# Patient Record
Sex: FEMALE | Race: WHITE | NOT HISPANIC OR LATINO | URBAN - METROPOLITAN AREA
[De-identification: names, ages, dates, MRNs, and addresses within clinical notes are randomized per-mention and may not be internally consistent; named-entity substitution may affect disease eponyms.]

---

## 2017-02-17 ENCOUNTER — OUTPATIENT (OUTPATIENT)
Dept: OUTPATIENT SERVICES | Facility: HOSPITAL | Age: 62
LOS: 1 days | End: 2017-02-17
Payer: MEDICAID

## 2017-02-17 ENCOUNTER — APPOINTMENT (OUTPATIENT)
Dept: CT IMAGING | Facility: CLINIC | Age: 62
End: 2017-02-17

## 2017-02-17 DIAGNOSIS — R63.4 ABNORMAL WEIGHT LOSS: ICD-10-CM

## 2017-02-17 DIAGNOSIS — R10.32 LEFT LOWER QUADRANT PAIN: ICD-10-CM

## 2017-02-17 DIAGNOSIS — Z87.19 PERSONAL HISTORY OF OTHER DISEASES OF THE DIGESTIVE SYSTEM: ICD-10-CM

## 2017-02-17 DIAGNOSIS — R10.31 RIGHT LOWER QUADRANT PAIN: ICD-10-CM

## 2017-02-17 PROCEDURE — 74177 CT ABD & PELVIS W/CONTRAST: CPT

## 2017-02-17 PROCEDURE — 82565 ASSAY OF CREATININE: CPT

## 2017-11-21 ENCOUNTER — EMERGENCY (EMERGENCY)
Facility: HOSPITAL | Age: 62
LOS: 1 days | Discharge: HOME | End: 2017-11-21
Attending: EMERGENCY MEDICINE | Admitting: EMERGENCY MEDICINE
Payer: MEDICAID

## 2017-11-21 VITALS — WEIGHT: 119.93 LBS

## 2017-11-21 VITALS
HEART RATE: 64 BPM | RESPIRATION RATE: 15 BRPM | DIASTOLIC BLOOD PRESSURE: 80 MMHG | OXYGEN SATURATION: 96 % | SYSTOLIC BLOOD PRESSURE: 155 MMHG | TEMPERATURE: 98 F

## 2017-11-21 DIAGNOSIS — Z88.0 ALLERGY STATUS TO PENICILLIN: ICD-10-CM
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
## 2017-11-21 DIAGNOSIS — M25.512 PAIN IN LEFT SHOULDER: ICD-10-CM

## 2017-11-21 DIAGNOSIS — S42.202A UNSPECIFIED FRACTURE OF UPPER END OF LEFT HUMERUS, INITIAL ENCOUNTER FOR CLOSED FRACTURE: ICD-10-CM

## 2017-11-21 DIAGNOSIS — R10.13 EPIGASTRIC PAIN: ICD-10-CM

## 2017-11-21 LAB
ALBUMIN SERPL ELPH-MCNC: 3.6 G/DL — SIGNIFICANT CHANGE UP (ref 3.3–5)
ALP SERPL-CCNC: 102 U/L — SIGNIFICANT CHANGE UP (ref 40–120)
ALT FLD-CCNC: 24 U/L — SIGNIFICANT CHANGE UP (ref 12–78)
ANION GAP SERPL CALC-SCNC: 9 MMOL/L — SIGNIFICANT CHANGE UP (ref 5–17)
APTT BLD: 34.5 SEC — SIGNIFICANT CHANGE UP (ref 27.5–37.4)
AST SERPL-CCNC: 24 U/L — SIGNIFICANT CHANGE UP (ref 15–37)
BASOPHILS # BLD AUTO: 0.1 K/UL — SIGNIFICANT CHANGE UP (ref 0–0.2)
BASOPHILS NFR BLD AUTO: 0.8 % — SIGNIFICANT CHANGE UP (ref 0–2)
BILIRUB SERPL-MCNC: 0.5 MG/DL — SIGNIFICANT CHANGE UP (ref 0.2–1.2)
BUN SERPL-MCNC: 18 MG/DL — SIGNIFICANT CHANGE UP (ref 7–23)
CALCIUM SERPL-MCNC: 9.3 MG/DL — SIGNIFICANT CHANGE UP (ref 8.5–10.1)
CHLORIDE SERPL-SCNC: 110 MMOL/L — HIGH (ref 96–108)
CO2 SERPL-SCNC: 26 MMOL/L — SIGNIFICANT CHANGE UP (ref 22–31)
CREAT SERPL-MCNC: 1.2 MG/DL — SIGNIFICANT CHANGE UP (ref 0.5–1.3)
EOSINOPHIL # BLD AUTO: 0.2 K/UL — SIGNIFICANT CHANGE UP (ref 0–0.5)
EOSINOPHIL NFR BLD AUTO: 2.9 % — SIGNIFICANT CHANGE UP (ref 0–6)
GLUCOSE SERPL-MCNC: 76 MG/DL — SIGNIFICANT CHANGE UP (ref 70–99)
HCT VFR BLD CALC: 46.6 % — HIGH (ref 34.5–45)
HGB BLD-MCNC: 14.3 G/DL — SIGNIFICANT CHANGE UP (ref 11.5–15.5)
INR BLD: 1.09 RATIO — SIGNIFICANT CHANGE UP (ref 0.88–1.16)
LIDOCAIN IGE QN: 123 U/L — SIGNIFICANT CHANGE UP (ref 73–393)
LYMPHOCYTES # BLD AUTO: 1.3 K/UL — SIGNIFICANT CHANGE UP (ref 1–3.3)
LYMPHOCYTES # BLD AUTO: 20.2 % — SIGNIFICANT CHANGE UP (ref 13–44)
MCHC RBC-ENTMCNC: 29 PG — SIGNIFICANT CHANGE UP (ref 27–34)
MCHC RBC-ENTMCNC: 30.7 GM/DL — LOW (ref 32–36)
MCV RBC AUTO: 94.4 FL — SIGNIFICANT CHANGE UP (ref 80–100)
MONOCYTES # BLD AUTO: 0.4 K/UL — SIGNIFICANT CHANGE UP (ref 0–0.9)
MONOCYTES NFR BLD AUTO: 6.4 % — SIGNIFICANT CHANGE UP (ref 1–9)
NEUTROPHILS # BLD AUTO: 4.5 K/UL — SIGNIFICANT CHANGE UP (ref 1.8–7.4)
NEUTROPHILS NFR BLD AUTO: 69.7 % — SIGNIFICANT CHANGE UP (ref 43–77)
PLATELET # BLD AUTO: 200 K/UL — SIGNIFICANT CHANGE UP (ref 150–400)
POTASSIUM SERPL-MCNC: 4 MMOL/L — SIGNIFICANT CHANGE UP (ref 3.5–5.3)
POTASSIUM SERPL-SCNC: 4 MMOL/L — SIGNIFICANT CHANGE UP (ref 3.5–5.3)
PROT SERPL-MCNC: 7.8 G/DL — SIGNIFICANT CHANGE UP (ref 6–8.3)
PROTHROM AB SERPL-ACNC: 11.9 SEC — SIGNIFICANT CHANGE UP (ref 9.8–12.7)
RBC # BLD: 4.93 M/UL — SIGNIFICANT CHANGE UP (ref 3.8–5.2)
RBC # FLD: 13 % — SIGNIFICANT CHANGE UP (ref 10.3–14.5)
SODIUM SERPL-SCNC: 145 MMOL/L — SIGNIFICANT CHANGE UP (ref 135–145)
WBC # BLD: 6.4 K/UL — SIGNIFICANT CHANGE UP (ref 3.8–10.5)
WBC # FLD AUTO: 6.4 K/UL — SIGNIFICANT CHANGE UP (ref 3.8–10.5)

## 2017-11-21 PROCEDURE — 96375 TX/PRO/DX INJ NEW DRUG ADDON: CPT

## 2017-11-21 PROCEDURE — 83690 ASSAY OF LIPASE: CPT

## 2017-11-21 PROCEDURE — 70450 CT HEAD/BRAIN W/O DYE: CPT | Mod: 26

## 2017-11-21 PROCEDURE — 99285 EMERGENCY DEPT VISIT HI MDM: CPT

## 2017-11-21 PROCEDURE — 85610 PROTHROMBIN TIME: CPT

## 2017-11-21 PROCEDURE — 73060 X-RAY EXAM OF HUMERUS: CPT | Mod: 26,LT

## 2017-11-21 PROCEDURE — 85730 THROMBOPLASTIN TIME PARTIAL: CPT

## 2017-11-21 PROCEDURE — 70450 CT HEAD/BRAIN W/O DYE: CPT

## 2017-11-21 PROCEDURE — 71010: CPT | Mod: 26

## 2017-11-21 PROCEDURE — 73060 X-RAY EXAM OF HUMERUS: CPT

## 2017-11-21 PROCEDURE — 71045 X-RAY EXAM CHEST 1 VIEW: CPT

## 2017-11-21 PROCEDURE — 99284 EMERGENCY DEPT VISIT MOD MDM: CPT | Mod: 25

## 2017-11-21 PROCEDURE — 85027 COMPLETE CBC AUTOMATED: CPT

## 2017-11-21 PROCEDURE — 80053 COMPREHEN METABOLIC PANEL: CPT

## 2017-11-21 PROCEDURE — 73030 X-RAY EXAM OF SHOULDER: CPT | Mod: 26,LT

## 2017-11-21 PROCEDURE — 96374 THER/PROPH/DIAG INJ IV PUSH: CPT

## 2017-11-21 PROCEDURE — 76705 ECHO EXAM OF ABDOMEN: CPT

## 2017-11-21 PROCEDURE — 73030 X-RAY EXAM OF SHOULDER: CPT

## 2017-11-21 PROCEDURE — 76705 ECHO EXAM OF ABDOMEN: CPT | Mod: 26

## 2017-11-21 RX ORDER — MORPHINE SULFATE 50 MG/1
4 CAPSULE, EXTENDED RELEASE ORAL ONCE
Qty: 0 | Refills: 0 | Status: DISCONTINUED | OUTPATIENT
Start: 2017-11-21 | End: 2017-11-21

## 2017-11-21 RX ORDER — OXYCODONE HYDROCHLORIDE 5 MG/1
1 TABLET ORAL
Qty: 16 | Refills: 0 | OUTPATIENT
Start: 2017-11-21 | End: 2017-11-25

## 2017-11-21 RX ORDER — HYDROMORPHONE HYDROCHLORIDE 2 MG/ML
0.5 INJECTION INTRAMUSCULAR; INTRAVENOUS; SUBCUTANEOUS ONCE
Qty: 0 | Refills: 0 | Status: DISCONTINUED | OUTPATIENT
Start: 2017-11-21 | End: 2017-11-21

## 2017-11-21 RX ORDER — ONDANSETRON 8 MG/1
1 TABLET, FILM COATED ORAL
Qty: 9 | Refills: 0 | OUTPATIENT
Start: 2017-11-21 | End: 2017-11-24

## 2017-11-21 RX ADMIN — HYDROMORPHONE HYDROCHLORIDE 0.5 MILLIGRAM(S): 2 INJECTION INTRAMUSCULAR; INTRAVENOUS; SUBCUTANEOUS at 09:14

## 2017-11-21 RX ADMIN — MORPHINE SULFATE 4 MILLIGRAM(S): 50 CAPSULE, EXTENDED RELEASE ORAL at 07:42

## 2017-11-21 RX ADMIN — MORPHINE SULFATE 4 MILLIGRAM(S): 50 CAPSULE, EXTENDED RELEASE ORAL at 07:57

## 2017-11-21 RX ADMIN — HYDROMORPHONE HYDROCHLORIDE 0.5 MILLIGRAM(S): 2 INJECTION INTRAMUSCULAR; INTRAVENOUS; SUBCUTANEOUS at 09:32

## 2017-11-21 NOTE — ED PROVIDER NOTE - OBJECTIVE STATEMENT
61 yo female hx of seizures s/p mechanical fall today while at home, thought her door was closed but was actually open and fell through landed on her left shoulder, admits to hitting the side of her head, no LOC, no neck pain c/o left shoulder pain with difficulty ranging it.  Also c/o of upper abdominal pain radiating to the back x 1 week.  No nausea/vomiting/diarrhea.  No fever/chills.

## 2017-11-21 NOTE — ED PROVIDER NOTE - PRINCIPAL DIAGNOSIS
Shoulder fracture, left, closed, initial encounter Closed fracture of proximal end of left humerus, unspecified fracture morphology, initial encounter

## 2017-11-21 NOTE — ED PROVIDER NOTE - PROGRESS NOTE DETAILS
discussed cased with Sung stevens resident, recommend sling and f/u with Dr. Lobato, does not feel the need to see patient in ED patient feels better after medications, offered admission for pain control, but patient declined, her with daughter and grandson who are comfortable with taking back home.  Labs and imaging explained to patient, will f/u with PMD Dr. Frederick. after discharge paper given, patient now stating she can't take percocet (despite telling patient what see was receiving), now requesting vicodin

## 2017-11-21 NOTE — CHART NOTE - NSCHARTNOTEFT_GEN_A_CORE
Asked by ed attending to eval patient for nondisplaced proximal humerus fracture which is an orthopedic issue and out of scope of practice of ER physician.    62y Female RHD presents c/o L shoulder pain sp mechanical fall. +HS. Denies LOC. Denies numbness/tingling. Denies fever/chills. Denies pain/injury elsewhere. No other complaints.    HEALTH ISSUES - PROBLEM Dx:        MEDICATIONS  (STANDING):    Allergies    penicillin (Anaphylaxis)    Intolerances                            14.3   6.4   )-----------( 200      ( 21 Nov 2017 07:53 )             46.6     21 Nov 2017 07:53    145    |  110    |  18     ----------------------------<  76     4.0     |  26     |  1.20     Ca    9.3        21 Nov 2017 07:53    TPro  7.8    /  Alb  3.6    /  TBili  0.5    /  DBili  x      /  AST  24     /  ALT  24     /  AlkPhos  102    21 Nov 2017 07:53    PT/INR - ( 21 Nov 2017 07:53 )   PT: 11.9 sec;   INR: 1.09 ratio         PTT - ( 21 Nov 2017 07:53 )  PTT:34.5 sec  Vital Signs Last 24 Hrs  T(C): 36.4 (11-21-17 @ 06:56), Max: 36.4 (11-21-17 @ 06:56)  T(F): 97.6 (11-21-17 @ 06:56), Max: 97.6 (11-21-17 @ 06:56)  HR: 52 (11-21-17 @ 06:56) (52 - 52)  BP: 156/62 (11-21-17 @ 06:56) (156/62 - 156/62)  BP(mean): --  RR: 15 (11-21-17 @ 06:56) (15 - 15)  SpO2: 100% (11-21-17 @ 06:56) (100% - 100%)  Imaging: XR demonstrates R/L proximal humerus fracture    Physical Exam  Gen: NAD  LUE: Skin intact,  +ttp shoulder, +r/u/m/ain/pin function, SILT, radial pulse intact, compartments soft/compressible, warm/well perfused    A/P: 62y Female with L proximal humerus fracture  Pain control  NWB LUE in sling, keep c/d/I   Pendulum exercises  Ice  Active movement of fingers/elbow encouraged  Ca/Vit D, outpt osteoporosis workup  Possible need for surgical intervention discussed with patient  All question answered  Follow up with Dr. Lobato as outpatient within 1 week, call office for appointment   Ortho stable

## 2017-11-21 NOTE — ED PROVIDER NOTE - CARE PLAN
Principal Discharge DX:	Shoulder fracture, left, closed, initial encounter  Secondary Diagnosis:	Upper abdominal pain Principal Discharge DX:	Shoulder fracture, left, closed, initial encounter  Secondary Diagnosis:	Upper abdominal pain  Secondary Diagnosis:	Epigastric pain Principal Discharge DX:	Closed fracture of proximal end of left humerus, unspecified fracture morphology, initial encounter  Secondary Diagnosis:	Upper abdominal pain  Secondary Diagnosis:	Epigastric pain

## 2017-11-22 ENCOUNTER — EMERGENCY (EMERGENCY)
Facility: HOSPITAL | Age: 62
LOS: 1 days | Discharge: HOME | End: 2017-11-22
Attending: EMERGENCY MEDICINE | Admitting: EMERGENCY MEDICINE
Payer: MEDICAID

## 2017-11-22 VITALS
RESPIRATION RATE: 18 BRPM | SYSTOLIC BLOOD PRESSURE: 159 MMHG | TEMPERATURE: 99 F | HEIGHT: 62 IN | HEART RATE: 67 BPM | WEIGHT: 121.25 LBS | DIASTOLIC BLOOD PRESSURE: 79 MMHG | OXYGEN SATURATION: 95 %

## 2017-11-22 LAB
ALBUMIN SERPL ELPH-MCNC: 2.9 G/DL — LOW (ref 3.3–5)
ALP SERPL-CCNC: 87 U/L — SIGNIFICANT CHANGE UP (ref 40–120)
ALT FLD-CCNC: 19 U/L — SIGNIFICANT CHANGE UP (ref 12–78)
ANION GAP SERPL CALC-SCNC: 12 MMOL/L — SIGNIFICANT CHANGE UP (ref 5–17)
AST SERPL-CCNC: 26 U/L — SIGNIFICANT CHANGE UP (ref 15–37)
BASOPHILS # BLD AUTO: 0 K/UL — SIGNIFICANT CHANGE UP (ref 0–0.2)
BASOPHILS NFR BLD AUTO: 0.5 % — SIGNIFICANT CHANGE UP (ref 0–2)
BILIRUB SERPL-MCNC: 0.5 MG/DL — SIGNIFICANT CHANGE UP (ref 0.2–1.2)
BUN SERPL-MCNC: 19 MG/DL — SIGNIFICANT CHANGE UP (ref 7–23)
CALCIUM SERPL-MCNC: 8.6 MG/DL — SIGNIFICANT CHANGE UP (ref 8.5–10.1)
CHLORIDE SERPL-SCNC: 107 MMOL/L — SIGNIFICANT CHANGE UP (ref 96–108)
CO2 SERPL-SCNC: 19 MMOL/L — LOW (ref 22–31)
CREAT SERPL-MCNC: 0.88 MG/DL — SIGNIFICANT CHANGE UP (ref 0.5–1.3)
EOSINOPHIL # BLD AUTO: 0.2 K/UL — SIGNIFICANT CHANGE UP (ref 0–0.5)
EOSINOPHIL NFR BLD AUTO: 2.2 % — SIGNIFICANT CHANGE UP (ref 0–6)
GLUCOSE SERPL-MCNC: 83 MG/DL — SIGNIFICANT CHANGE UP (ref 70–99)
HCT VFR BLD CALC: 41.6 % — SIGNIFICANT CHANGE UP (ref 34.5–45)
HGB BLD-MCNC: 13.5 G/DL — SIGNIFICANT CHANGE UP (ref 11.5–15.5)
LYMPHOCYTES # BLD AUTO: 0.8 K/UL — LOW (ref 1–3.3)
LYMPHOCYTES # BLD AUTO: 11 % — LOW (ref 13–44)
MCHC RBC-ENTMCNC: 29.8 PG — SIGNIFICANT CHANGE UP (ref 27–34)
MCHC RBC-ENTMCNC: 32.5 GM/DL — SIGNIFICANT CHANGE UP (ref 32–36)
MCV RBC AUTO: 91.7 FL — SIGNIFICANT CHANGE UP (ref 80–100)
MONOCYTES # BLD AUTO: 0.6 K/UL — SIGNIFICANT CHANGE UP (ref 0–0.9)
MONOCYTES NFR BLD AUTO: 7.4 % — SIGNIFICANT CHANGE UP (ref 1–9)
NEUTROPHILS # BLD AUTO: 6.1 K/UL — SIGNIFICANT CHANGE UP (ref 1.8–7.4)
NEUTROPHILS NFR BLD AUTO: 79 % — HIGH (ref 43–77)
PLATELET # BLD AUTO: 173 K/UL — SIGNIFICANT CHANGE UP (ref 150–400)
POTASSIUM SERPL-MCNC: 5 MMOL/L — SIGNIFICANT CHANGE UP (ref 3.5–5.3)
POTASSIUM SERPL-SCNC: 5 MMOL/L — SIGNIFICANT CHANGE UP (ref 3.5–5.3)
PROT SERPL-MCNC: 7.3 G/DL — SIGNIFICANT CHANGE UP (ref 6–8.3)
RBC # BLD: 4.53 M/UL — SIGNIFICANT CHANGE UP (ref 3.8–5.2)
RBC # FLD: 12.8 % — SIGNIFICANT CHANGE UP (ref 10.3–14.5)
SODIUM SERPL-SCNC: 138 MMOL/L — SIGNIFICANT CHANGE UP (ref 135–145)
WBC # BLD: 7.7 K/UL — SIGNIFICANT CHANGE UP (ref 3.8–10.5)
WBC # FLD AUTO: 7.7 K/UL — SIGNIFICANT CHANGE UP (ref 3.8–10.5)

## 2017-11-22 PROCEDURE — 96374 THER/PROPH/DIAG INJ IV PUSH: CPT

## 2017-11-22 PROCEDURE — 96361 HYDRATE IV INFUSION ADD-ON: CPT

## 2017-11-22 PROCEDURE — 85610 PROTHROMBIN TIME: CPT

## 2017-11-22 PROCEDURE — 85027 COMPLETE CBC AUTOMATED: CPT

## 2017-11-22 PROCEDURE — 80053 COMPREHEN METABOLIC PANEL: CPT

## 2017-11-22 PROCEDURE — 36415 COLL VENOUS BLD VENIPUNCTURE: CPT

## 2017-11-22 PROCEDURE — 99284 EMERGENCY DEPT VISIT MOD MDM: CPT | Mod: 25

## 2017-11-22 PROCEDURE — 85730 THROMBOPLASTIN TIME PARTIAL: CPT

## 2017-11-22 PROCEDURE — 99284 EMERGENCY DEPT VISIT MOD MDM: CPT

## 2017-11-22 PROCEDURE — 96375 TX/PRO/DX INJ NEW DRUG ADDON: CPT

## 2017-11-22 RX ORDER — METOCLOPRAMIDE HCL 10 MG
10 TABLET ORAL ONCE
Qty: 0 | Refills: 0 | Status: COMPLETED | OUTPATIENT
Start: 2017-11-22 | End: 2017-11-22

## 2017-11-22 RX ORDER — HYDROMORPHONE HYDROCHLORIDE 2 MG/ML
0.5 INJECTION INTRAMUSCULAR; INTRAVENOUS; SUBCUTANEOUS ONCE
Qty: 0 | Refills: 0 | Status: DISCONTINUED | OUTPATIENT
Start: 2017-11-22 | End: 2017-11-22

## 2017-11-22 RX ORDER — SODIUM CHLORIDE 9 MG/ML
1000 INJECTION INTRAMUSCULAR; INTRAVENOUS; SUBCUTANEOUS ONCE
Qty: 0 | Refills: 0 | Status: COMPLETED | OUTPATIENT
Start: 2017-11-22 | End: 2017-11-22

## 2017-11-22 RX ORDER — KETOROLAC TROMETHAMINE 30 MG/ML
30 SYRINGE (ML) INJECTION ONCE
Qty: 0 | Refills: 0 | Status: DISCONTINUED | OUTPATIENT
Start: 2017-11-22 | End: 2017-11-22

## 2017-11-22 RX ADMIN — Medication 10 MILLIGRAM(S): at 22:24

## 2017-11-22 RX ADMIN — HYDROMORPHONE HYDROCHLORIDE 0.5 MILLIGRAM(S): 2 INJECTION INTRAMUSCULAR; INTRAVENOUS; SUBCUTANEOUS at 23:35

## 2017-11-22 RX ADMIN — Medication 30 MILLIGRAM(S): at 23:48

## 2017-11-22 RX ADMIN — Medication 30 MILLIGRAM(S): at 22:23

## 2017-11-22 RX ADMIN — SODIUM CHLORIDE 1000 MILLILITER(S): 9 INJECTION INTRAMUSCULAR; INTRAVENOUS; SUBCUTANEOUS at 22:25

## 2017-11-22 NOTE — ED ADULT NURSE NOTE - OBJECTIVE STATEMENT
Pt was received with left arm sling and ecchymosis and C/O N/V and severe headache and left arm pain. Pt was evaluated by Dr. Bain who is aware of the above assessments. Pt was given her stat meds as ordered. No s/s of distress noted. Continue to monitor pt.

## 2017-11-22 NOTE — ED PROVIDER NOTE - OBJECTIVE STATEMENT
pmdaphne - lee pt with hx migraines c/o headache with n/v c/w her chronic migraines s/p stopping her fioricet 2 days ago. pt also relates didn't take xanax today. no fevers, chills, rash, dizziness, cp, sob, abd pain, weakness, numbness, vision or speech changes.  pmd - lee

## 2017-11-22 NOTE — ED PROVIDER NOTE - MUSCULOSKELETAL, MLM
Spine appears normal, left arm in sling (due to recent shoulder fx), otherwise range of motion is not limited, no muscle or joint tenderness

## 2017-11-22 NOTE — ED ADULT NURSE NOTE - CHPI ED SYMPTOMS NEG
no dysuria/no chills/no abdominal distension/no blood in stool/no diarrhea/no fever/no burning urination

## 2017-11-22 NOTE — ED ADULT NURSE NOTE - AS SC BRADEN [employer]
Billing Type: Third-Party Bill Notification Instructions: Patient will be notified of biopsy results. However, patient instructed to call the office if not contacted within 2 weeks. Anesthesia Type: 1% lidocaine with epinephrine Consent was obtained and risks were reviewed including but not limited to scarring, infection, bleeding, scabbing, incomplete removal, nerve damage and allergy to anesthesia. Detail Level: Detailed Price (Use Numbers Only, No Special Characters Or $): 250.00 Hemostasis: Drysol Post-Care Instructions: I reviewed with the patient in detail post-care instructions. Patient is to keep the biopsy site dry overnight, and then apply bacitracin twice daily until healed. Patient may apply hydrogen peroxide soaks to remove any crusting. Wound Care: Vaseline Biopsy Method: Dermablade Anesthesia Volume In Cc: 1.5 Size Of Lesion In Cm (Optional): 0 Biopsy Type: H and E Price (Use Numbers Only, No Special Characters Or $): 125.00 (3) slightly limited

## 2017-11-23 VITALS
DIASTOLIC BLOOD PRESSURE: 68 MMHG | TEMPERATURE: 98 F | RESPIRATION RATE: 16 BRPM | OXYGEN SATURATION: 97 % | SYSTOLIC BLOOD PRESSURE: 133 MMHG | HEART RATE: 63 BPM

## 2017-11-23 LAB
APTT BLD: 34.4 SEC — SIGNIFICANT CHANGE UP (ref 27.5–37.4)
INR BLD: 1.24 RATIO — HIGH (ref 0.88–1.16)
PROTHROM AB SERPL-ACNC: 13.6 SEC — HIGH (ref 9.8–12.7)

## 2017-11-23 RX ADMIN — HYDROMORPHONE HYDROCHLORIDE 0.5 MILLIGRAM(S): 2 INJECTION INTRAMUSCULAR; INTRAVENOUS; SUBCUTANEOUS at 00:28

## 2017-12-18 ENCOUNTER — APPOINTMENT (OUTPATIENT)
Dept: ORTHOPEDIC SURGERY | Facility: CLINIC | Age: 62
End: 2017-12-18
Payer: MEDICAID

## 2017-12-18 VITALS
WEIGHT: 120 LBS | HEIGHT: 62 IN | BODY MASS INDEX: 22.08 KG/M2 | DIASTOLIC BLOOD PRESSURE: 78 MMHG | SYSTOLIC BLOOD PRESSURE: 110 MMHG | HEART RATE: 82 BPM

## 2017-12-18 DIAGNOSIS — Z78.9 OTHER SPECIFIED HEALTH STATUS: ICD-10-CM

## 2017-12-18 DIAGNOSIS — Z87.898 PERSONAL HISTORY OF OTHER SPECIFIED CONDITIONS: ICD-10-CM

## 2017-12-18 DIAGNOSIS — Z80.9 FAMILY HISTORY OF MALIGNANT NEOPLASM, UNSPECIFIED: ICD-10-CM

## 2017-12-18 PROCEDURE — 73030 X-RAY EXAM OF SHOULDER: CPT | Mod: LT

## 2017-12-18 PROCEDURE — 99203 OFFICE O/P NEW LOW 30 MIN: CPT

## 2018-01-22 ENCOUNTER — APPOINTMENT (OUTPATIENT)
Dept: ORTHOPEDIC SURGERY | Facility: CLINIC | Age: 63
End: 2018-01-22
Payer: MEDICAID

## 2018-01-22 VITALS — HEIGHT: 62 IN | RESPIRATION RATE: 14 BRPM | WEIGHT: 120 LBS | BODY MASS INDEX: 22.08 KG/M2

## 2018-01-22 DIAGNOSIS — S42.295A OTHER NONDISPLACED FRACTURE OF UPPER END OF LEFT HUMERUS, INITIAL ENCOUNTER FOR CLOSED FRACTURE: ICD-10-CM

## 2018-01-22 PROCEDURE — 99214 OFFICE O/P EST MOD 30 MIN: CPT

## 2018-01-22 PROCEDURE — 73030 X-RAY EXAM OF SHOULDER: CPT | Mod: LT

## 2018-01-26 PROBLEM — S42.295A OTHER CLOSED NONDISPLACED FRACTURE OF PROXIMAL END OF LEFT HUMERUS, INITIAL ENCOUNTER: Status: ACTIVE | Noted: 2017-12-18

## 2018-01-28 ENCOUNTER — EMERGENCY (EMERGENCY)
Facility: HOSPITAL | Age: 63
LOS: 1 days | Discharge: HOME | End: 2018-01-28
Attending: EMERGENCY MEDICINE | Admitting: EMERGENCY MEDICINE
Payer: MEDICAID

## 2018-01-28 VITALS
OXYGEN SATURATION: 99 % | SYSTOLIC BLOOD PRESSURE: 140 MMHG | RESPIRATION RATE: 17 BRPM | DIASTOLIC BLOOD PRESSURE: 62 MMHG | TEMPERATURE: 98 F | HEART RATE: 72 BPM

## 2018-01-28 VITALS
HEIGHT: 62 IN | TEMPERATURE: 98 F | OXYGEN SATURATION: 98 % | WEIGHT: 115.08 LBS | RESPIRATION RATE: 16 BRPM | HEART RATE: 68 BPM | DIASTOLIC BLOOD PRESSURE: 80 MMHG | SYSTOLIC BLOOD PRESSURE: 189 MMHG

## 2018-01-28 PROCEDURE — 96375 TX/PRO/DX INJ NEW DRUG ADDON: CPT

## 2018-01-28 PROCEDURE — 99284 EMERGENCY DEPT VISIT MOD MDM: CPT

## 2018-01-28 PROCEDURE — 99284 EMERGENCY DEPT VISIT MOD MDM: CPT | Mod: 25

## 2018-01-28 PROCEDURE — 96374 THER/PROPH/DIAG INJ IV PUSH: CPT

## 2018-01-28 RX ORDER — METOCLOPRAMIDE HCL 10 MG
10 TABLET ORAL ONCE
Qty: 0 | Refills: 0 | Status: COMPLETED | OUTPATIENT
Start: 2018-01-28 | End: 2018-01-28

## 2018-01-28 RX ORDER — HYDROMORPHONE HYDROCHLORIDE 2 MG/ML
1 INJECTION INTRAMUSCULAR; INTRAVENOUS; SUBCUTANEOUS ONCE
Qty: 0 | Refills: 0 | Status: DISCONTINUED | OUTPATIENT
Start: 2018-01-28 | End: 2018-01-28

## 2018-01-28 RX ORDER — KETOROLAC TROMETHAMINE 30 MG/ML
30 SYRINGE (ML) INJECTION ONCE
Qty: 0 | Refills: 0 | Status: DISCONTINUED | OUTPATIENT
Start: 2018-01-28 | End: 2018-01-28

## 2018-01-28 RX ORDER — METOCLOPRAMIDE HCL 10 MG
10 TABLET ORAL ONCE
Qty: 0 | Refills: 0 | Status: DISCONTINUED | OUTPATIENT
Start: 2018-01-28 | End: 2018-01-28

## 2018-01-28 RX ORDER — SODIUM CHLORIDE 9 MG/ML
1000 INJECTION INTRAMUSCULAR; INTRAVENOUS; SUBCUTANEOUS ONCE
Qty: 0 | Refills: 0 | Status: COMPLETED | OUTPATIENT
Start: 2018-01-28 | End: 2018-01-28

## 2018-01-28 RX ORDER — PROCHLORPERAZINE MALEATE 5 MG
10 TABLET ORAL ONCE
Qty: 0 | Refills: 0 | Status: COMPLETED | OUTPATIENT
Start: 2018-01-28 | End: 2018-01-28

## 2018-01-28 RX ADMIN — HYDROMORPHONE HYDROCHLORIDE 1 MILLIGRAM(S): 2 INJECTION INTRAMUSCULAR; INTRAVENOUS; SUBCUTANEOUS at 08:39

## 2018-01-28 RX ADMIN — Medication 125 MILLIGRAM(S): at 05:38

## 2018-01-28 RX ADMIN — Medication 30 MILLIGRAM(S): at 05:38

## 2018-01-28 RX ADMIN — Medication 30 MILLIGRAM(S): at 06:19

## 2018-01-28 RX ADMIN — Medication 10 MILLIGRAM(S): at 06:43

## 2018-01-28 RX ADMIN — SODIUM CHLORIDE 2000 MILLILITER(S): 9 INJECTION INTRAMUSCULAR; INTRAVENOUS; SUBCUTANEOUS at 05:37

## 2018-01-28 NOTE — ED ADULT NURSE NOTE - OBJECTIVE STATEMENT
62 year old female presents to the ED with c/o migraine x 10 hours. A+Ox  4. anxious. states migraine started at 4pm the previous night. On 200 mg Topamax. Tried tylenol and coffee. Ambulation WDL without incidence or need of assistance. Lungs clear lenora. S1/S2. Reports headache and NV. skin warm dry and intact. asking for dilaudid.

## 2018-01-28 NOTE — ED PROVIDER NOTE - PROGRESS NOTE DETAILS
pt still with 7/10 ha after tx with tx with one dose dilaudid and d/c for neuro fu Pt feeling much improved, wishes to go home.  Dw pt re paiz prec / inst, importance of close, prompt fu and to return with any changes or concerns

## 2018-01-28 NOTE — ED PROVIDER NOTE - CHPI ED SYMPTOMS NEG
no confusion/no blurred vision/no numbness/no dizziness/no fever/no loss of consciousness/no change in level of consciousness/no weakness

## 2018-01-28 NOTE — ED PROVIDER NOTE - CONSTITUTIONAL, MLM
normal... disheveled, awake, alert, oriented to person, place, time/situation and in no apparent distress.

## 2018-01-28 NOTE — ED PROVIDER NOTE - ENMT, MLM
Airway patent, Nasal mucosa clear. Mm dry. Throat has no vesicles, no oropharyngeal exudates and uvula is midline. poor dentition

## 2018-01-28 NOTE — ED PROVIDER NOTE - MEDICAL DECISION MAKING DETAILS
pt with migraine headache, typical severe type that doesn't respond to meds, pt states ONLY dilaudid works, pt told opiates not first line tx and will tx with compazine, toradol, ivf, o2. steroids. reassess

## 2018-01-28 NOTE — ED PROVIDER NOTE - OBJECTIVE STATEMENT
Pt is a 61 yo female hx migraines on topamax, sz,  pt states with 3 dasy of typical migraine, progressively severe with n/v and photophobia. pain is diffuse throbbing ha, 10/10.  pt states that often caffeine and rest with topamax break headaches, but occasionally they get this severe and only dilaudid works for her. no f/c, numbness, weakness, vision change, pt has had many headaches this severe in past

## 2018-07-01 ENCOUNTER — OUTPATIENT (OUTPATIENT)
Dept: OUTPATIENT SERVICES | Facility: HOSPITAL | Age: 63
LOS: 1 days | End: 2018-07-01
Payer: MEDICAID

## 2018-07-01 PROCEDURE — G9001: CPT

## 2018-07-16 RX ADMIN — Medication 650 MILLIGRAM(S): at 21:39

## 2018-07-19 ENCOUNTER — INPATIENT (INPATIENT)
Facility: HOSPITAL | Age: 63
LOS: 8 days | Discharge: HOME | DRG: 552 | End: 2018-07-28
Attending: INTERNAL MEDICINE | Admitting: INTERNAL MEDICINE
Payer: MEDICAID

## 2018-07-19 VITALS
WEIGHT: 115.08 LBS | TEMPERATURE: 97 F | HEART RATE: 51 BPM | RESPIRATION RATE: 16 BRPM | OXYGEN SATURATION: 98 % | DIASTOLIC BLOOD PRESSURE: 64 MMHG | SYSTOLIC BLOOD PRESSURE: 138 MMHG

## 2018-07-19 DIAGNOSIS — R41.0 DISORIENTATION, UNSPECIFIED: ICD-10-CM

## 2018-07-19 LAB
ALBUMIN SERPL ELPH-MCNC: 3.6 G/DL — SIGNIFICANT CHANGE UP (ref 3.3–5)
ALP SERPL-CCNC: 85 U/L — SIGNIFICANT CHANGE UP (ref 40–120)
ALT FLD-CCNC: 17 U/L — SIGNIFICANT CHANGE UP (ref 10–45)
ANION GAP SERPL CALC-SCNC: 13 MMOL/L — SIGNIFICANT CHANGE UP (ref 5–17)
APPEARANCE UR: CLEAR — SIGNIFICANT CHANGE UP
APTT BLD: 31.7 SEC — SIGNIFICANT CHANGE UP (ref 27.5–37.4)
AST SERPL-CCNC: 21 U/L — SIGNIFICANT CHANGE UP (ref 10–40)
BASE EXCESS BLDV CALC-SCNC: -2.4 MMOL/L — LOW (ref -2–2)
BILIRUB SERPL-MCNC: 0.2 MG/DL — SIGNIFICANT CHANGE UP (ref 0.2–1.2)
BILIRUB UR-MCNC: NEGATIVE — SIGNIFICANT CHANGE UP
BUN SERPL-MCNC: 31 MG/DL — HIGH (ref 7–23)
CA-I SERPL-SCNC: 1.24 MMOL/L — SIGNIFICANT CHANGE UP (ref 1.12–1.3)
CALCIUM SERPL-MCNC: 9.1 MG/DL — SIGNIFICANT CHANGE UP (ref 8.4–10.5)
CHLORIDE BLDV-SCNC: 115 MMOL/L — HIGH (ref 96–108)
CHLORIDE SERPL-SCNC: 110 MMOL/L — HIGH (ref 96–108)
CO2 BLDV-SCNC: 25 MMOL/L — SIGNIFICANT CHANGE UP (ref 22–30)
CO2 SERPL-SCNC: 20 MMOL/L — LOW (ref 22–31)
COLOR SPEC: SIGNIFICANT CHANGE UP
CREAT SERPL-MCNC: 1.36 MG/DL — HIGH (ref 0.5–1.3)
DIFF PNL FLD: NEGATIVE — SIGNIFICANT CHANGE UP
GAS PNL BLDV: 139 MMOL/L — SIGNIFICANT CHANGE UP (ref 136–145)
GAS PNL BLDV: SIGNIFICANT CHANGE UP
GLUCOSE BLDV-MCNC: 128 MG/DL — HIGH (ref 70–99)
GLUCOSE SERPL-MCNC: 138 MG/DL — HIGH (ref 70–99)
GLUCOSE UR QL: NEGATIVE — SIGNIFICANT CHANGE UP
HCO3 BLDV-SCNC: 23 MMOL/L — SIGNIFICANT CHANGE UP (ref 21–29)
HCT VFR BLD CALC: 42 % — SIGNIFICANT CHANGE UP (ref 34.5–45)
HCT VFR BLDA CALC: 42 % — SIGNIFICANT CHANGE UP (ref 39–50)
HGB BLD CALC-MCNC: 13.8 G/DL — SIGNIFICANT CHANGE UP (ref 11.5–15.5)
HGB BLD-MCNC: 13.9 G/DL — SIGNIFICANT CHANGE UP (ref 11.5–15.5)
INR BLD: 1.05 RATIO — SIGNIFICANT CHANGE UP (ref 0.88–1.16)
KETONES UR-MCNC: NEGATIVE — SIGNIFICANT CHANGE UP
LACTATE BLDV-MCNC: 1.5 MMOL/L — SIGNIFICANT CHANGE UP (ref 0.7–2)
LEUKOCYTE ESTERASE UR-ACNC: ABNORMAL
MCHC RBC-ENTMCNC: 30.7 PG — SIGNIFICANT CHANGE UP (ref 27–34)
MCHC RBC-ENTMCNC: 33.2 GM/DL — SIGNIFICANT CHANGE UP (ref 32–36)
MCV RBC AUTO: 92.5 FL — SIGNIFICANT CHANGE UP (ref 80–100)
NITRITE UR-MCNC: NEGATIVE — SIGNIFICANT CHANGE UP
PCO2 BLDV: 46 MMHG — SIGNIFICANT CHANGE UP (ref 35–50)
PH BLDV: 7.32 — LOW (ref 7.35–7.45)
PH UR: 7 — SIGNIFICANT CHANGE UP (ref 5–8)
PLATELET # BLD AUTO: 158 K/UL — SIGNIFICANT CHANGE UP (ref 150–400)
PO2 BLDV: 41 MMHG — SIGNIFICANT CHANGE UP (ref 25–45)
POTASSIUM BLDV-SCNC: 4.4 MMOL/L — SIGNIFICANT CHANGE UP (ref 3.5–5.3)
POTASSIUM SERPL-MCNC: 4 MMOL/L — SIGNIFICANT CHANGE UP (ref 3.5–5.3)
POTASSIUM SERPL-SCNC: 4 MMOL/L — SIGNIFICANT CHANGE UP (ref 3.5–5.3)
PROT SERPL-MCNC: 6.8 G/DL — SIGNIFICANT CHANGE UP (ref 6–8.3)
PROT UR-MCNC: NEGATIVE — SIGNIFICANT CHANGE UP
PROTHROM AB SERPL-ACNC: 11.4 SEC — SIGNIFICANT CHANGE UP (ref 9.8–12.7)
RBC # BLD: 4.54 M/UL — SIGNIFICANT CHANGE UP (ref 3.8–5.2)
RBC # FLD: 12.3 % — SIGNIFICANT CHANGE UP (ref 10.3–14.5)
RBC CASTS # UR COMP ASSIST: ABNORMAL /HPF (ref 0–2)
SAO2 % BLDV: 73 % — SIGNIFICANT CHANGE UP (ref 67–88)
SODIUM SERPL-SCNC: 143 MMOL/L — SIGNIFICANT CHANGE UP (ref 135–145)
SP GR SPEC: 1.01 — LOW (ref 1.01–1.02)
UROBILINOGEN FLD QL: NEGATIVE — SIGNIFICANT CHANGE UP
WBC # BLD: 5.4 K/UL — SIGNIFICANT CHANGE UP (ref 3.8–10.5)
WBC # FLD AUTO: 5.4 K/UL — SIGNIFICANT CHANGE UP (ref 3.8–10.5)
WBC UR QL: SIGNIFICANT CHANGE UP /HPF (ref 0–5)

## 2018-07-19 PROCEDURE — 99284 EMERGENCY DEPT VISIT MOD MDM: CPT | Mod: 25

## 2018-07-19 PROCEDURE — 93010 ELECTROCARDIOGRAM REPORT: CPT

## 2018-07-19 PROCEDURE — 72128 CT CHEST SPINE W/O DYE: CPT | Mod: 26

## 2018-07-19 PROCEDURE — 70450 CT HEAD/BRAIN W/O DYE: CPT | Mod: 26

## 2018-07-19 RX ORDER — SODIUM CHLORIDE 9 MG/ML
500 INJECTION INTRAMUSCULAR; INTRAVENOUS; SUBCUTANEOUS ONCE
Qty: 0 | Refills: 0 | Status: COMPLETED | OUTPATIENT
Start: 2018-07-19 | End: 2018-07-19

## 2018-07-19 RX ORDER — DIPHENHYDRAMINE HCL 50 MG
50 CAPSULE ORAL ONCE
Qty: 0 | Refills: 0 | Status: DISCONTINUED | OUTPATIENT
Start: 2018-07-19 | End: 2018-07-19

## 2018-07-19 RX ORDER — IBUPROFEN 200 MG
600 TABLET ORAL ONCE
Qty: 0 | Refills: 0 | Status: DISCONTINUED | OUTPATIENT
Start: 2018-07-19 | End: 2018-07-19

## 2018-07-19 RX ORDER — ACETAMINOPHEN 500 MG
975 TABLET ORAL ONCE
Qty: 0 | Refills: 0 | Status: COMPLETED | OUTPATIENT
Start: 2018-07-19 | End: 2018-07-19

## 2018-07-19 RX ORDER — MORPHINE SULFATE 50 MG/1
2 CAPSULE, EXTENDED RELEASE ORAL ONCE
Qty: 0 | Refills: 0 | Status: DISCONTINUED | OUTPATIENT
Start: 2018-07-19 | End: 2018-07-19

## 2018-07-19 RX ORDER — IBUPROFEN 200 MG
600 TABLET ORAL ONCE
Qty: 0 | Refills: 0 | Status: COMPLETED | OUTPATIENT
Start: 2018-07-19 | End: 2018-07-19

## 2018-07-19 RX ADMIN — Medication 600 MILLIGRAM(S): at 19:07

## 2018-07-19 RX ADMIN — MORPHINE SULFATE 2 MILLIGRAM(S): 50 CAPSULE, EXTENDED RELEASE ORAL at 22:38

## 2018-07-19 RX ADMIN — Medication 600 MILLIGRAM(S): at 19:06

## 2018-07-19 RX ADMIN — SODIUM CHLORIDE 1000 MILLILITER(S): 9 INJECTION INTRAMUSCULAR; INTRAVENOUS; SUBCUTANEOUS at 23:01

## 2018-07-19 RX ADMIN — Medication 975 MILLIGRAM(S): at 19:06

## 2018-07-19 NOTE — ED ADULT NURSE REASSESSMENT NOTE - NS ED NURSE REASSESS COMMENT FT1
amb to/from bathroom with assist, cousin present in room, +voided large amount of clear urine, +slightly unsteady with amb, adm pending

## 2018-07-19 NOTE — ED PROVIDER NOTE - NS ED ROS FT
CONSTITUTIONAL: No fevers or chills  RESPIRATORY: No cough, No shortness of breath  CARDIOVASCULAR: No chest pain or palpitations  MSK: + back pain, no muscle weakness  NEUROLOGICAL: No numbness or weakness, no sensory loss, no bowel or urinary incontinence  SKIN: No itching, rashes

## 2018-07-19 NOTE — ED PROVIDER NOTE - PHYSICAL EXAMINATION
GENERAL: NAD, well-developed  HEAD:  Atraumatic, Normocephalic  EYES: EOMI, PERRLA, conjunctiva and sclera clear  NECK: Supple, No JVD  CHEST/LUNG: Clear to auscultation bilaterally; No wheeze  HEART: Regular rate and rhythm; No murmurs, rubs, or gallops  ABDOMEN: Soft, Nontender, Nondistended; Bowel sounds present  BACK: TTP on lumbar spine, no muscle tenderness.   MSK: 5/5 strength bilaterally, normal ROM  EXTREMITIES:  2+ Peripheral Pulses, No clubbing, cyanosis, or edema  NEUROLOGY: non-focal, no sensory deficit  SKIN: No rashes or lesions GENERAL: NAD, well-developed, somnolent   HEAD:  Atraumatic, Normocephalic  EYES: EOMI, PERRLA, conjunctiva and sclera clear  NECK: Supple, No JVD  CHEST/LUNG: Clear to auscultation bilaterally; No wheeze  HEART: Regular rate and rhythm; No murmurs, rubs, or gallops  ABDOMEN: Soft, Nontender, Nondistended; Bowel sounds present  BACK: TTP at mid-thoracic spine, R para-thoracic pain TTP.  No midline c-spine TTP or restrictions in range of motion of neck.    MSK: 5/5 strength bilaterally, normal range of motion, no sensory deficits  EXTREMITIES:  2+ Peripheral Pulses, No clubbing, cyanosis, or edema  NEUROLOGY: non-focal, no sensory deficit  SKIN: No rashes or lesions

## 2018-07-19 NOTE — ED PROVIDER NOTE - OBJECTIVE STATEMENT
64 yo female with PMHx of migraines present with back pain. About 2 days ago, while lifting a paint can, she felt sudden pain on her mid back. she went to [facility] ED X2, had xray of the back did not show any fracture. Was given valium not help with the pain. Denied fever, chill, numbness/tingling, loss of sensation, LE weakness, bowel/urinary incontinence. 62 yo female with PMHx of migraines, seizure d/o present with back pain. About 2 days ago, while lifting a paint can, she felt sudden pain on her mid back. she went to [facility] ED X2, had xray of the back did not show any fracture. Was given valium not help with the pain. Denied fever, chill, numbness/tingling, loss of sensation, LE weakness, bowel/urinary incontinence.

## 2018-07-19 NOTE — CONSULT NOTE ADULT - ASSESSMENT
63F here with sudden onsent back pain after lifting a heavy object found to have a non displaced thoracic compression deformity  - no acute neurosurgical intervention  - Pain control  - Outpatient follow up

## 2018-07-19 NOTE — ED ADULT NURSE NOTE - OBJECTIVE STATEMENT
63 yr old female is complaining of back pain s/p lifting. on assessment a and o x 3 lungs clear abd soft non tender no swelling in extremties no n/v/d no fevers no other complaints.
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
## 2018-07-19 NOTE — ED PROVIDER NOTE - MEDICAL DECISION MAKING DETAILS
DENIZ Horn., 62 yo female with PMHx of migraines present with back pain after suffering a injury from lifting heavy paint can. No red flag on physical exam. Pain localized to lumbar spine without neurologic deficit. Will get CT lumbar spine w/o contrast. Pain control with motrin DENIZ Horn., 62 yo female with PMHx of migraines present with back pain after suffering a injury from lifting heavy paint can. No red flag on physical exam. Pain localized to lumbar spine without neurologic deficit. Will get CT lumbar spine w/o contrast. Pain control DENIZ Horn., 64 yo female with PMHx of migraines present with back pain after suffering a injury from lifting heavy paint can. No red flag on physical exam. Pain localized to thoracic spine without neurologic deficit. Will get CT thoracic spine w/o contrast. Pain control with motrin and tylenol CAROLA Horn, 64 yo female with PMHx of migraines present with back pain after suffering a injury from lifting heavy paint can. No red flag on physical exam. Pain localized to thoracic spine without neurologic deficit. Will get CT thoracic spine w/o contrast. Pain control with motrin and tylenol  Attending Statement: Agree with the above.  No h/o fall; states was lifting heavy object and had immediate nonradiating pain.  Neurologically intact.  Able to ambulate though with assistance from family.  Pain poorly controlled at home -- nephew states she has been taking doses of home diazepam and norco (unknown # of pills; patient denies PO meds since ED visit yesterday AM).  Significant TTP at mid-T spine requiring CT.  Reassess after imaging/pain Rx.  --LITTLE

## 2018-07-19 NOTE — ED PROVIDER NOTE - CARE PLAN
Principal Discharge DX:	Delirium  Goal:	Admit to medicine  Secondary Diagnosis:	Compression fracture of thoracic vertebra

## 2018-07-19 NOTE — CONSULT NOTE ADULT - SUBJECTIVE AND OBJECTIVE BOX
p ([id number])     HPI:62 yo female with PMHx of migraines, seizure d/o present with back pain. About 2 days ago, while lifting a paint can, she felt sudden pain on her mid back. she went to [facility] ED X2, had xray of the back did not show any fracture. Was given valium not help with the pain. Denied fever, chill, numbness/tingling, loss of sensation, LE weakness, bowel/urinary incontinence.    PAST MEDICAL HISTORY   Shingles  Pericarditis  Osteoporosis  Seizure disorder  Migraines    PAST SURGICAL HISTORY   No significant past surgical history    penicillin (Anaphylaxis)      MEDICATIONS:  Antibiotics:    Neuro:    Anticoagulation:    Other:      SOCIAL HISTORY:   Occupation:   Marital Status:     FAMILY HISTORY:  No pertinent family history in first degree relatives      REVIEW OF SYSTEMS:  Check here if all are normal other than Neurological []  General:  Eyes:  ENT:  Cardiac:  Respiratory:  GI:  Musculoskeletal:   Skin:  Neurologic:   Psychiatric:     PHYSICAL EXAMINATION:   T(C): 36.3 (07-19-18 @ 17:37), Max: 36.3 (07-19-18 @ 17:37)  HR: 50 (07-19-18 @ 20:25) (50 - 51)  BP: 138/64 (07-19-18 @ 17:37) (138/64 - 138/64)  RR: 17 (07-19-18 @ 20:25) (16 - 17)  SpO2: 99% (07-19-18 @ 20:25) (98% - 99%)  Wt(kg): --  Weight (kg): 52.2 (07-19 @ 17:37)    General Examination:     Neurologic Examination:           PHYSICAL EXAM:    Constitutional: No Acute Distress     Neurological: AOx3, Following Commands    Motor exam:          Upper extremity                         Delt     Bicep     Tricep    HG                                                 R         5/5        5/5        5/5       5/5                                               L          5/5        5/5        5/5       5/5          Lower extremity                        HF         KF        KE       DF         PF                                                  R        5/5        5/5        5/5       5/5         5/5                                               L         5/5        5/5       5/5       5/5          5/5                                                 Sensation: [x] intact to light touch  [] decreased:     No clonus      LABS:                        13.9   5.4   )-----------( 158      ( 19 Jul 2018 22:12 )             42.0     07-19    143  |  110<H>  |  31<H>  ----------------------------<  138<H>  4.0   |  20<L>  |  1.36<H>    Ca    9.1      19 Jul 2018 22:12    TPro  6.8  /  Alb  3.6  /  TBili  0.2  /  DBili  x   /  AST  21  /  ALT  17  /  AlkPhos  85  07-19    PT/INR - ( 19 Jul 2018 22:12 )   PT: 11.4 sec;   INR: 1.05 ratio         PTT - ( 19 Jul 2018 22:12 )  PTT:31.7 sec      RADIOLOGY & ADDITIONAL STUDIES:

## 2018-07-20 DIAGNOSIS — G40.909 EPILEPSY, UNSPECIFIED, NOT INTRACTABLE, WITHOUT STATUS EPILEPTICUS: ICD-10-CM

## 2018-07-20 DIAGNOSIS — R41.0 DISORIENTATION, UNSPECIFIED: ICD-10-CM

## 2018-07-20 DIAGNOSIS — Z79.899 OTHER LONG TERM (CURRENT) DRUG THERAPY: ICD-10-CM

## 2018-07-20 DIAGNOSIS — S22.000A WEDGE COMPRESSION FRACTURE OF UNSPECIFIED THORACIC VERTEBRA, INITIAL ENCOUNTER FOR CLOSED FRACTURE: ICD-10-CM

## 2018-07-20 LAB
ALBUMIN SERPL ELPH-MCNC: 4.5 G/DL — SIGNIFICANT CHANGE UP (ref 3.3–5)
ALP SERPL-CCNC: 99 U/L — SIGNIFICANT CHANGE UP (ref 40–120)
ALT FLD-CCNC: 18 U/L — SIGNIFICANT CHANGE UP (ref 10–45)
ANION GAP SERPL CALC-SCNC: 13 MMOL/L — SIGNIFICANT CHANGE UP (ref 5–17)
AST SERPL-CCNC: 23 U/L — SIGNIFICANT CHANGE UP (ref 10–40)
BASOPHILS # BLD AUTO: 0.03 K/UL — SIGNIFICANT CHANGE UP (ref 0–0.2)
BASOPHILS NFR BLD AUTO: 0.5 % — SIGNIFICANT CHANGE UP (ref 0–2)
BILIRUB SERPL-MCNC: 0.3 MG/DL — SIGNIFICANT CHANGE UP (ref 0.2–1.2)
BUN SERPL-MCNC: 26 MG/DL — HIGH (ref 7–23)
CALCIUM SERPL-MCNC: 9.3 MG/DL — SIGNIFICANT CHANGE UP (ref 8.4–10.5)
CHLORIDE SERPL-SCNC: 113 MMOL/L — HIGH (ref 96–108)
CK MB CFR SERPL CALC: 1.4 NG/ML — SIGNIFICANT CHANGE UP (ref 0–3.8)
CK SERPL-CCNC: 46 U/L — SIGNIFICANT CHANGE UP (ref 25–170)
CO2 SERPL-SCNC: 18 MMOL/L — LOW (ref 22–31)
CREAT SERPL-MCNC: 1.23 MG/DL — SIGNIFICANT CHANGE UP (ref 0.5–1.3)
CULTURE RESULTS: SIGNIFICANT CHANGE UP
EOSINOPHIL # BLD AUTO: 0.14 K/UL — SIGNIFICANT CHANGE UP (ref 0–0.5)
EOSINOPHIL NFR BLD AUTO: 2.2 % — SIGNIFICANT CHANGE UP (ref 0–6)
GLUCOSE SERPL-MCNC: 138 MG/DL — HIGH (ref 70–99)
HCT VFR BLD CALC: 45.7 % — HIGH (ref 34.5–45)
HGB BLD-MCNC: 15 G/DL — SIGNIFICANT CHANGE UP (ref 11.5–15.5)
IMM GRANULOCYTES NFR BLD AUTO: 0.2 % — SIGNIFICANT CHANGE UP (ref 0–1.5)
LIDOCAIN IGE QN: 26 U/L — SIGNIFICANT CHANGE UP (ref 7–60)
LYMPHOCYTES # BLD AUTO: 1.04 K/UL — SIGNIFICANT CHANGE UP (ref 1–3.3)
LYMPHOCYTES # BLD AUTO: 16.2 % — SIGNIFICANT CHANGE UP (ref 13–44)
MCHC RBC-ENTMCNC: 30.1 PG — SIGNIFICANT CHANGE UP (ref 27–34)
MCHC RBC-ENTMCNC: 32.8 GM/DL — SIGNIFICANT CHANGE UP (ref 32–36)
MCV RBC AUTO: 91.6 FL — SIGNIFICANT CHANGE UP (ref 80–100)
MONOCYTES # BLD AUTO: 0.42 K/UL — SIGNIFICANT CHANGE UP (ref 0–0.9)
MONOCYTES NFR BLD AUTO: 6.5 % — SIGNIFICANT CHANGE UP (ref 2–14)
NEUTROPHILS # BLD AUTO: 4.78 K/UL — SIGNIFICANT CHANGE UP (ref 1.8–7.4)
NEUTROPHILS NFR BLD AUTO: 74.4 % — SIGNIFICANT CHANGE UP (ref 43–77)
PLATELET # BLD AUTO: 219 K/UL — SIGNIFICANT CHANGE UP (ref 150–400)
POTASSIUM SERPL-MCNC: 4 MMOL/L — SIGNIFICANT CHANGE UP (ref 3.5–5.3)
POTASSIUM SERPL-SCNC: 4 MMOL/L — SIGNIFICANT CHANGE UP (ref 3.5–5.3)
PROT SERPL-MCNC: 8.1 G/DL — SIGNIFICANT CHANGE UP (ref 6–8.3)
RBC # BLD: 4.99 M/UL — SIGNIFICANT CHANGE UP (ref 3.8–5.2)
RBC # FLD: 13.8 % — SIGNIFICANT CHANGE UP (ref 10.3–14.5)
SODIUM SERPL-SCNC: 144 MMOL/L — SIGNIFICANT CHANGE UP (ref 135–145)
SPECIMEN SOURCE: SIGNIFICANT CHANGE UP
TROPONIN T, HIGH SENSITIVITY RESULT: 7 NG/L — SIGNIFICANT CHANGE UP (ref 0–51)
WBC # BLD: 6.42 K/UL — SIGNIFICANT CHANGE UP (ref 3.8–10.5)
WBC # FLD AUTO: 6.42 K/UL — SIGNIFICANT CHANGE UP (ref 3.8–10.5)

## 2018-07-20 PROCEDURE — 99223 1ST HOSP IP/OBS HIGH 75: CPT

## 2018-07-20 PROCEDURE — 99223 1ST HOSP IP/OBS HIGH 75: CPT | Mod: GC

## 2018-07-20 RX ORDER — HYDROMORPHONE HYDROCHLORIDE 2 MG/ML
0.5 INJECTION INTRAMUSCULAR; INTRAVENOUS; SUBCUTANEOUS EVERY 6 HOURS
Qty: 0 | Refills: 0 | Status: DISCONTINUED | OUTPATIENT
Start: 2018-07-20 | End: 2018-07-21

## 2018-07-20 RX ORDER — ALPRAZOLAM 0.25 MG
0.5 TABLET ORAL ONCE
Qty: 0 | Refills: 0 | Status: DISCONTINUED | OUTPATIENT
Start: 2018-07-20 | End: 2018-07-23

## 2018-07-20 RX ORDER — TOPIRAMATE 25 MG
100 TABLET ORAL
Qty: 0 | Refills: 0 | Status: DISCONTINUED | OUTPATIENT
Start: 2018-07-20 | End: 2018-07-28

## 2018-07-20 RX ORDER — NALOXONE HYDROCHLORIDE 4 MG/.1ML
0.04 SPRAY NASAL ONCE
Qty: 0 | Refills: 0 | Status: COMPLETED | OUTPATIENT
Start: 2018-07-20 | End: 2018-07-20

## 2018-07-20 RX ORDER — ACETAMINOPHEN 500 MG
650 TABLET ORAL EVERY 6 HOURS
Qty: 0 | Refills: 0 | Status: DISCONTINUED | OUTPATIENT
Start: 2018-07-20 | End: 2018-07-28

## 2018-07-20 RX ORDER — HYDROMORPHONE HYDROCHLORIDE 2 MG/ML
0.25 INJECTION INTRAMUSCULAR; INTRAVENOUS; SUBCUTANEOUS ONCE
Qty: 0 | Refills: 0 | Status: DISCONTINUED | OUTPATIENT
Start: 2018-07-20 | End: 2018-07-28

## 2018-07-20 RX ORDER — NALOXONE HYDROCHLORIDE 4 MG/.1ML
0.4 SPRAY NASAL ONCE
Qty: 0 | Refills: 0 | Status: DISCONTINUED | OUTPATIENT
Start: 2018-07-20 | End: 2018-07-20

## 2018-07-20 RX ORDER — LIDOCAINE 4 G/100G
1 CREAM TOPICAL DAILY
Qty: 0 | Refills: 0 | Status: DISCONTINUED | OUTPATIENT
Start: 2018-07-20 | End: 2018-07-26

## 2018-07-20 RX ORDER — SODIUM CHLORIDE 9 MG/ML
500 INJECTION INTRAMUSCULAR; INTRAVENOUS; SUBCUTANEOUS ONCE
Qty: 0 | Refills: 0 | Status: DISCONTINUED | OUTPATIENT
Start: 2018-07-20 | End: 2018-07-28

## 2018-07-20 RX ORDER — MORPHINE SULFATE 50 MG/1
2 CAPSULE, EXTENDED RELEASE ORAL ONCE
Qty: 0 | Refills: 0 | Status: DISCONTINUED | OUTPATIENT
Start: 2018-07-20 | End: 2018-07-20

## 2018-07-20 RX ORDER — SERTRALINE 25 MG/1
50 TABLET, FILM COATED ORAL DAILY
Qty: 0 | Refills: 0 | Status: DISCONTINUED | OUTPATIENT
Start: 2018-07-20 | End: 2018-07-21

## 2018-07-20 RX ORDER — DIAZEPAM 5 MG
2 TABLET ORAL AT BEDTIME
Qty: 0 | Refills: 0 | Status: DISCONTINUED | OUTPATIENT
Start: 2018-07-20 | End: 2018-07-24

## 2018-07-20 RX ORDER — FAMOTIDINE 10 MG/ML
20 INJECTION INTRAVENOUS ONCE
Qty: 0 | Refills: 0 | Status: COMPLETED | OUTPATIENT
Start: 2018-07-20 | End: 2018-07-20

## 2018-07-20 RX ORDER — HEPARIN SODIUM 5000 [USP'U]/ML
5000 INJECTION INTRAVENOUS; SUBCUTANEOUS EVERY 12 HOURS
Qty: 0 | Refills: 0 | Status: DISCONTINUED | OUTPATIENT
Start: 2018-07-20 | End: 2018-07-28

## 2018-07-20 RX ORDER — DOCUSATE SODIUM 100 MG
100 CAPSULE ORAL THREE TIMES A DAY
Qty: 0 | Refills: 0 | Status: DISCONTINUED | OUTPATIENT
Start: 2018-07-20 | End: 2018-07-28

## 2018-07-20 RX ADMIN — LIDOCAINE 1 PATCH: 4 CREAM TOPICAL at 23:00

## 2018-07-20 RX ADMIN — MORPHINE SULFATE 2 MILLIGRAM(S): 50 CAPSULE, EXTENDED RELEASE ORAL at 00:21

## 2018-07-20 RX ADMIN — Medication 100 MILLIGRAM(S): at 07:49

## 2018-07-20 RX ADMIN — HEPARIN SODIUM 5000 UNIT(S): 5000 INJECTION INTRAVENOUS; SUBCUTANEOUS at 07:49

## 2018-07-20 RX ADMIN — MORPHINE SULFATE 2 MILLIGRAM(S): 50 CAPSULE, EXTENDED RELEASE ORAL at 02:24

## 2018-07-20 RX ADMIN — Medication 100 MILLIGRAM(S): at 22:12

## 2018-07-20 RX ADMIN — Medication 100 MILLIGRAM(S): at 07:50

## 2018-07-20 RX ADMIN — SERTRALINE 50 MILLIGRAM(S): 25 TABLET, FILM COATED ORAL at 12:49

## 2018-07-20 RX ADMIN — LIDOCAINE 1 PATCH: 4 CREAM TOPICAL at 12:49

## 2018-07-20 RX ADMIN — HEPARIN SODIUM 5000 UNIT(S): 5000 INJECTION INTRAVENOUS; SUBCUTANEOUS at 18:15

## 2018-07-20 RX ADMIN — Medication 650 MILLIGRAM(S): at 07:50

## 2018-07-20 RX ADMIN — FAMOTIDINE 20 MILLIGRAM(S): 10 INJECTION INTRAVENOUS at 01:15

## 2018-07-20 RX ADMIN — Medication 100 MILLIGRAM(S): at 16:08

## 2018-07-20 RX ADMIN — HYDROMORPHONE HYDROCHLORIDE 0.5 MILLIGRAM(S): 2 INJECTION INTRAMUSCULAR; INTRAVENOUS; SUBCUTANEOUS at 22:12

## 2018-07-20 RX ADMIN — HYDROMORPHONE HYDROCHLORIDE 0.5 MILLIGRAM(S): 2 INJECTION INTRAMUSCULAR; INTRAVENOUS; SUBCUTANEOUS at 09:36

## 2018-07-20 RX ADMIN — Medication 100 MILLIGRAM(S): at 18:31
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
## 2018-07-20 NOTE — H&P ADULT - PROBLEM SELECTOR PLAN 3
patient and family  cannot name medications and dosing,  - pharmacy tech emailed to verify home medications

## 2018-07-20 NOTE — H&P ADULT - NSHPSOCIALHISTORY_GEN_ALL_CORE
Social History:    Marital Status:  (   )    (  x ) Single    (   )    (  )   Occupation:   Lives with: (  ) alone  (  ) children   (  ) spouse   (  ) parents  ( x ) sister    Substance Use (street drugs):   Tobacco Usage:  (   ) never smoked   (  x ) former smoker  , quit 30 yrs ago  (   ) current smoker  (     ) pack year  (        ) last cigarette date  Alcohol Usage: no etoh use

## 2018-07-20 NOTE — H&P ADULT - NSHPPHYSICALEXAM_GEN_ALL_CORE
Vital Signs Last 24 Hrs  T(C): 36.8 (20 Jul 2018 00:18), Max: 36.8 (20 Jul 2018 00:18)  T(F): 98.2 (20 Jul 2018 00:18), Max: 98.2 (20 Jul 2018 00:18)  HR: 52 (20 Jul 2018 00:18) (50 - 52)  BP: 157/83 (20 Jul 2018 00:18) (138/64 - 157/83)  BP(mean): --  RR: 17 (20 Jul 2018 00:18) (16 - 17)  SpO2: 98% (20 Jul 2018 00:18) (98% - 99%) Vital Signs Last 24 Hrs  T(C): 36.8 (20 Jul 2018 00:18), Max: 36.8 (20 Jul 2018 00:18)  T(F): 98.2 (20 Jul 2018 00:18), Max: 98.2 (20 Jul 2018 00:18)  HR: 52 (20 Jul 2018 00:18) (50 - 52)  BP: 157/83 (20 Jul 2018 00:18) (138/64 - 157/83)  BP(mean): --  RR: 17 (20 Jul 2018 00:18) (16 - 17)  SpO2: 98% (20 Jul 2018 00:18) (98% - 99%)    GENERAL: No acute distress, poor concentration , unable to maintain conversation   HEAD:  Atraumatic, Normocephalic  ENT: EOMI, PERRLA, conjunctiva and sclera clear,  moist mucosa no pharyngeal erythema or exudates   NECK: supple , no JVD   CHEST/LUNG: Clear to auscultation bilaterally; No wheeze, equal breath sounds bilaterally   BACK: No spinal tenderness,  No CVA tenderness   HEART: Regular rate and rhythm; No murmurs, rubs, or gallops  ABDOMEN: Soft, Nontender, Nondistended; Bowel sounds present  EXTREMITIES:  No clubbing, cyanosis, or edema  MSK: No joint swelling or effusions, ROM intact   PSYCH: Normal behavior/affect  NEUROLOGY: AAOx3, non-focal, cranial nerves intact  SKIN: Normal color, No rashes or lesions

## 2018-07-20 NOTE — H&P ADULT - FAMILY HISTORY
No pertinent family history in first degree relatives Mother  Still living? Unknown  Family history of colon cancer in mother, Age at diagnosis: Age Unknown

## 2018-07-20 NOTE — CONSULT NOTE ADULT - ASSESSMENT
62 yo woman w/ PMH of migraines, seizure disorder p/w acute onset delirium, possibly due to opioid use vs. benzo/shell withdrawal.    Plan:    Acute delirium - multifactorial, could be due to benzo/shell withdrawal, as patient takes benzos at home. Opioid use less likely given patient has received morphine and dilaudid. infection unlikely as pt afebrile, no WBC count. recommend toxic-metabolic workup.  -recommend psychiatry consultation  -recommend B12, folate, RPR, TSH, ammonia  -reorientation  -consider pain medication review - could be cause for delirium 64 yo woman w/ PMH of migraines, seizure disorder p/w acute onset delirium, possibly due to opioid use vs. benzo/shell withdrawal.    Plan:    Acute delirium - multifactorial, could be due to benzo/shell withdrawal, as patient takes benzos at home. Opioid use less likely given patient has received morphine and dilaudid. infection unlikely as pt afebrile, no WBC count. recommend toxic-metabolic workup.  -recommend psychiatry consultation  -recommend B12, folate, RPR, TSH, ammonia  -reorientation  -consider pain medication review - could be cause for delirium  -EEG monitoring to r/o seizures 62 yo woman w/ PMH of migraines, seizure disorder p/w acute onset delirium, possibly due to opioid use vs. benzo/shell withdrawal vs seizures causing post-ictal agitation.    Plan:    Acute delirium - multifactorial, could be due to benzo/shell withdrawal, as patient takes benzos at home. Opioid use less likely given patient has received morphine and dilaudid. infection unlikely as pt afebrile, no WBC count. recommend toxic-metabolic workup. Also possible that patient having seizures and could have post-ictal agitation and confusion.  -recommend psychiatry consultation  -recommend B12, folate, RPR, TSH, ammonia  -reorientation  -consider pain medication review - could be cause for delirium  -EEG monitoring to assess for seizures

## 2018-07-20 NOTE — H&P ADULT - PROBLEM SELECTOR PLAN 2
Seen by Neurosurgery , no surgical intervention recommended  - tylenol PRN for mild pain    - Dilaudid IV prn for severe pain   - lidocaine patch   - fall precautions

## 2018-07-20 NOTE — ED ADULT NURSE REASSESSMENT NOTE - NS ED NURSE REASSESS COMMENT FT1
pt with periods of wakefulness and other times of lethargy, cousin present, vitals stable pt with periods of wakefulness and other times of lethargy, cousin present, vitals stable, pepcid given for reproducible epigastric pain

## 2018-07-20 NOTE — H&P ADULT - PROBLEM SELECTOR PLAN 1
CT head without acute findings , no metabolic derangements, U tox positive for barbiturates and benzodiazepines c/w history provided by family , suspect delirium secondary to excessive dosing  will monitor closely CT head without acute findings , no metabolic derangements, U tox positive for barbiturates and benzodiazepines c/w history provided by family , suspect delirium secondary to excessive dosing  will monitor closely:   Istop was personally reviewed ,  Reference #: [id number] patient has been on Diazepam and percocet both last prescribed in January '18

## 2018-07-20 NOTE — H&P ADULT - NSHPLABSRESULTS_GEN_ALL_CORE
Labs personally reviewed:                          13.9   5.4   )-----------( 158      ( 2018 22:12 )             42.0     -    143  |  110<H>  |  31<H>  ----------------------------<  138<H>  4.0   |  20<L>  |  1.36<H>    Ca    9.1      2018 22:12    TPro  6.8  /  Alb  3.6  /  TBili  0.2  /  DBili  x   /  AST  21  /  ALT  17  /  AlkPhos  85          LIVER FUNCTIONS - ( 2018 22:12 )  Alb: 3.6 g/dL / Pro: 6.8 g/dL / ALK PHOS: 85 U/L / ALT: 17 U/L / AST: 21 U/L / GGT: x           PT/INR - ( 2018 22:12 )   PT: 11.4 sec;   INR: 1.05 ratio         PTT - ( 2018 22:12 )  PTT:31.7 sec  Urinalysis Basic - ( 2018 22:45 )    Color: PL Yellow / Appearance: Clear / S.008 / pH: x  Gluc: x / Ketone: Negative  / Bili: Negative / Urobili: Negative   Blood: x / Protein: Negative / Nitrite: Negative   Leuk Esterase: Moderate / RBC: 2-5 /HPF / WBC 2-5 /HPF   Sq Epi: x / Non Sq Epi: x / Bacteria: x      CAPILLARY BLOOD GLUCOSE      POCT Blood Glucose.: 125 mg/dL (2018 20:48)      Imaging:  Central compression fracture of T11. There is no retropulsion.  CT head: No acute intracranial hemorrhage or mass effect      EKG personally reviewed:  sinus bradycardia

## 2018-07-20 NOTE — CONSULT NOTE ADULT - SUBJECTIVE AND OBJECTIVE BOX
Neurology consult    AUGUSTUS [id number]    HPI:  63 Female w/ PMH of migraines, seizure disorder, HTN, osteoporosis, presents with back pain for the past two days. CT thoracic spine shows T11 fracture. NSGY evaluated, no intervention. Tonight, patient apparently had an episode of acute delirium. Per NP notes, patient was getting up and ready to leave the hospital, was demanding to leave. Attempts were made to re-orient patient, but unsuccessful. Patient placed on constant observation, neurology consulted.     Currently patient without any complaints.    MEDICATIONS    acetaminophen   Tablet. 650 milliGRAM(s) Oral every 6 hours PRN  ALPRAZolam 0.5 milliGRAM(s) Oral once PRN  diazepam    Tablet 2 milliGRAM(s) Oral at bedtime PRN  docusate sodium 100 milliGRAM(s) Oral three times a day  heparin  Injectable 5000 Unit(s) SubCutaneous every 12 hours  HYDROmorphone  Injectable 0.5 milliGRAM(s) IV Push every 6 hours PRN  HYDROmorphone  Injectable 0.25 milliGRAM(s) IV Push once  lidocaine   Patch 1 Patch Transdermal daily  sertraline 50 milliGRAM(s) Oral daily  sodium chloride 0.9% Bolus 500 milliLiter(s) IV Bolus once  topiramate 100 milliGRAM(s) Oral two times a day         Family history: No history of dementia, strokes, or seizures   FAMILY HISTORY:  Family history of colon cancer in mother (Mother)    SOCIAL HISTORY -- No history of tobacco or alcohol use     Allergies    penicillin (Anaphylaxis)    Intolerances        Height (cm): 157.48 ( @ 12:00)  Weight (kg): 53 ( @ 12:00)  BMI (kg/m2): 21.4 ( @ 12:00)    Vital Signs Last 24 Hrs  T(C): 37 (2018 12:00), Max: 37 (2018 12:00)  T(F): 98.6 (2018 12:00), Max: 98.6 (2018 12:00)  HR: 56 (2018 12:00) (51 - 60)  BP: 147/76 (2018 12:00) (117/54 - 194/70)  BP(mean): --  RR: 18 (2018 12:00) (16 - 20)  SpO2: 98% (2018 12:00) (97% - 100%)      REVIEW OF SYSTEMS:    Constitutional: No fever  Eyes: no visual disturbances  Gastrointestinal: No nausea, vomiting.   Neurological: No headaches  Musculoskeletal: No joint pain or swelling  Skin: No itching, burning, rashes or lesions   Lymph Nodes: No enlarged glands  Endocrine: No heat or cold intolerance; No hair loss, No h/o diabetes or thyroid dysfunction  Allergy and Immunologic: No hives or eczema    On Neurological Examination:    Mental Status - Patient is alert, awake, oriented X3.  Follows commands well and able to answer questions appropriately. Mood and affect  normal  Speech -   Fluent                          Cranial Nerves - Pupils 3 mm equal and reactive to light, extraocular eye movements intact. no facial asymmetry  Motor Exam - 5/5 throughout  Sensory    Bilateral intact to light touch  Gait -  deferred  Reflexes: 2+ UE, 2+ LLE, 1+ RLE  Babinski downgoing b/l       LABS:  CBC Full  -  ( 2018 08:19 )  WBC Count : 6.42 K/uL  Hemoglobin : 15.0 g/dL  Hematocrit : 45.7 %  Platelet Count - Automated : 219 K/uL  Mean Cell Volume : 91.6 fl  Mean Cell Hemoglobin : 30.1 pg  Mean Cell Hemoglobin Concentration : 32.8 gm/dL  Auto Neutrophil # : 4.78 K/uL  Auto Lymphocyte # : 1.04 K/uL  Auto Monocyte # : 0.42 K/uL  Auto Eosinophil # : 0.14 K/uL  Auto Basophil # : 0.03 K/uL  Auto Neutrophil % : 74.4 %  Auto Lymphocyte % : 16.2 %  Auto Monocyte % : 6.5 %  Auto Eosinophil % : 2.2 %  Auto Basophil % : 0.5 %    Urinalysis Basic - ( 2018 22:45 )    Color: PL Yellow / Appearance: Clear / S.008 / pH: x  Gluc: x / Ketone: Negative  / Bili: Negative / Urobili: Negative   Blood: x / Protein: Negative / Nitrite: Negative   Leuk Esterase: Moderate / RBC: 2-5 /HPF / WBC 2-5 /HPF   Sq Epi: x / Non Sq Epi: x / Bacteria: x      07-20    144  |  113<H>  |  26<H>  ----------------------------<  138<H>  4.0   |  18<L>  |  1.23    Ca    9.3      2018 06:33    TPro  8.1  /  Alb  4.5  /  TBili  0.3  /  DBili  x   /  AST  23  /  ALT  18  /  AlkPhos  99  07-20    Hemoglobin A1C:     LIVER FUNCTIONS - ( 2018 06:33 )  Alb: 4.5 g/dL / Pro: 8.1 g/dL / ALK PHOS: 99 U/L / ALT: 18 U/L / AST: 23 U/L / GGT: x           Vitamin B12   PT/INR - ( 2018 22:12 )   PT: 11.4 sec;   INR: 1.05 ratio         PTT - ( 2018 22:12 )  PTT:31.7 sec      RADIOLOGY    EKG

## 2018-07-20 NOTE — H&P ADULT - ASSESSMENT
63F w/pmh migraines , seizure disorder , HTN, osteoporosis,   p/w intractable  back pain x  two days, CT spine with T11 compression fracture.

## 2018-07-20 NOTE — CHART NOTE - NSCHARTNOTEFT_GEN_A_CORE
Pt OOB , demanding "What room is this? I wanna call the police! I am missing my bag with my credit card, cellphone!" Attempted to reorient and redirect pt . Explained to pt that RN is trying to contact her nephew.   Now pt in hallway , dressed in her own clothes, saying " I am leaving here. You have my bag, no one can find my bag for two days! I am going to another hospital where they can properly diagnose my back pain! I came here with a kidney stone or something. " Again , attempted to redirect and reorient pt without success. Now pt speaking to her nephew on phone.  -Will maintain constant observation for now for elopement risk.   -Consider haldol for severe agitation.  -Consider psyche consult   -D/w Night NP and staff.       Nelda Barrera NP Medicine [id number]

## 2018-07-20 NOTE — ED ADULT NURSE REASSESSMENT NOTE - NS ED NURSE REASSESS COMMENT FT1
*gave report to holding rn delphine  - upon returning to fast track, RN notified pt with c/o chest pain and acute change in mental status, lethargic, garbled speech, requiring constant cueing to open eyes and to answer questions, EKG and repeat EKG done, trop sent to lab,  infusing, ED attending and resident present, cousin present

## 2018-07-21 DIAGNOSIS — F32.9 MAJOR DEPRESSIVE DISORDER, SINGLE EPISODE, UNSPECIFIED: ICD-10-CM

## 2018-07-21 PROCEDURE — 99223 1ST HOSP IP/OBS HIGH 75: CPT

## 2018-07-21 RX ORDER — PANTOPRAZOLE SODIUM 20 MG/1
40 TABLET, DELAYED RELEASE ORAL
Qty: 0 | Refills: 0 | Status: DISCONTINUED | OUTPATIENT
Start: 2018-07-21 | End: 2018-07-28

## 2018-07-21 RX ORDER — PROPRANOLOL HCL 160 MG
10 CAPSULE, EXTENDED RELEASE 24HR ORAL ONCE
Qty: 0 | Refills: 0 | Status: COMPLETED | OUTPATIENT
Start: 2018-07-21 | End: 2018-07-21

## 2018-07-21 RX ORDER — HYDRALAZINE HCL 50 MG
5 TABLET ORAL ONCE
Qty: 0 | Refills: 0 | Status: COMPLETED | OUTPATIENT
Start: 2018-07-21 | End: 2018-07-21

## 2018-07-21 RX ORDER — HYDROMORPHONE HYDROCHLORIDE 2 MG/ML
0.5 INJECTION INTRAMUSCULAR; INTRAVENOUS; SUBCUTANEOUS EVERY 4 HOURS
Qty: 0 | Refills: 0 | Status: DISCONTINUED | OUTPATIENT
Start: 2018-07-21 | End: 2018-07-28

## 2018-07-21 RX ORDER — AMLODIPINE BESYLATE 2.5 MG/1
5 TABLET ORAL ONCE
Qty: 0 | Refills: 0 | Status: DISCONTINUED | OUTPATIENT
Start: 2018-07-21 | End: 2018-07-21

## 2018-07-21 RX ORDER — AMLODIPINE BESYLATE 2.5 MG/1
5 TABLET ORAL DAILY
Qty: 0 | Refills: 0 | Status: DISCONTINUED | OUTPATIENT
Start: 2018-07-21 | End: 2018-07-23

## 2018-07-21 RX ORDER — SERTRALINE 25 MG/1
25 TABLET, FILM COATED ORAL DAILY
Qty: 0 | Refills: 0 | Status: DISCONTINUED | OUTPATIENT
Start: 2018-07-21 | End: 2018-07-28

## 2018-07-21 RX ADMIN — Medication 100 MILLIGRAM(S): at 21:51

## 2018-07-21 RX ADMIN — Medication 650 MILLIGRAM(S): at 08:15

## 2018-07-21 RX ADMIN — Medication 650 MILLIGRAM(S): at 07:40

## 2018-07-21 RX ADMIN — HYDROMORPHONE HYDROCHLORIDE 0.5 MILLIGRAM(S): 2 INJECTION INTRAMUSCULAR; INTRAVENOUS; SUBCUTANEOUS at 21:51

## 2018-07-21 RX ADMIN — HYDROMORPHONE HYDROCHLORIDE 0.5 MILLIGRAM(S): 2 INJECTION INTRAMUSCULAR; INTRAVENOUS; SUBCUTANEOUS at 10:30

## 2018-07-21 RX ADMIN — Medication 100 MILLIGRAM(S): at 14:21

## 2018-07-21 RX ADMIN — Medication 2 MILLIGRAM(S): at 23:36

## 2018-07-21 RX ADMIN — Medication 650 MILLIGRAM(S): at 16:20

## 2018-07-21 RX ADMIN — LIDOCAINE 1 PATCH: 4 CREAM TOPICAL at 11:31

## 2018-07-21 RX ADMIN — Medication 650 MILLIGRAM(S): at 16:45

## 2018-07-21 RX ADMIN — Medication 100 MILLIGRAM(S): at 05:54

## 2018-07-21 RX ADMIN — SERTRALINE 25 MILLIGRAM(S): 25 TABLET, FILM COATED ORAL at 11:39

## 2018-07-21 RX ADMIN — HYDROMORPHONE HYDROCHLORIDE 0.5 MILLIGRAM(S): 2 INJECTION INTRAMUSCULAR; INTRAVENOUS; SUBCUTANEOUS at 10:15

## 2018-07-21 RX ADMIN — HEPARIN SODIUM 5000 UNIT(S): 5000 INJECTION INTRAVENOUS; SUBCUTANEOUS at 05:56

## 2018-07-21 RX ADMIN — HEPARIN SODIUM 5000 UNIT(S): 5000 INJECTION INTRAVENOUS; SUBCUTANEOUS at 18:58

## 2018-07-21 RX ADMIN — Medication 5 MILLIGRAM(S): at 22:42

## 2018-07-21 RX ADMIN — Medication 100 MILLIGRAM(S): at 18:59

## 2018-07-21 RX ADMIN — PANTOPRAZOLE SODIUM 40 MILLIGRAM(S): 20 TABLET, DELAYED RELEASE ORAL at 06:47

## 2018-07-21 RX ADMIN — HYDROMORPHONE HYDROCHLORIDE 0.5 MILLIGRAM(S): 2 INJECTION INTRAMUSCULAR; INTRAVENOUS; SUBCUTANEOUS at 18:20

## 2018-07-21 RX ADMIN — Medication 100 MILLIGRAM(S): at 05:55

## 2018-07-21 RX ADMIN — HYDROMORPHONE HYDROCHLORIDE 0.5 MILLIGRAM(S): 2 INJECTION INTRAMUSCULAR; INTRAVENOUS; SUBCUTANEOUS at 18:01

## 2018-07-21 RX ADMIN — AMLODIPINE BESYLATE 5 MILLIGRAM(S): 2.5 TABLET ORAL at 16:20

## 2018-07-21 RX ADMIN — Medication 10 MILLIGRAM(S): at 07:40

## 2018-07-21 RX ADMIN — Medication 5 MILLIGRAM(S): at 23:32

## 2018-07-21 RX ADMIN — HYDROMORPHONE HYDROCHLORIDE 0.5 MILLIGRAM(S): 2 INJECTION INTRAMUSCULAR; INTRAVENOUS; SUBCUTANEOUS at 22:09
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
## 2018-07-21 NOTE — BEHAVIORAL HEALTH ASSESSMENT NOTE - NSBHCHARTREVIEWVS_PSY_A_CORE FT
Vital Signs Last 24 Hrs  T(C): 36.8 (21 Jul 2018 15:34), Max: 37 (21 Jul 2018 14:14)  T(F): 98.3 (21 Jul 2018 15:34), Max: 98.6 (21 Jul 2018 14:14)  HR: 62 (21 Jul 2018 20:59) (60 - 64)  BP: 195/80 (21 Jul 2018 20:59) (158/76 - 195/80)  BP(mean): --  RR: 18 (21 Jul 2018 14:14) (18 - 18)  SpO2: 98% (21 Jul 2018 20:59) (95% - 100%)

## 2018-07-21 NOTE — PROGRESS NOTE ADULT - SUBJECTIVE AND OBJECTIVE BOX
Patient is a 63y old  Female who presents with a chief complaint of back pain (2018 00:18)    Admitted overnight by hospitalist service   SUBJECTIVE / OVERNIGHT EVENTS: c/o back pain.  Review of Systems  chest pain no  palpitations no  sob no  nausea no  headache no    MEDICATIONS  (STANDING):  docusate sodium 100 milliGRAM(s) Oral three times a day  heparin  Injectable 5000 Unit(s) SubCutaneous every 12 hours  HYDROmorphone  Injectable 0.25 milliGRAM(s) IV Push once  lidocaine   Patch 1 Patch Transdermal daily  pantoprazole    Tablet 40 milliGRAM(s) Oral before breakfast  sertraline 25 milliGRAM(s) Oral daily  sodium chloride 0.9% Bolus 500 milliLiter(s) IV Bolus once  topiramate 100 milliGRAM(s) Oral two times a day    MEDICATIONS  (PRN):  acetaminophen   Tablet. 650 milliGRAM(s) Oral every 6 hours PRN Mild Pain (1 - 3)  ALPRAZolam 0.5 milliGRAM(s) Oral once PRN Anxiety  diazepam    Tablet 2 milliGRAM(s) Oral at bedtime PRN muscle spasms  HYDROmorphone  Injectable 0.5 milliGRAM(s) IV Push every 4 hours PRN Moderate Pain (4 - 6)          PHYSICAL EXAM:  GENERAL: NAD, well-developed  HEAD:  Atraumatic, Normocephalic  EYES: EOMI, PERRLA, conjunctiva and sclera clear  NECK: Supple, No JVD  CHEST/LUNG: Clear to auscultation bilaterally; No wheeze TS tenderness  HEART: Regular rate and rhythm; No murmurs, rubs, or gallops  ABDOMEN: Soft, Nontender, Nondistended; Bowel sounds present  EXTREMITIES:  2+ Peripheral Pulses, No clubbing, cyanosis, or edema  PSYCH anxious   NEUROLOGY: non-focal  SKIN: No rashes or lesions    LABS:                        15.0   6.42  )-----------( 219      ( 2018 08:19 )             45.7     07-20    144  |  113<H>  |  26<H>  ----------------------------<  138<H>  4.0   |  18<L>  |  1.23    Ca    9.3      2018 06:33    TPro  8.1  /  Alb  4.5  /  TBili  0.3  /  DBili  x   /  AST  23  /  ALT  18  /  AlkPhos  99  07-20    PT/INR - ( 2018 22:12 )   PT: 11.4 sec;   INR: 1.05 ratio         PTT - ( 2018 22:12 )  PTT:31.7 sec  CARDIAC MARKERS ( 2018 01:07 )  x     / x     / 46 U/L / x     / 1.4 ng/mL      Urinalysis Basic - ( 2018 22:45 )    Color: PL Yellow / Appearance: Clear / S.008 / pH: x  Gluc: x / Ketone: Negative  / Bili: Negative / Urobili: Negative   Blood: x / Protein: Negative / Nitrite: Negative   Leuk Esterase: Moderate / RBC: 2-5 /HPF / WBC 2-5 /HPF   Sq Epi: x / Non Sq Epi: x / Bacteria: x        RADIOLOGY & ADDITIONAL TESTS:    Imaging Personally Reviewed:    Consultant(s) Notes Reviewed:      Care Discussed with Consultants/Other Providers:

## 2018-07-21 NOTE — BEHAVIORAL HEALTH ASSESSMENT NOTE - NSBHCHARTREVIEWLAB_PSY_A_CORE FT
15.0   6.42  )-----------( 219      ( 2018 08:19 )             45.7     07-20    144  |  113<H>  |  26<H>  ----------------------------<  138<H>  4.0   |  18<L>  |  1.23    Ca    9.3      2018 06:33    TPro  8.1  /  Alb  4.5  /  TBili  0.3  /  DBili  x   /  AST  23  /  ALT  18  /  AlkPhos  99  07-20    Urinalysis Basic - ( 2018 22:45 )    Color: PL Yellow / Appearance: Clear / S.008 / pH: x  Gluc: x / Ketone: Negative  / Bili: Negative / Urobili: Negative   Blood: x / Protein: Negative / Nitrite: Negative   Leuk Esterase: Moderate / RBC: 2-5 /HPF / WBC 2-5 /HPF   Sq Epi: x / Non Sq Epi: x / Bacteria: x

## 2018-07-21 NOTE — BEHAVIORAL HEALTH ASSESSMENT NOTE - NSBHCHARTREVIEWINVESTIGATE_PSY_A_CORE FT
Ventricular Rate 45 BPM    Atrial Rate 45 BPM    P-R Interval 162 ms    QRS Duration 82 ms     ms    QTc 410 ms    P Axis 46 degrees    R Axis 8 degrees    T Axis 29 degrees    Diagnosis Line MARKED SINUS BRADYCARDIA  MINIMAL VOLTAGE CRITERIAFOR LVH, MAY BE NORMAL VARIANT  NONSPECIFIC T WAVE ABNORMALITY  ABNORMAL ECG

## 2018-07-21 NOTE — PROGRESS NOTE ADULT - ASSESSMENT
· Assessment		  63F w/pmh migraines , seizure disorder , HTN, osteoporosis,   p/w intractable  back pain x  two days, CT spine with T11 compression fracture.         Delirium.  CT head without acute findings , no metabolic derangements, U tox positive for barbiturates and benzodiazepines c/w history provided by family , suspect delirium secondary to excessive dosing  will monitor closely:   Istop was personally reviewed ,  Reference #: [id number] patient has been on Diazepam and percocet both last prescribed in January '18.   Psychiatry evaluation      Compression fracture of thoracic vertebra.  Seen by Neurosurgery , no surgical intervention recommended  - tylenol PRN for mild pain    - Dilaudid IV prn for severe pain   - lidocaine patch   - fall precautions.     Osteoporosis- Endocrine evaluation       Seizure disorder.  Plan: - cont Topamax.   Naldo Mendoza MD pager [phone]
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
## 2018-07-21 NOTE — BEHAVIORAL HEALTH ASSESSMENT NOTE - RISK ASSESSMENT
mild , no current SI or plans, No CAH, no loss of interest, pt Is future oriented, she has good rapport with her therapist .

## 2018-07-21 NOTE — BEHAVIORAL HEALTH ASSESSMENT NOTE - NSBHSUICPROTECTFACT_PSY_A_CORE
Identifies reasons for living/Future oriented/Fear of death or dying due to pain/suffering/Ability to cope with stress/Responsibility to family and others/Supportive social network or family

## 2018-07-22 LAB
ANION GAP SERPL CALC-SCNC: 15 MMOL/L — SIGNIFICANT CHANGE UP (ref 5–17)
BUN SERPL-MCNC: 25 MG/DL — HIGH (ref 7–23)
CALCIUM SERPL-MCNC: 9.9 MG/DL — SIGNIFICANT CHANGE UP (ref 8.4–10.5)
CHLORIDE SERPL-SCNC: 104 MMOL/L — SIGNIFICANT CHANGE UP (ref 96–108)
CO2 SERPL-SCNC: 21 MMOL/L — LOW (ref 22–31)
CREAT SERPL-MCNC: 1.23 MG/DL — SIGNIFICANT CHANGE UP (ref 0.5–1.3)
GLUCOSE SERPL-MCNC: 164 MG/DL — HIGH (ref 70–99)
HCT VFR BLD CALC: 47.8 % — HIGH (ref 34.5–45)
HGB BLD-MCNC: 16.4 G/DL — HIGH (ref 11.5–15.5)
MCHC RBC-ENTMCNC: 30.6 PG — SIGNIFICANT CHANGE UP (ref 27–34)
MCHC RBC-ENTMCNC: 34.3 GM/DL — SIGNIFICANT CHANGE UP (ref 32–36)
MCV RBC AUTO: 89.2 FL — SIGNIFICANT CHANGE UP (ref 80–100)
OB PNL GAST: POSITIVE
PLATELET # BLD AUTO: 168 K/UL — SIGNIFICANT CHANGE UP (ref 150–400)
POTASSIUM SERPL-MCNC: 3.7 MMOL/L — SIGNIFICANT CHANGE UP (ref 3.5–5.3)
POTASSIUM SERPL-SCNC: 3.7 MMOL/L — SIGNIFICANT CHANGE UP (ref 3.5–5.3)
RBC # BLD: 5.36 M/UL — HIGH (ref 3.8–5.2)
RBC # FLD: 13.4 % — SIGNIFICANT CHANGE UP (ref 10.3–14.5)
SODIUM SERPL-SCNC: 140 MMOL/L — SIGNIFICANT CHANGE UP (ref 135–145)
WBC # BLD: 6.58 K/UL — SIGNIFICANT CHANGE UP (ref 3.8–10.5)
WBC # FLD AUTO: 6.58 K/UL — SIGNIFICANT CHANGE UP (ref 3.8–10.5)

## 2018-07-22 PROCEDURE — 99232 SBSQ HOSP IP/OBS MODERATE 35: CPT

## 2018-07-22 PROCEDURE — 70450 CT HEAD/BRAIN W/O DYE: CPT | Mod: 26

## 2018-07-22 RX ORDER — LABETALOL HCL 100 MG
10 TABLET ORAL ONCE
Qty: 0 | Refills: 0 | Status: COMPLETED | OUTPATIENT
Start: 2018-07-22 | End: 2018-07-22

## 2018-07-22 RX ORDER — POLYETHYLENE GLYCOL 3350 17 G/17G
17 POWDER, FOR SOLUTION ORAL DAILY
Qty: 0 | Refills: 0 | Status: DISCONTINUED | OUTPATIENT
Start: 2018-07-22 | End: 2018-07-28

## 2018-07-22 RX ORDER — GLYCERIN ADULT
1 SUPPOSITORY, RECTAL RECTAL ONCE
Qty: 0 | Refills: 0 | Status: COMPLETED | OUTPATIENT
Start: 2018-07-22 | End: 2018-07-22

## 2018-07-22 RX ORDER — ACETAMINOPHEN 500 MG
1000 TABLET ORAL ONCE
Qty: 0 | Refills: 0 | Status: COMPLETED | OUTPATIENT
Start: 2018-07-22 | End: 2018-07-22

## 2018-07-22 RX ORDER — METOCLOPRAMIDE HCL 10 MG
10 TABLET ORAL ONCE
Qty: 0 | Refills: 0 | Status: COMPLETED | OUTPATIENT
Start: 2018-07-22 | End: 2018-07-22

## 2018-07-22 RX ORDER — HYDROMORPHONE HYDROCHLORIDE 2 MG/ML
0.25 INJECTION INTRAMUSCULAR; INTRAVENOUS; SUBCUTANEOUS ONCE
Qty: 0 | Refills: 0 | Status: DISCONTINUED | OUTPATIENT
Start: 2018-07-22 | End: 2018-07-22

## 2018-07-22 RX ORDER — ONDANSETRON 8 MG/1
4 TABLET, FILM COATED ORAL ONCE
Qty: 0 | Refills: 0 | Status: COMPLETED | OUTPATIENT
Start: 2018-07-22 | End: 2018-07-22

## 2018-07-22 RX ORDER — HYDRALAZINE HCL 50 MG
10 TABLET ORAL ONCE
Qty: 0 | Refills: 0 | Status: COMPLETED | OUTPATIENT
Start: 2018-07-22 | End: 2018-07-22

## 2018-07-22 RX ORDER — SODIUM CHLORIDE 9 MG/ML
1000 INJECTION INTRAMUSCULAR; INTRAVENOUS; SUBCUTANEOUS
Qty: 0 | Refills: 0 | Status: DISCONTINUED | OUTPATIENT
Start: 2018-07-22 | End: 2018-07-23

## 2018-07-22 RX ADMIN — HYDROMORPHONE HYDROCHLORIDE 0.5 MILLIGRAM(S): 2 INJECTION INTRAMUSCULAR; INTRAVENOUS; SUBCUTANEOUS at 13:29

## 2018-07-22 RX ADMIN — Medication 100 MILLIGRAM(S): at 21:47

## 2018-07-22 RX ADMIN — Medication 10 MILLIGRAM(S): at 16:31

## 2018-07-22 RX ADMIN — Medication 100 MILLIGRAM(S): at 13:51

## 2018-07-22 RX ADMIN — SODIUM CHLORIDE 100 MILLILITER(S): 9 INJECTION INTRAMUSCULAR; INTRAVENOUS; SUBCUTANEOUS at 22:11

## 2018-07-22 RX ADMIN — PANTOPRAZOLE SODIUM 40 MILLIGRAM(S): 20 TABLET, DELAYED RELEASE ORAL at 08:07

## 2018-07-22 RX ADMIN — HYDROMORPHONE HYDROCHLORIDE 0.5 MILLIGRAM(S): 2 INJECTION INTRAMUSCULAR; INTRAVENOUS; SUBCUTANEOUS at 23:39

## 2018-07-22 RX ADMIN — LIDOCAINE 1 PATCH: 4 CREAM TOPICAL at 00:37

## 2018-07-22 RX ADMIN — SERTRALINE 25 MILLIGRAM(S): 25 TABLET, FILM COATED ORAL at 13:50

## 2018-07-22 RX ADMIN — Medication 100 MILLIGRAM(S): at 08:07

## 2018-07-22 RX ADMIN — HYDROMORPHONE HYDROCHLORIDE 0.5 MILLIGRAM(S): 2 INJECTION INTRAMUSCULAR; INTRAVENOUS; SUBCUTANEOUS at 13:54

## 2018-07-22 RX ADMIN — ONDANSETRON 4 MILLIGRAM(S): 8 TABLET, FILM COATED ORAL at 00:33

## 2018-07-22 RX ADMIN — HYDROMORPHONE HYDROCHLORIDE 0.5 MILLIGRAM(S): 2 INJECTION INTRAMUSCULAR; INTRAVENOUS; SUBCUTANEOUS at 02:01

## 2018-07-22 RX ADMIN — Medication 10 MILLIGRAM(S): at 07:41

## 2018-07-22 RX ADMIN — Medication 1000 MILLIGRAM(S): at 18:50

## 2018-07-22 RX ADMIN — HYDROMORPHONE HYDROCHLORIDE 0.25 MILLIGRAM(S): 2 INJECTION INTRAMUSCULAR; INTRAVENOUS; SUBCUTANEOUS at 04:50

## 2018-07-22 RX ADMIN — SODIUM CHLORIDE 100 MILLILITER(S): 9 INJECTION INTRAMUSCULAR; INTRAVENOUS; SUBCUTANEOUS at 18:10

## 2018-07-22 RX ADMIN — HYDROMORPHONE HYDROCHLORIDE 0.5 MILLIGRAM(S): 2 INJECTION INTRAMUSCULAR; INTRAVENOUS; SUBCUTANEOUS at 02:16

## 2018-07-22 RX ADMIN — Medication 400 MILLIGRAM(S): at 18:10

## 2018-07-22 RX ADMIN — LIDOCAINE 1 PATCH: 4 CREAM TOPICAL at 13:50

## 2018-07-22 RX ADMIN — ONDANSETRON 4 MILLIGRAM(S): 8 TABLET, FILM COATED ORAL at 01:24

## 2018-07-22 RX ADMIN — AMLODIPINE BESYLATE 5 MILLIGRAM(S): 2.5 TABLET ORAL at 08:07

## 2018-07-22 RX ADMIN — Medication 10 MILLIGRAM(S): at 16:33

## 2018-07-22 RX ADMIN — Medication 10 MILLIGRAM(S): at 09:54

## 2018-07-22 RX ADMIN — Medication 10 MILLIGRAM(S): at 22:12

## 2018-07-22 RX ADMIN — Medication 100 MILLIGRAM(S): at 21:46

## 2018-07-22 RX ADMIN — HYDROMORPHONE HYDROCHLORIDE 0.25 MILLIGRAM(S): 2 INJECTION INTRAMUSCULAR; INTRAVENOUS; SUBCUTANEOUS at 05:35

## 2018-07-22 NOTE — CONSULT NOTE ADULT - SUBJECTIVE AND OBJECTIVE BOX
Patient is a 63y old  Female who presents with a chief complaint of back pain (20 Jul 2018 00:18)      HPI:  63 Female w/pmh migraines , seizure disorder , HTN, osteoporosis,   presents with back pain for the past two days.   Patient has a psych history.  She gets vomiting during migraines.  She had an episode of coffee ground emesis.  She had recent delirium.  She has T11 compression fracture.  Utox was positive for barbiturates and benzos.  Now with constipation.  Given a tap water enema today.      PAST MEDICAL & SURGICAL HISTORY:  Shingles  Pericarditis  Osteoporosis  Seizure disorder  Migraines  No significant past surgical history      Allergies    penicillin (Anaphylaxis)    Intolerances        MEDICATIONS  (STANDING):  amLODIPine   Tablet 5 milliGRAM(s) Oral daily  docusate sodium 100 milliGRAM(s) Oral three times a day  glycerin Suppository - Adult 1 Suppository(s) Rectal once  heparin  Injectable 5000 Unit(s) SubCutaneous every 12 hours  HYDROmorphone  Injectable 0.25 milliGRAM(s) IV Push once  lidocaine   Patch 1 Patch Transdermal daily  pantoprazole    Tablet 40 milliGRAM(s) Oral before breakfast  sertraline 25 milliGRAM(s) Oral daily  sodium chloride 0.9% Bolus 500 milliLiter(s) IV Bolus once  topiramate 100 milliGRAM(s) Oral two times a day    MEDICATIONS  (PRN):  acetaminophen   Tablet. 650 milliGRAM(s) Oral every 6 hours PRN Mild Pain (1 - 3)  ALPRAZolam 0.5 milliGRAM(s) Oral once PRN Anxiety  diazepam    Tablet 2 milliGRAM(s) Oral at bedtime PRN muscle spasms  HYDROmorphone  Injectable 0.5 milliGRAM(s) IV Push every 4 hours PRN Moderate Pain (4 - 6)          Review of Systems:    General:  No wt loss, fevers, chills, night sweats,fatigue,   CV:  No pain, palpitatioins, hypo/hypertension  Resp:  No dyspnea, cough, tachypnea, wheezing  GI:  No pain, No nausea, No vomiting, No diarrhea, No constipatiion, No weight loss, No fever, No pruritis, No rectal bleeding, No tarry stools, No dysphagia,  :  No pain, bleeding, incontinence, nocturia  Muscle:  No pain, weakness  Neuro:  No weakness, tingling, memory problems  Psych:  No fatigue, insomnia, mood problems, depression  Endocrine:  No polyuria, polydypsia, cold/heat intolerance  Heme:  No petechiae, ecchymosis, easy bruisability  Skin:  No rash, tattoos, scars, edema  Otherwise 10 point ROS negative      Vital Signs Last 24 Hrs  T(C): 36.9 (22 Jul 2018 09:37), Max: 37 (21 Jul 2018 14:14)  T(F): 98.5 (22 Jul 2018 09:37), Max: 98.6 (21 Jul 2018 14:14)  HR: 80 (22 Jul 2018 10:06) (61 - 84)  BP: 161/86 (22 Jul 2018 10:06) (161/86 - 199/98)  BP(mean): --  RR: 18 (22 Jul 2018 09:37) (18 - 18)  SpO2: 97% (22 Jul 2018 09:37) (95% - 100%)    PHYSICAL EXAM:    Constitutional: NAD, well-developed  Neck: No LAD, supple  Respiratory: CTA and P  Cardiovascular: S1 and S2, RRR, no M  Gastrointestinal: BS+, soft, NT/ND, neg HSM,  Extremities: No peripheral edema, neg clubing, cyanosis  Vascular: 2+ peripheral pulses  Neurological: A/O x 3, no focal deficits  Psychiatric: Normal mood, normal affect  Skin: No rashes        LABS:                        16.4   6.58  )-----------( 168      ( 22 Jul 2018 07:59 )             47.8     07-22    140  |  104  |  25<H>  ----------------------------<  164<H>  3.7   |  21<L>  |  1.23    Ca    9.9      22 Jul 2018 06:58          LIVER FUNCTIONS - ( 20 Jul 2018 06:33 )  Alb: 4.5 g/dL / Pro: 8.1 g/dL / ALK PHOS: 99 U/L / ALT: 18 U/L / AST: 23 U/L / GGT: x             RADIOLOGY & ADDITIONAL TESTS:

## 2018-07-22 NOTE — PROGRESS NOTE BEHAVIORAL HEALTH - NSBHCHARTREVIEWVS_PSY_A_CORE FT
Vital Signs Last 24 Hrs  T(C): 37.2 (22 Jul 2018 14:24), Max: 37.2 (22 Jul 2018 14:24)  T(F): 99 (22 Jul 2018 14:24), Max: 99 (22 Jul 2018 14:24)  HR: 100 (22 Jul 2018 16:42) (61 - 100)  BP: 184/88 (22 Jul 2018 16:42) (161/86 - 203/101)  BP(mean): --  RR: 18 (22 Jul 2018 14:24) (18 - 18)  SpO2: 97% (22 Jul 2018 16:23) (95% - 98%)

## 2018-07-22 NOTE — PROGRESS NOTE BEHAVIORAL HEALTH - NSBHCHARTREVIEWLAB_PSY_A_CORE FT
16.4   6.58  )-----------( 168      ( 22 Jul 2018 07:59 )             47.8     07-22    140  |  104  |  25<H>  ----------------------------<  164<H>  3.7   |  21<L>  |  1.23    Ca    9.9      22 Jul 2018 06:58

## 2018-07-22 NOTE — CHART NOTE - NSCHARTNOTEFT_GEN_A_CORE
NP note called by RN for pt vomiting coffee ground emesis, nausea, headache and hypertension. Pt is c/o migraine headaches and reports that when she is nauseous, she sticks her fingers down her throat to induce vomiting.  She normally takes coffee and tylenol for her migraines, but is refusing at this time, stating it is too far gone at this time.  I offered multiple options for pain relief and the patient was agreeable to IV tylenol.  She was also given 2 doses of iv hydralazine for her elevated b/p, with moderate results.  The coffee ground emesis was positive for occult blood, h/h stable at 16/47.  The above was discussed with Dr. Mendoza and GI Dr. Rivera called for evaluation.  Will continue to follow.  MILTON Sofia [id number]

## 2018-07-22 NOTE — CONSULT NOTE ADULT - ASSESSMENT
63 Female w/pmh migraines , seizure disorder , HTN, osteoporosis,   presents with back pain for the past two days.   Patient has a psych history.  She gets vomiting during migraines.  She had an episode of coffee ground emesis.  She had recent delirium.  She has T11 compression fracture.  Utox was positive for barbiturates and benzos.  Now with constipation.  Given a tap water enema today.  She likely has a component of narcotic induced constipation.  Hgb / HCT normal.     Plan:  1.  No plans for inpatient EGD unless has overt or active GI bleeding.  2.  Protonix 40mg po qd.  3.  Miralax.  4.  May need amitiza if remains on chronic narcotics.

## 2018-07-22 NOTE — PROGRESS NOTE BEHAVIORAL HEALTH - NSBHFUPINTERVALHXFT_PSY_A_CORE
Pt was well related, however she seems to be in mld pain, she reported her mood is Ok but in mild pain, denied any loss of interest, denied any SI or plans. She continues to be future oriented  She was alert, oriented x 4. no Psychosis.
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
## 2018-07-22 NOTE — CHART NOTE - NSCHARTNOTEFT_GEN_A_CORE
Medicine PA Note     AUGUSTUS FLORES  MRN-[id number]       Notified by RN for elevated BP of 193/73. Patient is a 63 y.o F w/pmh migraines , seizure disorder , HTN, osteoporosis who p/w LBP found to have T11 compression fx,  Patient seen and evaluated at bedside in NAD with c/o LBP that is alleviated with PRN pain meds. Patient states her BP is typically sporadic and has been elevated to this level before. Patient denies any Headache/CP/SOB/Abdominal pain/N/V/D/Dizziness/Visual disturbances. Patient with 10mg IV hydralazine pushes during day shift x 2.     Vital Signs Last 24 Hrs  T(C): 37.1 (07-22-18 @ 21:11), Max: 37.2 (07-22-18 @ 14:24)  T(F): 98.8 (07-22-18 @ 21:11), Max: 99 (07-22-18 @ 14:24)  HR: 105 (07-22-18 @ 21:11) (75 - 105)  BP: 193/73 (07-22-18 @ 21:11) (161/86 - 203/101)  BP(mean): --  RR: 18 (07-22-18 @ 21:11) (18 - 18)  SpO2: 99% (07-22-18 @ 21:11) (95% - 99%)  Daily     Daily   I&O's Summary    21 Jul 2018 07:01  -  22 Jul 2018 07:00  --------------------------------------------------------  IN: 840 mL / OUT: 3 mL / NET: 837 mL    22 Jul 2018 07:01  -  22 Jul 2018 21:21  --------------------------------------------------------  IN: 200 mL / OUT: 4 mL / NET: 196 mL      CAPILLARY BLOOD GLUCOSE                          16.4   6.58  )-----------( 168      ( 22 Jul 2018 07:59 )             47.8     07-22    140  |  104  |  25<H>  ----------------------------<  164<H>  3.7   |  21<L>  |  1.23    Ca    9.9      22 Jul 2018 06:58      PHYSICAL EXAM:  GENERAL: NAD, well-developed  CHEST/LUNG: Clear to auscultation bilaterally; No wheeze  HEART: Regular rate and rhythm;  ABDOMEN: Soft, Nontender, Nondistended; Bowel sounds present  EXTREMITIES:  2+ Peripheral Pulses, No clubbing, cyanosis, or edema      Assessment/Plan: Hypertensive urgency likely 2/2 to pain and uncontrolled essential hypertension   - labetolol 10mg IVP x 1, reassess BP 30-40 mins s/p   - consider additional BP agent if BP remains persistently elevated   - RN aware of plan   - c/w PRN pain medications   - Will continue to monitor   - will endorse to AM team.       Gene Mendoza PA-C,   Department of Medicine Medicine PA Note     AUGUSTUS FLORES  MRN-[id number]       Notified by RN for elevated BP of 193/73. Patient is a 63 y.o F w/pmh migraines , seizure disorder , HTN, osteoporosis who p/w LBP found to have T11 compression fx,  Patient seen and evaluated at bedside in NAD with c/o LBP that is alleviated with PRN pain meds. Patient states her BP is typically sporadic and has been elevated to this level before. Patient denies any Headache/CP/SOB/Abdominal pain/N/V/D/Dizziness/Visual disturbances. Patient with 10mg IV hydralazine pushes during day shift x 2.     Vital Signs Last 24 Hrs  T(C): 37.1 (07-22-18 @ 21:11), Max: 37.2 (07-22-18 @ 14:24)  T(F): 98.8 (07-22-18 @ 21:11), Max: 99 (07-22-18 @ 14:24)  HR: 105 (07-22-18 @ 21:11) (75 - 105)  BP: 193/73 (07-22-18 @ 21:11) (161/86 - 203/101)  BP(mean): --  RR: 18 (07-22-18 @ 21:11) (18 - 18)  SpO2: 99% (07-22-18 @ 21:11) (95% - 99%)  Daily     Daily   I&O's Summary    21 Jul 2018 07:01  -  22 Jul 2018 07:00  --------------------------------------------------------  IN: 840 mL / OUT: 3 mL / NET: 837 mL    22 Jul 2018 07:01  -  22 Jul 2018 21:21  --------------------------------------------------------  IN: 200 mL / OUT: 4 mL / NET: 196 mL      CAPILLARY BLOOD GLUCOSE                          16.4   6.58  )-----------( 168      ( 22 Jul 2018 07:59 )             47.8     07-22    140  |  104  |  25<H>  ----------------------------<  164<H>  3.7   |  21<L>  |  1.23    Ca    9.9      22 Jul 2018 06:58      PHYSICAL EXAM:  GENERAL: NAD, well-developed  CHEST/LUNG: Clear to auscultation bilaterally; No wheeze  HEART: Regular rate and rhythm;  ABDOMEN: Soft, Nontender, Nondistended; Bowel sounds present  EXTREMITIES:  2+ Peripheral Pulses, No clubbing, cyanosis, or edema      Assessment/Plan: Hypertensive urgency likely 2/2 to pain and uncontrolled essential hypertension   - labetolol 10mg IVP x 1, reassess BP 30-40 mins s/p ,    - consider additional BP agent if BP remains persistently elevated   - consider cardiology consult if persistent uncontrolled BP   - RN aware of plan   - c/w PRN pain medications   - Will continue to monitor   - will endorse to AM team.       Gene Mendoza PA-C,   Department of Medicine Medicine PA [place]     AUGUSTUS FLORES  MRN-[id number]       Notified by RN for elevated BP of 193/73. Patient is a 63 y.o F w/pmh migraines , seizure disorder , HTN, osteoporosis who p/w LBP found to have T11 compression fx,  Patient seen and evaluated at bedside in NAD with c/o LBP that is alleviated with PRN pain meds. Patient states her BP is typically sporadic and has been elevated to this level before. Patient denies any Headache/CP/SOB/Abdominal pain/N/V/D/Dizziness/Visual disturbances. Patient with 10mg IV hydralazine pushes during day shift x 2. . Head CT done last night, with no ICH.     Vital Signs Last 24 Hrs  T(C): 37.1 (07-22-18 @ 21:11), Max: 37.2 (07-22-18 @ 14:24)  T(F): 98.8 (07-22-18 @ 21:11), Max: 99 (07-22-18 @ 14:24)  HR: 105 (07-22-18 @ 21:11) (75 - 105)  BP: 193/73 (07-22-18 @ 21:11) (161/86 - 203/101)  BP(mean): --  RR: 18 (07-22-18 @ 21:11) (18 - 18)  SpO2: 99% (07-22-18 @ 21:11) (95% - 99%)  Daily     Daily   I&O's Summary    21 Jul 2018 07:01  -  22 Jul 2018 07:00  --------------------------------------------------------  IN: 840 mL / OUT: 3 mL / NET: 837 mL    22 Jul 2018 07:01  -  22 Jul 2018 21:21  --------------------------------------------------------  IN: 200 mL / OUT: 4 mL / NET: 196 mL      CAPILLARY BLOOD GLUCOSE                          16.4   6.58  )-----------( 168      ( 22 Jul 2018 07:59 )             47.8     07-22    140  |  104  |  25<H>  ----------------------------<  164<H>  3.7   |  21<L>  |  1.23    Ca    9.9      22 Jul 2018 06:58      PHYSICAL EXAM:  GENERAL: NAD, well-developed  CHEST/LUNG: Clear to auscultation bilaterally; No wheeze  HEART: Regular rate and rhythm;  ABDOMEN: Soft, Nontender, Nondistended; Bowel sounds present  EXTREMITIES:  2+ Peripheral Pulses, No clubbing, cyanosis, or edema      Assessment/Plan: Hypertensive urgency likely 2/2 to pain and uncontrolled essential hypertension   - labetolol 10mg IVP x 1, reassess BP 30-40 mins s/p ,    - consider additional BP agent if BP remains persistently elevated   - consider cardiology consult if persistent uncontrolled BP   - RN aware of plan   - c/w PRN pain medications   - Will continue to monitor   - will endorse to AM team.       Gene Mendoza PA-C,   Department of Medicine

## 2018-07-22 NOTE — PROGRESS NOTE ADULT - ASSESSMENT
· Assessment		  63F w/pmh migraines , seizure disorder , HTN, osteoporosis,   p/w intractable  back pain x  two days, CT spine with T11 compression fracture.         Delirium.  CT head without acute findings , no metabolic derangements, U tox positive for barbiturates and benzodiazepines c/w history provided by family , suspect delirium secondary to excessive dosing  will monitor closely:   Istop was personally reviewed ,  Reference #: [id number] patient has been on Diazepam and percocet both last prescribed in January '18.   Psychiatry evaluation noted      Compression fracture of thoracic vertebra.  Seen by Neurosurgery , no surgical intervention recommended  - tylenol PRN for mild pain    - Dilaudid IV prn for severe pain   - lidocaine patch   - fall precautions.     Osteoporosis- Endocrine evaluation    Nausea/ Constipation- Gastroenterology evaluation noted. No EGD. Bowel regimen/enema.    HTN control- continue Am,lodipine    Seizure disorder.  Plan: - cont Topamax.   Naldo Mendoza MD pager [phone]

## 2018-07-23 ENCOUNTER — TRANSCRIPTION ENCOUNTER (OUTPATIENT)
Age: 63
End: 2018-07-23

## 2018-07-23 DIAGNOSIS — S22.000A WEDGE COMPRESSION FRACTURE OF UNSPECIFIED THORACIC VERTEBRA, INITIAL ENCOUNTER FOR CLOSED FRACTURE: ICD-10-CM

## 2018-07-23 LAB
24R-OH-CALCIDIOL SERPL-MCNC: 15.3 NG/ML — LOW (ref 30–80)
ANION GAP SERPL CALC-SCNC: 16 MMOL/L — SIGNIFICANT CHANGE UP (ref 5–17)
BUN SERPL-MCNC: 29 MG/DL — HIGH (ref 7–23)
CALCIUM SERPL-MCNC: 9.8 MG/DL — SIGNIFICANT CHANGE UP (ref 8.4–10.5)
CHLORIDE SERPL-SCNC: 102 MMOL/L — SIGNIFICANT CHANGE UP (ref 96–108)
CO2 SERPL-SCNC: 23 MMOL/L — SIGNIFICANT CHANGE UP (ref 22–31)
CORTIS AM PEAK SERPL-MCNC: 24.7 UG/DL — HIGH (ref 6–18.4)
CREAT SERPL-MCNC: 1.53 MG/DL — HIGH (ref 0.5–1.3)
GLUCOSE SERPL-MCNC: 141 MG/DL — HIGH (ref 70–99)
POTASSIUM SERPL-MCNC: 3.4 MMOL/L — LOW (ref 3.5–5.3)
POTASSIUM SERPL-SCNC: 3.4 MMOL/L — LOW (ref 3.5–5.3)
SODIUM SERPL-SCNC: 141 MMOL/L — SIGNIFICANT CHANGE UP (ref 135–145)

## 2018-07-23 PROCEDURE — 99222 1ST HOSP IP/OBS MODERATE 55: CPT

## 2018-07-23 PROCEDURE — 99232 SBSQ HOSP IP/OBS MODERATE 35: CPT

## 2018-07-23 RX ORDER — NIFEDIPINE 30 MG
30 TABLET, EXTENDED RELEASE 24 HR ORAL DAILY
Qty: 0 | Refills: 0 | Status: DISCONTINUED | OUTPATIENT
Start: 2018-07-23 | End: 2018-07-28

## 2018-07-23 RX ORDER — POTASSIUM CHLORIDE 20 MEQ
20 PACKET (EA) ORAL
Qty: 0 | Refills: 0 | Status: COMPLETED | OUTPATIENT
Start: 2018-07-23 | End: 2018-07-23

## 2018-07-23 RX ORDER — PROPRANOLOL HCL 160 MG
10 CAPSULE, EXTENDED RELEASE 24HR ORAL
Qty: 0 | Refills: 0 | Status: DISCONTINUED | OUTPATIENT
Start: 2018-07-23 | End: 2018-07-24

## 2018-07-23 RX ORDER — ALPRAZOLAM 0.25 MG
0.25 TABLET ORAL ONCE
Qty: 0 | Refills: 0 | Status: DISCONTINUED | OUTPATIENT
Start: 2018-07-23 | End: 2018-07-23

## 2018-07-23 RX ORDER — ONDANSETRON 8 MG/1
4 TABLET, FILM COATED ORAL EVERY 6 HOURS
Qty: 0 | Refills: 0 | Status: DISCONTINUED | OUTPATIENT
Start: 2018-07-23 | End: 2018-07-28

## 2018-07-23 RX ORDER — AMLODIPINE BESYLATE 2.5 MG/1
5 TABLET ORAL ONCE
Qty: 0 | Refills: 0 | Status: COMPLETED | OUTPATIENT
Start: 2018-07-23 | End: 2018-07-23

## 2018-07-23 RX ORDER — ONDANSETRON 8 MG/1
4 TABLET, FILM COATED ORAL ONCE
Qty: 0 | Refills: 0 | Status: COMPLETED | OUTPATIENT
Start: 2018-07-23 | End: 2018-07-23

## 2018-07-23 RX ADMIN — Medication 10 MILLIGRAM(S): at 18:15

## 2018-07-23 RX ADMIN — AMLODIPINE BESYLATE 5 MILLIGRAM(S): 2.5 TABLET ORAL at 10:37

## 2018-07-23 RX ADMIN — HYDROMORPHONE HYDROCHLORIDE 0.5 MILLIGRAM(S): 2 INJECTION INTRAMUSCULAR; INTRAVENOUS; SUBCUTANEOUS at 01:00

## 2018-07-23 RX ADMIN — PANTOPRAZOLE SODIUM 40 MILLIGRAM(S): 20 TABLET, DELAYED RELEASE ORAL at 05:27

## 2018-07-23 RX ADMIN — HYDROMORPHONE HYDROCHLORIDE 0.5 MILLIGRAM(S): 2 INJECTION INTRAMUSCULAR; INTRAVENOUS; SUBCUTANEOUS at 06:15

## 2018-07-23 RX ADMIN — Medication 2 MILLIGRAM(S): at 00:42

## 2018-07-23 RX ADMIN — Medication 0.5 MILLIGRAM(S): at 09:04

## 2018-07-23 RX ADMIN — Medication 10 MILLIGRAM(S): at 10:37

## 2018-07-23 RX ADMIN — HYDROMORPHONE HYDROCHLORIDE 0.5 MILLIGRAM(S): 2 INJECTION INTRAMUSCULAR; INTRAVENOUS; SUBCUTANEOUS at 09:43

## 2018-07-23 RX ADMIN — ONDANSETRON 4 MILLIGRAM(S): 8 TABLET, FILM COATED ORAL at 09:06

## 2018-07-23 RX ADMIN — Medication 100 MILLIGRAM(S): at 21:17

## 2018-07-23 RX ADMIN — HEPARIN SODIUM 5000 UNIT(S): 5000 INJECTION INTRAVENOUS; SUBCUTANEOUS at 18:14

## 2018-07-23 RX ADMIN — HYDROMORPHONE HYDROCHLORIDE 0.5 MILLIGRAM(S): 2 INJECTION INTRAMUSCULAR; INTRAVENOUS; SUBCUTANEOUS at 19:55

## 2018-07-23 RX ADMIN — Medication 100 MILLIGRAM(S): at 09:08

## 2018-07-23 RX ADMIN — Medication 20 MILLIEQUIVALENT(S): at 15:51

## 2018-07-23 RX ADMIN — LIDOCAINE 1 PATCH: 4 CREAM TOPICAL at 13:46

## 2018-07-23 RX ADMIN — Medication 0.25 MILLIGRAM(S): at 01:31

## 2018-07-23 RX ADMIN — HYDROMORPHONE HYDROCHLORIDE 0.5 MILLIGRAM(S): 2 INJECTION INTRAMUSCULAR; INTRAVENOUS; SUBCUTANEOUS at 14:00

## 2018-07-23 RX ADMIN — LIDOCAINE 1 PATCH: 4 CREAM TOPICAL at 00:52

## 2018-07-23 RX ADMIN — HYDROMORPHONE HYDROCHLORIDE 0.5 MILLIGRAM(S): 2 INJECTION INTRAMUSCULAR; INTRAVENOUS; SUBCUTANEOUS at 13:46

## 2018-07-23 RX ADMIN — POLYETHYLENE GLYCOL 3350 17 GRAM(S): 17 POWDER, FOR SOLUTION ORAL at 13:46

## 2018-07-23 RX ADMIN — HYDROMORPHONE HYDROCHLORIDE 0.5 MILLIGRAM(S): 2 INJECTION INTRAMUSCULAR; INTRAVENOUS; SUBCUTANEOUS at 00:00

## 2018-07-23 RX ADMIN — Medication 30 MILLIGRAM(S): at 16:54

## 2018-07-23 RX ADMIN — HYDROMORPHONE HYDROCHLORIDE 0.5 MILLIGRAM(S): 2 INJECTION INTRAMUSCULAR; INTRAVENOUS; SUBCUTANEOUS at 05:59

## 2018-07-23 RX ADMIN — ONDANSETRON 4 MILLIGRAM(S): 8 TABLET, FILM COATED ORAL at 20:03

## 2018-07-23 RX ADMIN — HYDROMORPHONE HYDROCHLORIDE 0.5 MILLIGRAM(S): 2 INJECTION INTRAMUSCULAR; INTRAVENOUS; SUBCUTANEOUS at 20:15

## 2018-07-23 RX ADMIN — Medication 100 MILLIGRAM(S): at 13:46

## 2018-07-23 RX ADMIN — SERTRALINE 25 MILLIGRAM(S): 25 TABLET, FILM COATED ORAL at 13:46

## 2018-07-23 RX ADMIN — Medication 20 MILLIEQUIVALENT(S): at 18:15

## 2018-07-23 RX ADMIN — Medication 100 MILLIGRAM(S): at 05:27

## 2018-07-23 RX ADMIN — AMLODIPINE BESYLATE 5 MILLIGRAM(S): 2.5 TABLET ORAL at 05:27

## 2018-07-23 NOTE — DISCHARGE NOTE ADULT - PLAN OF CARE
Improved You will need to follow up with your primary medical doctor, Dr. Montes, within one week of discharge - please call to make an appointment. Outpatient physical therapy  You can follow up with neurosurgery, Dr. Ramos [phone] upon discharge - please call to make an appointment. Continue with current medication regimen You will need to follow up with your primary medical doctor, Dr. Montes, within one week of discharge - please call to make an appointment.  At this time, you will need to have your creatinine checked.

## 2018-07-23 NOTE — PROGRESS NOTE ADULT - SUBJECTIVE AND OBJECTIVE BOX
Patient is a 63y old  Female who presents with a chief complaint of back pain (23 Jul 2018 17:21)      SUBJECTIVE / OVERNIGHT EVENTS: c/o headache Vomited once.  Review of Systems  chest pain no  palpitations no  sob no  nausea no    MEDICATIONS  (STANDING):  docusate sodium 100 milliGRAM(s) Oral three times a day  heparin  Injectable 5000 Unit(s) SubCutaneous every 12 hours  HYDROmorphone  Injectable 0.25 milliGRAM(s) IV Push once  lidocaine   Patch 1 Patch Transdermal daily  NIFEdipine XL 30 milliGRAM(s) Oral daily  pantoprazole    Tablet 40 milliGRAM(s) Oral before breakfast  polyethylene glycol 3350 17 Gram(s) Oral daily  propranolol 10 milliGRAM(s) Oral two times a day  sertraline 25 milliGRAM(s) Oral daily  sodium chloride 0.9% Bolus 500 milliLiter(s) IV Bolus once  topiramate 100 milliGRAM(s) Oral two times a day    MEDICATIONS  (PRN):  acetaminophen   Tablet. 650 milliGRAM(s) Oral every 6 hours PRN Mild Pain (1 - 3)  diazepam    Tablet 2 milliGRAM(s) Oral at bedtime PRN muscle spasms  HYDROmorphone  Injectable 0.5 milliGRAM(s) IV Push every 4 hours PRN Moderate Pain (4 - 6)  ondansetron Injectable 4 milliGRAM(s) IV Push every 6 hours PRN Nausea and/or Vomiting          PHYSICAL EXAM:  GENERAL: sick  HEAD:  Atraumatic, Normocephalic  EYES: EOMI, PERRLA, conjunctiva and sclera clear  NECK: Supple, No JVD  CHEST/LUNG: Clear to auscultation bilaterally; No wheeze  HEART: Regular rate and rhythm; No murmurs, rubs, or gallops  ABDOMEN: Soft, Nontender, Nondistended; Bowel sounds present  EXTREMITIES:  2+ Peripheral Pulses, No clubbing, cyanosis, or edema  PSYCH: AAOx3, anxious  NEUROLOGY: non-focal  SKIN: No rashes or lesions    LABS:                        16.4   6.58  )-----------( 168      ( 22 Jul 2018 07:59 )             47.8     07-23    141  |  102  |  29<H>  ----------------------------<  141<H>  3.4<L>   |  23  |  1.53<H>    Ca    9.8      23 Jul 2018 06:28                RADIOLOGY & ADDITIONAL TESTS:    Imaging Personally Reviewed:    Consultant(s) Notes Reviewed:      Care Discussed with Consultants/Other Providers:

## 2018-07-23 NOTE — DISCHARGE NOTE ADULT - MEDICATION SUMMARY - MEDICATIONS TO STOP TAKING
I will STOP taking the medications listed below when I get home from the hospital:    butalbital-acetaminophen 50 mg-325 mg oral tablet  -- 1 tab(s) by mouth every 4 hours, As Needed    propranolol 10 mg oral tablet  -- 1 tab(s) by mouth 2 times a day

## 2018-07-23 NOTE — DISCHARGE NOTE ADULT - HOSPITAL COURSE
63 Female w/pmh migraines , seizure disorder , HTN, osteoporosis,   presents with back pain for the past two days.   Per family, patient reported symptoms started suddenly after lifting a paint can ,she had severe pain  in mid thoracic area  more on right , non radiating . Per family she has been taking extra doses of her migraine medications  in order to relieve the pain, they are not sure how much she took.  she was seen at [facility] emergency room and treated with valium , however symptoms persisted. She has no fever or chills . No other injuries.

## 2018-07-23 NOTE — CONSULT NOTE ADULT - PROBLEM SELECTOR RECOMMENDATION 9
-check vitamin D 25-OH: If less than 20, start on ergocalciferol 50,000 IU qweekly, If between 20-25, vit d 2000IU qdaily or between 25-30 then vitamin D 1000IU po qdaily.  -she also needs calcium carbonate 1000mg po bid  -unable to treat this patient at this time for several reasons:  a) Vitamin D status is unknown, low vitamin D would put her at risk for hypocalcemia  b) She has poor dentition so she would be at risk for ONJ. She should see the dentist to have her remaining few teeth pulled.   c) her CrCl is close to 30 so a bisphosphonate would not be safe. Denosumab would be a better option but is an outpatient therapy and not safe due to the above reasons.  Outpt DXA and f/u with my partner Dr. Jaqueline Montes at [address] [place] [address]. [address] [phone] on 9/10/18 at 11am.  Will sign off at this time

## 2018-07-23 NOTE — DISCHARGE NOTE ADULT - CARE PLAN
Principal Discharge DX:	Delirium  Goal:	Improved  Secondary Diagnosis:	Compression fracture of thoracic vertebra  Secondary Diagnosis:	Seizure disorder  Secondary Diagnosis:	Depression Principal Discharge DX:	Delirium  Goal:	Improved  Secondary Diagnosis:	Compression fracture of thoracic vertebra  Secondary Diagnosis:	Seizure disorder  Secondary Diagnosis:	Depression  Secondary Diagnosis:	RUPERTO (acute kidney injury) Principal Discharge DX:	Delirium  Goal:	Improved  Assessment and plan of treatment:	You will need to follow up with your primary medical doctor, Dr. Montes, within one week of discharge - please call to make an appointment.  Secondary Diagnosis:	Compression fracture of thoracic vertebra  Assessment and plan of treatment:	Outpatient physical therapy  You can follow up with neurosurgery, Dr. Ramos [phone] upon discharge - please call to make an appointment.  Secondary Diagnosis:	Seizure disorder  Assessment and plan of treatment:	Continue with current medication regimen  Secondary Diagnosis:	Depression  Assessment and plan of treatment:	Continue with current medication regimen  Secondary Diagnosis:	RUPERTO (acute kidney injury)  Assessment and plan of treatment:	You will need to follow up with your primary medical doctor, Dr. Montes, within one week of discharge - please call to make an appointment.  At this time, you will need to have your creatinine checked.

## 2018-07-23 NOTE — PROGRESS NOTE ADULT - ASSESSMENT
· Assessment		  63F w/pmh migraines , seizure disorder , HTN, osteoporosis,   p/w intractable  back pain x  two days, CT spine with T11 compression fracture.         Delirium.  CT head without acute findings , no metabolic derangements, U tox positive for barbiturates and benzodiazepines c/w history provided by family , suspect delirium secondary to excessive dosing  will monitor closely:   Istop was personally reviewed ,  Reference #: [id number] patient has been on Diazepam and percocet both last prescribed in January '18.   Psychiatry evaluation noted  EEG pending       Compression fracture of thoracic vertebra.  Seen by Neurosurgery , no surgical intervention recommended  - tylenol PRN for mild pain    - Dilaudid IV prn for severe pain   - lidocaine patch   - fall precautions.     Osteoporosis- Endocrine evaluation noted. For further Rx as OTP.    Nausea/ Constipation- Gastroenterology evaluation noted. No EGD. Bowel regimen/enema.    HTN control- Nephrology evaluation noted Dr. Bhat    Seizure disorder.  Plan: - cont Topamax.   Naldo Mendoza MD pager [phone]

## 2018-07-23 NOTE — PROGRESS NOTE ADULT - SUBJECTIVE AND OBJECTIVE BOX
INTERVAL HPI/OVERNIGHT EVENTS:  Patient awake in bed, reports she has back pain and migraines not able to eat much because of the nausea she associates with migraine HA . had dry heaves, with small amount of bile . She had a BM after the enema      MEDICATIONS  (STANDING):  amLODIPine   Tablet 5 milliGRAM(s) Oral once  docusate sodium 100 milliGRAM(s) Oral three times a day  heparin  Injectable 5000 Unit(s) SubCutaneous every 12 hours  HYDROmorphone  Injectable 0.25 milliGRAM(s) IV Push once  lidocaine   Patch 1 Patch Transdermal daily  pantoprazole    Tablet 40 milliGRAM(s) Oral before breakfast  polyethylene glycol 3350 17 Gram(s) Oral daily  propranolol 10 milliGRAM(s) Oral two times a day  sertraline 25 milliGRAM(s) Oral daily  sodium chloride 0.9% Bolus 500 milliLiter(s) IV Bolus once  sodium chloride 0.9%. 1000 milliLiter(s) (100 mL/Hr) IV Continuous <Continuous>  topiramate 100 milliGRAM(s) Oral two times a day    MEDICATIONS  (PRN):  acetaminophen   Tablet. 650 milliGRAM(s) Oral every 6 hours PRN Mild Pain (1 - 3)  diazepam    Tablet 2 milliGRAM(s) Oral at bedtime PRN muscle spasms  HYDROmorphone  Injectable 0.5 milliGRAM(s) IV Push every 4 hours PRN Moderate Pain (4 - 6)      Allergies    penicillin (Anaphylaxis)    Intolerances        Review of Systems:    General:  No fevers or chills    HENT:  has migraine headaches , no  dysphagia  CV:  No chest pain  Resp:  No dyspnea, cough, tachypnea, or wheezing  GI, had dry heaves, BM this morning after enema        Vital Signs Last 24 Hrs  T(C): 37 (23 Jul 2018 04:30), Max: 37.2 (22 Jul 2018 14:24)  T(F): 98.6 (23 Jul 2018 04:30), Max: 99 (22 Jul 2018 14:24)  HR: 75 (23 Jul 2018 07:15) (75 - 105)  BP: 175/85 (23 Jul 2018 07:15) (170/98 - 203/101)  BP(mean): --  RR: 18 (23 Jul 2018 04:30) (18 - 18)  SpO2: 97% (23 Jul 2018 04:30) (97% - 99%)    PHYSICAL EXAM:    Constitutional: in no NAD, non toxic , well-developed  Neck: supple  Respiratory:  anterior chest wall clear to auscultation,  no  wheezing  Cardiovascular: S1 and S2, RRR,  Gastrointestinal: soft + BS, non tender   Extremities: No peripheral edema, or  cyanosis  Neurological: A/O x 3, no focal deficits  Psychiatric: Normal mood, normal affect  Skin: No visible rashes, no jaundice       LABS:                        16.4   6.58  )-----------( 168      ( 22 Jul 2018 07:59 )             47.8     07-23    141  |  102  |  29<H>  ----------------------------<  141<H>  3.4<L>   |  23  |  1.53<H>    Ca    9.8      23 Jul 2018 06:28    Occult Blood, Gastric (07.22.18 @ 12:44)    Occult Blood, Gastric: Positive          LIVER FUNCTIONS - ( 20 Jul 2018 06:33 )  Alb: 4.5 g/dL / Pro: 8.1 g/dL / ALK PHOS: 99 U/L / ALT: 18 U/L / AST: 23 U/L / GGT: x             RADIOLOGY & ADDITIONAL TESTS:  < from: CT Head No Cont (07.22.18 @ 03:00) >  EXAM:  CT BRAIN                            PROCEDURE DATE:  07/22/2018            INTERPRETATION:  INDICATIONS:  hx compression fx, htn, dizzy headache   vomiting    TECHNIQUE:  Serial axial images were obtained from the skull base to the   vertex without intravenous contrast.    COMPARISON EXAMINATION: 7/19/2018    FINDINGS:    VENTRICLES AND SULCI: Mild involutional changes appropriate for the   patient's age  INTRA-AXIAL:  No mass, blood or abnormal attenuation is seen.  EXTRA-AXIAL:  No mass or collection is seen.  VISUALIZED SINUSES:  Clear.  VISUALIZED MASTOIDS:  Clear.  CALVARIUM:  Normal.  MISCELLANEOUS:  None.    IMPRESSION:  Normal non-contrast CT of the brain. No change 7/19/2018      < from: CT Thoracic Spine No Cont (07.19.18 @ 19:06) >  EXAM:  CT THORACIC SPINE                            PROCEDURE DATE:  07/19/2018            INTERPRETATION:  HISTORY: Injury to trunk.    COMPARISON: CT chest 1/1/2016.    TECHNIQUE: Axial noncontrast CT images of the thoracic spine were   obtained.Coronal and sagittal reconstructions were performed. Bone and   soft tissue windows were evaluated.     FINDINGS:    Thoracic kyphosis is maintained. No evidence of traumatic malalignment.    There is a central compression fracture of T11. The heightloss is on the   order of 55% centrally.     Multiple anterior osteophytes are present.     IMPRESSION:     Central compression fracture of T11. There is no retropulsion.               < end of copied text > INTERVAL HPI/OVERNIGHT EVENTS:  Patient awake in bed, reports she has back pain and migraines not able to eat much because of the nausea she associates with migraine HA . had dry heaves, with small amount of bile . She had a BM after the enema      MEDICATIONS  (STANDING):  amLODIPine   Tablet 5 milliGRAM(s) Oral once  docusate sodium 100 milliGRAM(s) Oral three times a day  heparin  Injectable 5000 Unit(s) SubCutaneous every 12 hours  HYDROmorphone  Injectable 0.25 milliGRAM(s) IV Push once  lidocaine   Patch 1 Patch Transdermal daily  pantoprazole    Tablet 40 milliGRAM(s) Oral before breakfast  polyethylene glycol 3350 17 Gram(s) Oral daily  propranolol 10 milliGRAM(s) Oral two times a day  sertraline 25 milliGRAM(s) Oral daily  sodium chloride 0.9% Bolus 500 milliLiter(s) IV Bolus once  sodium chloride 0.9%. 1000 milliLiter(s) (100 mL/Hr) IV Continuous <Continuous>  topiramate 100 milliGRAM(s) Oral two times a day    MEDICATIONS  (PRN):  acetaminophen   Tablet. 650 milliGRAM(s) Oral every 6 hours PRN Mild Pain (1 - 3)  diazepam    Tablet 2 milliGRAM(s) Oral at bedtime PRN muscle spasms  HYDROmorphone  Injectable 0.5 milliGRAM(s) IV Push every 4 hours PRN Moderate Pain (4 - 6)      Allergies    penicillin (Anaphylaxis)    Intolerances        Review of Systems:    General:  No fevers or chills    HENT:  has migraine headaches , no  dysphagia  CV:  No chest pain  Resp:  No dyspnea, cough, tachypnea, or wheezing  GI, had dry heaves, BM this morning after enema        Vital Signs Last 24 Hrs  T(C): 37 (23 Jul 2018 04:30), Max: 37.2 (22 Jul 2018 14:24)  T(F): 98.6 (23 Jul 2018 04:30), Max: 99 (22 Jul 2018 14:24)  HR: 75 (23 Jul 2018 07:15) (75 - 105)  BP: 175/85 (23 Jul 2018 07:15) (170/98 - 203/101)  BP(mean): --  RR: 18 (23 Jul 2018 04:30) (18 - 18)  SpO2: 97% (23 Jul 2018 04:30) (97% - 99%)    PHYSICAL EXAM:    Constitutional: in no NAD, non toxic , well-developed  Neck: supple  Respiratory:  anterior chest wall clear to auscultation,  no  wheezing  Cardiovascular: S1 and S2, RRR,  Gastrointestinal: soft + BS, non tender   Extremities: No peripheral edema, or  cyanosis  Neurological: A/O x 3, no focal deficits  Psychiatric: Normal mood, normal affect  Skin: No visible rashes, no jaundice       LABS:                        16.4   6.58  )-----------( 168      ( 22 Jul 2018 07:59 )             47.8     07-23    141  |  102  |  29<H>  ----------------------------<  141<H>  3.4<L>   |  23  |  1.53<H>    Ca    9.8      23 Jul 2018 06:28    Occult Blood, Gastric (07.22.18 @ 12:44)    Occult Blood, Gastric: Positive          LIVER FUNCTIONS - ( 20 Jul 2018 06:33 )  Alb: 4.5 g/dL / Pro: 8.1 g/dL / ALK PHOS: 99 U/L / ALT: 18 U/L / AST: 23 U/L / GGT: x             RADIOLOGY & ADDITIONAL TESTS:  < from: CT Head No Cont (07.22.18 @ 03:00) >  EXAM:  CT BRAIN                            PROCEDURE DATE:  07/22/2018            INTERPRETATION:  INDICATIONS:  hx compression fx, htn, dizzy headache   vomiting    TECHNIQUE:  Serial axial images were obtained from the skull base to the   vertex without intravenous contrast.    COMPARISON EXAMINATION: 7/19/2018    FINDINGS:    VENTRICLES AND SULCI: Mild involutional changes appropriate for the   patient's age  INTRA-AXIAL:  No mass, blood or abnormal attenuation is seen.  EXTRA-AXIAL:  No mass or collection is seen.  VISUALIZED SINUSES:  Clear.  VISUALIZED MASTOIDS:  Clear.  CALVARIUM:  Normal.  MISCELLANEOUS:  None.    IMPRESSION:  Normal non-contrast CT of the brain. No change 7/19/2018      < from: CT Thoracic Spine No Cont (07.19.18 @ 19:06) >  EXAM:  CT THORACIC SPINE                            PROCEDURE DATE:  07/19/2018            INTERPRETATION:  HISTORY: Injury to trunk.    COMPARISON: CT chest 1/1/2016.    TECHNIQUE: Axial noncontrast CT images of the thoracic spine were   obtained.Coronal and sagittal reconstructions were performed. Bone and   soft tissue windows were evaluated.     FINDINGS:    Thoracic kyphosis is maintained. No evidence of traumatic malalignment.    There is a central compression fracture of T11. The heightloss is on the   order of 55% centrally.     Multiple anterior osteophytes are present.     IMPRESSION:     Central compression fracture of T11. There is no retropulsion.

## 2018-07-23 NOTE — PROGRESS NOTE ADULT - ASSESSMENT
Assessment Assessment :  63 Female with history of migraines , seizure disorder , HTN, osteoporosis,   admitted 7/19/18  with back pain for the past two days.   Patient has a psych history.  She frequently gets vomiting during migraines.  She had one  episode of coffee ground emesis.    She has T11 compression fracture.  Urine tox screen  was positive for barbiturates and benzos.  She had a bowel movement after an enema  She likely has a component of narcotic induced constipation.    Her Hgb / HCT stable      Plan:  Monitor labs and trends as ordered  Continue  PPI  Continue  colace and Miralax   there are no plans for inpatient EGD unless has overt or active GI bleeding.  monitor bowel frequency as patient may need Amitiza if remains on chronic narcotics.     Neris Bourgeois, [facility]  [address] [place] [address]  [id number] [phone]

## 2018-07-23 NOTE — DISCHARGE NOTE ADULT - PATIENT PORTAL LINK FT
You can access the [facility] Patient Portal, offered by [facility], by registering with the following website: [email or web address]

## 2018-07-23 NOTE — CONSULT NOTE ADULT - SUBJECTIVE AND OBJECTIVE BOX
HPI:          PAST MEDICAL & SURGICAL HISTORY:  Shingles  Pericarditis  Osteoporosis  Seizure disorder  Migraines        FAMILY HISTORY:  Family history of colon cancer in mother, MI: father, breast cancer: paternal aunt, rehumatic fever/valve disease: sister        Social History:  Tobacco: quit 12 years ago: 1ppdx 25 yrs  ETOH: none  Occupation: Retired     Outpatient Medications: topa    MEDICATIONS  (STANDING):  docusate sodium 100 milliGRAM(s) Oral three times a day  heparin  Injectable 5000 Unit(s) SubCutaneous every 12 hours  HYDROmorphone  Injectable 0.25 milliGRAM(s) IV Push once  lidocaine   Patch 1 Patch Transdermal daily  pantoprazole    Tablet 40 milliGRAM(s) Oral before breakfast  polyethylene glycol 3350 17 Gram(s) Oral daily  potassium chloride    Tablet ER 20 milliEquivalent(s) Oral every 2 hours  propranolol 10 milliGRAM(s) Oral two times a day  sertraline 25 milliGRAM(s) Oral daily  sodium chloride 0.9% Bolus 500 milliLiter(s) IV Bolus once  topiramate 100 milliGRAM(s) Oral two times a day    MEDICATIONS  (PRN):  acetaminophen   Tablet. 650 milliGRAM(s) Oral every 6 hours PRN Mild Pain (1 - 3)  diazepam    Tablet 2 milliGRAM(s) Oral at bedtime PRN muscle spasms  HYDROmorphone  Injectable 0.5 milliGRAM(s) IV Push every 4 hours PRN Moderate Pain (4 - 6)    Allergies  Penicillin (Anaphylaxis)    Review of Systems:  Constitutional: No fever, good appetite/po intake  Eyes: No blurry vision, diplopia  Neuro: No tremors  HEENT: No pain  Cardiovascular: No chest pain, palpitations  Respiratory: No SOB, no cough  GI: No nausea, vomiting,   : No dysuria, hematuria  Skin: no rash  Psych: no depression  Endocrine: no polyuria, polydipsia  Hem/lymph: no swelling  Osteoporosis: no fractures    ALL OTHER SYSTEMS REVIEWED AND NEGATIVE    UNABLE TO OBTAIN    PHYSICAL EXAM:  VITALS: T(C): 37 (07-23-18 @ 12:21)  T(F): 98.6 (07-23-18 @ 12:21), Max: 98.8 (07-22-18 @ 21:11)  HR: 73 (07-23-18 @ 12:21) (73 - 105)  BP: 196/84 (07-23-18 @ 13:52) (170/98 - 203/101)  RR:  (18 - 18)  SpO2:  (97% - 99%)  Wt(kg): --  GENERAL: NAD, well-groomed, well-developed  EYES: No proptosis, anicteric  HEENT:  Atraumatic, Normocephalic, moist mucous membranes  RESPIRATORY: Clear to auscultation bilaterally; No rales, rhonchi, wheezing, or rubs  CARDIOVASCULAR: Regular rate and rhythm; No murmurs  GI: Soft, nontender, non distended, normal bowel sounds  SKIN: Dry, intact, No rashes or lesions  PSYCH: reactive affect, euthymic mood                          16.4   6.58  )-----------( 168      ( 22 Jul 2018 07:59 )             47.8       07-23    141  |  102  |  29<H>  ----------------------------<  141<H>  3.4<L>   |  23  |  1.53<H>    EGFR if : 42<L>  EGFR if non : 36<L>    Ca    9.8      07-23 HPI:       PAST MEDICAL & SURGICAL HISTORY:  Shingles  Pericarditis  Osteoporosis  Seizure disorder  Migraines      FAMILY HISTORY:  Family history of colon cancer in mother, MI: father, breast cancer: paternal aunt, rehumatic fever/valve disease: sister    Social History:  Tobacco: quit 12 years ago: 1ppdx 25 yrs  ETOH: none  Occupation: Retired     Outpatient Medications: topamax, zoloft, protonix, xanax, oxycodone    MEDICATIONS  (STANDING):  docusate sodium 100 milliGRAM(s) Oral three times a day  heparin  Injectable 5000 Unit(s) SubCutaneous every 12 hours  HYDROmorphone  Injectable 0.25 milliGRAM(s) IV Push once  lidocaine   Patch 1 Patch Transdermal daily  pantoprazole    Tablet 40 milliGRAM(s) Oral before breakfast  polyethylene glycol 3350 17 Gram(s) Oral daily  potassium chloride    Tablet ER 20 milliEquivalent(s) Oral every 2 hours  propranolol 10 milliGRAM(s) Oral two times a day  sertraline 25 milliGRAM(s) Oral daily  sodium chloride 0.9% Bolus 500 milliLiter(s) IV Bolus once  topiramate 100 milliGRAM(s) Oral two times a day    MEDICATIONS  (PRN):  acetaminophen   Tablet. 650 milliGRAM(s) Oral every 6 hours PRN Mild Pain (1 - 3)  diazepam    Tablet 2 milliGRAM(s) Oral at bedtime PRN muscle spasms  HYDROmorphone  Injectable 0.5 milliGRAM(s) IV Push every 4 hours PRN Moderate Pain (4 - 6)    Allergies  Penicillin (Anaphylaxis)    Review of Systems:  Constitutional: +decreased appetite/po intake  Eyes: No blurry vision, diplopia  Neuro: +migraines, no dizzyness  Cardiovascular: No chest pain, palpitations  Respiratory: No SOB, no cough  GI: +nausea, vomiting,   : No dysuria, +hematuria  Psych: no depression. no S/HI  Osteoporosis: +fractures of R forearm, spine  ALL OTHER SYSTEMS REVIEWED AND NEGATIVE      PHYSICAL EXAM:  VITALS: T(C): 37 (07-23-18 @ 12:21)  T(F): 98.6 (07-23-18 @ 12:21), Max: 98.8 (07-22-18 @ 21:11)  HR: 73 (07-23-18 @ 12:21) (73 - 105)  BP: 196/84 (07-23-18 @ 13:52) (170/98 - 203/101)  RR:  (18 - 18)  SpO2:  (97% - 99%)  Wt(kg): --  GENERAL: NAD, well-groomed, well-developed  EYES: No proptosis, anicteric  HEENT:  Atraumatic, Normocephalic, moist mucous membranes  RESPIRATORY: Clear to auscultation bilaterally; No rales, rhonchi, wheezing, or rubs  CARDIOVASCULAR: Regular rate and rhythm; No murmurs  GI: Soft, nontender, non distended, normal bowel sounds  SKIN: Dry, intact, No rashes or lesions on feet b/l  PSYCH: reactive affect, euthymic mood                          16.4   6.58  )-----------( 168      ( 22 Jul 2018 07:59 )             47.8       07-23    141  |  102  |  29<H>  ----------------------------<  141<H>  3.4<L>   |  23  |  1.53<H>    EGFR if : 42<L>  EGFR if non : 36<L>    Ca    9.8      07-23 HPI: 62 yo female with hx of migraines, valvular dz, migraines, renal stones and OP here after hearing a snap in her back while lifting gallons of paint. She was concerned as she has 2 previous fractures. Nov 2017 she broke her L arm s/p fall upon getting up from bed which was associated with dizziness and a sudden migraine. She landed on carpeting. She was not treated at that time. She also broke a vertebrae - but she is not sure of which one - in a unknown manner. About 15 years ago she was treated with actonel but told to stop it after 5-6 years. She denies hip fx in parents, but her sister has osteopenia. Also no steroid use or etoh abuse. Her risk factors include: smoking history, SSRI use (zoloft), PPI use (protonix) and past use of dilantin.    PAST MEDICAL & SURGICAL HISTORY:  Shingles  Pericarditis  Osteoporosis  Seizure disorder  Migraines    FAMILY HISTORY:  Family history of colon cancer in mother, MI: father, breast cancer: paternal aunt, rheumatic fever/valve disease: sister    Social History:  Tobacco: quit 12 years ago: 1ppdx 25 yrs  ETOH: none  Occupation: Retired     Outpatient Medications: topamax, zoloft, protonix, xanax, oxycodone    MEDICATIONS  (STANDING):  docusate sodium 100 milliGRAM(s) Oral three times a day  heparin  Injectable 5000 Unit(s) SubCutaneous every 12 hours  HYDROmorphone  Injectable 0.25 milliGRAM(s) IV Push once  lidocaine   Patch 1 Patch Transdermal daily  pantoprazole    Tablet 40 milliGRAM(s) Oral before breakfast  polyethylene glycol 3350 17 Gram(s) Oral daily  potassium chloride    Tablet ER 20 milliEquivalent(s) Oral every 2 hours  propranolol 10 milliGRAM(s) Oral two times a day  sertraline 25 milliGRAM(s) Oral daily  sodium chloride 0.9% Bolus 500 milliLiter(s) IV Bolus once  topiramate 100 milliGRAM(s) Oral two times a day    MEDICATIONS  (PRN):  acetaminophen   Tablet. 650 milliGRAM(s) Oral every 6 hours PRN Mild Pain (1 - 3)  diazepam    Tablet 2 milliGRAM(s) Oral at bedtime PRN muscle spasms  HYDROmorphone  Injectable 0.5 milliGRAM(s) IV Push every 4 hours PRN Moderate Pain (4 - 6)    Allergies  Penicillin (Anaphylaxis)    Review of Systems:  Constitutional: +decreased appetite/po intake  Eyes: No blurry vision, diplopia  Neuro: +migraines, no dizziness  Cardiovascular: No chest pain, palpitations  Respiratory: No SOB, no cough  GI: +nausea, vomiting,   : No dysuria, +hematuria  Psych: no depression. no S/HI  Osteoporosis: +fractures of R forearm, spine  ALL OTHER SYSTEMS REVIEWED AND NEGATIVE      PHYSICAL EXAM:  VITALS: T(C): 37 (07-23-18 @ 12:21)  T(F): 98.6 (07-23-18 @ 12:21), Max: 98.8 (07-22-18 @ 21:11)  HR: 73 (07-23-18 @ 12:21) (73 - 105)  BP: 196/84 (07-23-18 @ 13:52) (170/98 - 203/101)  RR:  (18 - 18)  SpO2:  (97% - 99%)  Wt(kg): --  GENERAL: NAD, well-groomed, well-developed  EYES: No proptosis, anicteric  HEENT:  Atraumatic, Normocephalic, moist mucous membranes  RESPIRATORY: Clear to auscultation bilaterally; No rales, rhonchi, wheezing, or rubs  CARDIOVASCULAR: Regular rate and rhythm; No murmurs  GI: Soft, nontender, non distended, normal bowel sounds  SKIN: Dry, intact, No rashes or lesions on feet b/l  PSYCH: reactive affect, euthymic mood                          16.4   6.58  )-----------( 168      ( 22 Jul 2018 07:59 )             47.8       07-23    141  |  102  |  29<H>  ----------------------------<  141<H>  3.4<L>   |  23  |  1.53<H>    EGFR if : 42<L>  EGFR if non : 36<L>    Ca    9.8      07-23

## 2018-07-23 NOTE — CONSULT NOTE ADULT - ASSESSMENT
62 yo female with hx of migraines, valvular dz, migraines, renal stones and OP here after hearing a snap in her back while lifting gallons of paint., endocrine consult for OP in setting of vertebral fracture.

## 2018-07-23 NOTE — DISCHARGE NOTE ADULT - ADDITIONAL INSTRUCTIONS
follow up with Dr. Montes on Sep 10, 2018 at 11 am You will need to follow up with your primary medical doctor, Dr. Montes, within one week of discharge - please call to make an appointment.   At this time, you will need to have your creatinine checked.    You can follow up with neurosurgery, Dr. Ramos [phone] upon discharge - please call to make an appointment. You will need to follow up with your primary medical doctor, Dr. Montes, within one week of discharge - please call to make an appointment.   At this time, you will need to have your creatinine checked. Also follow up Neurology and endocrinology in 1-2 weeks    You can follow up with neurosurgery, Dr. Ramos [phone] upon discharge - please call to make an appointment.

## 2018-07-23 NOTE — DISCHARGE NOTE ADULT - PROVIDER TOKENS
TOKNEFTALY:'[id number]' TOKEN:'[id number]:'[id number] TOKEN:'[id number]

## 2018-07-23 NOTE — CHART NOTE - NSCHARTNOTEFT_GEN_A_CORE
notified by RN that patient SBP in 180's, associated with scanty nose bleed. Seen and examined the patient. patient in no distress. states that she noticed satin of blood on facial tissue while she was cleaning her nose. patient endorses hx of nose bleed. c/o dried nose, c/o generalized pain, Denies any other complaints.     Vital Signs Last 24 Hrs  T(C): 37.7 (23 Jul 2018 19:23), Max: 37.7 (23 Jul 2018 19:23)  T(F): 99.8 (23 Jul 2018 19:23), Max: 99.8 (23 Jul 2018 19:23)  HR: 74 (23 Jul 2018 19:23) (73 - 105)  BP: 168/92 (23 Jul 2018 20:19) (168/92 - 196/84)  BP(mean): --  RR: 18 (23 Jul 2018 19:23) (18 - 18)  SpO2: 99% (23 Jul 2018 19:23) (97% - 99%)      Labs:                          16.4   6.58  )-----------( 168      ( 22 Jul 2018 07:59 )             47.8     07-23    141  |  102  |  29<H>  ----------------------------<  141<H>  3.4<L>   |  23  |  1.53<H>    Ca    9.8      23 Jul 2018 06:28      Radiology:    MEDICATIONS  < from: CT Head No Cont (07.22.18 @ 03:00) >    Normal non-contrast CT of the brain. No change 7/19/2018    docusate sodium 100 milliGRAM(s) Oral three times a day  heparin  Injectable 5000 Unit(s) SubCutaneous every 12 hours  HYDROmorphone  Injectable 0.25 milliGRAM(s) IV Push once  lidocaine   Patch 1 Patch Transdermal daily  NIFEdipine XL 30 milliGRAM(s) Oral daily  pantoprazole    Tablet 40 milliGRAM(s) Oral before breakfast  polyethylene glycol 3350 17 Gram(s) Oral daily  propranolol 10 milliGRAM(s) Oral two times a day  sertraline 25 milliGRAM(s) Oral daily  sodium chloride 0.9% Bolus 500 milliLiter(s) IV Bolus once  topiramate 100 milliGRAM(s) Oral two times a day    MEDICATIONS  (PRN):  acetaminophen   Tablet. 650 milliGRAM(s) Oral every 6 hours PRN Mild Pain (1 - 3)  diazepam    Tablet 2 milliGRAM(s) Oral at bedtime PRN muscle spasms  HYDROmorphone  Injectable 0.5 milliGRAM(s) IV Push every 4 hours PRN Moderate Pain (4 - 6)  ondansetron Injectable 4 milliGRAM(s) IV Push every 6 hours PRN Nausea and/or Vomiting      Physical Exam:  General: well appearing, in no acute distress  Neurology: A&Ox3, nonfocal, LOZANO x 4  ENT: no bleeding  Respiratory: CTA B/L  CV: RRR, S1S2  Abdominal: Soft, NT, ND no palpable mass  MSK: No edema, + peripheral pulses, FROM all 4 extremity    Assessment & Plan:  63F w/pmh migraines , seizure disorder , HTN, osteoporosis,   p/w intractable  back pain x  two days, CT spine with T11 compression fracture.  HTN urgency, RUPERTO  Patient was hypertensive with SBP in range of  200's to 180's during day. Started on propanolol and procardia XL PO today by Nephrology. manual /92 mm of hg.     HTN : patient asymptomatic other than scanty nose bleed  Continue Procardia XL and propanolol, Bp slowly trending down, goal 25% reduction of Bp in 24 hours  Vitals [id number] for now  Continue Hydromorphone IV for pain    Epistaxis; scanty nose bleed, no bleeding now    Instructed the patient do not blow the nose or aggressively clean the nose  Offered Notified by RN that patient SBP in 180's, associated with scanty nose bleed. Seen and examined the patient. patient in no distress. states that she noticed stain of blood on facial tissue while she was cleaning her nose. patient endorses hx of nose bleed. c/o dried nose, c/o migraine,  Denies any other complaints.     Vital Signs Last 24 Hrs  T(C): 37.7 (23 Jul 2018 19:23), Max: 37.7 (23 Jul 2018 19:23)  T(F): 99.8 (23 Jul 2018 19:23), Max: 99.8 (23 Jul 2018 19:23)  HR: 74 (23 Jul 2018 19:23) (73 - 105)  BP: 168/92 (23 Jul 2018 20:19) (168/92 - 196/84)  BP(mean): --  RR: 18 (23 Jul 2018 19:23) (18 - 18)  SpO2: 99% (23 Jul 2018 19:23) (97% - 99%)      Labs:                          16.4   6.58  )-----------( 168      ( 22 Jul 2018 07:59 )             47.8     07-23    141  |  102  |  29<H>  ----------------------------<  141<H>  3.4<L>   |  23  |  1.53<H>    Ca    9.8      23 Jul 2018 06:28      Radiology:    MEDICATIONS  < from: CT Head No Cont (07.22.18 @ 03:00) >    Normal non-contrast CT of the brain. No change 7/19/2018    docusate sodium 100 milliGRAM(s) Oral three times a day  heparin  Injectable 5000 Unit(s) SubCutaneous every 12 hours  HYDROmorphone  Injectable 0.25 milliGRAM(s) IV Push once  lidocaine   Patch 1 Patch Transdermal daily  NIFEdipine XL 30 milliGRAM(s) Oral daily  pantoprazole    Tablet 40 milliGRAM(s) Oral before breakfast  polyethylene glycol 3350 17 Gram(s) Oral daily  propranolol 10 milliGRAM(s) Oral two times a day  sertraline 25 milliGRAM(s) Oral daily  sodium chloride 0.9% Bolus 500 milliLiter(s) IV Bolus once  topiramate 100 milliGRAM(s) Oral two times a day    MEDICATIONS  (PRN):  acetaminophen   Tablet. 650 milliGRAM(s) Oral every 6 hours PRN Mild Pain (1 - 3)  diazepam    Tablet 2 milliGRAM(s) Oral at bedtime PRN muscle spasms  HYDROmorphone  Injectable 0.5 milliGRAM(s) IV Push every 4 hours PRN Moderate Pain (4 - 6)  ondansetron Injectable 4 milliGRAM(s) IV Push every 6 hours PRN Nausea and/or Vomiting      Physical Exam:  General: well appearing, in no acute distress  Neurology: A&Ox3, nonfocal, LOZANO x 4  ENT: no bleeding, throat clear  Respiratory: CTA B/L  CV: RRR, S1S2  Abdominal: Soft, NT, ND no palpable mass  MSK: No edema, + peripheral pulses, FROM all 4 extremity    Assessment & Plan:  63F w/pmh of migraines , seizure disorder , HTN, osteoporosis,   p/w intractable  back pain x  two days, CT spine with T11 compression fracture.  HTN urgency, RUPERTO  Patient was hypertensive with SBP in range of  200's to 180's during day. Started on propanolol and procardia XL PO today by Nephrology. manual /92 mm of hg.     HTN : patient asymptomatic other than scanty nose bleed, CTH done on 07/22 negative  Continue Procardia XL and propanolol, Bp slowly trending down, goal 25% reduction of Bp in 24 hours  Vitals [id number] for now  Continue Hydromorphone IV for pain    Epistaxis; scanty nose bleed, no bleeding now    Instructed the patient not to blow the nose or aggressively clean the nose  Offered ocean nasal spray for dry nose, patient refused  Consider ENT consult if patient with active nose bleed  Will continue to monitor  will update with primary team  Attending to follow    Carlos Guerrero [facility] [place]  [id number] Notified by RN that patient SBP in 180's, associated with scanty nose bleed. Seen and examined the patient. patient in no distress. states that she noticed stain of blood on facial tissue while she was cleaning her nose. patient endorses hx of nose bleed. c/o dried nose, c/o migraine,  Denies any other complaints.     Vital Signs Last 24 Hrs  T(C): 37.7 (23 Jul 2018 19:23), Max: 37.7 (23 Jul 2018 19:23)  T(F): 99.8 (23 Jul 2018 19:23), Max: 99.8 (23 Jul 2018 19:23)  HR: 74 (23 Jul 2018 19:23) (73 - 105)  BP: 168/92 (23 Jul 2018 20:19) (168/92 - 196/84)  BP(mean): --  RR: 18 (23 Jul 2018 19:23) (18 - 18)  SpO2: 99% (23 Jul 2018 19:23) (97% - 99%)      Labs:                          16.4   6.58  )-----------( 168      ( 22 Jul 2018 07:59 )             47.8     07-23    141  |  102  |  29<H>  ----------------------------<  141<H>  3.4<L>   |  23  |  1.53<H>    Ca    9.8      23 Jul 2018 06:28      Radiology:    MEDICATIONS  < from: CT Head No Cont (07.22.18 @ 03:00) >    Normal non-contrast CT of the brain. No change 7/19/2018    docusate sodium 100 milliGRAM(s) Oral three times a day  heparin  Injectable 5000 Unit(s) SubCutaneous every 12 hours  HYDROmorphone  Injectable 0.25 milliGRAM(s) IV Push once  lidocaine   Patch 1 Patch Transdermal daily  NIFEdipine XL 30 milliGRAM(s) Oral daily  pantoprazole    Tablet 40 milliGRAM(s) Oral before breakfast  polyethylene glycol 3350 17 Gram(s) Oral daily  propranolol 10 milliGRAM(s) Oral two times a day  sertraline 25 milliGRAM(s) Oral daily  sodium chloride 0.9% Bolus 500 milliLiter(s) IV Bolus once  topiramate 100 milliGRAM(s) Oral two times a day    MEDICATIONS  (PRN):  acetaminophen   Tablet. 650 milliGRAM(s) Oral every 6 hours PRN Mild Pain (1 - 3)  diazepam    Tablet 2 milliGRAM(s) Oral at bedtime PRN muscle spasms  HYDROmorphone  Injectable 0.5 milliGRAM(s) IV Push every 4 hours PRN Moderate Pain (4 - 6)  ondansetron Injectable 4 milliGRAM(s) IV Push every 6 hours PRN Nausea and/or Vomiting    Review of Systems: CONSTITUTIONAL: No fevers or chills  	RESPIRATORY: No cough, No shortness of breath                ENt + nose bleed no throat pain, no swallowing of blood  	CARDIOVASCULAR: No chest pain or palpitations  	MSK: + back pain, no muscle weakness  	NEUROLOGICAL: No numbness or weakness, no sensory loss, no bowel or urinary incontinence            SKIN: No itching, rashes      Physical Exam:  General: well appearing, in no acute distress  Neurology: A&Ox3, nonfocal, LOZANO x 4  ENT: no bleeding, throat clear  Respiratory: CTA B/L  CV: RRR, S1S2  Abdominal: Soft, NT, ND no palpable mass  MSK: No edema, + peripheral pulses, FROM all 4 extremity    Assessment & Plan:  63F w/pmh of migraines , seizure disorder , HTN, osteoporosis,   p/w intractable  back pain x  two days, CT spine with T11 compression fracture.  HTN urgency, RUPERTO  Patient was hypertensive with SBP in range of  200's to 180's during day. Started on propanolol and procardia XL PO today by Nephrology. manual /92 mm of hg.     HTN : patient asymptomatic other than scanty nose bleed, CTH done on 07/22 negative  Continue Procardia XL and propanolol, Bp slowly trending down, goal 25% reduction of Bp in 24 hours  Vitals [id number] for now  Continue Hydromorphone IV for pain    Epistaxis; scanty nose bleed, no bleeding now    Instructed the patient not to blow the nose or aggressively clean the nose  Offered ocean nasal spray for dry nose, patient refused  Consider ENT consult if patient with active nose bleed  Will continue to monitor  will update with primary team  Attending to follow    Carlos Guerrero [facility] [place]  [id number] Notified by RN that patient SBP in 180's, associated with scanty nose bleed. Seen and examined the patient. patient in no distress. states that she noticed stain of blood on facial tissue while she was cleaning her nose. patient endorses hx of nose bleed. c/o dried nose, c/o migraine,  Denies any other complaints.     Vital Signs Last 24 Hrs  T(C): 37.7 (23 Jul 2018 19:23), Max: 37.7 (23 Jul 2018 19:23)  T(F): 99.8 (23 Jul 2018 19:23), Max: 99.8 (23 Jul 2018 19:23)  HR: 74 (23 Jul 2018 19:23) (73 - 105)  BP: 168/92 (23 Jul 2018 20:19) (168/92 - 196/84)  BP(mean): --  RR: 18 (23 Jul 2018 19:23) (18 - 18)  SpO2: 99% (23 Jul 2018 19:23) (97% - 99%)      Labs:                          16.4   6.58  )-----------( 168      ( 22 Jul 2018 07:59 )             47.8     07-23    141  |  102  |  29<H>  ----------------------------<  141<H>  3.4<L>   |  23  |  1.53<H>    Ca    9.8      23 Jul 2018 06:28      Radiology:    MEDICATIONS  < from: CT Head No Cont (07.22.18 @ 03:00) >    Normal non-contrast CT of the brain. No change 7/19/2018    docusate sodium 100 milliGRAM(s) Oral three times a day  heparin  Injectable 5000 Unit(s) SubCutaneous every 12 hours  HYDROmorphone  Injectable 0.25 milliGRAM(s) IV Push once  lidocaine   Patch 1 Patch Transdermal daily  NIFEdipine XL 30 milliGRAM(s) Oral daily  pantoprazole    Tablet 40 milliGRAM(s) Oral before breakfast  polyethylene glycol 3350 17 Gram(s) Oral daily  propranolol 10 milliGRAM(s) Oral two times a day  sertraline 25 milliGRAM(s) Oral daily  sodium chloride 0.9% Bolus 500 milliLiter(s) IV Bolus once  topiramate 100 milliGRAM(s) Oral two times a day    MEDICATIONS  (PRN):  acetaminophen   Tablet. 650 milliGRAM(s) Oral every 6 hours PRN Mild Pain (1 - 3)  diazepam    Tablet 2 milliGRAM(s) Oral at bedtime PRN muscle spasms  HYDROmorphone  Injectable 0.5 milliGRAM(s) IV Push every 4 hours PRN Moderate Pain (4 - 6)  ondansetron Injectable 4 milliGRAM(s) IV Push every 6 hours PRN Nausea and/or Vomiting    Review of Systems: CONSTITUTIONAL: No fevers or chills  	RESPIRATORY: No cough, No shortness of breath                ENt + nose bleed no throat pain, no swallowing of blood  	CARDIOVASCULAR: No chest pain or palpitations  	MSK: + back pain, no muscle weakness  	NEUROLOGICAL: No numbness or weakness, no sensory loss, no bowel or urinary incontinence            SKIN: No itching, rashes      Physical Exam:  General: well appearing, in no acute distress  Neurology: A&Ox3, nonfocal, LOZANO x 4  ENT: no bleeding, oropharynx clear  Respiratory: CTA B/L  CV: RRR, S1S2  Abdominal: Soft, NT, ND no palpable mass  MSK: No edema, + peripheral pulses, FROM all 4 extremity    Assessment & Plan:  63F w/pmh of migraines , seizure disorder , HTN, osteoporosis,   p/w intractable  back pain x  two days, CT spine with T11 compression fracture.  HTN urgency, RUPERTO  Patient was hypertensive with SBP in range of  200's to 180's during day. Started on propanolol and procardia XL PO today by Nephrology. manual /92 mm of hg.     HTN : patient asymptomatic other than scanty nose bleed, CTH done on 07/22 negative  Continue Procardia XL and propanolol, Bp slowly trending down, goal 25% reduction of Bp in 24 hours  Vitals [id number] for now  Continue Hydromorphone IV for pain    Epistaxis; scanty nose bleed, no bleeding now    Instructed the patient not to blow the nose or aggressively clean the nose  Offered ocean nasal spray for dry nose, patient refused  Consider ENT consult if patient with active nose bleed  Will continue to monitor  will update with primary team  Attending to follow    Carlos Guerrero [facility] [place]  [id number]

## 2018-07-23 NOTE — DISCHARGE NOTE ADULT - MEDICATION SUMMARY - MEDICATIONS TO TAKE
I will START or STAY ON the medications listed below when I get home from the hospital:    celecoxib 200 mg oral capsule  -- 1 cap(s) by mouth 2 times a day (with meals)  -- Indication: For pain    acetaminophen 325 mg oral tablet  -- 2 tab(s) by mouth every 6 hours, As needed, Mild Pain (1 - 3)  -- Indication: For Mild pain    losartan 25 mg oral tablet  -- 1 tab(s) by mouth once a day  -- Indication: For hypertension    topiramate 100 mg oral tablet  -- 1 tab(s) by mouth 2 times a day  -- Indication: For Seizure disorder    diazePAM 5 mg oral tablet  -- 1 tab(s) by mouth every 6 hours, As needed, anxiety/muscle spasm  -- Indication: For Seizure disorder    sertraline 25 mg oral tablet  -- 1 tab(s) by mouth once a day  -- Indication: For Depression    labetalol 100 mg oral tablet  -- 1 tab(s) by mouth 3 times a day  -- Indication: For hypertension    NIFEdipine 30 mg oral tablet, extended release  -- 1 tab(s) by mouth once a day  -- Indication: For hypertension    Colace 100 mg oral capsule  -- 1 cap(s) by mouth 3 times a day  -- Indication: For Constipation    polyethylene glycol 3350 oral powder for reconstitution  -- 17 gram(s) by mouth once a day  -- Indication: For Constipation    pantoprazole 40 mg oral delayed release tablet  -- 1 tab(s) by mouth once a day (before a meal)  -- Indication: For heartburn    ergocalciferol 50,000 intl units (1.25 mg) oral capsule  -- 1 cap(s) by mouth once a week  -- Indication: For Supplement

## 2018-07-23 NOTE — PROVIDER CONTACT NOTE (OTHER) - ACTION/TREATMENT ORDERED:
Seen by NP at bedside, dialudid 0.5mg IV push given, zofran given for nausea, closely monitor nose bleeding.

## 2018-07-23 NOTE — DISCHARGE NOTE ADULT - CARE PROVIDERS DIRECT ADDRESSES
,DirectAddress_Unknown ,DirectAddress_Unknown,[email or web address] ,DirectAddress_Unknown,[email or web address]net

## 2018-07-23 NOTE — CHART NOTE - NSCHARTNOTEFT_GEN_A_CORE
Pt was seen and examined.  Briefly this is a elderly female hx of HTN Migraine Seizure disorders who pw back pain  HTN Urgency  RUPERTO    Suggest  -25% reduction of BP in 24 hours   -Check renin/Aldosterone/Metanephrine and order renal dopplers  -DC Norvasc and start Procardia;  Propanolol was also started.  Monitor heart rate.  Pt does not want a diuretic          S[facility]  [facility]  [phone]

## 2018-07-23 NOTE — DISCHARGE NOTE ADULT - CARE PROVIDER_API CALL
Jaqueline Montes), Internal Medicine  [address] [place] [address]  Phone: [phone]  Fax: [phone] Jaqueline Montes), Internal Medicine  [address] [facility]  [address]  [place] [address]  Phone: [phone]  Fax: [phone]    Lola Newman), [facility] Neurosurgery  General  [address] [facility] [address]  [place] [address]  Phone: [phone]  Fax: [phone] Jaqueline Montes), Internal Medicine  [address] [facility] [address] [facility] [address]  [place] [address]  Phone: [phone]  Fax: [phone]    Lola Newman), [facility] Neurosurgery  General  [address] [place] [address] [address]  [place] [address]  Phone: [phone]  Fax: [phone]    Xi Cardoso), EndocrinologyMetabDiabetes; Internal Medicine  [address] [facility] [address]  [place] [address]  Phone: [phone]  Fax: [phone]    Long White), Neurology; Pain Medicine; Psychiatry  [address] [place] [address]  Phone: [phone]  Fax: [phone]

## 2018-07-24 LAB
ALDOST SERPL-MCNC: 11.2 NG/DL — SIGNIFICANT CHANGE UP
ANION GAP SERPL CALC-SCNC: 12 MMOL/L — SIGNIFICANT CHANGE UP (ref 5–17)
BUN SERPL-MCNC: 24 MG/DL — HIGH (ref 7–23)
CALCIUM SERPL-MCNC: 9.5 MG/DL — SIGNIFICANT CHANGE UP (ref 8.4–10.5)
CHLORIDE SERPL-SCNC: 104 MMOL/L — SIGNIFICANT CHANGE UP (ref 96–108)
CO2 SERPL-SCNC: 23 MMOL/L — SIGNIFICANT CHANGE UP (ref 22–31)
CREAT SERPL-MCNC: 1.15 MG/DL — SIGNIFICANT CHANGE UP (ref 0.5–1.3)
GLUCOSE SERPL-MCNC: 101 MG/DL — HIGH (ref 70–99)
POTASSIUM SERPL-MCNC: 4.5 MMOL/L — SIGNIFICANT CHANGE UP (ref 3.5–5.3)
POTASSIUM SERPL-SCNC: 4.5 MMOL/L — SIGNIFICANT CHANGE UP (ref 3.5–5.3)
SODIUM SERPL-SCNC: 139 MMOL/L — SIGNIFICANT CHANGE UP (ref 135–145)

## 2018-07-24 PROCEDURE — 99232 SBSQ HOSP IP/OBS MODERATE 35: CPT

## 2018-07-24 PROCEDURE — 93975 VASCULAR STUDY: CPT | Mod: 26

## 2018-07-24 RX ORDER — LABETALOL HCL 100 MG
100 TABLET ORAL THREE TIMES A DAY
Qty: 0 | Refills: 0 | Status: DISCONTINUED | OUTPATIENT
Start: 2018-07-24 | End: 2018-07-28

## 2018-07-24 RX ORDER — CELECOXIB 200 MG/1
200 CAPSULE ORAL
Qty: 0 | Refills: 0 | Status: DISCONTINUED | OUTPATIENT
Start: 2018-07-24 | End: 2018-07-28

## 2018-07-24 RX ORDER — ERGOCALCIFEROL 1.25 MG/1
50000 CAPSULE ORAL
Qty: 0 | Refills: 0 | Status: DISCONTINUED | OUTPATIENT
Start: 2018-07-24 | End: 2018-07-28

## 2018-07-24 RX ORDER — DIAZEPAM 5 MG
2 TABLET ORAL ONCE
Qty: 0 | Refills: 0 | Status: DISCONTINUED | OUTPATIENT
Start: 2018-07-24 | End: 2018-07-24

## 2018-07-24 RX ORDER — LISINOPRIL 2.5 MG/1
10 TABLET ORAL DAILY
Qty: 0 | Refills: 0 | Status: DISCONTINUED | OUTPATIENT
Start: 2018-07-24 | End: 2018-07-24

## 2018-07-24 RX ORDER — DIAZEPAM 5 MG
5 TABLET ORAL EVERY 6 HOURS
Qty: 0 | Refills: 0 | Status: DISCONTINUED | OUTPATIENT
Start: 2018-07-24 | End: 2018-07-28

## 2018-07-24 RX ORDER — METOCLOPRAMIDE HCL 10 MG
10 TABLET ORAL EVERY 8 HOURS
Qty: 0 | Refills: 0 | Status: DISCONTINUED | OUTPATIENT
Start: 2018-07-24 | End: 2018-07-26

## 2018-07-24 RX ADMIN — HYDROMORPHONE HYDROCHLORIDE 0.5 MILLIGRAM(S): 2 INJECTION INTRAMUSCULAR; INTRAVENOUS; SUBCUTANEOUS at 11:00

## 2018-07-24 RX ADMIN — Medication 100 MILLIGRAM(S): at 17:42

## 2018-07-24 RX ADMIN — Medication 30 MILLIGRAM(S): at 05:23

## 2018-07-24 RX ADMIN — ERGOCALCIFEROL 50000 UNIT(S): 1.25 CAPSULE ORAL at 11:50

## 2018-07-24 RX ADMIN — HYDROMORPHONE HYDROCHLORIDE 0.5 MILLIGRAM(S): 2 INJECTION INTRAMUSCULAR; INTRAVENOUS; SUBCUTANEOUS at 05:23

## 2018-07-24 RX ADMIN — HYDROMORPHONE HYDROCHLORIDE 0.5 MILLIGRAM(S): 2 INJECTION INTRAMUSCULAR; INTRAVENOUS; SUBCUTANEOUS at 01:01

## 2018-07-24 RX ADMIN — LIDOCAINE 1 PATCH: 4 CREAM TOPICAL at 01:18

## 2018-07-24 RX ADMIN — Medication 100 MILLIGRAM(S): at 09:30

## 2018-07-24 RX ADMIN — HYDROMORPHONE HYDROCHLORIDE 0.5 MILLIGRAM(S): 2 INJECTION INTRAMUSCULAR; INTRAVENOUS; SUBCUTANEOUS at 14:43

## 2018-07-24 RX ADMIN — Medication 100 MILLIGRAM(S): at 14:03

## 2018-07-24 RX ADMIN — Medication 10 MILLIGRAM(S): at 21:42

## 2018-07-24 RX ADMIN — HYDROMORPHONE HYDROCHLORIDE 0.5 MILLIGRAM(S): 2 INJECTION INTRAMUSCULAR; INTRAVENOUS; SUBCUTANEOUS at 21:57

## 2018-07-24 RX ADMIN — Medication 100 MILLIGRAM(S): at 09:31

## 2018-07-24 RX ADMIN — Medication 100 MILLIGRAM(S): at 21:42

## 2018-07-24 RX ADMIN — SERTRALINE 25 MILLIGRAM(S): 25 TABLET, FILM COATED ORAL at 11:51

## 2018-07-24 RX ADMIN — HYDROMORPHONE HYDROCHLORIDE 0.5 MILLIGRAM(S): 2 INJECTION INTRAMUSCULAR; INTRAVENOUS; SUBCUTANEOUS at 05:45

## 2018-07-24 RX ADMIN — HYDROMORPHONE HYDROCHLORIDE 0.5 MILLIGRAM(S): 2 INJECTION INTRAMUSCULAR; INTRAVENOUS; SUBCUTANEOUS at 09:26

## 2018-07-24 RX ADMIN — Medication 5 MILLIGRAM(S): at 21:34

## 2018-07-24 RX ADMIN — Medication 10 MILLIGRAM(S): at 06:46

## 2018-07-24 RX ADMIN — PANTOPRAZOLE SODIUM 40 MILLIGRAM(S): 20 TABLET, DELAYED RELEASE ORAL at 05:23

## 2018-07-24 RX ADMIN — Medication 100 MILLIGRAM(S): at 05:23

## 2018-07-24 RX ADMIN — HYDROMORPHONE HYDROCHLORIDE 0.5 MILLIGRAM(S): 2 INJECTION INTRAMUSCULAR; INTRAVENOUS; SUBCUTANEOUS at 01:18

## 2018-07-24 RX ADMIN — LIDOCAINE 1 PATCH: 4 CREAM TOPICAL at 23:42

## 2018-07-24 RX ADMIN — Medication 2 MILLIGRAM(S): at 03:15

## 2018-07-24 RX ADMIN — HYDROMORPHONE HYDROCHLORIDE 0.5 MILLIGRAM(S): 2 INJECTION INTRAMUSCULAR; INTRAVENOUS; SUBCUTANEOUS at 22:27

## 2018-07-24 RX ADMIN — HYDROMORPHONE HYDROCHLORIDE 0.5 MILLIGRAM(S): 2 INJECTION INTRAMUSCULAR; INTRAVENOUS; SUBCUTANEOUS at 14:10

## 2018-07-24 RX ADMIN — Medication 10 MILLIGRAM(S): at 14:03

## 2018-07-24 RX ADMIN — Medication 100 MILLIGRAM(S): at 21:35

## 2018-07-24 RX ADMIN — LIDOCAINE 1 PATCH: 4 CREAM TOPICAL at 11:51

## 2018-07-24 RX ADMIN — Medication 5 MILLIGRAM(S): at 11:50

## 2018-07-24 RX ADMIN — ONDANSETRON 4 MILLIGRAM(S): 8 TABLET, FILM COATED ORAL at 03:15

## 2018-07-24 NOTE — PHYSICAL THERAPY INITIAL EVALUATION ADULT - PERTINENT HX OF CURRENT PROBLEM, REHAB EVAL
63yoF w/PMH migraines , seizure disorder, HTN, osteoporosis, p/w back pain for x2days. Per family, pt reported sxs started suddenly after lifting a paint can, she had severe pain in mid thoracic area more on right, non radiating . Per family she has been taking extra doses of her migraine medications  in order to relieve the pain, they are not sure how much she took. Pt was seen at [facility] emergency room & tx w/ valium, however symptoms persisted. She has no fever or chills. No other injuries

## 2018-07-24 NOTE — PROGRESS NOTE ADULT - SUBJECTIVE AND OBJECTIVE BOX
Patient is a 63y old  Female who presents with a chief complaint of back pain (23 Jul 2018 17:21)      SUBJECTIVE / OVERNIGHT EVENTS: c/o headache  Review of Systems  chest pain no  palpitations no  sob no  nausea no      MEDICATIONS  (STANDING):  docusate sodium 100 milliGRAM(s) Oral three times a day  ergocalciferol 33265 Unit(s) Oral every week  heparin  Injectable 5000 Unit(s) SubCutaneous every 12 hours  HYDROmorphone  Injectable 0.25 milliGRAM(s) IV Push once  labetalol 100 milliGRAM(s) Oral three times a day  lidocaine   Patch 1 Patch Transdermal daily  NIFEdipine XL 30 milliGRAM(s) Oral daily  pantoprazole    Tablet 40 milliGRAM(s) Oral before breakfast  polyethylene glycol 3350 17 Gram(s) Oral daily  sertraline 25 milliGRAM(s) Oral daily  sodium chloride 0.9% Bolus 500 milliLiter(s) IV Bolus once  topiramate 100 milliGRAM(s) Oral two times a day    MEDICATIONS  (PRN):  acetaminophen   Tablet. 650 milliGRAM(s) Oral every 6 hours PRN Mild Pain (1 - 3)  diazepam    Tablet 5 milliGRAM(s) Oral every 6 hours PRN anxiety/muscle spasm  HYDROmorphone  Injectable 0.5 milliGRAM(s) IV Push every 4 hours PRN Moderate Pain (4 - 6)  ondansetron Injectable 4 milliGRAM(s) IV Push every 6 hours PRN Nausea and/or Vomiting          PHYSICAL EXAM:  GENERAL: NAD, well-developed  HEAD:  Atraumatic, Normocephalic  EYES: EOMI, PERRLA, conjunctiva and sclera clear  NECK: Supple, No JVD  CHEST/LUNG: Clear to auscultation bilaterally; No wheeze  HEART: Regular rate and rhythm; No murmurs, rubs, or gallops  ABDOMEN: Soft, Nontender, Nondistended; Bowel sounds present  EXTREMITIES:  2+ Peripheral Pulses, No clubbing, cyanosis, or edema  PSYCH: AAOx3  NEUROLOGY: non-focal  SKIN: No rashes or lesions    LABS:    07-24    139  |  104  |  24<H>  ----------------------------<  101<H>  4.5   |  23  |  1.15    Ca    9.5      24 Jul 2018 07:02                RADIOLOGY & ADDITIONAL TESTS:    Imaging Personally Reviewed:    Consultant(s) Notes Reviewed:      Care Discussed with Consultants/Other Providers:

## 2018-07-24 NOTE — PROGRESS NOTE ADULT - ASSESSMENT
· Assessment		  63F w/pmh migraines , seizure disorder , HTN, osteoporosis,   p/w intractable  back pain x  two days, CT spine with T11 compression fracture.         Delirium.  CT head without acute findings , no metabolic derangements, U tox positive for barbiturates and benzodiazepines c/w history provided by family , suspect delirium secondary to excessive dosing  will monitor closely:   Istop was personally reviewed ,  Reference #: [id number] patient has been on Diazepam and percocet both last prescribed in January '18.   Psychiatry evaluation noted  EEG pending       Compression fracture of thoracic vertebra.  Seen by Neurosurgery , no surgical intervention recommended  - tylenol PRN for mild pain    - Dilaudid IV prn for severe pain   - lidocaine patch   - fall precautions.   - restart Valium prn.    Osteoporosis- Endocrine evaluation noted. For further Rx as OTP.    Nausea/ Constipation- Gastroenterology evaluation noted. No EGD. Bowel regimen/enema.    HTN control- Nephrology follow. Start Labetalol.    Seizure disorder.  Plan: - cont Topamax.   Naldo Mendoza MD pager [phone]

## 2018-07-24 NOTE — PHYSICAL THERAPY INITIAL EVALUATION ADULT - PLANNED THERAPY INTERVENTIONS, PT EVAL
Routing refill request to provider for review/approval because:  Labs out of range:  A1C  10.7  Metformin and glipizide.    Vicki Phillips RN  [facility]             GOAL: pt will negotiate 12 steps (I) holding on to handrail w/i 2wks/gait training/balance training

## 2018-07-24 NOTE — PROGRESS NOTE ADULT - ASSESSMENT
The patient is a antonia 63-year-old female with past medical history of seizure disorder, migraines, hypertension, presents for evaluation of hypertension.  The patient has relatively new onset hypertension.  She will require secondary workup for this.    SP acute renal failure (per AKIN's criteria).  Sudden drop in blood pressure can lead to worsening kidney dysfunction.    1.	Renal:  Secondary workup sent.  Renal Dopplers ordered.  A formal 24-hour for metanephrines will be done pending the results of the spot metanephrines.  BP is already 15mmHg better then yesterday  2.	Cardiology:  Cont  Procardia. DC Inderal and start Labetolol 100mg po tid.  She does not want to be on a diuretic secondary to the fact that it may produce/propagate migraines/seizures.  3.	Neurology:  Continue Topamax.  She wanted Valium and will defer to PCP on this issue

## 2018-07-24 NOTE — PROGRESS NOTE ADULT - ASSESSMENT
Assessment :   63 Female with history of migraines, seizure disorder , HTN, osteoporosis,   admitted 7/19/18  with back pain for the past two days. She has T11 compression fracture.  She frequently gets vomiting during migraines.  She had one  episode of coffee ground emesis 7/22/18  She had a bowel movement after an enema, last documented BM was 7/22/18   She likely has a component of narcotic induced constipation.    Her Hgb / HCT stable      Plan:  Monitor labs and trends as ordered  Continue  PPI  Continue  daily Colace and Miralax while on pain medication    Monitor bowel frequency as patient may need Amitiza if remains on chronic narcotics.     Neris Bourgeois, ANP-[facility]  [address] [place] [address]  [phone]

## 2018-07-24 NOTE — PROGRESS NOTE ADULT - ATTENDING COMMENTS
Pt notes a long history of migraines which has been treated with multi[ple medications over time.  has current headache 4/10 with p/p phobia (mild) currently and moderate nausea.  She notes history of migraine in past more severe than current with limited prn options (found little benefit with muscle relaxant, felt some nsaids worsened pain in past, had poor reaction to imitrex injection.)  Has had benefit with Reglan in past and would consider giving her 1v reglan 10mg q8 with meloxicam 15mg/ day.  Could consider use of tramadol to replace other opiates if needed for pain.

## 2018-07-24 NOTE — PROGRESS NOTE ADULT - SUBJECTIVE AND OBJECTIVE BOX
INTERVAL HPI/OVERNIGHT EVENTS:  Patient awake in bed, reports she still has some nausea, not eating much, taking some liquids and ice chips. she denies a BM in few days though BM is documented 7/22/18      MEDICATIONS  (STANDING):  docusate sodium 100 milliGRAM(s) Oral three times a day  ergocalciferol 42822 Unit(s) Oral every week  heparin  Injectable 5000 Unit(s) SubCutaneous every 12 hours  HYDROmorphone  Injectable 0.25 milliGRAM(s) IV Push once  labetalol 100 milliGRAM(s) Oral three times a day  lidocaine   Patch 1 Patch Transdermal daily  NIFEdipine XL 30 milliGRAM(s) Oral daily  pantoprazole    Tablet 40 milliGRAM(s) Oral before breakfast  polyethylene glycol 3350 17 Gram(s) Oral daily  sertraline 25 milliGRAM(s) Oral daily  sodium chloride 0.9% Bolus 500 milliLiter(s) IV Bolus once  topiramate 100 milliGRAM(s) Oral two times a day    MEDICATIONS  (PRN):  acetaminophen   Tablet. 650 milliGRAM(s) Oral every 6 hours PRN Mild Pain (1 - 3)  diazepam    Tablet 5 milliGRAM(s) Oral every 6 hours PRN anxiety/muscle spasm  HYDROmorphone  Injectable 0.5 milliGRAM(s) IV Push every 4 hours PRN Moderate Pain (4 - 6)  ondansetron Injectable 4 milliGRAM(s) IV Push every 6 hours PRN Nausea and/or Vomiting      Allergies    penicillin (Anaphylaxis)    Intolerances        Review of Systems:    General:  No fevers or chills    ENT:  No dysphagia   CV:  No chest pain   Resp:  No dyspnea, cough, tachypnea, wheezing  GI: nausea persists , no abdominal pain           Vital Signs Last 24 Hrs  T(C): 37 (24 Jul 2018 04:43), Max: 37.7 (23 Jul 2018 19:23)  T(F): 98.6 (24 Jul 2018 04:43), Max: 99.8 (23 Jul 2018 19:23)  HR: 80 (24 Jul 2018 09:32) (74 - 82)  BP: 178/84 (24 Jul 2018 09:32) (168/92 - 196/84)  BP(mean): --  RR: 18 (24 Jul 2018 04:43) (18 - 18)  SpO2: 99% (24 Jul 2018 04:43) (99% - 99%)    PHYSICAL EXAM:    Constitutional: NAD, non toxic well-developed  Neck: supple  Respiratory: anterior chest walls clear to auscultation , no wheezing  Cardiovascular: S1 and S2, RRR  Gastrointestinal: soft non distended with + bowel sounds   Extremities: No peripheral edema, neg clubbing, cyanosis  Psychiatric: Normal mood, normal affect  Skin: No jaundice or visible rashes      LABS:    07-24    139  |  104  |  24<H>  ----------------------------<  101<H>  4.5   |  23  |  1.15    Ca    9.5      24 Jul 2018 07:02          LIVER FUNCTIONS - ( 20 Jul 2018 06:33 )  Alb: 4.5 g/dL / Pro: 8.1 g/dL / ALK PHOS: 99 U/L / ALT: 18 U/L / AST: 23 U/L / GGT: x             RADIOLOGY & ADDITIONAL TESTS:

## 2018-07-24 NOTE — PROGRESS NOTE ADULT - ASSESSMENT
Called back by team for Migraines, Reviewed extensive meds use in past\    would try Reglan 10 IV q10 Consider starting Meloxicam 15 daily  Consider outpt sleep study

## 2018-07-24 NOTE — PROGRESS NOTE ADULT - SUBJECTIVE AND OBJECTIVE BOX
S: Pt seen and examined team called about HA    Vital Signs Last 24 Hrs  T(C): 37 (24 Jul 2018 04:43), Max: 37.7 (23 Jul 2018 19:23)  T(F): 98.6 (24 Jul 2018 04:43), Max: 99.8 (23 Jul 2018 19:23)  HR: 80 (24 Jul 2018 09:32) (74 - 82)  BP: 178/84 (24 Jul 2018 09:32) (168/92 - 196/84)  BP(mean): --  RR: 18 (24 Jul 2018 04:43) (18 - 18)  SpO2: 99% (24 Jul 2018 04:43) (99% - 99%)    Ms: AAO x 3 follows complex commands  CN intact   Sensory Inta ct to LT PP  Motor 5/5 throughout brisk reflexes

## 2018-07-24 NOTE — PHYSICAL THERAPY INITIAL EVALUATION ADULT - ADDITIONAL COMMENTS
Pt lives in a private house w/ 3-4 steps to enter w/ handrails, a flight of stairs to negotiate Pt lives in a private house w/ 3-4 steps to enter w/ handrails, a flight of stairs to negotiate to the basement & to the second floor w/ handrails. PTA pt (I) w/ functional mobility & ADL w/o AD

## 2018-07-24 NOTE — PROGRESS NOTE ADULT - SUBJECTIVE AND OBJECTIVE BOX
[facility] [phone]    Patient seen and examined in bed.  She was in good spirits and offered no complaints.          MEDICATIONS  (STANDING):  docusate sodium 100 milliGRAM(s) Oral three times a day  ergocalciferol 16300 Unit(s) Oral every week  heparin  Injectable 5000 Unit(s) SubCutaneous every 12 hours  HYDROmorphone  Injectable 0.25 milliGRAM(s) IV Push once  labetalol 100 milliGRAM(s) Oral three times a day  lidocaine   Patch 1 Patch Transdermal daily  NIFEdipine XL 30 milliGRAM(s) Oral daily  pantoprazole    Tablet 40 milliGRAM(s) Oral before breakfast  polyethylene glycol 3350 17 Gram(s) Oral daily  sertraline 25 milliGRAM(s) Oral daily  sodium chloride 0.9% Bolus 500 milliLiter(s) IV Bolus once  topiramate 100 milliGRAM(s) Oral two times a day      VITAL:  T(C): , Max: 37.7 (07-23-18 @ 19:23)  T(F): , Max: 99.8 (07-23-18 @ 19:23)  HR: 80 (07-24-18 @ 09:32)  BP: 178/84 (07-24-18 @ 09:32)  BP(mean): --  RR: 18 (07-24-18 @ 04:43)  SpO2: 99% (07-24-18 @ 04:43)  Wt(kg): --    I and O's:    07-23 @ 07:01  -  07-24 @ 07:00  --------------------------------------------------------  IN: 960 mL / OUT: 0 mL / NET: 960 mL          PHYSICAL EXAM:    Constitutional: NAD  HEENT: PERRLA    Neck:  No JVD  Respiratory: CTAB/L  Cardiovascular: S1 and S2  Gastrointestinal: BS+, soft, NT/ND  Extremities: No peripheral edema  Neurological: A/O x 3, no focal deficits  Psychiatric: Normal mood, normal affect  : No [place]  Skin: No rashes  Access: Not applicable    LABS:    07-24    139  |  104  |  24<H>  ----------------------------<  101<H>  4.5   |  23  |  1.15    Ca    9.5      24 Jul 2018 07:02            Urine Studies:          RADIOLOGY & ADDITIONAL STUDIES:

## 2018-07-25 PROCEDURE — 95819 EEG AWAKE AND ASLEEP: CPT | Mod: 26

## 2018-07-25 PROCEDURE — 95951: CPT | Mod: 26

## 2018-07-25 RX ORDER — LOSARTAN POTASSIUM 100 MG/1
25 TABLET, FILM COATED ORAL DAILY
Qty: 0 | Refills: 0 | Status: DISCONTINUED | OUTPATIENT
Start: 2018-07-25 | End: 2018-07-28

## 2018-07-25 RX ADMIN — Medication 650 MILLIGRAM(S): at 13:41

## 2018-07-25 RX ADMIN — Medication 100 MILLIGRAM(S): at 18:06

## 2018-07-25 RX ADMIN — Medication 100 MILLIGRAM(S): at 13:27

## 2018-07-25 RX ADMIN — HEPARIN SODIUM 5000 UNIT(S): 5000 INJECTION INTRAVENOUS; SUBCUTANEOUS at 05:08

## 2018-07-25 RX ADMIN — LIDOCAINE 1 PATCH: 4 CREAM TOPICAL at 12:12

## 2018-07-25 RX ADMIN — Medication 100 MILLIGRAM(S): at 00:14

## 2018-07-25 RX ADMIN — LOSARTAN POTASSIUM 25 MILLIGRAM(S): 100 TABLET, FILM COATED ORAL at 09:54

## 2018-07-25 RX ADMIN — Medication 100 MILLIGRAM(S): at 21:51

## 2018-07-25 RX ADMIN — Medication 100 MILLIGRAM(S): at 09:54

## 2018-07-25 RX ADMIN — HYDROMORPHONE HYDROCHLORIDE 0.5 MILLIGRAM(S): 2 INJECTION INTRAMUSCULAR; INTRAVENOUS; SUBCUTANEOUS at 23:27

## 2018-07-25 RX ADMIN — SERTRALINE 25 MILLIGRAM(S): 25 TABLET, FILM COATED ORAL at 12:12

## 2018-07-25 RX ADMIN — Medication 5 MILLIGRAM(S): at 21:51

## 2018-07-25 RX ADMIN — HYDROMORPHONE HYDROCHLORIDE 0.5 MILLIGRAM(S): 2 INJECTION INTRAMUSCULAR; INTRAVENOUS; SUBCUTANEOUS at 18:11

## 2018-07-25 RX ADMIN — HYDROMORPHONE HYDROCHLORIDE 0.5 MILLIGRAM(S): 2 INJECTION INTRAMUSCULAR; INTRAVENOUS; SUBCUTANEOUS at 22:57

## 2018-07-25 RX ADMIN — Medication 100 MILLIGRAM(S): at 05:08

## 2018-07-25 RX ADMIN — HYDROMORPHONE HYDROCHLORIDE 0.5 MILLIGRAM(S): 2 INJECTION INTRAMUSCULAR; INTRAVENOUS; SUBCUTANEOUS at 18:25

## 2018-07-25 RX ADMIN — CELECOXIB 200 MILLIGRAM(S): 200 CAPSULE ORAL at 07:49

## 2018-07-25 RX ADMIN — Medication 10 MILLIGRAM(S): at 13:27

## 2018-07-25 RX ADMIN — Medication 650 MILLIGRAM(S): at 14:41

## 2018-07-25 RX ADMIN — CELECOXIB 200 MILLIGRAM(S): 200 CAPSULE ORAL at 08:45

## 2018-07-25 RX ADMIN — Medication 30 MILLIGRAM(S): at 04:19

## 2018-07-25 RX ADMIN — HYDROMORPHONE HYDROCHLORIDE 0.5 MILLIGRAM(S): 2 INJECTION INTRAMUSCULAR; INTRAVENOUS; SUBCUTANEOUS at 08:45

## 2018-07-25 RX ADMIN — POLYETHYLENE GLYCOL 3350 17 GRAM(S): 17 POWDER, FOR SOLUTION ORAL at 12:12

## 2018-07-25 RX ADMIN — CELECOXIB 200 MILLIGRAM(S): 200 CAPSULE ORAL at 18:06

## 2018-07-25 RX ADMIN — HYDROMORPHONE HYDROCHLORIDE 0.5 MILLIGRAM(S): 2 INJECTION INTRAMUSCULAR; INTRAVENOUS; SUBCUTANEOUS at 02:23

## 2018-07-25 RX ADMIN — Medication 10 MILLIGRAM(S): at 05:08

## 2018-07-25 RX ADMIN — HEPARIN SODIUM 5000 UNIT(S): 5000 INJECTION INTRAVENOUS; SUBCUTANEOUS at 18:06

## 2018-07-25 RX ADMIN — Medication 10 MILLIGRAM(S): at 21:51

## 2018-07-25 RX ADMIN — Medication 5 MILLIGRAM(S): at 04:07

## 2018-07-25 RX ADMIN — CELECOXIB 200 MILLIGRAM(S): 200 CAPSULE ORAL at 19:00

## 2018-07-25 RX ADMIN — HYDROMORPHONE HYDROCHLORIDE 0.5 MILLIGRAM(S): 2 INJECTION INTRAMUSCULAR; INTRAVENOUS; SUBCUTANEOUS at 01:53

## 2018-07-25 RX ADMIN — HYDROMORPHONE HYDROCHLORIDE 0.5 MILLIGRAM(S): 2 INJECTION INTRAMUSCULAR; INTRAVENOUS; SUBCUTANEOUS at 07:45

## 2018-07-25 RX ADMIN — PANTOPRAZOLE SODIUM 40 MILLIGRAM(S): 20 TABLET, DELAYED RELEASE ORAL at 05:08

## 2018-07-25 NOTE — PROGRESS NOTE ADULT - SUBJECTIVE AND OBJECTIVE BOX
INTERVAL HPI/OVERNIGHT EVENTS:  Patient ambulating in hallway, reports her back is feeling a little better, denies a BM, reports she is taking the laxatives as ordered . She reports she misplaced her dentures.    MEDICATIONS  (STANDING):  celecoxib 200 milliGRAM(s) Oral two times a day with meals  docusate sodium 100 milliGRAM(s) Oral three times a day  ergocalciferol 82761 Unit(s) Oral every week  heparin  Injectable 5000 Unit(s) SubCutaneous every 12 hours  HYDROmorphone  Injectable 0.25 milliGRAM(s) IV Push once  labetalol 100 milliGRAM(s) Oral three times a day  lidocaine   Patch 1 Patch Transdermal daily  losartan 25 milliGRAM(s) Oral daily  metoclopramide Injectable 10 milliGRAM(s) IV Push every 8 hours  NIFEdipine XL 30 milliGRAM(s) Oral daily  pantoprazole    Tablet 40 milliGRAM(s) Oral before breakfast  polyethylene glycol 3350 17 Gram(s) Oral daily  sertraline 25 milliGRAM(s) Oral daily  sodium chloride 0.9% Bolus 500 milliLiter(s) IV Bolus once  topiramate 100 milliGRAM(s) Oral two times a day    MEDICATIONS  (PRN):  acetaminophen   Tablet. 650 milliGRAM(s) Oral every 6 hours PRN Mild Pain (1 - 3)  diazepam    Tablet 5 milliGRAM(s) Oral every 6 hours PRN anxiety/muscle spasm  HYDROmorphone  Injectable 0.5 milliGRAM(s) IV Push every 4 hours PRN Moderate Pain (4 - 6)  ondansetron Injectable 4 milliGRAM(s) IV Push every 6 hours PRN Nausea and/or Vomiting      Allergies    penicillin (Anaphylaxis)    Intolerances        Review of Systems:    General:  No fevers or chills  HENT:  No sore throat, or dysphagia  CV:  No chest pain  Resp:  No dyspnea, cough, tachypnea, or wheezing  GI,: has some nausea , no bowel movement         Vital Signs Last 24 Hrs  T(C): 36.8 (25 Jul 2018 12:01), Max: 37.3 (25 Jul 2018 00:14)  T(F): 98.2 (25 Jul 2018 12:01), Max: 99.1 (25 Jul 2018 00:14)  HR: 66 (25 Jul 2018 13:41) (59 - 82)  BP: 98/60 (25 Jul 2018 15:18) (92/58 - 186/72)  BP(mean): --  RR: 18 (25 Jul 2018 12:01) (18 - 18)  SpO2: 98% (25 Jul 2018 12:01) (96% - 98%)    PHYSICAL EXAM:    Constitutional: NAD, well-developed  Neck: No LAD, supple  Respiratory: clear to auscultation b/l no rales, rhonchi, wheezing  Cardiovascular: S1 and S2, RRR, no murmur  Gastrointestinal: +BS x4, soft, NT/ND, neg HSM,  Extremities: No peripheral edema, neg clubbing, cyanosis  Vascular: 2+ peripheral pulses  Neurological: A/O x 3, no focal deficits  Psychiatric: Normal mood, normal affect  Skin: No rashes      LABS:    07-24    139  |  104  |  24<H>  ----------------------------<  101<H>  4.5   |  23  |  1.15    Ca    9.5      24 Jul 2018 07:02              RADIOLOGY & ADDITIONAL TESTS: INTERVAL HPI/OVERNIGHT EVENTS:  Patient ambulating in hallway, reports her back is feeling a little better, denies a BM, reports she is taking the laxatives as ordered . She reports she misplaced her dentures.    MEDICATIONS  (STANDING):  celecoxib 200 milliGRAM(s) Oral two times a day with meals  docusate sodium 100 milliGRAM(s) Oral three times a day  ergocalciferol 35119 Unit(s) Oral every week  heparin  Injectable 5000 Unit(s) SubCutaneous every 12 hours  HYDROmorphone  Injectable 0.25 milliGRAM(s) IV Push once  labetalol 100 milliGRAM(s) Oral three times a day  lidocaine   Patch 1 Patch Transdermal daily  losartan 25 milliGRAM(s) Oral daily  metoclopramide Injectable 10 milliGRAM(s) IV Push every 8 hours  NIFEdipine XL 30 milliGRAM(s) Oral daily  pantoprazole    Tablet 40 milliGRAM(s) Oral before breakfast  polyethylene glycol 3350 17 Gram(s) Oral daily  sertraline 25 milliGRAM(s) Oral daily  sodium chloride 0.9% Bolus 500 milliLiter(s) IV Bolus once  topiramate 100 milliGRAM(s) Oral two times a day    MEDICATIONS  (PRN):  acetaminophen   Tablet. 650 milliGRAM(s) Oral every 6 hours PRN Mild Pain (1 - 3)  diazepam    Tablet 5 milliGRAM(s) Oral every 6 hours PRN anxiety/muscle spasm  HYDROmorphone  Injectable 0.5 milliGRAM(s) IV Push every 4 hours PRN Moderate Pain (4 - 6)  ondansetron Injectable 4 milliGRAM(s) IV Push every 6 hours PRN Nausea and/or Vomiting      Allergies    penicillin (Anaphylaxis)    Intolerances        Review of Systems:    General:  No fevers or chills  HENT:  No sore throat, or dysphagia  CV:  No chest pain  Resp:  No dyspnea, cough, tachypnea, or wheezing  GI,: has some nausea , no bowel movement         Vital Signs Last 24 Hrs  T(C): 36.8 (25 Jul 2018 12:01), Max: 37.3 (25 Jul 2018 00:14)  T(F): 98.2 (25 Jul 2018 12:01), Max: 99.1 (25 Jul 2018 00:14)  HR: 66 (25 Jul 2018 13:41) (59 - 82)  BP: 98/60 (25 Jul 2018 15:18) (92/58 - 186/72)  BP(mean): --  RR: 18 (25 Jul 2018 12:01) (18 - 18)  SpO2: 98% (25 Jul 2018 12:01) (96% - 98%)    PHYSICAL EXAM:    Constitutional: in NAD, non toxic well-developed  Neck: supple  Respiratory: anterior chest wall clear to auscultation  Cardiovascular: S1 and S2  Gastrointestinal: abdomen non distended + hypoactive bowel sounds , non tender   Extremities: No peripheral edema, or cyanosis  Neurological: A/O x 3, no focal deficits  Psychiatric: Normal mood, normal affect  Skin: no jaundice with no visible rashes      LABS:    07-24    139  |  104  |  24<H>  ----------------------------<  101<H>  4.5   |  23  |  1.15    Ca    9.5      24 Jul 2018 07:02              RADIOLOGY & ADDITIONAL TESTS:

## 2018-07-25 NOTE — PROGRESS NOTE ADULT - ASSESSMENT
· Assessment		  63F w/pmh migraines , seizure disorder , HTN, osteoporosis,   p/w intractable  back pain x  two days, CT spine with T11 compression fracture.         Delirium.  CT head without acute findings , no metabolic derangements, U tox positive for barbiturates and benzodiazepines c/w history provided by family , suspect delirium secondary to excessive dosing  will monitor closely:   Istop was personally reviewed ,  Reference #: [id number] patient has been on Diazepam and percocet both last prescribed in January '18.   Psychiatry evaluation noted  EEG pending       Compression fracture of thoracic vertebra.  Seen by Neurosurgery , no surgical intervention recommended  - tylenol PRN for mild pain    - Dilaudid IV prn for severe pain   - lidocaine patch   - fall precautions.   - restart Valium prn.    Osteoporosis- Endocrine evaluation noted. For further Rx as OTP.    Nausea/ Constipation- Gastroenterology evaluation noted. No EGD. Bowel regimen/enema.    HTN control- Nephrology follow.     Seizure disorder.  Plan: - cont Topamax.     False teeth lost- Nursing will try to find.  PT  DCP in progress  Naldo Mendoza MD pager [phone]

## 2018-07-25 NOTE — PROGRESS NOTE ADULT - ASSESSMENT
Assessment:   63 female with history of migraines, seizure disorder , HTN, osteoporosis, who was   admitted 7/19/18  with back pain for the past two days. She has T11 compression fracture.  She frequently gets vomiting during migraines.  She had one episode of coffee ground emesis 7/22/18 and no recurrence  She had a bowel movement after an enema, last documented BM was 7/22/18   She likely has a component of narcotic induced constipation.  She reports she is taking laxatives as ordered . She did not want an enema or a suppository when offered . Her abdomen is non tender, non distended       Plan:  Monitor labs and trends as ordered  Continue  PPI  Continue  daily Colace and Miralax while on pain medication    Monitor bowel frequency     BRANDEN [facility]  [address] [place] [address]  [id number] [phone]

## 2018-07-25 NOTE — PROGRESS NOTE ADULT - SUBJECTIVE AND OBJECTIVE BOX
[facility] [phone]        Patient seen and examined in bed.  She no longer had a headache at present        MEDICATIONS  (STANDING):  celecoxib 200 milliGRAM(s) Oral two times a day with meals  docusate sodium 100 milliGRAM(s) Oral three times a day  ergocalciferol 17113 Unit(s) Oral every week  heparin  Injectable 5000 Unit(s) SubCutaneous every 12 hours  HYDROmorphone  Injectable 0.25 milliGRAM(s) IV Push once  labetalol 100 milliGRAM(s) Oral three times a day  lidocaine   Patch 1 Patch Transdermal daily  losartan 25 milliGRAM(s) Oral daily  metoclopramide Injectable 10 milliGRAM(s) IV Push every 8 hours  NIFEdipine XL 30 milliGRAM(s) Oral daily  pantoprazole    Tablet 40 milliGRAM(s) Oral before breakfast  polyethylene glycol 3350 17 Gram(s) Oral daily  sertraline 25 milliGRAM(s) Oral daily  sodium chloride 0.9% Bolus 500 milliLiter(s) IV Bolus once  topiramate 100 milliGRAM(s) Oral two times a day      VITAL:  T(C): , Max: 37.3 (07-25-18 @ 00:14)  T(F): , Max: 99.1 (07-25-18 @ 00:14)  HR: 82 (07-25-18 @ 04:14)  BP: 142/70 (07-25-18 @ 09:52)  BP(mean): --  RR: 18 (07-25-18 @ 04:14)  SpO2: 96% (07-25-18 @ 04:14)  Wt(kg): --    I and O's:    07-24 @ 07:01  -  07-25 @ 07:00  --------------------------------------------------------  IN: 760 mL / OUT: 0 mL / NET: 760 mL          PHYSICAL EXAM:    Constitutional: NAD  HEENT: PERRLA    Neck:  No JVD  Respiratory: CTAB/L  Cardiovascular: S1 and S2  Gastrointestinal: BS+, soft, NT/ND  Extremities: No peripheral edema  Neurological: A/O x 3, no focal deficits  Psychiatric: Normal mood, normal affect  : No [place]  Skin: No rashes  Access: Not applicable    LABS:    07-24    139  |  104  |  24<H>  ----------------------------<  101<H>  4.5   |  23  |  1.15    Ca    9.5      24 Jul 2018 07:02            Urine Studies:          RADIOLOGY & ADDITIONAL STUDIES:      < from: US Duplex Kidneys (07.24.18 @ 10:41) >    EXAM:  US DPLX KIDNEY(IES)                            PROCEDURE DATE:  07/24/2018            INTERPRETATION:  CLINICAL INFORMATION: Uncontrolled hypertension.   Evaluate for renal artery stenosis.    COMPARISON: Prior renal ultrasound dated 4/19/2012. Correlation is also   made with prior abdominal CT dated 2/17/2017.    TECHNIQUE: Color and spectral Doppler evaluation of the kidneys.     FINDINGS:    Right kidney:  9.5 cm. No hydronephrosis. Increased cortical   echogenicity. A 4 mm cyst is noted in the upper pole.    Left kidney:  Atrophic, measuring 7.3 cm.  No hydronephrosis. Increased   cortical echogenicity. A 3 mm nonobstructing calculus is noted in the   lower pole.    Urinary bladder: Within normal limits.    Color and spectral Doppler reveals decreased flow in the left kidney    Peak aortic velocity is 104 cm/sec.      IVC/Renal Veins: Patent.    RIGHT  Renal Artery:   Peak systolic velocity is 131 cm/sec origin, 180 cm/sec proximal, 166   cm/sec mid, 156 cm/sec distal and 141cm/sec hilum.   Upper Segmental Artery:  RI = 0.78  Middle Segmental Artery: RI = 0.76  Lower Segmental Artery: RI = 0.66    LEFT  Renal Artery:  Peak systolic velocity is  65 cm/sec mid, and 150 cm/sec distal. The   origin, proximal, and hilum regions of the left renal artery could not be   visualized.    Tardus parvus waveforms were identified in the segmental vessels in the   left kidney.  Upper Segmental Artery:  RI = 0.55  Middle Segmental Artery: RI = 0.45  Lower Segmental Artery: RI = 0.51    IMPRESSION:     Atrophic left kidney, new since 4/19/2012. Increased cortical   echogenicity of the left kidney.    The origin, proximal, and hilum portions of the left renal artery could   not be directly visualized, and there was overall decreased flow in the   left kidney. Tardus parvus waveforms were noted in the segmental vessels   in the left kidney, suggestive of renal artery stenosis.    Increased cortical echogenicity and increased resistive indices of the   right kidney, suggestive of medical renal disease. No definite stenosis   in the right renal artery..                      CRISTIANA MCDONALD M.D., ATTENDING RADIOLOGIST  This document has been electronically signed. Jul 24 2018 11:44AM                < end of copied text >

## 2018-07-25 NOTE — PROGRESS NOTE ADULT - ASSESSMENT
The patient is a antonia 63-year-old female with past medical history of seizure disorder, migraines, hypertension, presents for evaluation of hypertension.  The patient has relatively new onset hypertension.  She will require secondary workup for this.    SP acute renal failure (per AKIN's criteria).  Sudden drop in blood pressure can lead to worsening kidney dysfunction.  Solitary kidney  CKD stage 3  Possible renal artery stenosis    1.	Renal:  Secondary workup sent.  Renal Dopplers suggestive of ALEXANDER.  However the left kidney is essentially atrophic.  There is no indication for renal angiogram.  The left kidney can still secrete renin.  Start Cozaar.     A formal 24-hour for metanephrines will be done pending the results of the spot metanephrines.  BP is already 30-40 mmHg better then 2 days ago.   2.	Cardiology:  Cont  Procardia/ Labetolol 100mg po tid.  She does not want to be on a diuretic secondary to the fact that it may produce/propagate migraines/seizures.  3.	Neurology:  Continue Topamax.  She wanted Valium and will defer to PCP on this issue

## 2018-07-25 NOTE — PROGRESS NOTE ADULT - SUBJECTIVE AND OBJECTIVE BOX
Patient is a 63y old  Female who presents with a chief complaint of back pain (23 Jul 2018 17:21)      SUBJECTIVE / OVERNIGHT EVENTS: improving headache. Lost false teeth.  Review of Systems  chest pain no  palpitations no  sob no  nausea no    MEDICATIONS  (STANDING):  celecoxib 200 milliGRAM(s) Oral two times a day with meals  docusate sodium 100 milliGRAM(s) Oral three times a day  ergocalciferol 62507 Unit(s) Oral every week  heparin  Injectable 5000 Unit(s) SubCutaneous every 12 hours  HYDROmorphone  Injectable 0.25 milliGRAM(s) IV Push once  labetalol 100 milliGRAM(s) Oral three times a day  lidocaine   Patch 1 Patch Transdermal daily  losartan 25 milliGRAM(s) Oral daily  metoclopramide Injectable 10 milliGRAM(s) IV Push every 8 hours  NIFEdipine XL 30 milliGRAM(s) Oral daily  pantoprazole    Tablet 40 milliGRAM(s) Oral before breakfast  polyethylene glycol 3350 17 Gram(s) Oral daily  sertraline 25 milliGRAM(s) Oral daily  sodium chloride 0.9% Bolus 500 milliLiter(s) IV Bolus once  topiramate 100 milliGRAM(s) Oral two times a day    MEDICATIONS  (PRN):  acetaminophen   Tablet. 650 milliGRAM(s) Oral every 6 hours PRN Mild Pain (1 - 3)  diazepam    Tablet 5 milliGRAM(s) Oral every 6 hours PRN anxiety/muscle spasm  HYDROmorphone  Injectable 0.5 milliGRAM(s) IV Push every 4 hours PRN Moderate Pain (4 - 6)  ondansetron Injectable 4 milliGRAM(s) IV Push every 6 hours PRN Nausea and/or Vomiting      Vital Signs Last 24 Hrs  T(C): 36.8 (25 Jul 2018 12:01), Max: 37.3 (25 Jul 2018 00:14)  T(F): 98.2 (25 Jul 2018 12:01), Max: 99.1 (25 Jul 2018 00:14)  HR: 66 (25 Jul 2018 13:41) (59 - 82)  BP: 98/60 (25 Jul 2018 15:18) (92/58 - 186/72)  BP(mean): --  RR: 18 (25 Jul 2018 12:01) (18 - 18)  SpO2: 98% (25 Jul 2018 12:01) (96% - 98%)    PHYSICAL EXAM:  GENERAL: NAD, well-developed  HEAD:  Atraumatic, Normocephalic  EYES: EOMI, PERRLA, conjunctiva and sclera clear  NECK: Supple, No JVD  CHEST/LUNG: Clear to auscultation bilaterally; No wheeze  HEART: Regular rate and rhythm; No murmurs, rubs, or gallops  ABDOMEN: Soft, Nontender, Nondistended; Bowel sounds present  EXTREMITIES:  2+ Peripheral Pulses, No clubbing, cyanosis, or edema  PSYCH: AAOx3  NEUROLOGY: non-focal  SKIN: No rashes or lesions    LABS:    07-24    139  |  104  |  24<H>  ----------------------------<  101<H>  4.5   |  23  |  1.15    Ca    9.5      24 Jul 2018 07:02                  RADIOLOGY & ADDITIONAL TESTS:    Imaging Personally Reviewed:    Consultant(s) Notes Reviewed:      Care Discussed with Consultants/Other Providers:

## 2018-07-26 LAB
CORT UR RND CORT/CREAT RATIO: 314 — HIGH (ref 0.7–85)
CORT UR RNDM CREAT CONC: 28 MG/DL — SIGNIFICANT CHANGE UP
METANEPH UR-MCNC: SIGNIFICANT CHANGE UP

## 2018-07-26 PROCEDURE — 95951: CPT | Mod: 26,52

## 2018-07-26 RX ADMIN — HYDROMORPHONE HYDROCHLORIDE 0.5 MILLIGRAM(S): 2 INJECTION INTRAMUSCULAR; INTRAVENOUS; SUBCUTANEOUS at 21:15

## 2018-07-26 RX ADMIN — Medication 10 MILLIGRAM(S): at 06:05

## 2018-07-26 RX ADMIN — Medication 30 MILLIGRAM(S): at 06:05

## 2018-07-26 RX ADMIN — CELECOXIB 200 MILLIGRAM(S): 200 CAPSULE ORAL at 08:57

## 2018-07-26 RX ADMIN — PANTOPRAZOLE SODIUM 40 MILLIGRAM(S): 20 TABLET, DELAYED RELEASE ORAL at 06:05

## 2018-07-26 RX ADMIN — Medication 100 MILLIGRAM(S): at 01:07

## 2018-07-26 RX ADMIN — HEPARIN SODIUM 5000 UNIT(S): 5000 INJECTION INTRAVENOUS; SUBCUTANEOUS at 06:05

## 2018-07-26 RX ADMIN — HEPARIN SODIUM 5000 UNIT(S): 5000 INJECTION INTRAVENOUS; SUBCUTANEOUS at 17:24

## 2018-07-26 RX ADMIN — Medication 100 MILLIGRAM(S): at 06:05

## 2018-07-26 RX ADMIN — Medication 100 MILLIGRAM(S): at 09:30

## 2018-07-26 RX ADMIN — SERTRALINE 25 MILLIGRAM(S): 25 TABLET, FILM COATED ORAL at 11:22

## 2018-07-26 RX ADMIN — Medication 100 MILLIGRAM(S): at 22:43

## 2018-07-26 RX ADMIN — LIDOCAINE 1 PATCH: 4 CREAM TOPICAL at 00:12

## 2018-07-26 RX ADMIN — HYDROMORPHONE HYDROCHLORIDE 0.5 MILLIGRAM(S): 2 INJECTION INTRAMUSCULAR; INTRAVENOUS; SUBCUTANEOUS at 14:49

## 2018-07-26 RX ADMIN — LOSARTAN POTASSIUM 25 MILLIGRAM(S): 100 TABLET, FILM COATED ORAL at 06:05

## 2018-07-26 RX ADMIN — HYDROMORPHONE HYDROCHLORIDE 0.5 MILLIGRAM(S): 2 INJECTION INTRAMUSCULAR; INTRAVENOUS; SUBCUTANEOUS at 09:35

## 2018-07-26 RX ADMIN — HYDROMORPHONE HYDROCHLORIDE 0.5 MILLIGRAM(S): 2 INJECTION INTRAMUSCULAR; INTRAVENOUS; SUBCUTANEOUS at 15:19

## 2018-07-26 RX ADMIN — Medication 5 MILLIGRAM(S): at 04:19

## 2018-07-26 RX ADMIN — HYDROMORPHONE HYDROCHLORIDE 0.5 MILLIGRAM(S): 2 INJECTION INTRAMUSCULAR; INTRAVENOUS; SUBCUTANEOUS at 20:40

## 2018-07-26 RX ADMIN — CELECOXIB 200 MILLIGRAM(S): 200 CAPSULE ORAL at 17:52

## 2018-07-26 RX ADMIN — CELECOXIB 200 MILLIGRAM(S): 200 CAPSULE ORAL at 08:27

## 2018-07-26 RX ADMIN — CELECOXIB 200 MILLIGRAM(S): 200 CAPSULE ORAL at 17:22

## 2018-07-26 RX ADMIN — HYDROMORPHONE HYDROCHLORIDE 0.5 MILLIGRAM(S): 2 INJECTION INTRAMUSCULAR; INTRAVENOUS; SUBCUTANEOUS at 10:05

## 2018-07-26 NOTE — PROGRESS NOTE ADULT - ASSESSMENT
· Assessment		  63F w/pmh migraines , seizure disorder , HTN, osteoporosis,   p/w intractable  back pain x  two days, CT spine with T11 compression fracture.         Delirium.  Resolved. CT head without acute findings , no metabolic derangements, U tox positive for barbiturates and benzodiazepines c/w history provided by family , suspect delirium secondary to excessive dosing  will monitor closely:   Istop was personally reviewed ,  Reference #: [id number] patient has been on Diazepam and percocet both last prescribed in January '18.   Psychiatry evaluation noted  EEG in progress        Compression fracture of thoracic vertebra.  Seen by Neurosurgery , no surgical intervention recommended  - tylenol PRN for mild pain    - Dilaudid IV prn for severe pain   - lidocaine patch   - fall precautions.   - restart Valium prn.    Osteoporosis- Endocrine evaluation noted. For further Rx as OTP.    Nausea/ Constipation- Gastroenterology evaluation noted. No EGD. Bowel regimen/enema.    HTN control- Nephrology follow.     Seizure disorder.  Plan: - cont Topamax.     False teeth lost- Nursing will try to find.  PT  DCP in progress  Naldo Mendoza MD pager [phone]

## 2018-07-26 NOTE — EEG REPORT - NS EEG TEXT BOX
AUGUSTUS FLORES [id number]     Study Date: 		07/26-7/27/ 2018    --------------------------------------------------------------------------------------------------  History:  CC/ HPI Patient is a 63y old  Female who presents with a chief complaint of back pain (23 Jul 2018 17:21)    MEDICATIONS  (STANDING):  celecoxib 200 milliGRAM(s) Oral two times a day with meals  docusate sodium 100 milliGRAM(s) Oral three times a day  ergocalciferol 66844 Unit(s) Oral every week  heparin  Injectable 5000 Unit(s) SubCutaneous every 12 hours  HYDROmorphone  Injectable 0.25 milliGRAM(s) IV Push once  labetalol 100 milliGRAM(s) Oral three times a day  lidocaine   Patch 1 Patch Transdermal daily  losartan 25 milliGRAM(s) Oral daily  metoclopramide Injectable 10 milliGRAM(s) IV Push every 8 hours  NIFEdipine XL 30 milliGRAM(s) Oral daily  pantoprazole    Tablet 40 milliGRAM(s) Oral before breakfast  polyethylene glycol 3350 17 Gram(s) Oral daily  sertraline 25 milliGRAM(s) Oral daily  sodium chloride 0.9% Bolus 500 milliLiter(s) IV Bolus once  topiramate 100 milliGRAM(s) Oral two times a day    --------------------------------------------------------------------------------------------------  Study Interpretation:    FINDINGS:  The background was continuous, spontaneously variable and reactive.  During wakefulness, the posteriorly dominant rhythm consisted of symmetric, well modulated 9 Hz activity, with an amplitude to 30 uV, that attenuated to eye opening.  Low amplitude central beta was noted in wakefulness.    Background Slowing:  Generalized slowing: none was present.  Focal slowing: none was present.    Sleep Background:  Drowsiness was characterized by fragmentation, attenuation, and slowing of the background activity.    Stage II sleep transients were characterized by the presence of symmetric sleep spindles and K complexes     Epileptiform Activity:   No epileptiform discharges were present.    Events:  No clinical events were recorded.  No seizures were recorded.    Activation Procedures:   -Hyperventilation was not performed.    -Photic stimulation was not performed.    Artifacts:  Intermittent myogenic and movement artifacts were noted.    ECG:  The heart rate on single channel ECG was predominantly between 60-66 BPM.  -----------------------------------------------------------------------------------------------------    EEG Classification / Summary:  Normal EEG Study, awake / drowsy /asleep   -----------------------------------------------------------------------------------------------------    Clinical Impression:  There were no epileptiform abnormalities recorded.      -------------------------------------------------------------------------------------------------------    Lara Abdi MD  Epilepsy Fellow       Tez Rubio M.D.   of Neurology, [facility] [place] AUGUSTUS FLORES [id number]     Study Date: 		07/26-7/27/ 2018    --------------------------------------------------------------------------------------------------  History:  CC/ HPI Patient is a 63y old  Female who presents with a chief complaint of back pain (23 Jul 2018 17:21)    MEDICATIONS  (STANDING):  celecoxib 200 milliGRAM(s) Oral two times a day with meals  docusate sodium 100 milliGRAM(s) Oral three times a day  ergocalciferol 19437 Unit(s) Oral every week  heparin  Injectable 5000 Unit(s) SubCutaneous every 12 hours  HYDROmorphone  Injectable 0.25 milliGRAM(s) IV Push once  labetalol 100 milliGRAM(s) Oral three times a day  lidocaine   Patch 1 Patch Transdermal daily  losartan 25 milliGRAM(s) Oral daily  metoclopramide Injectable 10 milliGRAM(s) IV Push every 8 hours  NIFEdipine XL 30 milliGRAM(s) Oral daily  pantoprazole    Tablet 40 milliGRAM(s) Oral before breakfast  polyethylene glycol 3350 17 Gram(s) Oral daily  sertraline 25 milliGRAM(s) Oral daily  sodium chloride 0.9% Bolus 500 milliLiter(s) IV Bolus once  topiramate 100 milliGRAM(s) Oral two times a day    --------------------------------------------------------------------------------------------------  Study Interpretation:    FINDINGS:  The background was continuous, spontaneously variable and reactive.  During wakefulness, the posteriorly dominant rhythm consisted of symmetric, well modulated 9 Hz activity, with an amplitude to 30 uV, that attenuated to eye opening.  Low amplitude central beta was noted in wakefulness.    Background Slowing:  Generalized slowing: none was present.  Focal slowing: none was present.    Sleep Background:  Drowsiness was characterized by fragmentation, attenuation, and slowing of the background activity.    Stage II sleep transients were characterized by the presence of symmetric sleep spindles and K complexes     Epileptiform Activity:   No epileptiform discharges were present.    Events:  No clinical events were recorded.  No seizures were recorded.    Activation Procedures:   -Hyperventilation was not performed.    -Photic stimulation was not performed.    Artifacts:  Intermittent myogenic and movement artifacts were noted.    ECG:  The heart rate on single channel ECG was predominantly between 60-66 BPM.  -----------------------------------------------------------------------------------------------------    EEG Classification / Summary:  Normal EEG Study, awake / drowsy /asleep   -----------------------------------------------------------------------------------------------------    Clinical Impression:  There were no epileptiform abnormalities recorded.      -------------------------------------------------------------------------------------------------------    Lara Abdi MD  Epilepsy Fellow 	      Tez Rubio M.D.   of Neurology, [facility] [place]

## 2018-07-26 NOTE — PROGRESS NOTE ADULT - SUBJECTIVE AND OBJECTIVE BOX
INTERVAL HPI/OVERNIGHT EVENTS:  Patient ambulating in room, recent EEG probes still in tact. She reports no further migraines or nausea, BM yesterday. Reports she located her dentures, her sister brought them home .     MEDICATIONS  (STANDING):  celecoxib 200 milliGRAM(s) Oral two times a day with meals  docusate sodium 100 milliGRAM(s) Oral three times a day  ergocalciferol 62916 Unit(s) Oral every week  heparin  Injectable 5000 Unit(s) SubCutaneous every 12 hours  HYDROmorphone  Injectable 0.25 milliGRAM(s) IV Push once  labetalol 100 milliGRAM(s) Oral three times a day  lidocaine   Patch 1 Patch Transdermal daily  losartan 25 milliGRAM(s) Oral daily  metoclopramide Injectable 10 milliGRAM(s) IV Push every 8 hours  NIFEdipine XL 30 milliGRAM(s) Oral daily  pantoprazole    Tablet 40 milliGRAM(s) Oral before breakfast  polyethylene glycol 3350 17 Gram(s) Oral daily  sertraline 25 milliGRAM(s) Oral daily  sodium chloride 0.9% Bolus 500 milliLiter(s) IV Bolus once  topiramate 100 milliGRAM(s) Oral two times a day    MEDICATIONS  (PRN):  acetaminophen   Tablet. 650 milliGRAM(s) Oral every 6 hours PRN Mild Pain (1 - 3)  diazepam    Tablet 5 milliGRAM(s) Oral every 6 hours PRN anxiety/muscle spasm  HYDROmorphone  Injectable 0.5 milliGRAM(s) IV Push every 4 hours PRN Moderate Pain (4 - 6)  ondansetron Injectable 4 milliGRAM(s) IV Push every 6 hours PRN Nausea and/or Vomiting      Allergies    penicillin (Anaphylaxis)    Intolerances        Review of Systems:    General:  No fevers or chills    HENT:  denies headaches, no throat, pain or dysphagia  CV:  No chest pain  Resp:  No dyspnea, cough, tachypnea, wheezing  GI: denies nausea or vomiting, tolerating her meals, BM yesterday       Vital Signs Last 24 Hrs  T(C): 36.7 (26 Jul 2018 06:04), Max: 37.2 (25 Jul 2018 21:43)  T(F): 98.1 (26 Jul 2018 06:04), Max: 99 (25 Jul 2018 21:43)  HR: 61 (26 Jul 2018 06:04) (59 - 71)  BP: 150/77 (26 Jul 2018 06:04) (92/58 - 150/77)  BP(mean): --  RR: 18 (26 Jul 2018 06:04) (18 - 18)  SpO2: 98% (26 Jul 2018 06:04) (98% - 98%)    PHYSICAL EXAM:    Constitutional: non toxic and in NAD, well-developed  HENT: EEG probes intact on scalp , eyes anicteric , mucus membranes moist   Respiratory: anterior chest wall clear to auscultation. No wheezing  Cardiovascular: S1 and S2, RRR  Gastrointestinal: non distended , + bowel sounds   Extremities: No peripheral edema, or cyanosis  Psychiatric: Normal mood, normal affect  Skin: No jaundice or visible rashes      LABS:  No new labs for review             RADIOLOGY & ADDITIONAL TESTS:

## 2018-07-26 NOTE — PROGRESS NOTE ADULT - SUBJECTIVE AND OBJECTIVE BOX
Patient is a 63y old  Female who presents with a chief complaint of back pain (23 Jul 2018 17:21)      SUBJECTIVE / OVERNIGHT EVENTS:  Review of Systems  chest pain no  palpitations no  sob no  nausea no  headache no    MEDICATIONS  (STANDING):  celecoxib 200 milliGRAM(s) Oral two times a day with meals  docusate sodium 100 milliGRAM(s) Oral three times a day  ergocalciferol 76038 Unit(s) Oral every week  heparin  Injectable 5000 Unit(s) SubCutaneous every 12 hours  HYDROmorphone  Injectable 0.25 milliGRAM(s) IV Push once  labetalol 100 milliGRAM(s) Oral three times a day  lidocaine   Patch 1 Patch Transdermal daily  losartan 25 milliGRAM(s) Oral daily  metoclopramide Injectable 10 milliGRAM(s) IV Push every 8 hours  NIFEdipine XL 30 milliGRAM(s) Oral daily  pantoprazole    Tablet 40 milliGRAM(s) Oral before breakfast  polyethylene glycol 3350 17 Gram(s) Oral daily  sertraline 25 milliGRAM(s) Oral daily  sodium chloride 0.9% Bolus 500 milliLiter(s) IV Bolus once  topiramate 100 milliGRAM(s) Oral two times a day    MEDICATIONS  (PRN):  acetaminophen   Tablet. 650 milliGRAM(s) Oral every 6 hours PRN Mild Pain (1 - 3)  diazepam    Tablet 5 milliGRAM(s) Oral every 6 hours PRN anxiety/muscle spasm  HYDROmorphone  Injectable 0.5 milliGRAM(s) IV Push every 4 hours PRN Moderate Pain (4 - 6)  ondansetron Injectable 4 milliGRAM(s) IV Push every 6 hours PRN Nausea and/or Vomiting      Vital Signs Last 24 Hrs  T(C): 37 (26 Jul 2018 11:47), Max: 37.2 (25 Jul 2018 21:43)  T(F): 98.6 (26 Jul 2018 11:47), Max: 99 (25 Jul 2018 21:43)  HR: 60 (26 Jul 2018 11:47) (60 - 71)  BP: 144/83 (26 Jul 2018 11:47) (92/58 - 150/77)  BP(mean): --  RR: 18 (26 Jul 2018 11:47) (18 - 18)  SpO2: 98% (26 Jul 2018 11:47) (98% - 98%)    PHYSICAL EXAM:  GENERAL: NAD  HEAD:  Atraumatic, Normocephalic  EYES: EOMI, PERRLA, conjunctiva and sclera clear  NECK: Supple, No JVD  CHEST/LUNG: Clear to auscultation bilaterally; No wheeze  HEART: Regular rate and rhythm; No murmurs, rubs, or gallops  ABDOMEN: Soft, Nontender, Nondistended; Bowel sounds present  EXTREMITIES:  2+ Peripheral Pulses, No clubbing, cyanosis, or edema  PSYCH: AAOx3  NEUROLOGY: non-focal  SKIN: No rashes or lesions    LABS:            EEG Classification / Summary:  Normal EEG Study, awake / drowsy /asleep   -----------------------------------------------------------------------------------------------------    Clinical Impression:  There were no epileptiform abnormalities recorded.      ------------------------------------------------------------------------          RADIOLOGY & ADDITIONAL TESTS:    Imaging Personally Reviewed:    Consultant(s) Notes Reviewed:      Care Discussed with Consultants/Other Providers:

## 2018-07-26 NOTE — PROGRESS NOTE ADULT - ASSESSMENT
The patient is a antonia 63-year-old female with past medical history of seizure disorder, migraines, hypertension, presents for evaluation of hypertension.  The patient has relatively new onset hypertension.  She will require secondary workup for this.    SP acute renal failure (per AKIN's criteria).  Sudden drop in blood pressure can lead to worsening kidney dysfunction.  Solitary kidney  CKD stage 3  Possible renal artery stenosis    1.	Renal:  Secondary workup sent.  Renal Dopplers suggestive of ALEXANDER.  However the left kidney is essentially atrophic.  There is no indication for renal angiogram.  The left kidney can still secrete renin.  Cont  Cozaar.     A formal 24-hour for metanephrines will be done pending the results of the spot metanephrines.  BP is already 30-40 mmHg better then 3 days ago.   2.	Cardiology:  Cont  Procardia/ Labetolol 100mg po tid.  She does not want to be on a diuretic secondary to the fact that it may produce/propagate migraines/seizures.  Neurology:  Continue Topamax.

## 2018-07-26 NOTE — PROGRESS NOTE ADULT - ASSESSMENT
Assessment:   63 female with history of migraines, seizure disorder , HTN, osteoporosis, who was   admitted 7/19/18  with back pain for the past two days. She has T11 compression fracture.  She reported that she frequently gets vomiting during migraines.  Her migraines have resolved and she has not had any further nausea or vomiting. tolerating her meals Had BM yesterday Her abdomen is non tender, non distended       Plan:  Monitor labs and trends as ordered  Continue  PPI  Monitor bowel frequency   Continue  daily Colace and Miralax if continued on pain medication      BRANDEN [facility]  [address] [place] [address]  [address] [phone]

## 2018-07-26 NOTE — PROGRESS NOTE ADULT - SUBJECTIVE AND OBJECTIVE BOX
NEPHROLOGY-[facility] [phone]        Patient seen and examined in bed.  She was in good spirits        MEDICATIONS  (STANDING):  celecoxib 200 milliGRAM(s) Oral two times a day with meals  docusate sodium 100 milliGRAM(s) Oral three times a day  ergocalciferol 93596 Unit(s) Oral every week  heparin  Injectable 5000 Unit(s) SubCutaneous every 12 hours  HYDROmorphone  Injectable 0.25 milliGRAM(s) IV Push once  labetalol 100 milliGRAM(s) Oral three times a day  lidocaine   Patch 1 Patch Transdermal daily  losartan 25 milliGRAM(s) Oral daily  metoclopramide Injectable 10 milliGRAM(s) IV Push every 8 hours  NIFEdipine XL 30 milliGRAM(s) Oral daily  pantoprazole    Tablet 40 milliGRAM(s) Oral before breakfast  polyethylene glycol 3350 17 Gram(s) Oral daily  sertraline 25 milliGRAM(s) Oral daily  sodium chloride 0.9% Bolus 500 milliLiter(s) IV Bolus once  topiramate 100 milliGRAM(s) Oral two times a day      VITAL:  T(C): , Max: 37.2 (07-25-18 @ 21:43)  T(F): , Max: 99 (07-25-18 @ 21:43)  HR: 61 (07-26-18 @ 06:04)  BP: 150/77 (07-26-18 @ 06:04)  BP(mean): --  RR: 18 (07-26-18 @ 06:04)  SpO2: 98% (07-26-18 @ 06:04)  Wt(kg): --    I and O's:    07-25 @ 07:01  -  07-26 @ 07:00  --------------------------------------------------------  IN: 360 mL / OUT: 0 mL / NET: 360 mL          PHYSICAL EXAM:    Constitutional: NAD  HEENT: PERRLA    Neck:  No JVD  Respiratory: CTAB/L  Cardiovascular: S1 and S2  Gastrointestinal: BS+, soft, NT/ND  Extremities: No peripheral edema  Neurological: A/O x 3, no focal deficits  Psychiatric: Normal mood, normal affect  : No [place]  Skin: No rashes  Access: Not applicable    LABS:                Urine Studies:          RADIOLOGY & ADDITIONAL STUDIES:

## 2018-07-27 LAB
ANION GAP SERPL CALC-SCNC: 14 MMOL/L — SIGNIFICANT CHANGE UP (ref 5–17)
BUN SERPL-MCNC: 38 MG/DL — HIGH (ref 7–23)
CALCIUM SERPL-MCNC: 9.3 MG/DL — SIGNIFICANT CHANGE UP (ref 8.4–10.5)
CHLORIDE SERPL-SCNC: 102 MMOL/L — SIGNIFICANT CHANGE UP (ref 96–108)
CO2 SERPL-SCNC: 21 MMOL/L — LOW (ref 22–31)
CREAT SERPL-MCNC: 1.62 MG/DL — HIGH (ref 0.5–1.3)
GLUCOSE SERPL-MCNC: 106 MG/DL — HIGH (ref 70–99)
HCT VFR BLD CALC: 38.5 % — SIGNIFICANT CHANGE UP (ref 34.5–45)
HGB BLD-MCNC: 12.8 G/DL — SIGNIFICANT CHANGE UP (ref 11.5–15.5)
MCHC RBC-ENTMCNC: 29.4 PG — SIGNIFICANT CHANGE UP (ref 27–34)
MCHC RBC-ENTMCNC: 33.2 GM/DL — SIGNIFICANT CHANGE UP (ref 32–36)
MCV RBC AUTO: 88.3 FL — SIGNIFICANT CHANGE UP (ref 80–100)
METANEPHRINE, PL: 32 PG/ML — SIGNIFICANT CHANGE UP (ref 0–62)
NORMETANEPHRINE, PL: 151 PG/ML — HIGH (ref 0–145)
PLATELET # BLD AUTO: 193 K/UL — SIGNIFICANT CHANGE UP (ref 150–400)
POTASSIUM SERPL-MCNC: 3.5 MMOL/L — SIGNIFICANT CHANGE UP (ref 3.5–5.3)
POTASSIUM SERPL-SCNC: 3.5 MMOL/L — SIGNIFICANT CHANGE UP (ref 3.5–5.3)
RBC # BLD: 4.36 M/UL — SIGNIFICANT CHANGE UP (ref 3.8–5.2)
RBC # FLD: 13.6 % — SIGNIFICANT CHANGE UP (ref 10.3–14.5)
RENIN PLAS-CCNC: 7.42 NG/ML/HR — HIGH (ref 0.17–5.38)
SODIUM SERPL-SCNC: 137 MMOL/L — SIGNIFICANT CHANGE UP (ref 135–145)
WBC # BLD: 5.35 K/UL — SIGNIFICANT CHANGE UP (ref 3.8–10.5)
WBC # FLD AUTO: 5.35 K/UL — SIGNIFICANT CHANGE UP (ref 3.8–10.5)

## 2018-07-27 RX ORDER — DIAZEPAM 5 MG
1 TABLET ORAL
Qty: 0 | Refills: 0 | COMMUNITY

## 2018-07-27 RX ORDER — DIAZEPAM 5 MG
1 TABLET ORAL
Qty: 0 | Refills: 0 | COMMUNITY
Start: 2018-07-27

## 2018-07-27 RX ORDER — SIMETHICONE 80 MG/1
80 TABLET, CHEWABLE ORAL ONCE
Qty: 0 | Refills: 0 | Status: COMPLETED | OUTPATIENT
Start: 2018-07-27 | End: 2018-07-27

## 2018-07-27 RX ORDER — TOPIRAMATE 25 MG
1 TABLET ORAL
Qty: 0 | Refills: 0 | DISCHARGE
Start: 2018-07-27

## 2018-07-27 RX ADMIN — Medication 100 MILLIGRAM(S): at 14:33

## 2018-07-27 RX ADMIN — HYDROMORPHONE HYDROCHLORIDE 0.5 MILLIGRAM(S): 2 INJECTION INTRAMUSCULAR; INTRAVENOUS; SUBCUTANEOUS at 09:02

## 2018-07-27 RX ADMIN — Medication 100 MILLIGRAM(S): at 22:22

## 2018-07-27 RX ADMIN — CELECOXIB 200 MILLIGRAM(S): 200 CAPSULE ORAL at 09:01

## 2018-07-27 RX ADMIN — SIMETHICONE 80 MILLIGRAM(S): 80 TABLET, CHEWABLE ORAL at 01:59

## 2018-07-27 RX ADMIN — HYDROMORPHONE HYDROCHLORIDE 0.5 MILLIGRAM(S): 2 INJECTION INTRAMUSCULAR; INTRAVENOUS; SUBCUTANEOUS at 01:42

## 2018-07-27 RX ADMIN — Medication 100 MILLIGRAM(S): at 06:39

## 2018-07-27 RX ADMIN — Medication 100 MILLIGRAM(S): at 09:01

## 2018-07-27 RX ADMIN — HYDROMORPHONE HYDROCHLORIDE 0.5 MILLIGRAM(S): 2 INJECTION INTRAMUSCULAR; INTRAVENOUS; SUBCUTANEOUS at 02:00

## 2018-07-27 RX ADMIN — SERTRALINE 25 MILLIGRAM(S): 25 TABLET, FILM COATED ORAL at 12:36

## 2018-07-27 RX ADMIN — CELECOXIB 200 MILLIGRAM(S): 200 CAPSULE ORAL at 17:11

## 2018-07-27 RX ADMIN — HYDROMORPHONE HYDROCHLORIDE 0.5 MILLIGRAM(S): 2 INJECTION INTRAMUSCULAR; INTRAVENOUS; SUBCUTANEOUS at 09:32

## 2018-07-27 RX ADMIN — HEPARIN SODIUM 5000 UNIT(S): 5000 INJECTION INTRAVENOUS; SUBCUTANEOUS at 06:39

## 2018-07-27 RX ADMIN — CELECOXIB 200 MILLIGRAM(S): 200 CAPSULE ORAL at 09:31

## 2018-07-27 RX ADMIN — HYDROMORPHONE HYDROCHLORIDE 0.5 MILLIGRAM(S): 2 INJECTION INTRAMUSCULAR; INTRAVENOUS; SUBCUTANEOUS at 22:08

## 2018-07-27 RX ADMIN — Medication 30 MILLIGRAM(S): at 06:39

## 2018-07-27 RX ADMIN — CELECOXIB 200 MILLIGRAM(S): 200 CAPSULE ORAL at 17:41

## 2018-07-27 RX ADMIN — Medication 100 MILLIGRAM(S): at 22:12

## 2018-07-27 RX ADMIN — LOSARTAN POTASSIUM 25 MILLIGRAM(S): 100 TABLET, FILM COATED ORAL at 06:39

## 2018-07-27 RX ADMIN — PANTOPRAZOLE SODIUM 40 MILLIGRAM(S): 20 TABLET, DELAYED RELEASE ORAL at 06:39

## 2018-07-27 RX ADMIN — HYDROMORPHONE HYDROCHLORIDE 0.5 MILLIGRAM(S): 2 INJECTION INTRAMUSCULAR; INTRAVENOUS; SUBCUTANEOUS at 14:31

## 2018-07-27 RX ADMIN — HYDROMORPHONE HYDROCHLORIDE 0.5 MILLIGRAM(S): 2 INJECTION INTRAMUSCULAR; INTRAVENOUS; SUBCUTANEOUS at 15:01

## 2018-07-27 RX ADMIN — HYDROMORPHONE HYDROCHLORIDE 0.5 MILLIGRAM(S): 2 INJECTION INTRAMUSCULAR; INTRAVENOUS; SUBCUTANEOUS at 22:40

## 2018-07-27 NOTE — DIETITIAN INITIAL EVALUATION ADULT. - OTHER INFO
Pt seen for LOS, reports UBW as 115-120 pounds, denies wt fluctuations. Current wt is 116 pounds. Pt with good appetite and po intakes, observed 100% po intakes of meal at visit, noted 20-75% per flowsheet. Pt had GI distress (Nausea with coffee ground emesis) couple of days ago, now resolved and no GI distress, +BM today. Pt wears full upper and partial lower dentures with good fit, does not use them for eating, denies chewing/swallowing difficulties. NKFA. PTA did not take any supplements.

## 2018-07-27 NOTE — DIETITIAN INITIAL EVALUATION ADULT. - ENERGY NEEDS
Height: 62 inches, Weight: 116 pounds  BMI: 21 kg/m2 IBW: 110 pounds (+/-10%), %IBW: 105%  Pertinent Info: Per chart, 64 y/o female with PMH of Seizure, HTN, osteoporosis, CKD3, admitted with back pain 2/2 T11 compression fracture and acute delirium.  No edema, no pressure ulcers noted at this time.

## 2018-07-27 NOTE — PROGRESS NOTE ADULT - ASSESSMENT
Assessment:   63 female with history of migraines, seizure disorder , HTN, osteoporosis, who was   admitted 7/19/18  with back pain for the past two days. She has T11 compression fracture. She has had  no recurrent nausea or vomiting, tolerating her meals because her  migraines have resolved . And  her back pain has improved  patient reports a colonoscopy about 3 yrs ago, she reports she has colonoscopy every 5 years   Plan:  Continue colace if continued on pain ,medications   Follow up with primary GI as advised   Stable for discharge from a GI perspective       BRANDEN [facility]  [address] [place] [address]  [address] [phone]

## 2018-07-27 NOTE — PROGRESS NOTE ADULT - SUBJECTIVE AND OBJECTIVE BOX
Patient is a 63y old  Female who presents with a chief complaint of back pain (23 Jul 2018 17:21)      SUBJECTIVE / OVERNIGHT EVENTS: Comfortable without new complaints.   Review of Systems  chest pain no  palpitations no  sob no  nausea no  headache no    MEDICATIONS  (STANDING):  celecoxib 200 milliGRAM(s) Oral two times a day with meals  docusate sodium 100 milliGRAM(s) Oral three times a day  ergocalciferol 47314 Unit(s) Oral every week  heparin  Injectable 5000 Unit(s) SubCutaneous every 12 hours  HYDROmorphone  Injectable 0.25 milliGRAM(s) IV Push once  labetalol 100 milliGRAM(s) Oral three times a day  losartan 25 milliGRAM(s) Oral daily  NIFEdipine XL 30 milliGRAM(s) Oral daily  pantoprazole    Tablet 40 milliGRAM(s) Oral before breakfast  polyethylene glycol 3350 17 Gram(s) Oral daily  sertraline 25 milliGRAM(s) Oral daily  sodium chloride 0.9% Bolus 500 milliLiter(s) IV Bolus once  topiramate 100 milliGRAM(s) Oral two times a day    MEDICATIONS  (PRN):  acetaminophen   Tablet. 650 milliGRAM(s) Oral every 6 hours PRN Mild Pain (1 - 3)  diazepam    Tablet 5 milliGRAM(s) Oral every 6 hours PRN anxiety/muscle spasm  HYDROmorphone  Injectable 0.5 milliGRAM(s) IV Push every 4 hours PRN Moderate Pain (4 - 6)  ondansetron Injectable 4 milliGRAM(s) IV Push every 6 hours PRN Nausea and/or Vomiting      Vital Signs Last 24 Hrs  T(C): 37.1 (27 Jul 2018 12:48), Max: 37.4 (27 Jul 2018 05:09)  T(F): 98.7 (27 Jul 2018 12:48), Max: 99.3 (27 Jul 2018 05:09)  HR: 74 (27 Jul 2018 12:48) (68 - 78)  BP: 149/76 (27 Jul 2018 12:48) (140/78 - 150/70)  BP(mean): --  RR: 18 (27 Jul 2018 12:48) (18 - 18)  SpO2: 96% (27 Jul 2018 12:48) (94% - 98%)    PHYSICAL EXAM:  GENERAL: NAD, well-developed  HEAD:  Atraumatic, Normocephalic  EYES: EOMI, PERRLA, conjunctiva and sclera clear  NECK: Supple, No JVD  CHEST/LUNG: Clear to auscultation bilaterally; No wheeze  HEART: Regular rate and rhythm; No murmurs, rubs, or gallops  ABDOMEN: Soft, Nontender, Nondistended; Bowel sounds present  EXTREMITIES:  2+ Peripheral Pulses, No clubbing, cyanosis, or edema  PSYCH: AAOx3  NEUROLOGY: non-focal  SKIN: No rashes or lesions    LABS:                        12.8   5.35  )-----------( 193      ( 27 Jul 2018 08:41 )             38.5     07-27    137  |  102  |  38<H>  ----------------------------<  106<H>  3.5   |  21<L>  |  1.62<H>    Ca    9.3      27 Jul 2018 06:58                  RADIOLOGY & ADDITIONAL TESTS:    Imaging Personally Reviewed:    Consultant(s) Notes Reviewed:      Care Discussed with Consultants/Other Providers:

## 2018-07-27 NOTE — PROGRESS NOTE ADULT - SUBJECTIVE AND OBJECTIVE BOX
INTERVAL HPI/OVERNIGHT EVENTS:  Patient sitting on side of bed, reports she is feeling much better, tolerating her meals, no nausea or vomiting. Has a BM this morning, anticipating discharge     MEDICATIONS  (STANDING):  celecoxib 200 milliGRAM(s) Oral two times a day with meals  docusate sodium 100 milliGRAM(s) Oral three times a day  ergocalciferol 80791 Unit(s) Oral every week  heparin  Injectable 5000 Unit(s) SubCutaneous every 12 hours  HYDROmorphone  Injectable 0.25 milliGRAM(s) IV Push once  labetalol 100 milliGRAM(s) Oral three times a day  losartan 25 milliGRAM(s) Oral daily  NIFEdipine XL 30 milliGRAM(s) Oral daily  pantoprazole    Tablet 40 milliGRAM(s) Oral before breakfast  polyethylene glycol 3350 17 Gram(s) Oral daily  sertraline 25 milliGRAM(s) Oral daily  sodium chloride 0.9% Bolus 500 milliLiter(s) IV Bolus once  topiramate 100 milliGRAM(s) Oral two times a day    MEDICATIONS  (PRN):  acetaminophen   Tablet. 650 milliGRAM(s) Oral every 6 hours PRN Mild Pain (1 - 3)  diazepam    Tablet 5 milliGRAM(s) Oral every 6 hours PRN anxiety/muscle spasm  HYDROmorphone  Injectable 0.5 milliGRAM(s) IV Push every 4 hours PRN Moderate Pain (4 - 6)  ondansetron Injectable 4 milliGRAM(s) IV Push every 6 hours PRN Nausea and/or Vomiting      Allergies    penicillin (Anaphylaxis)    Intolerances        Review of Systems:    General:  No fevers or chills    HENT:  No headaches  No sore throat, or dysphagia  CV:  No chest pain   Resp:  No dyspnea, cough, tachypnea, or wheezing  GI: denies nausea or vomiting, tolerating meals, had BM           Vital Signs Last 24 Hrs  T(C): 37.4 (27 Jul 2018 05:09), Max: 37.4 (27 Jul 2018 05:09)  T(F): 99.3 (27 Jul 2018 05:09), Max: 99.3 (27 Jul 2018 05:09)  HR: 78 (27 Jul 2018 06:38) (60 - 78)  BP: 150/70 (27 Jul 2018 06:38) (140/78 - 150/70)  BP(mean): --  RR: 18 (27 Jul 2018 06:38) (18 - 18)  SpO2: 94% (27 Jul 2018 05:09) (94% - 98%)    PHYSICAL EXAM:      Constitutional: non toxic and in NAD, well-developed  Neck: supple  Respiratory: anterior chest wall  clear to auscultation, no  wheezing  Cardiovascular: S1 and S2, RRR,  Gastrointestinal: abdomen soft, non distended  + bowel sounds   Extremities: No peripheral edema, neg clubbing, cyanosis  Neurological: A/O x 3, no focal deficits  Psychiatric: Normal mood, normal affect  Skin: No jaundice or visible  rashes      LABS:                        12.8   5.35  )-----------( 193      ( 27 Jul 2018 08:41 )             38.5     07-27    137  |  102  |  38<H>  ----------------------------<  106<H>  3.5   |  21<L>  |  1.62<H>    Ca    9.3      27 Jul 2018 06:58      RADIOLOGY & ADDITIONAL TESTS:

## 2018-07-27 NOTE — PROGRESS NOTE ADULT - ASSESSMENT
· Assessment		  63F w/pmh migraines , seizure disorder , HTN, osteoporosis,   p/w intractable  back pain x  two days, CT spine with T11 compression fracture.         Delirium.  Resolved. CT head without acute findings , no metabolic derangements, U tox positive for barbiturates and benzodiazepines c/w history provided by family , suspect delirium secondary to excessive dosing  will monitor closely:   Istop was personally reviewed ,  Reference #: [id number] patient has been on Diazepam and percocet both last prescribed in January '18.   Psychiatry evaluation noted  EEG in progress        Compression fracture of thoracic vertebra.  Seen by Neurosurgery , no surgical intervention recommended  - tylenol PRN for mild pain    - Dilaudid IV prn for severe pain   - lidocaine patch   - fall precautions.   - restart Valium prn.    Osteoporosis- Endocrine evaluation noted. For further Rx as OTP.    Nausea/ Constipation- Gastroenterology evaluation noted. No EGD. Bowel regimen/enema.    HTN control- Nephrology follow.     RUPERTO- will follow Cr. Not cleared by Nephrology for DC.    Seizure disorder.  Plan: - cont Topamax.     False teeth lost- Nursing will try to find.  PT  DCP in progress if cleared by Nephrology.  Naldo Mendoza MD pager [phone]

## 2018-07-27 NOTE — PROGRESS NOTE ADULT - SUBJECTIVE AND OBJECTIVE BOX
NEPHROLOGY-[facility] [phone]        Patient seen and examined in bed.  She was in good spirits and offered no complaints.          MEDICATIONS  (STANDING):  celecoxib 200 milliGRAM(s) Oral two times a day with meals  docusate sodium 100 milliGRAM(s) Oral three times a day  ergocalciferol 37214 Unit(s) Oral every week  heparin  Injectable 5000 Unit(s) SubCutaneous every 12 hours  HYDROmorphone  Injectable 0.25 milliGRAM(s) IV Push once  labetalol 100 milliGRAM(s) Oral three times a day  losartan 25 milliGRAM(s) Oral daily  NIFEdipine XL 30 milliGRAM(s) Oral daily  pantoprazole    Tablet 40 milliGRAM(s) Oral before breakfast  polyethylene glycol 3350 17 Gram(s) Oral daily  sertraline 25 milliGRAM(s) Oral daily  sodium chloride 0.9% Bolus 500 milliLiter(s) IV Bolus once  topiramate 100 milliGRAM(s) Oral two times a day      VITAL:  T(C): , Max: 37.4 (07-27-18 @ 05:09)  T(F): , Max: 99.3 (07-27-18 @ 05:09)  HR: 78 (07-27-18 @ 06:38)  BP: 150/70 (07-27-18 @ 06:38)  BP(mean): --  RR: 18 (07-27-18 @ 06:38)  SpO2: 94% (07-27-18 @ 05:09)  Wt(kg): --    I and O's:    07-26 @ 07:01  -  07-27 @ 07:00  --------------------------------------------------------  IN: 1150 mL / OUT: 550 mL / NET: 600 mL          PHYSICAL EXAM:    Constitutional: NAD  HEENT: PERRLA    Neck:  No JVD  Respiratory: CTAB/L  Cardiovascular: S1 and S2  Gastrointestinal: BS+, soft, NT/ND  Extremities: No peripheral edema  Neurological: A/O x 3, no focal deficits  Psychiatric: Normal mood, normal affect  : No Redmond  Skin: No rashes  Access: Not applicable    LABS:    07-27    137  |  102  |  38<H>  ----------------------------<  106<H>  3.5   |  21<L>  |  1.62<H>    Ca    9.3      27 Jul 2018 06:58            Urine Studies:          RADIOLOGY & ADDITIONAL STUDIES:

## 2018-07-27 NOTE — DIETITIAN INITIAL EVALUATION ADULT. - PROBLEM SELECTOR PLAN 1
CT head without acute findings , no metabolic derangements, U tox positive for barbiturates and benzodiazepines c/w history provided by family , suspect delirium secondary to excessive dosing  will monitor closely:   Istop was personally reviewed ,  Reference #: [id number] patient has been on Diazepam and percocet both last prescribed in January '18

## 2018-07-27 NOTE — DIETITIAN INITIAL EVALUATION ADULT. - PERTINENT LABORATORY DATA
Na 137 [135 - 145], K+ 3.5 [3.5 - 5.3], BUN 38 [7 - 23], Cr 1.62 [0.50 - 1.30],  [70 - 99], Ca 9.3 [8.4 - 10.5]

## 2018-07-27 NOTE — PROGRESS NOTE ADULT - ASSESSMENT
The patient is a antonia 63-year-old female with past medical history of seizure disorder, migraines, hypertension, presents for evaluation of hypertension.  The patient has relatively new onset hypertension.      Sudden drop in blood pressure can lead to worsening kidney dysfunction.  Solitary kidney  CKD stage 3 and now RUPERTO?  Possible renal artery stenosis    1.	Renal:     Renal Dopplers suggestive of ALEXANDER.  However the left kidney is essentially atrophic.  There is no indication for renal angiogram.  The left kidney can still secrete renin.  Cont  Cozaar despite the increased in creatinine.     A formal 24-hour for metanephrines will be done pending the results of the spot metanephrines.  BP is already 30-40 mmHg better then 3 days ago.   2.	Cardiology:  Cont  Procardia/ Labetolol 100mg po tid.  She does not want to be on a diuretic secondary to the fact that it may produce/propagate migraines/seizures.  Neurology:  Continue Topamax.       Please keep in the hsopital for another 24 hours to trend the serum creatinine

## 2018-07-28 VITALS
SYSTOLIC BLOOD PRESSURE: 166 MMHG | OXYGEN SATURATION: 99 % | DIASTOLIC BLOOD PRESSURE: 82 MMHG | TEMPERATURE: 98 F | RESPIRATION RATE: 18 BRPM | HEART RATE: 57 BPM

## 2018-07-28 LAB
ANION GAP SERPL CALC-SCNC: 11 MMOL/L — SIGNIFICANT CHANGE UP (ref 5–17)
BUN SERPL-MCNC: 38 MG/DL — HIGH (ref 7–23)
CALCIUM SERPL-MCNC: 9.3 MG/DL — SIGNIFICANT CHANGE UP (ref 8.4–10.5)
CHLORIDE SERPL-SCNC: 104 MMOL/L — SIGNIFICANT CHANGE UP (ref 96–108)
CO2 SERPL-SCNC: 23 MMOL/L — SIGNIFICANT CHANGE UP (ref 22–31)
CREAT SERPL-MCNC: 1.46 MG/DL — HIGH (ref 0.5–1.3)
GLUCOSE SERPL-MCNC: 101 MG/DL — HIGH (ref 70–99)
HCT VFR BLD CALC: 38.5 % — SIGNIFICANT CHANGE UP (ref 34.5–45)
HGB BLD-MCNC: 12.8 G/DL — SIGNIFICANT CHANGE UP (ref 11.5–15.5)
MCHC RBC-ENTMCNC: 29.4 PG — SIGNIFICANT CHANGE UP (ref 27–34)
MCHC RBC-ENTMCNC: 33.2 GM/DL — SIGNIFICANT CHANGE UP (ref 32–36)
MCV RBC AUTO: 88.5 FL — SIGNIFICANT CHANGE UP (ref 80–100)
PLATELET # BLD AUTO: 188 K/UL — SIGNIFICANT CHANGE UP (ref 150–400)
POTASSIUM SERPL-MCNC: 3.9 MMOL/L — SIGNIFICANT CHANGE UP (ref 3.5–5.3)
POTASSIUM SERPL-SCNC: 3.9 MMOL/L — SIGNIFICANT CHANGE UP (ref 3.5–5.3)
RBC # BLD: 4.35 M/UL — SIGNIFICANT CHANGE UP (ref 3.8–5.2)
RBC # FLD: 14.1 % — SIGNIFICANT CHANGE UP (ref 10.3–14.5)
SODIUM SERPL-SCNC: 138 MMOL/L — SIGNIFICANT CHANGE UP (ref 135–145)
WBC # BLD: 5.45 K/UL — SIGNIFICANT CHANGE UP (ref 3.8–10.5)
WBC # FLD AUTO: 5.45 K/UL — SIGNIFICANT CHANGE UP (ref 3.8–10.5)

## 2018-07-28 PROCEDURE — 93975 VASCULAR STUDY: CPT

## 2018-07-28 PROCEDURE — 95819 EEG AWAKE AND ASLEEP: CPT

## 2018-07-28 PROCEDURE — 82553 CREATINE MB FRACTION: CPT

## 2018-07-28 PROCEDURE — 83690 ASSAY OF LIPASE: CPT

## 2018-07-28 PROCEDURE — 80053 COMPREHEN METABOLIC PANEL: CPT

## 2018-07-28 PROCEDURE — 72128 CT CHEST SPINE W/O DYE: CPT

## 2018-07-28 PROCEDURE — 82803 BLOOD GASES ANY COMBINATION: CPT

## 2018-07-28 PROCEDURE — 82435 ASSAY OF BLOOD CHLORIDE: CPT

## 2018-07-28 PROCEDURE — 95951: CPT

## 2018-07-28 PROCEDURE — 84484 ASSAY OF TROPONIN QUANT: CPT

## 2018-07-28 PROCEDURE — 82530 CORTISOL FREE: CPT

## 2018-07-28 PROCEDURE — 70450 CT HEAD/BRAIN W/O DYE: CPT

## 2018-07-28 PROCEDURE — 99285 EMERGENCY DEPT VISIT HI MDM: CPT | Mod: 25

## 2018-07-28 PROCEDURE — 93005 ELECTROCARDIOGRAM TRACING: CPT

## 2018-07-28 PROCEDURE — 96374 THER/PROPH/DIAG INJ IV PUSH: CPT

## 2018-07-28 PROCEDURE — 82330 ASSAY OF CALCIUM: CPT

## 2018-07-28 PROCEDURE — 83605 ASSAY OF LACTIC ACID: CPT

## 2018-07-28 PROCEDURE — 97161 PT EVAL LOW COMPLEX 20 MIN: CPT

## 2018-07-28 PROCEDURE — 84244 ASSAY OF RENIN: CPT

## 2018-07-28 PROCEDURE — 83835 ASSAY OF METANEPHRINES: CPT

## 2018-07-28 PROCEDURE — 82088 ASSAY OF ALDOSTERONE: CPT

## 2018-07-28 PROCEDURE — 85014 HEMATOCRIT: CPT

## 2018-07-28 PROCEDURE — 82533 TOTAL CORTISOL: CPT

## 2018-07-28 PROCEDURE — 81001 URINALYSIS AUTO W/SCOPE: CPT

## 2018-07-28 PROCEDURE — 85027 COMPLETE CBC AUTOMATED: CPT

## 2018-07-28 PROCEDURE — 82962 GLUCOSE BLOOD TEST: CPT

## 2018-07-28 PROCEDURE — 83986 ASSAY PH BODY FLUID NOS: CPT

## 2018-07-28 PROCEDURE — 82947 ASSAY GLUCOSE BLOOD QUANT: CPT

## 2018-07-28 PROCEDURE — 80048 BASIC METABOLIC PNL TOTAL CA: CPT

## 2018-07-28 PROCEDURE — 87086 URINE CULTURE/COLONY COUNT: CPT

## 2018-07-28 PROCEDURE — 85610 PROTHROMBIN TIME: CPT

## 2018-07-28 PROCEDURE — 82306 VITAMIN D 25 HYDROXY: CPT

## 2018-07-28 PROCEDURE — 82550 ASSAY OF CK (CPK): CPT

## 2018-07-28 PROCEDURE — 85730 THROMBOPLASTIN TIME PARTIAL: CPT

## 2018-07-28 PROCEDURE — 80307 DRUG TEST PRSMV CHEM ANLYZR: CPT

## 2018-07-28 PROCEDURE — 84132 ASSAY OF SERUM POTASSIUM: CPT

## 2018-07-28 PROCEDURE — 82271 OCCULT BLOOD OTHER SOURCES: CPT

## 2018-07-28 PROCEDURE — 84295 ASSAY OF SERUM SODIUM: CPT

## 2018-07-28 RX ORDER — NIFEDIPINE 30 MG
1 TABLET, EXTENDED RELEASE 24 HR ORAL
Qty: 30 | Refills: 0 | OUTPATIENT
Start: 2018-07-28 | End: 2018-08-26

## 2018-07-28 RX ORDER — POLYETHYLENE GLYCOL 3350 17 G/17G
17 POWDER, FOR SOLUTION ORAL
Qty: 30 | Refills: 0 | OUTPATIENT
Start: 2018-07-28 | End: 2018-08-26

## 2018-07-28 RX ORDER — ERGOCALCIFEROL 1.25 MG/1
1 CAPSULE ORAL
Qty: 4 | Refills: 0 | OUTPATIENT
Start: 2018-07-28 | End: 2018-08-26

## 2018-07-28 RX ORDER — SERTRALINE 25 MG/1
1 TABLET, FILM COATED ORAL
Qty: 30 | Refills: 0 | OUTPATIENT
Start: 2018-07-28

## 2018-07-28 RX ORDER — PROPRANOLOL HCL 160 MG
1 CAPSULE, EXTENDED RELEASE 24HR ORAL
Qty: 0 | Refills: 0 | COMMUNITY

## 2018-07-28 RX ORDER — LABETALOL HCL 100 MG
1 TABLET ORAL
Qty: 90 | Refills: 0 | OUTPATIENT
Start: 2018-07-28

## 2018-07-28 RX ORDER — BUTALBITAL/ACETAMINOPHEN 50MG-650MG
1 TABLET ORAL
Qty: 0 | Refills: 0 | COMMUNITY

## 2018-07-28 RX ORDER — PANTOPRAZOLE SODIUM 20 MG/1
1 TABLET, DELAYED RELEASE ORAL
Qty: 30 | Refills: 0 | OUTPATIENT
Start: 2018-07-28

## 2018-07-28 RX ORDER — DOCUSATE SODIUM 100 MG
1 CAPSULE ORAL
Qty: 90 | Refills: 0 | OUTPATIENT
Start: 2018-07-28

## 2018-07-28 RX ORDER — LOSARTAN POTASSIUM 100 MG/1
1 TABLET, FILM COATED ORAL
Qty: 30 | Refills: 0 | OUTPATIENT
Start: 2018-07-28

## 2018-07-28 RX ORDER — ACETAMINOPHEN 500 MG
2 TABLET ORAL
Qty: 240 | Refills: 0 | OUTPATIENT
Start: 2018-07-28 | End: 2018-08-26

## 2018-07-28 RX ORDER — CELECOXIB 200 MG/1
1 CAPSULE ORAL
Qty: 60 | Refills: 0 | OUTPATIENT
Start: 2018-07-28 | End: 2018-08-26

## 2018-07-28 RX ADMIN — HYDROMORPHONE HYDROCHLORIDE 0.5 MILLIGRAM(S): 2 INJECTION INTRAMUSCULAR; INTRAVENOUS; SUBCUTANEOUS at 10:38

## 2018-07-28 RX ADMIN — CELECOXIB 200 MILLIGRAM(S): 200 CAPSULE ORAL at 18:41

## 2018-07-28 RX ADMIN — HYDROMORPHONE HYDROCHLORIDE 0.5 MILLIGRAM(S): 2 INJECTION INTRAMUSCULAR; INTRAVENOUS; SUBCUTANEOUS at 10:18

## 2018-07-28 RX ADMIN — LOSARTAN POTASSIUM 25 MILLIGRAM(S): 100 TABLET, FILM COATED ORAL at 06:49

## 2018-07-28 RX ADMIN — Medication 30 MILLIGRAM(S): at 06:49

## 2018-07-28 RX ADMIN — Medication 100 MILLIGRAM(S): at 06:49

## 2018-07-28 RX ADMIN — Medication 650 MILLIGRAM(S): at 15:03

## 2018-07-28 RX ADMIN — SERTRALINE 25 MILLIGRAM(S): 25 TABLET, FILM COATED ORAL at 14:35

## 2018-07-28 RX ADMIN — Medication 100 MILLIGRAM(S): at 14:37

## 2018-07-28 RX ADMIN — CELECOXIB 200 MILLIGRAM(S): 200 CAPSULE ORAL at 09:00

## 2018-07-28 RX ADMIN — Medication 100 MILLIGRAM(S): at 09:59

## 2018-07-28 RX ADMIN — PANTOPRAZOLE SODIUM 40 MILLIGRAM(S): 20 TABLET, DELAYED RELEASE ORAL at 06:49

## 2018-07-28 NOTE — PROGRESS NOTE ADULT - PROVIDER SPECIALTY LIST ADULT
Gastroenterology
Internal Medicine
Nephrology
Neurology
Internal Medicine

## 2018-07-28 NOTE — PROGRESS NOTE ADULT - ASSESSMENT
· Assessment		  63F w/pmh migraines , seizure disorder , HTN, osteoporosis,   p/w intractable  back pain x  two days, CT spine with T11 compression fracture.         Delirium.  Resolved. CT head without acute findings , no metabolic derangements, U tox positive for barbiturates and benzodiazepines c/w history provided by family , suspect delirium secondary to excessive dosing  will monitor closely:   Istop was personally reviewed ,  Reference #: [id number] patient has been on Diazepam and percocet both last prescribed in January '18.   Psychiatry evaluation noted        Compression fracture of thoracic vertebra.  Seen by Neurosurgery , no surgical intervention recommended  - tylenol PRN for mild pain    - lidocaine patch   - fall precautions.   - restart Valium prn.    Osteoporosis- Endocrine evaluation noted. For further Rx as OTP.    Nausea/ Constipation- Gastroenterology evaluation noted. No EGD. Bowel regimen/enema.    HTN control- Nephrology follow.     RUPERTO- improving.     Seizure disorder.  Plan: - cont Topamax.     DC home. Follow with PMD/ Endocrine/ Neurology QA  Naldo Mendoza MD pager [phone]

## 2018-07-28 NOTE — PROGRESS NOTE ADULT - SUBJECTIVE AND OBJECTIVE BOX
Patient is a 63y old  Female who presents with a chief complaint of back pain (23 Jul 2018 17:21)      SUBJECTIVE / OVERNIGHT EVENTS: Comfortable without new complaints.   Review of Systems  chest pain no  palpitations no  sob no  nausea no  headache no    MEDICATIONS  (STANDING):  celecoxib 200 milliGRAM(s) Oral two times a day with meals  docusate sodium 100 milliGRAM(s) Oral three times a day  ergocalciferol 38776 Unit(s) Oral every week  heparin  Injectable 5000 Unit(s) SubCutaneous every 12 hours  HYDROmorphone  Injectable 0.25 milliGRAM(s) IV Push once  labetalol 100 milliGRAM(s) Oral three times a day  losartan 25 milliGRAM(s) Oral daily  NIFEdipine XL 30 milliGRAM(s) Oral daily  pantoprazole    Tablet 40 milliGRAM(s) Oral before breakfast  polyethylene glycol 3350 17 Gram(s) Oral daily  sertraline 25 milliGRAM(s) Oral daily  sodium chloride 0.9% Bolus 500 milliLiter(s) IV Bolus once  topiramate 100 milliGRAM(s) Oral two times a day    MEDICATIONS  (PRN):  acetaminophen   Tablet. 650 milliGRAM(s) Oral every 6 hours PRN Mild Pain (1 - 3)  diazepam    Tablet 5 milliGRAM(s) Oral every 6 hours PRN anxiety/muscle spasm  HYDROmorphone  Injectable 0.5 milliGRAM(s) IV Push every 4 hours PRN Moderate Pain (4 - 6)  ondansetron Injectable 4 milliGRAM(s) IV Push every 6 hours PRN Nausea and/or Vomiting      Vital Signs Last 24 Hrs  T(C): 37 (28 Jul 2018 06:05), Max: 37.2 (27 Jul 2018 21:21)  T(F): 98.6 (28 Jul 2018 06:05), Max: 99 (27 Jul 2018 21:21)  HR: 63 (28 Jul 2018 06:05) (63 - 74)  BP: 157/78 (28 Jul 2018 06:05) (149/76 - 163/76)  BP(mean): --  RR: 18 (28 Jul 2018 06:05) (18 - 18)  SpO2: 97% (28 Jul 2018 06:05) (96% - 100%)    PHYSICAL EXAM:  GENERAL: NAD, well-developed  HEAD:  Atraumatic, Normocephalic  EYES: EOMI, PERRLA, conjunctiva and sclera clear  NECK: Supple, No JVD  CHEST/LUNG: Clear to auscultation bilaterally; No wheeze  HEART: Regular rate and rhythm; No murmurs, rubs, or gallops  ABDOMEN: Soft, Nontender, Nondistended; Bowel sounds present  EXTREMITIES:  2+ Peripheral Pulses, No clubbing, cyanosis, or edema  PSYCH: AAOx3  NEUROLOGY: non-focal  SKIN: No rashes or lesions    LABS:                        12.8   5.45  )-----------( 188      ( 28 Jul 2018 09:05 )             38.5     07-28    138  |  104  |  38<H>  ----------------------------<  101<H>  3.9   |  23  |  1.46<H>    Ca    9.3      28 Jul 2018 07:17                  RADIOLOGY & ADDITIONAL TESTS:    Imaging Personally Reviewed:    Consultant(s) Notes Reviewed:      Care Discussed with Consultants/Other Providers:

## 2018-07-28 NOTE — PROGRESS NOTE ADULT - SUBJECTIVE AND OBJECTIVE BOX
Patient seen and examined    REVIEW OF SYSTEMS:  As per HPI, otherwise 8 full 10 ROS were unremarkable    MEDICATIONS  (STANDING):  celecoxib 200 milliGRAM(s) Oral two times a day with meals  docusate sodium 100 milliGRAM(s) Oral three times a day  ergocalciferol 51058 Unit(s) Oral every week  heparin  Injectable 5000 Unit(s) SubCutaneous every 12 hours  HYDROmorphone  Injectable 0.25 milliGRAM(s) IV Push once  labetalol 100 milliGRAM(s) Oral three times a day  losartan 25 milliGRAM(s) Oral daily  NIFEdipine XL 30 milliGRAM(s) Oral daily  pantoprazole    Tablet 40 milliGRAM(s) Oral before breakfast  polyethylene glycol 3350 17 Gram(s) Oral daily  sertraline 25 milliGRAM(s) Oral daily  sodium chloride 0.9% Bolus 500 milliLiter(s) IV Bolus once  topiramate 100 milliGRAM(s) Oral two times a day      VITAL:  T(C): , Max: 37.2 (07-27-18 @ 21:21)  T(F): , Max: 99 (07-27-18 @ 21:21)  HR: 57 (07-28-18 @ 12:35)  BP: 157/78 (07-28-18 @ 06:05)  BP(mean): --  RR: 18 (07-28-18 @ 12:35)  SpO2: 99% (07-28-18 @ 12:35)  Wt(kg): --    I and O's:    07-27 @ 07:01  -  07-28 @ 07:00  --------------------------------------------------------  IN: 860 mL / OUT: 0 mL / NET: 860 mL    07-28 @ 07:01  -  07-28 @ 14:16  --------------------------------------------------------  IN: 480 mL / OUT: 0 mL / NET: 480 mL          PHYSICAL EXAM:    Constitutional: NAD  HEENT: PERRLA, EOMI,  MMM  Neck: No LAD, No JVD  Respiratory: CTAB  Cardiovascular: S1 and S2  Gastrointestinal: BS+, soft, NT/ND  Extremities: No peripheral edema  Neurological: A/O x 3, no focal deficits  Psychiatric: Normal mood, normal affect  : No Redmond  Skin: No rashes  Access: Not applicable    LABS:                        12.8   5.45  )-----------( 188      ( 28 Jul 2018 09:05 )             38.5     07-28    138  |  104  |  38<H>  ----------------------------<  101<H>  3.9   |  23  |  1.46<H>    Ca    9.3      28 Jul 2018 07:17      Assessment and Plan:   · Assessment	    The patient is a antonia 63-year-old female with past medical history of seizure disorder, migraines, hypertension, presents for evaluation of hypertension.  The patient has relatively new onset hypertension.      Sudden drop in blood pressure can lead to worsening kidney dysfunction.  Solitary kidney  CKD stage 3 and now RUPERTO?  Possible renal artery stenosis    Renal:  CKD stage 3 and now RUPERTO  Renal Dopplers suggestive of ALEXANDER.  However the left kidney is essentially atrophic.  There is no indication for renal angiogram.  The left kidney can still secrete renin.  Cont Cozaar despite the increased in creatinine as of late; renal fxn slowly improving  Cardiology:  Cont  Procardia/ Labetolol 100mg po tid.  She does not want to be on a diuretic secondary to the fact that it may produce/propagate migraines/seizures.  Neurology:  Continue Topamax    *Discharge planning per primary team; no renal objection to proceed; have patient f/u with PCP in 1-2 weeks for repeat BMP Patient seen and examined    REVIEW OF SYSTEMS:  As per HPI, otherwise 8 full 10 ROS were unremarkable    MEDICATIONS  (STANDING):  celecoxib 200 milliGRAM(s) Oral two times a day with meals  docusate sodium 100 milliGRAM(s) Oral three times a day  ergocalciferol 88370 Unit(s) Oral every week  heparin  Injectable 5000 Unit(s) SubCutaneous every 12 hours  HYDROmorphone  Injectable 0.25 milliGRAM(s) IV Push once  labetalol 100 milliGRAM(s) Oral three times a day  losartan 25 milliGRAM(s) Oral daily  NIFEdipine XL 30 milliGRAM(s) Oral daily  pantoprazole    Tablet 40 milliGRAM(s) Oral before breakfast  polyethylene glycol 3350 17 Gram(s) Oral daily  sertraline 25 milliGRAM(s) Oral daily  sodium chloride 0.9% Bolus 500 milliLiter(s) IV Bolus once  topiramate 100 milliGRAM(s) Oral two times a day      VITAL:  T(C): , Max: 37.2 (07-27-18 @ 21:21)  T(F): , Max: 99 (07-27-18 @ 21:21)  HR: 57 (07-28-18 @ 12:35)  BP: 157/78 (07-28-18 @ 06:05)  BP(mean): --  RR: 18 (07-28-18 @ 12:35)  SpO2: 99% (07-28-18 @ 12:35)  Wt(kg): --    I and O's:    07-27 @ 07:01  -  07-28 @ 07:00  --------------------------------------------------------  IN: 860 mL / OUT: 0 mL / NET: 860 mL    07-28 @ 07:01  -  07-28 @ 14:16  --------------------------------------------------------  IN: 480 mL / OUT: 0 mL / NET: 480 mL          PHYSICAL EXAM:    Constitutional: NAD  HEENT: PERRLA, EOMI,  MMM  Neck: No LAD, No JVD  Respiratory: CTAB  Cardiovascular: S1 and S2  Gastrointestinal: BS+, soft, NT/ND  Extremities: No peripheral edema  Neurological: A/O x 3, no focal deficits  Psychiatric: Normal mood, normal affect  : No Redmond  Skin: No rashes  Access: Not applicable    LABS:                        12.8   5.45  )-----------( 188      ( 28 Jul 2018 09:05 )             38.5     07-28    138  |  104  |  38<H>  ----------------------------<  101<H>  3.9   |  23  |  1.46<H>    Ca    9.3      28 Jul 2018 07:17      Assessment and Plan:   · Assessment	    The patient is a antonia 63-year-old female with past medical history of seizure disorder, migraines, hypertension, presents for evaluation of hypertension.  The patient has relatively new onset hypertension.      Sudden drop in blood pressure can lead to worsening kidney dysfunction.  Solitary kidney  CKD stage 3 and now RUPERTO?  Possible renal artery stenosis    Renal:  CKD stage 3 and now RUPERTO  Renal Dopplers suggestive of ALEXANDER.  However the left kidney is essentially atrophic.  There is no indication for renal angiogram.  The left kidney can still secrete renin.  Cont Cozaar despite the increased in creatinine as of late; renal fxn slowly improving  Cardiology:  Cont  Procardia/ Labetolol 100mg po tid.  She does not want to be on a diuretic secondary to the fact that it may produce/propagate migraines/seizures.  Neurology:  Continue Topamax Patient seen and examined in bed; no new events overnight.  NAD.    REVIEW OF SYSTEMS:  As per HPI, otherwise 8 full 10 ROS were unremarkable    MEDICATIONS  (STANDING):  celecoxib 200 milliGRAM(s) Oral two times a day with meals  docusate sodium 100 milliGRAM(s) Oral three times a day  ergocalciferol 83824 Unit(s) Oral every week  heparin  Injectable 5000 Unit(s) SubCutaneous every 12 hours  HYDROmorphone  Injectable 0.25 milliGRAM(s) IV Push once  labetalol 100 milliGRAM(s) Oral three times a day  losartan 25 milliGRAM(s) Oral daily  NIFEdipine XL 30 milliGRAM(s) Oral daily  pantoprazole    Tablet 40 milliGRAM(s) Oral before breakfast  polyethylene glycol 3350 17 Gram(s) Oral daily  sertraline 25 milliGRAM(s) Oral daily  sodium chloride 0.9% Bolus 500 milliLiter(s) IV Bolus once  topiramate 100 milliGRAM(s) Oral two times a day      VITAL:  T(C): , Max: 37.2 (07-27-18 @ 21:21)  T(F): , Max: 99 (07-27-18 @ 21:21)  HR: 57 (07-28-18 @ 12:35)  BP: 157/78 (07-28-18 @ 06:05)  BP(mean): --  RR: 18 (07-28-18 @ 12:35)  SpO2: 99% (07-28-18 @ 12:35)  Wt(kg): --    I and O's:    07-27 @ 07:01 - 07-28 @ 07:00  --------------------------------------------------------  IN: 860 mL / OUT: 0 mL / NET: 860 mL    07-28 @ 07:01  -  07-28 @ 14:16  --------------------------------------------------------  IN: 480 mL / OUT: 0 mL / NET: 480 mL          PHYSICAL EXAM:    Constitutional: NAD  HEENT: PERRLA, EOMI,  MMM  Neck: No LAD, No JVD  Respiratory: CTAB  Cardiovascular: S1 and S2  Gastrointestinal: BS+, soft, NT/ND  Extremities: No peripheral edema  Neurological: A/O x 3, no focal deficits  Psychiatric: Normal mood, normal affect  : No Redmond  Skin: No rashes  Access: Not applicable    LABS:                        12.8   5.45  )-----------( 188      ( 28 Jul 2018 09:05 )             38.5     07-28    138  |  104  |  38<H>  ----------------------------<  101<H>  3.9   |  23  |  1.46<H>    Ca    9.3      28 Jul 2018 07:17      Assessment and Plan:   · Assessment	    The patient is a antonia 63-year-old female with past medical history of seizure disorder, migraines, hypertension, presents for evaluation of hypertension.  The patient has relatively new onset hypertension.      Sudden drop in blood pressure can lead to worsening kidney dysfunction.  Solitary kidney  CKD stage 3 and now RUPERTO?  Possible renal artery stenosis    Renal:  CKD stage 3 and now RUPERTO  Renal Dopplers suggestive of ALEXANDER.  However the left kidney is essentially atrophic.  There is no indication for renal angiogram.  The left kidney can still secrete renin.  Cont Cozaar despite the increased in creatinine as of late; renal fxn slowly improving  Cardiology:  Cont  Procardia/ Labetolol 100mg po tid.  She does not want to be on a diuretic secondary to the fact that it may produce/propagate migraines/seizures.  Neurology:  Continue Topamax    **Discharge planning per primary team; no renal objection to proceed**

## 2018-07-30 DIAGNOSIS — Z71.89 OTHER SPECIFIED COUNSELING: ICD-10-CM

## 2018-08-05 ENCOUNTER — INPATIENT (INPATIENT)
Facility: HOSPITAL | Age: 63
LOS: 4 days | Discharge: HOME | DRG: 305 | End: 2018-08-10
Attending: INTERNAL MEDICINE | Admitting: INTERNAL MEDICINE
Payer: MEDICAID

## 2018-08-05 VITALS
RESPIRATION RATE: 18 BRPM | HEIGHT: 62 IN | TEMPERATURE: 98 F | DIASTOLIC BLOOD PRESSURE: 98 MMHG | HEART RATE: 59 BPM | WEIGHT: 115.08 LBS | SYSTOLIC BLOOD PRESSURE: 209 MMHG | OXYGEN SATURATION: 98 %

## 2018-08-05 LAB
ALBUMIN SERPL ELPH-MCNC: 3.8 G/DL — SIGNIFICANT CHANGE UP (ref 3.3–5)
ALP SERPL-CCNC: 72 U/L — SIGNIFICANT CHANGE UP (ref 40–120)
ALT FLD-CCNC: 17 U/L — SIGNIFICANT CHANGE UP (ref 10–45)
ANION GAP SERPL CALC-SCNC: 13 MMOL/L — SIGNIFICANT CHANGE UP (ref 5–17)
AST SERPL-CCNC: 18 U/L — SIGNIFICANT CHANGE UP (ref 10–40)
BASOPHILS # BLD AUTO: 0.1 K/UL — SIGNIFICANT CHANGE UP (ref 0–0.2)
BASOPHILS NFR BLD AUTO: 0.9 % — SIGNIFICANT CHANGE UP (ref 0–2)
BILIRUB SERPL-MCNC: 0.3 MG/DL — SIGNIFICANT CHANGE UP (ref 0.2–1.2)
BUN SERPL-MCNC: 22 MG/DL — SIGNIFICANT CHANGE UP (ref 7–23)
CALCIUM SERPL-MCNC: 9.3 MG/DL — SIGNIFICANT CHANGE UP (ref 8.4–10.5)
CHLORIDE SERPL-SCNC: 106 MMOL/L — SIGNIFICANT CHANGE UP (ref 96–108)
CO2 SERPL-SCNC: 24 MMOL/L — SIGNIFICANT CHANGE UP (ref 22–31)
CREAT SERPL-MCNC: 1.65 MG/DL — HIGH (ref 0.5–1.3)
EOSINOPHIL # BLD AUTO: 0.2 K/UL — SIGNIFICANT CHANGE UP (ref 0–0.5)
EOSINOPHIL NFR BLD AUTO: 3.3 % — SIGNIFICANT CHANGE UP (ref 0–6)
GAS PNL BLDV: SIGNIFICANT CHANGE UP
GLUCOSE SERPL-MCNC: 90 MG/DL — SIGNIFICANT CHANGE UP (ref 70–99)
HCT VFR BLD CALC: 39.6 % — SIGNIFICANT CHANGE UP (ref 34.5–45)
HGB BLD-MCNC: 13.3 G/DL — SIGNIFICANT CHANGE UP (ref 11.5–15.5)
LYMPHOCYTES # BLD AUTO: 1.9 K/UL — SIGNIFICANT CHANGE UP (ref 1–3.3)
LYMPHOCYTES # BLD AUTO: 25.8 % — SIGNIFICANT CHANGE UP (ref 13–44)
MCHC RBC-ENTMCNC: 30.2 PG — SIGNIFICANT CHANGE UP (ref 27–34)
MCHC RBC-ENTMCNC: 33.7 GM/DL — SIGNIFICANT CHANGE UP (ref 32–36)
MCV RBC AUTO: 89.6 FL — SIGNIFICANT CHANGE UP (ref 80–100)
MONOCYTES # BLD AUTO: 0.6 K/UL — SIGNIFICANT CHANGE UP (ref 0–0.9)
MONOCYTES NFR BLD AUTO: 8.9 % — SIGNIFICANT CHANGE UP (ref 2–14)
NEUTROPHILS # BLD AUTO: 4.4 K/UL — SIGNIFICANT CHANGE UP (ref 1.8–7.4)
NEUTROPHILS NFR BLD AUTO: 61.1 % — SIGNIFICANT CHANGE UP (ref 43–77)
PLATELET # BLD AUTO: 248 K/UL — SIGNIFICANT CHANGE UP (ref 150–400)
POTASSIUM SERPL-MCNC: 3.3 MMOL/L — LOW (ref 3.5–5.3)
POTASSIUM SERPL-SCNC: 3.3 MMOL/L — LOW (ref 3.5–5.3)
PROT SERPL-MCNC: 7.1 G/DL — SIGNIFICANT CHANGE UP (ref 6–8.3)
RBC # BLD: 4.42 M/UL — SIGNIFICANT CHANGE UP (ref 3.8–5.2)
RBC # FLD: 12.7 % — SIGNIFICANT CHANGE UP (ref 10.3–14.5)
SODIUM SERPL-SCNC: 143 MMOL/L — SIGNIFICANT CHANGE UP (ref 135–145)
WBC # BLD: 7.2 K/UL — SIGNIFICANT CHANGE UP (ref 3.8–10.5)
WBC # FLD AUTO: 7.2 K/UL — SIGNIFICANT CHANGE UP (ref 3.8–10.5)

## 2018-08-05 PROCEDURE — 70450 CT HEAD/BRAIN W/O DYE: CPT | Mod: 26

## 2018-08-05 PROCEDURE — 71045 X-RAY EXAM CHEST 1 VIEW: CPT | Mod: 26

## 2018-08-05 PROCEDURE — 99285 EMERGENCY DEPT VISIT HI MDM: CPT | Mod: 25

## 2018-08-05 PROCEDURE — 93010 ELECTROCARDIOGRAM REPORT: CPT

## 2018-08-05 RX ORDER — FAMOTIDINE 10 MG/ML
20 INJECTION INTRAVENOUS ONCE
Qty: 0 | Refills: 0 | Status: COMPLETED | OUTPATIENT
Start: 2018-08-05 | End: 2018-08-05

## 2018-08-05 RX ORDER — ACETAMINOPHEN 500 MG
975 TABLET ORAL ONCE
Qty: 0 | Refills: 0 | Status: COMPLETED | OUTPATIENT
Start: 2018-08-05 | End: 2018-08-05

## 2018-08-05 RX ORDER — METOCLOPRAMIDE HCL 10 MG
10 TABLET ORAL ONCE
Qty: 0 | Refills: 0 | Status: COMPLETED | OUTPATIENT
Start: 2018-08-05 | End: 2018-08-05

## 2018-08-05 RX ADMIN — FAMOTIDINE 20 MILLIGRAM(S): 10 INJECTION INTRAVENOUS at 23:57

## 2018-08-05 RX ADMIN — Medication 30 MILLILITER(S): at 23:57

## 2018-08-05 RX ADMIN — Medication 10 MILLIGRAM(S): at 23:51

## 2018-08-05 NOTE — ED PROVIDER NOTE - PROGRESS NOTE DETAILS
Patient now telling staff and family members that she is sick of her pain, will go home and take all her blood pressure medicine in an effort to harm herself, family notes that she has had self-harm in the past with vertical scars on her wrists. Paging psych for evaluation. -SM Cleared by psych, headache beginning to improve, BP also better, in process of second dose of headache meds. Will cont to reassess. Awaiting trop 2. Delta trop normal, headache has returned to 10/10. Will admit and call neuro. Patient agrees. -SM Dr Thapa accepts admission and would like us to call house neurology. Paging now. -SM Dr Mendoza accepts admission and would like us to call house neurology. Paging now. Attempted to call patient's family to inform them of admission, no answer at emergency contact number -SM

## 2018-08-05 NOTE — ED PROVIDER NOTE - OBJECTIVE STATEMENT
Patient is 63 y F with PMH migraines/seizures on topamax, htn, renal artery stenosis  presenting with hypertension, headache, chest pain.  Chest pain is dull, gradual onset, non radiating, thinks pain is associated with all meds she takes, has been occurring for 2 weeks.   Headaches are different from migraines, b/l temples, no trouble walking, no recent visual changes.   Did not take BP meds today, has been noncompliant with meds bc they give her headaches, took only propranolol today and yesterday.   Had recent admission for back pain, found to have thoracic compression fracture, ALEXANDER on ultrasound, was evaluated for treatment resistant hypertension    PMD: Tristan SIMPSON  Nsg: Trent  Neuro: none  Endo: Xi Cardoso  Psych/pain: Long White  ROS: Denies fever, palpitations, chills, recent sickness, cough, SOB, chest pain, abdominal pain, n/v/d/c, dysuria, hematuria, rash, new joint aches, sick contacts, and recent travel.

## 2018-08-05 NOTE — ED PROVIDER NOTE - ATTENDING CONTRIBUTION TO CARE
slow onset ha similar to prior migraine, daily cp when pt gets pills. at least 1 wk.  nl neuro exam though +/- Romberg. rrr, clear lungs, well appearing in no distress. 2+ 4/4. from neck.  do not suspect acs / dissection / pe. do not suspect sah / cva / dissection. will check for esr.

## 2018-08-05 NOTE — ED ADULT NURSE NOTE - OBJECTIVE STATEMENT
Pt presents to the ED with headache, chest pain and elevated BP. Pt AXOX3 with family at the bedside. Pt reports chest pain mid sternal nonradiating beginning yesterday, pt reports frequent headaches she states "are because of my blood pressure" pt hypertensive on arrival to the ED, pt reports some blurry vision, pt on home BP meds, unsure of which she takes regularly , medications brought in bag include 2 beta blockers, pt states she did take med today, but " not all of them" Pt presents to the ED with headache, chest pain and elevated BP. Pt AXOX3 with family at the bedside. Pt reports chest pain mid sternal nonradiating beginning yesterday, pt reports frequent headaches she states "are because of my blood pressure", reports headaches location as "all over" the head, pt hypertensive on arrival to the ED, pt reports some blurry vision, pt on home BP meds, unsure of which she takes regularly , medications brought in bag include 2 beta blockers, pt states she did take med today, but " not all of them", pt moving all extremities with strength, no shortness of breath, no nausea vomiting or diarrhea. No fevers or chills at home. Abdomen soft and nontender. Pt family report poor PO intake.

## 2018-08-05 NOTE — ED ADULT NURSE NOTE - NSIMPLEMENTINTERV_GEN_ALL_ED
Implemented All Universal Safety Interventions:  [place] to call system. Call bell, personal items and telephone within reach. Instruct patient to call for assistance. Room bathroom lighting operational. Non-slip footwear when patient is off stretcher. Physically safe environment: no spills, clutter or unnecessary equipment. Stretcher in lowest position, wheels locked, appropriate side rails in place.

## 2018-08-05 NOTE — ED PROVIDER NOTE - MEDICAL DECISION MAKING DETAILS
Headache ddx includes migraine, 2/2 hypertension, temporal arteritis, uncertain if related to dysmetria on exam. Chest pain is atypical for cardiac and occurring after taking pills, pill esophagitis possible. BP currently downtrending, will monitor, noncompliant with most BP meds at home. Plan: cxr, ekg, troponin, esr, pain control, ct head, reassess

## 2018-08-05 NOTE — ED PROVIDER NOTE - PHYSICAL EXAMINATION
Gen: NAD, AOx3  Head: NCAT  HEENT: PERRL, oral mucosa moist, normal conjunctiva  Lung: CTAB, no respiratory distress  CV: rrr, no murmurs, Normal perfusion  Abd: soft, NTND, no CVA tenderness  MSK: No edema, no visible deformities  Neuro: CN intact, motor and sensation intact, +dysmetria to LUE and LLE, + slight pronator drift on LUE  Skin: No rash   Psych: normal affect

## 2018-08-06 DIAGNOSIS — I10 ESSENTIAL (PRIMARY) HYPERTENSION: ICD-10-CM

## 2018-08-06 DIAGNOSIS — G43.909 MIGRAINE, UNSPECIFIED, NOT INTRACTABLE, WITHOUT STATUS MIGRAINOSUS: ICD-10-CM

## 2018-08-06 DIAGNOSIS — R10.13 EPIGASTRIC PAIN: ICD-10-CM

## 2018-08-06 DIAGNOSIS — F32.9 MAJOR DEPRESSIVE DISORDER, SINGLE EPISODE, UNSPECIFIED: ICD-10-CM

## 2018-08-06 DIAGNOSIS — Z29.9 ENCOUNTER FOR PROPHYLACTIC MEASURES, UNSPECIFIED: ICD-10-CM

## 2018-08-06 DIAGNOSIS — N17.9 ACUTE KIDNEY FAILURE, UNSPECIFIED: ICD-10-CM

## 2018-08-06 LAB
ANION GAP SERPL CALC-SCNC: 13 MMOL/L — SIGNIFICANT CHANGE UP (ref 5–17)
BUN SERPL-MCNC: 21 MG/DL — SIGNIFICANT CHANGE UP (ref 7–23)
CALCIUM SERPL-MCNC: 9.5 MG/DL — SIGNIFICANT CHANGE UP (ref 8.4–10.5)
CHLORIDE SERPL-SCNC: 103 MMOL/L — SIGNIFICANT CHANGE UP (ref 96–108)
CO2 SERPL-SCNC: 23 MMOL/L — SIGNIFICANT CHANGE UP (ref 22–31)
CREAT SERPL-MCNC: 1.37 MG/DL — HIGH (ref 0.5–1.3)
ERYTHROCYTE [SEDIMENTATION RATE] IN BLOOD: 20 MM/HR — SIGNIFICANT CHANGE UP (ref 0–20)
GLUCOSE SERPL-MCNC: 117 MG/DL — HIGH (ref 70–99)
HCT VFR BLD CALC: 44.1 % — SIGNIFICANT CHANGE UP (ref 34.5–45)
HGB BLD-MCNC: 14.9 G/DL — SIGNIFICANT CHANGE UP (ref 11.5–15.5)
MCHC RBC-ENTMCNC: 30.1 PG — SIGNIFICANT CHANGE UP (ref 27–34)
MCHC RBC-ENTMCNC: 33.9 GM/DL — SIGNIFICANT CHANGE UP (ref 32–36)
MCV RBC AUTO: 88.8 FL — SIGNIFICANT CHANGE UP (ref 80–100)
PLATELET # BLD AUTO: 271 K/UL — SIGNIFICANT CHANGE UP (ref 150–400)
POTASSIUM SERPL-MCNC: 4 MMOL/L — SIGNIFICANT CHANGE UP (ref 3.5–5.3)
POTASSIUM SERPL-SCNC: 4 MMOL/L — SIGNIFICANT CHANGE UP (ref 3.5–5.3)
RBC # BLD: 4.96 M/UL — SIGNIFICANT CHANGE UP (ref 3.8–5.2)
RBC # FLD: 12.6 % — SIGNIFICANT CHANGE UP (ref 10.3–14.5)
SODIUM SERPL-SCNC: 139 MMOL/L — SIGNIFICANT CHANGE UP (ref 135–145)
TROPONIN T, HIGH SENSITIVITY RESULT: 12 NG/L — SIGNIFICANT CHANGE UP (ref 0–51)
TROPONIN T, HIGH SENSITIVITY RESULT: 14 NG/L — SIGNIFICANT CHANGE UP (ref 0–51)
WBC # BLD: 5.2 K/UL — SIGNIFICANT CHANGE UP (ref 3.8–10.5)
WBC # FLD AUTO: 5.2 K/UL — SIGNIFICANT CHANGE UP (ref 3.8–10.5)

## 2018-08-06 PROCEDURE — 90792 PSYCH DIAG EVAL W/MED SRVCS: CPT | Mod: GT

## 2018-08-06 PROCEDURE — 99223 1ST HOSP IP/OBS HIGH 75: CPT | Mod: AI

## 2018-08-06 RX ORDER — NIFEDIPINE 30 MG
30 TABLET, EXTENDED RELEASE 24 HR ORAL DAILY
Qty: 0 | Refills: 0 | Status: DISCONTINUED | OUTPATIENT
Start: 2018-08-06 | End: 2018-08-07

## 2018-08-06 RX ORDER — METOCLOPRAMIDE HCL 10 MG
10 TABLET ORAL EVERY 6 HOURS
Qty: 0 | Refills: 0 | Status: DISCONTINUED | OUTPATIENT
Start: 2018-08-06 | End: 2018-08-07

## 2018-08-06 RX ORDER — HYDROMORPHONE HYDROCHLORIDE 2 MG/ML
1 INJECTION INTRAMUSCULAR; INTRAVENOUS; SUBCUTANEOUS ONCE
Qty: 0 | Refills: 0 | Status: DISCONTINUED | OUTPATIENT
Start: 2018-08-06 | End: 2018-08-06

## 2018-08-06 RX ORDER — POTASSIUM CHLORIDE 20 MEQ
40 PACKET (EA) ORAL ONCE
Qty: 0 | Refills: 0 | Status: COMPLETED | OUTPATIENT
Start: 2018-08-06 | End: 2018-08-06

## 2018-08-06 RX ORDER — ACETAMINOPHEN 500 MG
650 TABLET ORAL EVERY 6 HOURS
Qty: 0 | Refills: 0 | Status: DISCONTINUED | OUTPATIENT
Start: 2018-08-06 | End: 2018-08-10

## 2018-08-06 RX ORDER — DIAZEPAM 5 MG
2.5 TABLET ORAL AT BEDTIME
Qty: 0 | Refills: 0 | Status: DISCONTINUED | OUTPATIENT
Start: 2018-08-06 | End: 2018-08-10

## 2018-08-06 RX ORDER — HEPARIN SODIUM 5000 [USP'U]/ML
5000 INJECTION INTRAVENOUS; SUBCUTANEOUS EVERY 8 HOURS
Qty: 0 | Refills: 0 | Status: DISCONTINUED | OUTPATIENT
Start: 2018-08-06 | End: 2018-08-10

## 2018-08-06 RX ORDER — DEXAMETHASONE 0.5 MG/5ML
10 ELIXIR ORAL ONCE
Qty: 0 | Refills: 0 | Status: COMPLETED | OUTPATIENT
Start: 2018-08-06 | End: 2018-08-06

## 2018-08-06 RX ORDER — PROPRANOLOL HCL 160 MG
0 CAPSULE, EXTENDED RELEASE 24HR ORAL
Qty: 0 | Refills: 0 | COMMUNITY

## 2018-08-06 RX ORDER — TOPIRAMATE 25 MG
100 TABLET ORAL
Qty: 0 | Refills: 0 | Status: DISCONTINUED | OUTPATIENT
Start: 2018-08-06 | End: 2018-08-10

## 2018-08-06 RX ORDER — METOCLOPRAMIDE HCL 10 MG
10 TABLET ORAL EVERY 6 HOURS
Qty: 0 | Refills: 0 | Status: DISCONTINUED | OUTPATIENT
Start: 2018-08-06 | End: 2018-08-06

## 2018-08-06 RX ORDER — KETOROLAC TROMETHAMINE 30 MG/ML
30 SYRINGE (ML) INJECTION EVERY 6 HOURS
Qty: 0 | Refills: 0 | Status: DISCONTINUED | OUTPATIENT
Start: 2018-08-06 | End: 2018-08-07

## 2018-08-06 RX ORDER — VALPROIC ACID (AS SODIUM SALT) 250 MG/5ML
500 SOLUTION, ORAL ORAL ONCE
Qty: 0 | Refills: 0 | Status: COMPLETED | OUTPATIENT
Start: 2018-08-06 | End: 2018-08-06

## 2018-08-06 RX ORDER — POLYETHYLENE GLYCOL 3350 17 G/17G
17 POWDER, FOR SOLUTION ORAL DAILY
Qty: 0 | Refills: 0 | Status: DISCONTINUED | OUTPATIENT
Start: 2018-08-06 | End: 2018-08-10

## 2018-08-06 RX ORDER — DOCUSATE SODIUM 100 MG
100 CAPSULE ORAL THREE TIMES A DAY
Qty: 0 | Refills: 0 | Status: DISCONTINUED | OUTPATIENT
Start: 2018-08-06 | End: 2018-08-10

## 2018-08-06 RX ORDER — DIPHENHYDRAMINE HCL 50 MG
25 CAPSULE ORAL EVERY 6 HOURS
Qty: 0 | Refills: 0 | Status: DISCONTINUED | OUTPATIENT
Start: 2018-08-06 | End: 2018-08-07

## 2018-08-06 RX ORDER — METOCLOPRAMIDE HCL 10 MG
10 TABLET ORAL ONCE
Qty: 0 | Refills: 0 | Status: COMPLETED | OUTPATIENT
Start: 2018-08-06 | End: 2018-08-06

## 2018-08-06 RX ORDER — PANTOPRAZOLE SODIUM 20 MG/1
40 TABLET, DELAYED RELEASE ORAL
Qty: 0 | Refills: 0 | Status: DISCONTINUED | OUTPATIENT
Start: 2018-08-06 | End: 2018-08-10

## 2018-08-06 RX ADMIN — Medication 650 MILLIGRAM(S): at 22:50

## 2018-08-06 RX ADMIN — Medication 650 MILLIGRAM(S): at 11:56

## 2018-08-06 RX ADMIN — Medication 30 MILLIGRAM(S): at 10:59

## 2018-08-06 RX ADMIN — Medication 100 MILLIGRAM(S): at 18:39

## 2018-08-06 RX ADMIN — Medication 30 MILLIGRAM(S): at 18:38

## 2018-08-06 RX ADMIN — Medication 30 MILLIGRAM(S): at 19:36

## 2018-08-06 RX ADMIN — Medication 25 MILLIGRAM(S): at 11:57

## 2018-08-06 RX ADMIN — Medication 10 MILLIGRAM(S): at 13:28

## 2018-08-06 RX ADMIN — Medication 975 MILLIGRAM(S): at 00:10

## 2018-08-06 RX ADMIN — Medication 25 MILLIGRAM(S): at 18:41

## 2018-08-06 RX ADMIN — Medication 102 MILLIGRAM(S): at 02:20

## 2018-08-06 RX ADMIN — HEPARIN SODIUM 5000 UNIT(S): 5000 INJECTION INTRAVENOUS; SUBCUTANEOUS at 18:41

## 2018-08-06 RX ADMIN — HYDROMORPHONE HYDROCHLORIDE 1 MILLIGRAM(S): 2 INJECTION INTRAMUSCULAR; INTRAVENOUS; SUBCUTANEOUS at 08:29

## 2018-08-06 RX ADMIN — Medication 10 MILLIGRAM(S): at 18:41

## 2018-08-06 RX ADMIN — HYDROMORPHONE HYDROCHLORIDE 1 MILLIGRAM(S): 2 INJECTION INTRAMUSCULAR; INTRAVENOUS; SUBCUTANEOUS at 07:04

## 2018-08-06 RX ADMIN — Medication 40 MILLIEQUIVALENT(S): at 07:05

## 2018-08-06 RX ADMIN — Medication 27.5 MILLIGRAM(S): at 02:49

## 2018-08-06 RX ADMIN — Medication 10 MILLIGRAM(S): at 02:20

## 2018-08-06 RX ADMIN — Medication 30 MILLIGRAM(S): at 13:28

## 2018-08-06 RX ADMIN — Medication 650 MILLIGRAM(S): at 21:56

## 2018-08-06 NOTE — ED BEHAVIORAL HEALTH ASSESSMENT NOTE - RISK ASSESSMENT
no imminent safety risk; no current SI/HI or plans, No CAH, no loss of interest, pt Is future oriented, has outpatient care

## 2018-08-06 NOTE — ED BEHAVIORAL HEALTH ASSESSMENT NOTE - DESCRIPTION
requesting pain medication for her headache  Patient was initially cooperative but became agitated when medication was denied. Patient made suicidal statement to RN and family.     00:29 - Decadron IVPB 10mg in 50mL of 5% Dextrose; Reglan 10mg IVP, and Depacon (Valproate Sodium) IVPB 500mg in 50mL NS. (ORDERED BUT NOT YET ADMINISTERED)  00:10 – Tylenol 975mg PO  23:57 – Maalox 30mL PO, Reglan 10mg IVP, Pepcid 20mg migraines, headaches, HTN, seizures lives with family, disabled x30 years due to migraines

## 2018-08-06 NOTE — H&P ADULT - PROBLEM SELECTOR PLAN 4
Cont pt on home sertraline for anxiety/depression, and diazepam for control fo anxiety  -some concern that patient has had self-harm behavior; and pt does state that when her headaches are very bad, hat she wants to harm herself and not live - psych consult called for evaluation; will f/u psych recs

## 2018-08-06 NOTE — CONSULT NOTE ADULT - SUBJECTIVE AND OBJECTIVE BOX
CHIEF COMPLAINT: HA    HPI:  Patient reports new onset headache today. States it began suddenly approximately 4hrs ago. Reports multiple migraines in the past. Multiple visits for headaches. Patient reports tinnitus aura. Reports photo/phonophobia and associated nausea without vomiting. Patient states Ofirmev and reglan was ineffective. States Benadryl does not work. Requesting Dilaudid by name.      REVIEW OF SYSTEMS:    Constitutional: No fever, weight loss, or fatigue  Eyes: No eye pain, visual disturbances, or discharge  ENMT:  No difficulty hearing, tinnitus, vertigo; No sinus or throat pain  Neck: No pain or stiffness  Respiratory: No cough, wheezing, or shortness of breath  Cardiovascular: No chest pain, palpitations, or leg swelling  Gastrointestinal: No abdominal pain, nausea, vomiting, diarrhea or constipation.   Genitourinary: No dysuria, frequency, hematuria, or incontinence  Neurological: As per HPI  Skin: No itching, burning, rashes, or lesions   Endocrine: No heat or cold intolerance; No hair loss  Musculoskeletal: No joint pain or swelling; No muscle, back, or extremity pain  Psychiatric: No depression, anxiety, mood swings, or difficulty sleeping  Heme/Lymph: No easy bruising or bleeding; No enlarged glands    PAST MEDICAL & SURGICAL HISTORY:  Shingles  Pericarditis  Osteoporosis  Seizure disorder  Migraines  No significant past surgical history    MEDICATIONS  (STANDING):    MEDICATIONS  (PRN):    Allergies    penicillin (Anaphylaxis)    Intolerances      FAMILY HISTORY:      SOCIAL HISTORY:    Living Situation:    Occupation:    Tobacco:    Alcohol:    Drugs:      VITAL SIGNS:  Vital Signs Last 24 Hrs  T(C): 36.8 (06 Aug 2018 06:38), Max: 36.8 (05 Aug 2018 22:18)  T(F): 98.2 (06 Aug 2018 06:38), Max: 98.2 (05 Aug 2018 22:18)  HR: 67 (06 Aug 2018 07:04) (53 - 67)  BP: 187/79 (06 Aug 2018 07:04) (156/75 - 209/98)  BP(mean): --  RR: 18 (06 Aug 2018 07:04) (16 - 18)  SpO2: 98% (06 Aug 2018 07:04) (98% - 100%)    PHYSICAL EXAMINATION:  General: Mild distress.   Eyes: Conjunctiva and sclera clear.  Cardiovascular: Regular rate and rhythm; S1 and S2 Normal; No murmurs, gallops or rubs.  Neurologic:  - Mental Status:  Alert, awake, oriented to person, place, and time; Speech is fluent with intact naming, repetition, and comprehension; Immediate recall is 3/3 words and delayed recall is 3/3 words at 5 minutes; Able to spell WORLD backwards and perform serial 7 subtraction; Able to read and write a sentence; Able to copy a cube; Good overall fund of knowledge.  - Cranial Nerves II-XII:    II:  Visual acuity is 20/20 bilaterally; Visual fields are full to confronation; Fundoscopic exam is normal with sharp discs; Pupils are equal, round, and reactive to light.  III, IV, VI:  Extraocular movements are intact without nystagmus.  V:  Facial sensation is intact in the V1-V3 distribution bilaterally.  VII:  Face is symmetric with normal eye closure and smile  VIII:  Hearing is intact to finger rub.  IX, X:  Uvula is midline and soft palate rises symmetrically  XI:  Head turning and shoulder shrug are intact.  XII:  Tongue protudes in the midline.  - Motor:  Strength is 5/5 throughout.  There is no prontator drift.  Normal muscle bulk and tone throughout.  - Reflexes:  2+ and symmetric at the biceps, triceps, brachioradialis, knees, and ankles.  Plantar responses flexor.  - Sensory:  Intact to light touch, pin prick, vibration, and joint-position sense throughout.  - Coordination:  Finger-nose-finger and heel-knee-shin intact without dysmetria.  Rapid alternating hand movements intact.  - Gait:   Normal steps, base, arm swing, and turning.  Heel and toe walking are normal.  Tandem gait is normal.  Romberg testing is negative.    LABS:                        13.3   7.2   )-----------( 248      ( 05 Aug 2018 23:23 )             39.6     05 Aug 2018 23:23    143    |  106    |  22     ----------------------------<  90     3.3     |  24     |  1.65     Ca    9.3        05 Aug 2018 23:23    TPro  7.1    /  Alb  3.8    /  TBili  0.3    /  DBili  x      /  AST  18     /  ALT  17     /  AlkPhos  72     05 Aug 2018 23:23      RADIOLOGY & ADDITIONAL STUDIES:      < from: CT Head No Cont (08.05.18 @ 23:34) >  IMPRESSION:   No acute findings on noncontrast CT ofthe brain when compared to prior   exam from 7/22/2018.  ZULMA CHRISTIE M.D., RADIOLOGY RESIDENT  This document has been electronically signed.  FLYNN PAZ D.O.; ATTENDING RADIOLOGIST  This document has been electronicallysigned. Aug  6 2018 12:35AM  < end of copied text >

## 2018-08-06 NOTE — ED BEHAVIORAL HEALTH ASSESSMENT NOTE - SUMMARY
63 years  female, single homosexual lives with her sister and nephew, with PMHx of seizure d/o, migraines, HTN, PPHx reported hx of depression and is currently on Zoloft 25 mg daily and she follows up with therapist and PCP, 1 prior self harm incident by cutting wrists in 1992, no recent hospitalizations or suicide attempt, no legal/substance hx, presenting for worsening headaches. Expressed suicidal ideation in context of pain.   Patient denying any active suicidal ideation with plan or intent and clarifies that statements of suicidal ideation said earlier in Emergency Department were in context of pain. Safety planning conducted with patient that patient to return to Emergency Department or call 911 if having plan/intent to harm self due to pain; patient agreed. Declines voluntary admission stating she does not have safety concerns and has outpatient care. Does not meet criteria for involuntary admission at this time.

## 2018-08-06 NOTE — CONSULT NOTE ADULT - ASSESSMENT
IMPRESSION:    Patient reports new onset headache today. States it began suddenly approximately 4hrs ago. Reports multiple migraines in the past. Multiple visits for headaches. Patient reports tinnitus aura. Reports photo/phonophobia and associated nausea without vomiting. Patient states Ofirmev and reglan was ineffective. States Benadryl does not work. Requesting Dilaudid by name.    Migrainous Headaches.     PLAN:  Reglan 10mg IV q6H  Benadryl 25mg IV q6H  Toradol 30mg IV q6H   If no improvement  Solumedrol 1mg IV x1  Depakote 1000mg IV x1 pushed over 15-30mins.   If no improvement, consider [facility] protocol and admission.
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
## 2018-08-06 NOTE — H&P ADULT - NSHPREVIEWOFSYSTEMS_GEN_ALL_CORE
+ headaches  + epigastric pain  + nausea    - chest pain, palpitations  - sob, cough  - fevers, chills, nt sweats  - bleeding, bruising  - allergies, rhinorrhea  - wt changes, temp intolerance  - abd pn, constipation, diarrhea  - vision changes, loss  - extremity weakness or loss of sensation  - new joint pains

## 2018-08-06 NOTE — H&P ADULT - PROBLEM SELECTOR PLAN 2
Pt having worsened headaches; neurology consulted and recommended toradol, benadryl, and reglan q6hrs for now for control of symptoms  -CT head negative, and case discussed with neurology team, they recommend no further inpt imaging at this time, but will cont to monitor pt  -cont topiramate

## 2018-08-06 NOTE — ED ADULT NURSE REASSESSMENT NOTE - NS ED NURSE REASSESS COMMENT FT1
Pt placed on one to one observation for stated thoughts of self harm, pt stated if she "did not get dilaudid. she would go home and kill herself", pt initially refused tylenol for pain control, family at bedside convinced her to take, pt told family she was going to go home and take many pills, MD Sebastian attending made aware, pt to receive psych consult and be observed by staff at all times until cleared.

## 2018-08-06 NOTE — ED ADULT NURSE REASSESSMENT NOTE - NS ED NURSE REASSESS COMMENT FT1
Pt received from MARIA LUISA [facility]Pneda in [facility]. Pt resting in bed, complaining of 9/10 headache at the moment, medicated with Dilaudid 1 mg. Pt admitted to medicine for hypertension. RTM. Awaits bed. Nephew at bedside.

## 2018-08-06 NOTE — H&P ADULT - PROBLEM SELECTOR PLAN 1
Pt presented with uncontrolled hypertension, due to medication noncompliance.  Pt has not been taking her home losartan and nifedipine, because she feels they make her headaches worse  -For now, will restart nifedipine, hold off on losartan given RUPERTO, and cont propranolol; will monitor BP's closely, and titrate meds as required, while monitoring for worsening headaches or any other side effects of the anti-hypertensives

## 2018-08-06 NOTE — H&P ADULT - HISTORY OF PRESENT ILLNESS
64 y/o f w/ PMH of HTN, migraines, GERD, anxiety p/w uncontrolled HTN and headaches.  Pt has been noncompliant with anti-hypertensive medications because she feels they cause her headaches to become exacerbated.  She was supposed to be on nifedipine and losartan, but has not been taking them, and takes only her propranolol.  In the ED she was found to be hypertensive.  Pt has history of migraines, but has been experiencing severe headaches, different from her typical migraines.  She woke up with this headache in the morning, they were persistent, dull, throbbing in nature, located at the top of her head and in the temporal regions.  Headaches were exacerbated by light.  Usually, her headaches are one sided, and have blurry vision, rhinorrhea, and she gets auras, but she did not experience any of those qualities with these headaches.  Pt also had some epigastric pain associated with food intake, and not associated with movement, that was relived wit maalox, and she believes to be related to her GERD.    In the ED, a CT head showed no acute findings, CXR was clear, and trop T were negative.

## 2018-08-06 NOTE — H&P ADULT - PROBLEM SELECTOR PLAN 5
Pt's Cr elevated from baseline; will avoid nephrotoxins, hold off on celecoxib and losartan Pt's Cr elevated from baseline; will avoid nephrotoxins, hold off on celecoxib and losartan; monitor Cr on BMP

## 2018-08-06 NOTE — H&P ADULT - NSHPLABSRESULTS_GEN_ALL_CORE
LABS personally reviewed by me: LABS personally reviewed by me:    WBC = 5.2  Hb = 14.9  Plt = 271    Trop T = 14    BUN = 22  Cr = 1.65      RADIOLOGY:    CT head = no acute pathology    CXR - personally reviewed by me = clear lungs      EKG: personally reviewed by me - NSR @ 65 bpm, no acute ST or T wave changes

## 2018-08-06 NOTE — H&P ADULT - PROBLEM SELECTOR PLAN 3
Pain appears to be related to GERD, as symptoms related to food consumption, and location of pain in epigastric region; EKG not suggestive of ACS, and trop T negative  -cont pt's protonix  -cont maalox prn

## 2018-08-06 NOTE — ED ADULT NURSE REASSESSMENT NOTE - NS ED NURSE REASSESS COMMENT FT1
Pt c/o h/a, med with Tylenol, pt requesting Dilauded, states nothing else will work .  Contacted ZEB Bird about request, Toradol has been ordered.

## 2018-08-06 NOTE — H&P ADULT - FAMILY HISTORY
Mother  Still living? Unknown  Family history of colon cancer in mother, Age at diagnosis: Age Unknown

## 2018-08-06 NOTE — H&P ADULT - NSHPPHYSICALEXAM_GEN_ALL_CORE
Gen - NAD  HEENT - perrla, eomi  CV - s1 and s2, rrr, no m/r/g  LUNGS - CTAB  Abd - soft, nd, nt, +bs  Ext - no c/c/e  Neuro - strength and sensation intact b/l, CN 2-12 intact Vital Signs Last 24 Hrs  T(C): 36.8 (06 Aug 2018 06:38), Max: 36.8 (05 Aug 2018 22:18)  T(F): 98.2 (06 Aug 2018 06:38), Max: 98.2 (05 Aug 2018 22:18)  HR: 67 (06 Aug 2018 07:04) (53 - 67)  BP: 187/79 (06 Aug 2018 07:04) (156/75 - 209/98)  BP(mean): --  RR: 18 (06 Aug 2018 07:04) (16 - 18)  SpO2: 98% (06 Aug 2018 07:04) (98% - 100%)    Gen - NAD  HEENT - perrla, eomi  CV - s1 and s2, rrr, no m/r/g  LUNGS - CTAB  Abd - soft, nd, nt, +bs  Ext - no c/c/e  Neuro - strength and sensation intact b/l, CN 2-12 intact

## 2018-08-06 NOTE — ED BEHAVIORAL HEALTH ASSESSMENT NOTE - OTHER PAST PSYCHIATRIC HISTORY (INCLUDE DETAILS REGARDING ONSET, COURSE OF ILLNESS, INPATIENT/OUTPATIENT TREATMENT)
receives outpatient treatment from PCP  unclear if prior psych admission (would have been in 1992 around wrist cutting incident)
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
## 2018-08-07 ENCOUNTER — TRANSCRIPTION ENCOUNTER (OUTPATIENT)
Age: 63
End: 2018-08-07

## 2018-08-07 LAB
ANION GAP SERPL CALC-SCNC: 11 MMOL/L — SIGNIFICANT CHANGE UP (ref 5–17)
BASOPHILS # BLD AUTO: 0.02 K/UL — SIGNIFICANT CHANGE UP (ref 0–0.2)
BASOPHILS NFR BLD AUTO: 0.3 % — SIGNIFICANT CHANGE UP (ref 0–2)
BUN SERPL-MCNC: 34 MG/DL — HIGH (ref 7–23)
CALCIUM SERPL-MCNC: 8.8 MG/DL — SIGNIFICANT CHANGE UP (ref 8.4–10.5)
CHLORIDE SERPL-SCNC: 104 MMOL/L — SIGNIFICANT CHANGE UP (ref 96–108)
CO2 SERPL-SCNC: 22 MMOL/L — SIGNIFICANT CHANGE UP (ref 22–31)
CREAT SERPL-MCNC: 1.81 MG/DL — HIGH (ref 0.5–1.3)
EOSINOPHIL # BLD AUTO: 0.19 K/UL — SIGNIFICANT CHANGE UP (ref 0–0.5)
EOSINOPHIL NFR BLD AUTO: 2.8 % — SIGNIFICANT CHANGE UP (ref 0–6)
GLUCOSE SERPL-MCNC: 91 MG/DL — SIGNIFICANT CHANGE UP (ref 70–99)
HCT VFR BLD CALC: 39.5 % — SIGNIFICANT CHANGE UP (ref 34.5–45)
HCV AB S/CO SERPL IA: 0.26 S/CO — SIGNIFICANT CHANGE UP
HCV AB SERPL-IMP: SIGNIFICANT CHANGE UP
HGB BLD-MCNC: 12.9 G/DL — SIGNIFICANT CHANGE UP (ref 11.5–15.5)
IMM GRANULOCYTES NFR BLD AUTO: 0.3 % — SIGNIFICANT CHANGE UP (ref 0–1.5)
LYMPHOCYTES # BLD AUTO: 2.07 K/UL — SIGNIFICANT CHANGE UP (ref 1–3.3)
LYMPHOCYTES # BLD AUTO: 30.6 % — SIGNIFICANT CHANGE UP (ref 13–44)
MAGNESIUM SERPL-MCNC: 2.3 MG/DL — SIGNIFICANT CHANGE UP (ref 1.6–2.6)
MCHC RBC-ENTMCNC: 29.6 PG — SIGNIFICANT CHANGE UP (ref 27–34)
MCHC RBC-ENTMCNC: 32.7 GM/DL — SIGNIFICANT CHANGE UP (ref 32–36)
MCV RBC AUTO: 90.6 FL — SIGNIFICANT CHANGE UP (ref 80–100)
MONOCYTES # BLD AUTO: 0.63 K/UL — SIGNIFICANT CHANGE UP (ref 0–0.9)
MONOCYTES NFR BLD AUTO: 9.3 % — SIGNIFICANT CHANGE UP (ref 2–14)
NEUTROPHILS # BLD AUTO: 3.83 K/UL — SIGNIFICANT CHANGE UP (ref 1.8–7.4)
NEUTROPHILS NFR BLD AUTO: 56.7 % — SIGNIFICANT CHANGE UP (ref 43–77)
PHOSPHATE SERPL-MCNC: 2.3 MG/DL — LOW (ref 2.5–4.5)
PLATELET # BLD AUTO: 246 K/UL — SIGNIFICANT CHANGE UP (ref 150–400)
POTASSIUM SERPL-MCNC: 3.9 MMOL/L — SIGNIFICANT CHANGE UP (ref 3.5–5.3)
POTASSIUM SERPL-SCNC: 3.9 MMOL/L — SIGNIFICANT CHANGE UP (ref 3.5–5.3)
RBC # BLD: 4.36 M/UL — SIGNIFICANT CHANGE UP (ref 3.8–5.2)
RBC # FLD: 13.7 % — SIGNIFICANT CHANGE UP (ref 10.3–14.5)
SODIUM SERPL-SCNC: 137 MMOL/L — SIGNIFICANT CHANGE UP (ref 135–145)
WBC # BLD: 6.76 K/UL — SIGNIFICANT CHANGE UP (ref 3.8–10.5)
WBC # FLD AUTO: 6.76 K/UL — SIGNIFICANT CHANGE UP (ref 3.8–10.5)

## 2018-08-07 PROCEDURE — 99222 1ST HOSP IP/OBS MODERATE 55: CPT

## 2018-08-07 PROCEDURE — 74176 CT ABD & PELVIS W/O CONTRAST: CPT | Mod: 26

## 2018-08-07 RX ORDER — NIFEDIPINE 30 MG
60 TABLET, EXTENDED RELEASE 24 HR ORAL DAILY
Qty: 0 | Refills: 0 | Status: DISCONTINUED | OUTPATIENT
Start: 2018-08-08 | End: 2018-08-08

## 2018-08-07 RX ORDER — NIFEDIPINE 30 MG
30 TABLET, EXTENDED RELEASE 24 HR ORAL ONCE
Qty: 0 | Refills: 0 | Status: COMPLETED | OUTPATIENT
Start: 2018-08-07 | End: 2018-08-08

## 2018-08-07 RX ORDER — PROPRANOLOL HCL 160 MG
10 CAPSULE, EXTENDED RELEASE 24HR ORAL
Qty: 0 | Refills: 0 | Status: DISCONTINUED | OUTPATIENT
Start: 2018-08-07 | End: 2018-08-08

## 2018-08-07 RX ORDER — HYDRALAZINE HCL 50 MG
10 TABLET ORAL ONCE
Qty: 0 | Refills: 0 | Status: COMPLETED | OUTPATIENT
Start: 2018-08-07 | End: 2018-08-07

## 2018-08-07 RX ORDER — HYDROMORPHONE HYDROCHLORIDE 2 MG/ML
1 INJECTION INTRAMUSCULAR; INTRAVENOUS; SUBCUTANEOUS ONCE
Qty: 0 | Refills: 0 | Status: DISCONTINUED | OUTPATIENT
Start: 2018-08-07 | End: 2018-08-07

## 2018-08-07 RX ORDER — NIFEDIPINE 30 MG
30 TABLET, EXTENDED RELEASE 24 HR ORAL ONCE
Qty: 0 | Refills: 0 | Status: COMPLETED | OUTPATIENT
Start: 2018-08-07 | End: 2018-08-07

## 2018-08-07 RX ADMIN — Medication 30 MILLIGRAM(S): at 18:22

## 2018-08-07 RX ADMIN — Medication 30 MILLIGRAM(S): at 01:04

## 2018-08-07 RX ADMIN — HEPARIN SODIUM 5000 UNIT(S): 5000 INJECTION INTRAVENOUS; SUBCUTANEOUS at 21:18

## 2018-08-07 RX ADMIN — Medication 25 MILLIGRAM(S): at 06:01

## 2018-08-07 RX ADMIN — Medication 30 MILLIGRAM(S): at 01:30

## 2018-08-07 RX ADMIN — Medication 30 MILLIGRAM(S): at 06:02

## 2018-08-07 RX ADMIN — HEPARIN SODIUM 5000 UNIT(S): 5000 INJECTION INTRAVENOUS; SUBCUTANEOUS at 01:04

## 2018-08-07 RX ADMIN — Medication 10 MILLIGRAM(S): at 18:21

## 2018-08-07 RX ADMIN — Medication 100 MILLIGRAM(S): at 06:01

## 2018-08-07 RX ADMIN — Medication 650 MILLIGRAM(S): at 13:48

## 2018-08-07 RX ADMIN — PANTOPRAZOLE SODIUM 40 MILLIGRAM(S): 20 TABLET, DELAYED RELEASE ORAL at 06:02

## 2018-08-07 RX ADMIN — Medication 650 MILLIGRAM(S): at 22:55

## 2018-08-07 RX ADMIN — Medication 10 MILLIGRAM(S): at 21:10

## 2018-08-07 RX ADMIN — Medication 10 MILLIGRAM(S): at 01:04

## 2018-08-07 RX ADMIN — Medication 100 MILLIGRAM(S): at 17:07

## 2018-08-07 RX ADMIN — Medication 30 MILLIGRAM(S): at 06:52

## 2018-08-07 RX ADMIN — Medication 25 MILLIGRAM(S): at 01:03

## 2018-08-07 RX ADMIN — Medication 10 MILLIGRAM(S): at 06:02

## 2018-08-07 RX ADMIN — Medication 650 MILLIGRAM(S): at 14:18

## 2018-08-07 RX ADMIN — HEPARIN SODIUM 5000 UNIT(S): 5000 INJECTION INTRAVENOUS; SUBCUTANEOUS at 13:48

## 2018-08-07 NOTE — DISCHARGE NOTE ADULT - MEDICATION SUMMARY - MEDICATIONS TO TAKE
I will START or STAY ON the medications listed below when I get home from the hospital:    Fioricet oral capsule  -- 1 cap(s) by mouth every 4 hours, As Needed  -- Indication: For Migraines    losartan 25 mg oral tablet  -- 1 tab(s) by mouth once a day  -- Indication: For Htn    topiramate 100 mg oral tablet  -- 1 tab(s) by mouth 2 times a day  -- Indication: For Migraines    sertraline 25 mg oral tablet  -- 1 tab(s) by mouth once a day  -- Indication: For Depression    carvedilol 25 mg oral tablet  -- 1 tab(s) by mouth every 12 hours  -- Indication: For Htn    NIFEdipine 90 mg oral tablet, extended release  -- 1 tab(s) by mouth 2 times a day at 6am and 6pm  -- Indication: For Htn    hydroCHLOROthiazide 25 mg oral tablet  -- 1 tab(s) by mouth once a day  -- Indication: For Htn    polyethylene glycol 3350 oral powder for reconstitution  -- 17 gram(s) by mouth once a day, As needed, Constipation  -- Indication: For Constipation    Colace 100 mg oral capsule  -- 1 cap(s) by mouth 3 times a day, As Needed  -- Indication: For constipation    pantoprazole 40 mg oral delayed release tablet  -- 1 tab(s) by mouth once a day (before a meal)  -- Indication: For GERD (gastroesophageal reflux disease)    hydrALAZINE 50 mg oral tablet  -- 1 tab(s) by mouth 3 times a day  -- Indication: For HTN

## 2018-08-07 NOTE — PROGRESS NOTE ADULT - ASSESSMENT
Patient reports new onset headache today. States it began suddenly approximately 4hrs ago. Reports multiple migraines in the past. Multiple visits for headaches. Patient reports tinnitus aura. Reports photo/phonophobia and associated nausea without vomiting. Patient states Ofirmev and reglan was ineffective. States Benadryl does not work. Requesting Dilaudid by name.    Migrainous Headaches.     PLAN:  Patient states that her headache has now resolved. She can continue the topiramate that she is on as an outpatient. She feels that the nifedipine is helping as well. No further neurological testing needed as an inpatient. Can follow-up with neurology as outpatient for follow-up evaluation. Patient reports new onset headache today. States it began suddenly approximately 4hrs ago. Reports multiple migraines in the past. Multiple visits for headaches. Patient reports tinnitus aura. Reports photo/phonophobia and associated nausea without vomiting. Patient states Ofirmev and reglan was ineffective. States Benadryl does not work. Requesting Dilaudid by name.    Migrainous Headaches.     PLAN:  Patient states that her headache has now resolved. She can continue the topiramate that she is on as an outpatient. She feels that the nifedipine is helping as well. No further neurological testing needed as an inpatient. Can follow-up with neurology as outpatient for follow-up.

## 2018-08-07 NOTE — DISCHARGE NOTE ADULT - ADDITIONAL INSTRUCTIONS
f/u appointment with your therapist-Madelyn, as scheduled on 8/15/18  f/u with PMD - CALVIN Rodriguez in one week

## 2018-08-07 NOTE — DISCHARGE NOTE ADULT - PATIENT PORTAL LINK FT
You can access the [facility] Patient Portal, offered by [facility], by registering with the following website: [email or web address]

## 2018-08-07 NOTE — PROGRESS NOTE ADULT - ASSESSMENT
· Assessment		  62 y/o f w/ PMH of HTN, migraines, GERD, anxiety p/w uncontrolled HTN and headaches    Hypertension.Pt presented with uncontrolled hypertension, due to medication noncompliance.  Pt has not been taking her home losartan and nifedipine, because she feels they make her headaches worse  Increase Prtocardia to 60 mg daily.     Migraines.neurology consulted and recommended toradol, benadryl, and reglan q6hrs for now for control of symptoms  -cont topiramate. Will hold Toradol 2 to RUPERTO.    Epigastric pain.  Pain appears to be related to GERD, as symptoms related to food consumption, and location of pain in epigastric region; EKG not suggestive of ACS, and trop T negative  -cont pt's protonix  -cont maalox prn.       Depression.  Cont pt on home sertraline for anxiety/depression, and diazepam for control fo anxiety  -some concern that patient has had self-harm behavior; and pt does state that when her headaches are very bad, hat she wants to harm herself and not live - psych consult called for evaluation; will f/u psych recs.       RUPERTO (acute kidney injury).  Pt's Cr elevated from baseline; will avoid nephrotoxins, hold off on celecoxib and losartan; monitor Cr on BMP. Nephrology evaluation Dr. Bhat.      Need for prophylactic measure. hep sq.    Naldo Mendoza MD pager [phone] · Assessment		  64 y/o f w/ PMH of HTN, migraines, GERD, anxiety p/w uncontrolled HTN and headaches    Hypertension.Pt presented with uncontrolled hypertension, due to medication noncompliance.  Pt has not been taking her home losartan and nifedipine, because she feels they make her headaches worse  Increase Prtocardia to 60 mg daily.     Migraines.neurology consulted and recommended toradol, benadryl, and reglan q6hrs for now for control of symptoms  -cont topiramate. Will hold Toradol 2 to RUPERTO.    Epigastric pain.  Pain appears to be related to GERD, as symptoms related to food consumption, and location of pain in epigastric region; EKG not suggestive of ACS, and trop T negative  -cont pt's protonix  -cont maalox prn.       Depression.  Cont pt on home sertraline for anxiety/depression, and diazepam for control fo anxiety  -some concern that patient has had self-harm behavior; and pt does state that when her headaches are very bad, hat she wants to harm herself and not live - psych consult called for evaluation; will f/u psych recs.       RUPERTO (acute kidney injury).  Pt's Cr elevated from baseline; will avoid nephrotoxins, hold off on celecoxib and losartan; monitor Cr on BMP. Nephrology evaluation Dr. Bhat.    Patient gives a history of a "above kidney mass " from a "doctor". Will check cortisol, CT abdomen/pelvis.      Need for prophylactic measure. hep sq.    Naldo Mendoza MD pager [phone]

## 2018-08-07 NOTE — DISCHARGE NOTE ADULT - CARE PROVIDER_API CALL
PMD - JAVAD Rodriguez,   Phone: (   )    -  Fax: (   )    - Tristan Frederick), Family Medicine  [address] [place] [address]  Phone: [phone]  Fax: [phone]    Edwin Hernandez), Cardiovascular Disease; Internal Medicine; Interventional Cardiology; Nuclear Cardiology  [address] [facility] [address]  [place] [address]  Phone: [phone]  Fax: [phone]

## 2018-08-07 NOTE — DISCHARGE NOTE ADULT - SECONDARY DIAGNOSIS.
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

## 2018-08-07 NOTE — DISCHARGE NOTE ADULT - MEDICATION SUMMARY - MEDICATIONS TO STOP TAKING
I will STOP taking the medications listed below when I get home from the hospital:    propranolol 10 mg oral tablet  -- 1 tab(s) by mouth 2 times a day

## 2018-08-07 NOTE — PROGRESS NOTE ADULT - SUBJECTIVE AND OBJECTIVE BOX
Neurology Follow up note        Subjective:Interval History - No events overnight. Patient states that she feels she is doing much better and that her headache has improved. She has no acute complaints at this time.     Objective:   Vital Signs Last 24 Hrs  T(C): 36.9 (07 Aug 2018 13:33), Max: 37.5 (07 Aug 2018 04:24)  T(F): 98.5 (07 Aug 2018 13:33), Max: 99.5 (07 Aug 2018 04:24)  HR: 66 (07 Aug 2018 13:33) (66 - 78)  BP: 171/63 (07 Aug 2018 13:33) (144/78 - 177/80)  BP(mean): --  RR: 18 (07 Aug 2018 13:33) (16 - 18)  SpO2: 97% (07 Aug 2018 13:33) (95% - 99%)    General Exam:   General appearance: No acute distress                 Cardiovascular: Pedal dorsalis pulses intact bilaterally    Neurological Exam:  Mental Status: Orientated to self, date and place.  Attention intact.  No dysarthria, aphasia or neglect.  Knowledge intact.  Registration intact.  Short and long term memory grossly intact.      Cranial Nerves:  PERRL, EOMI, VFF, no nystagmus or diplopia.  No APD.    CN V1-3 intact to light touch and pinprick.  No facial asymmetry.  Hearing intact to finger rub bilaterally.  Tongue, uvula and palate midline.  Sternocleidomastoid and Trapezius intact bilaterally.    Motor:   Tone: normal.                  Strength: intact throughout      Sensation: intact to light touch    08-07    137  |  104  |  34<H>  ----------------------------<  91  3.9   |  22  |  1.81<H>    Ca    8.8      07 Aug 2018 06:49  Phos  2.3     08-07  Mg     2.3     08-07    TPro  7.1  /  Alb  3.8  /  TBili  0.3  /  DBili  x   /  AST  18  /  ALT  17  /  AlkPhos  72  08-05 08-07    137  |  104  |  34<H>  ----------------------------<  91  3.9   |  22  |  1.81<H>    Ca    8.8      07 Aug 2018 06:49  Phos  2.3     08-07  Mg     2.3     08-07    TPro  7.1  /  Alb  3.8  /  TBili  0.3  /  DBili  x   /  AST  18  /  ALT  17  /  AlkPhos  72  08-05    LIVER FUNCTIONS - ( 05 Aug 2018 23:23 )  Alb: 3.8 g/dL / Pro: 7.1 g/dL / ALK PHOS: 72 U/L / ALT: 17 U/L / AST: 18 U/L / GGT: x                                 12.9   6.76  )-----------( 246      ( 07 Aug 2018 07:54 )             39.5         MEDICATIONS  (STANDING):  heparin  Injectable 5000 Unit(s) SubCutaneous every 8 hours  NIFEdipine XL 30 milliGRAM(s) Oral daily  pantoprazole    Tablet 40 milliGRAM(s) Oral before breakfast  topiramate 100 milliGRAM(s) Oral two times a day    MEDICATIONS  (PRN):  acetaminophen   Tablet. 650 milliGRAM(s) Oral every 6 hours PRN Mild Pain (1 - 3)  aluminum hydroxide/magnesium hydroxide/simethicone Suspension 30 milliLiter(s) Oral every 6 hours PRN Dyspepsia  diazepam    Tablet 2.5 milliGRAM(s) Oral at bedtime PRN anxiety  docusate sodium 100 milliGRAM(s) Oral three times a day PRN Constipation  polyethylene glycol 3350 17 Gram(s) Oral daily PRN Constipation

## 2018-08-07 NOTE — DISCHARGE NOTE ADULT - PLAN OF CARE
HOME CARE INSTRUCTIONS  Avoid foods and drinks that make your symptoms worse, such as:   Caffeine or alcoholic drinks.   Chocolate.   Peppermint or mint flavorings.  Garlic and onions.  Spicy foods.   Citrus fruits, such as oranges, lennox, or limes.   Avoid lying down for the 3 hours prior to your bedtime or prior to taking a nap.  Eat small, frequent meals instead of large meals.   Raise the head of your bed 6 to 8 inches with wood blocks to help you sleep. Extra pillows will not help.  Only take over-the-counter or prescription medicines for pain, discomfort, or fever as directed by your caregiver.   SEEK IMMEDIATE MEDICAL CARE IF:  You have pain in your arms, neck, jaw, teeth, or back.   Your pain increases or changes in intensity or duration.   You develop nausea, vomiting, or sweating (diaphoresis).  You develop shortness of breath, or you faint.  Your vomit is green, yellow, black, or looks like coffee grounds or blood.  Your stool is red, bloody, or black.  These symptoms could be signs of other problems, such as heart disease, gastric bleeding, or esophageal bleeding. Depressed mood disorder 2/2 pain syndrome as diagnosed by Psychiatry team  -Continue with Zoloft as prescribed  -f/u appointment with your therapist-Madelyn, as scheduled on 8/15/18 Goal SBP<140, DBP<90 Low salt diet  Activity as tolerated.  Take all medication as prescribed.  Follow up with your medical doctor for routine blood pressure monitoring at your next visit.  Notify your doctor if you have any of the following symptoms:   Dizziness, Lightheadedness, Blurry vision, Headache, Chest pain, Shortness of breath -Continue with anti-emetics and abortive migraine medications as prescribed  -Follow-up with PMD-Dr. Tristan Perez [facility] in one week  HOME CARE INSTRUCTIONS  Ask your caregiver about specific rehydration instructions.  Drink enough fluids to keep your urine clear or pale yellow.   SEEK MEDICAL CARE IF:  You have abdominal pain and it increases or stays in one area (localizes).  You have a rash, stiff neck, or severe headache.   You are irritable, sleepy, or difficult to awaken.  You are weak, dizzy, or extremely thirsty.  SEEK IMMEDIATE MEDICAL CARE IF:  You are unable to keep fluids down or you get worse despite treatment.  You have frequent episodes of vomiting or diarrhea.  You have blood or green matter (bile) in your vomit.  You have blood in your stool or your stool looks black and tarry.  You have not urinated in 6 to 8 hours, or you have only urinated a small amount of very dark urine.  You have a fever.  You faint Continue blood pressure medications, follow up with nephrology Follow up with PMD for management

## 2018-08-07 NOTE — PROGRESS NOTE ADULT - SUBJECTIVE AND OBJECTIVE BOX
Patient is a 63y old  Female who presents with a chief complaint of Hypertension (07 Aug 2018 11:52)      SUBJECTIVE / OVERNIGHT EVENTS: Comfortable without new complaints. Headache improving   Review of Systems  chest pain no  palpitations no  sob no  nausea no  headache no    MEDICATIONS  (STANDING):  heparin  Injectable 5000 Unit(s) SubCutaneous every 8 hours  pantoprazole    Tablet 40 milliGRAM(s) Oral before breakfast  propranolol 10 milliGRAM(s) Oral two times a day  topiramate 100 milliGRAM(s) Oral two times a day    MEDICATIONS  (PRN):  acetaminophen   Tablet. 650 milliGRAM(s) Oral every 6 hours PRN Mild Pain (1 - 3)  aluminum hydroxide/magnesium hydroxide/simethicone Suspension 30 milliLiter(s) Oral every 6 hours PRN Dyspepsia  diazepam    Tablet 2.5 milliGRAM(s) Oral at bedtime PRN anxiety  docusate sodium 100 milliGRAM(s) Oral three times a day PRN Constipation  polyethylene glycol 3350 17 Gram(s) Oral daily PRN Constipation      Vital Signs Last 24 Hrs  T(C): 36.9 (07 Aug 2018 13:33), Max: 37.5 (07 Aug 2018 04:24)  T(F): 98.5 (07 Aug 2018 13:33), Max: 99.5 (07 Aug 2018 04:24)  HR: 66 (07 Aug 2018 13:33) (66 - 78)  BP: 194/82 (07 Aug 2018 17:07) (144/78 - 194/82)  BP(mean): --  RR: 18 (07 Aug 2018 13:33) (17 - 18)  SpO2: 97% (07 Aug 2018 13:33) (95% - 98%)    PHYSICAL EXAM:  GENERAL: NAD, well-developed  HEAD:  Atraumatic, Normocephalic  EYES: EOMI, PERRLA, conjunctiva and sclera clear  NECK: Supple, No JVD  CHEST/LUNG: Clear to auscultation bilaterally; No wheeze  HEART: Regular rate and rhythm; No murmurs, rubs, or gallops  ABDOMEN: Soft, Nontender, Nondistended; Bowel sounds present  EXTREMITIES:  2+ Peripheral Pulses, No clubbing, cyanosis, or edema  PSYCH: AAOx3  NEUROLOGY: non-focal  SKIN: No rashes or lesions    LABS:                        12.9   6.76  )-----------( 246      ( 07 Aug 2018 07:54 )             39.5     08-07    137  |  104  |  34<H>  ----------------------------<  91  3.9   |  22  |  1.81<H>    Ca    8.8      07 Aug 2018 06:49  Phos  2.3     08-07  Mg     2.3     08-07    TPro  7.1  /  Alb  3.8  /  TBili  0.3  /  DBili  x   /  AST  18  /  ALT  17  /  AlkPhos  72  08-05                RADIOLOGY & ADDITIONAL TESTS:    Imaging Personally Reviewed:    Consultant(s) Notes Reviewed:      Care Discussed with Consultants/Other Providers:

## 2018-08-07 NOTE — DISCHARGE NOTE ADULT - PROVIDER TOKENS
FREE:[LAST:[PMD - CALVIN Rodriguez],PHONE:[(   )    -],FAX:[(   )    -]] TOKEN:'[id number]',TOKEN:'[id number]'

## 2018-08-07 NOTE — DISCHARGE NOTE ADULT - HOSPITAL COURSE
64 y/o f w/ PMH of HTN, migraines, GERD, anxiety p/w uncontrolled HTN and headaches.  Pt has been noncompliant with anti-hypertensive medications because she feels they cause her headaches to become exacerbated.  She was supposed to be on nifedipine and losartan, but has not been taking them, and takes only her propranolol.  In the ED she was found to be hypertensive. In the ED, a CT head showed no acute findings, CXR was clear, and trop T were negative. During hospital course, difficulty controlling BP, was written for oral medications, but has had worsening vomiting and has not been able to hold any pills down with SBP>220. Pt was seen by MICU & Renal, Neuro, and Cardiology teams, repeat CTH negative, medications were readjusted, with initial doses IV and then transitioned to PO, with good BP control, and improved pain control. Pt deemed clinically stable for disposition home, non-focal neurological exam, with PMD-f/u with Dr. Tristan Perez from [facility] in one week, and scheduled psychotherapist appointment with Madelyn on 8/15/18.

## 2018-08-07 NOTE — DISCHARGE NOTE ADULT - CARE PLAN
Principal Discharge DX:	Uncontrolled hypertension  Goal:	Goal SBP<140, DBP<90  Assessment and plan of treatment:	Low salt diet  Activity as tolerated.  Take all medication as prescribed.  Follow up with your medical doctor for routine blood pressure monitoring at your next visit.  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 Tristan Perez [facility] in one week  HOME CARE INSTRUCTIONS  Ask your caregiver about specific rehydration instructions.  Drink enough fluids to keep your urine clear or pale yellow.   SEEK MEDICAL CARE IF:  You have abdominal pain and it increases or stays in one area (localizes).  You have a rash, stiff neck, or severe headache.   You are irritable, sleepy, or difficult to awaken.  You are weak, dizzy, or extremely thirsty.  SEEK IMMEDIATE MEDICAL CARE IF:  You are unable to keep fluids down or you get worse despite treatment.  You have frequent episodes of vomiting or diarrhea.  You have blood or green matter (bile) in your vomit.  You have blood in your stool or your stool looks black and tarry.  You have not urinated in 6 to 8 hours, or you have only urinated a small amount of very dark urine.  You have a fever.  You faint  Secondary Diagnosis:	GERD (gastroesophageal reflux disease)  Assessment and plan of treatment:	HOME CARE INSTRUCTIONS  Avoid foods and drinks that make your symptoms worse, such as:   Caffeine or alcoholic drinks.   Chocolate.   Peppermint or mint flavorings.  Garlic and onions.  Spicy foods.   Citrus fruits, such as oranges, lennox, or limes.   Avoid lying down for the 3 hours prior to your bedtime or prior to taking a nap.  Eat small, frequent meals instead of large meals.   Raise the head of your bed 6 to 8 inches with wood blocks to help you sleep. Extra pillows will not help.  Only take over-the-counter or prescription medicines for pain, discomfort, or fever as directed by your caregiver.   SEEK IMMEDIATE MEDICAL CARE IF:  You have pain in your arms, neck, jaw, teeth, or back.   Your pain increases or changes in intensity or duration.   You develop nausea, vomiting, or sweating (diaphoresis).  You develop shortness of breath, or you faint.  Your vomit is green, yellow, black, or looks like coffee grounds or blood.  Your stool is red, bloody, or black.  These symptoms could be signs of other problems, such as heart disease, gastric bleeding, or esophageal bleeding.  Secondary Diagnosis:	Depression  Assessment and plan of treatment:	Depressed mood disorder 2/2 pain syndrome as diagnosed by Psychiatry team  -Continue with Zoloft as prescribed  -f/u appointment with your therapist-Madelyn, as scheduled on 8/15/18 Principal Discharge DX:	Uncontrolled hypertension  Goal:	Goal SBP<140, DBP<90  Assessment and plan of treatment:	Low salt diet  Activity as tolerated.  Take all medication as prescribed.  Follow up with your medical doctor for routine blood pressure monitoring at your next visit.  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 Tristan Perez [facility] in one week  HOME CARE INSTRUCTIONS  Ask your caregiver about specific rehydration instructions.  Drink enough fluids to keep your urine clear or pale yellow.   SEEK MEDICAL CARE IF:  You have abdominal pain and it increases or stays in one area (localizes).  You have a rash, stiff neck, or severe headache.   You are irritable, sleepy, or difficult to awaken.  You are weak, dizzy, or extremely thirsty.  SEEK IMMEDIATE MEDICAL CARE IF:  You are unable to keep fluids down or you get worse despite treatment.  You have frequent episodes of vomiting or diarrhea.  You have blood or green matter (bile) in your vomit.  You have blood in your stool or your stool looks black and tarry.  You have not urinated in 6 to 8 hours, or you have only urinated a small amount of very dark urine.  You have a fever.  You faint  Secondary Diagnosis:	GERD (gastroesophageal reflux disease)  Assessment and plan of treatment:	HOME CARE INSTRUCTIONS  Avoid foods and drinks that make your symptoms worse, such as:   Caffeine or alcoholic drinks.   Chocolate.   Peppermint or mint flavorings.  Garlic and onions.  Spicy foods.   Citrus fruits, such as oranges, lennox, or limes.   Avoid lying down for the 3 hours prior to your bedtime or prior to taking a nap.  Eat small, frequent meals instead of large meals.   Raise the head of your bed 6 to 8 inches with wood blocks to help you sleep. Extra pillows will not help.  Only take over-the-counter or prescription medicines for pain, discomfort, or fever as directed by your caregiver.   SEEK IMMEDIATE MEDICAL CARE IF:  You have pain in your arms, neck, jaw, teeth, or back.   Your pain increases or changes in intensity or duration.   You develop nausea, vomiting, or sweating (diaphoresis).  You develop shortness of breath, or you faint.  Your vomit is green, yellow, black, or looks like coffee grounds or blood.  Your stool is red, bloody, or black.  These symptoms could be signs of other problems, such as heart disease, gastric bleeding, or esophageal bleeding.  Secondary Diagnosis:	Depression  Assessment and plan of treatment:	Depressed mood disorder 2/2 pain syndrome as diagnosed by Psychiatry team  -Continue with Zoloft as prescribed  -f/u appointment with your therapist-Madelyn, as scheduled on 8/15/18  Secondary Diagnosis:	Renal artery stenosis  Assessment and plan of treatment:	Continue blood pressure medications, follow up with nephrology Principal Discharge DX:	Uncontrolled hypertension  Goal:	Goal SBP<140, DBP<90  Assessment and plan of treatment:	Low salt diet  Activity as tolerated.  Take all medication as prescribed.  Follow up with your medical doctor for routine blood pressure monitoring at your next visit.  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 Tristan Perez [facility] in one week  HOME CARE INSTRUCTIONS  Ask your caregiver about specific rehydration instructions.  Drink enough fluids to keep your urine clear or pale yellow.   SEEK MEDICAL CARE IF:  You have abdominal pain and it increases or stays in one area (localizes).  You have a rash, stiff neck, or severe headache.   You are irritable, sleepy, or difficult to awaken.  You are weak, dizzy, or extremely thirsty.  SEEK IMMEDIATE MEDICAL CARE IF:  You are unable to keep fluids down or you get worse despite treatment.  You have frequent episodes of vomiting or diarrhea.  You have blood or green matter (bile) in your vomit.  You have blood in your stool or your stool looks black and tarry.  You have not urinated in 6 to 8 hours, or you have only urinated a small amount of very dark urine.  You have a fever.  You faint  Secondary Diagnosis:	GERD (gastroesophageal reflux disease)  Assessment and plan of treatment:	HOME CARE INSTRUCTIONS  Avoid foods and drinks that make your symptoms worse, such as:   Caffeine or alcoholic drinks.   Chocolate.   Peppermint or mint flavorings.  Garlic and onions.  Spicy foods.   Citrus fruits, such as oranges, lennox, or limes.   Avoid lying down for the 3 hours prior to your bedtime or prior to taking a nap.  Eat small, frequent meals instead of large meals.   Raise the head of your bed 6 to 8 inches with wood blocks to help you sleep. Extra pillows will not help.  Only take over-the-counter or prescription medicines for pain, discomfort, or fever as directed by your caregiver.   SEEK IMMEDIATE MEDICAL CARE IF:  You have pain in your arms, neck, jaw, teeth, or back.   Your pain increases or changes in intensity or duration.   You develop nausea, vomiting, or sweating (diaphoresis).  You develop shortness of breath, or you faint.  Your vomit is green, yellow, black, or looks like coffee grounds or blood.  Your stool is red, bloody, or black.  These symptoms could be signs of other problems, such as heart disease, gastric bleeding, or esophageal bleeding.  Secondary Diagnosis:	Depression  Assessment and plan of treatment:	Depressed mood disorder 2/2 pain syndrome as diagnosed by Psychiatry team  -Continue with Zoloft as prescribed  -f/u appointment with your therapist-Madelyn, as scheduled on 8/15/18  Secondary Diagnosis:	Renal artery stenosis  Assessment and plan of treatment:	Continue blood pressure medications, follow up with nephrology  Secondary Diagnosis:	Adrenal nodule Principal Discharge DX:	Uncontrolled hypertension  Goal:	Goal SBP<140, DBP<90  Assessment and plan of treatment:	Low salt diet  Activity as tolerated.  Take all medication as prescribed.  Follow up with your medical doctor for routine blood pressure monitoring at your next visit.  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 Tristan Perez [facility] in one week  HOME CARE INSTRUCTIONS  Ask your caregiver about specific rehydration instructions.  Drink enough fluids to keep your urine clear or pale yellow.   SEEK MEDICAL CARE IF:  You have abdominal pain and it increases or stays in one area (localizes).  You have a rash, stiff neck, or severe headache.   You are irritable, sleepy, or difficult to awaken.  You are weak, dizzy, or extremely thirsty.  SEEK IMMEDIATE MEDICAL CARE IF:  You are unable to keep fluids down or you get worse despite treatment.  You have frequent episodes of vomiting or diarrhea.  You have blood or green matter (bile) in your vomit.  You have blood in your stool or your stool looks black and tarry.  You have not urinated in 6 to 8 hours, or you have only urinated a small amount of very dark urine.  You have a fever.  You faint  Secondary Diagnosis:	GERD (gastroesophageal reflux disease)  Assessment and plan of treatment:	HOME CARE INSTRUCTIONS  Avoid foods and drinks that make your symptoms worse, such as:   Caffeine or alcoholic drinks.   Chocolate.   Peppermint or mint flavorings.  Garlic and onions.  Spicy foods.   Citrus fruits, such as oranges, lennox, or limes.   Avoid lying down for the 3 hours prior to your bedtime or prior to taking a nap.  Eat small, frequent meals instead of large meals.   Raise the head of your bed 6 to 8 inches with wood blocks to help you sleep. Extra pillows will not help.  Only take over-the-counter or prescription medicines for pain, discomfort, or fever as directed by your caregiver.   SEEK IMMEDIATE MEDICAL CARE IF:  You have pain in your arms, neck, jaw, teeth, or back.   Your pain increases or changes in intensity or duration.   You develop nausea, vomiting, or sweating (diaphoresis).  You develop shortness of breath, or you faint.  Your vomit is green, yellow, black, or looks like coffee grounds or blood.  Your stool is red, bloody, or black.  These symptoms could be signs of other problems, such as heart disease, gastric bleeding, or esophageal bleeding.  Secondary Diagnosis:	Depression  Assessment and plan of treatment:	Depressed mood disorder 2/2 pain syndrome as diagnosed by Psychiatry team  -Continue with Zoloft as prescribed  -f/u appointment with your therapist-Madelyn, as scheduled on 8/15/18  Secondary Diagnosis:	Renal artery stenosis  Assessment and plan of treatment:	Continue blood pressure medications, follow up with nephrology  Secondary Diagnosis:	Adrenal nodule  Assessment and plan of treatment:	Follow up with PMD for management

## 2018-08-08 DIAGNOSIS — G43.519 PERSISTENT MIGRAINE AURA WITHOUT CEREBRAL INFARCTION, INTRACTABLE, WITHOUT STATUS MIGRAINOSUS: ICD-10-CM

## 2018-08-08 DIAGNOSIS — R11.2 NAUSEA WITH VOMITING, UNSPECIFIED: ICD-10-CM

## 2018-08-08 DIAGNOSIS — I10 ESSENTIAL (PRIMARY) HYPERTENSION: ICD-10-CM

## 2018-08-08 LAB
ANION GAP SERPL CALC-SCNC: 14 MMOL/L — SIGNIFICANT CHANGE UP (ref 5–17)
APPEARANCE UR: ABNORMAL
BILIRUB UR-MCNC: NEGATIVE — SIGNIFICANT CHANGE UP
BUN SERPL-MCNC: 22 MG/DL — SIGNIFICANT CHANGE UP (ref 7–23)
CALCIUM SERPL-MCNC: 9.7 MG/DL — SIGNIFICANT CHANGE UP (ref 8.4–10.5)
CHLORIDE SERPL-SCNC: 104 MMOL/L — SIGNIFICANT CHANGE UP (ref 96–108)
CHLORIDE UR-SCNC: 57 MMOL/L — SIGNIFICANT CHANGE UP
CO2 SERPL-SCNC: 23 MMOL/L — SIGNIFICANT CHANGE UP (ref 22–31)
COLOR SPEC: YELLOW — SIGNIFICANT CHANGE UP
CREAT ?TM UR-MCNC: 52 MG/DL — SIGNIFICANT CHANGE UP
CREAT SERPL-MCNC: 1.07 MG/DL — SIGNIFICANT CHANGE UP (ref 0.5–1.3)
DIFF PNL FLD: NEGATIVE — SIGNIFICANT CHANGE UP
GLUCOSE SERPL-MCNC: 131 MG/DL — HIGH (ref 70–99)
GLUCOSE UR QL: NEGATIVE MG/DL — SIGNIFICANT CHANGE UP
KETONES UR-MCNC: NEGATIVE — SIGNIFICANT CHANGE UP
LEUKOCYTE ESTERASE UR-ACNC: NEGATIVE — SIGNIFICANT CHANGE UP
MAGNESIUM SERPL-MCNC: 2.2 MG/DL — SIGNIFICANT CHANGE UP (ref 1.6–2.6)
NITRITE UR-MCNC: NEGATIVE — SIGNIFICANT CHANGE UP
PH UR: 7.5 — SIGNIFICANT CHANGE UP (ref 5–8)
PHOSPHATE SERPL-MCNC: 2.8 MG/DL — SIGNIFICANT CHANGE UP (ref 2.5–4.5)
POTASSIUM SERPL-MCNC: 3.6 MMOL/L — SIGNIFICANT CHANGE UP (ref 3.5–5.3)
POTASSIUM SERPL-SCNC: 3.6 MMOL/L — SIGNIFICANT CHANGE UP (ref 3.5–5.3)
POTASSIUM UR-SCNC: 43 MMOL/L — SIGNIFICANT CHANGE UP
PROT UR-MCNC: 300 MG/DL
SODIUM SERPL-SCNC: 141 MMOL/L — SIGNIFICANT CHANGE UP (ref 135–145)
SODIUM UR-SCNC: 90 MMOL/L — SIGNIFICANT CHANGE UP
SP GR SPEC: 1.02 — SIGNIFICANT CHANGE UP (ref 1.01–1.02)
UROBILINOGEN FLD QL: NEGATIVE MG/DL — SIGNIFICANT CHANGE UP

## 2018-08-08 PROCEDURE — 99233 SBSQ HOSP IP/OBS HIGH 50: CPT | Mod: GC

## 2018-08-08 PROCEDURE — 99231 SBSQ HOSP IP/OBS SF/LOW 25: CPT

## 2018-08-08 PROCEDURE — 70450 CT HEAD/BRAIN W/O DYE: CPT | Mod: 26

## 2018-08-08 PROCEDURE — 93010 ELECTROCARDIOGRAM REPORT: CPT

## 2018-08-08 RX ORDER — LABETALOL HCL 100 MG
10 TABLET ORAL ONCE
Qty: 0 | Refills: 0 | Status: COMPLETED | OUTPATIENT
Start: 2018-08-08 | End: 2018-08-08

## 2018-08-08 RX ORDER — HYDROCHLOROTHIAZIDE 25 MG
25 TABLET ORAL DAILY
Qty: 0 | Refills: 0 | Status: DISCONTINUED | OUTPATIENT
Start: 2018-08-08 | End: 2018-08-10

## 2018-08-08 RX ORDER — SUMATRIPTAN SUCCINATE 4 MG/.5ML
6 INJECTION, SOLUTION SUBCUTANEOUS DAILY
Qty: 0 | Refills: 0 | Status: DISCONTINUED | OUTPATIENT
Start: 2018-08-08 | End: 2018-08-08

## 2018-08-08 RX ORDER — HYDROMORPHONE HYDROCHLORIDE 2 MG/ML
0.5 INJECTION INTRAMUSCULAR; INTRAVENOUS; SUBCUTANEOUS ONCE
Qty: 0 | Refills: 0 | Status: DISCONTINUED | OUTPATIENT
Start: 2018-08-08 | End: 2018-08-08

## 2018-08-08 RX ORDER — KETOROLAC TROMETHAMINE 30 MG/ML
30 SYRINGE (ML) INJECTION ONCE
Qty: 0 | Refills: 0 | Status: DISCONTINUED | OUTPATIENT
Start: 2018-08-08 | End: 2018-08-08

## 2018-08-08 RX ORDER — DIPHENHYDRAMINE HCL 50 MG
25 CAPSULE ORAL ONCE
Qty: 0 | Refills: 0 | Status: COMPLETED | OUTPATIENT
Start: 2018-08-08 | End: 2018-08-08

## 2018-08-08 RX ORDER — CARVEDILOL PHOSPHATE 80 MG/1
25 CAPSULE, EXTENDED RELEASE ORAL EVERY 12 HOURS
Qty: 0 | Refills: 0 | Status: DISCONTINUED | OUTPATIENT
Start: 2018-08-08 | End: 2018-08-10

## 2018-08-08 RX ORDER — METOCLOPRAMIDE HCL 10 MG
5 TABLET ORAL EVERY 8 HOURS
Qty: 0 | Refills: 0 | Status: DISCONTINUED | OUTPATIENT
Start: 2018-08-08 | End: 2018-08-10

## 2018-08-08 RX ORDER — SODIUM CHLORIDE 9 MG/ML
1000 INJECTION, SOLUTION INTRAVENOUS
Qty: 0 | Refills: 0 | Status: DISCONTINUED | OUTPATIENT
Start: 2018-08-08 | End: 2018-08-09

## 2018-08-08 RX ORDER — ONDANSETRON 8 MG/1
4 TABLET, FILM COATED ORAL ONCE
Qty: 0 | Refills: 0 | Status: COMPLETED | OUTPATIENT
Start: 2018-08-08 | End: 2018-08-08

## 2018-08-08 RX ORDER — HYDRALAZINE HCL 50 MG
10 TABLET ORAL ONCE
Qty: 0 | Refills: 0 | Status: COMPLETED | OUTPATIENT
Start: 2018-08-08 | End: 2018-08-08

## 2018-08-08 RX ORDER — NIFEDIPINE 30 MG
60 TABLET, EXTENDED RELEASE 24 HR ORAL
Qty: 0 | Refills: 0 | Status: DISCONTINUED | OUTPATIENT
Start: 2018-08-08 | End: 2018-08-08

## 2018-08-08 RX ORDER — CARVEDILOL PHOSPHATE 80 MG/1
12.5 CAPSULE, EXTENDED RELEASE ORAL EVERY 12 HOURS
Qty: 0 | Refills: 0 | Status: DISCONTINUED | OUTPATIENT
Start: 2018-08-08 | End: 2018-08-08

## 2018-08-08 RX ORDER — METOCLOPRAMIDE HCL 10 MG
10 TABLET ORAL ONCE
Qty: 0 | Refills: 0 | Status: COMPLETED | OUTPATIENT
Start: 2018-08-08 | End: 2018-08-08

## 2018-08-08 RX ORDER — HYDRALAZINE HCL 50 MG
25 TABLET ORAL THREE TIMES A DAY
Qty: 0 | Refills: 0 | Status: DISCONTINUED | OUTPATIENT
Start: 2018-08-08 | End: 2018-08-09

## 2018-08-08 RX ORDER — NIFEDIPINE 30 MG
90 TABLET, EXTENDED RELEASE 24 HR ORAL
Qty: 0 | Refills: 0 | Status: DISCONTINUED | OUTPATIENT
Start: 2018-08-08 | End: 2018-08-10

## 2018-08-08 RX ORDER — HYDROMORPHONE HYDROCHLORIDE 2 MG/ML
1 INJECTION INTRAMUSCULAR; INTRAVENOUS; SUBCUTANEOUS ONCE
Qty: 0 | Refills: 0 | Status: DISCONTINUED | OUTPATIENT
Start: 2018-08-08 | End: 2018-08-08

## 2018-08-08 RX ADMIN — SODIUM CHLORIDE 75 MILLILITER(S): 9 INJECTION, SOLUTION INTRAVENOUS at 18:28

## 2018-08-08 RX ADMIN — Medication 650 MILLIGRAM(S): at 10:39

## 2018-08-08 RX ADMIN — HYDROMORPHONE HYDROCHLORIDE 0.5 MILLIGRAM(S): 2 INJECTION INTRAMUSCULAR; INTRAVENOUS; SUBCUTANEOUS at 07:32

## 2018-08-08 RX ADMIN — Medication 10 MILLIGRAM(S): at 02:20

## 2018-08-08 RX ADMIN — Medication 25 MILLIGRAM(S): at 18:11

## 2018-08-08 RX ADMIN — HEPARIN SODIUM 5000 UNIT(S): 5000 INJECTION INTRAVENOUS; SUBCUTANEOUS at 05:34

## 2018-08-08 RX ADMIN — Medication 30 MILLIGRAM(S): at 01:59

## 2018-08-08 RX ADMIN — Medication 30 MILLIGRAM(S): at 18:15

## 2018-08-08 RX ADMIN — HYDROMORPHONE HYDROCHLORIDE 0.5 MILLIGRAM(S): 2 INJECTION INTRAMUSCULAR; INTRAVENOUS; SUBCUTANEOUS at 07:31

## 2018-08-08 RX ADMIN — PANTOPRAZOLE SODIUM 40 MILLIGRAM(S): 20 TABLET, DELAYED RELEASE ORAL at 05:34

## 2018-08-08 RX ADMIN — HYDROMORPHONE HYDROCHLORIDE 1 MILLIGRAM(S): 2 INJECTION INTRAMUSCULAR; INTRAVENOUS; SUBCUTANEOUS at 11:57

## 2018-08-08 RX ADMIN — CARVEDILOL PHOSPHATE 12.5 MILLIGRAM(S): 80 CAPSULE, EXTENDED RELEASE ORAL at 08:27

## 2018-08-08 RX ADMIN — Medication 90 MILLIGRAM(S): at 18:10

## 2018-08-08 RX ADMIN — ONDANSETRON 4 MILLIGRAM(S): 8 TABLET, FILM COATED ORAL at 18:05

## 2018-08-08 RX ADMIN — HYDROMORPHONE HYDROCHLORIDE 1 MILLIGRAM(S): 2 INJECTION INTRAMUSCULAR; INTRAVENOUS; SUBCUTANEOUS at 01:49

## 2018-08-08 RX ADMIN — Medication 30 MILLIGRAM(S): at 18:30

## 2018-08-08 RX ADMIN — HEPARIN SODIUM 5000 UNIT(S): 5000 INJECTION INTRAVENOUS; SUBCUTANEOUS at 21:30

## 2018-08-08 RX ADMIN — Medication 100 MILLIGRAM(S): at 22:46

## 2018-08-08 RX ADMIN — Medication 30 MILLIGRAM(S): at 21:40

## 2018-08-08 RX ADMIN — HYDROMORPHONE HYDROCHLORIDE 1 MILLIGRAM(S): 2 INJECTION INTRAMUSCULAR; INTRAVENOUS; SUBCUTANEOUS at 12:12

## 2018-08-08 RX ADMIN — Medication 25 MILLIGRAM(S): at 22:46

## 2018-08-08 RX ADMIN — HYDROMORPHONE HYDROCHLORIDE 0.5 MILLIGRAM(S): 2 INJECTION INTRAMUSCULAR; INTRAVENOUS; SUBCUTANEOUS at 05:49

## 2018-08-08 RX ADMIN — Medication 25 MILLIGRAM(S): at 14:38

## 2018-08-08 RX ADMIN — Medication 10 MILLIGRAM(S): at 16:23

## 2018-08-08 RX ADMIN — Medication 25 MILLIGRAM(S): at 16:23

## 2018-08-08 RX ADMIN — Medication 5 MILLIGRAM(S): at 21:31

## 2018-08-08 RX ADMIN — HEPARIN SODIUM 5000 UNIT(S): 5000 INJECTION INTRAVENOUS; SUBCUTANEOUS at 14:38

## 2018-08-08 RX ADMIN — Medication 60 MILLIGRAM(S): at 10:23

## 2018-08-08 RX ADMIN — Medication 100 MILLIGRAM(S): at 05:34

## 2018-08-08 RX ADMIN — HYDROMORPHONE HYDROCHLORIDE 0.5 MILLIGRAM(S): 2 INJECTION INTRAMUSCULAR; INTRAVENOUS; SUBCUTANEOUS at 07:46

## 2018-08-08 RX ADMIN — Medication 30 MILLIGRAM(S): at 22:47

## 2018-08-08 RX ADMIN — Medication 650 MILLIGRAM(S): at 00:37

## 2018-08-08 RX ADMIN — Medication 10 MILLIGRAM(S): at 10:21

## 2018-08-08 RX ADMIN — Medication 10 MILLIGRAM(S): at 02:45

## 2018-08-08 RX ADMIN — CARVEDILOL PHOSPHATE 25 MILLIGRAM(S): 80 CAPSULE, EXTENDED RELEASE ORAL at 18:15

## 2018-08-08 RX ADMIN — Medication 650 MILLIGRAM(S): at 10:24

## 2018-08-08 RX ADMIN — HYDROMORPHONE HYDROCHLORIDE 1 MILLIGRAM(S): 2 INJECTION INTRAMUSCULAR; INTRAVENOUS; SUBCUTANEOUS at 00:45

## 2018-08-08 NOTE — CONSULT NOTE ADULT - SUBJECTIVE AND OBJECTIVE BOX
REASON FOR ADMISSION: Patient is a 63y old  Female who presents with a chief complaint of Hypertension (07 Aug 2018 11:52)    HISTORY OF PRESENT ILLNESS: 62 y/o f w/ PMH of HTN, migraines, GERD, anxiety p/w uncontrolled HTN and headaches likely noncompliant complicated with RUPERTO for which nephrology is consulted;  Patient had an episode of severe hypertension last night          REVIEW OF SYSTEMS: 12 review of systems negative except what is stated in HPI    PAST MEDICAL & SURGICAL HISTORY:  Shingles  Pericarditis  Osteoporosis  Seizure disorder  Migraines  No significant past surgical history      SOCIAL HISTORY: no smoking, drinking or drugs    FAMILY HISTORY: FAMILY HISTORY:  Family history of colon cancer in mother (Mother)      ALLERGIES: Allergies    penicillin (Anaphylaxis)    Intolerances        Home Medications:  acetaminophen 500 mg oral tablet: 2 tab(s) orally 2 times a week, As Needed  (06 Aug 2018 17:48)  Fioricet oral capsule: 1 cap(s) orally every 4 hours, As Needed (06 Aug 2018 17:48)  propranolol 10 mg oral tablet: 1 tab(s) orally 2 times a day (06 Aug 2018 17:48)  topiramate 100 mg oral tablet: 1 tab(s) orally 2 times a day (06 Aug 2018 17:48)      MEDICATIONS  (STANDING):  carvedilol 12.5 milliGRAM(s) Oral every 12 hours  heparin  Injectable 5000 Unit(s) SubCutaneous every 8 hours  NIFEdipine XL 60 milliGRAM(s) Oral <User Schedule>  pantoprazole    Tablet 40 milliGRAM(s) Oral before breakfast  topiramate 100 milliGRAM(s) Oral two times a day    MEDICATIONS  (PRN):  acetaminophen   Tablet. 650 milliGRAM(s) Oral every 6 hours PRN Mild Pain (1 - 3)  aluminum hydroxide/magnesium hydroxide/simethicone Suspension 30 milliLiter(s) Oral every 6 hours PRN Dyspepsia  diazepam    Tablet 2.5 milliGRAM(s) Oral at bedtime PRN anxiety  docusate sodium 100 milliGRAM(s) Oral three times a day PRN Constipation  metoclopramide Injectable 5 milliGRAM(s) IV Push every 8 hours PRN Nausea/Vomiting  polyethylene glycol 3350 17 Gram(s) Oral daily PRN Constipation      PHYSICAL EXAMINATION  VITAL SIGNS:  Vital Signs Last 24 Hrs  T(C): 36.7 (08 Aug 2018 06:26), Max: 37.2 (08 Aug 2018 05:13)  T(F): 98.1 (08 Aug 2018 06:26), Max: 98.9 (08 Aug 2018 05:13)  HR: 86 (08 Aug 2018 10:20) (59 - 86)  BP: 184/90 (08 Aug 2018 10:20) (159/80 - 211/91)  BP(mean): --  RR: 18 (08 Aug 2018 07:45) (17 - 18)  SpO2: 96% (08 Aug 2018 07:45) (96% - 98%)  I&O's Summary    07 Aug 2018 07:01  -  08 Aug 2018 07:00  --------------------------------------------------------  IN: 360 mL / OUT: 0 mL / NET: 360 mL    08 Aug 2018 07:01  -  08 Aug 2018 12:49  --------------------------------------------------------  IN: 100 mL / OUT: 0 mL / NET: 100 mL      GA: awake and alert, no distress  EYES: EOMI, clear conj, anicteric sclera  ENT: MMM, clear OP, neck supple  LUNGS: CTABL, no wrc, normal effort  HEART; RS1S2 normal, no mrg, no peripheral edema  ABD; Soft, NT/ND/BS+, no peritoneal signs  EXT; well perfused, no edema or cyanosis  ; no ryan  NEURO: AAO x 3, sensation and motor intact  SKIN: warm and dry, no rash on visible skin    LABORATORY DATA:                        12.9   6.76  )-----------( 246      ( 07 Aug 2018 07:54 )             39.5     08-08    141  |  104  |  22  ----------------------------<  131<H>  3.6   |  23  |  1.07    Ca    9.7      08 Aug 2018 11:23  Phos  2.8     08-08  Mg     2.2     08-08          IMAGING: Reviewed and as per records: pertinent positives:       ASSESSMENT: This is a       RECOMMENDATIONS:    Thank you for allowing me to participate on this patient's care. Please do not hesitate to call with questions.    I will follow with you.    Reynaldo Flowers MD   [facility]  [phone] REASON FOR ADMISSION: Patient is a 63y old  Female who presents with a chief complaint of Hypertension (07 Aug 2018 11:52)    HISTORY OF PRESENT ILLNESS: 64 y/o f w/ PMH of HTN, migraines, GERD, anxiety p/w uncontrolled HTN and headaches likely noncompliant complicated with RUPERTO for which nephrology is consulted;  Patient reports she was recently diagnosed with HTN; started on nefedipine and losartan 3 months ago but seems took them for a short period of time. The only medication she takes now is Propranonol, She has h/o migraine but has been controlled; now she is having this severe headache which seems different.; associated with nausea, some unsteady gait ( likely someone is pushing her)    Patient is started on nifedipine and carvediloll BP remains on the higher side      REVIEW OF SYSTEMS: 12 review of systems negative except what is stated in HPI    PAST MEDICAL & SURGICAL HISTORY:  Shingles  Pericarditis  Osteoporosis  Seizure disorder  Migraines  No significant past surgical history      SOCIAL HISTORY: no smoking, drinking or drugs    FAMILY HISTORY: FAMILY HISTORY:  Family history of colon cancer in mother (Mother)      ALLERGIES: Allergies    penicillin (Anaphylaxis)    Intolerances        Home Medications:  acetaminophen 500 mg oral tablet: 2 tab(s) orally 2 times a week, As Needed  (06 Aug 2018 17:48)  Fioricet oral capsule: 1 cap(s) orally every 4 hours, As Needed (06 Aug 2018 17:48)  propranolol 10 mg oral tablet: 1 tab(s) orally 2 times a day (06 Aug 2018 17:48)  topiramate 100 mg oral tablet: 1 tab(s) orally 2 times a day (06 Aug 2018 17:48)      MEDICATIONS  (STANDING):  carvedilol 12.5 milliGRAM(s) Oral every 12 hours  heparin  Injectable 5000 Unit(s) SubCutaneous every 8 hours  NIFEdipine XL 60 milliGRAM(s) Oral <User Schedule>  pantoprazole    Tablet 40 milliGRAM(s) Oral before breakfast  topiramate 100 milliGRAM(s) Oral two times a day    MEDICATIONS  (PRN):  acetaminophen   Tablet. 650 milliGRAM(s) Oral every 6 hours PRN Mild Pain (1 - 3)  aluminum hydroxide/magnesium hydroxide/simethicone Suspension 30 milliLiter(s) Oral every 6 hours PRN Dyspepsia  diazepam    Tablet 2.5 milliGRAM(s) Oral at bedtime PRN anxiety  docusate sodium 100 milliGRAM(s) Oral three times a day PRN Constipation  metoclopramide Injectable 5 milliGRAM(s) IV Push every 8 hours PRN Nausea/Vomiting  polyethylene glycol 3350 17 Gram(s) Oral daily PRN Constipation      PHYSICAL EXAMINATION  VITAL SIGNS:  Vital Signs Last 24 Hrs  T(C): 36.7 (08 Aug 2018 06:26), Max: 37.2 (08 Aug 2018 05:13)  T(F): 98.1 (08 Aug 2018 06:26), Max: 98.9 (08 Aug 2018 05:13)  HR: 86 (08 Aug 2018 10:20) (59 - 86)  BP: 184/90 (08 Aug 2018 10:20) (159/80 - 211/91)  BP(mean): --  RR: 18 (08 Aug 2018 07:45) (17 - 18)  SpO2: 96% (08 Aug 2018 07:45) (96% - 98%)  I&O's Summary    07 Aug 2018 07:01  -  08 Aug 2018 07:00  --------------------------------------------------------  IN: 360 mL / OUT: 0 mL / NET: 360 mL    08 Aug 2018 07:01  -  08 Aug 2018 12:49  --------------------------------------------------------  IN: 100 mL / OUT: 0 mL / NET: 100 mL      GA: awake and alert, no distress  EYES: EOMI, clear conj, anicteric sclera  ENT: MMM, clear OP, neck supple  LUNGS: CTABL, no wrc, normal effort  HEART; RS1S2 normal, no mrg, no peripheral edema  ABD; Soft, NT/ND/BS+, no peritoneal signs  EXT; well perfused, no edema or cyanosis  NEURO: AAO x 3, sensation and motor intact  SKIN: warm and dry, no rash on visible skin    LABORATORY DATA:                        12.9   6.76  )-----------( 246      ( 07 Aug 2018 07:54 )             39.5     08-08    141  |  104  |  22  ----------------------------<  131<H>  3.6   |  23  |  1.07    Ca    9.7      08 Aug 2018 11:23  Phos  2.8     08-08  Mg     2.2     08-08      IMAGING: Reviewed and as per records: pertinent positives:       ASSESSMENT: This is a 64 y/o f w/ PMH of HTN, migraines, GERD, anxiety p/w uncontrolled HTN and headaches likely noncompliant complicated with RUPERTO for which nephrology is consulted;    1. RUPERTO likely some vasoconstriction due to HTN - improved numbers  2. Hypertensive urgency resulting on impaired kidney function; rule out secondary HTN considering newly diagnosed HTN on a 64 y/o  3. Headache; ?HTN related, h/o migraines; management per primary team    RECOMMENDATIONS:  - would start a diuretic; lasix 20 mg daily by tomorrow if kidney function remains stable  - agree with increasing nifedipine 90 mg daily, carvedilol 25 mg BID and start Hydralazine 25 mg TID  - check john/renin level  - check renal duplex to rule out ALEXANDER  - avoid NSAIDs, ARBs, ACEIs or other nephrotoxins  - dose meds per GFR ~ 55 ml/min at present  - BMP daily, mag and phos tomorrow  Further recs based on the above results    Thank you for allowing me to participate on this patient's care. Please do not hesitate to call with questions.    I will follow with you.    Reynaldo Flowers MD   [facility]  [phone] REASON FOR ADMISSION: Patient is a 63y old  Female who presents with a chief complaint of Hypertension (07 Aug 2018 11:52)    HISTORY OF PRESENT ILLNESS: 62 y/o f w/ PMH of HTN, migraines, GERD, anxiety p/w uncontrolled HTN and headaches likely noncompliant complicated with RUPERTO for which nephrology is consulted;  Patient reports she was recently diagnosed with HTN; started on nefedipine and losartan 3 months ago but seems took them for a short period of time. The only medication she takes now is Propranonol, She has h/o migraine but has been controlled; now she is having this severe headache which seems different.; associated with nausea, some unsteady gait ( likely someone is pushing her)    Patient is started on nifedipine and carvediloll BP remains on the higher side      REVIEW OF SYSTEMS: 12 review of systems negative except what is stated in HPI    PAST MEDICAL & SURGICAL HISTORY:  Shingles  Pericarditis  Osteoporosis  Seizure disorder  Migraines  No significant past surgical history      SOCIAL HISTORY: no smoking, drinking or drugs    FAMILY HISTORY: FAMILY HISTORY:  Family history of colon cancer in mother (Mother)      ALLERGIES: Allergies    penicillin (Anaphylaxis)    Intolerances        Home Medications:  acetaminophen 500 mg oral tablet: 2 tab(s) orally 2 times a week, As Needed  (06 Aug 2018 17:48)  Fioricet oral capsule: 1 cap(s) orally every 4 hours, As Needed (06 Aug 2018 17:48)  propranolol 10 mg oral tablet: 1 tab(s) orally 2 times a day (06 Aug 2018 17:48)  topiramate 100 mg oral tablet: 1 tab(s) orally 2 times a day (06 Aug 2018 17:48)      MEDICATIONS  (STANDING):  carvedilol 12.5 milliGRAM(s) Oral every 12 hours  heparin  Injectable 5000 Unit(s) SubCutaneous every 8 hours  NIFEdipine XL 60 milliGRAM(s) Oral <User Schedule>  pantoprazole    Tablet 40 milliGRAM(s) Oral before breakfast  topiramate 100 milliGRAM(s) Oral two times a day    MEDICATIONS  (PRN):  acetaminophen   Tablet. 650 milliGRAM(s) Oral every 6 hours PRN Mild Pain (1 - 3)  aluminum hydroxide/magnesium hydroxide/simethicone Suspension 30 milliLiter(s) Oral every 6 hours PRN Dyspepsia  diazepam    Tablet 2.5 milliGRAM(s) Oral at bedtime PRN anxiety  docusate sodium 100 milliGRAM(s) Oral three times a day PRN Constipation  metoclopramide Injectable 5 milliGRAM(s) IV Push every 8 hours PRN Nausea/Vomiting  polyethylene glycol 3350 17 Gram(s) Oral daily PRN Constipation      PHYSICAL EXAMINATION  VITAL SIGNS:  Vital Signs Last 24 Hrs  T(C): 36.7 (08 Aug 2018 06:26), Max: 37.2 (08 Aug 2018 05:13)  T(F): 98.1 (08 Aug 2018 06:26), Max: 98.9 (08 Aug 2018 05:13)  HR: 86 (08 Aug 2018 10:20) (59 - 86)  BP: 184/90 (08 Aug 2018 10:20) (159/80 - 211/91)  BP(mean): --  RR: 18 (08 Aug 2018 07:45) (17 - 18)  SpO2: 96% (08 Aug 2018 07:45) (96% - 98%)  I&O's Summary    07 Aug 2018 07:01  -  08 Aug 2018 07:00  --------------------------------------------------------  IN: 360 mL / OUT: 0 mL / NET: 360 mL    08 Aug 2018 07:01  -  08 Aug 2018 12:49  --------------------------------------------------------  IN: 100 mL / OUT: 0 mL / NET: 100 mL      GA: awake and alert, no distress  EYES: EOMI, clear conj, anicteric sclera  ENT: MMM, clear OP, neck supple  LUNGS: CTABL, no wrc, normal effort  HEART; RS1S2 normal, no mrg, no peripheral edema  ABD; Soft, NT/ND/BS+, no peritoneal signs  EXT; well perfused, no edema or cyanosis  NEURO: AAO x 3, sensation and motor intact  SKIN: warm and dry, no rash on visible skin    LABORATORY DATA:                        12.9   6.76  )-----------( 246      ( 07 Aug 2018 07:54 )             39.5     08-08    141  |  104  |  22  ----------------------------<  131<H>  3.6   |  23  |  1.07    Ca    9.7      08 Aug 2018 11:23  Phos  2.8     08-08  Mg     2.2     08-08      IMAGING: Reviewed and as per records: pertinent positives:       ASSESSMENT: This is a 62 y/o f w/ PMH of HTN, migraines, GERD, anxiety p/w uncontrolled HTN and headaches likely noncompliant complicated with RUPERTO for which nephrology is consulted;    1. RUPERTO on CKD 3; baseline Cr ~ 1.3; likely some vasoconstriction due to HTN - improved numbers  2. Hypertensive urgency resulting on impaired kidney function; rule out secondary HTN considering newly diagnosed HTN on a 62 y/o  3. Headache; ?HTN related, h/o migraines; management per primary team    RECOMMENDATIONS:  - would start a diuretic for HTN management; lasix 20 mg daily by tomorrow if kidney function remains stable  - agree with increasing nifedipine 90 mg daily, carvedilol 25 mg BID and start Hydralazine 25 mg TID  - check john/renin level  - check renal duplex to rule out ALEXANDER  - avoid NSAIDs, ARBs, ACEIs or other nephrotoxins  - dose meds per GFR ~ 40 ml/min at present  - BMP daily, mag and phos tomorrow  - urine studies as ordered  Further recs based on the above results    Thank you for allowing me to participate on this patient's care. Please do not hesitate to call with questions.    I will follow with you.    Reynaldo Flowers MD   [facility]  [phone]

## 2018-08-08 NOTE — PROGRESS NOTE ADULT - SUBJECTIVE AND OBJECTIVE BOX
Patient is a 63y old  Female who presents with a chief complaint of Hypertension (07 Aug 2018 11:52)      SUBJECTIVE / OVERNIGHT EVENTS: c/o headache, vomited.  Review of Systems  chest pain no  palpitations no  sob no  nausea no  headache yes    MEDICATIONS  (STANDING):  carvedilol 25 milliGRAM(s) Oral every 12 hours  heparin  Injectable 5000 Unit(s) SubCutaneous every 8 hours  hydrALAZINE 25 milliGRAM(s) Oral three times a day  hydrochlorothiazide 25 milliGRAM(s) Oral daily  NIFEdipine XL 90 milliGRAM(s) Oral <User Schedule>  pantoprazole    Tablet 40 milliGRAM(s) Oral before breakfast  topiramate 100 milliGRAM(s) Oral two times a day    MEDICATIONS  (PRN):  acetaminophen   Tablet. 650 milliGRAM(s) Oral every 6 hours PRN Mild Pain (1 - 3)  aluminum hydroxide/magnesium hydroxide/simethicone Suspension 30 milliLiter(s) Oral every 6 hours PRN Dyspepsia  diazepam    Tablet 2.5 milliGRAM(s) Oral at bedtime PRN anxiety  docusate sodium 100 milliGRAM(s) Oral three times a day PRN Constipation  metoclopramide Injectable 5 milliGRAM(s) IV Push every 8 hours PRN Nausea/Vomiting  polyethylene glycol 3350 17 Gram(s) Oral daily PRN Constipation      Vital Signs Last 24 Hrs  T(C): 36.7 (08 Aug 2018 06:26), Max: 37.2 (08 Aug 2018 05:13)  T(F): 98.1 (08 Aug 2018 06:26), Max: 98.9 (08 Aug 2018 05:13)  HR: 92 (08 Aug 2018 16:35) (59 - 92)  BP: 181/91 (08 Aug 2018 16:35) (159/80 - 213/101)  BP(mean): --  RR: 18 (08 Aug 2018 07:45) (17 - 18)  SpO2: 96% (08 Aug 2018 07:45) (96% - 98%)    PHYSICAL EXAM:  GENERAL: sick  HEAD:  Atraumatic, Normocephalic  EYES: EOMI, PERRLA, conjunctiva and sclera clear  NECK: Supple, No JVD  CHEST/LUNG: Clear to auscultation bilaterally; No wheeze  HEART: Regular rate and rhythm; No murmurs, rubs, or gallops  ABDOMEN: Soft, Nontender, Nondistended; Bowel sounds present  EXTREMITIES:  2+ Peripheral Pulses, No clubbing, cyanosis, or edema  PSYCH: AAOx3  NEUROLOGY: non-focal  SKIN: No rashes or lesions    LABS:           < from: CT Abdomen and Pelvis No Cont (08.07.18 @ 22:15) >    IMPRESSION: A 1.3 cm indeterminateleft adrenal nodule stable from prior   imaging dating to 2012.      < end of copied text >               12.9   6.76  )-----------( 246      ( 07 Aug 2018 07:54 )             39.5     08-08    141  |  104  |  22  ----------------------------<  131<H>  3.6   |  23  |  1.07    Ca    9.7      08 Aug 2018 11:23  Phos  2.8     08-08  Mg     2.2     08-08                  RADIOLOGY & ADDITIONAL TESTS:    Imaging Personally Reviewed:    Consultant(s) Notes Reviewed:      Care Discussed with Consultants/Other Providers:

## 2018-08-08 NOTE — CONSULT NOTE ADULT - SUBJECTIVE AND OBJECTIVE BOX
CHIEF COMPLAINT: headache    HPI: 63F hx HTN, migraines, GERD, anxiety presented to [facility] for migraine. Pt had RRT last night for hypertension and pt finally responded after hydralazine 10 mg x1 and labetalol 20 mg x2. Today medicine team has had difficulty controlling hypertension. Patient was written for oral medications, but has had worsening vomiting all day and has not been able to hold any pills down. Pt blood pressure again systolic was over 200 around 4 pm, but pt responded to hydralazine 10 mg iv.     Pt currently complaining of headache and actively vomiting. Pt last received reglan for her vomiting about 8 hours ago. She has been recieving IV dilaudid for headache without resolution. Pt states she takes fioricet every 4 hours at home for headache. She states was previously on oxycodone and xanax, but has stopped taking it months ago. Pt states hasn't had a bowel movement in 5 days, but continues to pass gas and denies abd pain. Denies blurry vision, chest pain, sob or dizziness.     PAST MEDICAL & SURGICAL HISTORY:  Shingles  Pericarditis  Osteoporosis  Seizure disorder  Migraines  No significant past surgical history      FAMILY HISTORY:  Family history of colon cancer in mother (Mother)      SOCIAL HISTORY:  Smoking: [ ] Never Smoked [x ] Former Smoker (_1_ packs x __25_ years) [ ] Current Smoker  (__ packs x ___ years)  Substance Use: [ ] Never Used [ ] Used ____  EtOH Use:  Marital Status: [ ] Single [ ]  [ ]  [ ]   Sexual History:   Occupation:  Recent Travel:  Country of Birth:  Advance Directives:    Allergies    penicillin (Anaphylaxis)    Intolerances        HOME MEDICATIONS:    REVIEW OF SYSTEMS:  Constitutional: [x ] negative [ ] fevers [ ] chills [ ] weight loss [ ] weight gain  HEENT: [ x] negative [ ] dry eyes [ ] eye irritation [ ] postnasal drip [ ] nasal congestion  CV: [x ] negative  [ ] chest pain [ ] orthopnea [ ] palpitations [ ] murmur  Resp: [x ] negative [ ] cough [ ] shortness of breath [ ] dyspnea [ ] wheezing [ ] sputum [ ] hemoptysis  GI: [ ] negative [x ] nausea [x ] vomiting [ x] diarrhea [ ] constipation [ ] abd pain [ ] dysphagia   : [x ] negative [ ] dysuria [ ] nocturia [ ] hematuria [ ] increased urinary frequency  Musculoskeletal: [x ] negative [ ] back pain [ ] myalgias [ ] arthralgias [ ] fracture  Skin: [x ] negative [ ] rash [ ] itch  Neurological: [ ] negative [x ] headache [ ] dizziness [ ] syncope [ ] weakness [ ] numbness  Psychiatric: [x ] negative [ ] anxiety [ ] depression  Endocrine: [x ] negative [ ] diabetes [ ] thyroid problem  Hematologic/Lymphatic: [ x] negative [ ] anemia [ ] bleeding problem  Allergic/Immunologic: x[ ] negative [ ] itchy eyes [ ] nasal discharge [ ] hives [ ] angioedema  [ x] All other systems negative  [ ] Unable to assess ROS because ________    OBJECTIVE:  ICU Vital Signs Last 24 Hrs  T(C): 36.7 (08 Aug 2018 06:26), Max: 37.2 (08 Aug 2018 05:13)  T(F): 98.1 (08 Aug 2018 06:26), Max: 98.9 (08 Aug 2018 05:13)  HR: 95 (08 Aug 2018 18:08) (59 - 95)  BP: 203/82 (08 Aug 2018 18:08) (159/80 - 213/101)  BP(mean): --  ABP: --  ABP(mean): --  RR: 18 (08 Aug 2018 07:45) (17 - 18)  SpO2: 96% (08 Aug 2018 07:45) (96% - 98%)        08-07 @ 07:01  -  08-08 @ 07:00  --------------------------------------------------------  IN: 360 mL / OUT: 0 mL / NET: 360 mL    08-08 @ 07:01  -  08-08 @ 18:19  --------------------------------------------------------  IN: 100 mL / OUT: 100 mL / NET: 0 mL      CAPILLARY BLOOD GLUCOSE    GENERAL: In distress due to vomiting  HEENT:  Atraumatic, Normocephalic  EYES: EOMI, PERRLA, conjunctiva and sclera clear  NECK: Supple, No JVD  CHEST/LUNG: Clear to auscultation bilaterally; No wheezes, rales, or rhonchi  HEART: Regular rate and rhythm; No murmurs, rubs, or gallops  ABDOMEN: Soft, mild epigatric tenderness, Nondistended; Bowel sounds present  EXTREMITIES:  2+ Peripheral Pulses, No clubbing, cyanosis, or edema  PSYCH: AAOx3  NEUROLOGY: mild hyperreflexia  SKIN: No rashes or lesions    LINES: [facility] MEDICATIONS:  heparin  Injectable 5000 Unit(s) SubCutaneous every 8 hours  carvedilol 25 milliGRAM(s) Oral every 12 hours  hydrALAZINE 25 milliGRAM(s) Oral three times a day  hydrochlorothiazide 25 milliGRAM(s) Oral daily  NIFEdipine XL 90 milliGRAM(s) Oral <User Schedule>    acetaminophen   Tablet. 650 milliGRAM(s) Oral every 6 hours PRN  diazepam    Tablet 2.5 milliGRAM(s) Oral at bedtime PRN  metoclopramide Injectable 5 milliGRAM(s) IV Push every 8 hours PRN  SUMAtriptan Injectable 6 milliGRAM(s) SubCutaneous daily  topiramate 100 milliGRAM(s) Oral two times a day    aluminum hydroxide/magnesium hydroxide/simethicone Suspension 30 milliLiter(s) Oral every 6 hours PRN  docusate sodium 100 milliGRAM(s) Oral three times a day PRN  pantoprazole    Tablet 40 milliGRAM(s) Oral before breakfast  polyethylene glycol 3350 17 Gram(s) Oral daily PRN  dextrose 5% + sodium chloride 0.45%. 1000 milliLiter(s) IV Continuous <Continuous>    LABS:                        12.9   6.76  )-----------( 246      ( 07 Aug 2018 07:54 )             39.5     Hgb Trend: 12.9<--, 14.9<--, 13.3<--  08-08    141  |  104  |  22  ----------------------------<  131<H>  3.6   |  23  |  1.07    Ca    9.7      08 Aug 2018 11:23  Phos  2.8     08-08  Mg     2.2     08-08      Creatinine Trend: 1.07<--, 1.81<--, 1.37<--, 1.65<--, 1.46<--, 1.62<--    MICROBIOLOGY:     RADIOLOGY:  [x ] Reviewed and interpreted by me  < from: CT Head No Cont (08.05.18 @ 23:34) >    IMPRESSION:     No acute findings on noncontrast CT ofthe brain when compared to prior   exam from 7/22/2018.    < end of copied text >    EKG:

## 2018-08-08 NOTE — PROGRESS NOTE ADULT - ASSESSMENT
· Assessment		  64 y/o f w/ PMH of HTN, migraines, GERD, anxiety p/w uncontrolled HTN and headaches    Hypertension poorly controlled. Cardiology evaluation noted. Nephrology evaluation noted.  ICU evaluation for management of hypertensive urgency.     [email or web address].neurology consulted and recommended toradol, benadryl, and reglan q6hrs for now for control of symptoms  -cont topiramate. Will hold Toradol 2 to RUPERTO.    Epigastric pain.  Pain appears to be related to GERD, as symptoms related to food consumption, and location of pain in epigastric region; EKG not suggestive of ACS, and trop T negative  -cont pt's protonix  -cont maalox prn.       Depression.  Cont pt on home sertraline for anxiety/depression, and diazepam for control fo anxiety  -some concern that patient has had self-harm behavior; and pt does state that when her headaches are very bad, hat she wants to harm herself and not live - psych consult called for evaluation; will f/u psych recs.       RUPERTO (acute kidney injury).  Pt's Cr elevated from baseline; will avoid nephrotoxins, hold off on celecoxib and losartan; monitor Cr on BMP. Nephrology evaluation Dr. Bhat.    Adrenal nodule- stable.      Need for prophylactic measure. hep sq.  Naldo Mendoza MD pager [phone]     Naldo Mendoza MD pager [phone]

## 2018-08-08 NOTE — CONSULT NOTE ADULT - PROBLEM SELECTOR RECOMMENDATION 3
would control migraine and vomiting as above.   start metoprolol 5 iv Q6 with hold paramaters  may continue to push hydralazine 10 mg for blood pressures sustained over 200.  Would have goal BP of 160-180 initially.

## 2018-08-08 NOTE — CONSULT NOTE ADULT - ASSESSMENT
63F hx HTN, migraines, GERD, anxiety presented to [facility] for migraine, complicated by poorly controlled hypertension 63F hx HTN, migraines, GERD, anxiety presented to [facility] for migraine, complicated by poorly controlled hypertension, MICU consulted for hypertension in setting of vomiting and headache

## 2018-08-08 NOTE — CONSULT NOTE ADULT - PROBLEM SELECTOR RECOMMENDATION 2
possibly related to headache, patient also constipated, but passing gas, exam no consistent with bowel obstruction. Recent ct a/p stable  would aggressively treat with zofran 4 mg IV push as needed.  continue to monitor QTC on EKG  If patient has persistent vomiting would consider Ativan 2mg iv push.

## 2018-08-08 NOTE — CONSULT NOTE ADULT - PROBLEM SELECTOR RECOMMENDATION 9
Would recommend better control of migraine to help improve hypertension and nausea.  -Imitrex subQ if patient can tolerate  -pt unlikely to absorb PO medications as actively vomiting  Pt on chronic fioricet which can possibly have withdrawal symptoms when stopped. Would monitor  Would give ketorolac 30 mg IV, zofran for nausea, IVF fluids.

## 2018-08-08 NOTE — CHART NOTE - NSCHARTNOTEFT_GEN_A_CORE
62 y/o f w/ PMH of HTN, migraines, GERD, anxiety p/w uncontrolled HTN and headaches. RRT called tonight for hypertension to the 220s.  Per primary NP at bedside, patient had previously received Labetalol 20mg, Hydralazine 10 mg x 1 with no effect on pressure.  Patient reported headache with hydralazine and refused another dose.      Labetalol 20mg IV x 1 given.  BP slowly downtrended to 197/97.  EKG NSR 60-70s.    Recommendations:    1) Continue to monitor BP y4T--acawd not administer any more cardiac/BP at this point.    2) Continue telemetry monitoring  3) Recommend cardiology consult for further management of uncontrolled resistant hypertension  4) Optimize electrolytes as able with respect to Cr  5) Nephrology consult for ALEXANDER Sam NP  MICU/RRT  [id number]

## 2018-08-08 NOTE — CONSULT NOTE ADULT - ASSESSMENT
63 year old female PMH of HTN (not compliant with medications), migraines, GERD, anxiety p/w uncontrolled HTN and headaches.     1. Uncontrolled HTN  pt. with history of non-compliance with medications   s/p IV labetalol, bp remains elevated  increase coreg to 25mg BID   increase nifedipine to 90mg BID, give additional 30mg now   would recommend resuming losartan if ok with renal     2. cv stable   no chest pain or sob, no evidence of acute ischemia/ACS     3. RUPERTO  now resolved   renal f/u     4. Headaches  neuro f/u, no further w/u     dvt ppx 63 year old female PMH of HTN (not compliant with medications), migraines, GERD, anxiety p/w uncontrolled HTN and headaches.     1. Uncontrolled HTN  pt. with history of non-compliance with medications   s/p IV labetalol, bp remains elevated  increase coreg to 25mg BID   increase nifedipine to 90mg BID  start hydralazine 25mg TID   losartan on hold secondary to RUPERTO     2. cv stable   no chest pain or sob, no evidence of acute ischemia/ACS     3. RUPERTO  now resolved   renal f/u     4. Headaches  neuro f/u, no further w/u     dvt ppx 63 year old female PMH of HTN (not compliant with medications), migraines, GERD, anxiety p/w uncontrolled HTN and headaches.     1. Uncontrolled HTN  pt. with history of non-compliance with medications   s/p IV labetalol, bp remains elevated  increase coreg to 25mg BID   increase nifedipine to 90mg BID  start hydralazine 25mg TID   start hydrochlorothiazide 25mg daily   losartan on hold secondary to RUPERTO     2. cv stable   no chest pain or sob, no evidence of acute ischemia/ACS     3. RUPERTO  now resolved   renal f/u     4. Headaches  neuro f/u, no further w/u     dvt ppx

## 2018-08-08 NOTE — CHART NOTE - NSCHARTNOTEFT_GEN_A_CORE
Pt seen on rounds 2 hr post RRT for hypertension. Pt was given 20mg of labetalol IV by the RRT team and recommended cardiology and tele monitoring. Pt states she is better but nervous. Pt denies numbness tingling weakness CP SOB dyspnea. D/w attending; agrees with above plan and cardiology consult with Dr. Hernandez. Will continue to monitor pt.     Nayely GALVAN  #[id number] Pt seen on rounds 2 hr post RRT for hypertension. Pt was given 20mg of labetalol IV by the RRT team and recommended cardiology and tele monitoring. Pt states she is better but nervous. Pt denies numbness tingling weakness CP SOB dyspnea. D/w attending; agrees with above plan and cardiology consult with Dr. Hernandez. Will continue to monitor pt.     Nayely GALVAN  #[id number]    ADDENDUM  D/w cardiologist Dr. Hernandez; recommended to start pt on coreg 12.5 BID and increase procardia 60 mg BID. Pt aware.    Nayely GALVAN  #[id number]

## 2018-08-08 NOTE — CHART NOTE - NSCHARTNOTEFT_GEN_A_CORE
Notified by RN about high blood pressure 200/100 @ 7:30 pm. Pt was seen; denies numbness and tingling. Pt is c/o of frontal headache; pt has hx of migraines. Pt was given extra dose of procardia and increased dose of procardia to 60 daily. On manual; 220/100. Pt given 10mg of hydralazine. D/w with Dr. Hodgson; agrees with above plan. Pending renal eval. Will continue to monitor.     Nayely GALVAN  #[id number]    Addendum  Pt c/o migraine headache after hydralazine. Pt given dilaudid IV.     Nayely GALVAN  #[id number]    ADDENDUM  On rounds; pt's /110 HR: 71. Pt given procardia 30 mg and IV labetalol 10 mg given. Will continue to monitor.     Nayely GALVAN  #[id number] Notified by RN about high blood pressure 200/100 @ 7:30 pm. Pt was seen; denies numbness and tingling. Pt is c/o of frontal headache; pt has hx of migraines. Pt was given extra dose of procardia and increased dose of procardia to 60 daily. On manual; 220/100. Pt given 10mg of hydralazine. D/w with Dr. Hodgson; agrees with above plan. Pending renal eval. Will continue to monitor.     Nayely GALVAN  #[id number]    Addendum  Pt c/o migraine headache after hydralazine. Pt given dilaudid IV.     Nayely GALVAN  #[id number]    ADDENDUM  On rounds; pt's /110 HR: 71. Pt given procardia 30 mg and IV labetalol 10 mg given. Will continue to monitor.     Nayely GALVAN  #[id number]    Addendum @3:30  Pt assessed 15 mins post IV labetalol. BP: 230/100. 10mg IV labetalol given an reassessed 15 mins post; BP: 220/102. RRT called for hypertension.    Nayely GALVAN  #[id number]

## 2018-08-08 NOTE — CHART NOTE - NSCHARTNOTEFT_GEN_A_CORE
Patient is a 63y old  Female who presents with a chief complaint of Hypertension (07 Aug 2018 11:52)    Pt remains with persistent HTN urgency, SBP>200s, c/o severe migraine HAs, and n/v. Pt seen & evaluated at bedside, c/o 10/10 migraine HAs, similar characteristic of her HAs, but worsening intensity than usual. Pt has been given PO anti-hypertensives today, but with periods of nonbilious, non-bloody vomiting, despite anti-emetics given. Denies fever, chills, nuchal rigidity, ataxia, blurry vision, dizziness, SOB, cough, CP, palpitations, abd pain, tearing back pain, flank pain.     Vital Signs Last 24 Hrs  T(C): 36.7 (08 Aug 2018 06:26), Max: 37.2 (08 Aug 2018 05:13)  T(F): 98.1 (08 Aug 2018 06:26), Max: 98.9 (08 Aug 2018 05:13)  HR: 80 (08 Aug 2018 16:00) (59 - 91)  BP: 213/101 (08 Aug 2018 16:00) (159/80 - 213/101)  BP(mean): --  RR: 18 (08 Aug 2018 07:45) (17 - 18)  SpO2: 96% (08 Aug 2018 07:45) (96% - 98%)                        12.9   6.76  )-----------( 246      ( 07 Aug 2018 07:54 )             39.5     08-08    141  |  104  |  22  ----------------------------<  131<H>  3.6   |  23  |  1.07    Ca    9.7      08 Aug 2018 11:23  Phos  2.8     08-08  Mg     2.2     08-08      General: A&Ox4, mild distress 2/2 pain  Neurology: Mild dysmetria with finger-nose-finger test, GCS 15, CN 2-12 intact, PERRL, negative Nystagmus, negative kernig's, no c-spine, t-spine, or l-spine tenderness  Head:  Normocephalic, atraumatic  Respiratory: CTA B/L  CV: RRR, S1S2, no murmur  Abdominal: Soft, NT, ND no palpable mass  MSK: No edema, + peripheral pulses, FROM all 4 extremity    A/P  HPI:  62 y/o f w/ PMH of HTN, migraines, GERD, anxiety p/w uncontrolled HTN and headaches. In the ED, a CT head showed no acute findings, CXR was clear, and trop T were negative. Pt s/p RRT early this morning for uncontrolled hypertension SBP 220s, received Labetalol 20mg, Hydralazine 10 mg x 1, and again Labetalol 20mg IV x 1 given, with minimal effect on BP. Remains with persistent HTN Urgency.    Problem #1 HTN Urgency  1.) Mild dysmetria on exam, current /101, Hydralazine 10mg IVP x 1  2.) Repeat CTH stat  3.) Seen by Renal, as pt with initial RUPERTO upon admission, recs appreciated, pending renal duplex to rule out ALEXANDER, all nephrotoxic agents held for now due to RUPERTO  4.) Seen by Neuro for migrainous Headaches, recs appreciated, neuro imaging unrevealing thus far, continuing Topamax 100mg BID for now  5.) Seen by Cards this morning, Coreg increased to 25mg BID, Nifedipine increased to 90mg BID, started Hydralazine 25mg TID, and has been receiving Reglan IV prior to doses of PO antihypertensives with minimal effect at this time.    MICU consulted at this time for HTN Urgency, pt remains symptomatic with severe HAs, n/v, pending evaluation and recommendations at this time. Attending, Dr. Mendoza, aware of above plan and management.    Nathaniel Saintus [facility]  #[phone]

## 2018-08-08 NOTE — CONSULT NOTE ADULT - ATTENDING COMMENTS
p
Patient seen and examined.  Agree with above NP note.  patient with uncontrolled htn and headache  received multiple IVP's of antihtn meds today   + vomiting so po meds likely not absorbed  s/p iv push of hydral given now  next give labetalolol 20mgh IVP if needed  rec micu eval for continuous iv drip for bp control  repeat head ct  d/w with floor team and med attg
63F Hx chronic migraines admitted with persistent intractable migraine headache complicated by poorly controlled hypertension. Patient was found undertreated for both conditions unable to take any po prescribed medications throughout the day (from retching and vomiting) and very labile blood pressure.   - Agreeable with immediate migraine HA control with SQ Imitrex and IV NSAIDs   - IV Lopressor standing order till po nausea and vomiting improve   - Avoid medication over use and rebound [place]

## 2018-08-08 NOTE — CONSULT NOTE ADULT - SUBJECTIVE AND OBJECTIVE BOX
CARDIOLOGY CONSULT - Dr. Hernandez     CHIEF COMPLAINT: HTN     HPI: 64 y/o f w/ PMH of HTN, migraines, GERD, anxiety p/w uncontrolled HTN and headaches. According to h&p, Pt has been noncompliant with anti-hypertensive medications because she feels they cause her headaches to become exacerbated. She was supposed to be on nifedipine and losartan, but has not been taking them, and takes only her propranolol. Pt has history of migraines, but has been experiencing severe headaches, different from her typical migraines, they were persistent, dull, throbbing in nature, located at the top of her head and in the temporal regions. Pt also had some epigastric pain associated with food intake, and not associated with movement, that was relived wit maalox, and she believes to be related to her GERD. Denies cp/sob/patel, lower extremity swelling, orthopnea, palpitations. Pt. says she does not see a cardiologist and has not had a cardiac w/u recently. Denies history of MI, CHF, arrythmia. Currently resting comfotably, headache improved s/p dilaudid.       PAST MEDICAL & SURGICAL HISTORY:  Shingles  Pericarditis  Osteoporosis  Seizure disorder  Migraines  No significant past surgical history          PREVIOUS DIAGNOSTIC TESTING:    [ ] Echocardiogram: < from: Transthoracic Echocardiogram (01.01.16 @ 12:40) >  Conclusions:  1. Normal trileaflet aortic valve. Minimal aortic  regurgitation.  2. Normal left ventricular systolic function. No segmental  wall motion abnormalities.  3. Reversal of the E-A waves of the mitral inflow pattern  consistent with reduced compliance of the left ventricle.  4. Normal right ventricular size and function.  5. Normal pericardium with no pericardial effusion.  *** No previous Echo exam.    < end of copied text >    [ ]  Catheterization:   [ ] Stress Test:  	    MEDICATIONS:  MEDICATIONS  (STANDING):  carvedilol 12.5 milliGRAM(s) Oral every 12 hours  heparin  Injectable 5000 Unit(s) SubCutaneous every 8 hours  NIFEdipine XL 60 milliGRAM(s) Oral <User Schedule>  pantoprazole    Tablet 40 milliGRAM(s) Oral before breakfast  topiramate 100 milliGRAM(s) Oral two times a day      FAMILY HISTORY:  Family history of colon cancer in mother (Mother)      SOCIAL HISTORY:    [x] Non-smoker  [ ] Smoker  [ ] Alcohol    Allergies    penicillin (Anaphylaxis)    Intolerances    	    REVIEW OF SYSTEMS:  CONSTITUTIONAL: No fever, weight loss, or fatigue  EYES: No eye pain, visual disturbances, or discharge  ENMT:  No difficulty hearing, tinnitus, vertigo; No sinus or throat pain  NECK: No pain or stiffness  RESPIRATORY: No cough, wheezing, chills or hemoptysis; No Shortness of Breath  CARDIOVASCULAR: No chest pain, palpitations, passing out, dizziness, or leg swelling  GASTROINTESTINAL: No abdominal or epigastric pain. No nausea, vomiting, or hematemesis; No diarrhea or constipation. No melena or hematochezia.  GENITOURINARY: No dysuria, frequency, hematuria, or incontinence  NEUROLOGICAL: + headaches, no memory loss, loss of strength, numbness, or tremors  SKIN: No itching, burning, rashes, or lesions   	    [x] All others negative	  [ ] Unable to obtain    PHYSICAL EXAM:  T(C): 36.7 (08-08-18 @ 06:26), Max: 37.2 (08-08-18 @ 05:13)  HR: 91 (08-08-18 @ 14:01) (59 - 91)  BP: 209/110 (08-08-18 @ 14:01) (159/80 - 211/91)  RR: 18 (08-08-18 @ 07:45) (17 - 18)  SpO2: 96% (08-08-18 @ 07:45) (96% - 98%)  Wt(kg): --  I&O's Summary    07 Aug 2018 07:01  -  08 Aug 2018 07:00  --------------------------------------------------------  IN: 360 mL / OUT: 0 mL / NET: 360 mL    08 Aug 2018 07:01  -  08 Aug 2018 14:09  --------------------------------------------------------  IN: 100 mL / OUT: 0 mL / NET: 100 mL        Appearance: Normal	  Psychiatry: A & O x 3, Mood & affect appropriate  HEENT:   Normal oral mucosa, PERRL, EOMI	  Lymphatic: No lymphadenopathy  Cardiovascular: Normal S1 S2,RRR, No JVD, No murmurs  Respiratory: Lungs clear to auscultation	  Gastrointestinal:  Soft, Non-tender, + BS	  Skin: No rashes, No ecchymoses, No cyanosis	  Neurologic: Non-focal  Extremities: Normal range of motion, No clubbing, cyanosis or edema  Vascular: Peripheral pulses palpable 2+ bilaterally    TELEMETRY: NSR     ECG:  	  RADIOLOGY: < from: Xray Chest 1 View AP/PA (08.05.18 @ 23:47) >    IMPRESSION:  Clear lungs.    < end of copied text >    OTHER: 	  < from: CT Head No Cont (08.05.18 @ 23:34) >    IMPRESSION:     No acute findings on noncontrast CT ofthe brain when compared to prior   exam from 7/22/2018.    < end of copied text >  	  LABS:	 	    CARDIAC MARKERS:      ce negative                             12.9   6.76  )-----------( 246      ( 07 Aug 2018 07:54 )             39.5     08-08    141  |  104  |  22  ----------------------------<  131<H>  3.6   |  23  |  1.07    Ca    9.7      08 Aug 2018 11:23  Phos  2.8     08-08  Mg     2.2     08-08        proBNP:   Lipid Profile:   HgA1c:   TSH:

## 2018-08-09 LAB
ALDOST SERPL-MCNC: 92.4 NG/DL — HIGH
ANION GAP SERPL CALC-SCNC: 14 MMOL/L — SIGNIFICANT CHANGE UP (ref 5–17)
APTT BLD: 36.2 SEC — SIGNIFICANT CHANGE UP (ref 27.5–37.4)
BUN SERPL-MCNC: 21 MG/DL — SIGNIFICANT CHANGE UP (ref 7–23)
CALCIUM SERPL-MCNC: 9.7 MG/DL — SIGNIFICANT CHANGE UP (ref 8.4–10.5)
CHLORIDE SERPL-SCNC: 101 MMOL/L — SIGNIFICANT CHANGE UP (ref 96–108)
CO2 SERPL-SCNC: 22 MMOL/L — SIGNIFICANT CHANGE UP (ref 22–31)
CORTIS AM PEAK SERPL-MCNC: 45.1 UG/DL — HIGH (ref 6–18.4)
CREAT SERPL-MCNC: 1.36 MG/DL — HIGH (ref 0.5–1.3)
ERYTHROCYTE [SEDIMENTATION RATE] IN BLOOD: 30 MM/HR — HIGH (ref 0–20)
GLUCOSE SERPL-MCNC: 133 MG/DL — HIGH (ref 70–99)
HCT VFR BLD CALC: 41.1 % — SIGNIFICANT CHANGE UP (ref 34.5–45)
HGB BLD-MCNC: 13.7 G/DL — SIGNIFICANT CHANGE UP (ref 11.5–15.5)
INR BLD: 1.03 RATIO — SIGNIFICANT CHANGE UP (ref 0.88–1.16)
MCHC RBC-ENTMCNC: 29.8 PG — SIGNIFICANT CHANGE UP (ref 27–34)
MCHC RBC-ENTMCNC: 33.3 GM/DL — SIGNIFICANT CHANGE UP (ref 32–36)
MCV RBC AUTO: 89.5 FL — SIGNIFICANT CHANGE UP (ref 80–100)
PLATELET # BLD AUTO: 266 K/UL — SIGNIFICANT CHANGE UP (ref 150–400)
POTASSIUM SERPL-MCNC: 3.3 MMOL/L — LOW (ref 3.5–5.3)
POTASSIUM SERPL-SCNC: 3.3 MMOL/L — LOW (ref 3.5–5.3)
PROTHROM AB SERPL-ACNC: 11.7 SEC — SIGNIFICANT CHANGE UP (ref 10–13.1)
RBC # BLD: 4.59 M/UL — SIGNIFICANT CHANGE UP (ref 3.8–5.2)
RBC # FLD: 14.4 % — SIGNIFICANT CHANGE UP (ref 10.3–14.5)
RENIN DIRECT, PLASMA: 71.7 PG/ML — HIGH
SODIUM SERPL-SCNC: 137 MMOL/L — SIGNIFICANT CHANGE UP (ref 135–145)
UUN UR-MCNC: 549 MG/DL — SIGNIFICANT CHANGE UP
WBC # BLD: 8.27 K/UL — SIGNIFICANT CHANGE UP (ref 3.8–10.5)
WBC # FLD AUTO: 8.27 K/UL — SIGNIFICANT CHANGE UP (ref 3.8–10.5)

## 2018-08-09 PROCEDURE — 93306 TTE W/DOPPLER COMPLETE: CPT | Mod: 26

## 2018-08-09 PROCEDURE — 93975 VASCULAR STUDY: CPT | Mod: 26

## 2018-08-09 RX ORDER — ACETAMINOPHEN 500 MG
1000 TABLET ORAL ONCE
Qty: 0 | Refills: 0 | Status: COMPLETED | OUTPATIENT
Start: 2018-08-09 | End: 2018-08-09

## 2018-08-09 RX ORDER — SERTRALINE 25 MG/1
25 TABLET, FILM COATED ORAL DAILY
Qty: 0 | Refills: 0 | Status: DISCONTINUED | OUTPATIENT
Start: 2018-08-09 | End: 2018-08-10

## 2018-08-09 RX ORDER — POTASSIUM CHLORIDE 20 MEQ
40 PACKET (EA) ORAL EVERY 4 HOURS
Qty: 0 | Refills: 0 | Status: COMPLETED | OUTPATIENT
Start: 2018-08-09 | End: 2018-08-09

## 2018-08-09 RX ORDER — KETOROLAC TROMETHAMINE 30 MG/ML
15 SYRINGE (ML) INJECTION ONCE
Qty: 0 | Refills: 0 | Status: DISCONTINUED | OUTPATIENT
Start: 2018-08-09 | End: 2018-08-09

## 2018-08-09 RX ORDER — METOCLOPRAMIDE HCL 10 MG
5 TABLET ORAL ONCE
Qty: 0 | Refills: 0 | Status: COMPLETED | OUTPATIENT
Start: 2018-08-09 | End: 2018-08-09

## 2018-08-09 RX ORDER — PROCHLORPERAZINE MALEATE 5 MG
10 TABLET ORAL ONCE
Qty: 0 | Refills: 0 | Status: DISCONTINUED | OUTPATIENT
Start: 2018-08-09 | End: 2018-08-10

## 2018-08-09 RX ORDER — HYDRALAZINE HCL 50 MG
50 TABLET ORAL THREE TIMES A DAY
Qty: 0 | Refills: 0 | Status: DISCONTINUED | OUTPATIENT
Start: 2018-08-09 | End: 2018-08-10

## 2018-08-09 RX ORDER — HYDROMORPHONE HYDROCHLORIDE 2 MG/ML
1 INJECTION INTRAMUSCULAR; INTRAVENOUS; SUBCUTANEOUS ONCE
Qty: 0 | Refills: 0 | Status: DISCONTINUED | OUTPATIENT
Start: 2018-08-09 | End: 2018-08-09

## 2018-08-09 RX ORDER — SODIUM CHLORIDE 9 MG/ML
1000 INJECTION, SOLUTION INTRAVENOUS ONCE
Qty: 0 | Refills: 0 | Status: COMPLETED | OUTPATIENT
Start: 2018-08-09 | End: 2018-08-09

## 2018-08-09 RX ORDER — DEXTROSE MONOHYDRATE, SODIUM CHLORIDE, AND POTASSIUM CHLORIDE 50; .745; 4.5 G/1000ML; G/1000ML; G/1000ML
1000 INJECTION, SOLUTION INTRAVENOUS
Qty: 0 | Refills: 0 | Status: DISCONTINUED | OUTPATIENT
Start: 2018-08-09 | End: 2018-08-10

## 2018-08-09 RX ORDER — ONDANSETRON 8 MG/1
4 TABLET, FILM COATED ORAL ONCE
Qty: 0 | Refills: 0 | Status: COMPLETED | OUTPATIENT
Start: 2018-08-09 | End: 2018-08-09

## 2018-08-09 RX ADMIN — Medication 40 MILLIEQUIVALENT(S): at 18:46

## 2018-08-09 RX ADMIN — HYDROMORPHONE HYDROCHLORIDE 1 MILLIGRAM(S): 2 INJECTION INTRAMUSCULAR; INTRAVENOUS; SUBCUTANEOUS at 18:10

## 2018-08-09 RX ADMIN — Medication 25 MILLIGRAM(S): at 12:58

## 2018-08-09 RX ADMIN — Medication 40 MILLIEQUIVALENT(S): at 12:58

## 2018-08-09 RX ADMIN — Medication 90 MILLIGRAM(S): at 05:53

## 2018-08-09 RX ADMIN — HYDROMORPHONE HYDROCHLORIDE 1 MILLIGRAM(S): 2 INJECTION INTRAMUSCULAR; INTRAVENOUS; SUBCUTANEOUS at 18:40

## 2018-08-09 RX ADMIN — Medication 5 MILLIGRAM(S): at 10:20

## 2018-08-09 RX ADMIN — Medication 90 MILLIGRAM(S): at 18:46

## 2018-08-09 RX ADMIN — CARVEDILOL PHOSPHATE 25 MILLIGRAM(S): 80 CAPSULE, EXTENDED RELEASE ORAL at 05:53

## 2018-08-09 RX ADMIN — HEPARIN SODIUM 5000 UNIT(S): 5000 INJECTION INTRAVENOUS; SUBCUTANEOUS at 21:36

## 2018-08-09 RX ADMIN — HEPARIN SODIUM 5000 UNIT(S): 5000 INJECTION INTRAVENOUS; SUBCUTANEOUS at 05:53

## 2018-08-09 RX ADMIN — ONDANSETRON 4 MILLIGRAM(S): 8 TABLET, FILM COATED ORAL at 15:30

## 2018-08-09 RX ADMIN — Medication 2.5 MILLIGRAM(S): at 15:02

## 2018-08-09 RX ADMIN — Medication 5 MILLIGRAM(S): at 18:10

## 2018-08-09 RX ADMIN — Medication 50 MILLIGRAM(S): at 21:36

## 2018-08-09 RX ADMIN — CARVEDILOL PHOSPHATE 25 MILLIGRAM(S): 80 CAPSULE, EXTENDED RELEASE ORAL at 18:46

## 2018-08-09 RX ADMIN — Medication 15 MILLIGRAM(S): at 05:39

## 2018-08-09 RX ADMIN — Medication 25 MILLIGRAM(S): at 05:53

## 2018-08-09 RX ADMIN — Medication 15 MILLIGRAM(S): at 04:19

## 2018-08-09 RX ADMIN — Medication 100 MILLIGRAM(S): at 05:53

## 2018-08-09 RX ADMIN — Medication 400 MILLIGRAM(S): at 14:42

## 2018-08-09 RX ADMIN — SERTRALINE 25 MILLIGRAM(S): 25 TABLET, FILM COATED ORAL at 15:02

## 2018-08-09 RX ADMIN — HEPARIN SODIUM 5000 UNIT(S): 5000 INJECTION INTRAVENOUS; SUBCUTANEOUS at 13:00

## 2018-08-09 RX ADMIN — Medication 5 MILLIGRAM(S): at 03:05

## 2018-08-09 RX ADMIN — SODIUM CHLORIDE 1000 MILLILITER(S): 9 INJECTION, SOLUTION INTRAVENOUS at 10:19

## 2018-08-09 RX ADMIN — Medication 1000 MILLIGRAM(S): at 15:30

## 2018-08-09 RX ADMIN — PANTOPRAZOLE SODIUM 40 MILLIGRAM(S): 20 TABLET, DELAYED RELEASE ORAL at 05:53

## 2018-08-09 RX ADMIN — SODIUM CHLORIDE 75 MILLILITER(S): 9 INJECTION, SOLUTION INTRAVENOUS at 04:19

## 2018-08-09 RX ADMIN — HYDROMORPHONE HYDROCHLORIDE 1 MILLIGRAM(S): 2 INJECTION INTRAMUSCULAR; INTRAVENOUS; SUBCUTANEOUS at 10:18

## 2018-08-09 RX ADMIN — HYDROMORPHONE HYDROCHLORIDE 1 MILLIGRAM(S): 2 INJECTION INTRAMUSCULAR; INTRAVENOUS; SUBCUTANEOUS at 10:57

## 2018-08-09 RX ADMIN — Medication 100 MILLIGRAM(S): at 18:46

## 2018-08-09 NOTE — PROGRESS NOTE ADULT - SUBJECTIVE AND OBJECTIVE BOX
Nephrology Progress Note    No acute events overnight  Patient seen and evaluated this afternoon; looks better, improved headache; no nausea at this time    PHYSICAL EXAM    Vital Signs Last 24 Hrs  T(C): 36.7 (09 Aug 2018 15:34), Max: 37.1 (08 Aug 2018 20:23)  T(F): 98.1 (09 Aug 2018 15:34), Max: 98.8 (08 Aug 2018 20:23)  HR: 71 (09 Aug 2018 15:34) (71 - 96)  BP: 180/90 (09 Aug 2018 17:45) (140/80 - 206/85)  BP(mean): --  RR: 18 (09 Aug 2018 15:34) (17 - 18)  SpO2: 98% (09 Aug 2018 15:34) (96% - 98%)  I&O's Summary    08 Aug 2018 07:01  -  09 Aug 2018 07:00  --------------------------------------------------------  IN: 100 mL / OUT: 100 mL / NET: 0 mL    09 Aug 2018 07:01  -  09 Aug 2018 19:30  --------------------------------------------------------  IN: 950 mL / OUT: 0 mL / NET: 950 mL      GA: AAO x 3, no distress  EYES: EOMI, clear conj, anicteric sclera  ENT: MMM< clear OP, neck supple  LUNGS: CTABL, no wrc, normal effort  HEART: S1S2 normal, no mrg, no peripheral edema  ABD: soft, NT/ND/BS+, no periotneal signs  EXT: well perfused, no edema or cyanosis  NEURO: AAO x 3, sensation and motor intact  SKIN: warm and dry, no rash on visible skin    LABORATORY DATA:                        13.7   8.27  )-----------( 266      ( 09 Aug 2018 08:27 )             41.1         137  |  101  |  21  ----------------------------<  133<H>  3.3<L>   |  22  |  1.36<H>    Ca    9.7      09 Aug 2018 06:18  Phos  2.8     08-  Mg     2.2     08-        Urinalysis Basic - ( 08 Aug 2018 18:12 )    Color: Yellow / Appearance: Slightly Turbid / S.016 / pH: x  Gluc: x / Ketone: Negative  / Bili: Negative / Urobili: Negative mg/dL   Blood: x / Protein: 300 mg/dL / Nitrite: Negative   Leuk Esterase: Negative / RBC: 3 /HPF / WBC 2 /HPF   Sq Epi: x / Non Sq Epi: 1 /HPF / Bacteria: Negative      IMAGING:  ASSESSMENT:    RECOMMENDATIONS:    Reynaldo Flowers MD  [facility],   [phone] Nephrology Progress Note    No acute events overnight  Patient seen and evaluated this afternoon; looks better, improved headache; no nausea at this time    PHYSICAL EXAM    Vital Signs Last 24 Hrs  T(C): 36.7 (09 Aug 2018 15:34), Max: 37.1 (08 Aug 2018 20:23)  T(F): 98.1 (09 Aug 2018 15:34), Max: 98.8 (08 Aug 2018 20:23)  HR: 71 (09 Aug 2018 15:34) (71 - 96)  BP: 180/90 (09 Aug 2018 17:45) (140/80 - 206/85)  BP(mean): --  RR: 18 (09 Aug 2018 15:34) (17 - 18)  SpO2: 98% (09 Aug 2018 15:34) (96% - 98%)  I&O's Summary    08 Aug 2018 07:01  -  09 Aug 2018 07:00  --------------------------------------------------------  IN: 100 mL / OUT: 100 mL / NET: 0 mL    09 Aug 2018 07:01  -  09 Aug 2018 19:30  --------------------------------------------------------  IN: 950 mL / OUT: 0 mL / NET: 950 mL      GA: AAO x 3, no distress  EYES: EOMI, clear conj, anicteric sclera  ENT: MMM< clear OP, neck supple  LUNGS: CTABL, no wrc, normal effort  HEART: S1S2 normal, no mrg, no peripheral edema  ABD: soft, NT/ND/BS+, no periotneal signs  EXT: well perfused, no edema or cyanosis  NEURO: AAO x 3, sensation and motor intact  SKIN: warm and dry, no rash on visible skin    LABORATORY DATA:                        13.7   8.27  )-----------( 266      ( 09 Aug 2018 08:27 )             41.1         137  |  101  |  21  ----------------------------<  133<H>  3.3<L>   |  22  |  1.36<H>    Ca    9.7      09 Aug 2018 06:18  Phos  2.8     08-  Mg     2.2     08-        Urinalysis Basic - ( 08 Aug 2018 18:12 )    Color: Yellow / Appearance: Slightly Turbid / S.016 / pH: x  Gluc: x / Ketone: Negative  / Bili: Negative / Urobili: Negative mg/dL   Blood: x / Protein: 300 mg/dL / Nitrite: Negative   Leuk Esterase: Negative / RBC: 3 /HPF / WBC 2 /HPF   Sq Epi: x / Non Sq Epi: 1 /HPF / Bacteria: Negative      IMAGING:  < from: US Duplex Kidneys (18 @ 10:41) >  IMPRESSION:     Atrophic left kidney, new since 2012. Increased cortical   echogenicity of the left kidney.    The origin, proximal, and hilum portions of the left renal artery could   not be directly visualized, and there was overall decreased flow in the   left kidney. Tardus parvus waveforms were noted in the segmental vessels   in the left kidney, suggestive of renal artery stenosis.    Increased cortical echogenicity and increased resistive indices of the   right kidney, suggestive of medical renal disease. No definite stenosis   in the right renal artery..    < end of copied text >    ASSESSMENT: This is a 64 y/o f w/ PMH of HTN, migraines, GERD, anxiety p/w uncontrolled HTN and headaches likely noncompliant complicated with RUPERTO for which nephrology is consulted;    1. RUPERTO on CKD 3; baseline Cr ~ 1.3; likely some vasoconstriction due to HTN - improved numbers  2. Hypertensive urgency resulting on impaired kidney function; left ALEXANDER on US and icnreased renin levels  3. Headache; ?HTN related, h/o migraines; management per primary team    RECOMMENDATIONS:  - Patient with left Renal Artery Stenosis and left kidney atrophy which is likely contributing to this blood pressure  - would start Losartan 25 mg daily considering high renin level still produced by left atrophic kidney driving all this;  kidney function is stable   - emphasize compliance  - low salt diet  - avoid NSAIDs, ARBs, ACEIs or other nephrotoxins  - dose meds per GFR ~ 40 ml/min at present  - BMP daily while inpatient  Further recs based on the above results        RECOMMENDATIONS:    Reynaldo Flowers MD  [facility]  [phone] Nephrology Progress Note    No acute events overnight  Patient seen and evaluated this afternoon; looks better, improved headache; no nausea at this time    PHYSICAL EXAM    Vital Signs Last 24 Hrs  T(C): 36.7 (09 Aug 2018 15:34), Max: 37.1 (08 Aug 2018 20:23)  T(F): 98.1 (09 Aug 2018 15:34), Max: 98.8 (08 Aug 2018 20:23)  HR: 71 (09 Aug 2018 15:34) (71 - 96)  BP: 180/90 (09 Aug 2018 17:45) (140/80 - 206/85)  BP(mean): --  RR: 18 (09 Aug 2018 15:34) (17 - 18)  SpO2: 98% (09 Aug 2018 15:34) (96% - 98%)  I&O's Summary    08 Aug 2018 07:01  -  09 Aug 2018 07:00  --------------------------------------------------------  IN: 100 mL / OUT: 100 mL / NET: 0 mL    09 Aug 2018 07:01  -  09 Aug 2018 19:30  --------------------------------------------------------  IN: 950 mL / OUT: 0 mL / NET: 950 mL      GA: AAO x 3, no distress  EYES: EOMI, clear conj, anicteric sclera  ENT: MMM< clear OP, neck supple  LUNGS: CTABL, no wrc, normal effort  HEART: S1S2 normal, no mrg, no peripheral edema  ABD: soft, NT/ND/BS+, no periotneal signs  EXT: well perfused, no edema or cyanosis  NEURO: AAO x 3, sensation and motor intact  SKIN: warm and dry, no rash on visible skin    LABORATORY DATA:                        13.7   8.27  )-----------( 266      ( 09 Aug 2018 08:27 )             41.1         137  |  101  |  21  ----------------------------<  133<H>  3.3<L>   |  22  |  1.36<H>    Ca    9.7      09 Aug 2018 06:18  Phos  2.8     08-  Mg     2.2     08-08        Urinalysis Basic - ( 08 Aug 2018 18:12 )    Color: Yellow / Appearance: Slightly Turbid / S.016 / pH: x  Gluc: x / Ketone: Negative  / Bili: Negative / Urobili: Negative mg/dL   Blood: x / Protein: 300 mg/dL / Nitrite: Negative   Leuk Esterase: Negative / RBC: 3 /HPF / WBC 2 /HPF   Sq Epi: x / Non Sq Epi: 1 /HPF / Bacteria: Negative      IMAGING:  < from: US Duplex Kidneys (18 @ 10:41) >  IMPRESSION:     Atrophic left kidney, new since 2012. Increased cortical   echogenicity of the left kidney.    The origin, proximal, and hilum portions of the left renal artery could   not be directly visualized, and there was overall decreased flow in the   left kidney. Tardus parvus waveforms were noted in the segmental vessels   in the left kidney, suggestive of renal artery stenosis.    Increased cortical echogenicity and increased resistive indices of the   right kidney, suggestive of medical renal disease. No definite stenosis   in the right renal artery..    < end of copied text >    ASSESSMENT: This is a 64 y/o f w/ PMH of HTN, migraines, GERD, anxiety p/w uncontrolled HTN and headaches likely noncompliant complicated with RUPERTO for which nephrology is consulted;    1. RUPERTO on CKD 3; baseline Cr ~ 1.3; likely some vasoconstriction due to HTN - improved numbers  2. Hypertensive urgency resulting on impaired kidney function; left ALEXANDER on US and icnreased renin levels  3. Headache; ?HTN related, h/o migraines; management per primary team    RECOMMENDATIONS:  - Patient with left Renal Artery Stenosis and left kidney atrophy which is likely contributing to this blood pressure  - start Losartan 25 mg daily considering high renin level still produced by left atrophic kidney driving all this;  kidney function is stable   - emphasize compliance  - low salt diet  - avoid NSAIDs, ARBs, ACEIs or other nephrotoxins  - dose meds per GFR ~ 40 ml/min at present  - BMP daily while inpatient  Further recs based on the above results        RECOMMENDATIONS:    Reynaldo Flowers MD  [facility]  [phone]

## 2018-08-09 NOTE — PROGRESS NOTE ADULT - ASSESSMENT
63 year old female PMH of HTN (not compliant with medications), migraines, GERD, anxiety p/w uncontrolled HTN and headaches.     1. Uncontrolled HTN  SBP still elevated  cont coreg 25mg BID, nifedipine to 90mg BID, hydralazine, inc to 50mg tid, cont HCTZ 25 daily  restart arb when ok by renal   if not tolerating po and continue to vomit  then change hydral and bb to IV standing - -> lopressor 5mg IVP Q6 and Hydral 10mg IVP Q 6      2. cv stable   no chest pain or sob, no evidence of acute ischemia/ACS     3. RUPERTO  now resolved   renal f/u     4. Headaches  neuro f/u, head ct ok    dvt ppx

## 2018-08-09 NOTE — PROGRESS NOTE ADULT - SUBJECTIVE AND OBJECTIVE BOX
CARDIOLOGY FOLLOW UP NOTE - DR. DHILLON    Subjective:    resting comfortably  less headache  no cp, sob  events noted  micu eval noted      PHYSICAL EXAM:  T(C): 36.7 (08-09-18 @ 15:34), Max: 37.1 (08-08-18 @ 20:23)  HR: 71 (08-09-18 @ 15:34) (71 - 96)  BP: 175/78 (08-09-18 @ 15:34) (140/80 - 206/85)  RR: 18 (08-09-18 @ 15:34) (17 - 18)  SpO2: 98% (08-09-18 @ 15:34) (96% - 98%)  Wt(kg): --  I&O's Summary    08 Aug 2018 07:01  -  09 Aug 2018 07:00  --------------------------------------------------------  IN: 100 mL / OUT: 100 mL / NET: 0 mL    09 Aug 2018 07:01  -  09 Aug 2018 16:12  --------------------------------------------------------  IN: 950 mL / OUT: 0 mL / NET: 950 mL        Appearance: Normal	  Cardiovascular: Normal S1 S2,RRR, No JVD, No murmurs  Respiratory: Lungs clear to auscultation	  Gastrointestinal:  Soft, Non-tender, + BS	  Extremities: Normal range of motion, No clubbing, cyanosis or edema  Vascular: Peripheral pulses palpable 2+ bilaterally    MEDICATIONS  (STANDING):  carvedilol 25 milliGRAM(s) Oral every 12 hours  dextrose 5% + sodium chloride 0.45% with potassium chloride 20 mEq/L 1000 milliLiter(s) (75 mL/Hr) IV Continuous <Continuous>  heparin  Injectable 5000 Unit(s) SubCutaneous every 8 hours  hydrALAZINE 50 milliGRAM(s) Oral three times a day  hydrochlorothiazide 25 milliGRAM(s) Oral daily  NIFEdipine XL 90 milliGRAM(s) Oral <User Schedule>  pantoprazole    Tablet 40 milliGRAM(s) Oral before breakfast  potassium chloride    Tablet ER 40 milliEquivalent(s) Oral every 4 hours  sertraline 25 milliGRAM(s) Oral daily  topiramate 100 milliGRAM(s) Oral two times a day      TELEMETRY: 	    ECG:  	  RADIOLOGY:   DIAGNOSTIC TESTING:  [ ] Echocardiogram:  [ ] Catheterization:  [ ] Stress Test:    OTHER: 	    LABS:	 	    CARDIAC MARKERS:                                13.7   8.27  )-----------( 266      ( 09 Aug 2018 08:27 )             41.1     08-09    137  |  101  |  21  ----------------------------<  133<H>  3.3<L>   |  22  |  1.36<H>    Ca    9.7      09 Aug 2018 06:18  Phos  2.8     08-08  Mg     2.2     08-08      proBNP:   PT/INR - ( 09 Aug 2018 08:21 )   PT: 11.7 sec;   INR: 1.03 ratio         PTT - ( 09 Aug 2018 08:21 )  PTT:36.2 sec  Lipid Profile:   HgA1c:

## 2018-08-09 NOTE — PROGRESS NOTE ADULT - ASSESSMENT
· Assessment		  64 y/o f w/ PMH of HTN, migraines, GERD, anxiety p/w uncontrolled HTN and headaches    Hypertension poorly controlled. Cardiology follow. Nephrology follow.      Migraines.neurology consulted and recommended toradol, benadryl, and reglan q6hrs for now for control of symptoms  -cont topiramate. Will hold Toradol 2 to RUPERTO.    Epigastric pain.  Pain appears to be related to GERD, as symptoms related to food consumption, and location of pain in epigastric region; EKG not suggestive of ACS, and trop T negative  -cont pt's protonix  -cont maalox prn.       Depression.  Cont pt on home sertraline for anxiety/depression, and diazepam for control fo anxiety  -some concern that patient has had self-harm behavior; and pt does state that when her headaches are very bad, hat she wants to harm herself and not live - psych consult called for evaluation; will f/u psych recs.       RUPERTO (acute kidney injury) improving .  Pt's Cr elevated from baseline; will avoid nephrotoxins, hold off on celecoxib and losartan; monitor Cr on BMP. Nephrology follow.    Adrenal nodule- stable.      Need for prophylactic measure. hep sq.  Naldo Mendoza MD pager [phone]

## 2018-08-09 NOTE — PROGRESS NOTE ADULT - SUBJECTIVE AND OBJECTIVE BOX
Patient is a 63y old  Female who presents with a chief complaint of Hypertension (07 Aug 2018 11:52)      SUBJECTIVE / OVERNIGHT EVENTS:  Review of Systems  chest pain no  palpitations no  sob no  nausea no  headache no    MEDICATIONS  (STANDING):  carvedilol 25 milliGRAM(s) Oral every 12 hours  dextrose 5% + sodium chloride 0.45% with potassium chloride 20 mEq/L 1000 milliLiter(s) (75 mL/Hr) IV Continuous <Continuous>  heparin  Injectable 5000 Unit(s) SubCutaneous every 8 hours  hydrALAZINE 50 milliGRAM(s) Oral three times a day  hydrochlorothiazide 25 milliGRAM(s) Oral daily  NIFEdipine XL 90 milliGRAM(s) Oral <User Schedule>  pantoprazole    Tablet 40 milliGRAM(s) Oral before breakfast  potassium chloride    Tablet ER 40 milliEquivalent(s) Oral every 4 hours  sertraline 25 milliGRAM(s) Oral daily  topiramate 100 milliGRAM(s) Oral two times a day    MEDICATIONS  (PRN):  acetaminophen   Tablet. 650 milliGRAM(s) Oral every 6 hours PRN Mild Pain (1 - 3)  aluminum hydroxide/magnesium hydroxide/simethicone Suspension 30 milliLiter(s) Oral every 6 hours PRN Dyspepsia  diazepam    Tablet 2.5 milliGRAM(s) Oral at bedtime PRN anxiety  docusate sodium 100 milliGRAM(s) Oral three times a day PRN Constipation  metoclopramide Injectable 5 milliGRAM(s) IV Push every 8 hours PRN Nausea/Vomiting  polyethylene glycol 3350 17 Gram(s) Oral daily PRN Constipation  prochlorperazine   Injectable 10 milliGRAM(s) IV Push once PRN Severe n/v      Vital Signs Last 24 Hrs  T(C): 36.7 (09 Aug 2018 15:34), Max: 37.1 (08 Aug 2018 20:23)  T(F): 98.1 (09 Aug 2018 15:34), Max: 98.8 (08 Aug 2018 20:23)  HR: 71 (09 Aug 2018 15:34) (71 - 96)  BP: 180/90 (09 Aug 2018 17:45) (140/80 - 206/85)  BP(mean): --  RR: 18 (09 Aug 2018 15:34) (17 - 18)  SpO2: 98% (09 Aug 2018 15:34) (96% - 98%)    PHYSICAL EXAM:  GENERAL: NAD, well-developed  HEAD:  Atraumatic, Normocephalic  EYES: EOMI, PERRLA, conjunctiva and sclera clear  NECK: Supple, No JVD  CHEST/LUNG: Clear to auscultation bilaterally; No wheeze  HEART: Regular rate and rhythm; No murmurs, rubs, or gallops  ABDOMEN: Soft, Nontender, Nondistended; Bowel sounds present  EXTREMITIES:  2+ Peripheral Pulses, No clubbing, cyanosis, or edema  PSYCH: AAOx3  NEUROLOGY: non-focal  SKIN: No rashes or lesions    LABS:                        13.7   8.27  )-----------( 266      ( 09 Aug 2018 08:27 )             41.1     08-09    137  |  101  |  21  ----------------------------<  133<H>  3.3<L>   |  22  |  1.36<H>    Ca    9.7      09 Aug 2018 06:18  Phos  2.8     08-08  Mg     2.2     08-08      PT/INR - ( 09 Aug 2018 08:21 )   PT: 11.7 sec;   INR: 1.03 ratio         PTT - ( 09 Aug 2018 08:21 )  PTT:36.2 sec      Urinalysis Basic - ( 08 Aug 2018 18:12 )    Color: Yellow / Appearance: Slightly Turbid / S.016 / pH: x  Gluc: x / Ketone: Negative  / Bili: Negative / Urobili: Negative mg/dL   Blood: x / Protein: 300 mg/dL / Nitrite: Negative   Leuk Esterase: Negative / RBC: 3 /HPF / WBC 2 /HPF   Sq Epi: x / Non Sq Epi: 1 /HPF / Bacteria: Negative          RADIOLOGY & ADDITIONAL TESTS:    Imaging Personally Reviewed:    Consultant(s) Notes Reviewed:      Care Discussed with Consultants/Other Providers:

## 2018-08-10 VITALS
DIASTOLIC BLOOD PRESSURE: 70 MMHG | HEART RATE: 62 BPM | OXYGEN SATURATION: 98 % | TEMPERATURE: 98 F | SYSTOLIC BLOOD PRESSURE: 152 MMHG | RESPIRATION RATE: 18 BRPM

## 2018-08-10 LAB
ANION GAP SERPL CALC-SCNC: 11 MMOL/L — SIGNIFICANT CHANGE UP (ref 5–17)
BUN SERPL-MCNC: 15 MG/DL — SIGNIFICANT CHANGE UP (ref 7–23)
CALCIUM SERPL-MCNC: 9.2 MG/DL — SIGNIFICANT CHANGE UP (ref 8.4–10.5)
CHLORIDE SERPL-SCNC: 100 MMOL/L — SIGNIFICANT CHANGE UP (ref 96–108)
CO2 SERPL-SCNC: 22 MMOL/L — SIGNIFICANT CHANGE UP (ref 22–31)
CREAT SERPL-MCNC: 1.17 MG/DL — SIGNIFICANT CHANGE UP (ref 0.5–1.3)
GLUCOSE SERPL-MCNC: 118 MG/DL — HIGH (ref 70–99)
HCT VFR BLD CALC: 43.3 % — SIGNIFICANT CHANGE UP (ref 34.5–45)
HGB BLD-MCNC: 13.7 G/DL — SIGNIFICANT CHANGE UP (ref 11.5–15.5)
MAGNESIUM SERPL-MCNC: 1.9 MG/DL — SIGNIFICANT CHANGE UP (ref 1.6–2.6)
MCHC RBC-ENTMCNC: 28.8 PG — SIGNIFICANT CHANGE UP (ref 27–34)
MCHC RBC-ENTMCNC: 31.6 GM/DL — LOW (ref 32–36)
MCV RBC AUTO: 91.2 FL — SIGNIFICANT CHANGE UP (ref 80–100)
PHOSPHATE SERPL-MCNC: 1.8 MG/DL — LOW (ref 2.5–4.5)
PLATELET # BLD AUTO: 232 K/UL — SIGNIFICANT CHANGE UP (ref 150–400)
POTASSIUM SERPL-MCNC: 3.8 MMOL/L — SIGNIFICANT CHANGE UP (ref 3.5–5.3)
POTASSIUM SERPL-SCNC: 3.8 MMOL/L — SIGNIFICANT CHANGE UP (ref 3.5–5.3)
RBC # BLD: 4.75 M/UL — SIGNIFICANT CHANGE UP (ref 3.8–5.2)
RBC # FLD: 13.9 % — SIGNIFICANT CHANGE UP (ref 10.3–14.5)
SODIUM SERPL-SCNC: 133 MMOL/L — LOW (ref 135–145)
WBC # BLD: 6.55 K/UL — SIGNIFICANT CHANGE UP (ref 3.8–10.5)
WBC # FLD AUTO: 6.55 K/UL — SIGNIFICANT CHANGE UP (ref 3.8–10.5)

## 2018-08-10 RX ORDER — PROPRANOLOL HCL 160 MG
1 CAPSULE, EXTENDED RELEASE 24HR ORAL
Qty: 0 | Refills: 0 | COMMUNITY

## 2018-08-10 RX ORDER — DIAZEPAM 5 MG
2 TABLET ORAL ONCE
Qty: 0 | Refills: 0 | Status: DISCONTINUED | OUTPATIENT
Start: 2018-08-10 | End: 2018-08-10

## 2018-08-10 RX ORDER — POLYETHYLENE GLYCOL 3350 17 G/17G
17 POWDER, FOR SOLUTION ORAL
Qty: 0 | Refills: 0 | COMMUNITY
Start: 2018-08-10

## 2018-08-10 RX ORDER — CARVEDILOL PHOSPHATE 80 MG/1
1 CAPSULE, EXTENDED RELEASE ORAL
Qty: 60 | Refills: 0 | OUTPATIENT
Start: 2018-08-10 | End: 2018-09-08

## 2018-08-10 RX ORDER — HYDROMORPHONE HYDROCHLORIDE 2 MG/ML
1 INJECTION INTRAMUSCULAR; INTRAVENOUS; SUBCUTANEOUS ONCE
Qty: 0 | Refills: 0 | Status: DISCONTINUED | OUTPATIENT
Start: 2018-08-10 | End: 2018-08-10

## 2018-08-10 RX ORDER — HYDRALAZINE HCL 50 MG
1 TABLET ORAL
Qty: 90 | Refills: 0 | OUTPATIENT
Start: 2018-08-10 | End: 2018-09-08

## 2018-08-10 RX ORDER — ACETAMINOPHEN 500 MG
2 TABLET ORAL
Qty: 0 | Refills: 0 | COMMUNITY

## 2018-08-10 RX ORDER — NIFEDIPINE 30 MG
1 TABLET, EXTENDED RELEASE 24 HR ORAL
Qty: 60 | Refills: 0 | OUTPATIENT
Start: 2018-08-10

## 2018-08-10 RX ORDER — LOSARTAN POTASSIUM 100 MG/1
25 TABLET, FILM COATED ORAL DAILY
Qty: 0 | Refills: 0 | Status: DISCONTINUED | OUTPATIENT
Start: 2018-08-10 | End: 2018-08-10

## 2018-08-10 RX ORDER — ACETAMINOPHEN 500 MG
1000 TABLET ORAL ONCE
Qty: 0 | Refills: 0 | Status: COMPLETED | OUTPATIENT
Start: 2018-08-10 | End: 2018-08-10

## 2018-08-10 RX ADMIN — CARVEDILOL PHOSPHATE 25 MILLIGRAM(S): 80 CAPSULE, EXTENDED RELEASE ORAL at 05:08

## 2018-08-10 RX ADMIN — Medication 1000 MILLIGRAM(S): at 16:58

## 2018-08-10 RX ADMIN — CARVEDILOL PHOSPHATE 25 MILLIGRAM(S): 80 CAPSULE, EXTENDED RELEASE ORAL at 17:55

## 2018-08-10 RX ADMIN — HEPARIN SODIUM 5000 UNIT(S): 5000 INJECTION INTRAVENOUS; SUBCUTANEOUS at 05:08

## 2018-08-10 RX ADMIN — Medication 100 MILLIGRAM(S): at 17:56

## 2018-08-10 RX ADMIN — HYDROMORPHONE HYDROCHLORIDE 1 MILLIGRAM(S): 2 INJECTION INTRAMUSCULAR; INTRAVENOUS; SUBCUTANEOUS at 16:18

## 2018-08-10 RX ADMIN — Medication 25 MILLIGRAM(S): at 05:08

## 2018-08-10 RX ADMIN — HEPARIN SODIUM 5000 UNIT(S): 5000 INJECTION INTRAVENOUS; SUBCUTANEOUS at 20:23

## 2018-08-10 RX ADMIN — HYDROMORPHONE HYDROCHLORIDE 1 MILLIGRAM(S): 2 INJECTION INTRAMUSCULAR; INTRAVENOUS; SUBCUTANEOUS at 04:35

## 2018-08-10 RX ADMIN — HYDROMORPHONE HYDROCHLORIDE 1 MILLIGRAM(S): 2 INJECTION INTRAMUSCULAR; INTRAVENOUS; SUBCUTANEOUS at 05:40

## 2018-08-10 RX ADMIN — Medication 50 MILLIGRAM(S): at 05:08

## 2018-08-10 RX ADMIN — SERTRALINE 25 MILLIGRAM(S): 25 TABLET, FILM COATED ORAL at 12:59

## 2018-08-10 RX ADMIN — LOSARTAN POTASSIUM 25 MILLIGRAM(S): 100 TABLET, FILM COATED ORAL at 12:58

## 2018-08-10 RX ADMIN — Medication 90 MILLIGRAM(S): at 05:08

## 2018-08-10 RX ADMIN — HEPARIN SODIUM 5000 UNIT(S): 5000 INJECTION INTRAVENOUS; SUBCUTANEOUS at 12:58

## 2018-08-10 RX ADMIN — Medication 50 MILLIGRAM(S): at 14:32

## 2018-08-10 RX ADMIN — Medication 400 MILLIGRAM(S): at 14:32

## 2018-08-10 RX ADMIN — Medication 2 MILLIGRAM(S): at 12:58

## 2018-08-10 RX ADMIN — Medication 50 MILLIGRAM(S): at 20:23

## 2018-08-10 RX ADMIN — Medication 5 MILLIGRAM(S): at 03:32

## 2018-08-10 RX ADMIN — Medication 5 MILLIGRAM(S): at 16:17

## 2018-08-10 RX ADMIN — Medication 100 MILLIGRAM(S): at 05:08

## 2018-08-10 RX ADMIN — HYDROMORPHONE HYDROCHLORIDE 1 MILLIGRAM(S): 2 INJECTION INTRAMUSCULAR; INTRAVENOUS; SUBCUTANEOUS at 16:59

## 2018-08-10 RX ADMIN — Medication 90 MILLIGRAM(S): at 17:55

## 2018-08-10 RX ADMIN — PANTOPRAZOLE SODIUM 40 MILLIGRAM(S): 20 TABLET, DELAYED RELEASE ORAL at 05:08

## 2018-08-10 RX ADMIN — Medication 650 MILLIGRAM(S): at 03:30

## 2018-08-10 RX ADMIN — DEXTROSE MONOHYDRATE, SODIUM CHLORIDE, AND POTASSIUM CHLORIDE 75 MILLILITER(S): 50; .745; 4.5 INJECTION, SOLUTION INTRAVENOUS at 04:41

## 2018-08-10 NOTE — PROGRESS NOTE ADULT - SUBJECTIVE AND OBJECTIVE BOX
Nephrology Progress Note    No acute events overnight  Patient seen and evaluated this morning; still some nausea when eating but much improved headache; BP better    PHYSICAL EXAM    Vital Signs Last 24 Hrs  T(C): 36.8 (10 Aug 2018 05:05), Max: 36.8 (09 Aug 2018 21:33)  T(F): 98.3 (10 Aug 2018 05:05), Max: 98.3 (10 Aug 2018 04:32)  HR: 68 (10 Aug 2018 05:05) (66 - 71)  BP: 153/68 (10 Aug 2018 05:05) (153/68 - 180/90)  BP(mean): --  RR: 18 (10 Aug 2018 05:05) (18 - 18)  SpO2: 98% (10 Aug 2018 05:05) (96% - 98%)  I&O's Summary    09 Aug 2018 07:01  -  10 Aug 2018 07:00  --------------------------------------------------------  IN: 1050 mL / OUT: 0 mL / NET: 1050 mL    GA: AAO x 3, no distress  EYES: EOMI, clear conj, anicteric sclera  ENT: MMM< clear OP, neck supple  LUNGS: CTABL, no wrc, normal effort  HEART: S1S2 normal, no mrg, no peripheral edema  ABD: soft, NT/ND/BS+, no peritoneal signs  EXT: well perfused, no edema or cyanosis  NEURO: AAO x 3, sensation and motor intact  SKIN: warm and dry, no rash on visible skin    LABORATORY DATA:                        13.7   6.55  )-----------( 232      ( 10 Aug 2018 08:11 )             43.3     08-10    133<L>  |  100  |  15  ----------------------------<  118<H>  3.8   |  22  |  1.17    Ca    9.2      10 Aug 2018 06:54  Phos  1.8     08-10  Mg     1.9     08-10    IMAGING:  < from: US Duplex Kidneys (07.24.18 @ 10:41) >  IMPRESSION:     Atrophic left kidney, new since 4/19/2012. Increased cortical   echogenicity of the left kidney.    The origin, proximal, and hilum portions of the left renal artery could   not be directly visualized, and there was overall decreased flow in the   left kidney. Tardus parvus waveforms were noted in the segmental vessels   in the left kidney, suggestive of renal artery stenosis.    Increased cortical echogenicity and increased resistive indices of the   right kidney, suggestive of medical renal disease. No definite stenosis   in the right renal artery..    < end of copied text >    ASSESSMENT: This is a 64 y/o f w/ PMH of HTN, migraines, GERD, anxiety p/w uncontrolled HTN and headaches likely noncompliant complicated with RUPERTO for which nephrology is consulted;    1. RUPERTO on CKD 3; baseline Cr ~ 1.3; likely some vasoconstriction due to HTN - improved numbers  2. Hypertensive urgency resulting on impaired kidney function; left ALEXANDER on US and icnreased renin levels  3. Headache; ?HTN related, h/o migraines; management per primary team    RECOMMENDATIONS:  - Patient with left Renal Artery Stenosis and left kidney atrophy which is likely contributing to this blood pressure  - start Losartan 25 mg daily considering high renin level still produced by left atrophic kidney driving all this;  kidney function is stable   - continue other BP meds as is; would slowly increase losartan as outpatient to maximum if kidney function stable and titrate down other meds if needed  - emphasize compliance  - low salt diet  - avoid NSAIDs, ARBs, ACEIs or other nephrotoxins  - dose meds per GFR ~ 40 ml/min at present  - BMP daily while inpatient  Further recs based on the above results    Reynaldo Flowers MD  [facility]  [phone]

## 2018-08-10 NOTE — PROGRESS NOTE ADULT - SUBJECTIVE AND OBJECTIVE BOX
Patient is a 63y old  Female who presents with a chief complaint of Hypertension (07 Aug 2018 11:52)      SUBJECTIVE / OVERNIGHT EVENTS: had headache. Wants to go home.  Review of Systems  chest pain no  palpitations no  sob no  nausea no  headache no    MEDICATIONS  (STANDING):  carvedilol 25 milliGRAM(s) Oral every 12 hours  heparin  Injectable 5000 Unit(s) SubCutaneous every 8 hours  hydrALAZINE 50 milliGRAM(s) Oral three times a day  hydrochlorothiazide 25 milliGRAM(s) Oral daily  losartan 25 milliGRAM(s) Oral daily  NIFEdipine XL 90 milliGRAM(s) Oral <User Schedule>  pantoprazole    Tablet 40 milliGRAM(s) Oral before breakfast  sertraline 25 milliGRAM(s) Oral daily  topiramate 100 milliGRAM(s) Oral two times a day    MEDICATIONS  (PRN):  acetaminophen   Tablet. 650 milliGRAM(s) Oral every 6 hours PRN Mild Pain (1 - 3)  aluminum hydroxide/magnesium hydroxide/simethicone Suspension 30 milliLiter(s) Oral every 6 hours PRN Dyspepsia  diazepam    Tablet 2.5 milliGRAM(s) Oral at bedtime PRN anxiety  docusate sodium 100 milliGRAM(s) Oral three times a day PRN Constipation  metoclopramide Injectable 5 milliGRAM(s) IV Push every 8 hours PRN Nausea/Vomiting  polyethylene glycol 3350 17 Gram(s) Oral daily PRN Constipation  prochlorperazine   Injectable 10 milliGRAM(s) IV Push once PRN administer if nauseous & HA & if not due for her reglan yet. Severe n/v      Vital Signs Last 24 Hrs  T(C): 36.4 (10 Aug 2018 16:20), Max: 36.8 (09 Aug 2018 21:33)  T(F): 97.5 (10 Aug 2018 16:20), Max: 98.3 (10 Aug 2018 04:32)  HR: 94 (10 Aug 2018 16:20) (66 - 94)  BP: 149/75 (10 Aug 2018 16:20) (138/72 - 180/90)  BP(mean): --  RR: 18 (10 Aug 2018 16:20) (18 - 18)  SpO2: 96% (10 Aug 2018 16:20) (96% - 98%)    PHYSICAL EXAM:  GENERAL: NAD, well-developed  HEAD:  Atraumatic, Normocephalic  EYES: EOMI, PERRLA, conjunctiva and sclera clear  NECK: Supple, No JVD  CHEST/LUNG: Clear to auscultation bilaterally; No wheeze  HEART: Regular rate and rhythm; No murmurs, rubs, or gallops  ABDOMEN: Soft, Nontender, Nondistended; Bowel sounds present  EXTREMITIES:  2+ Peripheral Pulses, No clubbing, cyanosis, or edema  PSYCH: AAOx3  NEUROLOGY: non-focal  SKIN: No rashes or lesions    LABS:                        13.7   6.55  )-----------( 232      ( 10 Aug 2018 08:11 )             43.3     08-10    133<L>  |  100  |  15  ----------------------------<  118<H>  3.8   |  22  |  1.17    Ca    9.2      10 Aug 2018 06:54  Phos  1.8     08-10  Mg     1.9     08-10      PT/INR - ( 09 Aug 2018 08:21 )   PT: 11.7 sec;   INR: 1.03 ratio         PTT - ( 09 Aug 2018 08:21 )  PTT:36.2 sec      Urinalysis Basic - ( 08 Aug 2018 18:12 )    Color: Yellow / Appearance: Slightly Turbid / S.016 / pH: x  Gluc: x / Ketone: Negative  / Bili: Negative / Urobili: Negative mg/dL   Blood: x / Protein: 300 mg/dL / Nitrite: Negative   Leuk Esterase: Negative / RBC: 3 /HPF / WBC 2 /HPF   Sq Epi: x / Non Sq Epi: 1 /HPF / Bacteria: Negative          RADIOLOGY & ADDITIONAL TESTS:    Imaging Personally Reviewed:    Consultant(s) Notes Reviewed:      Care Discussed with Consultants/Other Providers:

## 2018-08-10 NOTE — DIETITIAN INITIAL EVALUATION ADULT. - OTHER INFO
Patient seen for nutrition consult. Pt reports UBW as around 110 pounds, per most recent RD note (7/27/18) pt noted with weight of 116 pounds. Current dosing weight noted as 105 pounds (bed), question accuracy of weight change ( fluid shifts vs bed scale discrepancies), will continue to monitor weights in-house. Pt continues to endorse decreased PO intake during admission, had only orange juice yesterday which pt reports "was a mistake" due to nausea and GERD.  Pt endorses good appetite now, but stays she will "take it slow" with her meals and does not want eat too much. Pt reports improvement in nausea and vomiting, reports no emesis since yesterday.  Pt encouraged to consume smaller more frequent meals, consisting of bland foods and avoiding high fat foods. Denies diarrhea, constipation.

## 2018-08-10 NOTE — PROGRESS NOTE ADULT - PROVIDER SPECIALTY LIST ADULT
Cardiology
Internal Medicine
Internal Medicine
Nephrology
Nephrology
Neurology
Internal Medicine
Cardiology
Internal Medicine

## 2018-08-10 NOTE — PROGRESS NOTE ADULT - ASSESSMENT
63 year old female PMH of HTN (not compliant with medications), migraines, GERD, anxiety p/w uncontrolled HTN and headaches.     1. Uncontrolled HTN  SBP improved, pt. able to tolerate PO   cont coreg 25mg BID, nifedipine 90mg BID, hydralazine 50mg tid, HCTZ 25 daily, losartan 25MG daily   Echo - normal LV function, EF 65%, mod AR     2. cv stable   no chest pain or sob, no evidence of acute ischemia/ACS     3. RUPERTO  now resolved   renal f/u     4. Headaches  neuro f/u, head ct ok    dvt ppx  if bp continues to improve/remain stable, d/c planning per primary team

## 2018-08-10 NOTE — PROGRESS NOTE ADULT - ASSESSMENT
· Assessment		  62 y/o f w/ PMH of HTN, migraines, GERD, anxiety p/w uncontrolled HTN and headaches    Hypertension better controlled. Cardiology follow. Nephrology follow.      Migraines.neurology consulted and recommended toradol, benadryl, and reglan q6hrs for now for control of symptoms  -cont topiramate. Will hold Toradol 2 to RUPERTO.    Epigastric pain.  Pain appears to be related to GERD, as symptoms related to food consumption, and location of pain in epigastric region; EKG not suggestive of ACS, and trop T negative  -cont pt's protonix  -cont maalox prn.       Depression.  Cont pt on home sertraline for anxiety/depression, and diazepam for control fo anxiety  -some concern that patient has had self-harm behavior; and pt does state that when her headaches are very bad, hat she wants to harm herself and not live - psych consult called for evaluation; will f/u psych recs.       RUPERTO (acute kidney injury) improving .  Pt's Cr elevated from baseline; will avoid nephrotoxins, hold off on celecoxib and losartan; monitor Cr on BMP. Nephrology follow.    Adrenal nodule- stable.      Need for prophylactic measure. hep sq.    DC home. Follow with PMD/ Neurology/ Cardiology in 3 days.  Naldo Mendoza MD pager [phone]

## 2018-08-10 NOTE — DIETITIAN INITIAL EVALUATION ADULT. - PROBLEM SELECTOR PLAN 5
Pt's Cr elevated from baseline; will avoid nephrotoxins, hold off on celecoxib and losartan; monitor Cr on BMP

## 2018-08-10 NOTE — PROGRESS NOTE ADULT - SUBJECTIVE AND OBJECTIVE BOX
CARDIOLOGY FOLLOW UP - Dr. Hernandez    CC no cp/sob   resting comfortably without headache.     PHYSICAL EXAM:  T(C): 36.8 (08-10-18 @ 05:05), Max: 36.8 (08-09-18 @ 21:33)  HR: 68 (08-10-18 @ 05:05) (66 - 71)  BP: 153/68 (08-10-18 @ 05:05) (153/68 - 180/90)  RR: 18 (08-10-18 @ 05:05) (18 - 18)  SpO2: 98% (08-10-18 @ 05:05) (96% - 98%)  Wt(kg): --  I&O's Summary    09 Aug 2018 07:01  -  10 Aug 2018 07:00  --------------------------------------------------------  IN: 1050 mL / OUT: 0 mL / NET: 1050 mL        Appearance: Normal	  Cardiovascular: Normal S1 S2,RRR, No JVD, No murmurs  Respiratory: Lungs clear to auscultation	  Gastrointestinal:  Soft, Non-tender, + BS	  Extremities: Normal range of motion, No clubbing, cyanosis or edema        MEDICATIONS  (STANDING):  carvedilol 25 milliGRAM(s) Oral every 12 hours  dextrose 5% + sodium chloride 0.45% with potassium chloride 20 mEq/L 1000 milliLiter(s) (75 mL/Hr) IV Continuous <Continuous>  heparin  Injectable 5000 Unit(s) SubCutaneous every 8 hours  hydrALAZINE 50 milliGRAM(s) Oral three times a day  hydrochlorothiazide 25 milliGRAM(s) Oral daily  losartan 25 milliGRAM(s) Oral daily  NIFEdipine XL 90 milliGRAM(s) Oral <User Schedule>  pantoprazole    Tablet 40 milliGRAM(s) Oral before breakfast  sertraline 25 milliGRAM(s) Oral daily  topiramate 100 milliGRAM(s) Oral two times a day      TELEMETRY: NSR 50-60	    ECG:  	  RADIOLOGY:   DIAGNOSTIC TESTING:  [ ] Echocardiogram: < from: Transthoracic Echocardiogram (08.09.18 @ 20:13) >  Conclusions:  1. Mitral annular calcification, otherwise normal mitral  valve.  2. Calcified trileaflet aortic valve with normal opening.  Moderate aortic regurgitation.  3. Mild concentric left ventricular hypertrophy.  4. Normal left ventricular systolic function. No segmental  wall motion abnormalities.  5. Increased E/e'  is consistent with elevated left  ventricular filling pressure.  6. Normal right ventricular size and function.  7. Normal pericardium with trace pericardial effusion.  *** Compared with echocardiogram of 1/1/2016, worsened AR.    < end of copied text >  < from: Transthoracic Echocardiogram (08.09.18 @ 20:13) >  EF (Teicholtz): 65 %    < end of copied text >    [ ]  Catheterization:  [ ] Stress Test:    OTHER: 	    LABS:	 	                                13.7   6.55  )-----------( 232      ( 10 Aug 2018 08:11 )             43.3     08-10    133<L>  |  100  |  15  ----------------------------<  118<H>  3.8   |  22  |  1.17    Ca    9.2      10 Aug 2018 06:54  Phos  1.8     08-10  Mg     1.9     08-10      PT/INR - ( 09 Aug 2018 08:21 )   PT: 11.7 sec;   INR: 1.03 ratio         PTT - ( 09 Aug 2018 08:21 )  PTT:36.2 sec

## 2018-08-10 NOTE — DIETITIAN INITIAL EVALUATION ADULT. - ENERGY NEEDS
Ht: 62inches, Wt: 105lbs, BMI: 19.5kg/m2, IBW: 110lbs +/- 10%, %IBW: 95%  Edema: none, Skin: free of pressure injuries   Pertinent Information: This is a 63 year old F with PMH of HTN, migraines, GERD, anxiety, depression presenting with uncontrolled HTN (non adherent with medications) and headaches complicated with RUPERTO. Nausea now improved. Discharge planning.

## 2018-08-10 NOTE — DIETITIAN INITIAL EVALUATION ADULT. - NUTRITION INTERVENTIONS
yogurt, health shakes 2x per day/nutrient dense snacks/food preferences as requested by patient (specify)

## 2018-08-10 NOTE — DIETITIAN INITIAL EVALUATION ADULT. - NS AS NUTRI INTERV MEALS SNACK
Continue current diet as tolerated. Encourage consumption of small frequent meals, consisting of bland foods./General/healthful diet

## 2018-08-10 NOTE — DIETITIAN INITIAL EVALUATION ADULT. - ORAL INTAKE PTA
Pt reports decreased PO intake x 6 days due to decreased appetite in setting of nausea and vomiting. Pt reports she only able to tolerate oatmeal and yogurt. PTA nausea, vomiting pt reports good PO intake with occasional nausea from headache. Confirms NKFA. Pt note with GERD, stay away from acidic and fatty foods. Pt denies chewing/swallowing difficulty, diarrhea, constipation PTA. No micronutrient supplementation./poor

## 2018-08-10 NOTE — CHART NOTE - NSCHARTNOTEFT_GEN_A_CORE
Medicine NP Note     AUGUSTUS FLORES  MRN-[id number]  Allergies    penicillin (Anaphylaxis)    Intolerances     Called to evaluate patient for 8/10 Migraine HA. Pt states headaches are similar to previous migraines. Pt states headaches come and go. Pain to top of head with associated photophobia and nausea. Pt states she takes butalbital/acetominophin and reglan at home when she feels headache coming on. Pt states she feels the headache has progressed to far at this time. Requesting dilaudid as it has helped in the past. No current vomiting, no SOB, No chest pain.     Vital Signs Last 24 Hrs  T(C): 36.4 (08-10-18 @ 16:20), Max: 36.8 (18 @ 21:33)  T(F): 97.5 (08-10-18 @ 16:20), Max: 98.3 (08-10-18 @ 04:32)  HR: 94 (08-10-18 @ 16:20) (66 - 94)  BP: 149/75 (08-10-18 @ 16:20) (138/72 - 180/90)  BP(mean): --  RR: 18 (08-10-18 @ 16:20) (18 - 18)  SpO2: 96% (08-10-18 @ 16:20) (96% - 98%)  Daily     Daily Weight in k.8 (10 Aug 2018 12:19)  I&O's Summary    09 Aug 2018 07:  -  10 Aug 2018 07:00  --------------------------------------------------------  IN: 1050 mL / OUT: 0 mL / NET: 1050 mL    10 Aug 2018 07:01  -  10 Aug 2018 17:12  --------------------------------------------------------  IN: 500 mL / OUT: 1000 mL / NET: -500 mL      CAPILLARY BLOOD GLUCOSE                          13.7   6.55  )-----------( 232      ( 10 Aug 2018 08:11 )             43.3     08-10    133<L>  |  100  |  15  ----------------------------<  118<H>  3.8   |  22  |  1.17    Ca    9.2      10 Aug 2018 06:54  Phos  1.8     08-10  Mg     1.9     08-10      PT/INR - ( 09 Aug 2018 08:21 )   PT: 11.7 sec;   INR: 1.03 ratio         PTT - ( 09 Aug 2018 08:21 )  PTT:36.2 sec          Radiology:< from: CT Head No Cont (18 @ 20:37) >No acute hemorrhage, mass effect, or midline shift.    PHYSICAL EXAM:  GENERAL: holding head, distress  EYES: EOMI, PERRLA, conjunctiva and sclera clear  CHEST/LUNG: Clear to auscultation bilaterally; No wheeze  HEART: Regular rate and rhythm; No murmurs, rubs, or gallops  ABDOMEN: Soft, Nontender, Nondistended; Bowel sounds present  EXTREMITIES: no edema  PSYCH: AAOx3  NEUROLOGY: non-focal      Assessment/Plan: HPI:  64 y/o f w/ PMH of HTN, migraines, GERD, anxiety admitted 18 with uncontrolled HTN and headaches, now with persistent HA.     1. HA - BP within normal levels. Dilaudid and reglan x 1 now. Pt had a CT head 18 without findings. She is followed by a Neurologist for migraines. Discussed with Dr. Mendoza. If patient condition improves will plan on discharge today and patient to follow with her neurologist. Pt does not want to see a neurologist here.       2. HTN- continue current medications.

## 2018-08-11 LAB — RENIN PLAS-CCNC: 11.48 NG/ML/HR — HIGH (ref 0.17–5.38)

## 2018-08-20 ENCOUNTER — INPATIENT (INPATIENT)
Facility: HOSPITAL | Age: 63
LOS: 1 days | Discharge: HOME | DRG: 312 | End: 2018-08-22
Attending: INTERNAL MEDICINE | Admitting: INTERNAL MEDICINE
Payer: MEDICAID

## 2018-08-20 VITALS
RESPIRATION RATE: 16 BRPM | TEMPERATURE: 98 F | HEIGHT: 61 IN | SYSTOLIC BLOOD PRESSURE: 104 MMHG | OXYGEN SATURATION: 97 % | DIASTOLIC BLOOD PRESSURE: 60 MMHG | WEIGHT: 104.94 LBS | HEART RATE: 52 BPM

## 2018-08-20 DIAGNOSIS — F41.9 ANXIETY DISORDER, UNSPECIFIED: ICD-10-CM

## 2018-08-20 DIAGNOSIS — Z29.9 ENCOUNTER FOR PROPHYLACTIC MEASURES, UNSPECIFIED: ICD-10-CM

## 2018-08-20 DIAGNOSIS — K59.00 CONSTIPATION, UNSPECIFIED: ICD-10-CM

## 2018-08-20 DIAGNOSIS — E87.6 HYPOKALEMIA: ICD-10-CM

## 2018-08-20 DIAGNOSIS — F32.9 MAJOR DEPRESSIVE DISORDER, SINGLE EPISODE, UNSPECIFIED: ICD-10-CM

## 2018-08-20 DIAGNOSIS — G40.909 EPILEPSY, UNSPECIFIED, NOT INTRACTABLE, WITHOUT STATUS EPILEPTICUS: ICD-10-CM

## 2018-08-20 DIAGNOSIS — R55 SYNCOPE AND COLLAPSE: ICD-10-CM

## 2018-08-20 DIAGNOSIS — I10 ESSENTIAL (PRIMARY) HYPERTENSION: ICD-10-CM

## 2018-08-20 DIAGNOSIS — G43.909 MIGRAINE, UNSPECIFIED, NOT INTRACTABLE, WITHOUT STATUS MIGRAINOSUS: ICD-10-CM

## 2018-08-20 LAB
ALBUMIN SERPL ELPH-MCNC: 3.3 G/DL — SIGNIFICANT CHANGE UP (ref 3.3–5)
ALP SERPL-CCNC: 60 U/L — SIGNIFICANT CHANGE UP (ref 40–120)
ALT FLD-CCNC: 18 U/L — SIGNIFICANT CHANGE UP (ref 12–78)
ANION GAP SERPL CALC-SCNC: 13 MMOL/L — SIGNIFICANT CHANGE UP (ref 5–17)
APTT BLD: 23.5 SEC — LOW (ref 27.5–37.4)
AST SERPL-CCNC: 19 U/L — SIGNIFICANT CHANGE UP (ref 15–37)
BASOPHILS # BLD AUTO: 0.04 K/UL — SIGNIFICANT CHANGE UP (ref 0–0.2)
BASOPHILS NFR BLD AUTO: 0.4 % — SIGNIFICANT CHANGE UP (ref 0–2)
BILIRUB SERPL-MCNC: 0.6 MG/DL — SIGNIFICANT CHANGE UP (ref 0.2–1.2)
BUN SERPL-MCNC: 19 MG/DL — SIGNIFICANT CHANGE UP (ref 7–23)
CALCIUM SERPL-MCNC: 9 MG/DL — SIGNIFICANT CHANGE UP (ref 8.5–10.1)
CHLORIDE SERPL-SCNC: 92 MMOL/L — LOW (ref 96–108)
CK MB CFR SERPL CALC: <1 NG/ML — SIGNIFICANT CHANGE UP (ref 0–3.6)
CO2 SERPL-SCNC: 29 MMOL/L — SIGNIFICANT CHANGE UP (ref 22–31)
CREAT SERPL-MCNC: 1.6 MG/DL — HIGH (ref 0.5–1.3)
EOSINOPHIL # BLD AUTO: 0.17 K/UL — SIGNIFICANT CHANGE UP (ref 0–0.5)
EOSINOPHIL NFR BLD AUTO: 1.8 % — SIGNIFICANT CHANGE UP (ref 0–6)
GLUCOSE SERPL-MCNC: 130 MG/DL — HIGH (ref 70–99)
HCT VFR BLD CALC: 39.5 % — SIGNIFICANT CHANGE UP (ref 34.5–45)
HGB BLD-MCNC: 14 G/DL — SIGNIFICANT CHANGE UP (ref 11.5–15.5)
IMM GRANULOCYTES NFR BLD AUTO: 0.5 % — SIGNIFICANT CHANGE UP (ref 0–1.5)
INR BLD: 1.11 RATIO — SIGNIFICANT CHANGE UP (ref 0.88–1.16)
LYMPHOCYTES # BLD AUTO: 1.93 K/UL — SIGNIFICANT CHANGE UP (ref 1–3.3)
LYMPHOCYTES # BLD AUTO: 19.9 % — SIGNIFICANT CHANGE UP (ref 13–44)
MAGNESIUM SERPL-MCNC: 2.5 MG/DL — SIGNIFICANT CHANGE UP (ref 1.6–2.6)
MCHC RBC-ENTMCNC: 30.2 PG — SIGNIFICANT CHANGE UP (ref 27–34)
MCHC RBC-ENTMCNC: 35.4 GM/DL — SIGNIFICANT CHANGE UP (ref 32–36)
MCV RBC AUTO: 85.1 FL — SIGNIFICANT CHANGE UP (ref 80–100)
MONOCYTES # BLD AUTO: 1.07 K/UL — HIGH (ref 0–0.9)
MONOCYTES NFR BLD AUTO: 11 % — SIGNIFICANT CHANGE UP (ref 2–14)
NEUTROPHILS # BLD AUTO: 6.45 K/UL — SIGNIFICANT CHANGE UP (ref 1.8–7.4)
NEUTROPHILS NFR BLD AUTO: 66.4 % — SIGNIFICANT CHANGE UP (ref 43–77)
NRBC # BLD: 0 /100 WBCS — SIGNIFICANT CHANGE UP (ref 0–0)
NT-PROBNP SERPL-SCNC: 1524 PG/ML — HIGH (ref 0–125)
PLATELET # BLD AUTO: 281 K/UL — SIGNIFICANT CHANGE UP (ref 150–400)
POTASSIUM SERPL-MCNC: 2.7 MMOL/L — CRITICAL LOW (ref 3.5–5.3)
POTASSIUM SERPL-SCNC: 2.7 MMOL/L — CRITICAL LOW (ref 3.5–5.3)
PROT SERPL-MCNC: 7 G/DL — SIGNIFICANT CHANGE UP (ref 6–8.3)
PROTHROM AB SERPL-ACNC: 12.1 SEC — SIGNIFICANT CHANGE UP (ref 9.8–12.7)
RBC # BLD: 4.64 M/UL — SIGNIFICANT CHANGE UP (ref 3.8–5.2)
RBC # FLD: 12.7 % — SIGNIFICANT CHANGE UP (ref 10.3–14.5)
SODIUM SERPL-SCNC: 134 MMOL/L — LOW (ref 135–145)
TROPONIN I SERPL-MCNC: 0.03 NG/ML — SIGNIFICANT CHANGE UP (ref 0.01–0.04)
WBC # BLD: 9.71 K/UL — SIGNIFICANT CHANGE UP (ref 3.8–10.5)
WBC # FLD AUTO: 9.71 K/UL — SIGNIFICANT CHANGE UP (ref 3.8–10.5)

## 2018-08-20 PROCEDURE — 93010 ELECTROCARDIOGRAM REPORT: CPT

## 2018-08-20 PROCEDURE — 73610 X-RAY EXAM OF ANKLE: CPT | Mod: 26,LT

## 2018-08-20 PROCEDURE — 71045 X-RAY EXAM CHEST 1 VIEW: CPT | Mod: 26

## 2018-08-20 PROCEDURE — 74176 CT ABD & PELVIS W/O CONTRAST: CPT | Mod: 26

## 2018-08-20 PROCEDURE — 70450 CT HEAD/BRAIN W/O DYE: CPT | Mod: 26

## 2018-08-20 PROCEDURE — 99285 EMERGENCY DEPT VISIT HI MDM: CPT

## 2018-08-20 RX ORDER — NIFEDIPINE 30 MG
90 TABLET, EXTENDED RELEASE 24 HR ORAL DAILY
Qty: 0 | Refills: 0 | Status: DISCONTINUED | OUTPATIENT
Start: 2018-08-21 | End: 2018-08-22

## 2018-08-20 RX ORDER — SERTRALINE 25 MG/1
25 TABLET, FILM COATED ORAL DAILY
Qty: 0 | Refills: 0 | Status: DISCONTINUED | OUTPATIENT
Start: 2018-08-20 | End: 2018-08-22

## 2018-08-20 RX ORDER — PANTOPRAZOLE SODIUM 20 MG/1
40 TABLET, DELAYED RELEASE ORAL
Qty: 0 | Refills: 0 | Status: DISCONTINUED | OUTPATIENT
Start: 2018-08-20 | End: 2018-08-22

## 2018-08-20 RX ORDER — ALPRAZOLAM 0.25 MG
0.5 TABLET ORAL AT BEDTIME
Qty: 0 | Refills: 0 | Status: DISCONTINUED | OUTPATIENT
Start: 2018-08-20 | End: 2018-08-22

## 2018-08-20 RX ORDER — ACETAMINOPHEN 500 MG
650 TABLET ORAL EVERY 6 HOURS
Qty: 0 | Refills: 0 | Status: DISCONTINUED | OUTPATIENT
Start: 2018-08-20 | End: 2018-08-22

## 2018-08-20 RX ORDER — ONDANSETRON 8 MG/1
4 TABLET, FILM COATED ORAL EVERY 6 HOURS
Qty: 0 | Refills: 0 | Status: DISCONTINUED | OUTPATIENT
Start: 2018-08-20 | End: 2018-08-22

## 2018-08-20 RX ORDER — DOCUSATE SODIUM 100 MG
100 CAPSULE ORAL THREE TIMES A DAY
Qty: 0 | Refills: 0 | Status: DISCONTINUED | OUTPATIENT
Start: 2018-08-20 | End: 2018-08-22

## 2018-08-20 RX ORDER — KETOROLAC TROMETHAMINE 30 MG/ML
15 SYRINGE (ML) INJECTION ONCE
Qty: 0 | Refills: 0 | Status: DISCONTINUED | OUTPATIENT
Start: 2018-08-20 | End: 2018-08-20

## 2018-08-20 RX ORDER — POTASSIUM CHLORIDE 20 MEQ
10 PACKET (EA) ORAL
Qty: 0 | Refills: 0 | Status: COMPLETED | OUTPATIENT
Start: 2018-08-20 | End: 2018-08-20

## 2018-08-20 RX ORDER — HYDRALAZINE HCL 50 MG
50 TABLET ORAL EVERY 8 HOURS
Qty: 0 | Refills: 0 | Status: DISCONTINUED | OUTPATIENT
Start: 2018-08-20 | End: 2018-08-22

## 2018-08-20 RX ORDER — HEPARIN SODIUM 5000 [USP'U]/ML
5000 INJECTION INTRAVENOUS; SUBCUTANEOUS EVERY 12 HOURS
Qty: 0 | Refills: 0 | Status: DISCONTINUED | OUTPATIENT
Start: 2018-08-20 | End: 2018-08-22

## 2018-08-20 RX ORDER — ACETAMINOPHEN 500 MG
650 TABLET ORAL EVERY 6 HOURS
Qty: 0 | Refills: 0 | Status: DISCONTINUED | OUTPATIENT
Start: 2018-08-20 | End: 2018-08-21

## 2018-08-20 RX ORDER — SODIUM CHLORIDE 9 MG/ML
1000 INJECTION INTRAMUSCULAR; INTRAVENOUS; SUBCUTANEOUS ONCE
Qty: 0 | Refills: 0 | Status: COMPLETED | OUTPATIENT
Start: 2018-08-20 | End: 2018-08-20

## 2018-08-20 RX ORDER — POTASSIUM CHLORIDE 20 MEQ
40 PACKET (EA) ORAL ONCE
Qty: 0 | Refills: 0 | Status: COMPLETED | OUTPATIENT
Start: 2018-08-20 | End: 2018-08-20

## 2018-08-20 RX ORDER — TOPIRAMATE 25 MG
100 TABLET ORAL
Qty: 0 | Refills: 0 | Status: DISCONTINUED | OUTPATIENT
Start: 2018-08-20 | End: 2018-08-22

## 2018-08-20 RX ORDER — SODIUM CHLORIDE 9 MG/ML
1000 INJECTION INTRAMUSCULAR; INTRAVENOUS; SUBCUTANEOUS
Qty: 0 | Refills: 0 | Status: DISCONTINUED | OUTPATIENT
Start: 2018-08-20 | End: 2018-08-22

## 2018-08-20 RX ORDER — TRAMADOL HYDROCHLORIDE 50 MG/1
50 TABLET ORAL EVERY 6 HOURS
Qty: 0 | Refills: 0 | Status: DISCONTINUED | OUTPATIENT
Start: 2018-08-20 | End: 2018-08-22

## 2018-08-20 RX ORDER — ONDANSETRON 8 MG/1
4 TABLET, FILM COATED ORAL ONCE
Qty: 0 | Refills: 0 | Status: COMPLETED | OUTPATIENT
Start: 2018-08-20 | End: 2018-08-20

## 2018-08-20 RX ADMIN — Medication 15 MILLIGRAM(S): at 18:10

## 2018-08-20 RX ADMIN — Medication 15 MILLIGRAM(S): at 17:23

## 2018-08-20 RX ADMIN — ONDANSETRON 4 MILLIGRAM(S): 8 TABLET, FILM COATED ORAL at 09:33

## 2018-08-20 RX ADMIN — Medication 15 MILLIGRAM(S): at 09:34

## 2018-08-20 RX ADMIN — Medication 15 MILLIGRAM(S): at 16:27

## 2018-08-20 RX ADMIN — SODIUM CHLORIDE 75 MILLILITER(S): 9 INJECTION INTRAMUSCULAR; INTRAVENOUS; SUBCUTANEOUS at 17:23

## 2018-08-20 RX ADMIN — Medication 100 MILLIEQUIVALENT(S): at 10:30

## 2018-08-20 RX ADMIN — Medication 100 MILLIGRAM(S): at 20:07

## 2018-08-20 RX ADMIN — Medication 40 MILLIEQUIVALENT(S): at 14:54

## 2018-08-20 RX ADMIN — Medication 100 MILLIGRAM(S): at 14:54

## 2018-08-20 RX ADMIN — Medication 0.5 MILLIGRAM(S): at 21:59

## 2018-08-20 RX ADMIN — Medication 40 MILLIEQUIVALENT(S): at 10:59

## 2018-08-20 RX ADMIN — Medication 650 MILLIGRAM(S): at 17:03

## 2018-08-20 RX ADMIN — HEPARIN SODIUM 5000 UNIT(S): 5000 INJECTION INTRAVENOUS; SUBCUTANEOUS at 17:23

## 2018-08-20 RX ADMIN — ONDANSETRON 4 MILLIGRAM(S): 8 TABLET, FILM COATED ORAL at 16:45

## 2018-08-20 RX ADMIN — Medication 650 MILLIGRAM(S): at 16:45

## 2018-08-20 RX ADMIN — SODIUM CHLORIDE 1000 MILLILITER(S): 9 INJECTION INTRAMUSCULAR; INTRAVENOUS; SUBCUTANEOUS at 09:35

## 2018-08-20 RX ADMIN — SERTRALINE 25 MILLIGRAM(S): 25 TABLET, FILM COATED ORAL at 12:56

## 2018-08-20 RX ADMIN — SODIUM CHLORIDE 1000 MILLILITER(S): 9 INJECTION INTRAMUSCULAR; INTRAVENOUS; SUBCUTANEOUS at 12:02

## 2018-08-20 NOTE — H&P ADULT - ASSESSMENT
63 female with hx of HTN , SHINGLES, OSTEOPOROSIS , SZ ,PERICARDITIS ,CONSTIPATION ,MIGRAINES presents to ER by ambulance with report of syncope. Patient states she was recently discharged from [facility] for elevated blood pressure, states that after she was discharged home , she was not feeling well, not eating, having headaches and vomiting, today patient got out of bed and felt light headed and dizzy and fell to the ground, witnessed, unresponsive for 30 seconds, c/o headache, left ankle pain, weakness.Family states patient had LOC but no sz activity noted .Patient denies cp ,sob palpitations ,vertigo Seen by neurologist and cardiologist ,admitted to rule out cva and ami Admit to telemetry unit for monitoring,send 3 sets of cardiac ensymes to rule out coronary event,obtain ECHO to evaluate LVEF,cardiology consult,continue current managmnet,O2 supply,anticoagulation plan as per cardiology consult

## 2018-08-20 NOTE — H&P ADULT - PMH
Migraines    Osteoporosis    Pericarditis    Seizure disorder    Shingles Anxiety    Constipation    Depression    HTN (hypertension)    Migraines    Osteoporosis    Pericarditis    Seizure disorder    Shingles

## 2018-08-20 NOTE — CONSULT NOTE ADULT - CONSULT REQUESTED BY NAME
OPERATIVE REPORT    Patient Name:   Demetrio Liao Surgeon:   Amy Alva MD  :   1958 Dictated by:   Amy Alva MD  MRN #:   [id number] PCP:  Vickey MATOS   Date of Surgery:  8/10/2018 Referring:   Chapin Head MD       PREOPERATIVE DIAGNOSES:   1. Torn medial meniscus,    knee. 2. Synovitis left kneeknee. 3. Chondromalacia left knee  3. Tibial subchondral fracture       POSTOPERATIVE DIAGNOSES:   1. Torn medial meniscus, left knee   2. Synovitisleft knee   3. Chondromalacia  left knee  MFC, MTP and TROCHLEA. PROCEDURES PERFORMED:   1. Arthroscopy of left knee knee with partial menisectomy   2. Major Synovectomy - 3 compartments, left knee    3. Chondroplasty of the [address] and MTP.  left knee. 4. Bone marrow aspiration  5. Needle localization in the tibia and femur  6. Chondroplasty of the medial tibia and distal femur         SURGEON:   Amy Alva M.D. ANESTHESIA:  General.       DETAILS OF PROCEDURE:  The patient was given preop medication and brought to the operating room where the surgery was again confirmed, as scheduled for the left knee. The patient was then placed on the table, given general anesthesia  (and intubated). A tourniquet was placed around the proximal  thigh and the leg was placed into a \"Surgical Assistant\" leg oneil. The  knee was then prepped with left Betadine and alcohol and then injected with 60 mL of 0.25% Marcaine with epinephrine. The knee was then prepped with DuraPrep and draped in the usual manner. The tourniquet was inflated.)   After exsanguination with an Esmarch bandage, the tourniquet was inflated to a level of 300 mmHg.)  A time-out confirmation was performed, according to the universal protocol. Appropriate antibiotics were given prior to the start of the surgery.)   Arthroscopy was then carried out through the inferolateral portal; saline inflow was superomedial, and an inferomedial portal was also utilized. MD The suprapatellar pouch was examined first.  noneloose bodies were seen. The patella was found to have Grade II chondromalacia of theLateral  facet(s). The superior compartment had  significant synovitis and fat pad hypertrophy. The medial compartment was then examined. There were multiple loose bodies. The anterior horn and body of the meniscus wereintact  The posterior horn was found to bedegenerative and flap The MFC had  Grade III chondromalacia of the weight-bearing portion. The MTP had  Grade II chondromalacia. The ACL was examined and noted to be intact. The lateral compartment was examined. There were none loose bodies. The anterior horn and body of the meniscus were intact  intact . The posterior horn was found to intact  The LFC had Grade I chondromalacia of the weight-bearing portion. The LTP had Grade I chondromalacia. A Medial menisectomy was performed, using the aggressor shaver and the linear baskets, which were used to morselize the remaining portion of the torn meniscus. The stump of the meniscus was sealed using the ArthroWand coblation tool. The lower portion of the trochlea was noted to have Grade II chondromalacia. A chondroplasty of the MFC and MTP. was performed using the ArthroWand coblation tool, through the medial /lateral  portals. A synovectomy was performed of the medial, lateral and superior compartments, using the Aggressor shaver. Hemostasis was achieved using the coblation tool. The synovectomy was performed through both the medial and lateral portals. The base of the synovial tissue was sealed using the ArthroWand tool. Final video-photographs were taken. The joint was then thoroughly irrigated and suctioned. The instruments were removed. The wounds were approximated with interrupted 2-0 nylon. A simple dressing, followed by a thicker overlying compression bandage were applied.       Marrow aspirate was obtained through stab incision in the  left anterior iliac crest.   This was fractionated into platelet rich/poor plasma and stem cell aggregate. Injection was done through needle localization into the medial aspect of the anterior third of the tibia and the middle of the femoral condyle medially. Injection of raw marrow and stem cell aggregate was done into the respective areas of bone. Injection of PRP, PPP was done into the joint through a lateral approach. The patient was then awakened from anesthesia, transferred to the stretcher, and brought to the recovery room in satisfactory condition.   Sponge and needle counts were correct.             ____________________________________           Parmjit Jorgensen MD

## 2018-08-20 NOTE — ED PROVIDER NOTE - CARE PLAN
Principal Discharge DX:	Syncope and collapse  Secondary Diagnosis:	Bradycardia Principal Discharge DX:	Syncope and collapse  Secondary Diagnosis:	Bradycardia  Secondary Diagnosis:	Hypokalemia

## 2018-08-20 NOTE — H&P ADULT - ATTENDING COMMENTS
63 female with hx of HTN , SHINGLES, OSTEOPOROSIS , SZ ,PERICARDITIS ,CONSTIPATION ,MIGRAINES,DEPRESSION AND ANXIETY  presents to ER by ambulance with report of syncope. Patient states she was recently discharged from [facility] for elevated blood pressure, states that after she was discharged home , she was not feeling well, not eating, having headaches and vomiting, today patient got out of bed and felt light headed and dizzy and fell to the ground, witnessed, unresponsive for 30 seconds, c/o headache, left ankle pain, weakness.Family states patient had LOC but no sz activity noted .Patient denies cp ,sob palpitations ,vertigo Seen by neurologist and cardiologist ,admitted to rule out cva and ami Admit to telemetry unit for monitoring,send 3 sets of cardiac ensymes to rule out coronary event,obtain ECHO to evaluate LVEF,cardiology consult,continue current managmnet,O2 supply,anticoagulation plan as per cardiology consul

## 2018-08-20 NOTE — H&P ADULT - HISTORY OF PRESENT ILLNESS
63 female with hx of HTN , SHINGLES, OSTEOPOROSIS , SZ ,PERICARDITIS ,CONSTIPATION ,MIGRAINES,DEPRESSION AND ANXIETY  presents to ER by ambulance with report of syncope. Patient states she was recently discharged from [facility] for elevated blood pressure, states that after she was discharged home , she was not feeling well, not eating, having headaches and vomiting, today patient got out of bed and felt light headed and dizzy and fell to the ground, witnessed, unresponsive for 30 seconds, c/o headache, left ankle pain, weakness.Family states patient had LOC but no sz activity noted .Patient denies cp ,sob palpitations ,vertigo Seen by neurologist and cardiologist ,admitted to rule out cva and ami Admit to telemetry unit for monitoring,send 3 sets of cardiac ensymes to rule out coronary event,obtain ECHO to evaluate LVEF,cardiology consult,continue current managmnet,O2 supply,anticoagulation plan as per cardiology consult

## 2018-08-20 NOTE — CHART NOTE - NSCHARTNOTEFT_GEN_A_CORE
Notified by Rn that patient states that she takes xanax at night and requesting medication. Patient receives medications from [facility] pharmacy. Called pharmacy and confirmed patient takes Xanax 0.25 1-2 tabs at night as needed. Ordered medication for patient.

## 2018-08-20 NOTE — CONSULT NOTE ADULT - ASSESSMENT
The patient is a 63 year old female with a history of GERD, anxiety, migraine headaches, HTN who presents with syncope likely in the setting of orthostatic hypotension.    Plan:  - BP currently on lower side  - Hold HCTZ due to hypokalemia  - Hold losartan due to RUPERTO  - Hold carvedilol for now due to mild bradycardia  - Resume hydralazine 50 mg q8h with holding parameters  - Likely to resume nifedipine soon as well  - Anticipate BPs to trend up based on history  - Monitor on telemetry  - Replete K  - ECG abnormalities likely due to prolonged QTc from hypokalemia  - Recent echo with normal LV systolic function, mod AR The patient is a 63 year old female with a history of GERD, anxiety, migraine headaches, HTN who presents with syncope likely in the setting of orthostatic hypotension.    Plan:  - BP currently on lower side  - Hold HCTZ due to hypokalemia  - Hold losartan due to RUPERTO  - Hold carvedilol for now due to mild bradycardia  - Resume hydralazine 50 mg q8h with holding parameters  - Likely to resume nifedipine soon as well  - Anticipate BPs to trend up based on history  - Monitor on telemetry  - Replete K  - ECG abnormalities likely due to prolonged QTc from hypokalemia  - Recent echo with normal LV systolic function, mod AR  - RUPERTO - continue IVF

## 2018-08-20 NOTE — H&P ADULT - PROBLEM SELECTOR PLAN 2
neurology evaluation ,CT BRAIN ,sz precautions ,continue home meds, orthostatic assesment , oob tc /pt

## 2018-08-20 NOTE — ED ADULT NURSE NOTE - NSIMPLEMENTINTERV_GEN_ALL_ED
Implemented All Fall Risk Interventions:  [place] to call system. Call bell, personal items and telephone within reach. Instruct patient to call for assistance. Room bathroom lighting operational. Non-slip footwear when patient is off stretcher. Physically safe environment: no spills, clutter or unnecessary equipment. Stretcher in lowest position, wheels locked, appropriate side rails in place. Provide visual cue, wrist band, yellow gown, etc. Monitor gait and stability. Monitor for mental status changes and reorient to person, place, and time. Review medications for side effects contributing to fall risk. Reinforce activity limits and safety measures with patient and family.

## 2018-08-20 NOTE — H&P ADULT - NEGATIVE GENERAL GENITOURINARY SYMPTOMS
no bladder infections/no hematuria/no renal colic/no flank pain L/no incontinence/no urine discoloration/no dysuria

## 2018-08-20 NOTE — H&P ADULT - MUSCULOSKELETAL
detailed exam ROM intact/no joint swelling/no joint erythema/no joint warmth/no calf tenderness details…

## 2018-08-20 NOTE — CONSULT NOTE ADULT - SUBJECTIVE AND OBJECTIVE BOX
History of Present Illness: The patient is a 63 year old female with a history of GERD, anxiety, migraine headaches, HTN who presents with syncope. She was recently discharged from [facility] with uncontrolled HTN and headaches. Since discharge, she has not felt well, poor PO intake, nausea, vomiting. Today after taking a dose of carvedilol she lost consciousness. She is not sure for how long. No chest pain, shortness of breath, palpitations.    Past Medical/Surgical History:  GERD, anxiety, migraine headaches, HTN    Medications:  HCTZ 25 mg daily  Nifedipine 90 mg bid  Hydralazine 50 mg tid  Losartan 25 mg daily  Carvedilol 12.5 mg bid      Family History: Non-contributory family history of premature cardiovascular atherosclerotic disease    Social History: No tobacco, alcohol or drug use    Review of Systems:  General: No fevers, chills, weight loss or gain  Skin: No rashes, color changes  Cardiovascular: No chest pain, orthopnea  Respiratory: No shortness of breath, cough  Gastrointestinal: No nausea, abdominal pain  Genitourinary: No incontinence, pain with urination  Musculoskeletal: No pain, swelling, decreased range of motion  Neurological: No headache, weakness  Psychiatric: No depression, anxiety  Endocrine: No weight loss or gain, increased thirst  All other systems are comprehensively negative.    Physical Exam:  Vitals:        Vital Signs Last 24 Hrs  T(C): 36.7 (20 Aug 2018 08:54), Max: 36.7 (20 Aug 2018 08:54)  T(F): 98 (20 Aug 2018 08:54), Max: 98 (20 Aug 2018 08:54)  HR: 52 (20 Aug 2018 08:54) (52 - 52)  BP: 104/60 (20 Aug 2018 08:54) (104/60 - 104/60)  BP(mean): --  RR: 16 (20 Aug 2018 08:54) (16 - 16)  SpO2: 97% (20 Aug 2018 08:54) (97% - 97%)  General: NAD  HEENT: MMM  Neck: No JVD, no carotid bruit  Lungs: CTAB  CV: RRR, nl S1/S2, no M/R/G  Abdomen: S/NT/ND, +BS  Extremities: No LE edema, no cyanosis  Neuro: AAOx3, non-focal  Skin: No rash    Labs:                        14.0   9.71  )-----------( 281      ( 20 Aug 2018 09:31 )             39.5     08-20    134<L>  |  92<L>  |  19  ----------------------------<  130<H>  2.7<LL>   |  29  |  1.60<H>    Ca    9.0      20 Aug 2018 09:31  Mg     2.5     08-20    TPro  7.0  /  Alb  3.3  /  TBili  0.6  /  DBili  x   /  AST  19  /  ALT  18  /  AlkPhos  60  08-20    CARDIAC MARKERS ( 20 Aug 2018 09:31 )  .033 ng/mL / x     / x     / x     / <1.0 ng/mL      PT/INR - ( 20 Aug 2018 09:31 )   PT: 12.1 sec;   INR: 1.11 ratio         PTT - ( 20 Aug 2018 09:31 )  PTT:23.5 sec    ECG: Sinus bradycardia, normal axis, anterior ST abnormality, prolonged QTc

## 2018-08-20 NOTE — ED PROVIDER NOTE - OBJECTIVE STATEMENT
63 female presents to ER by ambulance with report of syncope. Patient states she was recently discharged from [facility] for elevated blood pressure, states when she was discharged she was not feeling well, not eating, having headaches and vomiting, today patient got out of bed and felt light headed and dizzy and fell to the ground, witnessed, unresponsive for 30 seconds, c/o headache, left ankle pain, weakness.  PMD- Tristan Frederick

## 2018-08-20 NOTE — ED ADULT TRIAGE NOTE - CHIEF COMPLAINT QUOTE
pt s/p syncopal episode, witnessed by family and assisted to floor, on ems arrival pt hypotensive and bradycardiac, ems placed # 20 left hand, ns bolus infusing

## 2018-08-20 NOTE — H&P ADULT - NEGATIVE NEUROLOGICAL SYMPTOMS
no paresthesias/no hemiparesis/no facial palsy/no tremors/no weakness/no generalized seizures/no loss of sensation/no focal seizures/no confusion

## 2018-08-21 DIAGNOSIS — M79.672 PAIN IN LEFT FOOT: ICD-10-CM

## 2018-08-21 LAB
ANION GAP SERPL CALC-SCNC: 6 MMOL/L — SIGNIFICANT CHANGE UP (ref 5–17)
BUN SERPL-MCNC: 27 MG/DL — HIGH (ref 7–23)
CALCIUM SERPL-MCNC: 8.4 MG/DL — LOW (ref 8.5–10.1)
CHLORIDE SERPL-SCNC: 106 MMOL/L — SIGNIFICANT CHANGE UP (ref 96–108)
CO2 SERPL-SCNC: 29 MMOL/L — SIGNIFICANT CHANGE UP (ref 22–31)
CREAT SERPL-MCNC: 1.7 MG/DL — HIGH (ref 0.5–1.3)
GLUCOSE SERPL-MCNC: 129 MG/DL — HIGH (ref 70–99)
HCT VFR BLD CALC: 33.2 % — LOW (ref 34.5–45)
HGB BLD-MCNC: 11 G/DL — LOW (ref 11.5–15.5)
MAGNESIUM SERPL-MCNC: 2.2 MG/DL — SIGNIFICANT CHANGE UP (ref 1.6–2.6)
MCHC RBC-ENTMCNC: 29.3 PG — SIGNIFICANT CHANGE UP (ref 27–34)
MCHC RBC-ENTMCNC: 33.1 GM/DL — SIGNIFICANT CHANGE UP (ref 32–36)
MCV RBC AUTO: 88.3 FL — SIGNIFICANT CHANGE UP (ref 80–100)
NRBC # BLD: 0 /100 WBCS — SIGNIFICANT CHANGE UP (ref 0–0)
PHOSPHATE SERPL-MCNC: 2.8 MG/DL — SIGNIFICANT CHANGE UP (ref 2.5–4.5)
PLATELET # BLD AUTO: 196 K/UL — SIGNIFICANT CHANGE UP (ref 150–400)
POTASSIUM SERPL-MCNC: 3.3 MMOL/L — LOW (ref 3.5–5.3)
POTASSIUM SERPL-SCNC: 3.3 MMOL/L — LOW (ref 3.5–5.3)
RBC # BLD: 3.76 M/UL — LOW (ref 3.8–5.2)
RBC # FLD: 13.1 % — SIGNIFICANT CHANGE UP (ref 10.3–14.5)
SODIUM SERPL-SCNC: 141 MMOL/L — SIGNIFICANT CHANGE UP (ref 135–145)
TROPONIN I SERPL-MCNC: 0.02 NG/ML — SIGNIFICANT CHANGE UP (ref 0.01–0.04)
TSH SERPL-MCNC: 0.37 UIU/ML — SIGNIFICANT CHANGE UP (ref 0.36–3.74)
WBC # BLD: 6.88 K/UL — SIGNIFICANT CHANGE UP (ref 3.8–10.5)
WBC # FLD AUTO: 6.88 K/UL — SIGNIFICANT CHANGE UP (ref 3.8–10.5)

## 2018-08-21 PROCEDURE — 93010 ELECTROCARDIOGRAM REPORT: CPT

## 2018-08-21 PROCEDURE — 73630 X-RAY EXAM OF FOOT: CPT | Mod: 26,LT

## 2018-08-21 RX ORDER — CARVEDILOL PHOSPHATE 80 MG/1
3.12 CAPSULE, EXTENDED RELEASE ORAL EVERY 12 HOURS
Qty: 0 | Refills: 0 | Status: DISCONTINUED | OUTPATIENT
Start: 2018-08-21 | End: 2018-08-22

## 2018-08-21 RX ORDER — ACETAMINOPHEN 500 MG
650 TABLET ORAL THREE TIMES A DAY
Qty: 0 | Refills: 0 | Status: DISCONTINUED | OUTPATIENT
Start: 2018-08-21 | End: 2018-08-22

## 2018-08-21 RX ORDER — NIFEDIPINE 30 MG
90 TABLET, EXTENDED RELEASE 24 HR ORAL ONCE
Qty: 0 | Refills: 0 | Status: COMPLETED | OUTPATIENT
Start: 2018-08-21 | End: 2018-08-21

## 2018-08-21 RX ORDER — POTASSIUM CHLORIDE 20 MEQ
40 PACKET (EA) ORAL ONCE
Qty: 0 | Refills: 0 | Status: COMPLETED | OUTPATIENT
Start: 2018-08-21 | End: 2018-08-21

## 2018-08-21 RX ADMIN — Medication 100 MILLIGRAM(S): at 18:12

## 2018-08-21 RX ADMIN — Medication 100 MILLIGRAM(S): at 05:56

## 2018-08-21 RX ADMIN — Medication 40 MILLIEQUIVALENT(S): at 11:48

## 2018-08-21 RX ADMIN — Medication 50 MILLIGRAM(S): at 21:24

## 2018-08-21 RX ADMIN — PANTOPRAZOLE SODIUM 40 MILLIGRAM(S): 20 TABLET, DELAYED RELEASE ORAL at 05:56

## 2018-08-21 RX ADMIN — CARVEDILOL PHOSPHATE 3.12 MILLIGRAM(S): 80 CAPSULE, EXTENDED RELEASE ORAL at 18:12

## 2018-08-21 RX ADMIN — Medication 650 MILLIGRAM(S): at 21:24

## 2018-08-21 RX ADMIN — Medication 650 MILLIGRAM(S): at 04:00

## 2018-08-21 RX ADMIN — TRAMADOL HYDROCHLORIDE 50 MILLIGRAM(S): 50 TABLET ORAL at 13:31

## 2018-08-21 RX ADMIN — SERTRALINE 25 MILLIGRAM(S): 25 TABLET, FILM COATED ORAL at 11:48

## 2018-08-21 RX ADMIN — Medication 650 MILLIGRAM(S): at 02:58

## 2018-08-21 RX ADMIN — Medication 90 MILLIGRAM(S): at 15:35

## 2018-08-21 RX ADMIN — TRAMADOL HYDROCHLORIDE 50 MILLIGRAM(S): 50 TABLET ORAL at 13:01

## 2018-08-21 RX ADMIN — Medication 40 MILLIEQUIVALENT(S): at 09:17

## 2018-08-21 RX ADMIN — Medication 0.5 MILLIGRAM(S): at 21:25

## 2018-08-21 RX ADMIN — HEPARIN SODIUM 5000 UNIT(S): 5000 INJECTION INTRAVENOUS; SUBCUTANEOUS at 18:12

## 2018-08-21 NOTE — DIETITIAN INITIAL EVALUATION ADULT. - SIGNS/SYMPTOMS
as evidenced by <50% of estimated needs >1 month, 45.45% wt loss in 1 year, mild muscle and fat loss

## 2018-08-21 NOTE — DIETITIAN INITIAL EVALUATION ADULT. - NS AS NUTRI INTERV ED CONTENT
Nutrition relationship to health/disease/Purpose of the nutrition education/Recommended modifications/Priority modifications/Pt provided with written and oral education on low sodium, heart healthy diet. Reviewed: 1) food high in sodium that should be avoided, 2) consuming low fat, fat-free foods, 3) consuming balanced meals. Pt also provided with strategies to stretch food budget and consume healthy meals.

## 2018-08-21 NOTE — DIETITIAN INITIAL EVALUATION ADULT. - ADHERENCE
Pt reports that she follows a low sodium diet PTA in addition to avoiding alcohol, chocolate, and MSG due to migraines. Pt reports that she does not consume a lot of food as she states she has food insecurity issues, states that she is on food stamps and goes to her local [employer]./good

## 2018-08-21 NOTE — DIETITIAN INITIAL EVALUATION ADULT. - PHYSICAL APPEARANCE
other (specify)/thin other (specify)/thin. Pt agreeable to nutrition focused physical exam, exam limited to upper body as pt's foot in pain. Upon exam pt found to have mild muscle loss in temples, clavicles, shoulders, interosseous muscles; mild fat loss in orbital and triceps.

## 2018-08-21 NOTE — CHART NOTE - NSCHARTNOTEFT_GEN_A_CORE
Upon Nutritional Assessment by the Registered Dietitian your patient was determined to meet criteria / has evidence of the following diagnosis/diagnoses:          [ ]  Mild Protein Calorie Malnutrition        [x ]  Moderate Protein Calorie Malnutrition        [ ] Severe Protein Calorie Malnutrition        [ ] Unspecified Protein Calorie Malnutrition        [ ] Underweight / BMI <19        [ ] Morbid Obesity / BMI > 40      Findings as based on:  •  Comprehensive nutrition assessment and consultation  •  Patient education  •   concerns  consuming <50% of estimated nutrient needs >1 month, 45.45% weight loss in 1 year, mild muscle and fat loss       Treatment:    The following diet has been recommended: DASH/TLC, Ensure, TID       PROVIDER Section:     By signing this assessment you are acknowledging and agree with the diagnosis/diagnoses assigned by the Registered Dietitian    Comments:

## 2018-08-21 NOTE — DIETITIAN INITIAL EVALUATION ADULT. - ORAL INTAKE PTA
Pt reports poor appetite and PO intake PTA, states she was not eating for about 2 weeks PTA due to side effects from her medications./poor

## 2018-08-21 NOTE — DIETITIAN INITIAL EVALUATION ADULT. - OTHER INFO
Pt seen for nutrition consult for nutrient assessment. As per chart pt is 63 year old female with a PMH of GERD, anxiety, migraine headaches, HTN who presents with syncope likely in the setting of orthostatic hypotension. Pt reports currently good appetite and PO intake now that she is off her medication that was causing a decreased appetite, currently consuming about 75% of meals in house. Pt's admission weight 104.15lbs. Pt reports UBW of 110lbs, states about 5-10lbs weight loss over the past month due to poor appetite from medication side effects. Pt reports that she has lost about 50lbs over the past year secondary to food insecurity issues. Per previous RD notes pt's weight (8/10/18) 110lbs, (7/27/18) 116lbs, aligns with pt's report of weight loss. Pt denies any nausea, vomiting, diarrhea, constipation at this time, +BM 8/20. Pt with no edema or pressure injuries noted at this time.

## 2018-08-22 ENCOUNTER — TRANSCRIPTION ENCOUNTER (OUTPATIENT)
Age: 63
End: 2018-08-22

## 2018-08-22 VITALS
DIASTOLIC BLOOD PRESSURE: 81 MMHG | TEMPERATURE: 99 F | RESPIRATION RATE: 18 BRPM | HEART RATE: 73 BPM | SYSTOLIC BLOOD PRESSURE: 146 MMHG | OXYGEN SATURATION: 99 %

## 2018-08-22 LAB
ANION GAP SERPL CALC-SCNC: 7 MMOL/L — SIGNIFICANT CHANGE UP (ref 5–17)
APPEARANCE UR: CLEAR — SIGNIFICANT CHANGE UP
BILIRUB UR-MCNC: NEGATIVE — SIGNIFICANT CHANGE UP
BUN SERPL-MCNC: 28 MG/DL — HIGH (ref 7–23)
CALCIUM SERPL-MCNC: 8.9 MG/DL — SIGNIFICANT CHANGE UP (ref 8.5–10.1)
CHLORIDE SERPL-SCNC: 106 MMOL/L — SIGNIFICANT CHANGE UP (ref 96–108)
CO2 SERPL-SCNC: 28 MMOL/L — SIGNIFICANT CHANGE UP (ref 22–31)
COLOR SPEC: YELLOW — SIGNIFICANT CHANGE UP
CREAT SERPL-MCNC: 1.4 MG/DL — HIGH (ref 0.5–1.3)
DIFF PNL FLD: NEGATIVE — SIGNIFICANT CHANGE UP
EPI CELLS # UR: SIGNIFICANT CHANGE UP
GLUCOSE SERPL-MCNC: 106 MG/DL — HIGH (ref 70–99)
GLUCOSE UR QL: NEGATIVE — SIGNIFICANT CHANGE UP
HCT VFR BLD CALC: 35.3 % — SIGNIFICANT CHANGE UP (ref 34.5–45)
HGB BLD-MCNC: 11.7 G/DL — SIGNIFICANT CHANGE UP (ref 11.5–15.5)
KETONES UR-MCNC: NEGATIVE — SIGNIFICANT CHANGE UP
LEUKOCYTE ESTERASE UR-ACNC: ABNORMAL
MCHC RBC-ENTMCNC: 29.8 PG — SIGNIFICANT CHANGE UP (ref 27–34)
MCHC RBC-ENTMCNC: 33.1 GM/DL — SIGNIFICANT CHANGE UP (ref 32–36)
MCV RBC AUTO: 89.8 FL — SIGNIFICANT CHANGE UP (ref 80–100)
NITRITE UR-MCNC: NEGATIVE — SIGNIFICANT CHANGE UP
NRBC # BLD: 0 /100 WBCS — SIGNIFICANT CHANGE UP (ref 0–0)
PH UR: 7 — SIGNIFICANT CHANGE UP (ref 5–8)
PLATELET # BLD AUTO: 220 K/UL — SIGNIFICANT CHANGE UP (ref 150–400)
POTASSIUM SERPL-MCNC: 3.9 MMOL/L — SIGNIFICANT CHANGE UP (ref 3.5–5.3)
POTASSIUM SERPL-SCNC: 3.9 MMOL/L — SIGNIFICANT CHANGE UP (ref 3.5–5.3)
PROT UR-MCNC: NEGATIVE — SIGNIFICANT CHANGE UP
RBC # BLD: 3.93 M/UL — SIGNIFICANT CHANGE UP (ref 3.8–5.2)
RBC # FLD: 13.2 % — SIGNIFICANT CHANGE UP (ref 10.3–14.5)
RBC CASTS # UR COMP ASSIST: SIGNIFICANT CHANGE UP /HPF (ref 0–4)
SODIUM SERPL-SCNC: 141 MMOL/L — SIGNIFICANT CHANGE UP (ref 135–145)
SP GR SPEC: 1 — LOW (ref 1.01–1.02)
UROBILINOGEN FLD QL: NEGATIVE — SIGNIFICANT CHANGE UP
WBC # BLD: 7.66 K/UL — SIGNIFICANT CHANGE UP (ref 3.8–10.5)
WBC # FLD AUTO: 7.66 K/UL — SIGNIFICANT CHANGE UP (ref 3.8–10.5)
WBC UR QL: SIGNIFICANT CHANGE UP

## 2018-08-22 PROCEDURE — 97162 PT EVAL MOD COMPLEX 30 MIN: CPT

## 2018-08-22 PROCEDURE — 84484 ASSAY OF TROPONIN QUANT: CPT

## 2018-08-22 PROCEDURE — 70450 CT HEAD/BRAIN W/O DYE: CPT

## 2018-08-22 PROCEDURE — 84443 ASSAY THYROID STIM HORMONE: CPT

## 2018-08-22 PROCEDURE — 73630 X-RAY EXAM OF FOOT: CPT

## 2018-08-22 PROCEDURE — 96372 THER/PROPH/DIAG INJ SC/IM: CPT | Mod: XU

## 2018-08-22 PROCEDURE — 82553 CREATINE MB FRACTION: CPT

## 2018-08-22 PROCEDURE — 96374 THER/PROPH/DIAG INJ IV PUSH: CPT

## 2018-08-22 PROCEDURE — 96375 TX/PRO/DX INJ NEW DRUG ADDON: CPT

## 2018-08-22 PROCEDURE — 85610 PROTHROMBIN TIME: CPT

## 2018-08-22 PROCEDURE — 81001 URINALYSIS AUTO W/SCOPE: CPT

## 2018-08-22 PROCEDURE — 71045 X-RAY EXAM CHEST 1 VIEW: CPT

## 2018-08-22 PROCEDURE — 93005 ELECTROCARDIOGRAM TRACING: CPT

## 2018-08-22 PROCEDURE — 36415 COLL VENOUS BLD VENIPUNCTURE: CPT

## 2018-08-22 PROCEDURE — 96365 THER/PROPH/DIAG IV INF INIT: CPT

## 2018-08-22 PROCEDURE — 96376 TX/PRO/DX INJ SAME DRUG ADON: CPT

## 2018-08-22 PROCEDURE — 84100 ASSAY OF PHOSPHORUS: CPT

## 2018-08-22 PROCEDURE — 85730 THROMBOPLASTIN TIME PARTIAL: CPT

## 2018-08-22 PROCEDURE — 74176 CT ABD & PELVIS W/O CONTRAST: CPT

## 2018-08-22 PROCEDURE — 80048 BASIC METABOLIC PNL TOTAL CA: CPT

## 2018-08-22 PROCEDURE — 85027 COMPLETE CBC AUTOMATED: CPT

## 2018-08-22 PROCEDURE — 83735 ASSAY OF MAGNESIUM: CPT

## 2018-08-22 PROCEDURE — 83880 ASSAY OF NATRIURETIC PEPTIDE: CPT

## 2018-08-22 PROCEDURE — 80053 COMPREHEN METABOLIC PANEL: CPT

## 2018-08-22 PROCEDURE — 73610 X-RAY EXAM OF ANKLE: CPT

## 2018-08-22 PROCEDURE — 99285 EMERGENCY DEPT VISIT HI MDM: CPT | Mod: 25

## 2018-08-22 RX ORDER — CARVEDILOL PHOSPHATE 80 MG/1
1 CAPSULE, EXTENDED RELEASE ORAL
Qty: 60 | Refills: 0 | OUTPATIENT
Start: 2018-08-22 | End: 2018-09-20

## 2018-08-22 RX ORDER — ALPRAZOLAM 0.25 MG
1 TABLET ORAL
Qty: 0 | Refills: 0 | COMMUNITY

## 2018-08-22 RX ORDER — SERTRALINE 25 MG/1
1 TABLET, FILM COATED ORAL
Qty: 0 | Refills: 0 | COMMUNITY
Start: 2018-08-22

## 2018-08-22 RX ORDER — NIFEDIPINE 30 MG
1 TABLET, EXTENDED RELEASE 24 HR ORAL
Qty: 0 | Refills: 0 | COMMUNITY
Start: 2018-08-22

## 2018-08-22 RX ORDER — ALPRAZOLAM 0.25 MG
1 TABLET ORAL
Qty: 0 | Refills: 0 | COMMUNITY
Start: 2018-08-22

## 2018-08-22 RX ORDER — HYDRALAZINE HCL 50 MG
1 TABLET ORAL
Qty: 60 | Refills: 0 | OUTPATIENT
Start: 2018-08-22 | End: 2018-09-20

## 2018-08-22 RX ORDER — HYDRALAZINE HCL 50 MG
50 TABLET ORAL
Qty: 0 | Refills: 0 | Status: DISCONTINUED | OUTPATIENT
Start: 2018-08-22 | End: 2018-08-22

## 2018-08-22 RX ADMIN — SERTRALINE 25 MILLIGRAM(S): 25 TABLET, FILM COATED ORAL at 11:28

## 2018-08-22 RX ADMIN — Medication 650 MILLIGRAM(S): at 13:33

## 2018-08-22 RX ADMIN — Medication 100 MILLIGRAM(S): at 06:16

## 2018-08-22 RX ADMIN — CARVEDILOL PHOSPHATE 3.12 MILLIGRAM(S): 80 CAPSULE, EXTENDED RELEASE ORAL at 06:16

## 2018-08-22 RX ADMIN — ONDANSETRON 4 MILLIGRAM(S): 8 TABLET, FILM COATED ORAL at 10:20

## 2018-08-22 RX ADMIN — Medication 650 MILLIGRAM(S): at 06:17

## 2018-08-22 RX ADMIN — PANTOPRAZOLE SODIUM 40 MILLIGRAM(S): 20 TABLET, DELAYED RELEASE ORAL at 06:16

## 2018-08-22 RX ADMIN — Medication 50 MILLIGRAM(S): at 06:17

## 2018-08-22 RX ADMIN — Medication 90 MILLIGRAM(S): at 06:16

## 2018-08-22 RX ADMIN — TRAMADOL HYDROCHLORIDE 50 MILLIGRAM(S): 50 TABLET ORAL at 08:45

## 2018-08-22 RX ADMIN — TRAMADOL HYDROCHLORIDE 50 MILLIGRAM(S): 50 TABLET ORAL at 09:45

## 2018-08-22 RX ADMIN — HEPARIN SODIUM 5000 UNIT(S): 5000 INJECTION INTRAVENOUS; SUBCUTANEOUS at 06:18

## 2018-08-22 NOTE — PHYSICAL THERAPY INITIAL EVALUATION ADULT - PRECAUTIONS/LIMITATIONS, REHAB EVAL
CT thoracic spin 7/19: Central compression fracture of T11. There is no retropulsion fall precautions/Left foot pain and swelling

## 2018-08-22 NOTE — PHYSICAL THERAPY INITIAL EVALUATION ADULT - BALANCE TRAINING, PT EVAL
GOAL: pt will demonstrate Good static/dynamic standing balance w/i 4wks Pt will demonstrate Good+ static/dynamic standing balance /c rolling walker in 1 wk

## 2018-08-22 NOTE — PHYSICAL THERAPY INITIAL EVALUATION ADULT - MANUAL MUSCLE TESTING RESULTS, REHAB EVAL
no strength deficits were identified left foot DF 3 to 3+/5 due to pain/no strength deficits were identified

## 2018-08-22 NOTE — PROGRESS NOTE ADULT - PROBLEM SELECTOR PLAN 5
Admit to telemetry unit for monitoring,send 3 sets of cardiac ensymes to rule out coronary event,obtain ECHO to evaluate LVEF,cardiology consult,continue current managmnet,O2 supply,anticoagulation plan as per cardiology consult continue current managmnet and medications

## 2018-08-22 NOTE — PHYSICAL THERAPY INITIAL EVALUATION ADULT - GAIT DEVIATIONS NOTED, PT EVAL
decreased step length/decreased stride length/decreased nanda decreased weight-shifting ability/footdrop/decreased stride length/decreased step length/decreased nanda

## 2018-08-22 NOTE — PHYSICAL THERAPY INITIAL EVALUATION ADULT - DISCHARGE DISPOSITION, PT EVAL
outpatient PT/home w/ assist Out-patient PT and home /c assist from her family/outpatient PT/home w/ assist

## 2018-08-22 NOTE — DISCHARGE NOTE ADULT - PATIENT PORTAL LINK FT
[facility] can access [facility], offered by [facility], by registering with the following website: [email or web address]

## 2018-08-22 NOTE — DISCHARGE NOTE ADULT - CARE PLAN
Principal Discharge DX:	Syncope and collapse Principal Discharge DX:	Syncope and collapse  Goal:	ENCOURAGE PO HYDRATION  Assessment and plan of treatment:	STOP HTZ,BP MEDS ARE ADJUSTED BY CARDIOLOGIST FOLLOWUP WITH CARD IN 1-2 WEEKS  Secondary Diagnosis:	Foot pain, left  Goal:	PAIN FREE  Assessment and plan of treatment:	CONTINUE OUTPATIENT PT ,TYLENOL  mg po 4 times a day x 5 days  Secondary Diagnosis:	HTN (hypertension)  Assessment and plan of treatment:	BP monitoring,continue current antihypertensive meds, low salt diet,followup with PMD  Secondary Diagnosis:	Migraines  Assessment and plan of treatment:	continue home medications  Secondary Diagnosis:	Constipation  Assessment and plan of treatment:	continue current managmnet and medications  Secondary Diagnosis:	Anxiety  Assessment and plan of treatment:	continue home medications  Secondary Diagnosis:	Seizure disorder  Assessment and plan of treatment:	continue home medications

## 2018-08-22 NOTE — DISCHARGE NOTE ADULT - MEDICATION SUMMARY - MEDICATIONS TO CHANGE
I will SWITCH the dose or number of times a day I take the medications listed below when I get home from the hospital:    carvedilol 25 mg oral tablet  -- 1 tab(s) by mouth every 12 hours    NIFEdipine 90 mg oral tablet, extended release  -- 1 tab(s) by mouth 2 times a day at 6am and 6pm    hydrALAZINE 50 mg oral tablet  -- 1 tab(s) by mouth 3 times a day

## 2018-08-22 NOTE — PHYSICAL THERAPY INITIAL EVALUATION ADULT - GAIT PATTERN USED, PT EVAL
2-point gait 3-point gait/very antalgic gait, decreased weight bearing and stance to left foot, balance is steady

## 2018-08-22 NOTE — PROGRESS NOTE ADULT - PROBLEM SELECTOR PLAN 3
XRAY of left foot ,OOB TC /PT ,pain management LIKELY SECONDARY TO MUSCLE SPRAIN , XRAY of left foot IS NEGATIVE  ,OOB TC /PT ,pain management

## 2018-08-22 NOTE — DISCHARGE NOTE ADULT - CARE PROVIDER_API CALL
Mykel Schmidt), Cardiovascular Disease; Internal Medicine  [address]  [address]  [place] [address]  Phone: [phone]  Fax: [phone]

## 2018-08-22 NOTE — PROGRESS NOTE ADULT - ASSESSMENT
63 female with hx of HTN , SHINGLES, OSTEOPOROSIS , SZ ,PERICARDITIS ,CONSTIPATION ,MIGRAINES presents to ER by ambulance with report of syncope. Patient states she was recently discharged from [facility] for elevated blood pressure, states that after she was discharged home , she was not feeling well, not eating, having headaches and vomiting, today patient got out of bed and felt light headed and dizzy and fell to the ground, witnessed, unresponsive for 30 seconds, c/o headache, left ankle pain, weakness.Family states patient had LOC but no sz activity noted .Patient denies cp ,sob palpitations ,vertigo Seen by neurologist and cardiologist ,admitted to rule out cva and ami Admit to telemetry unit for monitoring,send 3 sets of cardiac ensymes to rule out coronary event,obtain ECHO to evaluate LVEF,cardiology consult,continue current managmnet,O2 supply,anticoagulation plan as per cardiology consult
The patient is a 63 year old female with a history of GERD, anxiety, migraine headaches, HTN who presents with syncope likely in the setting of orthostatic hypotension.    Plan:  - Discontinue HCTZ  - Discontinue losartan  - Continue carvedilol 3.125 mg bid  - Continue hydralazine, lower to 50 mg bid since patient states she does not feel well on it  - Continue nifedipine 90 mg daily  - Recent echo with normal LV systolic function, mod AR  - RUPERTO - improving  - Ok for discharge from a cardiac standpoint
The patient is a 63 year old female with a history of GERD, anxiety, migraine headaches, HTN who presents with syncope likely in the setting of orthostatic hypotension.    Plan:  - Hold HCTZ due to hypokalemia  - Hold losartan due to RUPERTO  - Resume carvedilol at 3.125 mg bid  - Continue hydralazine 50 mg q8h  - Continue nifedipine 90 mg daily  - Replete K  - ECG abnormalities likely due to prolonged QTc from hypokalemia. Repeat in am.  - Recent echo with normal LV systolic function, mod AR  - RUPERTO - continue IVF
63 female with hx of HTN , SHINGLES, OSTEOPOROSIS , SZ ,PERICARDITIS ,CONSTIPATION ,MIGRAINES presents to ER by ambulance with report of syncope. Patient states she was recently discharged from [facility] for elevated blood pressure, states that after she was discharged home , she was not feeling well, not eating, having headaches and vomiting, today patient got out of bed and felt light headed and dizzy and fell to the ground, witnessed, unresponsive for 30 seconds, c/o headache, left ankle pain, weakness.Family states patient had LOC but no sz activity noted .Patient denies cp ,sob palpitations ,vertigo Seen by neurologist and cardiologist ,admitted to rule out cva and ami Admit to telemetry unit for monitoring,send 3 sets of cardiac ensymes to rule out coronary event,obtain ECHO to evaluate LVEF,cardiology consult,continue current managmnet,O2 supply,anticoagulation plan as per cardiology consult

## 2018-08-22 NOTE — PROGRESS NOTE ADULT - PROBLEM SELECTOR PLAN 4
continue current managmnet and medications ,pain management
continue current managmnet and medications ,pain management

## 2018-08-22 NOTE — PHYSICAL THERAPY INITIAL EVALUATION ADULT - IMPAIRMENTS FOUND, PT EVAL
aerobic capacity/endurance/gait, locomotion, and balance integumentary integrity/gait, locomotion, and balance/Pain

## 2018-08-22 NOTE — PHYSICAL THERAPY INITIAL EVALUATION ADULT - PLANNED THERAPY INTERVENTIONS, PT EVAL
transfer training/gait training/GOAL: pt will negotiate 12 steps (I) holding on to handrail w/i 2wks/balance training/bed mobility training gait training/Assess stairs in 1-3 sessions/transfer training/balance training/bed mobility training

## 2018-08-22 NOTE — PHYSICAL THERAPY INITIAL EVALUATION ADULT - PERTINENT HX OF CURRENT PROBLEM, REHAB EVAL
63yoF w/PMH migraines , seizure disorder, HTN, osteoporosis, p/w back pain for x2days. Per family, pt reported sxs started suddenly after lifting a paint can, she had severe pain in mid thoracic area more on right, non radiating . Per family she has been taking extra doses of her migraine medications  in order to relieve the pain, they are not sure how much she took. Pt was seen at [facility] emergency room & tx w/ valium, however symptoms persisted. She has no fever or chills. No other injuries As per H&P:"63 female with hx of HTN , SHINGLES, OSTEOPOROSIS , SZ ,PERICARDITIS ,CONSTIPATION ,MIGRAINES,DEPRESSION AND ANXIETY  presents to ER by ambulance with report of syncope. Patient states she was recently discharged from [facility] for elevated blood pressure, states that after she was discharged home , she was not feeling well, not eating, having headaches and vomiting, today patient got out of bed and felt light headed and dizzy and fell."

## 2018-08-22 NOTE — DISCHARGE NOTE ADULT - PLAN OF CARE
ENCOURAGE PO HYDRATION STOP HTZ,BP MEDS ARE ADJUSTED BY CARDIOLOGIST FOLLOWUP WITH CARD IN 1-2 WEEKS PAIN FREE CONTINUE OUTPATIENT PT ,TYLENOL  mg po 4 times a day x 5 days BP monitoring,continue current antihypertensive meds, low salt diet,followup with PMD continue home medications continue current managmnet and medications

## 2018-08-22 NOTE — PHYSICAL THERAPY INITIAL EVALUATION ADULT - GAIT TRAINING, PT EVAL
GOAL: pt will ambulate 400ft (I) w/i 2wks Pt will ambulate >75' /c rolling walker and [place] in 2wks

## 2018-08-22 NOTE — PROGRESS NOTE ADULT - SUBJECTIVE AND OBJECTIVE BOX
Chief Complaint: Syncope    Interval Events: No events overnight. No complaints.    Review of Systems:  General: No fevers, chills, weight loss or gain  Skin: No rashes, color changes  Cardiovascular: No chest pain, orthopnea  Respiratory: No shortness of breath, cough  Gastrointestinal: No nausea, abdominal pain  Genitourinary: No incontinence, pain with urination  Musculoskeletal: No pain, swelling, decreased range of motion  Neurological: No headache, weakness  Psychiatric: No depression, anxiety  Endocrine: No weight loss or gain, increased thirst  All other systems are comprehensively negative.    Physical Exam:  Vital Signs Last 24 Hrs  T(C): 37.6 (22 Aug 2018 07:03), Max: 37.6 (22 Aug 2018 07:03)  T(F): 99.7 (22 Aug 2018 07:03), Max: 99.7 (22 Aug 2018 07:03)  HR: 63 (22 Aug 2018 07:03) (63 - 74)  BP: 147/79 (22 Aug 2018 07:03) (121/65 - 182/80)  BP(mean): --  RR: 18 (22 Aug 2018 07:03) (16 - 18)  SpO2: 98% (22 Aug 2018 07:03) (97% - 100%)  General: NAD  HEENT: MMM  Neck: No JVD, no carotid bruit  Lungs: CTAB  CV: RRR, nl S1/S2, no M/R/G  Abdomen: S/NT/ND, +BS  Extremities: No LE edema, no cyanosis  Neuro: AAOx3, non-focal  Skin: No rash    Labs:             08-22    141  |  106  |  28<H>  ----------------------------<  106<H>  3.9   |  28  |  1.40<H>    Ca    8.9      22 Aug 2018 06:51  Phos  2.8     08-21  Mg     2.2     08-21    TPro  7.0  /  Alb  3.3  /  TBili  0.6  /  DBili  x   /  AST  19  /  ALT  18  /  AlkPhos  60  08-20                        11.7   7.66  )-----------( 220      ( 22 Aug 2018 06:51 )             35.3       Telemetry: Sinus rhythm
Chief Complaint: Syncope    Interval Events: No events overnight. No complaints.    Review of Systems:  General: No fevers, chills, weight loss or gain  Skin: No rashes, color changes  Cardiovascular: No chest pain, orthopnea  Respiratory: No shortness of breath, cough  Gastrointestinal: No nausea, abdominal pain  Genitourinary: No incontinence, pain with urination  Musculoskeletal: No pain, swelling, decreased range of motion  Neurological: No headache, weakness  Psychiatric: No depression, anxiety  Endocrine: No weight loss or gain, increased thirst  All other systems are comprehensively negative.    Physical Exam:  Vitals:        Vital Signs Last 24 Hrs  T(C): 36.6 (21 Aug 2018 07:25), Max: 37.4 (21 Aug 2018 00:17)  T(F): 97.8 (21 Aug 2018 07:25), Max: 99.4 (21 Aug 2018 00:17)  HR: 72 (21 Aug 2018 07:25) (52 - 77)  BP: 145/77 (21 Aug 2018 07:25) (104/60 - 157/72)  BP(mean): --  RR: 18 (21 Aug 2018 07:25) (16 - 18)  SpO2: 97% (21 Aug 2018 07:25) (97% - 100%)  General: NAD  HEENT: MMM  Neck: No JVD, no carotid bruit  Lungs: CTAB  CV: RRR, nl S1/S2, no M/R/G  Abdomen: S/NT/ND, +BS  Extremities: No LE edema, no cyanosis  Neuro: AAOx3, non-focal  Skin: No rash    Labs:                        11.0   6.88  )-----------( 196      ( 21 Aug 2018 07:26 )             33.2     08-21    141  |  106  |  27<H>  ----------------------------<  129<H>  3.3<L>   |  29  |  1.70<H>    Ca    8.4<L>      21 Aug 2018 07:26  Mg     2.5     08-20    TPro  7.0  /  Alb  3.3  /  TBili  0.6  /  DBili  x   /  AST  19  /  ALT  18  /  AlkPhos  60  08-20    CARDIAC MARKERS ( 21 Aug 2018 07:26 )  .016 ng/mL / x     / x     / x     / x      CARDIAC MARKERS ( 20 Aug 2018 09:31 )  .033 ng/mL / x     / x     / x     / <1.0 ng/mL      PT/INR - ( 20 Aug 2018 09:31 )   PT: 12.1 sec;   INR: 1.11 ratio         PTT - ( 20 Aug 2018 09:31 )  PTT:23.5 sec    Telemetry: Sinus rhythm
Neurology follow up note    AUGUSTUS [id number]      Interval History:    Patient left foot pain h/o of fall    MEDICATIONS    acetaminophen   Tablet 650 milliGRAM(s) Oral every 6 hours PRN  acetaminophen   Tablet 650 milliGRAM(s) Oral three times a day  ALPRAZolam 0.5 milliGRAM(s) Oral at bedtime PRN  carvedilol 3.125 milliGRAM(s) Oral every 12 hours  docusate sodium 100 milliGRAM(s) Oral three times a day  heparin  Injectable 5000 Unit(s) SubCutaneous every 12 hours  hydrALAZINE 50 milliGRAM(s) Oral two times a day  NIFEdipine XL 90 milliGRAM(s) Oral daily  ondansetron Injectable 4 milliGRAM(s) IV Push every 6 hours PRN  pantoprazole    Tablet 40 milliGRAM(s) Oral before breakfast  sertraline 25 milliGRAM(s) Oral daily  sodium chloride 0.9%. 1000 milliLiter(s) IV Continuous <Continuous>  topiramate 100 milliGRAM(s) Oral two times a day  traMADol 50 milliGRAM(s) Oral every 6 hours PRN      Allergies    penicillin (Anaphylaxis)    Intolerances            Vital Signs Last 24 Hrs  T(C): 37.6 (22 Aug 2018 07:03), Max: 37.6 (22 Aug 2018 07:03)  T(F): 99.7 (22 Aug 2018 07:03), Max: 99.7 (22 Aug 2018 07:03)  HR: 63 (22 Aug 2018 07:03) (63 - 74)  BP: 147/79 (22 Aug 2018 07:03) (121/65 - 182/80)  BP(mean): --  RR: 18 (22 Aug 2018 07:03) (16 - 18)  SpO2: 98% (22 Aug 2018 07:03) (97% - 100%)    REVIEW OF SYSTEMS:     Constitutional: No fever, chills, fatigue, weakness  Eyes: no eye pain, visual disturbances, or discharge  ENT:  No difficulty hearing, tinnitus, vertigo; No sinus or throat pain  Neck: No pain or stiffness  Respiratory: No cough, dyspnea, wheezing   Cardiovascular: No chest pain, palpitations,   Gastrointestinal: No abdominal or epigastric pain. No nausea, vomiting  No diarrhea or constipation.   Genitourinary: No dysuria, frequency, hematuria or incontinence  Neurological: No headaches, lightheadedness, vertigo, numbness or tremors  Psychiatric: No depression, anxiety, mood swings or difficulty sleeping  Musculoskeletal: No joint pain or swelling; No muscle, back or extremity pain  Skin: No itching, burning, rashes or lesions   Lymph Nodes: No enlarged glands  Endocrine: No heat or cold intolerance; No hair loss   Allergy and Immunologic: No hives or eczema    On Neurological Examination:    Mental Status - Patient is alert, awake,       Follow simple commands  Follow complex commands    Speech -   Fluent                         Cranial Nerves - Pupils 3 mm equal and reactive to light,   extraocular eye movements intact.   smile symmetric  intact bilateral NLF    Motor Exam -   Right upper 4/5  Left upper 4/5  Right lower 4/5  Left lower  4/5    Muscle tone - is normal all over.  No asymmetry is seen.      Sensory    Bilateral intact to light touch    GENERAL Exam: Nontoxic , No Acute Distress   	  HEENT:  normocephalic, atraumatic  		  LUNGS: Clear bilaterally    	  HEART: Normal S1S2   No murmur RRR        	  GI/ ABDOMEN:  Soft  Non tender    EXTREMITIES:   No Edema  No Clubbing  No Cyanosis     MUSCULOSKELETAL: left foot decreased Range of Motion  	   SKIN: Normal  left ankle Ecchymosis               LABS:  CBC Full  -  ( 22 Aug 2018 06:51 )  WBC Count : 7.66 K/uL  Hemoglobin : 11.7 g/dL  Hematocrit : 35.3 %  Platelet Count - Automated : 220 K/uL  Mean Cell Volume : 89.8 fl  Mean Cell Hemoglobin : 29.8 pg  Mean Cell Hemoglobin Concentration : 33.1 gm/dL  Auto Neutrophil # : x  Auto Lymphocyte # : x  Auto Monocyte # : x  Auto Eosinophil # : x  Auto Basophil # : x  Auto Neutrophil % : x  Auto Lymphocyte % : x  Auto Monocyte % : x  Auto Eosinophil % : x  Auto Basophil % : x    Urinalysis Basic - ( 22 Aug 2018 04:44 )    Color: Yellow / Appearance: Clear / S.005 / pH: x  Gluc: x / Ketone: Negative  / Bili: Negative / Urobili: Negative   Blood: x / Protein: Negative / Nitrite: Negative   Leuk Esterase: Trace / RBC: 0-2 /HPF / WBC 3-5   Sq Epi: x / Non Sq Epi: Few / Bacteria: x          141  |  106  |  28<H>  ----------------------------<  106<H>  3.9   |  28  |  1.40<H>    Ca    8.9      22 Aug 2018 06:51  Phos  2.8     08-21  Mg     2.2     08-21      Hemoglobin A1C:       Vitamin B12         RADIOLOGY    ASSESSMENT AND PLAN     1-LOC syncope   tele eval  monitor SBP    2- migraine Fioricet as needed    3- H/O fall left foot x ray normal possible muscle sprain       Physical therapy evaluation.  OOB to chair/ambulation with assistance only.    Greater than 40 minutes spent in direct patient care reviewing  the notes, lab data/ imaging , discussion with multidisciplinary team.
Neurology follow up note    AUGUSTUS [id number]      Interval History:    Patient feels ok no new complaints.    MEDICATIONS    acetaminophen   Tablet 650 milliGRAM(s) Oral every 6 hours PRN  acetaminophen   Tablet 650 milliGRAM(s) Oral three times a day  ALPRAZolam 0.5 milliGRAM(s) Oral at bedtime PRN  carvedilol 3.125 milliGRAM(s) Oral every 12 hours  docusate sodium 100 milliGRAM(s) Oral three times a day  heparin  Injectable 5000 Unit(s) SubCutaneous every 12 hours  hydrALAZINE 50 milliGRAM(s) Oral every 8 hours  NIFEdipine XL 90 milliGRAM(s) Oral daily  ondansetron Injectable 4 milliGRAM(s) IV Push every 6 hours PRN  pantoprazole    Tablet 40 milliGRAM(s) Oral before breakfast  sertraline 25 milliGRAM(s) Oral daily  sodium chloride 0.9%. 1000 milliLiter(s) IV Continuous <Continuous>  topiramate 100 milliGRAM(s) Oral two times a day  traMADol 50 milliGRAM(s) Oral every 6 hours PRN      Allergies    penicillin (Anaphylaxis)    Intolerances            Vital Signs Last 24 Hrs  T(C): 36.7 (21 Aug 2018 15:32), Max: 37.4 (21 Aug 2018 00:17)  T(F): 98 (21 Aug 2018 15:32), Max: 99.4 (21 Aug 2018 00:17)  HR: 68 (21 Aug 2018 15:32) (63 - 77)  BP: 152/79 (21 Aug 2018 15:32) (138/84 - 182/80)  BP(mean): --  RR: 18 (21 Aug 2018 15:32) (17 - 18)  SpO2: 98% (21 Aug 2018 15:32) (97% - 100%)      REVIEW OF SYSTEMS:     Constitutional: No fever, chills, fatigue, weakness  Eyes: no eye pain, visual disturbances, or discharge  ENT:  No difficulty hearing, tinnitus, vertigo; No sinus or throat pain  Neck: No pain or stiffness  Respiratory: No cough, dyspnea, wheezing   Cardiovascular: No chest pain, palpitations,   Gastrointestinal: No abdominal or epigastric pain. No nausea, vomiting  No diarrhea or constipation.   Genitourinary: No dysuria, frequency, hematuria or incontinence  Neurological: No headaches, lightheadedness, vertigo, numbness or tremors  Psychiatric: No depression, anxiety, mood swings or difficulty sleeping  Musculoskeletal: No joint pain or swelling; No muscle, back or extremity pain  Skin: No itching, burning, rashes or lesions   Lymph Nodes: No enlarged glands  Endocrine: No heat or cold intolerance; No hair loss   Allergy and Immunologic: No hives or eczema    On Neurological Examination:    Mental Status - Patient is alert, awake,       Follow simple commands  Follow complex commands    Speech -   Fluent                         Cranial Nerves - Pupils 3 mm equal and reactive to light,   extraocular eye movements intact.   smile symmetric  intact bilateral NLF    Motor Exam -   Right upper 4/5  Left upper 4/5  Right lower 4/5  Left lower  4/5    Muscle tone - is normal all over.  No asymmetry is seen.      Sensory    Bilateral intact to light touch    GENERAL Exam: Nontoxic , No Acute Distress   	  HEENT:  normocephalic, atraumatic  		  LUNGS: Clear bilaterally    	  HEART: Normal S1S2   No murmur RRR        	  GI/ ABDOMEN:  Soft  Non tender    EXTREMITIES:   No Edema  No Clubbing  No Cyanosis     MUSCULOSKELETAL: Normal Range of Motion  	   SKIN: Normal  No Ecchymosis               LABS:  CBC Full  -  ( 21 Aug 2018 07:26 )  WBC Count : 6.88 K/uL  Hemoglobin : 11.0 g/dL  Hematocrit : 33.2 %  Platelet Count - Automated : 196 K/uL  Mean Cell Volume : 88.3 fl  Mean Cell Hemoglobin : 29.3 pg  Mean Cell Hemoglobin Concentration : 33.1 gm/dL  Auto Neutrophil # : x  Auto Lymphocyte # : x  Auto Monocyte # : x  Auto Eosinophil # : x  Auto Basophil # : x  Auto Neutrophil % : x  Auto Lymphocyte % : x  Auto Monocyte % : x  Auto Eosinophil % : x  Auto Basophil % : x      08-21    141  |  106  |  27<H>  ----------------------------<  129<H>  3.3<L>   |  29  |  1.70<H>    Ca    8.4<L>      21 Aug 2018 07:26  Phos  2.8     08-21  Mg     2.2     08-21    TPro  7.0  /  Alb  3.3  /  TBili  0.6  /  DBili  x   /  AST  19  /  ALT  18  /  AlkPhos  60  08-20    Hemoglobin A1C:     LIVER FUNCTIONS - ( 20 Aug 2018 09:31 )  Alb: 3.3 g/dL / Pro: 7.0 g/dL / ALK PHOS: 60 U/L / ALT: 18 U/L / AST: 19 U/L / GGT: x           Vitamin B12   PT/INR - ( 20 Aug 2018 09:31 )   PT: 12.1 sec;   INR: 1.11 ratio         PTT - ( 20 Aug 2018 09:31 )  PTT:23.5 sec      RADIOLOGY    ASSESSMENT AND PLAN     1-LOC syncope   tele eval  monitor SBP    2- migraine Fioricet as needed      Physical therapy evaluation.  OOB to chair/ambulation with assistance only.    Greater than 45 minutes spent in direct patient care reviewing  the notes, lab data/ imaging , discussion with multidisciplinary team.
PROGRESS NOTE  Patient is a 63y old  Female who presents with a chief complaint of syncope (20 Aug 2018 11:55)  Chart and available morning labs /imaging are reviewed electronically , urgent issues addressed . More information  is being added upon completion of rounds , when more information is collected and management discussed with consultants , medical staff and social service/case management on the floor   OVERNIGHT  No new issues reported by medical staff . All above noted Patient is resting in a bed comfortably .No distress noted   HPI:  63 female with hx of HTN , SHINGLES, OSTEOPOROSIS , SZ ,PERICARDITIS ,CONSTIPATION ,MIGRAINES,DEPRESSION AND ANXIETY  presents to ER by ambulance with report of syncope. Patient states she was recently discharged from [facility] for elevated blood pressure, states that after she was discharged home , she was not feeling well, not eating, having headaches and vomiting, today patient got out of bed and felt light headed and dizzy and fell to the ground, witnessed, unresponsive for 30 seconds, c/o headache, left ankle pain, weakness.Family states patient had LOC but no sz activity noted .Patient denies cp ,sob palpitations ,vertigo Seen by neurologist and cardiologist ,admitted to rule out cva and ami Admit to telemetry unit for monitoring,send 3 sets of cardiac ensymes to rule out coronary event,obtain ECHO to evaluate LVEF,cardiology consult,continue current managmnet,O2 supply,anticoagulation plan as per cardiology consult (20 Aug 2018 11:55)  PAST MEDICAL & SURGICAL HISTORY:  Constipation  Anxiety  Depression  HTN (hypertension)  Shingles  Pericarditis  Osteoporosis  Seizure disorder  Migraines  No significant past surgical history  MEDICATIONS  (STANDING):  acetaminophen   Tablet 650 milliGRAM(s) Oral three times a day  carvedilol 3.125 milliGRAM(s) Oral every 12 hours  docusate sodium 100 milliGRAM(s) Oral three times a day  heparin  Injectable 5000 Unit(s) SubCutaneous every 12 hours  hydrALAZINE 50 milliGRAM(s) Oral two times a day  NIFEdipine XL 90 milliGRAM(s) Oral daily  pantoprazole    Tablet 40 milliGRAM(s) Oral before breakfast  sertraline 25 milliGRAM(s) Oral daily  topiramate 100 milliGRAM(s) Oral two times a day  MEDICATIONS  (PRN):  acetaminophen   Tablet 650 milliGRAM(s) Oral every 6 hours PRN For Temp greater than 38 C (100.4 F)  ALPRAZolam 0.5 milliGRAM(s) Oral at bedtime PRN anxiety, insomnia  ondansetron Injectable 4 milliGRAM(s) IV Push every 6 hours PRN Nausea and/or Vomiting  traMADol 50 milliGRAM(s) Oral every 6 hours PRN Moderate Pain (4 - 6)  OBJECTIVE  T(C): 37.6 (18 @ 07:03), Max: 37.6 (18 @ 07:03)  HR: 63 (18 @ 07:03) (63 - 74)  BP: 147/79 (18 @ 07:03) (121/65 - 182/80)  RR: 18 (18 @ 07:03) (16 - 18)  SpO2: 98% (18 @ 07:03) (97% - 100%)  Wt(kg): --  I&O's Summary  21 Aug 2018 07:01  -  22 Aug 2018 07:00  --------------------------------------------------------  IN: 0 mL / OUT: 700 mL / NET: -700 mL  General: NAD  HEENT: MMM  Neck: No JVD, no carotid bruit  Lungs: CTAB  CV: RRR, nl S1/S2, no M/R/G  Abdomen: S/NT/ND, +BS  Extremities: No LE edema, no cyanosis Mild bruisng and swelling of left foot   Neuro: AAOx3, non-focal  Skin: No rash                   11.7   7.66  )-----------( 220      ( 22 Aug 2018 06:51 )             35.3   08-  141  |  106  |  28<H>  ----------------------------<  106<H>  3.9   |  28  |  1.40<H>  Ca    8.9      22 Aug 2018 06:51  Phos  2.8     08-21  Mg     2.2     08-21  CAPILLARY BLOOD GLUCOSE  Urinalysis Basic - ( 22 Aug 2018 04:44 )    Color: Yellow / Appearance: Clear / S.005 / pH: x  Gluc: x / Ketone: Negative  / Bili: Negative / Urobili: Negative   Blood: x / Protein: Negative / Nitrite: Negative   Leuk Esterase: Trace / RBC: 0-2 /HPF / WBC 3-5   Sq Epi: x / Non Sq Epi: Few / Bacteria: x        RADIOLOGY & ADDITIONAL TESTS:< from: Xray Foot AP + Lateral + Oblique, Left (18 @ 10:52) >  EXAM:  FOOT LEFT (MINIMUM 3 VIEWS)                            PROCEDURE DATE:  2018          INTERPRETATION:  LEFT FOOT: AP, LATERAL, OBLIQUE    CLINICAL INFORMATION: left  foot pain and swelling.    COMPARISON:  None available    FINDINGS:    Artifact overlies the foot degrading image quality limiting evaluation.    The alignment at the tarsometatarsal joints remains within normal limits.  No definite acute fracture is noted.    Impression:    Findings as discussed above.    < end of copied text >     reviewed elctronically  ASSESSMENT/PLAN:
syncope doubt tia.  migraine HA,  FIORICET  PRN FOR VELEZ.  DR TAYLOR TO FOLLOW TOMORROW,
LATE ENTRY- patient was seen and examined earlier today . Plan of care was discussed with med staff and unit coordinator . Chart and available morning labs /imaging are reviewed electronically , urgent issues addressed . More information  is being added upon completion of rounds , when more information is collected and management discussed with consultants , medical staff and social service/case management on the floor Chief Complaint: Syncope   Interval Events: No events overnight. Complains of left foot pain after fall Dorsum of foot is swollen ,mild bruising noted   Review of Systems:  General: No fevers, chills, weight loss or gain  Skin: No rashes, color changes  Cardiovascular: No chest pain, orthopnea  Respiratory: No shortness of breath, cough  Gastrointestinal: No nausea, abdominal pain  Genitourinary: No incontinence, pain with urination  Musculoskeletal: No pain, swelling, decreased range of motion  Neurological: No headache, weakness  Psychiatric: No depression, anxiety  Endocrine: No weight loss or gain, increased thirst  All other systems are comprehensively negative.  Physical Exam:  Vitals:        Vital Signs Last 24 Hrs  T(C): 36.6 (21 Aug 2018 07:25), Max: 37.4 (21 Aug 2018 00:17)  T(F): 97.8 (21 Aug 2018 07:25), Max: 99.4 (21 Aug 2018 00:17)  HR: 72 (21 Aug 2018 07:25) (52 - 77)  BP: 145/77 (21 Aug 2018 07:25) (104/60 - 157/72)  BP(mean): --  RR: 18 (21 Aug 2018 07:25) (16 - 18)  SpO2: 97% (21 Aug 2018 07:25) (97% - 100%)  General: NAD  HEENT: MMM  Neck: No JVD, no carotid bruit  Lungs: CTAB  CV: RRR, nl S1/S2, no M/R/G  Abdomen: S/NT/ND, +BS  Extremities: No LE edema, no cyanosis Mild bruisng and swelling of left foot   Neuro: AAOx3, non-focal  Skin: No rash  Labs:                      11.0   6.88  )-----------( 196      ( 21 Aug 2018 07:26 )             33.2   08-21  141  |  106  |  27<H>  ----------------------------<  129<H>  3.3<L>   |  29  |  1.70<H>  Ca    8.4<L>      21 Aug 2018 07:26  Mg     2.5     08-20  TPro  7.0  /  Alb  3.3  /  TBili  0.6  /  DBili  x   /  AST  19  /  ALT  18  /  AlkPhos  60  08-20  CARDIAC MARKERS ( 21 Aug 2018 07:26 )  .016 ng/mL / x     / x     / x     / x      CARDIAC MARKERS ( 20 Aug 2018 09:31 )  .033 ng/mL / x     / x     / x     / <1.0 ng/mL  PT/INR - ( 20 Aug 2018 09:31 )   PT: 12.1 sec;   INR: 1.11 ratio    PTT - ( 20 Aug 2018 09:31 )  PTT:23.5 sec  Telemetry: Sinus rhythm < from: Xray Foot AP + Lateral + Oblique, Left (08.21.18 @ 10:52) >      INTERPRETATION:  LEFT FOOT: AP, LATERAL, OBLIQUE    CLINICAL INFORMATION: left  foot pain and swelling.    COMPARISON:  None available    FINDINGS:    Artifact overlies the foot degrading image quality limiting evaluation.    The alignment at the tarsometatarsal joints remains within normal limits.  No definite acute fracture is noted.    Impression:    Findings as discussed above.    < end of copied text >

## 2018-08-22 NOTE — PHYSICAL THERAPY INITIAL EVALUATION ADULT - DID THE PATIENT HAVE SURGERY?
n/a n/a/X-ray to left foot: no definitive fx noted; Left ankle x-ray: (-) fx or dislocation; Chest X-ray: Normal

## 2018-08-22 NOTE — PROGRESS NOTE ADULT - PROBLEM SELECTOR PLAN 1
Admit to telemetry unit for monitoring,send 3 sets of cardiac ensymes to rule out coronary event,obtain ECHO to evaluate LVEF,cardiology consult,continue current managmnet,O2 supply,anticoagulation plan as per cardiology consult
LIKELY VASOVAGAL SECONDARY TO DEHYDARTION AND RUPERTO ,RESOLVED

## 2018-08-22 NOTE — PROGRESS NOTE ADULT - PROBLEM SELECTOR PLAN 2
neurology evaluation ,CT BRAIN ,sz precautions ,continue home meds, orthostatic assesment , oob tc /pt neurology evaluation ,CT BRAIN ,sz precautions ,continue home meds, orthostatic assesment , oob tc /pt CLEARED FOR D/C BY

## 2018-08-22 NOTE — PHYSICAL THERAPY INITIAL EVALUATION ADULT - ADDITIONAL COMMENTS
Pt lives in a private house w/ 3-4 steps to enter w/ handrails, a flight of stairs to negotiate to the basement & to the second floor w/ handrails. PTA pt (I) w/ functional mobility & ADL w/o AD Pt lives in a private house w/ 3-4 steps to enter w/ handrails, a flight of stairs to negotiate to the basement & to the second floor w/ handrails but patel snot need to use. PTA pt (I) w/ functional mobility & ADL w/o AD. Pt lives /c her sister and nephew. Pt does not own or use any adaptive or assistive devices.

## 2018-08-22 NOTE — DISCHARGE NOTE ADULT - HOSPITAL COURSE
63 female with hx of HTN , SHINGLES, OSTEOPOROSIS , SZ ,PERICARDITIS ,CONSTIPATION ,MIGRAINES,DEPRESSION AND ANXIETY  presents to ER by ambulance with report of syncope. Patient states she was recently discharged from [facility] for elevated blood pressure, states that after she was discharged home , she was not feeling well, not eating, having headaches and vomiting, today patient got out of bed and felt light headed and dizzy and fell to the ground, witnessed, unresponsive for 30 seconds, c/o headache, left ankle pain, weakness.Family states patient had LOC but no sz activity noted .Patient denies cp ,sob palpitations ,vertigo Seen by neurologist and cardiologist ,admitted to rule out cva and ami Admit to telemetry unit for monitoring,send 3 sets of cardiac ensymes to rule out coronary event,obtain ECHO to evaluate LVEF,cardiology consult,continue current managmnet,O2 supply,anticoagulation plan as per cardiology consult (20 Aug 2018 11:55)

## 2018-08-22 NOTE — PHYSICAL THERAPY INITIAL EVALUATION ADULT - RANGE OF MOTION EXAMINATION, REHAB EVAL
bilateral upper extremity ROM was WFL (within functional limits)/foot limited due to pain but WFL/bilateral lower extremity ROM was WFL (within functional limits)/deficits as listed below

## 2018-08-22 NOTE — DISCHARGE NOTE ADULT - MEDICATION SUMMARY - MEDICATIONS TO TAKE
I will START or STAY ON the medications listed below when I get home from the hospital:    OUTPATIENT PT   -- 1 application between cheek and gums once a day   -- Indication: For PT    ROLLING WALLKER WITH 5 INCH WHEELS  -- 1 application between cheek and gums once a day   -- Indication: For ATAXIA    Fioricet oral capsule  -- 1 cap(s) by mouth every 4 hours, As Needed  -- Indication: For Migraines    topiramate 100 mg oral tablet  -- 1 tab(s) by mouth 2 times a day  -- Indication: For SZ    sertraline 25 mg oral tablet  -- 1 tab(s) by mouth once a day  -- Indication: For Depression    ALPRAZolam 0.5 mg oral tablet  -- 1 tab(s) by mouth once a day (at bedtime), As needed, anxiety, insomnia  -- Indication: For Anxiety    carvedilol 3.125 mg oral tablet  -- 1 tab(s) by mouth every 12 hours ,hold for sbp below 100 or hr below 60  -- Indication: For HTN (hypertension)    NIFEdipine 90 mg oral tablet, extended release  -- 1 tab(s) by mouth once a day  -- Indication: For HTN (hypertension)    Colace 100 mg oral capsule  -- 1 cap(s) by mouth 3 times a day, As Needed  -- Indication: For Constipation    polyethylene glycol 3350 oral powder for reconstitution  -- 17 gram(s) by mouth once a day, As needed, Constipation  -- Indication: For Constipation    pantoprazole 40 mg oral delayed release tablet  -- 1 tab(s) by mouth once a day (before a meal)  -- Indication: For GERD    hydrALAZINE 50 mg oral tablet  -- 1 tab(s) by mouth 2 times a day  -- Indication: For HTN (hypertension) I will START or STAY ON the medications listed below when I get home from the hospital:    OUTPATIENT PT   -- 1 application between cheek and gums once a day   -- Indication: For PT    ROLLING WALLKER WITH 5 INCH WHEELS  -- 1 application between cheek and gums once a day   -- Indication: For ATAXIA    traMADol 50 mg oral tablet  -- 1 tab(s) by mouth 4 times a day, As Needed moderate to severe pain MDD:4  -- Caution federal law prohibits the transfer of this drug to any person other  than the person for whom it was prescribed.  May cause drowsiness.  Alcohol may intensify this effect.  Use care when operating dangerous machinery.  Obtain medical advice before taking any non-prescription drugs as some may affect the action of this medication.    -- Indication: For Pain    Fioricet oral capsule  -- 1 cap(s) by mouth every 4 hours, As Needed  -- Indication: For Migraines    topiramate 100 mg oral tablet  -- 1 tab(s) by mouth 2 times a day  -- Indication: For SZ    sertraline 25 mg oral tablet  -- 1 tab(s) by mouth once a day  -- Indication: For Depression    ALPRAZolam 0.5 mg oral tablet  -- 1 tab(s) by mouth once a day (at bedtime), As needed, anxiety, insomnia  -- Indication: For Anxiety    carvedilol 3.125 mg oral tablet  -- 1 tab(s) by mouth every 12 hours ,hold for sbp below 100 or hr below 60  -- Indication: For HTN (hypertension)    NIFEdipine 90 mg oral tablet, extended release  -- 1 tab(s) by mouth once a day  -- Indication: For HTN (hypertension)    Colace 100 mg oral capsule  -- 1 cap(s) by mouth 3 times a day, As Needed  -- Indication: For Constipation    polyethylene glycol 3350 oral powder for reconstitution  -- 17 gram(s) by mouth once a day, As needed, Constipation  -- Indication: For Constipation    pantoprazole 40 mg oral delayed release tablet  -- 1 tab(s) by mouth once a day (before a meal)  -- Indication: For GERD    hydrALAZINE 50 mg oral tablet  -- 1 tab(s) by mouth 2 times a day  -- Indication: For HTN (hypertension)

## 2018-08-22 NOTE — PHYSICAL THERAPY INITIAL EVALUATION ADULT - GENERAL OBSERVATIONS, REHAB EVAL
Pt re'jonathan supine in bed, NAD, call bell in reach, agreeable to PT scot Pt re'jonathan semi-hartmann in tele bed, pt /c NAD but still c/o pain to left foot, was pre-medicated /c Tramadol. Pt agreeable to PT scot

## 2018-08-23 RX ORDER — TRAMADOL HYDROCHLORIDE 50 MG/1
1 TABLET ORAL
Qty: 20 | Refills: 0 | OUTPATIENT
Start: 2018-08-23 | End: 2018-08-27

## 2018-08-26 ENCOUNTER — EMERGENCY (EMERGENCY)
Facility: HOSPITAL | Age: 63
LOS: 1 days | Discharge: HOME | End: 2018-08-26
Attending: EMERGENCY MEDICINE
Payer: MEDICAID

## 2018-08-26 VITALS
DIASTOLIC BLOOD PRESSURE: 102 MMHG | TEMPERATURE: 98 F | WEIGHT: 104.94 LBS | HEIGHT: 61 IN | RESPIRATION RATE: 16 BRPM | HEART RATE: 72 BPM | OXYGEN SATURATION: 97 % | SYSTOLIC BLOOD PRESSURE: 193 MMHG

## 2018-08-26 VITALS
RESPIRATION RATE: 18 BRPM | HEART RATE: 74 BPM | DIASTOLIC BLOOD PRESSURE: 75 MMHG | SYSTOLIC BLOOD PRESSURE: 187 MMHG | OXYGEN SATURATION: 95 % | TEMPERATURE: 98 F

## 2018-08-26 PROBLEM — F32.9 MAJOR DEPRESSIVE DISORDER, SINGLE EPISODE, UNSPECIFIED: Chronic | Status: ACTIVE | Noted: 2018-08-20

## 2018-08-26 PROBLEM — K59.00 CONSTIPATION, UNSPECIFIED: Chronic | Status: ACTIVE | Noted: 2018-08-20

## 2018-08-26 PROBLEM — I10 ESSENTIAL (PRIMARY) HYPERTENSION: Chronic | Status: ACTIVE | Noted: 2018-08-20

## 2018-08-26 PROBLEM — F41.9 ANXIETY DISORDER, UNSPECIFIED: Chronic | Status: ACTIVE | Noted: 2018-08-20

## 2018-08-26 LAB
ALBUMIN SERPL ELPH-MCNC: 3.4 G/DL — SIGNIFICANT CHANGE UP (ref 3.3–5)
ALP SERPL-CCNC: 57 U/L — SIGNIFICANT CHANGE UP (ref 40–120)
ALT FLD-CCNC: 13 U/L — SIGNIFICANT CHANGE UP (ref 12–78)
ANION GAP SERPL CALC-SCNC: 11 MMOL/L — SIGNIFICANT CHANGE UP (ref 5–17)
AST SERPL-CCNC: 14 U/L — LOW (ref 15–37)
BASOPHILS # BLD AUTO: 0.03 K/UL — SIGNIFICANT CHANGE UP (ref 0–0.2)
BASOPHILS NFR BLD AUTO: 0.5 % — SIGNIFICANT CHANGE UP (ref 0–2)
BILIRUB SERPL-MCNC: 0.5 MG/DL — SIGNIFICANT CHANGE UP (ref 0.2–1.2)
BUN SERPL-MCNC: 21 MG/DL — SIGNIFICANT CHANGE UP (ref 7–23)
CALCIUM SERPL-MCNC: 9.2 MG/DL — SIGNIFICANT CHANGE UP (ref 8.5–10.1)
CHLORIDE SERPL-SCNC: 104 MMOL/L — SIGNIFICANT CHANGE UP (ref 96–108)
CK MB BLD-MCNC: <4.3 % — HIGH (ref 0–3.5)
CK MB BLD-MCNC: <5.3 % — HIGH (ref 0–3.5)
CK MB CFR SERPL CALC: <1 NG/ML — SIGNIFICANT CHANGE UP (ref 0–3.6)
CK MB CFR SERPL CALC: <1 NG/ML — SIGNIFICANT CHANGE UP (ref 0–3.6)
CK SERPL-CCNC: 19 U/L — LOW (ref 26–192)
CK SERPL-CCNC: 23 U/L — LOW (ref 26–192)
CO2 SERPL-SCNC: 24 MMOL/L — SIGNIFICANT CHANGE UP (ref 22–31)
CREAT SERPL-MCNC: 1.3 MG/DL — SIGNIFICANT CHANGE UP (ref 0.5–1.3)
EOSINOPHIL # BLD AUTO: 0.31 K/UL — SIGNIFICANT CHANGE UP (ref 0–0.5)
EOSINOPHIL NFR BLD AUTO: 4.9 % — SIGNIFICANT CHANGE UP (ref 0–6)
GLUCOSE SERPL-MCNC: 93 MG/DL — SIGNIFICANT CHANGE UP (ref 70–99)
HCT VFR BLD CALC: 39.7 % — SIGNIFICANT CHANGE UP (ref 34.5–45)
HGB BLD-MCNC: 13.6 G/DL — SIGNIFICANT CHANGE UP (ref 11.5–15.5)
IMM GRANULOCYTES NFR BLD AUTO: 0.5 % — SIGNIFICANT CHANGE UP (ref 0–1.5)
LYMPHOCYTES # BLD AUTO: 1.26 K/UL — SIGNIFICANT CHANGE UP (ref 1–3.3)
LYMPHOCYTES # BLD AUTO: 19.9 % — SIGNIFICANT CHANGE UP (ref 13–44)
MCHC RBC-ENTMCNC: 29.7 PG — SIGNIFICANT CHANGE UP (ref 27–34)
MCHC RBC-ENTMCNC: 34.3 GM/DL — SIGNIFICANT CHANGE UP (ref 32–36)
MCV RBC AUTO: 86.7 FL — SIGNIFICANT CHANGE UP (ref 80–100)
MONOCYTES # BLD AUTO: 0.8 K/UL — SIGNIFICANT CHANGE UP (ref 0–0.9)
MONOCYTES NFR BLD AUTO: 12.6 % — SIGNIFICANT CHANGE UP (ref 2–14)
NEUTROPHILS # BLD AUTO: 3.9 K/UL — SIGNIFICANT CHANGE UP (ref 1.8–7.4)
NEUTROPHILS NFR BLD AUTO: 61.6 % — SIGNIFICANT CHANGE UP (ref 43–77)
PLATELET # BLD AUTO: 245 K/UL — SIGNIFICANT CHANGE UP (ref 150–400)
POTASSIUM SERPL-MCNC: 3.4 MMOL/L — LOW (ref 3.5–5.3)
POTASSIUM SERPL-SCNC: 3.4 MMOL/L — LOW (ref 3.5–5.3)
PROT SERPL-MCNC: 7.5 G/DL — SIGNIFICANT CHANGE UP (ref 6–8.3)
RBC # BLD: 4.58 M/UL — SIGNIFICANT CHANGE UP (ref 3.8–5.2)
RBC # FLD: 13.1 % — SIGNIFICANT CHANGE UP (ref 10.3–14.5)
SODIUM SERPL-SCNC: 139 MMOL/L — SIGNIFICANT CHANGE UP (ref 135–145)
TROPONIN I SERPL-MCNC: 0.02 NG/ML — SIGNIFICANT CHANGE UP (ref 0.01–0.04)
TROPONIN I SERPL-MCNC: <.015 NG/ML — SIGNIFICANT CHANGE UP (ref 0.01–0.04)
WBC # BLD: 6.33 K/UL — SIGNIFICANT CHANGE UP (ref 3.8–10.5)
WBC # FLD AUTO: 6.33 K/UL — SIGNIFICANT CHANGE UP (ref 3.8–10.5)

## 2018-08-26 PROCEDURE — 36415 COLL VENOUS BLD VENIPUNCTURE: CPT

## 2018-08-26 PROCEDURE — 80053 COMPREHEN METABOLIC PANEL: CPT

## 2018-08-26 PROCEDURE — 96375 TX/PRO/DX INJ NEW DRUG ADDON: CPT

## 2018-08-26 PROCEDURE — 96360 HYDRATION IV INFUSION INIT: CPT

## 2018-08-26 PROCEDURE — 93010 ELECTROCARDIOGRAM REPORT: CPT

## 2018-08-26 PROCEDURE — 99284 EMERGENCY DEPT VISIT MOD MDM: CPT | Mod: 25

## 2018-08-26 PROCEDURE — 82550 ASSAY OF CK (CPK): CPT

## 2018-08-26 PROCEDURE — 93005 ELECTROCARDIOGRAM TRACING: CPT

## 2018-08-26 PROCEDURE — 96374 THER/PROPH/DIAG INJ IV PUSH: CPT

## 2018-08-26 PROCEDURE — 85027 COMPLETE CBC AUTOMATED: CPT

## 2018-08-26 PROCEDURE — 84484 ASSAY OF TROPONIN QUANT: CPT

## 2018-08-26 PROCEDURE — 99284 EMERGENCY DEPT VISIT MOD MDM: CPT

## 2018-08-26 PROCEDURE — 82553 CREATINE MB FRACTION: CPT

## 2018-08-26 RX ORDER — AMLODIPINE BESYLATE 2.5 MG/1
5 TABLET ORAL ONCE
Qty: 0 | Refills: 0 | Status: COMPLETED | OUTPATIENT
Start: 2018-08-26 | End: 2018-08-26

## 2018-08-26 RX ORDER — ONDANSETRON 8 MG/1
4 TABLET, FILM COATED ORAL ONCE
Qty: 0 | Refills: 0 | Status: COMPLETED | OUTPATIENT
Start: 2018-08-26 | End: 2018-08-26

## 2018-08-26 RX ORDER — ACETAMINOPHEN 500 MG
650 TABLET ORAL ONCE
Qty: 0 | Refills: 0 | Status: COMPLETED | OUTPATIENT
Start: 2018-08-26 | End: 2018-08-26

## 2018-08-26 RX ORDER — SODIUM CHLORIDE 9 MG/ML
1000 INJECTION INTRAMUSCULAR; INTRAVENOUS; SUBCUTANEOUS ONCE
Qty: 0 | Refills: 0 | Status: COMPLETED | OUTPATIENT
Start: 2018-08-26 | End: 2018-08-26

## 2018-08-26 RX ORDER — KETOROLAC TROMETHAMINE 30 MG/ML
15 SYRINGE (ML) INJECTION ONCE
Qty: 0 | Refills: 0 | Status: DISCONTINUED | OUTPATIENT
Start: 2018-08-26 | End: 2018-08-26

## 2018-08-26 RX ORDER — AMLODIPINE BESYLATE 2.5 MG/1
1 TABLET ORAL
Qty: 10 | Refills: 0 | OUTPATIENT
Start: 2018-08-26 | End: 2018-09-04

## 2018-08-26 RX ORDER — POTASSIUM CHLORIDE 20 MEQ
40 PACKET (EA) ORAL ONCE
Qty: 0 | Refills: 0 | Status: COMPLETED | OUTPATIENT
Start: 2018-08-26 | End: 2018-08-26

## 2018-08-26 RX ORDER — AMLODIPINE BESYLATE 2.5 MG/1
5 TABLET ORAL ONCE
Qty: 0 | Refills: 0 | Status: DISCONTINUED | OUTPATIENT
Start: 2018-08-26 | End: 2018-08-26

## 2018-08-26 RX ADMIN — Medication 15 MILLIGRAM(S): at 13:35

## 2018-08-26 RX ADMIN — Medication 40 MILLIEQUIVALENT(S): at 13:53

## 2018-08-26 RX ADMIN — SODIUM CHLORIDE 1000 MILLILITER(S): 9 INJECTION INTRAMUSCULAR; INTRAVENOUS; SUBCUTANEOUS at 11:25

## 2018-08-26 RX ADMIN — AMLODIPINE BESYLATE 5 MILLIGRAM(S): 2.5 TABLET ORAL at 13:35

## 2018-08-26 RX ADMIN — Medication 15 MILLIGRAM(S): at 13:53

## 2018-08-26 RX ADMIN — SODIUM CHLORIDE 1000 MILLILITER(S): 9 INJECTION INTRAMUSCULAR; INTRAVENOUS; SUBCUTANEOUS at 13:35

## 2018-08-26 RX ADMIN — ONDANSETRON 4 MILLIGRAM(S): 8 TABLET, FILM COATED ORAL at 11:25

## 2018-08-26 NOTE — ED ADULT NURSE NOTE - NSIMPLEMENTINTERV_GEN_ALL_ED
Implemented All Universal Safety Interventions:  [place] to call system. Call bell, personal items and telephone within reach. Instruct patient to call for assistance. Room bathroom lighting operational. Non-slip footwear when patient is off stretcher. Physically safe environment: no spills, clutter or unnecessary equipment. Stretcher in lowest position, wheels locked, appropriate side rails in place.

## 2018-08-26 NOTE — ED PROVIDER NOTE - ATTENDING CONTRIBUTION TO CARE
Nausea and slight elevated BP w/o chest pain or headache. Exam revealed female in NAD with clear lungs and normal heart sounds. Neuro exam wnl. I agree with plan and management outlined by PA.

## 2018-08-26 NOTE — ED PROVIDER NOTE - OBJECTIVE STATEMENT
63 y female presents with nausea, headache, elevated blood pressure, intermittent chest pain,  states she took her coreg and hydralazine this am, "did not feel well", began to have nausea, states she was here a few days ago, was admitted,  was seen by her cardio Dr Liu who decreased the coreg and hydralazine medications, but patient states the medications make her feel "sick".  denies sob, no vomiting, no change in bowel and bladder habits.  former smoker, quit 12 years ago. patient states did not take any pain medication at home.   PMD Dr Frederick  PMH: Anxiety  Constipation  Depression  HTN (hypertension)  Migraines  Osteoporosis  Pericarditis  Seizure disorder  Shingles

## 2018-08-26 NOTE — ED PROVIDER NOTE - PROGRESS NOTE DETAILS
spoke with Dr Gonzales spoke with Dr Gonzales, case discussed, advised stop hydralazine, start amlodipine 5 mg po daily.  call him if  abnormal results. patient resting comfortably, states feeling much better, 2nd set of enzymes ordered 2nd ce negative,  copy of results given, rx for amlodipine sent to pharmacy,  advised follow up with cardio Dr Liu, call tomorrow to arrange follow up in office, any concerns return to ed

## 2018-08-26 NOTE — ED ADULT NURSE NOTE - DOES PATIENT HAVE ADVANCE DIRECTIVE
Anesthetic History     PONV          Review of Systems / Medical History  Patient summary reviewed, nursing notes reviewed and pertinent labs reviewed    Pulmonary  Within defined limits                 Neuro/Psych   Within defined limits           Cardiovascular              Hyperlipidemia    Exercise tolerance: >4 METS     GI/Hepatic/Renal     GERD           Endo/Other        Obesity and arthritis     Other Findings   Comments: Hx R BREAST CANCER  Chronic low back pain            Physical Exam    Airway  Mallampati: I    Neck ROM: normal range of motion   Mouth opening: Normal     Cardiovascular  Regular rate and rhythm,  S1 and S2 normal,  no murmur, click, rub, or gallop             Dental  No notable dental hx       Pulmonary  Breath sounds clear to auscultation               Abdominal  GI exam deferred       Other Findings            Anesthetic Plan    ASA: 2  Anesthesia type: general          Induction: Intravenous  Anesthetic plan and risks discussed with: Patient No

## 2018-08-26 NOTE — ED ADULT NURSE NOTE - PMH
Anxiety    Constipation    Depression    HTN (hypertension)    Migraines    Osteoporosis    Pericarditis    Seizure disorder    Shingles

## 2018-08-26 NOTE — ED ADULT NURSE NOTE - OBJECTIVE STATEMENT
Pt. received alert and oriented x4 with chief complaint of N/V for 1 day after taking hydralazine and coreg. Pt. denies any abdominal pain at current time. Pt. placed on continuous cardiac monitor with continuous pulse ox. EKG performed at bedside upon arrival.

## 2018-08-29 ENCOUNTER — EMERGENCY (EMERGENCY)
Facility: HOSPITAL | Age: 63
LOS: 1 days | Discharge: HOME | End: 2018-08-29
Attending: EMERGENCY MEDICINE
Payer: MEDICAID

## 2018-08-29 VITALS
DIASTOLIC BLOOD PRESSURE: 89 MMHG | WEIGHT: 104.94 LBS | OXYGEN SATURATION: 99 % | RESPIRATION RATE: 18 BRPM | HEIGHT: 61 IN | HEART RATE: 67 BPM | TEMPERATURE: 99 F | SYSTOLIC BLOOD PRESSURE: 183 MMHG

## 2018-08-29 VITALS
RESPIRATION RATE: 14 BRPM | SYSTOLIC BLOOD PRESSURE: 148 MMHG | HEART RATE: 62 BPM | DIASTOLIC BLOOD PRESSURE: 78 MMHG | OXYGEN SATURATION: 98 %

## 2018-08-29 LAB
ALBUMIN SERPL ELPH-MCNC: 3.3 G/DL — SIGNIFICANT CHANGE UP (ref 3.3–5)
ALP SERPL-CCNC: 63 U/L — SIGNIFICANT CHANGE UP (ref 40–120)
ALT FLD-CCNC: 16 U/L — SIGNIFICANT CHANGE UP (ref 12–78)
ANION GAP SERPL CALC-SCNC: 11 MMOL/L — SIGNIFICANT CHANGE UP (ref 5–17)
APTT BLD: 34.4 SEC — SIGNIFICANT CHANGE UP (ref 27.5–37.4)
AST SERPL-CCNC: 11 U/L — LOW (ref 15–37)
BASOPHILS # BLD AUTO: 0.03 K/UL — SIGNIFICANT CHANGE UP (ref 0–0.2)
BASOPHILS NFR BLD AUTO: 0.4 % — SIGNIFICANT CHANGE UP (ref 0–2)
BILIRUB SERPL-MCNC: 0.4 MG/DL — SIGNIFICANT CHANGE UP (ref 0.2–1.2)
BUN SERPL-MCNC: 20 MG/DL — SIGNIFICANT CHANGE UP (ref 7–23)
CALCIUM SERPL-MCNC: 9.4 MG/DL — SIGNIFICANT CHANGE UP (ref 8.5–10.1)
CHLORIDE SERPL-SCNC: 105 MMOL/L — SIGNIFICANT CHANGE UP (ref 96–108)
CK MB BLD-MCNC: <5 % — HIGH (ref 0–3.5)
CK MB CFR SERPL CALC: <1 NG/ML — SIGNIFICANT CHANGE UP (ref 0–3.6)
CK SERPL-CCNC: 20 U/L — LOW (ref 26–192)
CO2 SERPL-SCNC: 23 MMOL/L — SIGNIFICANT CHANGE UP (ref 22–31)
CREAT SERPL-MCNC: 1.3 MG/DL — SIGNIFICANT CHANGE UP (ref 0.5–1.3)
EOSINOPHIL # BLD AUTO: 0.31 K/UL — SIGNIFICANT CHANGE UP (ref 0–0.5)
EOSINOPHIL NFR BLD AUTO: 4.2 % — SIGNIFICANT CHANGE UP (ref 0–6)
GLUCOSE SERPL-MCNC: 95 MG/DL — SIGNIFICANT CHANGE UP (ref 70–99)
HCT VFR BLD CALC: 38.1 % — SIGNIFICANT CHANGE UP (ref 34.5–45)
HGB BLD-MCNC: 13.5 G/DL — SIGNIFICANT CHANGE UP (ref 11.5–15.5)
IMM GRANULOCYTES NFR BLD AUTO: 0.3 % — SIGNIFICANT CHANGE UP (ref 0–1.5)
INR BLD: 1.05 RATIO — SIGNIFICANT CHANGE UP (ref 0.88–1.16)
LYMPHOCYTES # BLD AUTO: 1.54 K/UL — SIGNIFICANT CHANGE UP (ref 1–3.3)
LYMPHOCYTES # BLD AUTO: 20.9 % — SIGNIFICANT CHANGE UP (ref 13–44)
MCHC RBC-ENTMCNC: 30.5 PG — SIGNIFICANT CHANGE UP (ref 27–34)
MCHC RBC-ENTMCNC: 35.4 GM/DL — SIGNIFICANT CHANGE UP (ref 32–36)
MCV RBC AUTO: 86.2 FL — SIGNIFICANT CHANGE UP (ref 80–100)
MONOCYTES # BLD AUTO: 0.82 K/UL — SIGNIFICANT CHANGE UP (ref 0–0.9)
MONOCYTES NFR BLD AUTO: 11.1 % — SIGNIFICANT CHANGE UP (ref 2–14)
NEUTROPHILS # BLD AUTO: 4.65 K/UL — SIGNIFICANT CHANGE UP (ref 1.8–7.4)
NEUTROPHILS NFR BLD AUTO: 63.1 % — SIGNIFICANT CHANGE UP (ref 43–77)
PLATELET # BLD AUTO: 241 K/UL — SIGNIFICANT CHANGE UP (ref 150–400)
POTASSIUM SERPL-MCNC: 3.1 MMOL/L — LOW (ref 3.5–5.3)
POTASSIUM SERPL-SCNC: 3.1 MMOL/L — LOW (ref 3.5–5.3)
PROT SERPL-MCNC: 7.2 G/DL — SIGNIFICANT CHANGE UP (ref 6–8.3)
PROTHROM AB SERPL-ACNC: 11.5 SEC — SIGNIFICANT CHANGE UP (ref 9.8–12.7)
RBC # BLD: 4.42 M/UL — SIGNIFICANT CHANGE UP (ref 3.8–5.2)
RBC # FLD: 13.2 % — SIGNIFICANT CHANGE UP (ref 10.3–14.5)
SODIUM SERPL-SCNC: 139 MMOL/L — SIGNIFICANT CHANGE UP (ref 135–145)
TROPONIN I SERPL-MCNC: <.015 NG/ML — SIGNIFICANT CHANGE UP (ref 0.01–0.04)
WBC # BLD: 7.37 K/UL — SIGNIFICANT CHANGE UP (ref 3.8–10.5)
WBC # FLD AUTO: 7.37 K/UL — SIGNIFICANT CHANGE UP (ref 3.8–10.5)

## 2018-08-29 PROCEDURE — 99284 EMERGENCY DEPT VISIT MOD MDM: CPT | Mod: 25

## 2018-08-29 PROCEDURE — 96365 THER/PROPH/DIAG IV INF INIT: CPT

## 2018-08-29 PROCEDURE — 82553 CREATINE MB FRACTION: CPT

## 2018-08-29 PROCEDURE — 93005 ELECTROCARDIOGRAM TRACING: CPT

## 2018-08-29 PROCEDURE — 96375 TX/PRO/DX INJ NEW DRUG ADDON: CPT

## 2018-08-29 PROCEDURE — 84484 ASSAY OF TROPONIN QUANT: CPT

## 2018-08-29 PROCEDURE — 80053 COMPREHEN METABOLIC PANEL: CPT

## 2018-08-29 PROCEDURE — 36415 COLL VENOUS BLD VENIPUNCTURE: CPT

## 2018-08-29 PROCEDURE — 99285 EMERGENCY DEPT VISIT HI MDM: CPT

## 2018-08-29 PROCEDURE — 71045 X-RAY EXAM CHEST 1 VIEW: CPT | Mod: 26

## 2018-08-29 PROCEDURE — 82550 ASSAY OF CK (CPK): CPT

## 2018-08-29 PROCEDURE — 85610 PROTHROMBIN TIME: CPT

## 2018-08-29 PROCEDURE — 93010 ELECTROCARDIOGRAM REPORT: CPT

## 2018-08-29 PROCEDURE — 85027 COMPLETE CBC AUTOMATED: CPT

## 2018-08-29 PROCEDURE — 71045 X-RAY EXAM CHEST 1 VIEW: CPT

## 2018-08-29 PROCEDURE — 85730 THROMBOPLASTIN TIME PARTIAL: CPT

## 2018-08-29 RX ORDER — FAMOTIDINE 10 MG/ML
20 INJECTION INTRAVENOUS ONCE
Qty: 0 | Refills: 0 | Status: COMPLETED | OUTPATIENT
Start: 2018-08-29 | End: 2018-08-29

## 2018-08-29 RX ORDER — ONDANSETRON 8 MG/1
4 TABLET, FILM COATED ORAL ONCE
Qty: 0 | Refills: 0 | Status: COMPLETED | OUTPATIENT
Start: 2018-08-29 | End: 2018-08-29

## 2018-08-29 RX ORDER — SODIUM CHLORIDE 9 MG/ML
1000 INJECTION INTRAMUSCULAR; INTRAVENOUS; SUBCUTANEOUS ONCE
Qty: 0 | Refills: 0 | Status: COMPLETED | OUTPATIENT
Start: 2018-08-29 | End: 2018-08-29

## 2018-08-29 RX ORDER — KETOROLAC TROMETHAMINE 30 MG/ML
15 SYRINGE (ML) INJECTION ONCE
Qty: 0 | Refills: 0 | Status: DISCONTINUED | OUTPATIENT
Start: 2018-08-29 | End: 2018-08-29

## 2018-08-29 RX ORDER — ACETAMINOPHEN 500 MG
650 TABLET ORAL ONCE
Qty: 0 | Refills: 0 | Status: COMPLETED | OUTPATIENT
Start: 2018-08-29 | End: 2018-08-29

## 2018-08-29 RX ORDER — POTASSIUM CHLORIDE 20 MEQ
40 PACKET (EA) ORAL ONCE
Qty: 0 | Refills: 0 | Status: COMPLETED | OUTPATIENT
Start: 2018-08-29 | End: 2018-08-29

## 2018-08-29 RX ADMIN — Medication 650 MILLIGRAM(S): at 20:20

## 2018-08-29 RX ADMIN — Medication 15 MILLIGRAM(S): at 22:55

## 2018-08-29 RX ADMIN — FAMOTIDINE 20 MILLIGRAM(S): 10 INJECTION INTRAVENOUS at 22:00

## 2018-08-29 RX ADMIN — FAMOTIDINE 100 MILLIGRAM(S): 10 INJECTION INTRAVENOUS at 21:25

## 2018-08-29 RX ADMIN — Medication 650 MILLIGRAM(S): at 21:25

## 2018-08-29 RX ADMIN — Medication 15 MILLIGRAM(S): at 22:22

## 2018-08-29 RX ADMIN — Medication 40 MILLIEQUIVALENT(S): at 23:00

## 2018-08-29 RX ADMIN — SODIUM CHLORIDE 1000 MILLILITER(S): 9 INJECTION INTRAMUSCULAR; INTRAVENOUS; SUBCUTANEOUS at 21:25

## 2018-08-29 RX ADMIN — ONDANSETRON 4 MILLIGRAM(S): 8 TABLET, FILM COATED ORAL at 21:26

## 2018-08-29 NOTE — ED PROVIDER NOTE - ATTENDING CONTRIBUTION TO CARE
pt with HA and elevated BP.  Pt with hx of renal artery stenosis for which she has follow up.  BP controlled.  Pt asymptomatic after meds.  Will follow with nephrologist.

## 2018-08-29 NOTE — ED PROVIDER NOTE - OBJECTIVE STATEMENT
63 y female presents with headache , nausea, episodes of vomiting, states she did not eat much today, drank some milk and had some broth,  took her meds today.  states she has hx of migraines. states she checked her blood pressure and it was "up.    was seen in ED 3 days ago.  had testing done, ct head done.  PMD Dr Frederick, Nephro Dr Moses, No GI  PMH:Anxiety    Constipation    Depression    HTN (hypertension)    Migraines    Osteoporosis    Pericarditis    Renal arteriosclerosis    Seizure disorder    Shingles

## 2018-08-29 NOTE — ED PROVIDER NOTE - PMH
Anxiety    Constipation    Depression    HTN (hypertension)    Migraines    Osteoporosis    Pericarditis    Renal arteriosclerosis    Seizure disorder    Shingles

## 2018-08-29 NOTE — ED ADULT NURSE NOTE - PMH
Anxiety    Constipation    Depression    HTN (hypertension)    Migraines    Osteoporosis    Pericarditis    Seizure disorder    Shingles Anxiety    Constipation    Depression    HTN (hypertension)    Migraines    Osteoporosis    Pericarditis    Renal arteriosclerosis    Seizure disorder    Shingles

## 2018-08-29 NOTE — ED PROVIDER NOTE - PROGRESS NOTE DETAILS
patient requesting discharge , states feeling better, results discussed, copy of results given.  given information for Dr Mcdonough, Dick Moses, PMD Dr Frederick

## 2018-08-29 NOTE — ED ADULT NURSE NOTE - OBJECTIVE STATEMENT
Pt A&Ox4, ambulatory to ED c/o high blood pressure and chest pain.  Pt recently diagnosed with renal ateriosclerosis and has had unstable blood pressure.  Pt currently on medications and being followed by nephrology.  Today, pt states her blood pressure has been "up and down" and as pressure increased, she developed headache and chest pain.  Pt also reports nausea, vomiting.

## 2018-08-29 NOTE — ED ADULT NURSE NOTE - NSIMPLEMENTINTERV_GEN_ALL_ED
Implemented All Universal Safety Interventions:  [place] to call system. Call bell, personal items and telephone within reach. Instruct patient to call for assistance. Room bathroom lighting operational. Non-slip footwear when patient is off stretcher. Physically safe environment: no spills, clutter or unnecessary equipment. Stretcher in lowest position, wheels locked, appropriate side rails in place.

## 2018-09-10 ENCOUNTER — APPOINTMENT (OUTPATIENT)
Dept: ENDOCRINOLOGY | Facility: CLINIC | Age: 63
End: 2018-09-10

## 2018-09-15 ENCOUNTER — EMERGENCY (EMERGENCY)
Facility: HOSPITAL | Age: 63
LOS: 1 days | Discharge: HOME | End: 2018-09-15
Attending: EMERGENCY MEDICINE
Payer: MEDICAID

## 2018-09-15 VITALS
SYSTOLIC BLOOD PRESSURE: 158 MMHG | OXYGEN SATURATION: 97 % | DIASTOLIC BLOOD PRESSURE: 67 MMHG | HEART RATE: 68 BPM | RESPIRATION RATE: 18 BRPM

## 2018-09-15 VITALS
TEMPERATURE: 98 F | RESPIRATION RATE: 16 BRPM | HEART RATE: 88 BPM | HEIGHT: 61 IN | WEIGHT: 108.03 LBS | OXYGEN SATURATION: 98 % | DIASTOLIC BLOOD PRESSURE: 90 MMHG | SYSTOLIC BLOOD PRESSURE: 165 MMHG

## 2018-09-15 PROCEDURE — 96367 TX/PROPH/DG ADDL SEQ IV INF: CPT

## 2018-09-15 PROCEDURE — 96360 HYDRATION IV INFUSION INIT: CPT

## 2018-09-15 PROCEDURE — 99284 EMERGENCY DEPT VISIT MOD MDM: CPT | Mod: 25

## 2018-09-15 PROCEDURE — 96375 TX/PRO/DX INJ NEW DRUG ADDON: CPT

## 2018-09-15 PROCEDURE — 99283 EMERGENCY DEPT VISIT LOW MDM: CPT | Mod: 25

## 2018-09-15 PROCEDURE — 96366 THER/PROPH/DIAG IV INF ADDON: CPT

## 2018-09-15 PROCEDURE — 96365 THER/PROPH/DIAG IV INF INIT: CPT

## 2018-09-15 RX ORDER — HYDROMORPHONE HYDROCHLORIDE 2 MG/ML
0.5 INJECTION INTRAMUSCULAR; INTRAVENOUS; SUBCUTANEOUS ONCE
Qty: 0 | Refills: 0 | Status: DISCONTINUED | OUTPATIENT
Start: 2018-09-15 | End: 2018-09-15

## 2018-09-15 RX ORDER — METOCLOPRAMIDE HCL 10 MG
10 TABLET ORAL ONCE
Qty: 0 | Refills: 0 | Status: COMPLETED | OUTPATIENT
Start: 2018-09-15 | End: 2018-09-15

## 2018-09-15 RX ORDER — KETOROLAC TROMETHAMINE 30 MG/ML
30 SYRINGE (ML) INJECTION ONCE
Qty: 0 | Refills: 0 | Status: DISCONTINUED | OUTPATIENT
Start: 2018-09-15 | End: 2018-09-15

## 2018-09-15 RX ORDER — DIPHENHYDRAMINE HCL 50 MG
50 CAPSULE ORAL ONCE
Qty: 0 | Refills: 0 | Status: COMPLETED | OUTPATIENT
Start: 2018-09-15 | End: 2018-09-15

## 2018-09-15 RX ORDER — SODIUM CHLORIDE 9 MG/ML
1000 INJECTION INTRAMUSCULAR; INTRAVENOUS; SUBCUTANEOUS ONCE
Qty: 0 | Refills: 0 | Status: COMPLETED | OUTPATIENT
Start: 2018-09-15 | End: 2018-09-15

## 2018-09-15 RX ORDER — SODIUM CHLORIDE 9 MG/ML
3 INJECTION INTRAMUSCULAR; INTRAVENOUS; SUBCUTANEOUS ONCE
Qty: 0 | Refills: 0 | Status: COMPLETED | OUTPATIENT
Start: 2018-09-15 | End: 2018-09-15

## 2018-09-15 RX ORDER — ONDANSETRON 8 MG/1
1 TABLET, FILM COATED ORAL
Qty: 12 | Refills: 0 | OUTPATIENT
Start: 2018-09-15 | End: 2018-09-18

## 2018-09-15 RX ADMIN — Medication 10 MILLIGRAM(S): at 09:00

## 2018-09-15 RX ADMIN — SODIUM CHLORIDE 1000 MILLILITER(S): 9 INJECTION INTRAMUSCULAR; INTRAVENOUS; SUBCUTANEOUS at 07:42

## 2018-09-15 RX ADMIN — HYDROMORPHONE HYDROCHLORIDE 0.5 MILLIGRAM(S): 2 INJECTION INTRAMUSCULAR; INTRAVENOUS; SUBCUTANEOUS at 09:24

## 2018-09-15 RX ADMIN — SODIUM CHLORIDE 3 MILLILITER(S): 9 INJECTION INTRAMUSCULAR; INTRAVENOUS; SUBCUTANEOUS at 07:43

## 2018-09-15 RX ADMIN — HYDROMORPHONE HYDROCHLORIDE 0.5 MILLIGRAM(S): 2 INJECTION INTRAMUSCULAR; INTRAVENOUS; SUBCUTANEOUS at 08:38

## 2018-09-15 RX ADMIN — Medication 30 MILLIGRAM(S): at 09:34

## 2018-09-15 RX ADMIN — Medication 50 MILLIGRAM(S): at 07:42

## 2018-09-15 RX ADMIN — SODIUM CHLORIDE 1000 MILLILITER(S): 9 INJECTION INTRAMUSCULAR; INTRAVENOUS; SUBCUTANEOUS at 09:35

## 2018-09-15 RX ADMIN — Medication 30 MILLIGRAM(S): at 07:42

## 2018-09-15 RX ADMIN — Medication 104 MILLIGRAM(S): at 07:42

## 2018-09-15 NOTE — ED PROVIDER NOTE - OBJECTIVE STATEMENT
anxiety, constipation    Depression    HTN (hypertension)    Migraines    Osteoporosis    Pericarditis    Renal arteriosclerosis    Seizure disorder Pt is a 62 yo female who presents to the ED with a cc of migraine headache.  PMHx of anxiety, constipation, depression, HTN, migraine headaches, osteoporosis, h/o pericarditis, h/o renal arteriosclerosis, h/o seizure disorder.  Pt reports that she has suffered from migraine headaches since she was a teenager.  She reports that for the last 3 days she has been experiencing what she describes as her classic migraine headaches.  Pt reports that the pain is worse over her right frontal region and is associated with phonophobia, photophobia, nausea and vomiting.  She reports that she has not been able to keep her usual migraine medications down.  Denies fever, chills, D/C, CP, SOB, abd pain, ext numbness or weakness.

## 2018-09-15 NOTE — ED PROVIDER NOTE - PROGRESS NOTE DETAILS
Pt reports near resolution of HA with pain scale from 10/10 to 1/10.  Feels stable for discharge home with outpatient follow up, tolerating po

## 2018-09-15 NOTE — ED ADULT NURSE NOTE - OBJECTIVE STATEMENT
pt to er c/o headache with nausea is alert oriented speech clear resp even unlabored skin intact no active vomiting iv started 22 angio right arm iv infusing

## 2018-09-15 NOTE — ED ADULT NURSE NOTE - NSIMPLEMENTINTERV_GEN_ALL_ED
Implemented All Fall Risk Interventions:  [place] to call system. Call bell, personal items and telephone within reach. Instruct patient to call for assistance. Room bathroom lighting operational. Non-slip footwear when patient is off stretcher. Physically safe environment: no spills, clutter or unnecessary equipment. Stretcher in lowest position, wheels locked, appropriate side rails in place. Provide visual cue, wrist band, yellow gown, etc. Monitor gait and stability. Monitor for mental status changes and reorient to person, place, and time. Review medications for side effects contributing to fall risk. Reinforce activity limits and safety measures with patient and family.

## 2018-09-15 NOTE — ED PROVIDER NOTE - NEUROLOGICAL, MLM
Alert and oriented, no focal deficits, no motor or sensory deficits. CN II-XII intact no focal deficits

## 2018-09-15 NOTE — ED PROVIDER NOTE - CARE PLAN
Principal Discharge DX:	Migraines  Assessment and plan of treatment:	Return to the ED for any new or worsening symptoms  Take your medication as prescribed  Follow up with your PMD in 2-3 days for a recheck   Advance activity as tolerated

## 2018-09-16 ENCOUNTER — EMERGENCY (EMERGENCY)
Facility: HOSPITAL | Age: 63
LOS: 1 days | Discharge: LEFT BEFORE DISCHARGE | End: 2018-09-16
Attending: EMERGENCY MEDICINE
Payer: MEDICAID

## 2018-09-16 VITALS
SYSTOLIC BLOOD PRESSURE: 185 MMHG | OXYGEN SATURATION: 98 % | TEMPERATURE: 99 F | RESPIRATION RATE: 16 BRPM | HEART RATE: 85 BPM | HEIGHT: 61 IN | DIASTOLIC BLOOD PRESSURE: 102 MMHG | WEIGHT: 100.09 LBS

## 2018-09-16 VITALS
RESPIRATION RATE: 15 BRPM | SYSTOLIC BLOOD PRESSURE: 220 MMHG | HEART RATE: 81 BPM | TEMPERATURE: 98 F | DIASTOLIC BLOOD PRESSURE: 100 MMHG | OXYGEN SATURATION: 97 %

## 2018-09-16 PROBLEM — I12.9 HYPERTENSIVE CHRONIC KIDNEY DISEASE WITH STAGE 1 THROUGH STAGE 4 CHRONIC KIDNEY DISEASE, OR UNSPECIFIED CHRONIC KIDNEY DISEASE: Chronic | Status: ACTIVE | Noted: 2018-08-29

## 2018-09-16 LAB
ALBUMIN SERPL ELPH-MCNC: 3.6 G/DL — SIGNIFICANT CHANGE UP (ref 3.3–5)
ALP SERPL-CCNC: 64 U/L — SIGNIFICANT CHANGE UP (ref 40–120)
ALT FLD-CCNC: 16 U/L — SIGNIFICANT CHANGE UP (ref 12–78)
ANION GAP SERPL CALC-SCNC: 10 MMOL/L — SIGNIFICANT CHANGE UP (ref 5–17)
ANION GAP SERPL CALC-SCNC: 12 MMOL/L — SIGNIFICANT CHANGE UP (ref 5–17)
AST SERPL-CCNC: 22 U/L — SIGNIFICANT CHANGE UP (ref 15–37)
BASOPHILS # BLD AUTO: 0.03 K/UL — SIGNIFICANT CHANGE UP (ref 0–0.2)
BASOPHILS NFR BLD AUTO: 0.5 % — SIGNIFICANT CHANGE UP (ref 0–2)
BILIRUB SERPL-MCNC: 0.6 MG/DL — SIGNIFICANT CHANGE UP (ref 0.2–1.2)
BUN SERPL-MCNC: 15 MG/DL — SIGNIFICANT CHANGE UP (ref 7–23)
BUN SERPL-MCNC: 9 MG/DL — SIGNIFICANT CHANGE UP (ref 7–23)
CALCIUM SERPL-MCNC: 8.5 MG/DL — SIGNIFICANT CHANGE UP (ref 8.5–10.1)
CALCIUM SERPL-MCNC: 9.4 MG/DL — SIGNIFICANT CHANGE UP (ref 8.5–10.1)
CHLORIDE SERPL-SCNC: 101 MMOL/L — SIGNIFICANT CHANGE UP (ref 96–108)
CHLORIDE SERPL-SCNC: 95 MMOL/L — LOW (ref 96–108)
CO2 SERPL-SCNC: 27 MMOL/L — SIGNIFICANT CHANGE UP (ref 22–31)
CO2 SERPL-SCNC: 29 MMOL/L — SIGNIFICANT CHANGE UP (ref 22–31)
CREAT SERPL-MCNC: 1.1 MG/DL — SIGNIFICANT CHANGE UP (ref 0.5–1.3)
CREAT SERPL-MCNC: 1.2 MG/DL — SIGNIFICANT CHANGE UP (ref 0.5–1.3)
EOSINOPHIL # BLD AUTO: 0.26 K/UL — SIGNIFICANT CHANGE UP (ref 0–0.5)
EOSINOPHIL NFR BLD AUTO: 4.3 % — SIGNIFICANT CHANGE UP (ref 0–6)
GLUCOSE SERPL-MCNC: 107 MG/DL — HIGH (ref 70–99)
GLUCOSE SERPL-MCNC: 97 MG/DL — SIGNIFICANT CHANGE UP (ref 70–99)
HCT VFR BLD CALC: 41.3 % — SIGNIFICANT CHANGE UP (ref 34.5–45)
HGB BLD-MCNC: 14.7 G/DL — SIGNIFICANT CHANGE UP (ref 11.5–15.5)
IMM GRANULOCYTES NFR BLD AUTO: 0.3 % — SIGNIFICANT CHANGE UP (ref 0–1.5)
LIDOCAIN IGE QN: 238 U/L — SIGNIFICANT CHANGE UP (ref 73–393)
LYMPHOCYTES # BLD AUTO: 1.31 K/UL — SIGNIFICANT CHANGE UP (ref 1–3.3)
LYMPHOCYTES # BLD AUTO: 21.8 % — SIGNIFICANT CHANGE UP (ref 13–44)
MCHC RBC-ENTMCNC: 29.6 PG — SIGNIFICANT CHANGE UP (ref 27–34)
MCHC RBC-ENTMCNC: 35.6 GM/DL — SIGNIFICANT CHANGE UP (ref 32–36)
MCV RBC AUTO: 83.1 FL — SIGNIFICANT CHANGE UP (ref 80–100)
MONOCYTES # BLD AUTO: 0.89 K/UL — SIGNIFICANT CHANGE UP (ref 0–0.9)
MONOCYTES NFR BLD AUTO: 14.8 % — HIGH (ref 2–14)
NEUTROPHILS # BLD AUTO: 3.5 K/UL — SIGNIFICANT CHANGE UP (ref 1.8–7.4)
NEUTROPHILS NFR BLD AUTO: 58.3 % — SIGNIFICANT CHANGE UP (ref 43–77)
PLATELET # BLD AUTO: 279 K/UL — SIGNIFICANT CHANGE UP (ref 150–400)
POTASSIUM SERPL-MCNC: 2.4 MMOL/L — CRITICAL LOW (ref 3.5–5.3)
POTASSIUM SERPL-MCNC: 3.6 MMOL/L — SIGNIFICANT CHANGE UP (ref 3.5–5.3)
POTASSIUM SERPL-SCNC: 2.4 MMOL/L — CRITICAL LOW (ref 3.5–5.3)
POTASSIUM SERPL-SCNC: 3.6 MMOL/L — SIGNIFICANT CHANGE UP (ref 3.5–5.3)
PROT SERPL-MCNC: 7.8 G/DL — SIGNIFICANT CHANGE UP (ref 6–8.3)
RBC # BLD: 4.97 M/UL — SIGNIFICANT CHANGE UP (ref 3.8–5.2)
RBC # FLD: 13 % — SIGNIFICANT CHANGE UP (ref 10.3–14.5)
SODIUM SERPL-SCNC: 136 MMOL/L — SIGNIFICANT CHANGE UP (ref 135–145)
SODIUM SERPL-SCNC: 138 MMOL/L — SIGNIFICANT CHANGE UP (ref 135–145)
WBC # BLD: 6.01 K/UL — SIGNIFICANT CHANGE UP (ref 3.8–10.5)
WBC # FLD AUTO: 6.01 K/UL — SIGNIFICANT CHANGE UP (ref 3.8–10.5)

## 2018-09-16 PROCEDURE — 83690 ASSAY OF LIPASE: CPT

## 2018-09-16 PROCEDURE — 96365 THER/PROPH/DIAG IV INF INIT: CPT

## 2018-09-16 PROCEDURE — 96375 TX/PRO/DX INJ NEW DRUG ADDON: CPT

## 2018-09-16 PROCEDURE — 96366 THER/PROPH/DIAG IV INF ADDON: CPT

## 2018-09-16 PROCEDURE — 82550 ASSAY OF CK (CPK): CPT

## 2018-09-16 PROCEDURE — 83735 ASSAY OF MAGNESIUM: CPT

## 2018-09-16 PROCEDURE — 99284 EMERGENCY DEPT VISIT MOD MDM: CPT | Mod: 25

## 2018-09-16 PROCEDURE — 96367 TX/PROPH/DG ADDL SEQ IV INF: CPT

## 2018-09-16 PROCEDURE — 80053 COMPREHEN METABOLIC PANEL: CPT

## 2018-09-16 PROCEDURE — 84484 ASSAY OF TROPONIN QUANT: CPT

## 2018-09-16 PROCEDURE — 80048 BASIC METABOLIC PNL TOTAL CA: CPT

## 2018-09-16 PROCEDURE — 99285 EMERGENCY DEPT VISIT HI MDM: CPT

## 2018-09-16 PROCEDURE — 82553 CREATINE MB FRACTION: CPT

## 2018-09-16 PROCEDURE — 85027 COMPLETE CBC AUTOMATED: CPT

## 2018-09-16 PROCEDURE — 83880 ASSAY OF NATRIURETIC PEPTIDE: CPT

## 2018-09-16 PROCEDURE — 84145 PROCALCITONIN (PCT): CPT

## 2018-09-16 RX ORDER — KETOROLAC TROMETHAMINE 30 MG/ML
30 SYRINGE (ML) INJECTION ONCE
Qty: 0 | Refills: 0 | Status: DISCONTINUED | OUTPATIENT
Start: 2018-09-16 | End: 2018-09-16

## 2018-09-16 RX ORDER — POTASSIUM CHLORIDE 20 MEQ
40 PACKET (EA) ORAL ONCE
Qty: 0 | Refills: 0 | Status: COMPLETED | OUTPATIENT
Start: 2018-09-16 | End: 2018-09-16

## 2018-09-16 RX ORDER — POTASSIUM CHLORIDE 20 MEQ
10 PACKET (EA) ORAL
Qty: 0 | Refills: 0 | Status: COMPLETED | OUTPATIENT
Start: 2018-09-16 | End: 2018-09-16

## 2018-09-16 RX ORDER — ALBUTEROL 90 UG/1
2.5 AEROSOL, METERED ORAL ONCE
Qty: 0 | Refills: 0 | Status: DISCONTINUED | OUTPATIENT
Start: 2018-09-16 | End: 2018-09-20

## 2018-09-16 RX ORDER — DIPHENHYDRAMINE HCL 50 MG
25 CAPSULE ORAL ONCE
Qty: 0 | Refills: 0 | Status: COMPLETED | OUTPATIENT
Start: 2018-09-16 | End: 2018-09-16

## 2018-09-16 RX ORDER — PROCHLORPERAZINE MALEATE 5 MG
10 TABLET ORAL ONCE
Qty: 0 | Refills: 0 | Status: COMPLETED | OUTPATIENT
Start: 2018-09-16 | End: 2018-09-16

## 2018-09-16 RX ORDER — SODIUM CHLORIDE 9 MG/ML
1000 INJECTION INTRAMUSCULAR; INTRAVENOUS; SUBCUTANEOUS ONCE
Qty: 0 | Refills: 0 | Status: COMPLETED | OUTPATIENT
Start: 2018-09-16 | End: 2018-09-16

## 2018-09-16 RX ORDER — IPRATROPIUM BROMIDE 0.2 MG/ML
500 SOLUTION, NON-ORAL INHALATION ONCE
Qty: 0 | Refills: 0 | Status: DISCONTINUED | OUTPATIENT
Start: 2018-09-16 | End: 2018-09-20

## 2018-09-16 RX ORDER — SODIUM CHLORIDE 9 MG/ML
3 INJECTION INTRAMUSCULAR; INTRAVENOUS; SUBCUTANEOUS ONCE
Qty: 0 | Refills: 0 | Status: COMPLETED | OUTPATIENT
Start: 2018-09-16 | End: 2018-09-16

## 2018-09-16 RX ORDER — MAGNESIUM SULFATE 500 MG/ML
2 VIAL (ML) INJECTION ONCE
Qty: 0 | Refills: 0 | Status: COMPLETED | OUTPATIENT
Start: 2018-09-16 | End: 2018-09-16

## 2018-09-16 RX ORDER — MORPHINE SULFATE 50 MG/1
4 CAPSULE, EXTENDED RELEASE ORAL ONCE
Qty: 0 | Refills: 0 | Status: COMPLETED | OUTPATIENT
Start: 2018-09-16 | End: 2018-09-16

## 2018-09-16 RX ORDER — ONDANSETRON 8 MG/1
4 TABLET, FILM COATED ORAL ONCE
Qty: 0 | Refills: 0 | Status: DISCONTINUED | OUTPATIENT
Start: 2018-09-16 | End: 2018-09-20

## 2018-09-16 RX ORDER — HYDROMORPHONE HYDROCHLORIDE 2 MG/ML
0.5 INJECTION INTRAMUSCULAR; INTRAVENOUS; SUBCUTANEOUS ONCE
Qty: 0 | Refills: 0 | Status: DISCONTINUED | OUTPATIENT
Start: 2018-09-16 | End: 2018-09-16

## 2018-09-16 RX ADMIN — Medication 104 MILLIGRAM(S): at 13:29

## 2018-09-16 RX ADMIN — Medication 25 MILLIGRAM(S): at 15:57

## 2018-09-16 RX ADMIN — Medication 100 MILLIEQUIVALENT(S): at 15:00

## 2018-09-16 RX ADMIN — SODIUM CHLORIDE 1000 MILLILITER(S): 9 INJECTION INTRAMUSCULAR; INTRAVENOUS; SUBCUTANEOUS at 14:46

## 2018-09-16 RX ADMIN — HYDROMORPHONE HYDROCHLORIDE 0.5 MILLIGRAM(S): 2 INJECTION INTRAMUSCULAR; INTRAVENOUS; SUBCUTANEOUS at 16:30

## 2018-09-16 RX ADMIN — SODIUM CHLORIDE 1000 MILLILITER(S): 9 INJECTION INTRAMUSCULAR; INTRAVENOUS; SUBCUTANEOUS at 14:28

## 2018-09-16 RX ADMIN — SODIUM CHLORIDE 1000 MILLILITER(S): 9 INJECTION INTRAMUSCULAR; INTRAVENOUS; SUBCUTANEOUS at 13:28

## 2018-09-16 RX ADMIN — Medication 100 MILLIEQUIVALENT(S): at 15:14

## 2018-09-16 RX ADMIN — Medication 40 MILLIEQUIVALENT(S): at 15:14

## 2018-09-16 RX ADMIN — Medication 10 MILLIEQUIVALENT(S): at 19:34

## 2018-09-16 RX ADMIN — Medication 100 MILLIEQUIVALENT(S): at 17:52

## 2018-09-16 RX ADMIN — SODIUM CHLORIDE 3 MILLILITER(S): 9 INJECTION INTRAMUSCULAR; INTRAVENOUS; SUBCUTANEOUS at 13:20

## 2018-09-16 RX ADMIN — Medication 40 MILLIEQUIVALENT(S): at 16:30

## 2018-09-16 RX ADMIN — Medication 10 MILLIGRAM(S): at 14:00

## 2018-09-16 RX ADMIN — Medication 30 MILLIGRAM(S): at 15:57

## 2018-09-16 RX ADMIN — SODIUM CHLORIDE 1000 MILLILITER(S): 9 INJECTION INTRAMUSCULAR; INTRAVENOUS; SUBCUTANEOUS at 15:46

## 2018-09-16 RX ADMIN — Medication 100 GRAM(S): at 14:38

## 2018-09-16 RX ADMIN — Medication 10 MILLIEQUIVALENT(S): at 16:07

## 2018-09-16 NOTE — ED ADULT NURSE NOTE - NSIMPLEMENTINTERV_GEN_ALL_ED
Implemented All Fall Risk Interventions:  [place] to call system. Call bell, personal items and telephone within reach. Instruct patient to call for assistance. Room bathroom lighting operational. Non-slip footwear when patient is off stretcher. Physically safe environment: no spills, clutter or unnecessary equipment. Stretcher in lowest position, wheels locked, appropriate side rails in place. Provide visual cue, wrist band, yellow gown, etc. Monitor gait and stability. Monitor for mental status changes and reorient to person, place, and time. Review medications for side effects contributing to fall risk. Reinforce activity limits and safety measures with patient and family.

## 2018-09-16 NOTE — ED PROVIDER NOTE - ATTENDING CONTRIBUTION TO CARE
Pt is a 64 yo female who presents to the ED with a cc of migraine headache.  PMHx of anxiety, constipation, depression, HTN, migraine headaches, osteoporosis, h/o pericarditis, h/o renal arteriosclerosis, h/o seizure disorder.  Pt reports that she has suffered from migraine headaches since she was a teenager.  She reports that for the last 4 days she has been experiencing what she describes as her classic migraine headaches.  Pt reports that the pain is worse over her right frontal region and is associated with phonophobia, photophobia, nausea and vomiting.  She reports that she has not been able to keep her usual migraine medications down.  Denies fever, chills, D/C, CP, SOB, abd pain, ext numbness or weakness.  Pt was seen in the ED yesterday for similar compliant.  Reports that she had initial relief after Dilaudid but states that after discharge her symptoms quickly returned.  Discussed the theory of rebound headaches.  On exam pt lying in bed does not appear to be uncomfortable.  NCAT, PERRL, EOMI, heart RRR, lungs CTA, abd soft NT/ND, CN II-XII intact.  Will medicate for symptoms, will check cbc, cmp, lipase.  Agree with above plan of care

## 2018-09-16 NOTE — ED PROVIDER NOTE - CARE PLAN
Principal Discharge DX:	Nonintractable headache, unspecified chronicity pattern, unspecified headache type  Secondary Diagnosis:	Hypokalemia

## 2018-09-16 NOTE — ED PROVIDER NOTE - PROGRESS NOTE DETAILS
patient stable. IV and meds infusing. informed of potassium of 2.4. will replace potassium. will give IV K-dur and oral. If headache continues after meds given and potassium replaced, will consider CT. patient stable. sleeping. IV fluids and potassium infusing patient stable. appears comfortable. continues to complain of pain. has been sleeping during multiple re-evaluations patient stable. appears comfortable. complains of continued headache, 7/10. requesting additional dilaudid. was given dose earlier during ED stay. patient states "it didn't give her the fog like it usually does". Dr. Weiner has been in and declines any additional Dilaudid. post rebound headache discussed. due to continued headache, recommended additional workup including CT, lumbar puncture, and to wait for repeat potassium. patient refused CT or lumbar puncture. does not want to wait for repeat potassium. will sign out AMA    Had an at length discussion with patient.  Patient wishes to leave at this time.  The patient understands that they are leaving against medical advice despite the risk of missing a potentially serious diagnosis which may lead to injury, disability and/or death.  I discussed with the patient which tests would need to be performed and what type of monitoring would be necessary for the patient as well.  I was unable to convince patient to stay for further workup.  The patient was given an opportunity to ask any questions as well.   The patient demonstrates understanding of all risks, is awake and alert and demonstrates competence to make medical decisions.  The patient understands that they may return at any time if desired. Discussed the importance of close, prompt medical follow-up.  Also present at bedside for discussions: patient continues to want to leave AMA. wound like potassium redrawn before leaving. will redraw BMP repeat potassium WNL. blood pressure 220/100. continue to recommend CT. recommend additional tx for blood pressure. patient continues to refuse additional tx. states she has blood pressure meds at home and her blood pressure has been this "out of control for weeks" Will leave AMA

## 2018-09-16 NOTE — ED PROVIDER NOTE - NEUROLOGICAL, MLM
Alert and oriented, no focal deficits, no motor or sensory deficits. elevates tongue and shoulders without difficulty. symmetric eyebrow raise and smile. sensation to face equal bilaterally. normal finger to nose. good  strength bilaterally

## 2018-09-16 NOTE — ED PROVIDER NOTE - OBJECTIVE STATEMENT
presents with "migraine" headache for the past 3 days. pain right sided behind right eye, starting to spread to entire forehead. gradual onset. not worse headache of life. denies injury or trauma. +n/v. started vomiting at 0600 this morning and has been vomiting every 20 min since then. no current blurred vision (reports blurred vision yesterday and with previous migraines). pain currently 8/10. was seen in ED yesterday for same pain. was given IV fluids, reglan, benadryl, and Toradol without any relief and then pain improved after Dilaudid. pain returned this am. patient has had long standing history of migraines since age 17. pain similar in quality and character as previous migraines.  patient has not been feeling well past 2 weeks. blood pressure has been out of control. has not been tolerating different antihypertensives and believes they have been causing her headache to get worse. was diagnosed with renal artery stenosis. was seen by nephrology last week but was told not severe enough to have a stent and put on a diuretic med which caused her headache to get worse with becoming more dehydrated. was told to drink more water but patient states she has been unable to tolerate the water and vomiting. has stopped the diuretic.  has been seen mult times at the ED and [place] since Aug 9th for same symptoms. has had mult CT's which were normal per patient. patient states she has been trying to stop xanax and wonders if headache due to rebound headache from xanax  PCP Tristan Frederick  nephrologist Dr. Archie Urban

## 2018-09-16 NOTE — ED ADULT NURSE NOTE - OBJECTIVE STATEMENT
patient comes to ED for evaluation of headache pt was here yesterday given pain meds and discharged states pain hasn't gone away and has persisting nausea

## 2018-09-16 NOTE — ED ADULT NURSE REASSESSMENT NOTE - NS ED NURSE REASSESS COMMENT FT1
Vital signs rechecked and noted BP = 220/100 taken manually but patient refused to stay and wanted to go home and states will take her bLOOD PRESSURE MEDS WHEN SHE GET HOME. dR. Galvin MADE AWARE. ama FORM SIGNED AND ATTACHED TO CHART.

## 2018-09-16 NOTE — ED PROVIDER NOTE - MEDICAL DECISION MAKING DETAILS
presents with right sided headache for 3 days with n/v. was seen in ED yesterday for same. history of similar headaches in the past. has had multiple Ct's in the past 2 weeks for same symptoms which were normal per patient. Dr. Weiner will attempt to perform sphenoid palatine block. will check CBC and CMP. will tx with IV fluids, compazine, benadryl, and magnesium. may need to repeat CT if headache continues

## 2018-09-16 NOTE — ED ADULT NURSE NOTE - CAS ED AMA FORM SIGNED YN
I called Dr. Addison ( psych) and discussed pt';s confusion.   Dr. Addison( psych) says she does not see any patient if first time no show whatever the reason is. I called her and discussed it. Dr. Yanez is also aware of this.   She will return to see Dr. Yanez on 4/6 and will see of she needs to see Dr. Perez sooner.   She also wants to see another GI.   Advised to call GI office to see another physician   Yes

## 2018-09-16 NOTE — ED ADULT NURSE NOTE - CHPI ED NUR SYMPTOMS NEG
no blurred vision/no fever/no numbness/no loss of consciousness/no dizziness/no weakness/no confusion/no change in level of consciousness

## 2018-09-19 ENCOUNTER — EMERGENCY (EMERGENCY)
Facility: HOSPITAL | Age: 63
LOS: 1 days | Discharge: HOME | End: 2018-09-19
Attending: EMERGENCY MEDICINE
Payer: MEDICAID

## 2018-09-19 VITALS
OXYGEN SATURATION: 98 % | TEMPERATURE: 98 F | SYSTOLIC BLOOD PRESSURE: 154 MMHG | DIASTOLIC BLOOD PRESSURE: 76 MMHG | HEART RATE: 76 BPM | RESPIRATION RATE: 14 BRPM

## 2018-09-19 VITALS
WEIGHT: 104.94 LBS | OXYGEN SATURATION: 97 % | RESPIRATION RATE: 18 BRPM | DIASTOLIC BLOOD PRESSURE: 112 MMHG | TEMPERATURE: 98 F | HEART RATE: 93 BPM | SYSTOLIC BLOOD PRESSURE: 206 MMHG

## 2018-09-19 LAB
ALBUMIN SERPL ELPH-MCNC: 3.9 G/DL — SIGNIFICANT CHANGE UP (ref 3.3–5)
ALP SERPL-CCNC: 65 U/L — SIGNIFICANT CHANGE UP (ref 40–120)
ALT FLD-CCNC: 16 U/L — SIGNIFICANT CHANGE UP (ref 12–78)
ANION GAP SERPL CALC-SCNC: 11 MMOL/L — SIGNIFICANT CHANGE UP (ref 5–17)
APTT BLD: 38.4 SEC — HIGH (ref 27.5–37.4)
AST SERPL-CCNC: 17 U/L — SIGNIFICANT CHANGE UP (ref 15–37)
BASOPHILS # BLD AUTO: 0.04 K/UL — SIGNIFICANT CHANGE UP (ref 0–0.2)
BASOPHILS NFR BLD AUTO: 0.5 % — SIGNIFICANT CHANGE UP (ref 0–2)
BILIRUB SERPL-MCNC: 0.5 MG/DL — SIGNIFICANT CHANGE UP (ref 0.2–1.2)
BUN SERPL-MCNC: 11 MG/DL — SIGNIFICANT CHANGE UP (ref 7–23)
CALCIUM SERPL-MCNC: 9.6 MG/DL — SIGNIFICANT CHANGE UP (ref 8.5–10.1)
CHLORIDE SERPL-SCNC: 99 MMOL/L — SIGNIFICANT CHANGE UP (ref 96–108)
CK MB BLD-MCNC: <2.6 % — SIGNIFICANT CHANGE UP (ref 0–3.5)
CK MB CFR SERPL CALC: <1 NG/ML — SIGNIFICANT CHANGE UP (ref 0–3.6)
CK SERPL-CCNC: 39 U/L — SIGNIFICANT CHANGE UP (ref 26–192)
CO2 SERPL-SCNC: 28 MMOL/L — SIGNIFICANT CHANGE UP (ref 22–31)
CREAT SERPL-MCNC: 1.1 MG/DL — SIGNIFICANT CHANGE UP (ref 0.5–1.3)
EOSINOPHIL # BLD AUTO: 0.1 K/UL — SIGNIFICANT CHANGE UP (ref 0–0.5)
EOSINOPHIL NFR BLD AUTO: 1.3 % — SIGNIFICANT CHANGE UP (ref 0–6)
GLUCOSE SERPL-MCNC: 125 MG/DL — HIGH (ref 70–99)
HCT VFR BLD CALC: 44.1 % — SIGNIFICANT CHANGE UP (ref 34.5–45)
HCT VFR BLD CALC: 44.7 % — SIGNIFICANT CHANGE UP (ref 34.5–45)
HGB BLD-MCNC: 15.2 G/DL — SIGNIFICANT CHANGE UP (ref 11.5–15.5)
HGB BLD-MCNC: 15.4 G/DL — SIGNIFICANT CHANGE UP (ref 11.5–15.5)
IMM GRANULOCYTES NFR BLD AUTO: 0.4 % — SIGNIFICANT CHANGE UP (ref 0–1.5)
INR BLD: 1.06 RATIO — SIGNIFICANT CHANGE UP (ref 0.88–1.16)
LYMPHOCYTES # BLD AUTO: 0.94 K/UL — LOW (ref 1–3.3)
LYMPHOCYTES # BLD AUTO: 12.4 % — LOW (ref 13–44)
MCHC RBC-ENTMCNC: 29.3 PG — SIGNIFICANT CHANGE UP (ref 27–34)
MCHC RBC-ENTMCNC: 29.5 PG — SIGNIFICANT CHANGE UP (ref 27–34)
MCHC RBC-ENTMCNC: 34.5 GM/DL — SIGNIFICANT CHANGE UP (ref 32–36)
MCHC RBC-ENTMCNC: 34.5 GM/DL — SIGNIFICANT CHANGE UP (ref 32–36)
MCV RBC AUTO: 85.1 FL — SIGNIFICANT CHANGE UP (ref 80–100)
MCV RBC AUTO: 85.6 FL — SIGNIFICANT CHANGE UP (ref 80–100)
MONOCYTES # BLD AUTO: 0.65 K/UL — SIGNIFICANT CHANGE UP (ref 0–0.9)
MONOCYTES NFR BLD AUTO: 8.6 % — SIGNIFICANT CHANGE UP (ref 2–14)
NEUTROPHILS # BLD AUTO: 5.8 K/UL — SIGNIFICANT CHANGE UP (ref 1.8–7.4)
NEUTROPHILS NFR BLD AUTO: 76.8 % — SIGNIFICANT CHANGE UP (ref 43–77)
NRBC # BLD: 0 /100 WBCS — SIGNIFICANT CHANGE UP (ref 0–0)
NT-PROBNP SERPL-SCNC: 2191 PG/ML — HIGH (ref 0–125)
PLATELET # BLD AUTO: 297 K/UL — SIGNIFICANT CHANGE UP (ref 150–400)
PLATELET # BLD AUTO: 303 K/UL — SIGNIFICANT CHANGE UP (ref 150–400)
POTASSIUM SERPL-MCNC: 2.9 MMOL/L — CRITICAL LOW (ref 3.5–5.3)
POTASSIUM SERPL-SCNC: 2.9 MMOL/L — CRITICAL LOW (ref 3.5–5.3)
PROCALCITONIN SERPL-MCNC: <0.05 — SIGNIFICANT CHANGE UP (ref 0–0.04)
PROT SERPL-MCNC: 8.3 G/DL — SIGNIFICANT CHANGE UP (ref 6–8.3)
PROTHROM AB SERPL-ACNC: 11.6 SEC — SIGNIFICANT CHANGE UP (ref 9.8–12.7)
RBC # BLD: 5.15 M/UL — SIGNIFICANT CHANGE UP (ref 3.8–5.2)
RBC # BLD: 5.25 M/UL — HIGH (ref 3.8–5.2)
RBC # FLD: 13.2 % — SIGNIFICANT CHANGE UP (ref 10.3–14.5)
RBC # FLD: 13.2 % — SIGNIFICANT CHANGE UP (ref 10.3–14.5)
SODIUM SERPL-SCNC: 138 MMOL/L — SIGNIFICANT CHANGE UP (ref 135–145)
TROPONIN I SERPL-MCNC: <.015 NG/ML — SIGNIFICANT CHANGE UP (ref 0.01–0.04)
WBC # BLD: 7.56 K/UL — SIGNIFICANT CHANGE UP (ref 3.8–10.5)
WBC # BLD: 7.7 K/UL — SIGNIFICANT CHANGE UP (ref 3.8–10.5)
WBC # FLD AUTO: 7.56 K/UL — SIGNIFICANT CHANGE UP (ref 3.8–10.5)
WBC # FLD AUTO: 7.7 K/UL — SIGNIFICANT CHANGE UP (ref 3.8–10.5)

## 2018-09-19 PROCEDURE — 85730 THROMBOPLASTIN TIME PARTIAL: CPT

## 2018-09-19 PROCEDURE — 96374 THER/PROPH/DIAG INJ IV PUSH: CPT

## 2018-09-19 PROCEDURE — 70450 CT HEAD/BRAIN W/O DYE: CPT | Mod: 26

## 2018-09-19 PROCEDURE — 96376 TX/PRO/DX INJ SAME DRUG ADON: CPT

## 2018-09-19 PROCEDURE — 96375 TX/PRO/DX INJ NEW DRUG ADDON: CPT

## 2018-09-19 PROCEDURE — 93005 ELECTROCARDIOGRAM TRACING: CPT

## 2018-09-19 PROCEDURE — 85027 COMPLETE CBC AUTOMATED: CPT

## 2018-09-19 PROCEDURE — 96361 HYDRATE IV INFUSION ADD-ON: CPT

## 2018-09-19 PROCEDURE — 85610 PROTHROMBIN TIME: CPT

## 2018-09-19 PROCEDURE — 99284 EMERGENCY DEPT VISIT MOD MDM: CPT | Mod: 25

## 2018-09-19 PROCEDURE — 99285 EMERGENCY DEPT VISIT HI MDM: CPT

## 2018-09-19 PROCEDURE — 70450 CT HEAD/BRAIN W/O DYE: CPT

## 2018-09-19 RX ORDER — ONDANSETRON 8 MG/1
4 TABLET, FILM COATED ORAL ONCE
Qty: 0 | Refills: 0 | Status: COMPLETED | OUTPATIENT
Start: 2018-09-19 | End: 2018-09-19

## 2018-09-19 RX ORDER — SODIUM CHLORIDE 9 MG/ML
1000 INJECTION INTRAMUSCULAR; INTRAVENOUS; SUBCUTANEOUS ONCE
Qty: 0 | Refills: 0 | Status: COMPLETED | OUTPATIENT
Start: 2018-09-19 | End: 2018-09-19

## 2018-09-19 RX ORDER — LABETALOL HCL 100 MG
10 TABLET ORAL ONCE
Qty: 0 | Refills: 0 | Status: COMPLETED | OUTPATIENT
Start: 2018-09-19 | End: 2018-09-19

## 2018-09-19 RX ORDER — POTASSIUM CHLORIDE 20 MEQ
40 PACKET (EA) ORAL ONCE
Qty: 0 | Refills: 0 | Status: COMPLETED | OUTPATIENT
Start: 2018-09-19 | End: 2018-09-19

## 2018-09-19 RX ORDER — HYDRALAZINE HCL 50 MG
10 TABLET ORAL ONCE
Qty: 0 | Refills: 0 | Status: COMPLETED | OUTPATIENT
Start: 2018-09-19 | End: 2018-09-19

## 2018-09-19 RX ORDER — KETOROLAC TROMETHAMINE 30 MG/ML
30 SYRINGE (ML) INJECTION ONCE
Qty: 0 | Refills: 0 | Status: DISCONTINUED | OUTPATIENT
Start: 2018-09-19 | End: 2018-09-19

## 2018-09-19 RX ORDER — HYDROMORPHONE HYDROCHLORIDE 2 MG/ML
1 INJECTION INTRAMUSCULAR; INTRAVENOUS; SUBCUTANEOUS ONCE
Qty: 0 | Refills: 0 | Status: DISCONTINUED | OUTPATIENT
Start: 2018-09-19 | End: 2018-09-19

## 2018-09-19 RX ORDER — METOCLOPRAMIDE HCL 10 MG
10 TABLET ORAL ONCE
Qty: 0 | Refills: 0 | Status: COMPLETED | OUTPATIENT
Start: 2018-09-19 | End: 2018-09-19

## 2018-09-19 RX ADMIN — SODIUM CHLORIDE 1000 MILLILITER(S): 9 INJECTION INTRAMUSCULAR; INTRAVENOUS; SUBCUTANEOUS at 12:43

## 2018-09-19 RX ADMIN — Medication 10 MILLIGRAM(S): at 12:42

## 2018-09-19 RX ADMIN — Medication 10 MILLIGRAM(S): at 15:03

## 2018-09-19 RX ADMIN — Medication 40 MILLIEQUIVALENT(S): at 13:28

## 2018-09-19 RX ADMIN — HYDROMORPHONE HYDROCHLORIDE 1 MILLIGRAM(S): 2 INJECTION INTRAMUSCULAR; INTRAVENOUS; SUBCUTANEOUS at 17:09

## 2018-09-19 RX ADMIN — Medication 30 MILLIGRAM(S): at 17:52

## 2018-09-19 RX ADMIN — Medication 30 MILLIGRAM(S): at 13:25

## 2018-09-19 RX ADMIN — Medication 30 MILLIGRAM(S): at 16:20

## 2018-09-19 RX ADMIN — ONDANSETRON 4 MILLIGRAM(S): 8 TABLET, FILM COATED ORAL at 12:42

## 2018-09-19 RX ADMIN — SODIUM CHLORIDE 1000 MILLILITER(S): 9 INJECTION INTRAMUSCULAR; INTRAVENOUS; SUBCUTANEOUS at 15:02

## 2018-09-19 RX ADMIN — Medication 10 MILLIGRAM(S): at 17:09

## 2018-09-19 RX ADMIN — Medication 30 MILLIGRAM(S): at 13:23

## 2018-09-19 RX ADMIN — HYDROMORPHONE HYDROCHLORIDE 1 MILLIGRAM(S): 2 INJECTION INTRAMUSCULAR; INTRAVENOUS; SUBCUTANEOUS at 17:52

## 2018-09-19 NOTE — ED PROVIDER NOTE - CARE PLAN
Principal Discharge DX:	Intractable migraine without aura and with status migrainosus  Secondary Diagnosis:	Essential hypertension

## 2018-09-19 NOTE — ED PROVIDER NOTE - CONSTITUTIONAL, MLM
normal... Well appearing, thin white female older appearing than stated age,  well nourished, awake, alert, oriented to person, place, time/situation and in no apparent distress.

## 2018-09-19 NOTE — ED ADULT NURSE NOTE - NSIMPLEMENTINTERV_GEN_ALL_ED
Implemented All Universal Safety Interventions:  [place] to call system. Call bell, personal items and telephone within reach. Instruct patient to call for assistance. Room bathroom lighting operational. Non-slip footwear when patient is off stretcher. Physically safe environment: no spills, clutter or unnecessary equipment. Stretcher in lowest position, wheels locked, appropriate side rails in place.

## 2018-09-19 NOTE — ED PROVIDER NOTE - OBJECTIVE STATEMENT
64 yo white female with HTN, Pericarditis, Migraine Headaches, CRF and Seizure Disorder who was well up until yesterday when she developed her typical migrainous headache with associated nausea and vomiting. Today had same symptoms and when checked her own BP noted it to be elevated so presented here for further evaluation. Denies any chest pain, SOB, palpitations, weakness or numbness. In addition no neck pain or vertigo/dizziness. Had same symptoms for which she states she was at [facility] overnight for BP control.

## 2018-09-19 NOTE — ED PROVIDER NOTE - MEDICAL DECISION MAKING DETAILS
Headache with N/v like her migraine HAs with elevated BP requiring evaluation, labs, ct scan and meds.

## 2018-09-25 ENCOUNTER — EMERGENCY (EMERGENCY)
Facility: HOSPITAL | Age: 63
LOS: 1 days | Discharge: HOME | End: 2018-09-25
Attending: EMERGENCY MEDICINE
Payer: MEDICAID

## 2018-09-25 ENCOUNTER — INPATIENT (INPATIENT)
Facility: HOSPITAL | Age: 63
LOS: 6 days | Discharge: HOME | DRG: 391 | End: 2018-10-02
Attending: INTERNAL MEDICINE | Admitting: INTERNAL MEDICINE
Payer: COMMERCIAL

## 2018-09-25 VITALS
OXYGEN SATURATION: 98 % | DIASTOLIC BLOOD PRESSURE: 90 MMHG | RESPIRATION RATE: 16 BRPM | HEART RATE: 83 BPM | TEMPERATURE: 98 F | SYSTOLIC BLOOD PRESSURE: 150 MMHG

## 2018-09-25 VITALS
TEMPERATURE: 99 F | HEIGHT: 62 IN | DIASTOLIC BLOOD PRESSURE: 107 MMHG | SYSTOLIC BLOOD PRESSURE: 181 MMHG | OXYGEN SATURATION: 98 % | WEIGHT: 100.09 LBS | HEART RATE: 83 BPM | RESPIRATION RATE: 18 BRPM

## 2018-09-25 VITALS
HEIGHT: 61 IN | DIASTOLIC BLOOD PRESSURE: 114 MMHG | RESPIRATION RATE: 16 BRPM | TEMPERATURE: 98 F | HEART RATE: 83 BPM | WEIGHT: 104.94 LBS | SYSTOLIC BLOOD PRESSURE: 197 MMHG

## 2018-09-25 DIAGNOSIS — I10 ESSENTIAL (PRIMARY) HYPERTENSION: ICD-10-CM

## 2018-09-25 DIAGNOSIS — R11.2 NAUSEA WITH VOMITING, UNSPECIFIED: ICD-10-CM

## 2018-09-25 DIAGNOSIS — F32.9 MAJOR DEPRESSIVE DISORDER, SINGLE EPISODE, UNSPECIFIED: ICD-10-CM

## 2018-09-25 DIAGNOSIS — Z29.9 ENCOUNTER FOR PROPHYLACTIC MEASURES, UNSPECIFIED: ICD-10-CM

## 2018-09-25 DIAGNOSIS — I12.9 HYPERTENSIVE CHRONIC KIDNEY DISEASE WITH STAGE 1 THROUGH STAGE 4 CHRONIC KIDNEY DISEASE, OR UNSPECIFIED CHRONIC KIDNEY DISEASE: ICD-10-CM

## 2018-09-25 DIAGNOSIS — G43.909 MIGRAINE, UNSPECIFIED, NOT INTRACTABLE, WITHOUT STATUS MIGRAINOSUS: ICD-10-CM

## 2018-09-25 DIAGNOSIS — R62.7 ADULT FAILURE TO THRIVE: ICD-10-CM

## 2018-09-25 LAB
ALBUMIN SERPL ELPH-MCNC: 3.7 G/DL — SIGNIFICANT CHANGE UP (ref 3.3–5)
ALBUMIN SERPL ELPH-MCNC: 3.9 G/DL — SIGNIFICANT CHANGE UP (ref 3.3–5)
ALP SERPL-CCNC: 69 U/L — SIGNIFICANT CHANGE UP (ref 40–120)
ALP SERPL-CCNC: 75 U/L — SIGNIFICANT CHANGE UP (ref 40–120)
ALT FLD-CCNC: 18 U/L — SIGNIFICANT CHANGE UP (ref 12–78)
ALT FLD-CCNC: 20 U/L — SIGNIFICANT CHANGE UP (ref 12–78)
AMYLASE P1 CFR SERPL: 208 U/L — HIGH (ref 25–115)
ANION GAP SERPL CALC-SCNC: 9 MMOL/L — SIGNIFICANT CHANGE UP (ref 5–17)
ANION GAP SERPL CALC-SCNC: 9 MMOL/L — SIGNIFICANT CHANGE UP (ref 5–17)
AST SERPL-CCNC: 16 U/L — SIGNIFICANT CHANGE UP (ref 15–37)
AST SERPL-CCNC: 21 U/L — SIGNIFICANT CHANGE UP (ref 15–37)
BASOPHILS # BLD AUTO: 0.05 K/UL — SIGNIFICANT CHANGE UP (ref 0–0.2)
BASOPHILS NFR BLD AUTO: 0.7 % — SIGNIFICANT CHANGE UP (ref 0–2)
BILIRUB SERPL-MCNC: 0.6 MG/DL — SIGNIFICANT CHANGE UP (ref 0.2–1.2)
BILIRUB SERPL-MCNC: 0.6 MG/DL — SIGNIFICANT CHANGE UP (ref 0.2–1.2)
BUN SERPL-MCNC: 12 MG/DL — SIGNIFICANT CHANGE UP (ref 7–23)
BUN SERPL-MCNC: 14 MG/DL — SIGNIFICANT CHANGE UP (ref 7–23)
CALCIUM SERPL-MCNC: 9.8 MG/DL — SIGNIFICANT CHANGE UP (ref 8.5–10.1)
CALCIUM SERPL-MCNC: 9.9 MG/DL — SIGNIFICANT CHANGE UP (ref 8.5–10.1)
CHLORIDE SERPL-SCNC: 97 MMOL/L — SIGNIFICANT CHANGE UP (ref 96–108)
CHLORIDE SERPL-SCNC: 99 MMOL/L — SIGNIFICANT CHANGE UP (ref 96–108)
CO2 SERPL-SCNC: 27 MMOL/L — SIGNIFICANT CHANGE UP (ref 22–31)
CO2 SERPL-SCNC: 28 MMOL/L — SIGNIFICANT CHANGE UP (ref 22–31)
CREAT SERPL-MCNC: 1.1 MG/DL — SIGNIFICANT CHANGE UP (ref 0.5–1.3)
CREAT SERPL-MCNC: 1.1 MG/DL — SIGNIFICANT CHANGE UP (ref 0.5–1.3)
EOSINOPHIL # BLD AUTO: 0.12 K/UL — SIGNIFICANT CHANGE UP (ref 0–0.5)
EOSINOPHIL NFR BLD AUTO: 1.6 % — SIGNIFICANT CHANGE UP (ref 0–6)
GLUCOSE SERPL-MCNC: 138 MG/DL — HIGH (ref 70–99)
GLUCOSE SERPL-MCNC: 99 MG/DL — SIGNIFICANT CHANGE UP (ref 70–99)
HCT VFR BLD CALC: 42.9 % — SIGNIFICANT CHANGE UP (ref 34.5–45)
HCT VFR BLD CALC: 45.4 % — HIGH (ref 34.5–45)
HGB BLD-MCNC: 14.8 G/DL — SIGNIFICANT CHANGE UP (ref 11.5–15.5)
HGB BLD-MCNC: 15.8 G/DL — HIGH (ref 11.5–15.5)
IMM GRANULOCYTES NFR BLD AUTO: 0.5 % — SIGNIFICANT CHANGE UP (ref 0–1.5)
LIDOCAIN IGE QN: 123 U/L — SIGNIFICANT CHANGE UP (ref 73–393)
LYMPHOCYTES # BLD AUTO: 0.79 K/UL — LOW (ref 1–3.3)
LYMPHOCYTES # BLD AUTO: 10.6 % — LOW (ref 13–44)
MCHC RBC-ENTMCNC: 29 PG — SIGNIFICANT CHANGE UP (ref 27–34)
MCHC RBC-ENTMCNC: 29.6 PG — SIGNIFICANT CHANGE UP (ref 27–34)
MCHC RBC-ENTMCNC: 34.5 GM/DL — SIGNIFICANT CHANGE UP (ref 32–36)
MCHC RBC-ENTMCNC: 34.8 GM/DL — SIGNIFICANT CHANGE UP (ref 32–36)
MCV RBC AUTO: 84.1 FL — SIGNIFICANT CHANGE UP (ref 80–100)
MCV RBC AUTO: 85.2 FL — SIGNIFICANT CHANGE UP (ref 80–100)
MONOCYTES # BLD AUTO: 0.62 K/UL — SIGNIFICANT CHANGE UP (ref 0–0.9)
MONOCYTES NFR BLD AUTO: 8.3 % — SIGNIFICANT CHANGE UP (ref 2–14)
NEUTROPHILS # BLD AUTO: 5.81 K/UL — SIGNIFICANT CHANGE UP (ref 1.8–7.4)
NEUTROPHILS NFR BLD AUTO: 78.3 % — HIGH (ref 43–77)
NRBC # BLD: 0 /100 WBCS — SIGNIFICANT CHANGE UP (ref 0–0)
NRBC # BLD: 0 /100 WBCS — SIGNIFICANT CHANGE UP (ref 0–0)
PLATELET # BLD AUTO: 288 K/UL — SIGNIFICANT CHANGE UP (ref 150–400)
PLATELET # BLD AUTO: 319 K/UL — SIGNIFICANT CHANGE UP (ref 150–400)
POTASSIUM SERPL-MCNC: 3.3 MMOL/L — LOW (ref 3.5–5.3)
POTASSIUM SERPL-MCNC: 3.7 MMOL/L — SIGNIFICANT CHANGE UP (ref 3.5–5.3)
POTASSIUM SERPL-SCNC: 3.3 MMOL/L — LOW (ref 3.5–5.3)
POTASSIUM SERPL-SCNC: 3.7 MMOL/L — SIGNIFICANT CHANGE UP (ref 3.5–5.3)
PROT SERPL-MCNC: 8.3 G/DL — SIGNIFICANT CHANGE UP (ref 6–8.3)
PROT SERPL-MCNC: 8.8 G/DL — HIGH (ref 6–8.3)
RBC # BLD: 5.1 M/UL — SIGNIFICANT CHANGE UP (ref 3.8–5.2)
RBC # BLD: 5.33 M/UL — HIGH (ref 3.8–5.2)
RBC # FLD: 12.8 % — SIGNIFICANT CHANGE UP (ref 10.3–14.5)
RBC # FLD: 12.9 % — SIGNIFICANT CHANGE UP (ref 10.3–14.5)
SODIUM SERPL-SCNC: 134 MMOL/L — LOW (ref 135–145)
SODIUM SERPL-SCNC: 135 MMOL/L — SIGNIFICANT CHANGE UP (ref 135–145)
WBC # BLD: 6.7 K/UL — SIGNIFICANT CHANGE UP (ref 3.8–10.5)
WBC # BLD: 7.43 K/UL — SIGNIFICANT CHANGE UP (ref 3.8–10.5)
WBC # FLD AUTO: 6.7 K/UL — SIGNIFICANT CHANGE UP (ref 3.8–10.5)
WBC # FLD AUTO: 7.43 K/UL — SIGNIFICANT CHANGE UP (ref 3.8–10.5)

## 2018-09-25 PROCEDURE — 85027 COMPLETE CBC AUTOMATED: CPT

## 2018-09-25 PROCEDURE — 74177 CT ABD & PELVIS W/CONTRAST: CPT | Mod: 26

## 2018-09-25 PROCEDURE — 99285 EMERGENCY DEPT VISIT HI MDM: CPT

## 2018-09-25 PROCEDURE — 80053 COMPREHEN METABOLIC PANEL: CPT

## 2018-09-25 PROCEDURE — 71045 X-RAY EXAM CHEST 1 VIEW: CPT | Mod: 26

## 2018-09-25 PROCEDURE — 93010 ELECTROCARDIOGRAM REPORT: CPT

## 2018-09-25 PROCEDURE — 99284 EMERGENCY DEPT VISIT MOD MDM: CPT | Mod: 25

## 2018-09-25 PROCEDURE — 36415 COLL VENOUS BLD VENIPUNCTURE: CPT

## 2018-09-25 PROCEDURE — 96375 TX/PRO/DX INJ NEW DRUG ADDON: CPT

## 2018-09-25 PROCEDURE — 99285 EMERGENCY DEPT VISIT HI MDM: CPT | Mod: 25

## 2018-09-25 PROCEDURE — 96374 THER/PROPH/DIAG INJ IV PUSH: CPT

## 2018-09-25 RX ORDER — ONDANSETRON 8 MG/1
4 TABLET, FILM COATED ORAL ONCE
Qty: 0 | Refills: 0 | Status: COMPLETED | OUTPATIENT
Start: 2018-09-25 | End: 2018-09-25

## 2018-09-25 RX ORDER — AMLODIPINE BESYLATE 2.5 MG/1
5 TABLET ORAL DAILY
Qty: 0 | Refills: 0 | Status: DISCONTINUED | OUTPATIENT
Start: 2018-09-25 | End: 2018-09-30

## 2018-09-25 RX ORDER — SODIUM CHLORIDE 9 MG/ML
1000 INJECTION INTRAMUSCULAR; INTRAVENOUS; SUBCUTANEOUS
Qty: 0 | Refills: 0 | Status: DISCONTINUED | OUTPATIENT
Start: 2018-09-25 | End: 2018-09-29

## 2018-09-25 RX ORDER — ONDANSETRON 8 MG/1
4 TABLET, FILM COATED ORAL EVERY 6 HOURS
Qty: 0 | Refills: 0 | Status: DISCONTINUED | OUTPATIENT
Start: 2018-09-25 | End: 2018-09-29

## 2018-09-25 RX ORDER — LACTOBACILLUS ACIDOPHILUS 100MM CELL
1 CAPSULE ORAL EVERY 8 HOURS
Qty: 0 | Refills: 0 | Status: DISCONTINUED | OUTPATIENT
Start: 2018-09-25 | End: 2018-10-02

## 2018-09-25 RX ORDER — AMLODIPINE BESYLATE 2.5 MG/1
5 TABLET ORAL ONCE
Qty: 0 | Refills: 0 | Status: DISCONTINUED | OUTPATIENT
Start: 2018-09-25 | End: 2018-09-29

## 2018-09-25 RX ORDER — SODIUM CHLORIDE 9 MG/ML
3 INJECTION INTRAMUSCULAR; INTRAVENOUS; SUBCUTANEOUS ONCE
Qty: 0 | Refills: 0 | Status: COMPLETED | OUTPATIENT
Start: 2018-09-25 | End: 2018-09-25

## 2018-09-25 RX ORDER — TOPIRAMATE 25 MG
100 TABLET ORAL
Qty: 0 | Refills: 0 | Status: DISCONTINUED | OUTPATIENT
Start: 2018-09-25 | End: 2018-10-02

## 2018-09-25 RX ORDER — PANTOPRAZOLE SODIUM 20 MG/1
40 TABLET, DELAYED RELEASE ORAL
Qty: 0 | Refills: 0 | Status: DISCONTINUED | OUTPATIENT
Start: 2018-09-25 | End: 2018-10-02

## 2018-09-25 RX ORDER — PANTOPRAZOLE SODIUM 20 MG/1
40 TABLET, DELAYED RELEASE ORAL ONCE
Qty: 0 | Refills: 0 | Status: COMPLETED | OUTPATIENT
Start: 2018-09-25 | End: 2018-09-25

## 2018-09-25 RX ORDER — SODIUM CHLORIDE 9 MG/ML
1000 INJECTION INTRAMUSCULAR; INTRAVENOUS; SUBCUTANEOUS ONCE
Qty: 0 | Refills: 0 | Status: COMPLETED | OUTPATIENT
Start: 2018-09-25 | End: 2018-09-25

## 2018-09-25 RX ORDER — KETOROLAC TROMETHAMINE 30 MG/ML
15 SYRINGE (ML) INJECTION ONCE
Qty: 0 | Refills: 0 | Status: DISCONTINUED | OUTPATIENT
Start: 2018-09-25 | End: 2018-09-25

## 2018-09-25 RX ORDER — SERTRALINE 25 MG/1
25 TABLET, FILM COATED ORAL DAILY
Qty: 0 | Refills: 0 | Status: DISCONTINUED | OUTPATIENT
Start: 2018-09-25 | End: 2018-10-02

## 2018-09-25 RX ORDER — ALPRAZOLAM 0.25 MG
0.5 TABLET ORAL AT BEDTIME
Qty: 0 | Refills: 0 | Status: DISCONTINUED | OUTPATIENT
Start: 2018-09-25 | End: 2018-09-26

## 2018-09-25 RX ORDER — SERTRALINE 25 MG/1
25 TABLET, FILM COATED ORAL DAILY
Qty: 0 | Refills: 0 | Status: DISCONTINUED | OUTPATIENT
Start: 2018-09-25 | End: 2018-09-29

## 2018-09-25 RX ORDER — ONDANSETRON 8 MG/1
4 TABLET, FILM COATED ORAL EVERY 6 HOURS
Qty: 0 | Refills: 0 | Status: DISCONTINUED | OUTPATIENT
Start: 2018-09-25 | End: 2018-09-30

## 2018-09-25 RX ORDER — ENOXAPARIN SODIUM 100 MG/ML
40 INJECTION SUBCUTANEOUS DAILY
Qty: 0 | Refills: 0 | Status: DISCONTINUED | OUTPATIENT
Start: 2018-09-25 | End: 2018-10-02

## 2018-09-25 RX ORDER — SODIUM CHLORIDE 9 MG/ML
1000 INJECTION, SOLUTION INTRAVENOUS
Qty: 0 | Refills: 0 | Status: DISCONTINUED | OUTPATIENT
Start: 2018-09-25 | End: 2018-09-27

## 2018-09-25 RX ORDER — KETOROLAC TROMETHAMINE 30 MG/ML
30 SYRINGE (ML) INJECTION ONCE
Qty: 0 | Refills: 0 | Status: DISCONTINUED | OUTPATIENT
Start: 2018-09-25 | End: 2018-09-25

## 2018-09-25 RX ORDER — TRAMADOL HYDROCHLORIDE 50 MG/1
50 TABLET ORAL EVERY 6 HOURS
Qty: 0 | Refills: 0 | Status: DISCONTINUED | OUTPATIENT
Start: 2018-09-25 | End: 2018-09-26

## 2018-09-25 RX ORDER — TOPIRAMATE 25 MG
100 TABLET ORAL
Qty: 0 | Refills: 0 | Status: DISCONTINUED | OUTPATIENT
Start: 2018-09-25 | End: 2018-09-29

## 2018-09-25 RX ORDER — METOCLOPRAMIDE HCL 10 MG
10 TABLET ORAL ONCE
Qty: 0 | Refills: 0 | Status: COMPLETED | OUTPATIENT
Start: 2018-09-25 | End: 2018-09-25

## 2018-09-25 RX ORDER — IOHEXOL 300 MG/ML
30 INJECTION, SOLUTION INTRAVENOUS ONCE
Qty: 0 | Refills: 0 | Status: COMPLETED | OUTPATIENT
Start: 2018-09-25 | End: 2018-09-25

## 2018-09-25 RX ORDER — ENOXAPARIN SODIUM 100 MG/ML
40 INJECTION SUBCUTANEOUS DAILY
Qty: 0 | Refills: 0 | Status: DISCONTINUED | OUTPATIENT
Start: 2018-09-25 | End: 2018-09-29

## 2018-09-25 RX ORDER — ONDANSETRON 8 MG/1
4 TABLET, FILM COATED ORAL EVERY 6 HOURS
Qty: 0 | Refills: 0 | Status: DISCONTINUED | OUTPATIENT
Start: 2018-09-25 | End: 2018-09-25

## 2018-09-25 RX ADMIN — PANTOPRAZOLE SODIUM 40 MILLIGRAM(S): 20 TABLET, DELAYED RELEASE ORAL at 14:16

## 2018-09-25 RX ADMIN — Medication 0.5 MILLIGRAM(S): at 23:45

## 2018-09-25 RX ADMIN — TRAMADOL HYDROCHLORIDE 50 MILLIGRAM(S): 50 TABLET ORAL at 23:42

## 2018-09-25 RX ADMIN — SODIUM CHLORIDE 3 MILLILITER(S): 9 INJECTION INTRAMUSCULAR; INTRAVENOUS; SUBCUTANEOUS at 05:12

## 2018-09-25 RX ADMIN — IOHEXOL 30 MILLILITER(S): 300 INJECTION, SOLUTION INTRAVENOUS at 14:11

## 2018-09-25 RX ADMIN — Medication 30 MILLIGRAM(S): at 06:07

## 2018-09-25 RX ADMIN — AMLODIPINE BESYLATE 5 MILLIGRAM(S): 2.5 TABLET ORAL at 23:45

## 2018-09-25 RX ADMIN — ONDANSETRON 4 MILLIGRAM(S): 8 TABLET, FILM COATED ORAL at 21:20

## 2018-09-25 RX ADMIN — SODIUM CHLORIDE 1000 MILLILITER(S): 9 INJECTION INTRAMUSCULAR; INTRAVENOUS; SUBCUTANEOUS at 14:11

## 2018-09-25 RX ADMIN — SODIUM CHLORIDE 1000 MILLILITER(S): 9 INJECTION INTRAMUSCULAR; INTRAVENOUS; SUBCUTANEOUS at 06:07

## 2018-09-25 RX ADMIN — Medication 10 MILLIGRAM(S): at 05:13

## 2018-09-25 RX ADMIN — Medication 1 TABLET(S): at 23:44

## 2018-09-25 RX ADMIN — Medication 30 MILLIGRAM(S): at 05:30

## 2018-09-25 RX ADMIN — SODIUM CHLORIDE 75 MILLILITER(S): 9 INJECTION, SOLUTION INTRAVENOUS at 17:41

## 2018-09-25 RX ADMIN — ONDANSETRON 4 MILLIGRAM(S): 8 TABLET, FILM COATED ORAL at 14:11

## 2018-09-25 RX ADMIN — SODIUM CHLORIDE 1000 MILLILITER(S): 9 INJECTION INTRAMUSCULAR; INTRAVENOUS; SUBCUTANEOUS at 05:12

## 2018-09-25 RX ADMIN — Medication 15 MILLIGRAM(S): at 18:00

## 2018-09-25 NOTE — CONSULT NOTE ADULT - SUBJECTIVE AND OBJECTIVE BOX
CARDIOLOGY CONSULT NOTE    Patient is a 63y Female with a known history of :Aditted with nausra ,vomitting and loss of wt 50 pounds   in last 1 yr  HPI:      REVIEW OF SYSTEMS:    CONSTITUTIONAL: No fever, weight loss, or fatigue  EYES: No eye pain, visual disturbances, or discharge  ENMT:  No difficulty hearing, tinnitus, vertigo; No sinus or throat pain  NECK: No pain or stiffness  BREASTS: No pain, masses, or nipple discharge  RESPIRATORY: No cough, wheezing, chills or hemoptysis; No shortness of breath  CARDIOVASCULAR: No chest pain, palpitations, dizziness, or leg swelling  GASTROINTESTINAL: No abdominal or epigastric pain. No nausea, vomiting, or hematemesis; No diarrhea or constipation. No melena or hematochezia.  GENITOURINARY: No dysuria, frequency, hematuria, or incontinence  NEUROLOGICAL: No headaches, memory loss, loss of strength, numbness, or tremors  SKIN: No itching, burning, rashes, or lesions   LYMPH NODES: No enlarged glands  ENDOCRINE: No heat or cold intolerance; No hair loss  MUSCULOSKELETAL: No joint pain or swelling; No muscle, back, or extremity pain  PSYCHIATRIC: No depression, anxiety, mood swings, or difficulty sleeping  HEME/LYMPH: No easy bruising, or bleeding gums  ALLERGY AND IMMUNOLOGIC: No hives or eczema    MEDICATIONS  (STANDING):  amLODIPine   Tablet 5 milliGRAM(s) Oral Once  enoxaparin Injectable 40 milliGRAM(s) SubCutaneous daily  sertraline 25 milliGRAM(s) Oral daily  sodium chloride 0.9%. 1000 milliLiter(s) (40 mL/Hr) IV Continuous <Continuous>  topiramate 100 milliGRAM(s) Oral two times a day    MEDICATIONS  (PRN):  ondansetron  IVPB 4 milliGRAM(s) IV Intermittent every 6 hours PRN Nausea and/or Vomiting      ALLERGIES: penicillin (Anaphylaxis)      Social History:     FAMILY HISTORY:  Family history of colon cancer in mother-  at age 61  father had mi -at age 71 and  at age 91      PAST MEDICAL & SURGICAL HISTORY:  Renal arteriosclerosis  Constipation  Anxiety  Depression  HTN (hypertension)  Shingles  Pericarditis  Osteoporosis  Seizure disorder  Migraines  No significant past surgical history        PHYSICAL EXAMINATION:  -----------------------------  T(C): 37.1 (18 @ 13:06), Max: 37.1 (18 @ 13:06)  HR: 83 (18 @ 13:06) (78 - 83)  BP: 181/107 (18 @ 13:06) (164/88 - 197/114)  RR: 18 (18 @ 13:06) (16 - 18)  SpO2: 98% (18 @ :06) (96% - 98%)  Wt(kg): --    Height (cm): 157.48 ( @ :06), 154.94 (:40)  Weight (kg): 45.4 (:06), 47.6 (:40)  BMI (kg/m2): 18.3 ( @ :06), 19.8 (:40)  BSA (m2): 1.42 (:06), 1.44 (:40)    Constitutional: well developed, normal appearance, well groomed, well nourished, no deformities and no acute distress.   Eyes: the conjunctiva exhibited no abnormalities and the eyelids demonstrated no xanthelasmas.   HEENT: normal oral mucosa, no oral pallor and no oral cyanosis.   Neck: normal jugular venous A waves present, normal jugular venous V waves present and no jugular venous monterroso A waves.   Pulmonary: no respiratory distress, normal respiratory rhythm and effort, no accessory muscle use and lungs were clear to auscultation bilaterally.   Cardiovascular: heart rate and rhythm were normal, normal S1 and S2 and no murmur, gallop, rub, heave or thrill are present.   Abdomen: soft, non-tender, no hepato-splenomegaly and no abdominal mass palpated.   Musculoskeletal: the gait could not be assessed..   Extremities: no clubbing of the fingernails, no localized cyanosis, no petechial hemorrhages and no ischemic changes.   Skin: normal skin color and pigmentation, no rash, no venous stasis, no skin lesions, no skin ulcer and no xanthoma was observed.   Psychiatric: oriented to person, place, and time, the affect was normal, the mood was normal and not feeling anxious.     LABS:   --------      134<L>  |  97  |  14  ----------------------------<  99  3.7   |  28  |  1.10    Ca    9.8      25 Sep 2018 14:01    TPro  8.3  /  Alb  3.7  /  TBili  0.6  /  DBili  x   /  AST  16  /  ALT  20  /  AlkPhos  69                           14.8   6.70  )-----------( 288      ( 25 Sep 2018 14:01 )             42.9                 RADIOLOGY:< from: CT Abdomen and Pelvis w/ Oral Cont and w/ IV Cont (18 @ 16:06) >  COMPARISON: CT scan abdomen and pelvis 2018.    FINDINGS:      There is a small, indeterminate, subcentimeter hypodense lesion medial   segment left hepatic lobe.  The spleen and gallbladder appear unremarkable.    The 12 mm indeterminate left adrenal nodule is stable in size.  The right adrenal gland and pancreas are unremarkable in appearance.    There is atrophy of the left kidney with delayed nephrogram.  There are several small indeterminate hypodense lesions within the right   kidney.  No hydroureteronephrosis is noted.    There is arteriosclerotic calcification of the abdominal aorta, which is   normal in caliber.  No enlarged retroperitoneal lymphadenopathy is noted.    There are scattered sigmoid diverticuli, without CT evidence of   diverticulitis.  No bowel obstruction is noted.  There is a moderate amount of fecal load throughout the colon.  No localized intra-abdominal fluid collection or pneumoperitoneum is   noted.  There is a normal-appearing appendix.    The urinary bladder appears unremarkable.  There is asmall amount of localized fluid or small cyst right posterior   adnexal pelvis.    No significant free fluid is noted.    There is a chronic compression deformity of the T12 vertebral body.  Linear sclerosis along the left iliac side of the sacroiliac joint may   represent the sequelae of insufficiency fracture.  There is a small sclerotic focus left parasymphyseal superior pubic bone,   stable in appearance.    Impression:    No acute intra-abdominal pathology demonstrated.    < end of copied text >    -----------------        ECG:     ECHO

## 2018-09-25 NOTE — ED PROVIDER NOTE - CARE PLAN
Principal Discharge DX:	Nausea and vomiting in adult Principal Discharge DX:	Nausea and vomiting in adult  Secondary Diagnosis:	Migraines

## 2018-09-25 NOTE — ED ADULT NURSE REASSESSMENT NOTE - NS ED NURSE REASSESS COMMENT FT1
pt reavaluated and vital signs stable d/c home to follow up no episodes of vomiting observed pt tolerated po as well  and d/c home to follow up pt verbalizes good relief from headache

## 2018-09-25 NOTE — ED PROVIDER NOTE - OBJECTIVE STATEMENT
62 yo white female with H/O HTN, Seizure, Migraine and Depression/Anxiety Disorder who has had unexplained nausea, weakness, vomiting with additional inability to even keep her meds down. No fever, chills, abdominal pains, chest pain, melena or BRBPR. Does not remember the last time she was endoscoped.

## 2018-09-25 NOTE — CONSULT NOTE ADULT - PROBLEM SELECTOR RECOMMENDATION 9
gerd precautions  in and out  ppi once a day  may need egd  advance diet  GES to be done to rule out gastroparesis
pt has yesi , and f/u closely with her nephrology

## 2018-09-25 NOTE — H&P ADULT - ATTENDING COMMENTS
64 yo white female with H/O HTN, RENAL ARTERY STENOSIS ,RECENT WEIGHT LOSS due to emesis , hx of  Seizure, Migraine type of headaches ,Depression/Anxiety Disorder who has had unexplained nausea, weakness, vomiting with additional inability to even keep her meds down. No fever, chills, abdominal pains, chest pain, melena or BRBPR. Patient was seen in ER earlier today due to the same symptoms and possible xanax withdrawal ,she takes it but keeps vomiting it . She was discharged and went to see pcp Dr Tristan Frederick  who advised to go back to ED for FTT workup and GI evaluation . As per PCP she recently has a lot of weight loss and also uncontrolled HTN due to ALEXANDER ,cardiology consult called ,patient refused stenting of renal artery in a past   Does not remember the last time she was endoscoped. Seen by GI CONSULT ,may require EGD ,ppi and antiemetics are started

## 2018-09-25 NOTE — ED ADULT NURSE NOTE - NSIMPLEMENTINTERV_GEN_ALL_ED
Implemented All Universal Safety Interventions:  [place] to call system. Call bell, personal items and telephone within reach. Instruct patient to call for assistance. Room bathroom lighting operational. Non-slip footwear when patient is off stretcher. Physically safe environment: no spills, clutter or unnecessary equipment. Stretcher in lowest position, wheels locked, appropriate side rails in place.

## 2018-09-25 NOTE — H&P ADULT - NSHPLABSRESULTS_GEN_ALL_CORE
< from: CT Abdomen and Pelvis w/ Oral Cont and w/ IV Cont (09.25.18 @ 16:06) >    EXAM:  CT ABDOMEN AND PELVIS OC IC                            PROCEDURE DATE:  09/25/2018          INTERPRETATION:  CT ABDOMEN AND PELVIS    CLINICAL INFORMATION:  Nausea, vomiting and weakness.    PROCEDURE:  Using multislice helical CT, oral contrast, and following the   intravenous administration of 95 cc Omnipaque 300 , 2.5 mm sections were   obtained from the domes of the diaphragm to the ischial tuberosities.    Multiplanar MPR's were performed.    COMPARISON: CT scan abdomen and pelvis 8/20/2018.    FINDINGS:      There is a small, indeterminate, subcentimeter hypodense lesion medial   segment left hepatic lobe.  The spleen and gallbladder appear unremarkable.    The 12 mm indeterminate left adrenal nodule is stable in size.  The right adrenal gland and pancreas are unremarkable in appearance.    There is atrophy of the left kidney with delayed nephrogram.  There are several small indeterminate hypodense lesions within the right   kidney.  No hydroureteronephrosis is noted.    There is arteriosclerotic calcification of the abdominal aorta, which is   normal in caliber.  No enlarged retroperitoneal lymphadenopathy is noted.    There are scattered sigmoid diverticuli, without CT evidence of   diverticulitis.  No bowel obstruction is noted.  There is a moderate amount of fecal load throughout the colon.  No localized intra-abdominal fluid collection or pneumoperitoneum is   noted.  There is a normal-appearing appendix.    The urinary bladder appears unremarkable.  There is asmall amount of localized fluid or small cyst right posterior   adnexal pelvis.    No significant free fluid is noted.    There is a chronic compression deformity of the T12 vertebral body.  Linear sclerosis along the left iliac side of the sacroiliac joint may   represent the sequelae of insufficiency fracture.  There is a small sclerotic focus left parasymphyseal superior pubic bone,   stable in appearance.    Impression:    No acute intra-abdominal pathology demonstrated.    < end of copied text > < from: CT Abdomen and Pelvis w/ Oral Cont and w/ IV Cont (09.25.18 @ 16:06) >  EXAM:  CT ABDOMEN AND PELVIS OC IC                        PROCEDURE DATE:  09/25/2018    INTERPRETATION:  CT ABDOMEN AND PELVIS  CLINICAL INFORMATION:  Nausea, vomiting and weakness.  PROCEDURE:  Using multislice helical CT, oral contrast, and following the   intravenous administration of 95 cc Omnipaque 300 , 2.5 mm sections were   obtained from the domes of the diaphragm to the ischial tuberosities.    Multiplanar MPR's were performed.  COMPARISON: CT scan abdomen and pelvis 8/20/2018.  FINDINGS:    There is a small, indeterminate, subcentimeter hypodense lesion medial   segment left hepatic lobe.  The spleen and gallbladder appear unremarkable.  The 12 mm indeterminate left adrenal nodule is stable in size.  The right adrenal gland and pancreas are unremarkable in appearance.  There is atrophy of the left kidney with delayed nephrogram.  There are several small indeterminate hypodense lesions within the right   kidney.  No hydroureteronephrosis is noted.  There is arteriosclerotic calcification of the abdominal aorta, which is   normal in caliber.  No enlarged retroperitoneal lymphadenopathy is noted.  There are scattered sigmoid diverticuli, without CT evidence of   diverticulitis.  No bowel obstruction is noted.  There is a moderate amount of fecal load throughout the colon.  No localized intra-abdominal fluid collection or pneumoperitoneum is   noted.  There is a normal-appearing appendix.  The urinary bladder appears unremarkable.  There is asmall amount of localized fluid or small cyst right posterior   adnexal pelvis.  No significant free fluid is noted.  There is a chronic compression deformity of the T12 vertebral body.  Linear sclerosis along the left iliac side of the sacroiliac joint may   represent the sequelae of insufficiency fracture.  There is a small sclerotic focus left parasymphyseal superior pubic bone,   stable in appearance.  Impression:  No acute intra-abdominal pathology demonstrated.  < end of copied text >

## 2018-09-25 NOTE — CONSULT NOTE ADULT - ASSESSMENT
pt  with nausea , vomitting and loss of wt  hypertension  depression  migraine  cardiac status stable (low risk) for gi endoscopy

## 2018-09-25 NOTE — ED PROVIDER NOTE - CONSTITUTIONAL, MLM
normal... Well appearing, older white female, well nourished, awake, alert, oriented to person, place, time/situation and in no apparent distress.

## 2018-09-25 NOTE — CONSULT NOTE ADULT - ASSESSMENT
62 yo white female with H/O HTN, RENAL ARTERY STENOSIS ,RECENT WEIGHT LOSS due to emesis , hx of  Seizure, Migraine type of headaches ,Depression/Anxiety Disorder who has had unexplained nausea, weakness, vomiting with additional inability to even keep her meds down. No fever, chills, abdominal pains, chest pain, melena or BRBPR. Patient was seen in ER earlier today due to the same symptoms and possible xanax withdrawal ,she takes it but keeps vomiting it . She was discharged and went to see pcp Dr Tristan Frederick  who advised to go back to ED for FTT workup and GI evaluation . As per PCP she recently has a lot of weight loss and also uncontrolled HTN due to ALEXANDER ,cardiology consult called ,patient refused stenting of renal artery in a past   Does not remember the last time she was endoscoped. Seen by GI CONSULT ,may require EGD ,ppi and antiemetics are started (25 Sep 2018 17:01) 64 yo white female with H/O HTN, RENAL ARTERY STENOSIS ,RECENT WEIGHT LOSS due to emesis , hx of  Seizure, Migraine type of headaches

## 2018-09-25 NOTE — H&P ADULT - RS GEN PE MLT RESP DETAILS PC
clear to auscultation bilaterally/breath sounds equal/good air movement/normal/respirations non-labored/airway patent

## 2018-09-25 NOTE — PATIENT PROFILE ADULT. - TEACHING/LEARNING LEARNING PREFERENCES
skill demonstration/audio/computer/internet/video/written material/group instruction/pictorial/individual instruction/verbal instruction

## 2018-09-25 NOTE — ED PROVIDER NOTE - PROGRESS NOTE DETAILS
pt reevaluated, feeling better, headache has improved. pt to be d/c home follow up with pmd, return if any symjessieoms pau

## 2018-09-25 NOTE — H&P ADULT - NEGATIVE NEUROLOGICAL SYMPTOMS
no generalized seizures/no focal seizures/no syncope/no tremors/no transient paralysis/no weakness/no paresthesias

## 2018-09-25 NOTE — H&P ADULT - GASTROINTESTINAL DETAILS
soft/no rebound tenderness/no rigidity/no organomegaly/nontender/bowel sounds normal/normal/no masses palpable/no distention/no guarding

## 2018-09-25 NOTE — PATIENT PROFILE ADULT. - URINARY CATHETER
Assessment/Plan:       Diagnoses and all orders for this visit:    Rash and nonspecific skin eruption  -     predniSONE 10 mg tablet; 5 po for 2 d, 4 po for 2 d, 3 po for 2 d, 2 po for 2 d, 1 po for 1d  -     Ambulatory referral to Dermatology; Future            Subjective:      Patient ID: Shanelle Balderas is a 48 y o  female  Rash   This is a new problem  The current episode started 1 to 4 weeks ago  The problem has been gradually worsening since onset  The affected locations include the chest, left arm, left upper leg, left lower leg, right arm, right upper leg and right lower leg  The rash is characterized by itchiness  It is unknown if there was an exposure to a precipitant  Pertinent negatives include no congestion, facial edema, fatigue, fever, joint pain, nail changes or shortness of breath  Past treatments include anti-itch cream and topical steroids  The treatment provided mild relief  The following portions of the patient's history were reviewed and updated as appropriate:   She has a past medical history of Abnormal mammogram and External hemorrhoid, bleeding  ,   does not have any pertinent problems on file  ,   has a past surgical history that includes Colonoscopy  ,  family history includes Heart disease in her father  ,   reports that she has never smoked  She has never used smokeless tobacco  She reports that she does not drink alcohol or use drugs  ,  is allergic to penicillins     Current Outpatient Prescriptions   Medication Sig Dispense Refill    omeprazole (PriLOSEC) 10 mg delayed release capsule Take 10 mg by mouth daily      hydrocortisone 2 5 % cream Apply topically 4 (four) times a day as needed for irritation 30 g 0    meloxicam (MOBIC) 15 mg tablet Take 1 tablet by mouth daily      predniSONE 10 mg tablet 5 po for 2 d, 4 po for 2 d, 3 po for 2 d, 2 po for 2 d, 1 po for 1d 30 tablet 0     No current facility-administered medications for this visit          Review of Systems Constitutional: Negative for fatigue and fever  HENT: Negative for congestion  Respiratory: Negative for shortness of breath  Musculoskeletal: Negative for joint pain  Skin: Positive for rash  Negative for nail changes  Objective:  Vitals:    05/15/18 0849   BP: 128/84   Pulse: 68   Temp: 98 8 °F (37 1 °C)   SpO2: 98%   Weight: 92 5 kg (204 lb)   Height: 5' 5" (1 651 m)     Body mass index is 33 95 kg/m²  Physical Exam   Constitutional: She is oriented to person, place, and time  She appears well-developed and well-nourished  HENT:   Head: Normocephalic  Neurological: She is alert and oriented to person, place, and time  Skin: Rash noted  Small papules scattered on her arms legs and chest   Some are scabbed over and have excoriation around them  Psychiatric: She has a normal mood and affect   Her behavior is normal  Judgment and thought content normal  no

## 2018-09-25 NOTE — CONSULT NOTE ADULT - SUBJECTIVE AND OBJECTIVE BOX
Chief Complaint:  Patient is a 63y old  Female who presents with a chief complaint of nausea ,vomiting, weight loss   · Chief Complaint: The patient is a 63y Female complaining of nausea and weakness	  · HPI Objective Statement: 64 yo white female with H/O HTN, Seizure, Migraine and Depression/Anxiety Disorder who has had unexplained nausea, weakness, vomiting with additional inability to even keep her meds down. No fever, chills, abdominal pains, chest pain, melena or BRBPR. Does not remember the last time she was endoscoped.	  · Presenting Symptoms: NAUSEA, VOMITING, Weakness	  · Negative Findings: no chills, no fever	  · Radiation: no radiation	  · Timing: worsening	  · Duration: month(s)	  · Severity: MODERATE	  · Aggravated Factors: food intake	  · Relieving Factors: none	  n/v for 4 days and 10 lb weight loss labs are all normal; no electrolyte abnormalities    Allergies:  penicillin (Anaphylaxis)    Medications:  dextrose 5% + sodium chloride 0.45%. 1000 milliLiter(s) IV Continuous <Continuous>  ondansetron Injectable 4 milliGRAM(s) IV Push every 6 hours PRN  pantoprazole    Tablet 40 milliGRAM(s) Oral before breakfast    PMHX/PSHX:  Renal arteriosclerosis  Constipation  Anxiety  Depression  HTN (hypertension)  Shingles  Pericarditis  Osteoporosis  Seizure disorder  Migraines  No significant past surgical history      Family history:  Family history of colon cancer in mother (Mother)  No pertinent family history in first degree relatives      Social History:     ROS:     General:  No wt loss, fevers, chills, night sweats, fatigue,   Eyes:  Good vision, no reported pain  ENT:  No sore throat, pain, runny nose, dysphagia  CV:  No pain, palpitations, hypo/hypertension  Resp:  No dyspnea, cough, tachypnea, wheezing  GI:  No pain, No nausea, No vomiting, No diarrhea, No constipation, No weight loss, No fever, No pruritis, No rectal bleeding, No tarry stools, No dysphagia,  :  No pain, bleeding, incontinence, nocturia  Muscle:  No pain, weakness  Neuro:  No weakness, tingling, memory problems  Psych:  No fatigue, insomnia, mood problems, depression  Endocrine:  No polyuria, polydipsia, cold/heat intolerance  Heme:  No petechiae, ecchymosis, easy bruisability  Skin:  No rash, tattoos, scars, edema      PHYSICAL EXAM:   Vital Signs:  Vital Signs Last 24 Hrs  T(C): 37.1 (25 Sep 2018 13:06), Max: 37.1 (25 Sep 2018 13:06)  T(F): 98.7 (25 Sep 2018 13:06), Max: 98.7 (25 Sep 2018 13:06)  HR: 83 (25 Sep 2018 13:06) (78 - 83)  BP: 181/107 (25 Sep 2018 13:06) (164/88 - 197/114)  BP(mean): --  RR: 18 (25 Sep 2018 13:06) (16 - 18)  SpO2: 98% (25 Sep 2018 13:06) (96% - 98%)  Daily Height in cm: 157.48 (25 Sep 2018 13:06)    Daily     GENERAL:  Appears stated age, well-groomed, well-nourished, no distress  HEENT:  NC/AT,  conjunctivae clear and pink, no thyromegaly, nodules, adenopathy, no JVD, sclera -anicteric  CHEST:  Full & symmetric excursion, no increased effort, breath sounds clear  HEART:  Regular rhythm, S1, S2, no murmur/rub/S3/S4, no abdominal bruit, no edema  ABDOMEN:  Soft, non-tender, non-distended, normoactive bowel sounds,  no masses ,no hepato-splenomegaly, no signs of chronic liver disease  EXTEREMITIES:  no cyanosis,clubbing or edema  SKIN:  No rash/erythema/ecchymoses/petechiae/wounds/abscess/warm/dry  NEURO:  Alert, oriented, no asterixis, no tremor, no encephalopathy    LABS:                        14.8   6.70  )-----------( 288      ( 25 Sep 2018 14:01 )             42.9     09-25    134<L>  |  97  |  14  ----------------------------<  99  3.7   |  28  |  1.10    Ca    9.8      25 Sep 2018 14:01    TPro  8.3  /  Alb  3.7  /  TBili  0.6  /  DBili  x   /  AST  16  /  ALT  20  /  AlkPhos  69  09-25    LIVER FUNCTIONS - ( 25 Sep 2018 14:01 )  Alb: 3.7 g/dL / Pro: 8.3 g/dL / ALK PHOS: 69 U/L / ALT: 20 U/L / AST: 16 U/L / GGT: x               Amylase [id number]      Lipase [id number]       Ammonia--      Imaging:

## 2018-09-25 NOTE — ED ADULT NURSE NOTE - NSIMPLEMENTINTERV_GEN_ALL_ED
Implemented All Universal Safety Interventions:  [place] to call system. Call bell, personal items and telephone within reach. Instruct patient to call for assistance. Room bathroom lighting operational. Non-slip footwear when patient is off stretcher. Physically safe environment: no spills, clutter or unnecessary equipment. Stretcher in lowest position, wheels locked, appropriate side rails in place.

## 2018-09-25 NOTE — H&P ADULT - NEGATIVE CARDIOVASCULAR SYMPTOMS
no palpitations/no chest pain/no dyspnea on exertion/no peripheral edema/no claudication/no orthopnea/no paroxysmal nocturnal dyspnea

## 2018-09-25 NOTE — CONSULT NOTE ADULT - SUBJECTIVE AND OBJECTIVE BOX
Patient is a 63y Female whom presented to the hospital with     PAST MEDICAL & SURGICAL HISTORY:  Renal arteriosclerosis  Constipation  Anxiety  Depression  HTN (hypertension)  Shingles  Pericarditis  Osteoporosis  Seizure disorder  Migraines  No significant past surgical history      MEDICATIONS  (STANDING):  amLODIPine   Tablet 5 milliGRAM(s) Oral daily  bisacodyl Suppository 10 milliGRAM(s) Rectal at bedtime  dextrose 5% + sodium chloride 0.45%. 1000 milliLiter(s) (75 mL/Hr) IV Continuous <Continuous>  docusate sodium 100 milliGRAM(s) Oral three times a day  enoxaparin Injectable 40 milliGRAM(s) SubCutaneous daily  lactobacillus acidophilus 1 Tablet(s) Oral every 8 hours  mirtazapine Soltab 15 milliGRAM(s) Oral at bedtime  multivitamin 1 Tablet(s) Oral daily  pantoprazole    Tablet 40 milliGRAM(s) Oral before breakfast  potassium chloride    Tablet ER 40 milliEquivalent(s) Oral every 4 hours  potassium chloride  10 mEq/100 mL IVPB 10 milliEquivalent(s) IV Intermittent every 1 hour  senna 2 Tablet(s) Oral at bedtime  sertraline 25 milliGRAM(s) Oral daily  topiramate 100 milliGRAM(s) Oral two times a day      Allergies    penicillin (Anaphylaxis)    Intolerances        SOCIAL HISTORY:  Denies ETOh,Smoking,     FAMILY HISTORY:  Family history of colon cancer in mother (Mother)      REVIEW OF SYSTEMS:    CONSTITUTIONAL: No weakness, fevers or chills  EYES/ENT: No visual changes;  no throat pain   NECK: No pain or stiffness  RESPIRATORY: No cough, wheezing, hemoptysis; No shortness of breath  CARDIOVASCULAR: No chest pain or palpitations  GASTROINTESTINAL: No abdominal or epigastric pain. No nausea, vomiting,     No diarrhea or constipation. No melena   GENITOURINARY: No dysuria, frequency or hematuria  NEUROLOGICAL: No numbness or weakness  SKIN: dry      VITAL:  T(C): , Max: 37.6 (09-25-18 @ 22:46)  T(F): , Max: 99.6 (09-25-18 @ 22:46)  HR: 79 (09-26-18 @ 05:01)  BP: 166/81 (09-26-18 @ 07:00)  BP(mean): --  RR: 18 (09-26-18 @ 05:01)  SpO2: 97% (09-26-18 @ 05:01)  Wt(kg): --    I and O's:    Height (cm): 157.48 (09-25 @ 13:06)  Weight (kg): 45.4 (09-25 @ 13:06)  BMI (kg/m2): 18.3 (09-25 @ 13:06)  BSA (m2): 1.42 (09-25 @ 13:06)    PHYSICAL EXAM:    Constitutional: NAD  HEENT: conjunctive   clear   Neck:  No JVD  Respiratory: CTAB  Cardiovascular: S1 and S2  Gastrointestinal: BS+, soft, NT/ND  Extremities: No peripheral edema  Neurological: A/O x 3, no focal deficits  Psychiatric: Normal mood, normal affect  : No Red[place]  Skin: No rashes  Access: Not applicable    LABS:                        13.1   6.72  )-----------( 276      ( 26 Sep 2018 08:53 )             37.1     09-26    136  |  103  |  8   ----------------------------<  105<H>  2.2<LL>   |  25  |  0.94    Ca    9.0      26 Sep 2018 08:53    TPro  7.0  /  Alb  3.2<L>  /  TBili  0.6  /  DBili  x   /  AST  19  /  ALT  13  /  AlkPhos  58  09-26      Urine Studies:          RADIOLOGY & ADDITIONAL STUDIES: Patient is a 63y Female whom presented to the hospital with renal artery stenosis     PAST MEDICAL & SURGICAL HISTORY:  Renal arteriosclerosis  Constipation  Anxiety  Depression  HTN (hypertension)  Shingles  Pericarditis  Osteoporosis  Seizure disorder  Migraines  No significant past surgical history      MEDICATIONS  (STANDING):  amLODIPine   Tablet 5 milliGRAM(s) Oral daily  bisacodyl Suppository 10 milliGRAM(s) Rectal at bedtime  dextrose 5% + sodium chloride 0.45%. 1000 milliLiter(s) (75 mL/Hr) IV Continuous <Continuous>  docusate sodium 100 milliGRAM(s) Oral three times a day  enoxaparin Injectable 40 milliGRAM(s) SubCutaneous daily  lactobacillus acidophilus 1 Tablet(s) Oral every 8 hours  mirtazapine Soltab 15 milliGRAM(s) Oral at bedtime  multivitamin 1 Tablet(s) Oral daily  pantoprazole    Tablet 40 milliGRAM(s) Oral before breakfast  potassium chloride    Tablet ER 40 milliEquivalent(s) Oral every 4 hours  potassium chloride  10 mEq/100 mL IVPB 10 milliEquivalent(s) IV Intermittent every 1 hour  senna 2 Tablet(s) Oral at bedtime  sertraline 25 milliGRAM(s) Oral daily  topiramate 100 milliGRAM(s) Oral two times a day      Allergies    penicillin (Anaphylaxis)    Intolerances        SOCIAL HISTORY:  Denies ETOh,Smoking,     FAMILY HISTORY:  Family history of colon cancer in mother (Mother)      REVIEW OF SYSTEMS:    CONSTITUTIONAL: No weakness, fevers or chills  EYES/ENT: No visual changes;  no throat pain   NECK: No pain or stiffness  RESPIRATORY: No cough, wheezing, hemoptysis; No shortness of breath  CARDIOVASCULAR: No chest pain or palpitations  GASTROINTESTINAL: No abdominal or epigastric pain. No nausea, vomiting,     No diarrhea or constipation. No melena   GENITOURINARY: No dysuria, frequency or hematuria  NEUROLOGICAL: No numbness or weakness  SKIN: dry      VITAL:  T(C): , Max: 37.6 (09-25-18 @ 22:46)  T(F): , Max: 99.6 (09-25-18 @ 22:46)  HR: 79 (09-26-18 @ 05:01)  BP: 166/81 (09-26-18 @ 07:00)  BP(mean): --  RR: 18 (09-26-18 @ 05:01)  SpO2: 97% (09-26-18 @ 05:01)  Wt(kg): --    I and O's:    Height (cm): 157.48 (09-25 @ 13:06)  Weight (kg): 45.4 (09-25 @ 13:06)  BMI (kg/m2): 18.3 (09-25 @ 13:06)  BSA (m2): 1.42 (09-25 @ 13:06)    PHYSICAL EXAM:    Constitutional: NAD  HEENT: conjunctive   clear   Neck:  No JVD  Respiratory: CTAB  Cardiovascular: S1 and S2  Gastrointestinal: BS+, soft, NT/ND  Extremities: No peripheral edema  Neurological: A/O x 3, no focal deficits  Psychiatric: Normal mood, normal affect  : No [place]  Skin: No rashes  Access: Not applicable    LABS:                        13.1   6.72  )-----------( 276      ( 26 Sep 2018 08:53 )             37.1     09-26    136  |  103  |  8   ----------------------------<  105<H>  2.2<LL>   |  25  |  0.94    Ca    9.0      26 Sep 2018 08:53    TPro  7.0  /  Alb  3.2<L>  /  TBili  0.6  /  DBili  x   /  AST  19  /  ALT  13  /  AlkPhos  58  09-26      Urine Studies:          RADIOLOGY & ADDITIONAL STUDIES:

## 2018-09-26 DIAGNOSIS — F43.21 ADJUSTMENT DISORDER WITH DEPRESSED MOOD: ICD-10-CM

## 2018-09-26 DIAGNOSIS — I25.10 ATHEROSCLEROTIC HEART DISEASE OF NATIVE CORONARY ARTERY WITHOUT ANGINA PECTORIS: ICD-10-CM

## 2018-09-26 LAB
ALBUMIN SERPL ELPH-MCNC: 3.2 G/DL — LOW (ref 3.3–5)
ALP SERPL-CCNC: 58 U/L — SIGNIFICANT CHANGE UP (ref 40–120)
ALT FLD-CCNC: 13 U/L — SIGNIFICANT CHANGE UP (ref 12–78)
ANION GAP SERPL CALC-SCNC: 8 MMOL/L — SIGNIFICANT CHANGE UP (ref 5–17)
AST SERPL-CCNC: 19 U/L — SIGNIFICANT CHANGE UP (ref 15–37)
BILIRUB SERPL-MCNC: 0.6 MG/DL — SIGNIFICANT CHANGE UP (ref 0.2–1.2)
BUN SERPL-MCNC: 8 MG/DL — SIGNIFICANT CHANGE UP (ref 7–23)
CALCIUM SERPL-MCNC: 9 MG/DL — SIGNIFICANT CHANGE UP (ref 8.5–10.1)
CHLORIDE SERPL-SCNC: 103 MMOL/L — SIGNIFICANT CHANGE UP (ref 96–108)
CO2 SERPL-SCNC: 25 MMOL/L — SIGNIFICANT CHANGE UP (ref 22–31)
CREAT SERPL-MCNC: 0.94 MG/DL — SIGNIFICANT CHANGE UP (ref 0.5–1.3)
GLUCOSE SERPL-MCNC: 105 MG/DL — HIGH (ref 70–99)
HCT VFR BLD CALC: 37.1 % — SIGNIFICANT CHANGE UP (ref 34.5–45)
HGB BLD-MCNC: 13.1 G/DL — SIGNIFICANT CHANGE UP (ref 11.5–15.5)
MCHC RBC-ENTMCNC: 29.4 PG — SIGNIFICANT CHANGE UP (ref 27–34)
MCHC RBC-ENTMCNC: 35.3 GM/DL — SIGNIFICANT CHANGE UP (ref 32–36)
MCV RBC AUTO: 83.2 FL — SIGNIFICANT CHANGE UP (ref 80–100)
NRBC # BLD: 0 /100 WBCS — SIGNIFICANT CHANGE UP (ref 0–0)
PLATELET # BLD AUTO: 276 K/UL — SIGNIFICANT CHANGE UP (ref 150–400)
POTASSIUM SERPL-MCNC: 2.2 MMOL/L — CRITICAL LOW (ref 3.5–5.3)
POTASSIUM SERPL-MCNC: 3.6 MMOL/L — SIGNIFICANT CHANGE UP (ref 3.5–5.3)
POTASSIUM SERPL-SCNC: 2.2 MMOL/L — CRITICAL LOW (ref 3.5–5.3)
POTASSIUM SERPL-SCNC: 3.6 MMOL/L — SIGNIFICANT CHANGE UP (ref 3.5–5.3)
PREALB SERPL-MCNC: 16.2 MG/DL — LOW (ref 20–40)
PROT SERPL-MCNC: 7 G/DL — SIGNIFICANT CHANGE UP (ref 6–8.3)
RBC # BLD: 4.46 M/UL — SIGNIFICANT CHANGE UP (ref 3.8–5.2)
RBC # FLD: 12.8 % — SIGNIFICANT CHANGE UP (ref 10.3–14.5)
SODIUM SERPL-SCNC: 136 MMOL/L — SIGNIFICANT CHANGE UP (ref 135–145)
WBC # BLD: 6.72 K/UL — SIGNIFICANT CHANGE UP (ref 3.8–10.5)
WBC # FLD AUTO: 6.72 K/UL — SIGNIFICANT CHANGE UP (ref 3.8–10.5)

## 2018-09-26 RX ORDER — ALPRAZOLAM 0.25 MG
0.5 TABLET ORAL AT BEDTIME
Qty: 0 | Refills: 0 | Status: DISCONTINUED | OUTPATIENT
Start: 2018-09-27 | End: 2018-10-02

## 2018-09-26 RX ORDER — ACETAMINOPHEN 500 MG
650 TABLET ORAL ONCE
Qty: 0 | Refills: 0 | Status: COMPLETED | OUTPATIENT
Start: 2018-09-26 | End: 2018-09-26

## 2018-09-26 RX ORDER — MIRTAZAPINE 45 MG/1
15 TABLET, ORALLY DISINTEGRATING ORAL AT BEDTIME
Qty: 0 | Refills: 0 | Status: DISCONTINUED | OUTPATIENT
Start: 2018-09-26 | End: 2018-10-02

## 2018-09-26 RX ORDER — KETOROLAC TROMETHAMINE 30 MG/ML
15 SYRINGE (ML) INJECTION EVERY 6 HOURS
Qty: 0 | Refills: 0 | Status: DISCONTINUED | OUTPATIENT
Start: 2018-09-26 | End: 2018-09-30

## 2018-09-26 RX ORDER — POTASSIUM CHLORIDE 20 MEQ
10 PACKET (EA) ORAL
Qty: 0 | Refills: 0 | Status: COMPLETED | OUTPATIENT
Start: 2018-09-26 | End: 2018-09-26

## 2018-09-26 RX ORDER — DOCUSATE SODIUM 100 MG
100 CAPSULE ORAL THREE TIMES A DAY
Qty: 0 | Refills: 0 | Status: DISCONTINUED | OUTPATIENT
Start: 2018-09-26 | End: 2018-09-29

## 2018-09-26 RX ORDER — SENNA PLUS 8.6 MG/1
2 TABLET ORAL AT BEDTIME
Qty: 0 | Refills: 0 | Status: DISCONTINUED | OUTPATIENT
Start: 2018-09-26 | End: 2018-09-29

## 2018-09-26 RX ORDER — ALPRAZOLAM 0.25 MG
0.5 TABLET ORAL ONCE
Qty: 0 | Refills: 0 | Status: DISCONTINUED | OUTPATIENT
Start: 2018-09-26 | End: 2018-09-26

## 2018-09-26 RX ORDER — POTASSIUM CHLORIDE 20 MEQ
40 PACKET (EA) ORAL EVERY 4 HOURS
Qty: 0 | Refills: 0 | Status: COMPLETED | OUTPATIENT
Start: 2018-09-26 | End: 2018-09-26

## 2018-09-26 RX ADMIN — Medication 100 MILLIGRAM(S): at 05:00

## 2018-09-26 RX ADMIN — Medication 650 MILLIGRAM(S): at 22:51

## 2018-09-26 RX ADMIN — Medication 15 MILLIGRAM(S): at 18:58

## 2018-09-26 RX ADMIN — Medication 650 MILLIGRAM(S): at 23:51

## 2018-09-26 RX ADMIN — PANTOPRAZOLE SODIUM 40 MILLIGRAM(S): 20 TABLET, DELAYED RELEASE ORAL at 05:00

## 2018-09-26 RX ADMIN — Medication 0.5 MILLIGRAM(S): at 22:19

## 2018-09-26 RX ADMIN — Medication 15 MILLIGRAM(S): at 12:30

## 2018-09-26 RX ADMIN — MIRTAZAPINE 15 MILLIGRAM(S): 45 TABLET, ORALLY DISINTEGRATING ORAL at 21:42

## 2018-09-26 RX ADMIN — Medication 1 TABLET(S): at 12:46

## 2018-09-26 RX ADMIN — Medication 40 MILLIEQUIVALENT(S): at 10:59

## 2018-09-26 RX ADMIN — ENOXAPARIN SODIUM 40 MILLIGRAM(S): 100 INJECTION SUBCUTANEOUS at 12:42

## 2018-09-26 RX ADMIN — TRAMADOL HYDROCHLORIDE 50 MILLIGRAM(S): 50 TABLET ORAL at 00:30

## 2018-09-26 RX ADMIN — ONDANSETRON 4 MILLIGRAM(S): 8 TABLET, FILM COATED ORAL at 22:19

## 2018-09-26 RX ADMIN — Medication 100 MILLIEQUIVALENT(S): at 18:58

## 2018-09-26 RX ADMIN — Medication 40 MILLIEQUIVALENT(S): at 15:46

## 2018-09-26 RX ADMIN — AMLODIPINE BESYLATE 5 MILLIGRAM(S): 2.5 TABLET ORAL at 05:00

## 2018-09-26 RX ADMIN — Medication 0.5 MILLIGRAM(S): at 05:02

## 2018-09-26 RX ADMIN — ONDANSETRON 4 MILLIGRAM(S): 8 TABLET, FILM COATED ORAL at 10:59

## 2018-09-26 RX ADMIN — Medication 15 MILLIGRAM(S): at 12:45

## 2018-09-26 RX ADMIN — Medication 100 MILLIEQUIVALENT(S): at 12:29

## 2018-09-26 RX ADMIN — Medication 1 TABLET(S): at 05:00

## 2018-09-26 RX ADMIN — ONDANSETRON 4 MILLIGRAM(S): 8 TABLET, FILM COATED ORAL at 16:53

## 2018-09-26 RX ADMIN — Medication 100 MILLIEQUIVALENT(S): at 15:03

## 2018-09-26 RX ADMIN — Medication 100 MILLIGRAM(S): at 18:58

## 2018-09-26 RX ADMIN — Medication 15 MILLIGRAM(S): at 19:13

## 2018-09-26 RX ADMIN — TRAMADOL HYDROCHLORIDE 50 MILLIGRAM(S): 50 TABLET ORAL at 05:01

## 2018-09-26 RX ADMIN — ONDANSETRON 4 MILLIGRAM(S): 8 TABLET, FILM COATED ORAL at 05:00

## 2018-09-26 RX ADMIN — SERTRALINE 25 MILLIGRAM(S): 25 TABLET, FILM COATED ORAL at 12:46

## 2018-09-26 RX ADMIN — Medication 1 TABLET(S): at 15:47

## 2018-09-26 NOTE — PROGRESS NOTE ADULT - SUBJECTIVE AND OBJECTIVE BOX
Patient is a 63y Female with a known history of :  Adjustment disorder with depressed mood (F43.21)  Prophylactic measure (Z29.9)  Migraines (G43.909)  Depression (F32.9)  Renal arteriosclerosis (I12.9)  HTN (hypertension) (I10)  FTT (failure to thrive) in adult (R62.7)  Intractable vomiting with nausea, unspecified vomiting type (R11.2)    HPI:  62 yo white female with H/O HTN, RENAL ARTERY STENOSIS ,RECENT WEIGHT LOSS due to emesis , hx of  Seizure, Migraine type of headaches ,Depression/Anxiety Disorder who has had unexplained nausea, weakness, vomiting with additional inability to even keep her meds down. No fever, chills, abdominal pains, chest pain, melena or BRBPR. Patient was seen in ER earlier today due to the same symptoms and possible xanax withdrawal ,she takes it but keeps vomiting it . She was discharged and went to see pcp Dr Tristan Frederick  who advised to go back to ED for FTT workup and GI evaluation . As per PCP she recently has a lot of weight loss and also uncontrolled HTN due to ALEXANDER ,cardiology consult called ,patient refused stenting of renal artery in a past   Does not remember the last time she was endoscoped. Seen by GI CONSULT ,may require EGD ,ppi and antiemetics are started (25 Sep 2018 17:01)      REVIEW OF SYSTEMS:    CONSTITUTIONAL: No fever, weight loss, or fatigue  EYES: No eye pain, visual disturbances, or discharge  ENMT:  No difficulty hearing, tinnitus, vertigo; No sinus or throat pain  NECK: No pain or stiffness  BREASTS: No pain, masses, or nipple discharge  RESPIRATORY: No cough, wheezing, chills or hemoptysis; No shortness of breath  CARDIOVASCULAR: No chest pain, palpitations, dizziness, or leg swelling  GASTROINTESTINAL: No abdominal or epigastric pain. No nausea, vomiting, or hematemesis; No diarrhea or constipation. No melena or hematochezia.  GENITOURINARY: No dysuria, frequency, hematuria, or incontinence  NEUROLOGICAL: No headaches, memory loss, loss of strength, numbness, or tremors  SKIN: No itching, burning, rashes, or lesions   LYMPH NODES: No enlarged glands  ENDOCRINE: No heat or cold intolerance; No hair loss  MUSCULOSKELETAL: No joint pain or swelling; No muscle, back, or extremity pain  PSYCHIATRIC: No depression, anxiety, mood swings, or difficulty sleeping  HEME/LYMPH: No easy bruising, or bleeding gums  ALLERGY AND IMMUNOLOGIC: No hives or eczema    MEDICATIONS  (STANDING):  amLODIPine   Tablet 5 milliGRAM(s) Oral daily  bisacodyl Suppository 10 milliGRAM(s) Rectal at bedtime  dextrose 5% + sodium chloride 0.45%. 1000 milliLiter(s) (75 mL/Hr) IV Continuous <Continuous>  docusate sodium 100 milliGRAM(s) Oral three times a day  enoxaparin Injectable 40 milliGRAM(s) SubCutaneous daily  lactobacillus acidophilus 1 Tablet(s) Oral every 8 hours  mirtazapine Soltab 15 milliGRAM(s) Oral at bedtime  multivitamin 1 Tablet(s) Oral daily  pantoprazole    Tablet 40 milliGRAM(s) Oral before breakfast  potassium chloride    Tablet ER 40 milliEquivalent(s) Oral every 4 hours  potassium chloride  10 mEq/100 mL IVPB 10 milliEquivalent(s) IV Intermittent every 1 hour  senna 2 Tablet(s) Oral at bedtime  sertraline 25 milliGRAM(s) Oral daily  topiramate 100 milliGRAM(s) Oral two times a day    MEDICATIONS  (PRN):  ALPRAZolam 0.5 milliGRAM(s) Oral at bedtime PRN anxiety, insomnia  ondansetron Injectable 4 milliGRAM(s) IV Push every 6 hours PRN Nausea and/or Vomiting      ALLERGIES: penicillin (Anaphylaxis)      FAMILY HISTORY:  Family history of colon cancer in mother (Mother)      PHYSICAL EXAMINATION:  -----------------------------  T(C): 37.1 (09-26-18 @ 05:01), Max: 37.6 (09-25-18 @ 22:46)  HR: 79 (09-26-18 @ 05:01) (76 - 83)  BP: 166/81 (09-26-18 @ 07:00) (150/90 - 211/94)  RR: 18 (09-26-18 @ 05:01) (16 - 18)  SpO2: 97% (09-26-18 @ 05:01) (97% - 98%)  Wt(kg): --    Height (cm): 157.48 (09-25 @ 13:06)  Weight (kg): 45.4 (09-25 @ 13:06)  BMI (kg/m2): 18.3 (09-25 @ 13:06)  BSA (m2): 1.42 (09-25 @ 13:06)    Constitutional: well developed, normal appearance, well groomed, well nourished, no deformities and no acute distress.   Eyes: the conjunctiva exhibited no abnormalities and the eyelids demonstrated no xanthelasmas.   HEENT: normal oral mucosa, no oral pallor and no oral cyanosis.   Neck: normal jugular venous A waves present, normal jugular venous V waves present and no jugular venous monterroso A waves.   Pulmonary: no respiratory distress, normal respiratory rhythm and effort, no accessory muscle use and lungs were clear to auscultation bilaterally.   Cardiovascular: heart rate and rhythm were normal, normal S1 and S2 and no murmur, gallop, rub, heave or thrill are present.   Abdomen: soft, non-tender, no hepato-splenomegaly and no abdominal mass palpated.   Musculoskeletal: the gait could not be assessed..   Extremities: no clubbing of the fingernails, no localized cyanosis, no petechial hemorrhages and no ischemic changes.   Skin: normal skin color and pigmentation, no rash, no venous stasis, no skin lesions, no skin ulcer and no xanthoma was observed.   Psychiatric: oriented to person, place, and time, the affect was normal, the mood was normal and not feeling anxious.     LABS:   --------  09-26    136  |  103  |  8   ----------------------------<  105<H>  2.2<LL>   |  25  |  0.94    Ca    9.0      26 Sep 2018 08:53    TPro  7.0  /  Alb  3.2<L>  /  TBili  0.6  /  DBili  x   /  AST  19  /  ALT  13  /  AlkPhos  58  09-26                         13.1   6.72  )-----------( 276      ( 26 Sep 2018 08:53 )             37.1                 RADIOLOGY:  -----------------        ECG:

## 2018-09-26 NOTE — PHYSICAL THERAPY INITIAL EVALUATION ADULT - ADDITIONAL COMMENTS
Pt lives in a house with 3-4 steps to enter and handrails. Pt has a flight of stairs to the basement & to the second floor w/ handrails but does not need to access areas. Pt has a RW but does not use device. Pt stated she was driving up until a week before her current admission.

## 2018-09-26 NOTE — CHART NOTE - NSCHARTNOTEFT_GEN_A_CORE
S: Called that pt c.o Headache. Pt seen and examed at bedside. Pt sleep comfortably at bed pt reported that she had this Lt frontal HA since the admission. the headache felt like her usually chronic headache. Neris of any neuro deficit/vision change/CP/SOB.            ROS as per HPI    O:  Allergies    penicillin (Anaphylaxis)    Intolerances        Vitals  Vital Signs Last 24 Hrs  T(C): 37.1 (26 Sep 2018 20:26), Max: 37.6 (25 Sep 2018 22:46)  T(F): 98.7 (26 Sep 2018 20:26), Max: 99.6 (25 Sep 2018 22:46)  HR: 72 (26 Sep 2018 20:26) (72 - 82)  BP: 170/80 (26 Sep 2018 21:39) (131/77 - 211/94)  BP(mean): --  RR: 17 (26 Sep 2018 20:26) (16 - 18)  SpO2: 98% (26 Sep 2018 20:26) (97% - 98%)      General: pale, skinny,  NAD, sleep comfortably at bed   Respiratory: CTA B/L, no w/r/r  CV: RRR, S1S2, no murmurs, rubs or gallops  Abdominal: Soft, NT, ND +BS,  Extremities: No edema, + peripheral pulses  Neurology: A&Ox3, CNsII- XII grossly wnl, nonfocal, LOZANO x 4       LABS:                        13.1   6.72  )-----------( 276      ( 26 Sep 2018 08:53 )             37.1     09-26    x   |  x   |  x   ----------------------------<  x   3.6   |  x   |  x     Ca    9.0      26 Sep 2018 08:53    TPro  7.0  /  Alb  3.2<L>  /  TBili  0.6  /  DBili  x   /  AST  19  /  ALT  13  /  AlkPhos  58  09-26              RADIOLOGY & ADDITIONAL TESTS:    A/P: 62 yo white female with H/O HTN, RENAL ARTERY STENOSIS ,RECENT WEIGHT LOSS due to emesis , hx of  Seizure, Migraine type of headaches ,Depression/Anxiety Disorder who has had unexplained nausea, weakness, vomiting a/w for FTT and possible gastroparesis now c.o HA    -HA is chronic, Pt request Dilaudid. Pt is scheduled for NM gastric emptying test tomorrow, should not take any Opioid meds. explain the reason to pt and offer tylenols. pt initially refuse and said she is willing to cancel the gastric emptying test but later agree to take PO tylenols.

## 2018-09-26 NOTE — PROGRESS NOTE ADULT - SUBJECTIVE AND OBJECTIVE BOX
Patient is a 63y Female whom presented to the hospital with renal artery stenosis     PAST MEDICAL & SURGICAL HISTORY:  Renal arteriosclerosis  Constipation  Anxiety  Depression  HTN (hypertension)  Shingles  Pericarditis  Osteoporosis  Seizure disorder  Migraines  No significant past surgical history      MEDICATIONS  (STANDING):  amLODIPine   Tablet 5 milliGRAM(s) Oral daily  bisacodyl Suppository 10 milliGRAM(s) Rectal at bedtime  dextrose 5% + sodium chloride 0.45%. 1000 milliLiter(s) (75 mL/Hr) IV Continuous <Continuous>  docusate sodium 100 milliGRAM(s) Oral three times a day  enoxaparin Injectable 40 milliGRAM(s) SubCutaneous daily  lactobacillus acidophilus 1 Tablet(s) Oral every 8 hours  mirtazapine Soltab 15 milliGRAM(s) Oral at bedtime  multivitamin 1 Tablet(s) Oral daily  pantoprazole    Tablet 40 milliGRAM(s) Oral before breakfast  potassium chloride    Tablet ER 40 milliEquivalent(s) Oral every 4 hours  potassium chloride  10 mEq/100 mL IVPB 10 milliEquivalent(s) IV Intermittent every 1 hour  senna 2 Tablet(s) Oral at bedtime  sertraline 25 milliGRAM(s) Oral daily  topiramate 100 milliGRAM(s) Oral two times a day      Allergies    penicillin (Anaphylaxis)    Intolerances        SOCIAL HISTORY:  Denies ETOh,Smoking,     FAMILY HISTORY:  Family history of colon cancer in mother (Mother)      REVIEW OF SYSTEMS:    CONSTITUTIONAL: No weakness, fevers or chills  EYES/ENT: No visual changes;  no throat pain   NECK: No pain or stiffness  RESPIRATORY: No cough, wheezing, hemoptysis; No shortness of breath  CARDIOVASCULAR: No chest pain or palpitations  GASTROINTESTINAL: No abdominal or epigastric pain. No nausea, vomiting,     No diarrhea or constipation. No melena   GENITOURINARY: No dysuria, frequency or hematuria  NEUROLOGICAL: No numbness or weakness  SKIN: dry      VITAL:  T(C): , Max: 37.6 (09-25-18 @ 22:46)  T(F): , Max: 99.6 (09-25-18 @ 22:46)  HR: 79 (09-26-18 @ 05:01)  BP: 166/81 (09-26-18 @ 07:00)  BP(mean): --  RR: 18 (09-26-18 @ 05:01)  SpO2: 97% (09-26-18 @ 05:01)  Wt(kg): --    I and O's:    Height (cm): 157.48 (09-25 @ 13:06)  Weight (kg): 45.4 (09-25 @ 13:06)  BMI (kg/m2): 18.3 (09-25 @ 13:06)  BSA (m2): 1.42 (09-25 @ 13:06)    PHYSICAL EXAM:    Constitutional: NAD  HEENT: conjunctive   clear   Neck:  No JVD  Respiratory: CTAB  Cardiovascular: S1 and S2  Gastrointestinal: BS+, soft, NT/ND  Extremities: No peripheral edema  Neurological: A/O x 3, no focal deficits  Psychiatric: Normal mood, normal affect  : No [place]  Skin: No rashes  Access: Not applicable    LABS:                        13.1   6.72  )-----------( 276      ( 26 Sep 2018 08:53 )             37.1     09-26    136  |  103  |  8   ----------------------------<  105<H>  2.2<LL>   |  25  |  0.94    Ca    9.0      26 Sep 2018 08:53    TPro  7.0  /  Alb  3.2<L>  /  TBili  0.6  /  DBili  x   /  AST  19  /  ALT  13  /  AlkPhos  58  09-26      Urine Studies:          RADIOLOGY & ADDITIONAL STUDIES:

## 2018-09-26 NOTE — BEHAVIORAL HEALTH ASSESSMENT NOTE - SUMMARY
60 y/o SWF, with no prior psychiatric hospitalizations, history of depression, admitted to the hospital for nausea and weight loss.

## 2018-09-26 NOTE — PROGRESS NOTE ADULT - PROBLEM SELECTOR PLAN 1
CT neg for acute pathology, no obstruction  f/u GES to rule out gastroparesis  gerd precautions  ppi once a day, zofran prn  diet as tolerated after ges  ?speech eval  may need egd if above negative, dw pt

## 2018-09-26 NOTE — BEHAVIORAL HEALTH ASSESSMENT NOTE - ORIENTED TO TIME
Other (Free Text): [facility] Other (Free Text): [facility] Yes

## 2018-09-26 NOTE — BEHAVIORAL HEALTH ASSESSMENT NOTE - AXIS III
HTN, RENAL ARTERY STENOSIS ,RECENT WEIGHT LOSS due to emesis , hx of  Seizure, Migraine type of headaches

## 2018-09-26 NOTE — CHART NOTE - NSCHARTNOTEFT_GEN_A_CORE
Upon Nutritional Assessment by the Registered Dietitian your patient was determined to meet criteria / has evidence of the following diagnosis/diagnoses:          [ ]  Mild Protein Calorie Malnutrition        [ ]  Moderate Protein Calorie Malnutrition        [x] Severe Protein Calorie Malnutrition        [ ] Unspecified Protein Calorie Malnutrition        [ ] Underweight / BMI <19        [ ] Morbid Obesity / BMI > 40      Findings as based on:  •  Comprehensive nutrition assessment and consultation  • NFPE performed. Meets criteria for severe chronic malnutrition ( sig wt loss , po intake <75% estd needs for >1 month)      Treatment:    The following diet has been recommended:  low Na with addl recommendations pending clinical course  nutritional supplement- 8oz Ensure enlive bid    PROVIDER Section:     By signing this assessment you are acknowledging and agree with the diagnosis/diagnoses assigned by the Registered Dietitian    Comments:

## 2018-09-26 NOTE — PROGRESS NOTE ADULT - ASSESSMENT
pt with nausea and vomitting and loss of wt  hypertension  migraine  ashd cardiac status stable (low risk) for gi endoscopy

## 2018-09-26 NOTE — PROGRESS NOTE ADULT - SUBJECTIVE AND OBJECTIVE BOX
INTERVAL HPI/OVERNIGHT EVENTS:  pt seen and examined  c/o nausea and dry heaves of bile  denies actual vomiting/hematemesis/abd pain  no bm but passing gas  per overnight rn pt attempted to self induce vomiting but unsuccessful  no new labs to assess afebrile overnight    MEDICATIONS  (STANDING):  amLODIPine   Tablet 5 milliGRAM(s) Oral daily  dextrose 5% + sodium chloride 0.45%. 1000 milliLiter(s) (75 mL/Hr) IV Continuous <Continuous>  enoxaparin Injectable 40 milliGRAM(s) SubCutaneous daily  lactobacillus acidophilus 1 Tablet(s) Oral every 8 hours  multivitamin 1 Tablet(s) Oral daily  pantoprazole    Tablet 40 milliGRAM(s) Oral before breakfast  sertraline 25 milliGRAM(s) Oral daily  topiramate 100 milliGRAM(s) Oral two times a day    MEDICATIONS  (PRN):  ALPRAZolam 0.5 milliGRAM(s) Oral at bedtime PRN anxiety, insomnia  ondansetron Injectable 4 milliGRAM(s) IV Push every 6 hours PRN Nausea and/or Vomiting  traMADol 50 milliGRAM(s) Oral every 6 hours PRN Moderate Pain (4 - 6)      Allergies    penicillin (Anaphylaxis)    Intolerances        Review of Systems:    General:  No wt loss, fevers, chills, night sweats, fatigue   Eyes:  Good vision, no reported pain  ENT:  No sore throat, pain, runny nose, dysphagia  CV:  No pain, palpitations, hypo/hypertension  Resp:  No dyspnea, cough, tachypnea, wheezing  GI:  No pain, + dry heaves and nausea, No vomiting, No diarrhea, No constipation, No weight loss, No fever, No pruritis, No rectal bleeding, No melena, No dysphagia  :  No pain, bleeding, incontinence, nocturia  Muscle:  No pain, weakness  Neuro:  No weakness, tingling, memory problems  Psych:  No fatigue, insomnia, mood problems, depression  Endocrine:  No polyuria, polydypsia, cold/heat intolerance  Heme:  No petechiae, ecchymosis, easy bruisability  Skin:  No rash, tattoos, scars, edema      Vital Signs Last 24 Hrs  T(C): 37.1 (26 Sep 2018 05:01), Max: 37.6 (25 Sep 2018 22:46)  T(F): 98.8 (26 Sep 2018 05:01), Max: 99.6 (25 Sep 2018 22:46)  HR: 79 (26 Sep 2018 05:01) (76 - 83)  BP: 166/81 (26 Sep 2018 07:00) (150/90 - 211/94)  BP(mean): --  RR: 18 (26 Sep 2018 05:01) (16 - 18)  SpO2: 97% (26 Sep 2018 05:01) (97% - 98%)    PHYSICAL EXAM:    Constitutional: NAD, lying in bed frail appearing  HEENT: EOMI, poor dentition   Neck: No LAD  Respiratory: dec bs  Cardiovascular: S1 and S2, RRR  Gastrointestinal: soft nt nd   Extremities: No peripheral edema  Vascular: 2+ peripheral pulses  Neurological: Awake alert responds appropriately  Skin: No rashes      LABS:                        14.8   6.70  )-----------( 288      ( 25 Sep 2018 14:01 )             42.9     09-25    134<L>  |  97  |  14  ----------------------------<  99  3.7   |  28  |  1.10    Ca    9.8      25 Sep 2018 14:01    TPro  8.3  /  Alb  3.7  /  TBili  0.6  /  DBili  x   /  AST  16  /  ALT  20  /  AlkPhos  69  09-25          RADIOLOGY & ADDITIONAL TESTS:

## 2018-09-26 NOTE — DIETITIAN INITIAL EVALUATION ADULT. - ADHERENCE
Pt reports follows low to no salt or MSG diet at home. Gets migraines so is careful to avoid salt and MSG./good

## 2018-09-26 NOTE — DIETITIAN INITIAL EVALUATION ADULT. - OTHER INFO
Pt reports very poor intake, inability to keep food down past few weeks. Old EMR reviewed- last admission in August pt wt the same (~104#).   Pt in January of 2016 was 151#.  Pt did report about a 50# wt loss over the past year attributable to eating less 2/2 reduced appetite.   Had CT ab/pelvis done 9/25- results unremarkable.  Seen by GI-  plan for GES to r/o gastroparesis and possible EGD.  Pt endorses reduced bowel movements attributable to not eating that much.

## 2018-09-26 NOTE — PHYSICAL THERAPY INITIAL EVALUATION ADULT - PERTINENT HX OF CURRENT PROBLEM, REHAB EVAL
Pt was admitted for nausea and vomiting. Pt complains of not being able to eat for 3 days due to nausea and vomiting.

## 2018-09-26 NOTE — BEHAVIORAL HEALTH ASSESSMENT NOTE - HPI (INCLUDE ILLNESS QUALITY, SEVERITY, DURATION, TIMING, CONTEXT, MODIFYING FACTORS, ASSOCIATED SIGNS AND SYMPTOMS)
64 yo white female with H/O HTN, RENAL ARTERY STENOSIS ,RECENT WEIGHT LOSS due to emesis , hx of  Seizure, Migraine type of headaches ,Depression/Anxiety Disorder who has had unexplained nausea, weakness, vomiting with additional inability to even keep her meds down. No fever, chills, abdominal pains, chest pain, melena or BRBPR. Patient was seen in ER earlier today due to the same symptoms and possible xanax withdrawal ,she takes it but keeps vomiting it . She was discharged and went to see pcp Dr Tristan Frederick  who advised to go back to ED for FTT workup and GI evaluation . As per PCP she recently has a lot of weight loss and also uncontrolled HTN due to ALEXANDER ,cardiology consult called ,patient refused stenting of renal artery in a past   Does not remember the last time she was endoscoped. Seen by GI CONSULT ,may require EGD ,ppi and antiemetics are started.  Patient seen, evaluated and chart reviewed. Spoke with the attending physician. Patient reports that she has been recently more depressed as she is struggling with difficulty swallowing and chronic nausea. She reports worsened sleep and energy, as well as poor concentration. Denies symptoms of psychosis or manuel.

## 2018-09-26 NOTE — BEHAVIORAL HEALTH ASSESSMENT NOTE - [facility]_PSY_A_CORE FT
Will Add Remeron sol-tab 15 mg po at HS - risks, benefits, alternatives and side-effects are explained.

## 2018-09-26 NOTE — PHYSICAL THERAPY INITIAL EVALUATION ADULT - GENERAL OBSERVATIONS, REHAB EVAL
Pt was awake supine in bed. Pt complained of being thirsty, but was NPO. Patient complained of have a headache. Pt was cooperative with PT.

## 2018-09-26 NOTE — PROGRESS NOTE ADULT - ASSESSMENT
64 yo white female with H/O HTN, RENAL ARTERY STENOSIS ,RECENT WEIGHT LOSS due to emesis , hx of  Seizure, Migraine type of headaches

## 2018-09-26 NOTE — DIETITIAN INITIAL EVALUATION ADULT. - ORAL INTAKE PTA
pt reports poor appetite with reduced intake for at least  year.  Nausea/vomiting started intermittantly few months ago. Got very bad past few weeks./poor

## 2018-09-26 NOTE — PROGRESS NOTE ADULT - PROBLEM SELECTOR PLAN 1
continue current managmnet and medications ,on antiemetics and ppi ,may need EGD ,seen by GI DR Mcdonough ,GES ordered

## 2018-09-26 NOTE — PROGRESS NOTE ADULT - SUBJECTIVE AND OBJECTIVE BOX
PROGRESS NOTE  Patient is a 63y old  Female who presents with a chief complaint of nausea ,vomiting, weight loss (26 Sep 2018 10:55)  Chart and available morning labs /imaging are reviewed electronically , urgent issues addressed . More information  is being added upon completion of rounds , when more information is collected and management discussed with consultants , medical staff and social service/case management on the floor   OVERNIGHT  Feels better ,tolerated lunch well   Periods of nausea No vomiting GES ordered this morning Requires pain management for HA and migraines BP is controlled   HPI:  62 yo white female with H/O HTN, RENAL ARTERY STENOSIS ,RECENT WEIGHT LOSS due to emesis , hx of  Seizure, Migraine type of headaches ,Depression/Anxiety Disorder who has had unexplained nausea, weakness, vomiting with additional inability to even keep her meds down. No fever, chills, abdominal pains, chest pain, melena or BRBPR. Patient was seen in ER earlier today due to the same symptoms and possible xanax withdrawal ,she takes it but keeps vomiting it . She was discharged and went to see pcp Dr Tristan Frederick  who advised to go back to ED for FTT workup and GI evaluation . As per PCP she recently has a lot of weight loss and also uncontrolled HTN due to ALEXANDER ,cardiology consult called ,patient refused stenting of renal artery in a past   Does not remember the last time she was endoscoped. Seen by GI CONSULT ,may require EGD ,ppi and antiemetics are started (25 Sep 2018 17:01)  PAST MEDICAL & SURGICAL HISTORY:  Renal arteriosclerosis  Constipation  Anxiety  Depression  HTN (hypertension)  Shingles  Pericarditis  Osteoporosis  Seizure disorder  Migraines  No significant past surgical history  MEDICATIONS  (STANDING):  amLODIPine   Tablet 5 milliGRAM(s) Oral daily  bisacodyl Suppository 10 milliGRAM(s) Rectal at bedtime  dextrose 5% + sodium chloride 0.45%. 1000 milliLiter(s) (75 mL/Hr) IV Continuous <Continuous>  docusate sodium 100 milliGRAM(s) Oral three times a day  enoxaparin Injectable 40 milliGRAM(s) SubCutaneous daily  lactobacillus acidophilus 1 Tablet(s) Oral every 8 hours  mirtazapine Soltab 15 milliGRAM(s) Oral at bedtime  multivitamin 1 Tablet(s) Oral daily  pantoprazole    Tablet 40 milliGRAM(s) Oral before breakfast  potassium chloride    Tablet ER 40 milliEquivalent(s) Oral every 4 hours  potassium chloride  10 mEq/100 mL IVPB 10 milliEquivalent(s) IV Intermittent every 1 hour  senna 2 Tablet(s) Oral at bedtime  sertraline 25 milliGRAM(s) Oral daily  topiramate 100 milliGRAM(s) Oral two times a day  MEDICATIONS  (PRN):  ALPRAZolam 0.5 milliGRAM(s) Oral at bedtime PRN anxiety, insomnia  ketorolac   Injectable 15 milliGRAM(s) IV Push every 6 hours PRN Moderate Pain (4 - 6)  ondansetron Injectable 4 milliGRAM(s) IV Push every 6 hours PRN Nausea and/or Vomiting  OBJECTIVE  T(C): 37.2 (09-26-18 @ 13:34), Max: 37.6 (09-25-18 @ 22:46)  HR: 77 (09-26-18 @ 13:34) (76 - 83)  BP: 131/77 (09-26-18 @ 13:34) (131/77 - 211/94)  RR: 16 (09-26-18 @ 13:34) (16 - 18)  SpO2: 97% (09-26-18 @ 13:34) (97% - 98%)  Wt(kg): --  I&O's Summary  REVIEW OF SYSTEMS:  CONSTITUTIONAL: No fever, weight loss, or fatigue  EYES: No eye pain, visual disturbances, or discharge  ENMT:   No sinus or throat pain  NECK: No pain or stiffness  RESPIRATORY: No cough, wheezing, chills or hemoptysis; No shortness of breath  CARDIOVASCULAR: No chest pain, palpitations, dizziness, or leg swelling  GASTROINTESTINAL: No abdominal pain. Some  nausea, no vomiting;   GENITOURINARY: No dysuria, frequency, hematuria, or incontinence  NEUROLOGICAL: No headaches, memory loss, loss of strength, numbness, or tremors  SKIN: No itching, burning, rashes, or lesions   MUSCULOSKELETAL: No joint pain or swelling; No muscle, back, or extremity pain  PHYSICAL EXAM:  Appearance: NAD. VS past 24 hrs -as above   HEENT:   Moist oral mucosa. Conjunctiva clear b/l.   Neck : supple  Respiratory: Lungs CTAB.  Gastrointestinal:  Soft, nontender. No rebound. No rigidity. BS present	  Cardiovascular: RRR ,S1S2 present  Neurologic: Non-focal. Moving all extremities.  Extremities: No edema. No erythema. No calf tenderness.  Skin: No rashes, No ecchymoses, No cyanosis.	  wounds ,skin lesions-See skin assesment flow sheet   LABS:                     13.1   6.72  )-----------( 276      ( 26 Sep 2018 08:53 )             37.1   09-26  136  |  103  |  8   ----------------------------<  105<H>  2.2<LL>   |  25  |  0.94  Ca    9.0      26 Sep 2018 08:53  TPro  7.0  /  Alb  3.2<L>  /  TBili  0.6  /  DBili  x   /  AST  19  /  ALT  13  /  AlkPhos  58  09-26  CAPILLARY BLOOD GLUCOSE  RADIOLOGY & ADDITIONAL TESTS:< from: CT Abdomen and Pelvis w/ Oral Cont and w/ IV Cont (09.25.18 @ 16:06) >  EXAM:  CT ABDOMEN AND PELVIS OC IC                        PROCEDURE DATE:  09/25/2018    INTERPRETATION:  CT ABDOMEN AND PELVIS  CLINICAL INFORMATION:  Nausea, vomiting and weakness.  PROCEDURE:  Using multislice helical CT, oral contrast, and following the   intravenous administration of 95 cc Omnipaque 300 , 2.5 mm sections were   obtained from the domes of the diaphragm to the ischial tuberosities.    Multiplanar MPR's were performed.  COMPARISON: CT scan abdomen and pelvis 8/20/2018.  FINDINGS:    There is a small, indeterminate, subcentimeter hypodense lesion medial   segment left hepatic lobe.  The spleen and gallbladder appear unremarkable.  The 12 mm indeterminate left adrenal nodule is stable in size.  The right adrenal gland and pancreas are unremarkable in appearance.  There is atrophy of the left kidney with delayed nephrogram.  There are several small indeterminate hypodense lesions within the right   kidney.  No hydroureteronephrosis is noted.  There is arteriosclerotic calcification of the abdominal aorta, which is   normal in caliber.  No enlarged retroperitoneal lymphadenopathy is noted.  There are scattered sigmoid diverticuli, without CT evidence of   diverticulitis.  No bowel obstruction is noted.  There is a moderate amount of fecal load throughout the colon.  No localized intra-abdominal fluid collection or pneumoperitoneum is   noted.  There is a normal-appearing appendix.  The urinary bladder appears unremarkable.  There is asmall amount of localized fluid or small cyst right posterior   adnexal pelvis.  No significant free fluid is noted.  There is a chronic compression deformity of the T12 vertebral body.  Linear sclerosis along the left iliac side of the sacroiliac joint may   represent the sequelae of insufficiency fracture.  There is a small sclerotic focus left parasymphyseal superior pubic bone,   stable in appearance.  Impression:  No acute intra-abdominal pathology demonstrated.  < end of copied text >   reviewed elctronically  ASSESSMENT/PLAN:

## 2018-09-27 LAB
ALDOST SERPL-MCNC: 58.4 NG/DL — HIGH
ANION GAP SERPL CALC-SCNC: 9 MMOL/L — SIGNIFICANT CHANGE UP (ref 5–17)
BUN SERPL-MCNC: 8 MG/DL — SIGNIFICANT CHANGE UP (ref 7–23)
CALCIUM SERPL-MCNC: 9.4 MG/DL — SIGNIFICANT CHANGE UP (ref 8.5–10.1)
CHLORIDE SERPL-SCNC: 104 MMOL/L — SIGNIFICANT CHANGE UP (ref 96–108)
CO2 SERPL-SCNC: 25 MMOL/L — SIGNIFICANT CHANGE UP (ref 22–31)
CREAT SERPL-MCNC: 1.6 MG/DL — HIGH (ref 0.5–1.3)
GLUCOSE SERPL-MCNC: 182 MG/DL — HIGH (ref 70–99)
HCT VFR BLD CALC: 41.4 % — SIGNIFICANT CHANGE UP (ref 34.5–45)
HGB BLD-MCNC: 14.1 G/DL — SIGNIFICANT CHANGE UP (ref 11.5–15.5)
MCHC RBC-ENTMCNC: 29.6 PG — SIGNIFICANT CHANGE UP (ref 27–34)
MCHC RBC-ENTMCNC: 34.1 GM/DL — SIGNIFICANT CHANGE UP (ref 32–36)
MCV RBC AUTO: 86.8 FL — SIGNIFICANT CHANGE UP (ref 80–100)
NRBC # BLD: 0 /100 WBCS — SIGNIFICANT CHANGE UP (ref 0–0)
PLATELET # BLD AUTO: 299 K/UL — SIGNIFICANT CHANGE UP (ref 150–400)
POTASSIUM SERPL-MCNC: 3.5 MMOL/L — SIGNIFICANT CHANGE UP (ref 3.5–5.3)
POTASSIUM SERPL-SCNC: 3.5 MMOL/L — SIGNIFICANT CHANGE UP (ref 3.5–5.3)
RBC # BLD: 4.77 M/UL — SIGNIFICANT CHANGE UP (ref 3.8–5.2)
RBC # FLD: 12.9 % — SIGNIFICANT CHANGE UP (ref 10.3–14.5)
SODIUM SERPL-SCNC: 138 MMOL/L — SIGNIFICANT CHANGE UP (ref 135–145)
WBC # BLD: 5.51 K/UL — SIGNIFICANT CHANGE UP (ref 3.8–10.5)
WBC # FLD AUTO: 5.51 K/UL — SIGNIFICANT CHANGE UP (ref 3.8–10.5)

## 2018-09-27 PROCEDURE — 76770 US EXAM ABDO BACK WALL COMP: CPT | Mod: 26

## 2018-09-27 RX ORDER — ACETAMINOPHEN 500 MG
650 TABLET ORAL EVERY 6 HOURS
Qty: 0 | Refills: 0 | Status: DISCONTINUED | OUTPATIENT
Start: 2018-09-27 | End: 2018-10-02

## 2018-09-27 RX ORDER — SODIUM CHLORIDE 9 MG/ML
1000 INJECTION, SOLUTION INTRAVENOUS
Qty: 0 | Refills: 0 | Status: DISCONTINUED | OUTPATIENT
Start: 2018-09-27 | End: 2018-10-01

## 2018-09-27 RX ORDER — HYDRALAZINE HCL 50 MG
25 TABLET ORAL THREE TIMES A DAY
Qty: 0 | Refills: 0 | Status: DISCONTINUED | OUTPATIENT
Start: 2018-09-27 | End: 2018-09-29

## 2018-09-27 RX ORDER — POTASSIUM CHLORIDE 20 MEQ
20 PACKET (EA) ORAL DAILY
Qty: 0 | Refills: 0 | Status: DISCONTINUED | OUTPATIENT
Start: 2018-09-27 | End: 2018-10-02

## 2018-09-27 RX ADMIN — SERTRALINE 25 MILLIGRAM(S): 25 TABLET, FILM COATED ORAL at 11:09

## 2018-09-27 RX ADMIN — Medication 100 MILLIGRAM(S): at 05:55

## 2018-09-27 RX ADMIN — PANTOPRAZOLE SODIUM 40 MILLIGRAM(S): 20 TABLET, DELAYED RELEASE ORAL at 05:55

## 2018-09-27 RX ADMIN — ENOXAPARIN SODIUM 40 MILLIGRAM(S): 100 INJECTION SUBCUTANEOUS at 11:10

## 2018-09-27 RX ADMIN — AMLODIPINE BESYLATE 5 MILLIGRAM(S): 2.5 TABLET ORAL at 05:57

## 2018-09-27 RX ADMIN — Medication 20 MILLIEQUIVALENT(S): at 21:49

## 2018-09-27 RX ADMIN — Medication 25 MILLIGRAM(S): at 21:42

## 2018-09-27 RX ADMIN — Medication 650 MILLIGRAM(S): at 09:23

## 2018-09-27 RX ADMIN — SODIUM CHLORIDE 75 MILLILITER(S): 9 INJECTION, SOLUTION INTRAVENOUS at 15:42

## 2018-09-27 RX ADMIN — Medication 1 TABLET(S): at 13:49

## 2018-09-27 RX ADMIN — Medication 650 MILLIGRAM(S): at 10:23

## 2018-09-27 RX ADMIN — Medication 100 MILLIGRAM(S): at 17:10

## 2018-09-27 RX ADMIN — Medication 650 MILLIGRAM(S): at 19:43

## 2018-09-27 RX ADMIN — Medication 1 TABLET(S): at 11:10

## 2018-09-27 RX ADMIN — Medication 650 MILLIGRAM(S): at 20:43

## 2018-09-27 RX ADMIN — Medication 0.5 MILLIGRAM(S): at 21:47

## 2018-09-27 RX ADMIN — Medication 1 TABLET(S): at 05:55

## 2018-09-27 RX ADMIN — MIRTAZAPINE 15 MILLIGRAM(S): 45 TABLET, ORALLY DISINTEGRATING ORAL at 21:55

## 2018-09-27 NOTE — PROGRESS NOTE ADULT - SUBJECTIVE AND OBJECTIVE BOX
Patient is a 63y Female with a known history of :  ASHD (arteriosclerotic heart disease) (I25.10)  Adjustment disorder with depressed mood (F43.21)  Prophylactic measure (Z29.9)  Migraines (G43.909)  Depression (F32.9)  Renal arteriosclerosis (I12.9)  HTN (hypertension) (I10)  FTT (failure to thrive) in adult (R62.7)  Intractable vomiting with nausea, unspecified vomiting type (R11.2)    HPI:  64 yo white female with H/O HTN, RENAL ARTERY STENOSIS ,RECENT WEIGHT LOSS due to emesis , hx of  Seizure, Migraine type of headaches ,Depression/Anxiety Disorder who has had unexplained nausea, weakness, vomiting with additional inability to even keep her meds down. No fever, chills, abdominal pains, chest pain, melena or BRBPR. Patient was seen in ER earlier today due to the same symptoms and possible xanax withdrawal ,she takes it but keeps vomiting it . She was discharged and went to see pcp Dr Tristan Frederick  who advised to go back to ED for FTT workup and GI evaluation . As per PCP she recently has a lot of weight loss and also uncontrolled HTN due to ALEXANDER ,cardiology consult called ,patient refused stenting of renal artery in a past   Does not remember the last time she was endoscoped. Seen by GI CONSULT ,may require EGD ,ppi and antiemetics are started (25 Sep 2018 17:01)      REVIEW OF SYSTEMS:    CONSTITUTIONAL: No fever, weight loss, or fatigue  EYES: No eye pain, visual disturbances, or discharge  ENMT:  No difficulty hearing, tinnitus, vertigo; No sinus or throat pain  NECK: No pain or stiffness  BREASTS: No pain, masses, or nipple discharge  RESPIRATORY: No cough, wheezing, chills or hemoptysis; No shortness of breath  CARDIOVASCULAR: No chest pain, palpitations, dizziness, or leg swelling  GASTROINTESTINAL: No abdominal or epigastric pain. No nausea, vomiting, or hematemesis; No diarrhea or constipation. No melena or hematochezia.  GENITOURINARY: No dysuria, frequency, hematuria, or incontinence  NEUROLOGICAL: No headaches, memory loss, loss of strength, numbness, or tremors  SKIN: No itching, burning, rashes, or lesions   LYMPH NODES: No enlarged glands  ENDOCRINE: No heat or cold intolerance; No hair loss  MUSCULOSKELETAL: No joint pain or swelling; No muscle, back, or extremity pain  PSYCHIATRIC: No depression, anxiety, mood swings, or difficulty sleeping  HEME/LYMPH: No easy bruising, or bleeding gums  ALLERGY AND IMMUNOLOGIC: No hives or eczema    MEDICATIONS  (STANDING):  amLODIPine   Tablet 5 milliGRAM(s) Oral daily  bisacodyl Suppository 10 milliGRAM(s) Rectal at bedtime  dextrose 5% + sodium chloride 0.45%. 1000 milliLiter(s) (75 mL/Hr) IV Continuous <Continuous>  docusate sodium 100 milliGRAM(s) Oral three times a day  enoxaparin Injectable 40 milliGRAM(s) SubCutaneous daily  lactobacillus acidophilus 1 Tablet(s) Oral every 8 hours  mirtazapine Soltab 15 milliGRAM(s) Oral at bedtime  multivitamin 1 Tablet(s) Oral daily  pantoprazole    Tablet 40 milliGRAM(s) Oral before breakfast  senna 2 Tablet(s) Oral at bedtime  sertraline 25 milliGRAM(s) Oral daily  topiramate 100 milliGRAM(s) Oral two times a day    MEDICATIONS  (PRN):  ALPRAZolam 0.5 milliGRAM(s) Oral at bedtime PRN anxiety, insomnia  ketorolac   Injectable 15 milliGRAM(s) IV Push every 6 hours PRN Moderate Pain (4 - 6)  ondansetron Injectable 4 milliGRAM(s) IV Push every 6 hours PRN Nausea and/or Vomiting      ALLERGIES: penicillin (Anaphylaxis)      FAMILY HISTORY:  Family history of colon cancer in mother (Mother)      PHYSICAL EXAMINATION:  -----------------------------  T(C): 36.9 (09-27-18 @ 04:47), Max: 37.2 (09-26-18 @ 13:34)  HR: 75 (09-27-18 @ 04:47) (72 - 77)  BP: 179/79 (09-27-18 @ 04:47) (131/77 - 187/75)  RR: 16 (09-27-18 @ 04:47) (16 - 17)  SpO2: 100% (09-27-18 @ 04:47) (97% - 100%)  Wt(kg): --    09-26 @ 07:01 - 09-27 @ 07:00  --------------------------------------------------------  IN:    dextrose 5% + sodium chloride 0.45%.: 1499 mL    IV PiggyBack: 300 mL  Total IN: 1799 mL    OUT:  Total OUT: 0 mL    Total NET: 1799 mL            Constitutional: well developed, normal appearance, well groomed, well nourished, no deformities and no acute distress.   Eyes: the conjunctiva exhibited no abnormalities and the eyelids demonstrated no xanthelasmas.   HEENT: normal oral mucosa, no oral pallor and no oral cyanosis.   Neck: normal jugular venous A waves present, normal jugular venous V waves present and no jugular venous monterroso A waves.   Pulmonary: no respiratory distress, normal respiratory rhythm and effort, no accessory muscle use and lungs were clear to auscultation bilaterally.   Cardiovascular: heart rate and rhythm were normal, normal S1 and S2 and no murmur, gallop, rub, heave or thrill are present.   Abdomen: soft, non-tender, no hepato-splenomegaly and no abdominal mass palpated.   Musculoskeletal: the gait could not be assessed..   Extremities: no clubbing of the fingernails, no localized cyanosis, no petechial hemorrhages and no ischemic changes.   Skin: normal skin color and pigmentation, no rash, no venous stasis, no skin lesions, no skin ulcer and no xanthoma was observed.   Psychiatric: oriented to person, place, and time, the affect was normal, the mood was normal and not feeling anxious.     LABS:   --------  09-26    x   |  x   |  x   ----------------------------<  x   3.6   |  x   |  x     Ca    9.0      26 Sep 2018 08:53    TPro  7.0  /  Alb  3.2<L>  /  TBili  0.6  /  DBili  x   /  AST  19  /  ALT  13  /  AlkPhos  58  09-26                         13.1   6.72  )-----------( 276      ( 26 Sep 2018 08:53 )             37.1                 RADIOLOGY:  -----------------        ECG:

## 2018-09-27 NOTE — PROGRESS NOTE ADULT - PROBLEM SELECTOR PLAN 1
CT neg for acute pathology, no obstruction   slowly improving, having +bms  f/u GES to rule out gastroparesis, planned for friday  gerd precautions  ppi once a day, zofran prn  diet as tolerated   ?speech eval  further recs pending results of above, may need egd, dw pt

## 2018-09-27 NOTE — PROGRESS NOTE ADULT - SUBJECTIVE AND OBJECTIVE BOX
PROGRESS NOTE  Patient is a 63y old  Female who presents with a chief complaint of nausea ,vomiting, weight loss (27 Sep 2018 08:32)  LATE ENTRY- patient was seen and examined earlier today . Plan of care was discussed with med staff and unit coordinator .   Chart and available morning labs /imaging are reviewed electronically , urgent issues addressed . More information  is being added upon completion of rounds , when more information is collected and management discussed with consultants , medical staff and social service/case management on the floor   OVERNIGHT  No new issues reported by medical staff . All above noted Denies vomiting ,had few periods of nausea ( mild) Headache seems to be a bigger problem as per patient scheduled for GES in am   HPI:  64 yo white female with H/O HTN, RENAL ARTERY STENOSIS ,RECENT WEIGHT LOSS due to emesis , hx of  Seizure, Migraine type of headaches ,Depression/Anxiety Disorder who has had unexplained nausea, weakness, vomiting with additional inability to even keep her meds down. No fever, chills, abdominal pains, chest pain, melena or BRBPR. Patient was seen in ER earlier today due to the same symptoms and possible xanax withdrawal ,she takes it but keeps vomiting it . She was discharged and went to see pcp Dr Tristan Frederick  who advised to go back to ED for FTT workup and GI evaluation . As per PCP she recently has a lot of weight loss and also uncontrolled HTN due to ALEXANDER ,cardiology consult called ,patient refused stenting of renal artery in a past   Does not remember the last time she was endoscoped. Seen by GI CONSULT ,may require EGD ,ppi and antiemetics are started (25 Sep 2018 17:01)  PAST MEDICAL & SURGICAL HISTORY:  Renal arteriosclerosis  Constipation  Anxiety  Depression  HTN (hypertension)  Shingles  Pericarditis  Osteoporosis  Seizure disorder  Migraines  No significant past surgical history  MEDICATIONS  (STANDING):  amLODIPine   Tablet 5 milliGRAM(s) Oral daily  bisacodyl Suppository 10 milliGRAM(s) Rectal at bedtime  dextrose 5% + sodium chloride 0.45%. 1000 milliLiter(s) (75 mL/Hr) IV Continuous <Continuous>  docusate sodium 100 milliGRAM(s) Oral three times a day  enoxaparin Injectable 40 milliGRAM(s) SubCutaneous daily  lactobacillus acidophilus 1 Tablet(s) Oral every 8 hours  mirtazapine Soltab 15 milliGRAM(s) Oral at bedtime  multivitamin 1 Tablet(s) Oral daily  pantoprazole    Tablet 40 milliGRAM(s) Oral before breakfast  senna 2 Tablet(s) Oral at bedtime  sertraline 25 milliGRAM(s) Oral daily  topiramate 100 milliGRAM(s) Oral two times a day  MEDICATIONS  (PRN):  acetaminophen   Tablet .. 650 milliGRAM(s) Oral every 6 hours PRN Mild Pain (1 - 3)  ALPRAZolam 0.5 milliGRAM(s) Oral at bedtime PRN anxiety, insomnia  ketorolac   Injectable 15 milliGRAM(s) IV Push every 6 hours PRN Moderate Pain (4 - 6)  ondansetron Injectable 4 milliGRAM(s) IV Push every 6 hours PRN Nausea and/or Vomiting  OBJECTIVE  T(C): 36.9 (09-27-18 @ 14:16), Max: 37.1 (09-26-18 @ 20:26)  HR: 81 (09-27-18 @ 14:16) (72 - 81)  BP: 176/88 (09-27-18 @ 14:16) (170/80 - 187/75)  RR: 17 (09-27-18 @ 14:16) (16 - 17)  SpO2: 98% (09-27-18 @ 14:16) (98% - 100%)  Wt(kg): --  I&O's Summary  26 Sep 2018 07:01  -  27 Sep 2018 07:00  --------------------------------------------------------  IN: 1799 mL / OUT: 0 mL / NET: 1799 mL  27 Sep 2018 07:01  -  27 Sep 2018 17:37  --------------------------------------------------------  IN: 1080 mL / OUT: 0 mL / NET: 1080 mL  REVIEW OF SYSTEMS:  CONSTITUTIONAL: No fever, weight loss, or fatigue  EYES: No eye pain, visual disturbances, or discharge  ENMT:   No sinus or throat pain  NECK: No pain or stiffness  RESPIRATORY: No cough, wheezing, chills or hemoptysis; No shortness of breath  CARDIOVASCULAR: No chest pain, palpitations, dizziness, or leg swelling  GASTROINTESTINAL: No abdominal pain. Some  nausea, no vomiting;   GENITOURINARY: No dysuria, frequency, hematuria, or incontinence  NEUROLOGICAL: No headaches, memory loss, loss of strength, numbness, or tremors  SKIN: No itching, burning, rashes, or lesions   MUSCULOSKELETAL: No joint pain or swelling; No muscle, back, or extremity pain  PHYSICAL EXAM:  Appearance: NAD. VS past 24 hrs -as above   HEENT:   Moist oral mucosa. Conjunctiva clear b/l.   Neck : supple  Respiratory: Lungs CTAB.  Gastrointestinal:  Soft, nontender. No rebound. No rigidity. BS present	  Cardiovascular: RRR ,S1S2 present  Neurologic: Non-focal. Moving all extremities.  Extremities: No edema. No erythema. No calf tenderness.  Skin: No rashes, No ecchymoses, No cyanosis.	  wounds ,skin lesions-See skin assesment flow sheet   LABS:                      14.1   5.51  )-----------( 299      ( 27 Sep 2018 09:30 )             41.4   09-27  138  |  104  |  8   ----------------------------<  182<H>  3.5   |  25  |  1.60<H>  Ca    9.4      27 Sep 2018 09:30  TPro  7.0  /  Alb  3.2<L>  /  TBili  0.6  /  DBili  x   /  AST  19  /  ALT  13  /  AlkPhos  58  09-26  CAPILLARY BLOOD GLUCOSE  RADIOLOGY & ADDITIONAL TESTS:< from: US Kidney and Bladder (09.27.18 @ 08:08) >  EXAM:  US KIDNEYS AND BLADDER                        PROCEDURE DATE:  09/27/2018    INTERPRETATION:  CLINICAL INFORMATION: Hypertension, history of renal   artery stenosis.  Nausea and vomiting.  COMPARISON: CT scan abdomen pelvis 9/25/2018 and renal ultrasound   8/9/2018.  TECHNIQUE: Sonography of the kidneys and bladder.   FINDINGS:  Right kidney:  9.4 cm. cm. There is mild fullness of the right renal   pelvis.  The small, indeterminate hypodense lesions within the right kidney seen   on CT scan of the abdomen and pelvis, are not appreciated on this   ultrasound.  Left kidney:  Atrophic, 6.7 cm. There is more limited visualization of   the left kidney.  There is thinning of the renal cortex with increased echogenicity.  Urinary bladder: Unremarkable in appearance.  The bladder volume measures 206 cc.  The patient is unable to void at this exam. The right ureteral jet is   visualized. The left ureteral jet is not visualized.  IMPRESSION:   Interval increased atrophy of the left kidney with evidence of renal   parenchymal disease.  < end of copied text >   reviewed elctronically  ASSESSMENT/PLAN:

## 2018-09-27 NOTE — PROGRESS NOTE ADULT - PROBLEM SELECTOR PLAN 1
continue current management and medications ,on antiemetics and ppi ,may need EGD ,seen by GI DR Mcdonough ,GES ordered

## 2018-09-27 NOTE — PROGRESS NOTE ADULT - SUBJECTIVE AND OBJECTIVE BOX
INTERVAL HPI/OVERNIGHT EVENTS:  pt seen and examined  c/o mild nausea and dry heaves, no vomiting/abd pain, reports +bm  able to tolerate some liquids  per overnight rn no vomiting, gave zofran  afebrile overnight no new labs to assess    MEDICATIONS  (STANDING):  amLODIPine   Tablet 5 milliGRAM(s) Oral daily  bisacodyl Suppository 10 milliGRAM(s) Rectal at bedtime  dextrose 5% + sodium chloride 0.45%. 1000 milliLiter(s) (75 mL/Hr) IV Continuous <Continuous>  docusate sodium 100 milliGRAM(s) Oral three times a day  enoxaparin Injectable 40 milliGRAM(s) SubCutaneous daily  lactobacillus acidophilus 1 Tablet(s) Oral every 8 hours  mirtazapine Soltab 15 milliGRAM(s) Oral at bedtime  multivitamin 1 Tablet(s) Oral daily  pantoprazole    Tablet 40 milliGRAM(s) Oral before breakfast  senna 2 Tablet(s) Oral at bedtime  sertraline 25 milliGRAM(s) Oral daily  topiramate 100 milliGRAM(s) Oral two times a day    MEDICATIONS  (PRN):  ALPRAZolam 0.5 milliGRAM(s) Oral at bedtime PRN anxiety, insomnia  ketorolac   Injectable 15 milliGRAM(s) IV Push every 6 hours PRN Moderate Pain (4 - 6)  ondansetron Injectable 4 milliGRAM(s) IV Push every 6 hours PRN Nausea and/or Vomiting      Allergies    penicillin (Anaphylaxis)    Intolerances        Review of Systems:    General:  No wt loss, fevers, chills, night sweats, fatigue   Eyes:  Good vision, no reported pain  ENT:  No sore throat, pain, runny nose, dysphagia  CV:  No pain, palpitations, hypo/hypertension  Resp:  No dyspnea, cough, tachypnea, wheezing  GI:  No pain, + dry heaves and nausea, No vomiting, No diarrhea, No constipation, No weight loss, No fever, No pruritis, No rectal bleeding, No melena, No dysphagia  :  No pain, bleeding, incontinence, nocturia  Muscle:  No pain, weakness  Neuro:  No weakness, tingling, memory problems  Psych:  No fatigue, insomnia, mood problems, depression  Endocrine:  No polyuria, polydypsia, cold/heat intolerance  Heme:  No petechiae, ecchymosis, easy bruisability  Skin:  No rash, tattoos, scars, edema      Vital Signs Last 24 Hrs  T(C): 36.9 (27 Sep 2018 04:47), Max: 37.2 (26 Sep 2018 13:34)  T(F): 98.4 (27 Sep 2018 04:47), Max: 98.9 (26 Sep 2018 13:34)  HR: 75 (27 Sep 2018 04:47) (72 - 77)  BP: 179/79 (27 Sep 2018 04:47) (131/77 - 187/75)  BP(mean): --  RR: 16 (27 Sep 2018 04:47) (16 - 17)  SpO2: 100% (27 Sep 2018 04:47) (97% - 100%)    PHYSICAL EXAM:    Constitutional: NAD, lying in bed frail appearing  HEENT: EOMI, poor dentition   Neck: No LAD  Respiratory: dec bs  Cardiovascular: S1 and S2, RRR  Gastrointestinal: soft nt nd   Extremities: No peripheral edema  Vascular: 2+ peripheral pulses  Neurological: Awake alert responds appropriately  Skin: No rashes    LABS:                        13.1   6.72  )-----------( 276      ( 26 Sep 2018 08:53 )             37.1     09-26    x   |  x   |  x   ----------------------------<  x   3.6   |  x   |  x     Ca    9.0      26 Sep 2018 08:53    TPro  7.0  /  Alb  3.2<L>  /  TBili  0.6  /  DBili  x   /  AST  19  /  ALT  13  /  AlkPhos  58  09-26          RADIOLOGY & ADDITIONAL TESTS:

## 2018-09-27 NOTE — PROGRESS NOTE ADULT - ASSESSMENT
62 yo white female with H/O HTN, RENAL ARTERY STENOSIS ,RECENT WEIGHT LOSS due to emesis , hx of  Seizure, Migraine type of headaches

## 2018-09-28 LAB
ANION GAP SERPL CALC-SCNC: 11 MMOL/L — SIGNIFICANT CHANGE UP (ref 5–17)
ANION GAP SERPL CALC-SCNC: 9 MMOL/L — SIGNIFICANT CHANGE UP (ref 5–17)
BUN SERPL-MCNC: 7 MG/DL — SIGNIFICANT CHANGE UP (ref 7–23)
BUN SERPL-MCNC: 7 MG/DL — SIGNIFICANT CHANGE UP (ref 7–23)
CALCIUM SERPL-MCNC: 8.9 MG/DL — SIGNIFICANT CHANGE UP (ref 8.5–10.1)
CALCIUM SERPL-MCNC: 9.2 MG/DL — SIGNIFICANT CHANGE UP (ref 8.5–10.1)
CHLORIDE SERPL-SCNC: 105 MMOL/L — SIGNIFICANT CHANGE UP (ref 96–108)
CHLORIDE SERPL-SCNC: 107 MMOL/L — SIGNIFICANT CHANGE UP (ref 96–108)
CO2 SERPL-SCNC: 25 MMOL/L — SIGNIFICANT CHANGE UP (ref 22–31)
CO2 SERPL-SCNC: 26 MMOL/L — SIGNIFICANT CHANGE UP (ref 22–31)
CREAT SERPL-MCNC: 1.1 MG/DL — SIGNIFICANT CHANGE UP (ref 0.5–1.3)
CREAT SERPL-MCNC: 1.2 MG/DL — SIGNIFICANT CHANGE UP (ref 0.5–1.3)
GLUCOSE SERPL-MCNC: 113 MG/DL — HIGH (ref 70–99)
GLUCOSE SERPL-MCNC: 138 MG/DL — HIGH (ref 70–99)
HCT VFR BLD CALC: 38.8 % — SIGNIFICANT CHANGE UP (ref 34.5–45)
HGB BLD-MCNC: 13.3 G/DL — SIGNIFICANT CHANGE UP (ref 11.5–15.5)
MCHC RBC-ENTMCNC: 29.1 PG — SIGNIFICANT CHANGE UP (ref 27–34)
MCHC RBC-ENTMCNC: 34.3 GM/DL — SIGNIFICANT CHANGE UP (ref 32–36)
MCV RBC AUTO: 84.9 FL — SIGNIFICANT CHANGE UP (ref 80–100)
NRBC # BLD: 0 /100 WBCS — SIGNIFICANT CHANGE UP (ref 0–0)
PLATELET # BLD AUTO: 258 K/UL — SIGNIFICANT CHANGE UP (ref 150–400)
POTASSIUM SERPL-MCNC: 2.6 MMOL/L — CRITICAL LOW (ref 3.5–5.3)
POTASSIUM SERPL-MCNC: 2.6 MMOL/L — CRITICAL LOW (ref 3.5–5.3)
POTASSIUM SERPL-MCNC: 4.2 MMOL/L — SIGNIFICANT CHANGE UP (ref 3.5–5.3)
POTASSIUM SERPL-SCNC: 2.6 MMOL/L — CRITICAL LOW (ref 3.5–5.3)
POTASSIUM SERPL-SCNC: 2.6 MMOL/L — CRITICAL LOW (ref 3.5–5.3)
POTASSIUM SERPL-SCNC: 4.2 MMOL/L — SIGNIFICANT CHANGE UP (ref 3.5–5.3)
RBC # BLD: 4.57 M/UL — SIGNIFICANT CHANGE UP (ref 3.8–5.2)
RBC # FLD: 13.1 % — SIGNIFICANT CHANGE UP (ref 10.3–14.5)
SODIUM SERPL-SCNC: 141 MMOL/L — SIGNIFICANT CHANGE UP (ref 135–145)
SODIUM SERPL-SCNC: 142 MMOL/L — SIGNIFICANT CHANGE UP (ref 135–145)
WBC # BLD: 6.32 K/UL — SIGNIFICANT CHANGE UP (ref 3.8–10.5)
WBC # FLD AUTO: 6.32 K/UL — SIGNIFICANT CHANGE UP (ref 3.8–10.5)

## 2018-09-28 PROCEDURE — 78264 GASTRIC EMPTYING IMG STUDY: CPT | Mod: 26,GC

## 2018-09-28 RX ORDER — POTASSIUM CHLORIDE 20 MEQ
10 PACKET (EA) ORAL
Qty: 0 | Refills: 0 | Status: COMPLETED | OUTPATIENT
Start: 2018-09-28 | End: 2018-09-28

## 2018-09-28 RX ORDER — POTASSIUM CHLORIDE 20 MEQ
40 PACKET (EA) ORAL EVERY 4 HOURS
Qty: 0 | Refills: 0 | Status: COMPLETED | OUTPATIENT
Start: 2018-09-28 | End: 2018-09-28

## 2018-09-28 RX ORDER — METOCLOPRAMIDE HCL 10 MG
10 TABLET ORAL ONCE
Qty: 0 | Refills: 0 | Status: COMPLETED | OUTPATIENT
Start: 2018-09-28 | End: 2018-09-28

## 2018-09-28 RX ADMIN — Medication 15 MILLIGRAM(S): at 12:17

## 2018-09-28 RX ADMIN — Medication 25 MILLIGRAM(S): at 21:29

## 2018-09-28 RX ADMIN — Medication 25 MILLIGRAM(S): at 13:41

## 2018-09-28 RX ADMIN — SODIUM CHLORIDE 75 MILLILITER(S): 9 INJECTION, SOLUTION INTRAVENOUS at 05:56

## 2018-09-28 RX ADMIN — Medication 1 TABLET(S): at 21:29

## 2018-09-28 RX ADMIN — Medication 1 TABLET(S): at 13:29

## 2018-09-28 RX ADMIN — ENOXAPARIN SODIUM 40 MILLIGRAM(S): 100 INJECTION SUBCUTANEOUS at 12:17

## 2018-09-28 RX ADMIN — SERTRALINE 25 MILLIGRAM(S): 25 TABLET, FILM COATED ORAL at 13:30

## 2018-09-28 RX ADMIN — Medication 2 CAPSULE(S): at 14:02

## 2018-09-28 RX ADMIN — Medication 10 MILLIGRAM(S): at 20:19

## 2018-09-28 RX ADMIN — Medication 100 MILLIEQUIVALENT(S): at 14:02

## 2018-09-28 RX ADMIN — Medication 100 MILLIEQUIVALENT(S): at 12:18

## 2018-09-28 RX ADMIN — Medication 0.5 MILLIGRAM(S): at 13:28

## 2018-09-28 RX ADMIN — Medication 650 MILLIGRAM(S): at 16:37

## 2018-09-28 RX ADMIN — Medication 1 CAPSULE(S): at 21:29

## 2018-09-28 RX ADMIN — Medication 25 MILLIGRAM(S): at 05:34

## 2018-09-28 RX ADMIN — Medication 1 TABLET(S): at 13:30

## 2018-09-28 RX ADMIN — Medication 2 CAPSULE(S): at 13:58

## 2018-09-28 RX ADMIN — Medication 40 MILLIEQUIVALENT(S): at 13:29

## 2018-09-28 RX ADMIN — ONDANSETRON 4 MILLIGRAM(S): 8 TABLET, FILM COATED ORAL at 18:54

## 2018-09-28 RX ADMIN — Medication 40 MILLIEQUIVALENT(S): at 17:42

## 2018-09-28 RX ADMIN — AMLODIPINE BESYLATE 5 MILLIGRAM(S): 2.5 TABLET ORAL at 05:34

## 2018-09-28 RX ADMIN — MIRTAZAPINE 15 MILLIGRAM(S): 45 TABLET, ORALLY DISINTEGRATING ORAL at 21:29

## 2018-09-28 RX ADMIN — Medication 1 CAPSULE(S): at 22:00

## 2018-09-28 RX ADMIN — Medication 100 MILLIGRAM(S): at 17:43

## 2018-09-28 RX ADMIN — Medication 20 MILLIEQUIVALENT(S): at 13:29

## 2018-09-28 RX ADMIN — Medication 15 MILLIGRAM(S): at 12:30

## 2018-09-28 RX ADMIN — Medication 100 MILLIGRAM(S): at 13:30

## 2018-09-28 NOTE — PROGRESS NOTE ADULT - SUBJECTIVE AND OBJECTIVE BOX
Patient is a 63y Female whom presented to the hospital with renal artery stenosis     PAST MEDICAL & SURGICAL HISTORY:  Renal arteriosclerosis  Constipation  Anxiety  Depression  HTN (hypertension)  Shingles  Pericarditis  Osteoporosis  Seizure disorder  Migraines  No significant past surgical history      MEDICATIONS  (STANDING):  amLODIPine   Tablet 5 milliGRAM(s) Oral daily  bisacodyl Suppository 10 milliGRAM(s) Rectal at bedtime  dextrose 5% + sodium chloride 0.45%. 1000 milliLiter(s) (75 mL/Hr) IV Continuous <Continuous>  docusate sodium 100 milliGRAM(s) Oral three times a day  enoxaparin Injectable 40 milliGRAM(s) SubCutaneous daily  lactobacillus acidophilus 1 Tablet(s) Oral every 8 hours  mirtazapine Soltab 15 milliGRAM(s) Oral at bedtime  multivitamin 1 Tablet(s) Oral daily  pantoprazole    Tablet 40 milliGRAM(s) Oral before breakfast  potassium chloride    Tablet ER 40 milliEquivalent(s) Oral every 4 hours  potassium chloride  10 mEq/100 mL IVPB 10 milliEquivalent(s) IV Intermittent every 1 hour  senna 2 Tablet(s) Oral at bedtime  sertraline 25 milliGRAM(s) Oral daily  topiramate 100 milliGRAM(s) Oral two times a day                            13.3   6.32  )-----------( 258      ( 28 Sep 2018 08:35 )             38.8       CBC Full  -  ( 28 Sep 2018 08:35 )  WBC Count : 6.32 K/uL  Hemoglobin : 13.3 g/dL  Hematocrit : 38.8 %  Platelet Count - Automated : 258 K/uL  Mean Cell Volume : 84.9 fl  Mean Cell Hemoglobin : 29.1 pg  Mean Cell Hemoglobin Concentration : 34.3 gm/dL  Auto Neutrophil # : x  Auto Lymphocyte # : x  Auto Monocyte # : x  Auto Eosinophil # : x  Auto Basophil # : x  Auto Neutrophil % : x  Auto Lymphocyte % : x  Auto Monocyte % : x  Auto Eosinophil % : x  Auto Basophil % : x      09-28    142  |  107  |  7   ----------------------------<  113<H>  2.6<LL>   |  26  |  1.10    Ca    8.9      28 Sep 2018 08:35        CAPILLARY BLOOD GLUCOSE      POCT Blood Glucose.: 118 mg/dL (28 Sep 2018 08:53)      Vital Signs Last 24 Hrs  T(C): 37 (28 Sep 2018 05:03), Max: 37.4 (27 Sep 2018 21:07)  T(F): 98.6 (28 Sep 2018 05:03), Max: 99.3 (27 Sep 2018 21:07)  HR: 91 (28 Sep 2018 05:03) (74 - 91)  BP: 190/80 (28 Sep 2018 05:29) (176/88 - 190/80)  BP(mean): --  RR: 16 (28 Sep 2018 05:03) (16 - 17)  SpO2: 99% (28 Sep 2018 05:03) (97% - 99%)            REVIEW OF SYSTEMS:    CONSTITUTIONAL: No weakness, fevers or chills  EYES/ENT: No visual changes;  no throat pain   NECK: No pain or stiffness  RESPIRATORY: No cough, wheezing, hemoptysis; No shortness of breath  CARDIOVASCULAR: No chest pain or palpitations  GASTROINTESTINAL: No abdominal or epigastric pain. No nausea, vomiting,     No diarrhea or constipation. No melena   GENITOURINARY: No dysuria, frequency or hematuria  NEUROLOGICAL: No numbness or weakness  SKIN: dry      VITAL:  T(C): , Max: 37.6 (09-25-18 @ 22:46)  T(F): , Max: 99.6 (09-25-18 @ 22:46)  HR: 79 (09-26-18 @ 05:01)  BP: 166/81 (09-26-18 @ 07:00)  BP(mean): --  RR: 18 (09-26-18 @ 05:01)  SpO2: 97% (09-26-18 @ 05:01)  Wt(kg): --    I and O's:    Height (cm): 157.48 (09-25 @ 13:06)  Weight (kg): 45.4 (09-25 @ 13:06)  BMI (kg/m2): 18.3 (09-25 @ 13:06)  BSA (m2): 1.42 (09-25 @ 13:06)    PHYSICAL EXAM:    Constitutional: NAD  HEENT: conjunctive   clear   Neck:  No JVD  Respiratory: CTAB  Cardiovascular: S1 and S2  Gastrointestinal: BS+, soft, NT/ND  Extremities: No peripheral edema  Neurological: A/O x 3, no focal deficits  Psychiatric: Normal mood, normal affect  : No [place]  Skin: No rashes  Access: Not applicable

## 2018-09-28 NOTE — PROGRESS NOTE ADULT - PROBLEM SELECTOR PLAN 1
CT neg for acute pathology, no obstruction   slowly improving, having +bms  f/u GES to rule out gastroparesis, planned for today  gerd precautions  ppi once a day, zofran prn  diet as tolerated   pt states she consumed regular consistency diet pta  further recs pending results of above, may need egd, dw pt

## 2018-09-28 NOTE — PROGRESS NOTE ADULT - PROBLEM SELECTOR PLAN 1
continue current management and medications ,on antiemetics and ppi ,may need EGD ,seen by GI DR Mcdonough ,GES IS WNL

## 2018-09-28 NOTE — PROGRESS NOTE ADULT - ASSESSMENT
pt with nausea and vomitting and loss of wt  hypertension  migraine  ashd cardiac status stable (low risk) for gi endoscopy  To have gi endoscopy today 9/28

## 2018-09-28 NOTE — PROGRESS NOTE ADULT - SUBJECTIVE AND OBJECTIVE BOX
INTERVAL HPI/OVERNIGHT EVENTS:  pt seen and examined  c/o mild nausea and dry heaves from current headache, denies abd pain  per overnight rn no vomiting, +bm yesterday  labs pending afebrile overnight    MEDICATIONS  (STANDING):  amLODIPine   Tablet 5 milliGRAM(s) Oral daily  bisacodyl Suppository 10 milliGRAM(s) Rectal at bedtime  dextrose 5% + sodium chloride 0.45%. 1000 milliLiter(s) (75 mL/Hr) IV Continuous <Continuous>  docusate sodium 100 milliGRAM(s) Oral three times a day  enoxaparin Injectable 40 milliGRAM(s) SubCutaneous daily  hydrALAZINE 25 milliGRAM(s) Oral three times a day  lactobacillus acidophilus 1 Tablet(s) Oral every 8 hours  mirtazapine Soltab 15 milliGRAM(s) Oral at bedtime  multivitamin 1 Tablet(s) Oral daily  pantoprazole    Tablet 40 milliGRAM(s) Oral before breakfast  potassium chloride    Tablet ER 20 milliEquivalent(s) Oral daily  senna 2 Tablet(s) Oral at bedtime  sertraline 25 milliGRAM(s) Oral daily  topiramate 100 milliGRAM(s) Oral two times a day    MEDICATIONS  (PRN):  acetaminophen   Tablet .. 650 milliGRAM(s) Oral every 6 hours PRN Mild Pain (1 - 3)  ALPRAZolam 0.5 milliGRAM(s) Oral at bedtime PRN anxiety, insomnia  ketorolac   Injectable 15 milliGRAM(s) IV Push every 6 hours PRN Moderate Pain (4 - 6)  ondansetron Injectable 4 milliGRAM(s) IV Push every 6 hours PRN Nausea and/or Vomiting      Allergies    penicillin (Anaphylaxis)    Intolerances        Review of Systems:    General:  No wt loss, fevers, chills, night sweats, fatigue   Eyes:  Good vision, no reported pain  ENT:  No sore throat, pain, runny nose, dysphagia  CV:  No pain, palpitations, hypo/hypertension  Resp:  No dyspnea, cough, tachypnea, wheezing  GI:  No pain, +dry heaves and nausea, No vomiting, No diarrhea, No constipation, No weight loss, No fever, No pruritis, No rectal bleeding, No melena, No dysphagia  :  No pain, bleeding, incontinence, nocturia  Muscle:  No pain, weakness  Neuro:  migraine  Psych:  No fatigue, insomnia, mood problems, depression  Endocrine:  No polyuria, polydypsia, cold/heat intolerance  Heme:  No petechiae, ecchymosis, easy bruisability  Skin:  No rash, tattoos, scars, edema      Vital Signs Last 24 Hrs  T(C): 37 (28 Sep 2018 05:03), Max: 37.4 (27 Sep 2018 21:07)  T(F): 98.6 (28 Sep 2018 05:03), Max: 99.3 (27 Sep 2018 21:07)  HR: 91 (28 Sep 2018 05:03) (74 - 91)  BP: 190/80 (28 Sep 2018 05:29) (176/88 - 190/80)  BP(mean): --  RR: 16 (28 Sep 2018 05:03) (16 - 17)  SpO2: 99% (28 Sep 2018 05:03) (97% - 99%)    PHYSICAL EXAM:    Constitutional: NAD, lying in bed frail appearing  HEENT: EOMI, poor dentition   Neck: No LAD  Respiratory: dec bs  Cardiovascular: S1 and S2, RRR  Gastrointestinal: soft nt nd   Extremities: No peripheral edema  Vascular: 2+ peripheral pulses  Neurological: Awake alert responds appropriately  Skin: No rashes    LABS:                        14.1   5.51  )-----------( 299      ( 27 Sep 2018 09:30 )             41.4     09-27    138  |  104  |  8   ----------------------------<  182<H>  3.5   |  25  |  1.60<H>    Ca    9.4      27 Sep 2018 09:30    TPro  7.0  /  Alb  3.2<L>  /  TBili  0.6  /  DBili  x   /  AST  19  /  ALT  13  /  AlkPhos  58  09-26          RADIOLOGY & ADDITIONAL TESTS:

## 2018-09-28 NOTE — PROGRESS NOTE ADULT - SUBJECTIVE AND OBJECTIVE BOX
Patient is a 63y Female with a known history of :  ASHD (arteriosclerotic heart disease) (I25.10)  Adjustment disorder with depressed mood (F43.21)  Prophylactic measure (Z29.9)  Migraines (G43.909)  Depression (F32.9)  Renal arteriosclerosis (I12.9)  HTN (hypertension) (I10)  FTT (failure to thrive) in adult (R62.7)  Intractable vomiting with nausea, unspecified vomiting type (R11.2)    HPI:  62 yo white female with H/O HTN, RENAL ARTERY STENOSIS ,RECENT WEIGHT LOSS due to emesis , hx of  Seizure, Migraine type of headaches ,Depression/Anxiety Disorder who has had unexplained nausea, weakness, vomiting with additional inability to even keep her meds down. No fever, chills, abdominal pains, chest pain, melena or BRBPR. Patient was seen in ER earlier today due to the same symptoms and possible xanax withdrawal ,she takes it but keeps vomiting it . She was discharged and went to see pcp Dr Tristan Frederick  who advised to go back to ED for FTT workup and GI evaluation . As per PCP she recently has a lot of weight loss and also uncontrolled HTN due to ALEXANDER ,cardiology consult called ,patient refused stenting of renal artery in a past   Does not remember the last time she was endoscoped. Seen by GI CONSULT ,may require EGD ,ppi and antiemetics are started (25 Sep 2018 17:01)      REVIEW OF SYSTEMS:    CONSTITUTIONAL: No fever, weight loss, or fatigue  EYES: No eye pain, visual disturbances, or discharge  ENMT:  No difficulty hearing, tinnitus, vertigo; No sinus or throat pain  NECK: No pain or stiffness  BREASTS: No pain, masses, or nipple discharge  RESPIRATORY: No cough, wheezing, chills or hemoptysis; No shortness of breath  CARDIOVASCULAR: No chest pain, palpitations, dizziness, or leg swelling  GASTROINTESTINAL: No abdominal or epigastric pain. No nausea, vomiting, or hematemesis; No diarrhea or constipation. No melena or hematochezia.  GENITOURINARY: No dysuria, frequency, hematuria, or incontinence  NEUROLOGICAL: No headaches, memory loss, loss of strength, numbness, or tremors  SKIN: No itching, burning, rashes, or lesions   LYMPH NODES: No enlarged glands  ENDOCRINE: No heat or cold intolerance; No hair loss  MUSCULOSKELETAL: No joint pain or swelling; No muscle, back, or extremity pain  PSYCHIATRIC: No depression, anxiety, mood swings, or difficulty sleeping  HEME/LYMPH: No easy bruising, or bleeding gums  ALLERGY AND IMMUNOLOGIC: No hives or eczema    MEDICATIONS  (STANDING):  amLODIPine   Tablet 5 milliGRAM(s) Oral daily  bisacodyl Suppository 10 milliGRAM(s) Rectal at bedtime  dextrose 5% + sodium chloride 0.45%. 1000 milliLiter(s) (75 mL/Hr) IV Continuous <Continuous>  docusate sodium 100 milliGRAM(s) Oral three times a day  enoxaparin Injectable 40 milliGRAM(s) SubCutaneous daily  hydrALAZINE 25 milliGRAM(s) Oral three times a day  lactobacillus acidophilus 1 Tablet(s) Oral every 8 hours  mirtazapine Soltab 15 milliGRAM(s) Oral at bedtime  multivitamin 1 Tablet(s) Oral daily  pantoprazole    Tablet 40 milliGRAM(s) Oral before breakfast  potassium chloride    Tablet ER 20 milliEquivalent(s) Oral daily  senna 2 Tablet(s) Oral at bedtime  sertraline 25 milliGRAM(s) Oral daily  topiramate 100 milliGRAM(s) Oral two times a day    MEDICATIONS  (PRN):  acetaminophen   Tablet .. 650 milliGRAM(s) Oral every 6 hours PRN Mild Pain (1 - 3)  ALPRAZolam 0.5 milliGRAM(s) Oral at bedtime PRN anxiety, insomnia  ketorolac   Injectable 15 milliGRAM(s) IV Push every 6 hours PRN Moderate Pain (4 - 6)  ondansetron Injectable 4 milliGRAM(s) IV Push every 6 hours PRN Nausea and/or Vomiting      ALLERGIES: penicillin (Anaphylaxis)      FAMILY HISTORY:  Family history of colon cancer in mother (Mother)      PHYSICAL EXAMINATION:  -----------------------------  T(C): 37 (09-28-18 @ 05:03), Max: 37.4 (09-27-18 @ 21:07)  HR: 91 (09-28-18 @ 05:03) (74 - 91)  BP: 190/80 (09-28-18 @ 05:29) (176/88 - 190/80)  RR: 16 (09-28-18 @ 05:03) (16 - 17)  SpO2: 99% (09-28-18 @ 05:03) (97% - 99%)  Wt(kg): --    09-27 @ 07:01  -  09-28 @ 07:00  --------------------------------------------------------  IN:    dextrose 5% + sodium chloride 0.45%.: 900 mL    dextrose 5% + sodium chloride 0.45%.: 825 mL    Oral Fluid: 480 mL  Total IN: 2205 mL    OUT:  Total OUT: 0 mL    Total NET: 2205 mL            Constitutional: well developed, normal appearance, well groomed, well nourished, no deformities and no acute distress.   Eyes: the conjunctiva exhibited no abnormalities and the eyelids demonstrated no xanthelasmas.   HEENT: normal oral mucosa, no oral pallor and no oral cyanosis.   Neck: normal jugular venous A waves present, normal jugular venous V waves present and no jugular venous monterroso A waves.   Pulmonary: no respiratory distress, normal respiratory rhythm and effort, no accessory muscle use and lungs were clear to auscultation bilaterally.   Cardiovascular: heart rate and rhythm were normal, normal S1 and S2 and no murmur, gallop, rub, heave or thrill are present.   Abdomen: soft, non-tender, no hepato-splenomegaly and no abdominal mass palpated.   Musculoskeletal: the gait could not be assessed..   Extremities: no clubbing of the fingernails, no localized cyanosis, no petechial hemorrhages and no ischemic changes.   Skin: normal skin color and pigmentation, no rash, no venous stasis, no skin lesions, no skin ulcer and no xanthoma was observed.   Psychiatric: oriented to person, place, and time, the affect was normal, the mood was normal and not feeling anxious.     LABS:   --------  09-27    138  |  104  |  8   ----------------------------<  182<H>  3.5   |  25  |  1.60<H>    Ca    9.4      27 Sep 2018 09:30    TPro  7.0  /  Alb  3.2<L>  /  TBili  0.6  /  DBili  x   /  AST  19  /  ALT  13  /  AlkPhos  58  09-26                         14.1   5.51  )-----------( 299      ( 27 Sep 2018 09:30 )             41.4                 RADIOLOGY:  -----------------        ECG:

## 2018-09-28 NOTE — PROGRESS NOTE ADULT - SUBJECTIVE AND OBJECTIVE BOX
Chart and available morning labs /imaging are reviewed electronically , urgent issues addressed . More information  is being added upon completion of rounds , when more information is collected and management discussed with consultants , medical staff and social service/case management on the floor Chart and available morning labs /imaging are reviewed electronically , urgent issues addressed . More information  is being added upon completion of rounds , when more information is collected and management discussed with consultants , medical staff and social service/case management on the floor                                                        INTERVAL HPI/OVERNIGHT EVENTS:  pt seen and examined  c/o mild nausea and headache ,iv toradol is given   MEDICATIONS  (STANDING):  amLODIPine   Tablet 5 milliGRAM(s) Oral daily  bisacodyl Suppository 10 milliGRAM(s) Rectal at bedtime  dextrose 5% + sodium chloride 0.45%. 1000 milliLiter(s) (75 mL/Hr) IV Continuous <Continuous>  docusate sodium 100 milliGRAM(s) Oral three times a day  enoxaparin Injectable 40 milliGRAM(s) SubCutaneous daily  hydrALAZINE 25 milliGRAM(s) Oral three times a day  lactobacillus acidophilus 1 Tablet(s) Oral every 8 hours  mirtazapine Soltab 15 milliGRAM(s) Oral at bedtime  multivitamin 1 Tablet(s) Oral daily  pantoprazole    Tablet 40 milliGRAM(s) Oral before breakfast  potassium chloride    Tablet ER 20 milliEquivalent(s) Oral daily  senna 2 Tablet(s) Oral at bedtime  sertraline 25 milliGRAM(s) Oral daily  topiramate 100 milliGRAM(s) Oral two times a day  MEDICATIONS  (PRN):  acetaminophen   Tablet .. 650 milliGRAM(s) Oral every 6 hours PRN Mild Pain (1 - 3)  ALPRAZolam 0.5 milliGRAM(s) Oral at bedtime PRN anxiety, insomnia  ketorolac   Injectable 15 milliGRAM(s) IV Push every 6 hours PRN Moderate Pain (4 - 6)  ondansetron Injectable 4 milliGRAM(s) IV Push every 6 hours PRN Nausea and/or Vomiting  Allergies  penicillin (Anaphylaxis)  Intolerances  Review of Systems:  General:  No wt loss, fevers, chills, night sweats, fatigue   Eyes:  Good vision, no reported pain  ENT:  No sore throat, pain, runny nose, dysphagia  CV:  No pain, palpitations, hypo/hypertension  Resp:  No dyspnea, cough, tachypnea, wheezing  GI:  No pain, +dry heaves and nausea, No vomiting, No diarrhea, No constipation, No weight loss, No fever, No pruritis, No rectal bleeding, No melena, No dysphagia  :  No pain, bleeding, incontinence, nocturia  Muscle:  No pain, weakness  Neuro:  migraine  Psych:  No fatigue, insomnia, mood problems, depression  Endocrine:  No polyuria, polydypsia, cold/heat intolerance  Heme:  No petechiae, ecchymosis, easy bruisability  Skin:  No rash, tattoos, scars, edema  Vital Signs Last 24 Hrs  T(C): 37 (28 Sep 2018 05:03), Max: 37.4 (27 Sep 2018 21:07)  T(F): 98.6 (28 Sep 2018 05:03), Max: 99.3 (27 Sep 2018 21:07)  HR: 91 (28 Sep 2018 05:03) (74 - 91)  BP: 190/80 (28 Sep 2018 05:29) (176/88 - 190/80)  BP(mean): --  RR: 16 (28 Sep 2018 05:03) (16 - 17)  SpO2: 99% (28 Sep 2018 05:03) (97% - 99%)  PHYSICAL EXAM:  Constitutional: NAD, lying in bed frail appearing  HEENT: EOMI, poor dentition   Neck: No LAD  Respiratory: dec bs  Cardiovascular: S1 and S2, RRR  Gastrointestinal: soft nt nd   Extremities: No peripheral edema  Vascular: 2+ peripheral pulses  Neurological: Awake alert responds appropriately  Skin: No rashes    LABS:                        14.1   5.51  )-----------( 299      ( 27 Sep 2018 09:30 )             41.4     09-27    138  |  104  |  8   ----------------------------<  182<H>  3.5   |  25  |  1.60<H>    Ca    9.4      27 Sep 2018 09:30    TPro  7.0  /  Alb  3.2<L>  /  TBili  0.6  /  DBili  x   /  AST  19  /  ALT  13  /  AlkPhos  58  09-26  RADIOLOGY & ADDITIONAL TESTS:< from: NM Gastric Emptying Solid (09.28.18 @ 13:42) >  FINDINGS:           TIME              % RETENTION                  NORMAL VALUES             0                           100%           1                              69%                                     37-90%           2                              23%                                     30-60%           3                              8%                                    -----------------           4                              3%                            0-10%        Visual inspection of the images is unremarkable.    IMPRESSION: Normal gastric emptying study. No evidence of gastroparesis.    < end of copied text >

## 2018-09-28 NOTE — PROGRESS NOTE ADULT - PROBLEM SELECTOR PLAN 1
will check renin , aldosterone  ,   hypokalemia will supplemented   yesi will us kidney   her nephrology out pateint is dr hernandez and have extensive salazar for renal artery stenosis and plan was not to put stent for yesi , will obtained the records

## 2018-09-29 DIAGNOSIS — R19.5 OTHER FECAL ABNORMALITIES: ICD-10-CM

## 2018-09-29 LAB
ANION GAP SERPL CALC-SCNC: 8 MMOL/L — SIGNIFICANT CHANGE UP (ref 5–17)
BUN SERPL-MCNC: 8 MG/DL — SIGNIFICANT CHANGE UP (ref 7–23)
CALCIUM SERPL-MCNC: 9.1 MG/DL — SIGNIFICANT CHANGE UP (ref 8.5–10.1)
CHLORIDE SERPL-SCNC: 107 MMOL/L — SIGNIFICANT CHANGE UP (ref 96–108)
CO2 SERPL-SCNC: 25 MMOL/L — SIGNIFICANT CHANGE UP (ref 22–31)
CREAT SERPL-MCNC: 1.2 MG/DL — SIGNIFICANT CHANGE UP (ref 0.5–1.3)
GLUCOSE SERPL-MCNC: 99 MG/DL — SIGNIFICANT CHANGE UP (ref 70–99)
HCT VFR BLD CALC: 39.6 % — SIGNIFICANT CHANGE UP (ref 34.5–45)
HGB BLD-MCNC: 13.3 G/DL — SIGNIFICANT CHANGE UP (ref 11.5–15.5)
MCHC RBC-ENTMCNC: 29.2 PG — SIGNIFICANT CHANGE UP (ref 27–34)
MCHC RBC-ENTMCNC: 33.6 GM/DL — SIGNIFICANT CHANGE UP (ref 32–36)
MCV RBC AUTO: 87 FL — SIGNIFICANT CHANGE UP (ref 80–100)
NRBC # BLD: 0 /100 WBCS — SIGNIFICANT CHANGE UP (ref 0–0)
PLATELET # BLD AUTO: 230 K/UL — SIGNIFICANT CHANGE UP (ref 150–400)
POTASSIUM SERPL-MCNC: 3.8 MMOL/L — SIGNIFICANT CHANGE UP (ref 3.5–5.3)
POTASSIUM SERPL-SCNC: 3.8 MMOL/L — SIGNIFICANT CHANGE UP (ref 3.5–5.3)
RBC # BLD: 4.55 M/UL — SIGNIFICANT CHANGE UP (ref 3.8–5.2)
RBC # FLD: 13.2 % — SIGNIFICANT CHANGE UP (ref 10.3–14.5)
RENIN PLAS-CCNC: 5.68 NG/ML/HR — HIGH (ref 0.17–5.38)
SODIUM SERPL-SCNC: 140 MMOL/L — SIGNIFICANT CHANGE UP (ref 135–145)
WBC # BLD: 5.52 K/UL — SIGNIFICANT CHANGE UP (ref 3.8–10.5)
WBC # FLD AUTO: 5.52 K/UL — SIGNIFICANT CHANGE UP (ref 3.8–10.5)

## 2018-09-29 RX ORDER — HYDRALAZINE HCL 50 MG
50 TABLET ORAL EVERY 6 HOURS
Qty: 0 | Refills: 0 | Status: DISCONTINUED | OUTPATIENT
Start: 2018-09-29 | End: 2018-10-01

## 2018-09-29 RX ADMIN — Medication 50 MILLIGRAM(S): at 11:32

## 2018-09-29 RX ADMIN — Medication 650 MILLIGRAM(S): at 18:11

## 2018-09-29 RX ADMIN — ONDANSETRON 4 MILLIGRAM(S): 8 TABLET, FILM COATED ORAL at 18:15

## 2018-09-29 RX ADMIN — PANTOPRAZOLE SODIUM 40 MILLIGRAM(S): 20 TABLET, DELAYED RELEASE ORAL at 05:32

## 2018-09-29 RX ADMIN — Medication 15 MILLIGRAM(S): at 18:16

## 2018-09-29 RX ADMIN — Medication 100 MILLIGRAM(S): at 17:36

## 2018-09-29 RX ADMIN — SODIUM CHLORIDE 75 MILLILITER(S): 9 INJECTION, SOLUTION INTRAVENOUS at 02:09

## 2018-09-29 RX ADMIN — Medication 1 CAPSULE(S): at 08:36

## 2018-09-29 RX ADMIN — Medication 50 MILLIGRAM(S): at 17:35

## 2018-09-29 RX ADMIN — MIRTAZAPINE 15 MILLIGRAM(S): 45 TABLET, ORALLY DISINTEGRATING ORAL at 21:33

## 2018-09-29 RX ADMIN — Medication 1 TABLET(S): at 11:20

## 2018-09-29 RX ADMIN — ENOXAPARIN SODIUM 40 MILLIGRAM(S): 100 INJECTION SUBCUTANEOUS at 11:19

## 2018-09-29 RX ADMIN — Medication 650 MILLIGRAM(S): at 17:36

## 2018-09-29 RX ADMIN — Medication 0.5 MILLIGRAM(S): at 21:33

## 2018-09-29 RX ADMIN — Medication 20 MILLIEQUIVALENT(S): at 11:20

## 2018-09-29 RX ADMIN — Medication 100 MILLIGRAM(S): at 05:32

## 2018-09-29 RX ADMIN — Medication 1 CAPSULE(S): at 09:30

## 2018-09-29 RX ADMIN — Medication 50 MILLIGRAM(S): at 23:14

## 2018-09-29 RX ADMIN — AMLODIPINE BESYLATE 5 MILLIGRAM(S): 2.5 TABLET ORAL at 05:32

## 2018-09-29 RX ADMIN — SERTRALINE 25 MILLIGRAM(S): 25 TABLET, FILM COATED ORAL at 11:20

## 2018-09-29 RX ADMIN — Medication 25 MILLIGRAM(S): at 05:32

## 2018-09-29 RX ADMIN — Medication 1 TABLET(S): at 21:33

## 2018-09-29 NOTE — PROGRESS NOTE ADULT - SUBJECTIVE AND OBJECTIVE BOX
Patient is a 63y Female whom presented to the hospital with renal artery stenosis     PAST MEDICAL & SURGICAL HISTORY:  Renal arteriosclerosis  Constipation  Anxiety  Depression  HTN (hypertension)  Shingles  Pericarditis  Osteoporosis  Seizure disorder  Migraines  No significant past surgical history      MEDICATIONS  (STANDING):  amLODIPine   Tablet 5 milliGRAM(s) Oral daily  bisacodyl Suppository 10 milliGRAM(s) Rectal at bedtime  dextrose 5% + sodium chloride 0.45%. 1000 milliLiter(s) (75 mL/Hr) IV Continuous <Continuous>  docusate sodium 100 milliGRAM(s) Oral three times a day  enoxaparin Injectable 40 milliGRAM(s) SubCutaneous daily  lactobacillus acidophilus 1 Tablet(s) Oral every 8 hours  mirtazapine Soltab 15 milliGRAM(s) Oral at bedtime  multivitamin 1 Tablet(s) Oral daily  pantoprazole    Tablet 40 milliGRAM(s) Oral before breakfast  potassium chloride    Tablet ER 40 milliEquivalent(s) Oral every 4 hours  potassium chloride  10 mEq/100 mL IVPB 10 milliEquivalent(s) IV Intermittent every 1 hour  senna 2 Tablet(s) Oral at bedtime  sertraline 25 milliGRAM(s) Oral daily  topiramate 100 milliGRAM(s) Oral two times a day                                                13.3   5.52  )-----------( 230      ( 29 Sep 2018 06:56 )             39.6       CBC Full  -  ( 29 Sep 2018 06:56 )  WBC Count : 5.52 K/uL  Hemoglobin : 13.3 g/dL  Hematocrit : 39.6 %  Platelet Count - Automated : 230 K/uL  Mean Cell Volume : 87.0 fl  Mean Cell Hemoglobin : 29.2 pg  Mean Cell Hemoglobin Concentration : 33.6 gm/dL  Auto Neutrophil # : x  Auto Lymphocyte # : x  Auto Monocyte # : x  Auto Eosinophil # : x  Auto Basophil # : x  Auto Neutrophil % : x  Auto Lymphocyte % : x  Auto Monocyte % : x  Auto Eosinophil % : x  Auto Basophil % : x      09-29    140  |  107  |  8   ----------------------------<  99  3.8   |  25  |  1.20    Ca    9.1      29 Sep 2018 06:56        CAPILLARY BLOOD GLUCOSE          Vital Signs Last 24 Hrs  T(C): 36.9 (29 Sep 2018 05:20), Max: 37.3 (28 Sep 2018 20:49)  T(F): 98.5 (29 Sep 2018 05:20), Max: 99.2 (28 Sep 2018 20:49)  HR: 83 (29 Sep 2018 05:20) (83 - 100)  BP: 184/88 (29 Sep 2018 05:20) (178/80 - 184/88)  BP(mean): --  RR: 17 (29 Sep 2018 05:20) (16 - 17)  SpO2: 98% (29 Sep 2018 05:20) (97% - 98%)                REVIEW OF SYSTEMS:    CONSTITUTIONAL: No weakness, fevers or chills  EYES/ENT: No visual changes;  no throat pain   NECK: No pain or stiffness  RESPIRATORY: No cough, wheezing, hemoptysis; No shortness of breath  CARDIOVASCULAR: No chest pain or palpitations  GASTROINTESTINAL: No abdominal or epigastric pain. No nausea, vomiting,     No diarrhea or constipation. No melena   GENITOURINARY: No dysuria, frequency or hematuria  NEUROLOGICAL: No numbness or weakness  SKIN: dry        PHYSICAL EXAM:    Constitutional: NAD  HEENT: conjunctive   clear   Neck:  No JVD  Respiratory: CTAB  Cardiovascular: S1 and S2  Gastrointestinal: BS+, soft, NT/ND  Extremities: No peripheral edema  Neurological: A/O x 3, no focal deficits  Psychiatric: Normal mood, normal affect  : No [place]  Skin: No rashes  Access: Not applicable

## 2018-09-29 NOTE — PROGRESS NOTE ADULT - SUBJECTIVE AND OBJECTIVE BOX
INTERVAL HPI/OVERNIGHT EVENTS:  pt seen and examined  c/o mild nausea and dry heaves, which she relates to migraine  states prior loose stools improving, which she relates to probiotic use  denies actual vomiting, abd pain  per overnight rn given reglan for nausea  afebrile overnight labs noted      MEDICATIONS  (STANDING):  amLODIPine   Tablet 5 milliGRAM(s) Oral daily  bisacodyl Suppository 10 milliGRAM(s) Rectal at bedtime  dextrose 5% + sodium chloride 0.45%. 1000 milliLiter(s) (75 mL/Hr) IV Continuous <Continuous>  docusate sodium 100 milliGRAM(s) Oral three times a day  enoxaparin Injectable 40 milliGRAM(s) SubCutaneous daily  hydrALAZINE 25 milliGRAM(s) Oral three times a day  lactobacillus acidophilus 1 Tablet(s) Oral every 8 hours  mirtazapine Soltab 15 milliGRAM(s) Oral at bedtime  multivitamin 1 Tablet(s) Oral daily  pantoprazole    Tablet 40 milliGRAM(s) Oral before breakfast  potassium chloride    Tablet ER 20 milliEquivalent(s) Oral daily  senna 2 Tablet(s) Oral at bedtime  sertraline 25 milliGRAM(s) Oral daily  topiramate 100 milliGRAM(s) Oral two times a day    MEDICATIONS  (PRN):  acetaminophen   Tablet .. 650 milliGRAM(s) Oral every 6 hours PRN Mild Pain (1 - 3)  acetaminophen 300 mG/butalbital 50 mG/ caffeine 40 mG 1 Capsule(s) Oral every 4 hours PRN Severe Pain (7 - 10)  ALPRAZolam 0.5 milliGRAM(s) Oral at bedtime PRN anxiety, insomnia  ketorolac   Injectable 15 milliGRAM(s) IV Push every 6 hours PRN Moderate Pain (4 - 6)  ondansetron Injectable 4 milliGRAM(s) IV Push every 6 hours PRN Nausea and/or Vomiting      Allergies    penicillin (Anaphylaxis)    Intolerances        Review of Systems:    General:  No wt loss, fevers, chills, night sweats, fatigue   Eyes:  Good vision, no reported pain  ENT:  No sore throat, pain, runny nose, dysphagia  CV:  No pain, palpitations, hypo/hypertension  Resp:  No dyspnea, cough, tachypnea, wheezing  GI:  No pain, +mild dry heaves and nausea, No vomiting, mild diarrhea, No constipation, No weight loss, No fever, No pruritis, No rectal bleeding, No melena, No dysphagia  :  No pain, bleeding, incontinence, nocturia  Muscle:  No pain, weakness  Neuro:  migraine  Psych:  No fatigue, insomnia, mood problems, depression  Endocrine:  No polyuria, polydypsia, cold/heat intolerance  Heme:  No petechiae, ecchymosis, easy bruisability  Skin:  No rash, tattoos, scars, edema      Vital Signs Last 24 Hrs  T(C): 36.9 (29 Sep 2018 05:20), Max: 37.3 (28 Sep 2018 20:49)  T(F): 98.5 (29 Sep 2018 05:20), Max: 99.2 (28 Sep 2018 20:49)  HR: 83 (29 Sep 2018 05:20) (83 - 100)  BP: 184/88 (29 Sep 2018 05:20) (178/80 - 184/88)  BP(mean): --  RR: 17 (29 Sep 2018 05:20) (16 - 17)  SpO2: 98% (29 Sep 2018 05:20) (97% - 98%)    PHYSICAL EXAM:    Constitutional: NAD, lying in bed frail appearing  HEENT: EOMI, poor dentition   Neck: No LAD  Respiratory: dec bs  Cardiovascular: S1 and S2, RRR  Gastrointestinal: soft nt nd   Extremities: No peripheral edema  Vascular: 2+ peripheral pulses  Neurological: Awake alert responds appropriately  Skin: No rashes    LABS:                        13.3   5.52  )-----------( 230      ( 29 Sep 2018 06:56 )             39.6     09-29    140  |  107  |  8   ----------------------------<  99  3.8   |  25  |  1.20    Ca    9.1      29 Sep 2018 06:56            RADIOLOGY & ADDITIONAL TESTS:

## 2018-09-29 NOTE — PROGRESS NOTE ADULT - SUBJECTIVE AND OBJECTIVE BOX
PROGRESS NOTE  Patient is a 63y old  Female who presents with a chief complaint of nausea ,vomiting, weight loss (29 Sep 2018 10:55)  Chart and available morning labs /imaging are reviewed electronically , urgent issues addressed . More information  is being added upon completion of rounds , when more information is collected and management discussed with consultants , medical staff and social service/case management on the floor   OVERNIGHT  No new issues reported by medical staff . All above noted Patient is resting in a bed comfortably .Complains of nausea, ,EPIGASTRIC PAIN especially on empty stomach headaches and weakness Diet is advanced by GI  .No distress noted   HPI:  62 yo white female with H/O HTN, RENAL ARTERY STENOSIS ,RECENT WEIGHT LOSS due to emesis , hx of  Seizure, Migraine type of headaches ,Depression/Anxiety Disorder who has had unexplained nausea, weakness, vomiting with additional inability to even keep her meds down. No fever, chills, abdominal pains, chest pain, melena or BRBPR. Patient was seen in ER earlier today due to the same symptoms and possible xanax withdrawal ,she takes it but keeps vomiting it . She was discharged and went to see pcp Dr Tristan Frederick  who advised to go back to ED for FTT workup and GI evaluation . As per PCP she recently has a lot of weight loss and also uncontrolled HTN due to ALEXANDER ,cardiology consult called ,patient refused stenting of renal artery in a past   Does not remember the last time she was endoscoped. Seen by GI CONSULT ,may require EGD ,ppi and antiemetics are started (25 Sep 2018 17:01)  PAST MEDICAL & SURGICAL HISTORY:  Renal arteriosclerosis  Constipation  Anxiety  Depression  HTN (hypertension)  Shingles  Pericarditis  Osteoporosis  Seizure disorder  Migraines  No significant past surgical history  MEDICATIONS  (STANDING):  amLODIPine   Tablet 5 milliGRAM(s) Oral daily  dextrose 5% + sodium chloride 0.45%. 1000 milliLiter(s) (75 mL/Hr) IV Continuous <Continuous>  enoxaparin Injectable 40 milliGRAM(s) SubCutaneous daily  hydrALAZINE 50 milliGRAM(s) Oral every 6 hours  lactobacillus acidophilus 1 Tablet(s) Oral every 8 hours  mirtazapine Soltab 15 milliGRAM(s) Oral at bedtime  multivitamin 1 Tablet(s) Oral daily  pantoprazole    Tablet 40 milliGRAM(s) Oral before breakfast  potassium chloride    Tablet ER 20 milliEquivalent(s) Oral daily  sertraline 25 milliGRAM(s) Oral daily  topiramate 100 milliGRAM(s) Oral two times a day  MEDICATIONS  (PRN):  acetaminophen   Tablet .. 650 milliGRAM(s) Oral every 6 hours PRN Mild Pain (1 - 3)  acetaminophen 300 mG/butalbital 50 mG/ caffeine 40 mG 1 Capsule(s) Oral every 4 hours PRN Severe Pain (7 - 10)  ALPRAZolam 0.5 milliGRAM(s) Oral at bedtime PRN anxiety, insomnia  ketorolac   Injectable 15 milliGRAM(s) IV Push every 6 hours PRN Moderate Pain (4 - 6)  ondansetron Injectable 4 milliGRAM(s) IV Push every 6 hours PRN Nausea and/or Vomiting  OBJECTIVE  T(C): 37 (09-29-18 @ 13:20), Max: 37.3 (09-28-18 @ 20:49)  HR: 79 (09-29-18 @ 13:20) (79 - 94)  BP: 167/89 (09-29-18 @ 13:20) (167/89 - 184/88)  RR: 17 (09-29-18 @ 13:20) (17 - 17)  SpO2: 98% (09-29-18 @ 13:20) (97% - 98%)  Wt(kg): --  I&O's Summary  28 Sep 2018 07:01  -  29 Sep 2018 07:00  --------------------------------------------------------  IN: 900 mL / OUT: 0 mL / NET: 900 mL  29 Sep 2018 07:01  -  29 Sep 2018 14:02  --------------------------------------------------------  IN: 840 mL / OUT: 0 mL / NET: 840 mL  REVIEW OF SYSTEMS:  CONSTITUTIONAL: No fever, weight loss, or fatigue  EYES: No eye pain, visual disturbances, or discharge  ENMT:   No sinus or throat pain  NECK: No pain or stiffness  RESPIRATORY: No cough, wheezing, chills or hemoptysis; No shortness of breath  CARDIOVASCULAR: No chest pain, palpitations, dizziness, or leg swelling  GASTROINTESTINAL: No abdominal pain. No nausea, vomiting; No diarrhea or constipation. No melena or hematochezia.  GENITOURINARY: No dysuria, frequency, hematuria, or incontinence  NEUROLOGICAL: No headaches, memory loss, loss of strength, numbness, or tremors  SKIN: No itching, burning, rashes, or lesions   MUSCULOSKELETAL: No joint pain or swelling; No muscle, back, or extremity pain  PHYSICAL EXAM:  Appearance: NAD. VS past 24 hrs -as above   HEENT:   Moist oral mucosa. Conjunctiva clear b/l.   Neck : supple  Respiratory: Lungs CTAB.  Gastrointestinal:  Soft, nontender. No rebound. No rigidity. BS present	  Cardiovascular: RRR ,S1S2 present  Neurologic: Non-focal. Moving all extremities.  Extremities: No edema. No erythema. No calf tenderness.  Skin: No rashes, No ecchymoses, No cyanosis.	  wounds ,skin lesions-See skin assesment flow sheet   LABS:                      13.3   5.52  )-----------( 230      ( 29 Sep 2018 06:56 )             39.6   09-29  140  |  107  |  8   ----------------------------<  99  3.8   |  25  |  1.20  Ca    9.1      29 Sep 2018 06:56  CAPILLARY BLOOD GLUCOSE  RADIOLOGY & ADDITIONAL TESTS:< from: NM Gastric Emptying Solid (09.28.18 @ 13:42) >  EXAM:  NM GASTRIC EMPTYING SOLID                        PROCEDURE DATE:  09/28/2018    INTERPRETATION:  RADIOPHARMACEUTICAL: 1.0 mCi 99mTc-sulfur colloid in a   standardized meal.   CLINICAL INFORMATION: 63 year old female with dyspepsia, nausea,   vomiting, and abdominal pain; referred to evaluate for gastroparesis.  MEDICATIONS: Within 24 hours before the test, the patient was not taking   any medications that could affect the test results.  TECHNIQUE: Immediately before the beginning of the study, the patient's   fasting blood sugar was measured and was 118 mg/dL. The patient consumed   100% of the radiolabeled meal consisting of 4 ounces of cooked egg   whites, two slices of toasted white bread with approximately 30 g of  strawberry jam, and 4 ounces of water over about 7 minutes. There was no   postprandial vomiting. Anterior and posterior one-minute images of the   stomach were obtained in the supine position as soon as the patient   finished the meal, and were repeated one, two, three and four hours   later. After calculation of the geometric mean, and correction for   isotope decay, the percent of activity remaining in the stomach at 1,2,3,   and 4 hours was determined.  COMPARISON: No previous gastric emptying studies were available for   comparison.  FINDINGS:         TIME              % RETENTION                  NORMAL VALUES             0                           100%           1                              69%                                     37-90%           2                              23%                                     30-60%           3                              8%                                    -----------------           4                              3%                            0-10%  Visual inspection of the images is unremarkable.  IMPRESSION: Normal gastric emptying study. No evidence of gastroparesis.  < end of copied text >   reviewed elctronically  ASSESSMENT/PLAN:

## 2018-09-29 NOTE — PROGRESS NOTE ADULT - SUBJECTIVE AND OBJECTIVE BOX
Patient is a 63y Female with a known history of :  Loose stools (R19.5)  ASHD (arteriosclerotic heart disease) (I25.10)  Adjustment disorder with depressed mood (F43.21)  Prophylactic measure (Z29.9)  Migraines (G43.909)  Depression (F32.9)  Renal arteriosclerosis (I12.9)  HTN (hypertension) (I10)  FTT (failure to thrive) in adult (R62.7)  Intractable vomiting with nausea, unspecified vomiting type (R11.2)    HPI:  62 yo white female with H/O HTN, RENAL ARTERY STENOSIS ,RECENT WEIGHT LOSS due to emesis , hx of  Seizure, Migraine type of headaches ,Depression/Anxiety Disorder who has had unexplained nausea, weakness, vomiting with additional inability to even keep her meds down. No fever, chills, abdominal pains, chest pain, melena or BRBPR. Patient was seen in ER earlier today due to the same symptoms and possible xanax withdrawal ,she takes it but keeps vomiting it . She was discharged and went to see pcp Dr Tristan Frederick  who advised to go back to ED for FTT workup and GI evaluation . As per PCP she recently has a lot of weight loss and also uncontrolled HTN due to ALEXANDER ,cardiology consult called ,patient refused stenting of renal artery in a past   Does not remember the last time she was endoscoped. Seen by GI CONSULT ,may require EGD ,ppi and antiemetics are started (25 Sep 2018 17:01)      REVIEW OF SYSTEMS:    CONSTITUTIONAL: No fever, weight loss, or fatigue  EYES: No eye pain, visual disturbances, or discharge  ENMT:  No difficulty hearing, tinnitus, vertigo; No sinus or throat pain  NECK: No pain or stiffness  BREASTS: No pain, masses, or nipple discharge  RESPIRATORY: No cough, wheezing, chills or hemoptysis; No shortness of breath  CARDIOVASCULAR: No chest pain, palpitations, dizziness, or leg swelling  GASTROINTESTINAL: No abdominal or epigastric pain. No nausea, vomiting, or hematemesis; No diarrhea or constipation. No melena or hematochezia.  GENITOURINARY: No dysuria, frequency, hematuria, or incontinence  NEUROLOGICAL: No headaches, memory loss, loss of strength, numbness, or tremors  SKIN: No itching, burning, rashes, or lesions   LYMPH NODES: No enlarged glands  ENDOCRINE: No heat or cold intolerance; No hair loss  MUSCULOSKELETAL: No joint pain or swelling; No muscle, back, or extremity pain  PSYCHIATRIC: No depression, anxiety, mood swings, or difficulty sleeping  HEME/LYMPH: No easy bruising, or bleeding gums  ALLERGY AND IMMUNOLOGIC: No hives or eczema    MEDICATIONS  (STANDING):  amLODIPine   Tablet 5 milliGRAM(s) Oral daily  dextrose 5% + sodium chloride 0.45%. 1000 milliLiter(s) (75 mL/Hr) IV Continuous <Continuous>  enoxaparin Injectable 40 milliGRAM(s) SubCutaneous daily  hydrALAZINE 25 milliGRAM(s) Oral three times a day  lactobacillus acidophilus 1 Tablet(s) Oral every 8 hours  mirtazapine Soltab 15 milliGRAM(s) Oral at bedtime  multivitamin 1 Tablet(s) Oral daily  pantoprazole    Tablet 40 milliGRAM(s) Oral before breakfast  potassium chloride    Tablet ER 20 milliEquivalent(s) Oral daily  sertraline 25 milliGRAM(s) Oral daily  topiramate 100 milliGRAM(s) Oral two times a day    MEDICATIONS  (PRN):  acetaminophen   Tablet .. 650 milliGRAM(s) Oral every 6 hours PRN Mild Pain (1 - 3)  acetaminophen 300 mG/butalbital 50 mG/ caffeine 40 mG 1 Capsule(s) Oral every 4 hours PRN Severe Pain (7 - 10)  ALPRAZolam 0.5 milliGRAM(s) Oral at bedtime PRN anxiety, insomnia  ketorolac   Injectable 15 milliGRAM(s) IV Push every 6 hours PRN Moderate Pain (4 - 6)  ondansetron Injectable 4 milliGRAM(s) IV Push every 6 hours PRN Nausea and/or Vomiting      ALLERGIES: penicillin (Anaphylaxis)      FAMILY HISTORY:  Family history of colon cancer in mother (Mother)      PHYSICAL EXAMINATION:  -----------------------------  T(C): 36.9 (09-29-18 @ 05:20), Max: 37.3 (09-28-18 @ 20:49)  HR: 83 (09-29-18 @ 05:20) (83 - 100)  BP: 184/88 (09-29-18 @ 05:20) (178/80 - 184/88)  RR: 17 (09-29-18 @ 05:20) (16 - 17)  SpO2: 98% (09-29-18 @ 05:20) (97% - 98%)  Wt(kg): --    09-28 @ 07:01  -  09-29 @ 07:00  --------------------------------------------------------  IN:    dextrose 5% + sodium chloride 0.45%.: 900 mL  Total IN: 900 mL    OUT:  Total OUT: 0 mL    Total NET: 900 mL            Constitutional: well developed, normal appearance, well groomed, well nourished, no deformities and no acute distress.   Eyes: the conjunctiva exhibited no abnormalities and the eyelids demonstrated no xanthelasmas.   HEENT: normal oral mucosa, no oral pallor and no oral cyanosis.   Neck: normal jugular venous A waves present, normal jugular venous V waves present and no jugular venous monterroso A waves.   Pulmonary: no respiratory distress, normal respiratory rhythm and effort, no accessory muscle use and lungs were clear to auscultation bilaterally.   Cardiovascular: heart rate and rhythm were normal, normal S1 and S2 and no murmur, gallop, rub, heave or thrill are present.   Abdomen: soft, non-tender, no hepato-splenomegaly and no abdominal mass palpated.   Musculoskeletal: the gait could not be assessed..   Extremities: no clubbing of the fingernails, no localized cyanosis, no petechial hemorrhages and no ischemic changes.   Skin: normal skin color and pigmentation, no rash, no venous stasis, no skin lesions, no skin ulcer and no xanthoma was observed.   Psychiatric: oriented to person, place, and time, the affect was normal, the mood was normal and not feeling anxious.     LABS:   --------  09-29    140  |  107  |  8   ----------------------------<  99  3.8   |  25  |  1.20    Ca    9.1      29 Sep 2018 06:56                           13.3   5.52  )-----------( 230      ( 29 Sep 2018 06:56 )             39.6                 RADIOLOGY:  -----------------        ECG:

## 2018-09-29 NOTE — PROGRESS NOTE ADULT - PROBLEM SELECTOR PLAN 1
CT neg for acute pathology, no obstruction   slowly improving, also relates symptoms to migraines  ges neg for gastroparesis  gerd precautions  ppi once a day, zofran prn, can trial reglan scheduled if symptoms persist although may contribute to loose stools  rec to advance diet as tolerated   pt states she consumed regular consistency diet pta, denies dysphagia  monitor diet tolerance, if symptoms persist may need egd, ?inpt vs outpt CT neg for acute pathology, no obstruction   slowly improving, also relates symptoms to migraines  ges neg for gastroparesis  gerd precautions  ppi once a day, zofran prn  rec to advance diet as tolerated   pt states she consumed regular consistency diet pta, denies dysphagia  monitor diet tolerance, if symptoms persist may need egd, ?inpt vs outpt CT neg for acute pathology, no obstruction   slowly improving, also relates symptoms to migraines  ges neg for gastroparesis  gerd precautions  ppi once a day, zofran prn  rec to advance diet as tolerated   pt states she consumed regular consistency diet pta, denies dysphagia  monitor diet tolerance, if symptoms persist may need egd, ?inpt vs outpt, if tolerates po, dc planning w close outpatient gi f/u  dw attg, agreeable

## 2018-09-30 ENCOUNTER — TRANSCRIPTION ENCOUNTER (OUTPATIENT)
Age: 63
End: 2018-09-30

## 2018-09-30 LAB
ANION GAP SERPL CALC-SCNC: 10 MMOL/L — SIGNIFICANT CHANGE UP (ref 5–17)
BUN SERPL-MCNC: 18 MG/DL — SIGNIFICANT CHANGE UP (ref 7–23)
CALCIUM SERPL-MCNC: 9 MG/DL — SIGNIFICANT CHANGE UP (ref 8.5–10.1)
CHLORIDE SERPL-SCNC: 104 MMOL/L — SIGNIFICANT CHANGE UP (ref 96–108)
CO2 SERPL-SCNC: 24 MMOL/L — SIGNIFICANT CHANGE UP (ref 22–31)
CREAT SERPL-MCNC: 1.2 MG/DL — SIGNIFICANT CHANGE UP (ref 0.5–1.3)
GLUCOSE SERPL-MCNC: 95 MG/DL — SIGNIFICANT CHANGE UP (ref 70–99)
HCT VFR BLD CALC: 36.8 % — SIGNIFICANT CHANGE UP (ref 34.5–45)
HGB BLD-MCNC: 12.1 G/DL — SIGNIFICANT CHANGE UP (ref 11.5–15.5)
MCHC RBC-ENTMCNC: 28.4 PG — SIGNIFICANT CHANGE UP (ref 27–34)
MCHC RBC-ENTMCNC: 32.9 GM/DL — SIGNIFICANT CHANGE UP (ref 32–36)
MCV RBC AUTO: 86.4 FL — SIGNIFICANT CHANGE UP (ref 80–100)
NRBC # BLD: 0 /100 WBCS — SIGNIFICANT CHANGE UP (ref 0–0)
PLATELET # BLD AUTO: 250 K/UL — SIGNIFICANT CHANGE UP (ref 150–400)
POTASSIUM SERPL-MCNC: 2.7 MMOL/L — CRITICAL LOW (ref 3.5–5.3)
POTASSIUM SERPL-SCNC: 2.7 MMOL/L — CRITICAL LOW (ref 3.5–5.3)
RBC # BLD: 4.26 M/UL — SIGNIFICANT CHANGE UP (ref 3.8–5.2)
RBC # FLD: 13.1 % — SIGNIFICANT CHANGE UP (ref 10.3–14.5)
SODIUM SERPL-SCNC: 138 MMOL/L — SIGNIFICANT CHANGE UP (ref 135–145)
WBC # BLD: 6.77 K/UL — SIGNIFICANT CHANGE UP (ref 3.8–10.5)
WBC # FLD AUTO: 6.77 K/UL — SIGNIFICANT CHANGE UP (ref 3.8–10.5)

## 2018-09-30 RX ORDER — POTASSIUM CHLORIDE 20 MEQ
10 PACKET (EA) ORAL
Qty: 0 | Refills: 0 | Status: COMPLETED | OUTPATIENT
Start: 2018-09-30 | End: 2018-09-30

## 2018-09-30 RX ORDER — MORPHINE SULFATE 50 MG/1
2 CAPSULE, EXTENDED RELEASE ORAL ONCE
Qty: 0 | Refills: 0 | Status: DISCONTINUED | OUTPATIENT
Start: 2018-09-30 | End: 2018-09-30

## 2018-09-30 RX ORDER — METOCLOPRAMIDE HCL 10 MG
5 TABLET ORAL EVERY 6 HOURS
Qty: 0 | Refills: 0 | Status: DISCONTINUED | OUTPATIENT
Start: 2018-09-30 | End: 2018-10-02

## 2018-09-30 RX ORDER — METOCLOPRAMIDE HCL 10 MG
TABLET ORAL
Qty: 0 | Refills: 0 | Status: DISCONTINUED | OUTPATIENT
Start: 2018-09-30 | End: 2018-09-30

## 2018-09-30 RX ORDER — POTASSIUM CHLORIDE 20 MEQ
40 PACKET (EA) ORAL EVERY 4 HOURS
Qty: 0 | Refills: 0 | Status: COMPLETED | OUTPATIENT
Start: 2018-09-30 | End: 2018-09-30

## 2018-09-30 RX ORDER — AMLODIPINE BESYLATE 2.5 MG/1
10 TABLET ORAL DAILY
Qty: 0 | Refills: 0 | Status: DISCONTINUED | OUTPATIENT
Start: 2018-10-01 | End: 2018-10-02

## 2018-09-30 RX ORDER — TRAMADOL HYDROCHLORIDE 50 MG/1
50 TABLET ORAL EVERY 8 HOURS
Qty: 0 | Refills: 0 | Status: DISCONTINUED | OUTPATIENT
Start: 2018-09-30 | End: 2018-09-30

## 2018-09-30 RX ORDER — AMLODIPINE BESYLATE 2.5 MG/1
5 TABLET ORAL ONCE
Qty: 0 | Refills: 0 | Status: COMPLETED | OUTPATIENT
Start: 2018-09-30 | End: 2018-09-30

## 2018-09-30 RX ORDER — TRAMADOL HYDROCHLORIDE 50 MG/1
50 TABLET ORAL EVERY 8 HOURS
Qty: 0 | Refills: 0 | Status: DISCONTINUED | OUTPATIENT
Start: 2018-09-30 | End: 2018-10-02

## 2018-09-30 RX ORDER — METOCLOPRAMIDE HCL 10 MG
5 TABLET ORAL EVERY 6 HOURS
Qty: 0 | Refills: 0 | Status: DISCONTINUED | OUTPATIENT
Start: 2018-09-30 | End: 2018-09-30

## 2018-09-30 RX ORDER — HYDROMORPHONE HYDROCHLORIDE 2 MG/ML
0.25 INJECTION INTRAMUSCULAR; INTRAVENOUS; SUBCUTANEOUS ONCE
Qty: 0 | Refills: 0 | Status: DISCONTINUED | OUTPATIENT
Start: 2018-09-30 | End: 2018-09-30

## 2018-09-30 RX ADMIN — Medication 100 MILLIGRAM(S): at 17:11

## 2018-09-30 RX ADMIN — PANTOPRAZOLE SODIUM 40 MILLIGRAM(S): 20 TABLET, DELAYED RELEASE ORAL at 05:38

## 2018-09-30 RX ADMIN — TRAMADOL HYDROCHLORIDE 50 MILLIGRAM(S): 50 TABLET ORAL at 19:07

## 2018-09-30 RX ADMIN — Medication 50 MILLIGRAM(S): at 17:11

## 2018-09-30 RX ADMIN — Medication 1 TABLET(S): at 11:24

## 2018-09-30 RX ADMIN — Medication 40 MILLIEQUIVALENT(S): at 10:09

## 2018-09-30 RX ADMIN — ENOXAPARIN SODIUM 40 MILLIGRAM(S): 100 INJECTION SUBCUTANEOUS at 11:23

## 2018-09-30 RX ADMIN — Medication 5 MILLIGRAM(S): at 20:52

## 2018-09-30 RX ADMIN — Medication 40 MILLIEQUIVALENT(S): at 13:02

## 2018-09-30 RX ADMIN — Medication 100 MILLIEQUIVALENT(S): at 15:30

## 2018-09-30 RX ADMIN — Medication 50 MILLIGRAM(S): at 05:38

## 2018-09-30 RX ADMIN — Medication 0.5 MILLIGRAM(S): at 23:39

## 2018-09-30 RX ADMIN — MORPHINE SULFATE 2 MILLIGRAM(S): 50 CAPSULE, EXTENDED RELEASE ORAL at 20:00

## 2018-09-30 RX ADMIN — Medication 100 MILLIGRAM(S): at 05:38

## 2018-09-30 RX ADMIN — MIRTAZAPINE 15 MILLIGRAM(S): 45 TABLET, ORALLY DISINTEGRATING ORAL at 23:39

## 2018-09-30 RX ADMIN — AMLODIPINE BESYLATE 5 MILLIGRAM(S): 2.5 TABLET ORAL at 10:22

## 2018-09-30 RX ADMIN — HYDROMORPHONE HYDROCHLORIDE 0.25 MILLIGRAM(S): 2 INJECTION INTRAMUSCULAR; INTRAVENOUS; SUBCUTANEOUS at 21:34

## 2018-09-30 RX ADMIN — Medication 15 MILLIGRAM(S): at 05:37

## 2018-09-30 RX ADMIN — Medication 50 MILLIGRAM(S): at 11:22

## 2018-09-30 RX ADMIN — SERTRALINE 25 MILLIGRAM(S): 25 TABLET, FILM COATED ORAL at 11:24

## 2018-09-30 RX ADMIN — Medication 15 MILLIGRAM(S): at 16:42

## 2018-09-30 RX ADMIN — Medication 100 MILLIEQUIVALENT(S): at 11:23

## 2018-09-30 RX ADMIN — AMLODIPINE BESYLATE 5 MILLIGRAM(S): 2.5 TABLET ORAL at 05:38

## 2018-09-30 RX ADMIN — Medication 20 MILLIEQUIVALENT(S): at 11:22

## 2018-09-30 RX ADMIN — Medication 100 MILLIEQUIVALENT(S): at 10:09

## 2018-09-30 RX ADMIN — Medication 15 MILLIGRAM(S): at 05:52

## 2018-09-30 NOTE — PROGRESS NOTE ADULT - PROBLEM SELECTOR PLAN 7
Gastrointestina stress ulcer prophylaxis l and DVT prophylaxis administrated
Gastrointestinal stress ulcer prophylaxis l and DVT prophylaxis administrated

## 2018-09-30 NOTE — PROGRESS NOTE ADULT - SUBJECTIVE AND OBJECTIVE BOX
INTERVAL HPI/OVERNIGHT EVENTS:  pt seen and examined  c/o some improvement in nausea/dry heaves  denies abd pain, loose stools resolved  tolerating diet  afebrile overnight no new labs    MEDICATIONS  (STANDING):  amLODIPine   Tablet 5 milliGRAM(s) Oral daily  dextrose 5% + sodium chloride 0.45%. 1000 milliLiter(s) (75 mL/Hr) IV Continuous <Continuous>  enoxaparin Injectable 40 milliGRAM(s) SubCutaneous daily  hydrALAZINE 50 milliGRAM(s) Oral every 6 hours  lactobacillus acidophilus 1 Tablet(s) Oral every 8 hours  mirtazapine Soltab 15 milliGRAM(s) Oral at bedtime  multivitamin 1 Tablet(s) Oral daily  pantoprazole    Tablet 40 milliGRAM(s) Oral before breakfast  potassium chloride    Tablet ER 20 milliEquivalent(s) Oral daily  sertraline 25 milliGRAM(s) Oral daily  topiramate 100 milliGRAM(s) Oral two times a day    MEDICATIONS  (PRN):  acetaminophen   Tablet .. 650 milliGRAM(s) Oral every 6 hours PRN Mild Pain (1 - 3)  acetaminophen 300 mG/butalbital 50 mG/ caffeine 40 mG 1 Capsule(s) Oral every 4 hours PRN Severe Pain (7 - 10)  ALPRAZolam 0.5 milliGRAM(s) Oral at bedtime PRN anxiety, insomnia  ketorolac   Injectable 15 milliGRAM(s) IV Push every 6 hours PRN Moderate Pain (4 - 6)  ondansetron Injectable 4 milliGRAM(s) IV Push every 6 hours PRN Nausea and/or Vomiting      Allergies    penicillin (Anaphylaxis)    Intolerances        Review of Systems:    General:  No wt loss, fevers, chills, night sweats, fatigue   Eyes:  Good vision, no reported pain  ENT:  No sore throat, pain, runny nose, dysphagia  CV:  No pain, palpitations, hypo/hypertension  Resp:  No dyspnea, cough, tachypnea, wheezing  GI:  No pain, mild dry heaves and nausea, No vomiting, No diarrhea, No constipation, No weight loss, No fever, No pruritis, No rectal bleeding, No melena, No dysphagia  :  No pain, bleeding, incontinence, nocturia  Muscle:  No pain, weakness  Neuro:  No weakness, tingling, memory problems  Psych:  No fatigue, insomnia, mood problems, depression  Endocrine:  No polyuria, polydypsia, cold/heat intolerance  Heme:  No petechiae, ecchymosis, easy bruisability  Skin:  No rash, tattoos, scars, edema      Vital Signs Last 24 Hrs  T(C): 37.2 (30 Sep 2018 05:40), Max: 37.3 (29 Sep 2018 20:55)  T(F): 99 (30 Sep 2018 05:40), Max: 99.1 (29 Sep 2018 20:55)  HR: 96 (30 Sep 2018 05:40) (79 - 96)  BP: 197/84 (30 Sep 2018 05:40) (162/72 - 197/84)  BP(mean): --  RR: 16 (30 Sep 2018 05:40) (16 - 17)  SpO2: 96% (30 Sep 2018 05:40) (96% - 98%)    PHYSICAL EXAM:    Constitutional: NAD, lying in bed frail appearing  HEENT: EOMI, poor dentition   Neck: No LAD  Respiratory: dec bs  Cardiovascular: S1 and S2, RRR  Gastrointestinal: soft nt nd   Extremities: No peripheral edema  Vascular: 2+ peripheral pulses  Neurological: Awake alert responds appropriately  Skin: No rashes    LABS:                        13.3   5.52  )-----------( 230      ( 29 Sep 2018 06:56 )             39.6     09-29    140  |  107  |  8   ----------------------------<  99  3.8   |  25  |  1.20    Ca    9.1      29 Sep 2018 06:56            RADIOLOGY & ADDITIONAL TESTS: Never smoker

## 2018-09-30 NOTE — PROVIDER CONTACT NOTE (CRITICAL VALUE NOTIFICATION) - ACTION/TREATMENT ORDERED:
MD aware, Potassium subsitutued
Pt already receiving potassium
Dr. Gant will order potassium
md aware...will put in replacements

## 2018-09-30 NOTE — CHART NOTE - NSCHARTNOTEFT_GEN_A_CORE
Assessment: Pt seen for malnutrition follow up. Per chart, pt is 62 Y/O F with PMH of HTN, Renal artery stenosis, seizures, migraine, anxiety/depression who presented with unexplained nausea, weakness, and vomiting. Pt S/P gastric emptying studying 9/28 - findings unremarkable (no gastroparesis). Pt presently NPO after midnight for EGD tomorrow 10/1.    Alert and oriented during interview. Pt reports good appetite/intake, consumes % of each meal tray. States that she dislikes sweets so typically will not eat certain items such as [place] toast, pancakes, or dessert. Preferences noted and communicated to nutrition office. Amenable to trying nutrition supplements but not sure if she will drink it because of her dislike for sweets. Endorses nausea and dry heaving. Denies diarrhea, last BM 9/28. Denies difficulties chewing/swallowing current diet texture.      Factors impacting intake: [ ] none [X] nausea  [X] vomiting [ ] diarrhea [ ] constipation  [ ]chewing problems [ ] swallowing issues  [ ] other:     Diet: Soft (09-29-18 @ 10:52)  Diet, NPO after Midnight:      NPO Start Date: 30-Sep-2018,   NPO Start Time: 23:59  Except Medications (09-30-18 @ 07:55)    Intake: % per pt report and flowsheets    Current Weight: 103.3 pounds (no new wt since 9/26)    Pertinent Medications: MEDICATIONS  (STANDING):  amLODIPine   Tablet 10 milliGRAM(s) Oral daily  dextrose 5% + sodium chloride 0.45%. 1000 milliLiter(s) (75 mL/Hr) IV Continuous <Continuous>  enoxaparin Injectable 40 milliGRAM(s) SubCutaneous daily  hydrALAZINE 50 milliGRAM(s) Oral every 6 hours  lactobacillus acidophilus 1 Tablet(s) Oral every 8 hours  mirtazapine Soltab 15 milliGRAM(s) Oral at bedtime  multivitamin 1 Tablet(s) Oral daily  pantoprazole    Tablet 40 milliGRAM(s) Oral before breakfast  potassium chloride    Tablet ER 20 milliEquivalent(s) Oral daily  potassium chloride  10 mEq/100 mL IVPB 10 milliEquivalent(s) IV Intermittent every 1 hour  sertraline 25 milliGRAM(s) Oral daily  topiramate 100 milliGRAM(s) Oral two times a day    MEDICATIONS  (PRN):  acetaminophen   Tablet .. 650 milliGRAM(s) Oral every 6 hours PRN Mild Pain (1 - 3)  acetaminophen 300 mG/butalbital 50 mG/ caffeine 40 mG 1 Capsule(s) Oral every 4 hours PRN Severe Pain (7 - 10)  ALPRAZolam 0.5 milliGRAM(s) Oral at bedtime PRN anxiety, insomnia  ketorolac   Injectable 15 milliGRAM(s) IV Push every 6 hours PRN Moderate Pain (4 - 6)  ondansetron Injectable 4 milliGRAM(s) IV Push every 6 hours PRN Nausea and/or Vomiting    Pertinent Labs: 09-30 Na138 mmol/L Glu 95 mg/dL K+ 2.7 mmol/L<LL> Cr  1.20 mg/dL BUN 18 mg/dL 09-26 Alb 3.2 g/dL<L> 09-26 PAB 16.2 mg/dL<L> 09-27 [id number] 5.2 %     CAPILLARY BLOOD GLUCOSE        Skin: no edema or pressure injuries noted.    Estimated Needs:   [X] no change since previous assessment  [ ] recalculated:     Previous Nutrition Diagnosis:    [X] Malnutrition     Nutrition Diagnosis is [X] ongoing  - being addressed with improving po intake    New Nutrition Diagnosis: [X] not applicable       Interventions:   Recommend  [ ] Change Diet To:  [X] Nutrition Supplement: If pt has marked decrease in appetite/po intake, add on oral nutrition supplement Ensure Enlive BID for additional kcal/protein.   [ ] Nutrition Support  [X] Other: Continue current diet (Soft) as it remains appropriate at this time. Pt advised to consume small frequent meals as tolerated with emphasis on nutrient dense choices. Obtain daily weights. RD remains available for further nutrition interventions as warranted.    Monitoring and Evaluation:   [X] PO intake [ x ] Tolerance to diet prescription [ x ] weights [ x ] labs[ x ] follow up per protocol  [ ] other:

## 2018-09-30 NOTE — PROGRESS NOTE ADULT - SUBJECTIVE AND OBJECTIVE BOX
Patient is a 63y Female whom presented to the hospital with renal artery stenosis     PAST MEDICAL & SURGICAL HISTORY:  Renal arteriosclerosis  Constipation  Anxiety  Depression  HTN (hypertension)  Shingles  Pericarditis  Osteoporosis  Seizure disorder  Migraines  No significant past surgical history      MEDICATIONS  (STANDING):  amLODIPine   Tablet 5 milliGRAM(s) Oral daily  bisacodyl Suppository 10 milliGRAM(s) Rectal at bedtime  dextrose 5% + sodium chloride 0.45%. 1000 milliLiter(s) (75 mL/Hr) IV Continuous <Continuous>  docusate sodium 100 milliGRAM(s) Oral three times a day  enoxaparin Injectable 40 milliGRAM(s) SubCutaneous daily  lactobacillus acidophilus 1 Tablet(s) Oral every 8 hours  mirtazapine Soltab 15 milliGRAM(s) Oral at bedtime  multivitamin 1 Tablet(s) Oral daily  pantoprazole    Tablet 40 milliGRAM(s) Oral before breakfast  potassium chloride    Tablet ER 40 milliEquivalent(s) Oral every 4 hours  potassium chloride  10 mEq/100 mL IVPB 10 milliEquivalent(s) IV Intermittent every 1 hour  senna 2 Tablet(s) Oral at bedtime  sertraline 25 milliGRAM(s) Oral daily  topiramate 100 milliGRAM(s) Oral two times a day                                                                           12.1   6.77  )-----------( 250      ( 30 Sep 2018 08:39 )             36.8       CBC Full  -  ( 30 Sep 2018 08:39 )  WBC Count : 6.77 K/uL  Hemoglobin : 12.1 g/dL  Hematocrit : 36.8 %  Platelet Count - Automated : 250 K/uL  Mean Cell Volume : 86.4 fl  Mean Cell Hemoglobin : 28.4 pg  Mean Cell Hemoglobin Concentration : 32.9 gm/dL  Auto Neutrophil # : x  Auto Lymphocyte # : x  Auto Monocyte # : x  Auto Eosinophil # : x  Auto Basophil # : x  Auto Neutrophil % : x  Auto Lymphocyte % : x  Auto Monocyte % : x  Auto Eosinophil % : x  Auto Basophil % : x      09-30    138  |  104  |  18  ----------------------------<  95  2.7<LL>   |  24  |  1.20    Ca    9.0      30 Sep 2018 08:39        CAPILLARY BLOOD GLUCOSE          Vital Signs Last 24 Hrs  T(C): 36.9 (30 Sep 2018 16:14), Max: 37.3 (29 Sep 2018 20:55)  T(F): 98.4 (30 Sep 2018 16:14), Max: 99.1 (29 Sep 2018 20:55)  HR: 107 (30 Sep 2018 16:14) (86 - 107)  BP: 192/92 (30 Sep 2018 16:14) (177/81 - 197/84)  BP(mean): --  RR: 17 (30 Sep 2018 16:14) (16 - 17)  SpO2: 98% (30 Sep 2018 16:14) (96% - 98%)                      REVIEW OF SYSTEMS:    CONSTITUTIONAL: No weakness, fevers or chills  EYES/ENT: No visual changes;  no throat pain   NECK: No pain or stiffness  RESPIRATORY: No cough, wheezing, hemoptysis; No shortness of breath  CARDIOVASCULAR: No chest pain or palpitations  GASTROINTESTINAL: No abdominal or epigastric pain. No nausea, vomiting,     No diarrhea or constipation. No melena   GENITOURINARY: No dysuria, frequency or hematuria  NEUROLOGICAL: No numbness or weakness  SKIN: dry        PHYSICAL EXAM:    Constitutional: NAD  HEENT: conjunctive   clear   Neck:  No JVD  Respiratory: CTAB  Cardiovascular: S1 and S2  Gastrointestinal: BS+, soft, NT/ND  Extremities: No peripheral edema  Neurological: A/O x 3, no focal deficits  Psychiatric: Normal mood, normal affect  : No [place]  Skin: No rashes  Access: Not applicable

## 2018-09-30 NOTE — PROGRESS NOTE ADULT - PROBLEM SELECTOR PLAN 1
CT neg for acute pathology, no obstruction   slowly improving, also relates symptoms to migraines  ges neg for gastroparesis  gerd precautions  ppi once a day, zofran prn  diet as tolerated   pt states she consumed regular consistency diet pta, denies dysphagia  will plan for egd tomorrow for further eval, pt agreeable, npo p mn, will need medical/cardiac clearance

## 2018-09-30 NOTE — PROGRESS NOTE ADULT - SUBJECTIVE AND OBJECTIVE BOX
PROGRESS NOTE  Patient is a 63y old  Female who presents with a chief complaint of nausea ,vomiting, weight loss (30 Sep 2018 09:02)  Chart and available morning labs /imaging are reviewed electronically , urgent issues addressed . More information  is being added upon completion of rounds , when more information is collected and management discussed with consultants , medical staff and social service/case management on the floor   LATE ENTRY- patient was seen and examined earlier today . Plan of care was discussed with med staff and unit coordinator .   OVERNIGHT  Feels better ,wallks in a room Tolerates diet well   C/O epigastric pain especially on empty stomach , no HA or nausea today ,scheduled for EGD IN AM  HPI:  62 yo white female with H/O HTN, RENAL ARTERY STENOSIS ,RECENT WEIGHT LOSS due to emesis , hx of  Seizure, Migraine type of headaches ,Depression/Anxiety Disorder who has had unexplained nausea, weakness, vomiting with additional inability to even keep her meds down. No fever, chills, abdominal pains, chest pain, melena or BRBPR. Patient was seen in ER earlier today due to the same symptoms and possible xanax withdrawal ,she takes it but keeps vomiting it . She was discharged and went to see pcp Dr Tristan Frederick  who advised to go back to ED for FTT workup and GI evaluation . As per PCP she recently has a lot of weight loss and also uncontrolled HTN due to ALEXANDER ,cardiology consult called ,patient refused stenting of renal artery in a past   Does not remember the last time she was endoscoped. Seen by GI CONSULT ,may require EGD ,ppi and antiemetics are started (25 Sep 2018 17:01)    PAST MEDICAL & SURGICAL HISTORY:  Renal arteriosclerosis  Constipation  Anxiety  Depression  HTN (hypertension)  Shingles  Pericarditis  Osteoporosis  Seizure disorder  Migraines  No significant past surgical history      MEDICATIONS  (STANDING):  amLODIPine   Tablet 10 milliGRAM(s) Oral daily  dextrose 5% + sodium chloride 0.45%. 1000 milliLiter(s) (40 mL/Hr) IV Continuous <Continuous>  enoxaparin Injectable 40 milliGRAM(s) SubCutaneous daily  hydrALAZINE 50 milliGRAM(s) Oral every 6 hours  lactobacillus acidophilus 1 Tablet(s) Oral every 8 hours  mirtazapine Soltab 15 milliGRAM(s) Oral at bedtime  multivitamin 1 Tablet(s) Oral daily  pantoprazole    Tablet 40 milliGRAM(s) Oral before breakfast  potassium chloride    Tablet ER 20 milliEquivalent(s) Oral daily  potassium chloride  10 mEq/100 mL IVPB 10 milliEquivalent(s) IV Intermittent every 1 hour  sertraline 25 milliGRAM(s) Oral daily  topiramate 100 milliGRAM(s) Oral two times a day    MEDICATIONS  (PRN):  acetaminophen   Tablet .. 650 milliGRAM(s) Oral every 6 hours PRN Mild Pain (1 - 3)  acetaminophen 300 mG/butalbital 50 mG/ caffeine 40 mG 1 Capsule(s) Oral every 4 hours PRN Severe Pain (7 - 10)  ALPRAZolam 0.5 milliGRAM(s) Oral at bedtime PRN anxiety, insomnia  ketorolac   Injectable 15 milliGRAM(s) IV Push every 6 hours PRN Moderate Pain (4 - 6)  ondansetron Injectable 4 milliGRAM(s) IV Push every 6 hours PRN Nausea and/or Vomiting      OBJECTIVE    T(C): 37.2 (09-30-18 @ 05:40), Max: 37.3 (09-29-18 @ 20:55)  HR: 88 (09-30-18 @ 11:20) (86 - 96)  BP: 177/81 (09-30-18 @ 11:20) (162/72 - 197/84)  RR: 16 (09-30-18 @ 05:40) (16 - 16)  SpO2: 96% (09-30-18 @ 05:40) (96% - 96%)  Wt(kg): --  I&O's Summary    29 Sep 2018 07:01  -  30 Sep 2018 07:00  --------------------------------------------------------  IN: 2265 mL / OUT: 0 mL / NET: 2265 mL    30 Sep 2018 07:01  -  30 Sep 2018 13:53  --------------------------------------------------------  IN: 400 mL / OUT: 0 mL / NET: 400 mL          REVIEW OF SYSTEMS:  CONSTITUTIONAL: No fever, weight loss, or fatigue  EYES: No eye pain, visual disturbances, or discharge  ENMT:   No sinus or throat pain  NECK: No pain or stiffness  RESPIRATORY: No cough, wheezing, chills or hemoptysis; No shortness of breath  CARDIOVASCULAR: No chest pain, palpitations, dizziness, or leg swelling  GASTROINTESTINAL: intermittent epigastric  abdominal pain on and off . No nausea, vomiting; No diarrhea or constipation. No melena or hematochezia.  GENITOURINARY: No dysuria, frequency, hematuria, or incontinence  NEUROLOGICAL: No headaches, memory loss, loss of strength, numbness, or tremors  SKIN: No itching, burning, rashes, or lesions   MUSCULOSKELETAL: No joint pain or swelling; No muscle, back, or extremity pain    PHYSICAL EXAM:  Appearance: NAD. VS past 24 hrs -as above   HEENT:   Moist oral mucosa. Conjunctiva clear b/l.   Neck : supple  Respiratory: Lungs CTAB.  Gastrointestinal:  Soft, nontender. No rebound. No rigidity. BS present	  Cardiovascular: RRR ,S1S2 present  Neurologic: Non-focal. Moving all extremities.  Extremities: No edema. No erythema. No calf tenderness.  Skin: No rashes, No ecchymoses, No cyanosis.	  wounds ,skin lesions-See skin assesment flow sheet   LABS:                        12.1   6.77  )-----------( 250      ( 30 Sep 2018 08:39 )             36.8     09-30    138  |  104  |  18  ----------------------------<  95  2.7<LL>   |  24  |  1.20    Ca    9.0      30 Sep 2018 08:39      CAPILLARY BLOOD GLUCOSE              RADIOLOGY & ADDITIONAL TESTS:   reviewed elctronically  ASSESSMENT/PLAN:

## 2018-09-30 NOTE — PROGRESS NOTE ADULT - SUBJECTIVE AND OBJECTIVE BOX
Patient is a 63y Female with a known history of :  Loose stools (R19.5)  ASHD (arteriosclerotic heart disease) (I25.10)  Adjustment disorder with depressed mood (F43.21)  Prophylactic measure (Z29.9)  Migraines (G43.909)  Depression (F32.9)  Renal arteriosclerosis (I12.9)  HTN (hypertension) (I10)  FTT (failure to thrive) in adult (R62.7)  Intractable vomiting with nausea, unspecified vomiting type (R11.2)    HPI:  62 yo white female with H/O HTN, RENAL ARTERY STENOSIS ,RECENT WEIGHT LOSS due to emesis , hx of  Seizure, Migraine type of headaches ,Depression/Anxiety Disorder who has had unexplained nausea, weakness, vomiting with additional inability to even keep her meds down. No fever, chills, abdominal pains, chest pain, melena or BRBPR. Patient was seen in ER earlier today due to the same symptoms and possible xanax withdrawal ,she takes it but keeps vomiting it . She was discharged and went to see pcp Dr Tristan Fredreick  who advised to go back to ED for FTT workup and GI evaluation . As per PCP she recently has a lot of weight loss and also uncontrolled HTN due to ALEXANDER ,cardiology consult called ,patient refused stenting of renal artery in a past   Does not remember the last time she was endoscoped. Seen by GI CONSULT ,may require EGD ,ppi and antiemetics are started (25 Sep 2018 17:01)      REVIEW OF SYSTEMS:    CONSTITUTIONAL: No fever, weight loss, or fatigue  EYES: No eye pain, visual disturbances, or discharge  ENMT:  No difficulty hearing, tinnitus, vertigo; No sinus or throat pain  NECK: No pain or stiffness  BREASTS: No pain, masses, or nipple discharge  RESPIRATORY: No cough, wheezing, chills or hemoptysis; No shortness of breath  CARDIOVASCULAR: No chest pain, palpitations, dizziness, or leg swelling  GASTROINTESTINAL: No abdominal or epigastric pain. No nausea, vomiting, or hematemesis; No diarrhea or constipation. No melena or hematochezia.  GENITOURINARY: No dysuria, frequency, hematuria, or incontinence  NEUROLOGICAL: No headaches, memory loss, loss of strength, numbness, or tremors  SKIN: No itching, burning, rashes, or lesions   LYMPH NODES: No enlarged glands  ENDOCRINE: No heat or cold intolerance; No hair loss  MUSCULOSKELETAL: No joint pain or swelling; No muscle, back, or extremity pain  PSYCHIATRIC: No depression, anxiety, mood swings, or difficulty sleeping  HEME/LYMPH: No easy bruising, or bleeding gums  ALLERGY AND IMMUNOLOGIC: No hives or eczema    MEDICATIONS  (STANDING):  amLODIPine   Tablet 10 milliGRAM(s) Oral daily  dextrose 5% + sodium chloride 0.45%. 1000 milliLiter(s) (75 mL/Hr) IV Continuous <Continuous>  enoxaparin Injectable 40 milliGRAM(s) SubCutaneous daily  hydrALAZINE 50 milliGRAM(s) Oral every 6 hours  lactobacillus acidophilus 1 Tablet(s) Oral every 8 hours  mirtazapine Soltab 15 milliGRAM(s) Oral at bedtime  multivitamin 1 Tablet(s) Oral daily  pantoprazole    Tablet 40 milliGRAM(s) Oral before breakfast  potassium chloride    Tablet ER 20 milliEquivalent(s) Oral daily  sertraline 25 milliGRAM(s) Oral daily  topiramate 100 milliGRAM(s) Oral two times a day    MEDICATIONS  (PRN):  acetaminophen   Tablet .. 650 milliGRAM(s) Oral every 6 hours PRN Mild Pain (1 - 3)  acetaminophen 300 mG/butalbital 50 mG/ caffeine 40 mG 1 Capsule(s) Oral every 4 hours PRN Severe Pain (7 - 10)  ALPRAZolam 0.5 milliGRAM(s) Oral at bedtime PRN anxiety, insomnia  ketorolac   Injectable 15 milliGRAM(s) IV Push every 6 hours PRN Moderate Pain (4 - 6)  ondansetron Injectable 4 milliGRAM(s) IV Push every 6 hours PRN Nausea and/or Vomiting      ALLERGIES: penicillin (Anaphylaxis)      FAMILY HISTORY:  Family history of colon cancer in mother (Mother)      PHYSICAL EXAMINATION:  -----------------------------  T(C): 37.2 (09-30-18 @ 05:40), Max: 37.3 (09-29-18 @ 20:55)  HR: 96 (09-30-18 @ 05:40) (79 - 96)  BP: 197/84 (09-30-18 @ 05:40) (162/72 - 197/84)  RR: 16 (09-30-18 @ 05:40) (16 - 17)  SpO2: 96% (09-30-18 @ 05:40) (96% - 98%)  Wt(kg): --    09-29 @ 07:01  -  09-30 @ 07:00  --------------------------------------------------------  IN:    dextrose 5% + sodium chloride 0.45%.: 1425 mL    Oral Fluid: 840 mL  Total IN: 2265 mL    OUT:  Total OUT: 0 mL    Total NET: 2265 mL            Constitutional: well developed, normal appearance, well groomed, well nourished, no deformities and no acute distress.   Eyes: the conjunctiva exhibited no abnormalities and the eyelids demonstrated no xanthelasmas.   HEENT: normal oral mucosa, no oral pallor and no oral cyanosis.   Neck: normal jugular venous A waves present, normal jugular venous V waves present and no jugular venous monterroso A waves.   Pulmonary: no respiratory distress, normal respiratory rhythm and effort, no accessory muscle use and lungs were clear to auscultation bilaterally.   Cardiovascular: heart rate and rhythm were normal, normal S1 and S2 and no murmur, gallop, rub, heave or thrill are present.   Abdomen: soft, non-tender, no hepato-splenomegaly and no abdominal mass palpated.   Musculoskeletal: the gait could not be assessed..   Extremities: no clubbing of the fingernails, no localized cyanosis, no petechial hemorrhages and no ischemic changes.   Skin: normal skin color and pigmentation, no rash, no venous stasis, no skin lesions, no skin ulcer and no xanthoma was observed.   Psychiatric: oriented to person, place, and time, the affect was normal, the mood was normal and not feeling anxious.     LABS:   --------  09-29    140  |  107  |  8   ----------------------------<  99  3.8   |  25  |  1.20    Ca    9.1      29 Sep 2018 06:56                           12.1   6.77  )-----------( 250      ( 30 Sep 2018 08:39 )             36.8                 RADIOLOGY:  -----------------        ECG:

## 2018-10-01 ENCOUNTER — RESULT REVIEW (OUTPATIENT)
Age: 63
End: 2018-10-01

## 2018-10-01 LAB
ALDOST SERPL-MCNC: 51.4 NG/DL — HIGH
ANION GAP SERPL CALC-SCNC: 9 MMOL/L — SIGNIFICANT CHANGE UP (ref 5–17)
BUN SERPL-MCNC: 16 MG/DL — SIGNIFICANT CHANGE UP (ref 7–23)
CALCIUM SERPL-MCNC: 9.1 MG/DL — SIGNIFICANT CHANGE UP (ref 8.5–10.1)
CHLORIDE SERPL-SCNC: 103 MMOL/L — SIGNIFICANT CHANGE UP (ref 96–108)
CO2 SERPL-SCNC: 26 MMOL/L — SIGNIFICANT CHANGE UP (ref 22–31)
CREAT SERPL-MCNC: 1.1 MG/DL — SIGNIFICANT CHANGE UP (ref 0.5–1.3)
DOPAMINE SERPL-MCNC: 51 PG/ML — HIGH (ref 0–48)
EPINEPH PLAS-MCNC: 17 PG/ML — SIGNIFICANT CHANGE UP (ref 0–62)
GLUCOSE SERPL-MCNC: 94 MG/DL — SIGNIFICANT CHANGE UP (ref 70–99)
HCT VFR BLD CALC: 38 % — SIGNIFICANT CHANGE UP (ref 34.5–45)
HGB BLD-MCNC: 12.6 G/DL — SIGNIFICANT CHANGE UP (ref 11.5–15.5)
MCHC RBC-ENTMCNC: 29.2 PG — SIGNIFICANT CHANGE UP (ref 27–34)
MCHC RBC-ENTMCNC: 33.2 GM/DL — SIGNIFICANT CHANGE UP (ref 32–36)
MCV RBC AUTO: 88 FL — SIGNIFICANT CHANGE UP (ref 80–100)
NOREPINEPH PLAS-MCNC: 1077 PG/ML — HIGH (ref 0–874)
NRBC # BLD: 0 /100 WBCS — SIGNIFICANT CHANGE UP (ref 0–0)
PLATELET # BLD AUTO: 274 K/UL — SIGNIFICANT CHANGE UP (ref 150–400)
POTASSIUM SERPL-MCNC: 3.5 MMOL/L — SIGNIFICANT CHANGE UP (ref 3.5–5.3)
POTASSIUM SERPL-SCNC: 3.5 MMOL/L — SIGNIFICANT CHANGE UP (ref 3.5–5.3)
RBC # BLD: 4.32 M/UL — SIGNIFICANT CHANGE UP (ref 3.8–5.2)
RBC # FLD: 13.3 % — SIGNIFICANT CHANGE UP (ref 10.3–14.5)
SODIUM SERPL-SCNC: 138 MMOL/L — SIGNIFICANT CHANGE UP (ref 135–145)
WBC # BLD: 9.18 K/UL — SIGNIFICANT CHANGE UP (ref 3.8–10.5)
WBC # FLD AUTO: 9.18 K/UL — SIGNIFICANT CHANGE UP (ref 3.8–10.5)

## 2018-10-01 PROCEDURE — 88305 TISSUE EXAM BY PATHOLOGIST: CPT | Mod: 26

## 2018-10-01 PROCEDURE — 88312 SPECIAL STAINS GROUP 1: CPT | Mod: 26

## 2018-10-01 RX ORDER — SOD SULF/SODIUM/NAHCO3/KCL/PEG
1000 SOLUTION, RECONSTITUTED, ORAL ORAL EVERY 4 HOURS
Qty: 0 | Refills: 0 | Status: COMPLETED | OUTPATIENT
Start: 2018-10-01 | End: 2018-10-01

## 2018-10-01 RX ORDER — POTASSIUM CHLORIDE 20 MEQ
40 PACKET (EA) ORAL ONCE
Qty: 0 | Refills: 0 | Status: COMPLETED | OUTPATIENT
Start: 2018-10-01 | End: 2018-10-01

## 2018-10-01 RX ORDER — POTASSIUM CHLORIDE 20 MEQ
20 PACKET (EA) ORAL ONCE
Qty: 0 | Refills: 0 | Status: DISCONTINUED | OUTPATIENT
Start: 2018-10-01 | End: 2018-10-01

## 2018-10-01 RX ORDER — POTASSIUM CHLORIDE 20 MEQ
10 PACKET (EA) ORAL
Qty: 0 | Refills: 0 | Status: DISCONTINUED | OUTPATIENT
Start: 2018-10-01 | End: 2018-10-01

## 2018-10-01 RX ORDER — HYDRALAZINE HCL 50 MG
100 TABLET ORAL EVERY 8 HOURS
Qty: 0 | Refills: 0 | Status: DISCONTINUED | OUTPATIENT
Start: 2018-10-01 | End: 2018-10-02

## 2018-10-01 RX ADMIN — Medication 20 MILLIEQUIVALENT(S): at 17:32

## 2018-10-01 RX ADMIN — Medication 5 MILLIGRAM(S): at 17:31

## 2018-10-01 RX ADMIN — Medication 1 CAPSULE(S): at 18:30

## 2018-10-01 RX ADMIN — Medication 50 MILLIGRAM(S): at 05:09

## 2018-10-01 RX ADMIN — Medication 100 MILLIGRAM(S): at 17:32

## 2018-10-01 RX ADMIN — Medication 1 CAPSULE(S): at 09:58

## 2018-10-01 RX ADMIN — MIRTAZAPINE 15 MILLIGRAM(S): 45 TABLET, ORALLY DISINTEGRATING ORAL at 21:44

## 2018-10-01 RX ADMIN — Medication 100 MILLIGRAM(S): at 21:44

## 2018-10-01 RX ADMIN — AMLODIPINE BESYLATE 10 MILLIGRAM(S): 2.5 TABLET ORAL at 05:09

## 2018-10-01 RX ADMIN — SERTRALINE 25 MILLIGRAM(S): 25 TABLET, FILM COATED ORAL at 17:32

## 2018-10-01 RX ADMIN — Medication 40 MILLIEQUIVALENT(S): at 08:56

## 2018-10-01 RX ADMIN — TRAMADOL HYDROCHLORIDE 50 MILLIGRAM(S): 50 TABLET ORAL at 05:16

## 2018-10-01 RX ADMIN — Medication 50 MILLIGRAM(S): at 11:58

## 2018-10-01 RX ADMIN — Medication 1 CAPSULE(S): at 17:32

## 2018-10-01 RX ADMIN — Medication 1 CAPSULE(S): at 10:50

## 2018-10-01 RX ADMIN — Medication 1 TABLET(S): at 21:44

## 2018-10-01 RX ADMIN — Medication 100 MILLIGRAM(S): at 05:09

## 2018-10-01 RX ADMIN — Medication 50 MILLIGRAM(S): at 02:03

## 2018-10-01 RX ADMIN — Medication 1 TABLET(S): at 17:32

## 2018-10-01 NOTE — PROGRESS NOTE ADULT - ASSESSMENT
pt with nausea and vomitting and loss of wt  hypertension  migraine  ashd cardiac status stable (low risk) for gi endoscopy  To have gi endoscopy today 9/28  bp is controlled   cardiac and medical status stable (low risk ) for gi endoscopy 10/1

## 2018-10-01 NOTE — PROGRESS NOTE ADULT - SUBJECTIVE AND OBJECTIVE BOX
Patient is a 63y Female with a known history of :  Loose stools (R19.5)  ASHD (arteriosclerotic heart disease) (I25.10)  Adjustment disorder with depressed mood (F43.21)  Prophylactic measure (Z29.9)  Migraines (G43.909)  Depression (F32.9)  Renal arteriosclerosis (I12.9)  HTN (hypertension) (I10)  FTT (failure to thrive) in adult (R62.7)  Intractable vomiting with nausea, unspecified vomiting type (R11.2)    HPI:  64 yo white female with H/O HTN, RENAL ARTERY STENOSIS ,RECENT WEIGHT LOSS due to emesis , hx of  Seizure, Migraine type of headaches ,Depression/Anxiety Disorder who has had unexplained nausea, weakness, vomiting with additional inability to even keep her meds down. No fever, chills, abdominal pains, chest pain, melena or BRBPR. Patient was seen in ER earlier today due to the same symptoms and possible xanax withdrawal ,she takes it but keeps vomiting it . She was discharged and went to see pcp Dr Tristan Frederick  who advised to go back to ED for FTT workup and GI evaluation . As per PCP she recently has a lot of weight loss and also uncontrolled HTN due to ALEXANDER ,cardiology consult called ,patient refused stenting of renal artery in a past   Does not remember the last time she was endoscoped. Seen by GI CONSULT ,may require EGD ,ppi and antiemetics are started (25 Sep 2018 17:01)      REVIEW OF SYSTEMS:    CONSTITUTIONAL: No fever, weight loss, or fatigue  EYES: No eye pain, visual disturbances, or discharge  ENMT:  No difficulty hearing, tinnitus, vertigo; No sinus or throat pain  NECK: No pain or stiffness  BREASTS: No pain, masses, or nipple discharge  RESPIRATORY: No cough, wheezing, chills or hemoptysis; No shortness of breath  CARDIOVASCULAR: No chest pain, palpitations, dizziness, or leg swelling  GASTROINTESTINAL: No abdominal or epigastric pain. No nausea, vomiting, or hematemesis; No diarrhea or constipation. No melena or hematochezia.  GENITOURINARY: No dysuria, frequency, hematuria, or incontinence  NEUROLOGICAL: No headaches, memory loss, loss of strength, numbness, or tremors  SKIN: No itching, burning, rashes, or lesions   LYMPH NODES: No enlarged glands  ENDOCRINE: No heat or cold intolerance; No hair loss  MUSCULOSKELETAL: No joint pain or swelling; No muscle, back, or extremity pain  PSYCHIATRIC: No depression, anxiety, mood swings, or difficulty sleeping  HEME/LYMPH: No easy bruising, or bleeding gums  ALLERGY AND IMMUNOLOGIC: No hives or eczema    MEDICATIONS  (STANDING):  amLODIPine   Tablet 10 milliGRAM(s) Oral daily  dextrose 5% + sodium chloride 0.45%. 1000 milliLiter(s) (40 mL/Hr) IV Continuous <Continuous>  enoxaparin Injectable 40 milliGRAM(s) SubCutaneous daily  hydrALAZINE 50 milliGRAM(s) Oral every 6 hours  lactobacillus acidophilus 1 Tablet(s) Oral every 8 hours  mirtazapine Soltab 15 milliGRAM(s) Oral at bedtime  multivitamin 1 Tablet(s) Oral daily  pantoprazole    Tablet 40 milliGRAM(s) Oral before breakfast  potassium chloride    Tablet ER 20 milliEquivalent(s) Oral daily  sertraline 25 milliGRAM(s) Oral daily  topiramate 100 milliGRAM(s) Oral two times a day    MEDICATIONS  (PRN):  acetaminophen   Tablet .. 650 milliGRAM(s) Oral every 6 hours PRN Mild Pain (1 - 3)  acetaminophen 300 mG/butalbital 50 mG/ caffeine 40 mG 1 Capsule(s) Oral every 4 hours PRN Severe Pain (7 - 10)  ALPRAZolam 0.5 milliGRAM(s) Oral at bedtime PRN anxiety, insomnia  metoclopramide Injectable 5 milliGRAM(s) IV Push every 6 hours PRN nausea  traMADol 50 milliGRAM(s) Oral every 8 hours PRN Moderate Pain (4 - 6)      ALLERGIES: penicillin (Anaphylaxis)      FAMILY HISTORY:  Family history of colon cancer in mother (Mother)      PHYSICAL EXAMINATION:  -----------------------------  T(C): 37 (10-01-18 @ 05:06), Max: 37 (10-01-18 @ 05:06)  HR: 86 (10-01-18 @ 05:06) (86 - 107)  BP: 143/91 (10-01-18 @ 07:09) (143/91 - 192/92)  RR: 16 (10-01-18 @ 05:06) (16 - 17)  SpO2: 99% (10-01-18 @ 05:06) (97% - 99%)  Wt(kg): --    09-30 @ 07:01  -  10-01 @ 07:00  --------------------------------------------------------  IN:    dextrose 5% + sodium chloride 0.45%.: 300 mL    IV PiggyBack: 100 mL  Total IN: 400 mL    OUT:  Total OUT: 0 mL    Total NET: 400 mL            Constitutional: well developed, normal appearance, well groomed, well nourished, no deformities and no acute distress.   Eyes: the conjunctiva exhibited no abnormalities and the eyelids demonstrated no xanthelasmas.   HEENT: normal oral mucosa, no oral pallor and no oral cyanosis.   Neck: normal jugular venous A waves present, normal jugular venous V waves present and no jugular venous monterroso A waves.   Pulmonary: no respiratory distress, normal respiratory rhythm and effort, no accessory muscle use and lungs were clear to auscultation bilaterally.   Cardiovascular: heart rate and rhythm were normal, normal S1 and S2 and no murmur, gallop, rub, heave or thrill are present.   Abdomen: soft, non-tender, no hepato-splenomegaly and no abdominal mass palpated.   Musculoskeletal: the gait could not be assessed..   Extremities: no clubbing of the fingernails, no localized cyanosis, no petechial hemorrhages and no ischemic changes.   Skin: normal skin color and pigmentation, no rash, no venous stasis, no skin lesions, no skin ulcer and no xanthoma was observed.   Psychiatric: oriented to person, place, and time, the affect was normal, the mood was normal and not feeling anxious.     LABS:   --------  09-30    138  |  104  |  18  ----------------------------<  95  2.7<LL>   |  24  |  1.20    Ca    9.0      30 Sep 2018 08:39                           12.1   6.77  )-----------( 250      ( 30 Sep 2018 08:39 )             36.8                 RADIOLOGY:  -----------------        ECG:

## 2018-10-01 NOTE — PROGRESS NOTE ADULT - ASSESSMENT
64 yo white female with H/O HTN, RENAL ARTERY STENOSIS ,RECENT WEIGHT LOSS due to emesis , hx of  Seizure, Migraine type of headaches ,Depression/Anxiety Disorder who has had unexplained nausea, weakness, vomiting with additional inability to even keep her meds down. No fever, chills, abdominal pains, chest pain, melena or BRBPR. Patient was seen in ER earlier today due to the same symptoms and possible xanax withdrawal ,she takes it but keeps vomiting it . She was discharged and went to see pcp Dr Tristan Frederick  who advised to go back to ED for FTT workup and GI evaluation . As per PCP she recently has a lot of weight loss and also uncontrolled HTN due to ALEXANDER ,cardiology consult called ,patient refused stenting of renal artery in a past   Does not remember the last time she was endoscoped. Seen by GI CONSULT ,may require EGD ,ppi and antiemetics are started    Problem/Plan - 1:  ·  Problem: Intractable vomiting with nausea, unspecified vomiting type.  Plan: continue current management and medications ,on antiemetics and PPI  GES -ve  EGD pending     Problem/Plan - 2:  ·  Problem: FTT (failure to thrive) in adult.  Plan: DAILY WEIGHTS  GI and nutrition fu     Problem/Plan - 3:  ·  Problem: HTN (hypertension).  Plan: continue home medications ,BP monitoring.     Problem/Plan - 4:  ·  Problem: Renal arteriosclerosis.  Plan: continue current management and medications ,nephrology eval.     Problem/Plan - 5:  ·  Problem: Depression.  Plan: psych cons called.     Problem/Plan - 6:  Problem: Migraines. Plan: continue current management and medications ,pain management. 64 yo white female with H/O HTN, RENAL ARTERY STENOSIS ,RECENT WEIGHT LOSS due to emesis , hx of  Seizure, Migraine type of headaches ,Depression/Anxiety Disorder who has had unexplained nausea, weakness, vomiting with additional inability to even keep her meds down. No fever, chills, abdominal pains, chest pain, melena or BRBPR. Patient was seen in ER earlier today due to the same symptoms and possible xanax withdrawal ,she takes it but keeps vomiting it . She was discharged and went to see pcp Dr Tristan Frederick  who advised to go back to ED for FTT workup and GI evaluation . As per PCP she recently has a lot of weight loss and also uncontrolled HTN due to ALEXANDER ,cardiology consult called ,patient refused stenting of renal artery in a past   Does not remember the last time she was endoscoped. Seen by GI CONSULT ,may require EGD ,ppi and antiemetics are started    Problem/Plan - 1:  ·  Problem: Intractable vomiting with nausea, unspecified vomiting type.  Plan: continue current management and medications ,on antiemetics and PPI  GES -ve  EGD pending     Problem/Plan - 2:  ·  Problem: FTT (failure to thrive) in adult.  Plan: DAILY WEIGHTS  GI and nutrition fu     Problem/Plan - 3:  ·  Problem: HTN (hypertension).  Plan: continue home medications ,BP monitoring.     Problem/Plan - 4:  ·  Problem: Renal arteriosclerosis.  Plan: continue current management and medications ,nephrology eval.     Problem/Plan - 5:  ·  Problem: Depression.  Plan: psych cons called.     Problem/Plan - 6:  Problem: Migraines. Plan: continue current management and medications ,pain management.      Pt medically cleared for EGD

## 2018-10-01 NOTE — PROGRESS NOTE ADULT - SUBJECTIVE AND OBJECTIVE BOX
neuro cons dict.  migraine HA.  CONTINUE TOPAMAX 100 MG BID.  TORADOL 10 MG IVP AND REGLAN 10 MG IVP PRN FOR HA Q 6HR.

## 2018-10-01 NOTE — PROGRESS NOTE ADULT - PROBLEM SELECTOR PLAN 1
will check renin , aldosterone  ,   hypokalemia will supplemented   her nephrology out pateint is dr hernandez and have extensive salazar for renal artery stenosis and plan was not to put stent for yesi , will obtained the records

## 2018-10-01 NOTE — PROGRESS NOTE ADULT - SUBJECTIVE AND OBJECTIVE BOX
Patient is a 63y old  Female who presents with a chief complaint of nausea ,vomiting, weight loss (01 Oct 2018 07:31)      INTERVAL HPI/OVERNIGHT EVENTS:  T(C): 37 (10-01-18 @ 05:06), Max: 37 (10-01-18 @ 05:06)  HR: 86 (10-01-18 @ 05:06) (86 - 107)  BP: 143/91 (10-01-18 @ 07:09) (143/91 - 192/92)  RR: 16 (10-01-18 @ 05:06) (16 - 17)  SpO2: 99% (10-01-18 @ 05:06) (97% - 99%)  Wt(kg): --  I&O's Summary    30 Sep 2018 07:01  -  01 Oct 2018 07:00  --------------------------------------------------------  IN: 400 mL / OUT: 0 mL / NET: 400 mL        LABS:                        12.1   6.77  )-----------( 250      ( 30 Sep 2018 08:39 )             36.8     09-30    138  |  104  |  18  ----------------------------<  95  2.7<LL>   |  24  |  1.20    Ca    9.0      30 Sep 2018 08:39          CAPILLARY BLOOD GLUCOSE                MEDICATIONS  (STANDING):  amLODIPine   Tablet 10 milliGRAM(s) Oral daily  dextrose 5% + sodium chloride 0.45%. 1000 milliLiter(s) (40 mL/Hr) IV Continuous <Continuous>  enoxaparin Injectable 40 milliGRAM(s) SubCutaneous daily  hydrALAZINE 50 milliGRAM(s) Oral every 6 hours  lactobacillus acidophilus 1 Tablet(s) Oral every 8 hours  mirtazapine Soltab 15 milliGRAM(s) Oral at bedtime  multivitamin 1 Tablet(s) Oral daily  pantoprazole    Tablet 40 milliGRAM(s) Oral before breakfast  potassium chloride    Tablet ER 20 milliEquivalent(s) Oral daily  sertraline 25 milliGRAM(s) Oral daily  topiramate 100 milliGRAM(s) Oral two times a day    MEDICATIONS  (PRN):  acetaminophen   Tablet .. 650 milliGRAM(s) Oral every 6 hours PRN Mild Pain (1 - 3)  acetaminophen 300 mG/butalbital 50 mG/ caffeine 40 mG 1 Capsule(s) Oral every 4 hours PRN Severe Pain (7 - 10)  ALPRAZolam 0.5 milliGRAM(s) Oral at bedtime PRN anxiety, insomnia  metoclopramide Injectable 5 milliGRAM(s) IV Push every 6 hours PRN nausea  traMADol 50 milliGRAM(s) Oral every 8 hours PRN Moderate Pain (4 - 6)          PHYSICAL EXAM:  GENERAL: NAD, well-groomed, well-developed  HEAD:  Atraumatic, Normocephalic  CHEST/LUNG: Clear to percussion bilaterally; No rales, rhonchi, wheezing, or rubs  HEART: Regular rate and rhythm; No murmurs, rubs, or gallops  ABDOMEN: Soft, Nontender, Nondistended; Bowel sounds present  EXTREMITIES:  2+ Peripheral Pulses, No clubbing, cyanosis, or edema  LYMPH: No lymphadenopathy noted  SKIN: No rashes or lesions    Care Discussed with Consultants/Other Providers [ ] YES  [ ] NO

## 2018-10-01 NOTE — PROGRESS NOTE ADULT - SUBJECTIVE AND OBJECTIVE BOX
Patient is a 63y Female whom presented to the hospital with renal artery stenosis     PAST MEDICAL & SURGICAL HISTORY:  Renal arteriosclerosis  Constipation  Anxiety  Depression  HTN (hypertension)  Shingles  Pericarditis  Osteoporosis  Seizure disorder  Migraines  No significant past surgical history      MEDICATIONS  (STANDING):  amLODIPine   Tablet 5 milliGRAM(s) Oral daily  bisacodyl Suppository 10 milliGRAM(s) Rectal at bedtime  dextrose 5% + sodium chloride 0.45%. 1000 milliLiter(s) (75 mL/Hr) IV Continuous <Continuous>  docusate sodium 100 milliGRAM(s) Oral three times a day  enoxaparin Injectable 40 milliGRAM(s) SubCutaneous daily  lactobacillus acidophilus 1 Tablet(s) Oral every 8 hours  mirtazapine Soltab 15 milliGRAM(s) Oral at bedtime  multivitamin 1 Tablet(s) Oral daily  pantoprazole    Tablet 40 milliGRAM(s) Oral before breakfast  potassium chloride    Tablet ER 40 milliEquivalent(s) Oral every 4 hours  potassium chloride  10 mEq/100 mL IVPB 10 milliEquivalent(s) IV Intermittent every 1 hour  senna 2 Tablet(s) Oral at bedtime  sertraline 25 milliGRAM(s) Oral daily  topiramate 100 milliGRAM(s) Oral two times a day                                    12.6   9.18  )-----------( 274      ( 01 Oct 2018 08:45 )             38.0       CBC Full  -  ( 01 Oct 2018 08:45 )  WBC Count : 9.18 K/uL  Hemoglobin : 12.6 g/dL  Hematocrit : 38.0 %  Platelet Count - Automated : 274 K/uL  Mean Cell Volume : 88.0 fl  Mean Cell Hemoglobin : 29.2 pg  Mean Cell Hemoglobin Concentration : 33.2 gm/dL  Auto Neutrophil # : x  Auto Lymphocyte # : x  Auto Monocyte # : x  Auto Eosinophil # : x  Auto Basophil # : x  Auto Neutrophil % : x  Auto Lymphocyte % : x  Auto Monocyte % : x  Auto Eosinophil % : x  Auto Basophil % : x      10-01    138  |  103  |  16  ----------------------------<  94  3.5   |  26  |  1.10    Ca    9.1      01 Oct 2018 08:45        CAPILLARY BLOOD GLUCOSE          Vital Signs Last 24 Hrs  T(C): 37.2 (01 Oct 2018 14:56), Max: 37.2 (01 Oct 2018 14:56)  T(F): 99 (01 Oct 2018 14:56), Max: 99 (01 Oct 2018 14:56)  HR: 108 (01 Oct 2018 14:56) (85 - 110)  BP: 181/76 (01 Oct 2018 14:56) (143/91 - 192/92)  BP(mean): --  RR: 17 (01 Oct 2018 14:56) (16 - 18)  SpO2: 100% (01 Oct 2018 14:56) (97% - 100%)              REVIEW OF SYSTEMS:    CONSTITUTIONAL: No weakness, fevers or chills  EYES/ENT: No visual changes;  no throat pain   NECK: No pain or stiffness  RESPIRATORY: No cough, wheezing, hemoptysis; No shortness of breath  CARDIOVASCULAR: No chest pain or palpitations  GASTROINTESTINAL: No abdominal or epigastric pain. No nausea, vomiting,     No diarrhea or constipation. No melena   GENITOURINARY: No dysuria, frequency or hematuria  NEUROLOGICAL: No numbness or weakness  SKIN: dry        PHYSICAL EXAM:    Constitutional: NAD  HEENT: conjunctive   clear   Neck:  No JVD  Respiratory: CTAB  Cardiovascular: S1 and S2  Gastrointestinal: BS+, soft, NT/ND  Extremities: No peripheral edema  Neurological: A/O x 3, no focal deficits  Psychiatric: Normal mood, normal affect  : No Red[place]  Skin: No rashes  Access: Not applicable

## 2018-10-02 ENCOUNTER — TRANSCRIPTION ENCOUNTER (OUTPATIENT)
Age: 63
End: 2018-10-02

## 2018-10-02 VITALS
RESPIRATION RATE: 16 BRPM | OXYGEN SATURATION: 98 % | SYSTOLIC BLOOD PRESSURE: 163 MMHG | TEMPERATURE: 99 F | DIASTOLIC BLOOD PRESSURE: 79 MMHG | HEART RATE: 98 BPM

## 2018-10-02 LAB
ALBUMIN SERPL ELPH-MCNC: 3.6 G/DL — SIGNIFICANT CHANGE UP (ref 3.3–5)
ALP SERPL-CCNC: 89 U/L — SIGNIFICANT CHANGE UP (ref 40–120)
ALT FLD-CCNC: 37 U/L — SIGNIFICANT CHANGE UP (ref 12–78)
ANION GAP SERPL CALC-SCNC: 10 MMOL/L — SIGNIFICANT CHANGE UP (ref 5–17)
AST SERPL-CCNC: 31 U/L — SIGNIFICANT CHANGE UP (ref 15–37)
BILIRUB SERPL-MCNC: 0.5 MG/DL — SIGNIFICANT CHANGE UP (ref 0.2–1.2)
BUN SERPL-MCNC: 12 MG/DL — SIGNIFICANT CHANGE UP (ref 7–23)
CALCIUM SERPL-MCNC: 9.2 MG/DL — SIGNIFICANT CHANGE UP (ref 8.5–10.1)
CHLORIDE SERPL-SCNC: 101 MMOL/L — SIGNIFICANT CHANGE UP (ref 96–108)
CO2 SERPL-SCNC: 24 MMOL/L — SIGNIFICANT CHANGE UP (ref 22–31)
CREAT SERPL-MCNC: 1.1 MG/DL — SIGNIFICANT CHANGE UP (ref 0.5–1.3)
GLUCOSE SERPL-MCNC: 92 MG/DL — SIGNIFICANT CHANGE UP (ref 70–99)
HCT VFR BLD CALC: 39 % — SIGNIFICANT CHANGE UP (ref 34.5–45)
HGB BLD-MCNC: 12.9 G/DL — SIGNIFICANT CHANGE UP (ref 11.5–15.5)
MCHC RBC-ENTMCNC: 29.3 PG — SIGNIFICANT CHANGE UP (ref 27–34)
MCHC RBC-ENTMCNC: 33.1 GM/DL — SIGNIFICANT CHANGE UP (ref 32–36)
MCV RBC AUTO: 88.4 FL — SIGNIFICANT CHANGE UP (ref 80–100)
NRBC # BLD: 0 /100 WBCS — SIGNIFICANT CHANGE UP (ref 0–0)
PLATELET # BLD AUTO: 338 K/UL — SIGNIFICANT CHANGE UP (ref 150–400)
POTASSIUM SERPL-MCNC: 3.2 MMOL/L — LOW (ref 3.5–5.3)
POTASSIUM SERPL-SCNC: 3.2 MMOL/L — LOW (ref 3.5–5.3)
PROT SERPL-MCNC: 7.7 G/DL — SIGNIFICANT CHANGE UP (ref 6–8.3)
RBC # BLD: 4.41 M/UL — SIGNIFICANT CHANGE UP (ref 3.8–5.2)
RBC # FLD: 13.2 % — SIGNIFICANT CHANGE UP (ref 10.3–14.5)
SODIUM SERPL-SCNC: 135 MMOL/L — SIGNIFICANT CHANGE UP (ref 135–145)
WBC # BLD: 7.8 K/UL — SIGNIFICANT CHANGE UP (ref 3.8–10.5)
WBC # FLD AUTO: 7.8 K/UL — SIGNIFICANT CHANGE UP (ref 3.8–10.5)

## 2018-10-02 PROCEDURE — 83690 ASSAY OF LIPASE: CPT

## 2018-10-02 PROCEDURE — 80053 COMPREHEN METABOLIC PANEL: CPT

## 2018-10-02 PROCEDURE — 83036 HEMOGLOBIN GLYCOSYLATED A1C: CPT

## 2018-10-02 PROCEDURE — 96375 TX/PRO/DX INJ NEW DRUG ADDON: CPT

## 2018-10-02 PROCEDURE — 80048 BASIC METABOLIC PNL TOTAL CA: CPT

## 2018-10-02 PROCEDURE — 74177 CT ABD & PELVIS W/CONTRAST: CPT

## 2018-10-02 PROCEDURE — 84244 ASSAY OF RENIN: CPT

## 2018-10-02 PROCEDURE — 93005 ELECTROCARDIOGRAM TRACING: CPT

## 2018-10-02 PROCEDURE — 88305 TISSUE EXAM BY PATHOLOGIST: CPT

## 2018-10-02 PROCEDURE — A9541: CPT

## 2018-10-02 PROCEDURE — 97162 PT EVAL MOD COMPLEX 30 MIN: CPT

## 2018-10-02 PROCEDURE — 84134 ASSAY OF PREALBUMIN: CPT

## 2018-10-02 PROCEDURE — 82150 ASSAY OF AMYLASE: CPT

## 2018-10-02 PROCEDURE — 36415 COLL VENOUS BLD VENIPUNCTURE: CPT

## 2018-10-02 PROCEDURE — 82962 GLUCOSE BLOOD TEST: CPT

## 2018-10-02 PROCEDURE — 84132 ASSAY OF SERUM POTASSIUM: CPT

## 2018-10-02 PROCEDURE — 97530 THERAPEUTIC ACTIVITIES: CPT

## 2018-10-02 PROCEDURE — 99285 EMERGENCY DEPT VISIT HI MDM: CPT | Mod: 25

## 2018-10-02 PROCEDURE — 82384 ASSAY THREE CATECHOLAMINES: CPT

## 2018-10-02 PROCEDURE — 82088 ASSAY OF ALDOSTERONE: CPT

## 2018-10-02 PROCEDURE — 96365 THER/PROPH/DIAG IV INF INIT: CPT | Mod: XU

## 2018-10-02 PROCEDURE — 78264 GASTRIC EMPTYING IMG STUDY: CPT

## 2018-10-02 PROCEDURE — 97116 GAIT TRAINING THERAPY: CPT

## 2018-10-02 PROCEDURE — 85027 COMPLETE CBC AUTOMATED: CPT

## 2018-10-02 PROCEDURE — 96372 THER/PROPH/DIAG INJ SC/IM: CPT | Mod: XU

## 2018-10-02 PROCEDURE — 88312 SPECIAL STAINS GROUP 1: CPT

## 2018-10-02 PROCEDURE — 96376 TX/PRO/DX INJ SAME DRUG ADON: CPT

## 2018-10-02 PROCEDURE — 76770 US EXAM ABDO BACK WALL COMP: CPT

## 2018-10-02 PROCEDURE — 71045 X-RAY EXAM CHEST 1 VIEW: CPT

## 2018-10-02 RX ORDER — AMLODIPINE BESYLATE 2.5 MG/1
1 TABLET ORAL
Qty: 0 | Refills: 0 | COMMUNITY
Start: 2018-10-02

## 2018-10-02 RX ORDER — PANTOPRAZOLE SODIUM 20 MG/1
1 TABLET, DELAYED RELEASE ORAL
Qty: 30 | Refills: 0 | OUTPATIENT
Start: 2018-10-02 | End: 2018-10-31

## 2018-10-02 RX ORDER — SENNA PLUS 8.6 MG/1
2 TABLET ORAL
Qty: 0 | Refills: 0 | COMMUNITY
Start: 2018-10-02

## 2018-10-02 RX ORDER — SENNA PLUS 8.6 MG/1
2 TABLET ORAL AT BEDTIME
Qty: 0 | Refills: 0 | Status: DISCONTINUED | OUTPATIENT
Start: 2018-10-02 | End: 2018-10-02

## 2018-10-02 RX ORDER — MIRTAZAPINE 45 MG/1
1 TABLET, ORALLY DISINTEGRATING ORAL
Qty: 30 | Refills: 0
Start: 2018-10-02 | End: 2018-10-31

## 2018-10-02 RX ORDER — SERTRALINE 25 MG/1
1 TABLET, FILM COATED ORAL
Qty: 0 | Refills: 0 | DISCHARGE
Start: 2018-10-02

## 2018-10-02 RX ORDER — ONDANSETRON 8 MG/1
1 TABLET, FILM COATED ORAL
Qty: 30 | Refills: 0 | OUTPATIENT
Start: 2018-10-02 | End: 2018-10-11

## 2018-10-02 RX ORDER — DOCUSATE SODIUM 100 MG
1 CAPSULE ORAL
Qty: 0 | Refills: 0 | COMMUNITY
Start: 2018-10-02

## 2018-10-02 RX ORDER — DOCUSATE SODIUM 100 MG
1 CAPSULE ORAL
Qty: 90 | Refills: 0 | OUTPATIENT
Start: 2018-10-02 | End: 2018-10-31

## 2018-10-02 RX ORDER — AMLODIPINE BESYLATE 2.5 MG/1
1 TABLET ORAL
Qty: 30 | Refills: 0
Start: 2018-10-02 | End: 2018-10-31

## 2018-10-02 RX ORDER — DOCUSATE SODIUM 100 MG
100 CAPSULE ORAL THREE TIMES A DAY
Qty: 0 | Refills: 0 | Status: DISCONTINUED | OUTPATIENT
Start: 2018-10-02 | End: 2018-10-02

## 2018-10-02 RX ORDER — SENNA PLUS 8.6 MG/1
2 TABLET ORAL
Qty: 60 | Refills: 0 | OUTPATIENT
Start: 2018-10-02 | End: 2018-10-31

## 2018-10-02 RX ORDER — HYDRALAZINE HCL 50 MG
1 TABLET ORAL
Qty: 90 | Refills: 0 | OUTPATIENT
Start: 2018-10-02 | End: 2018-10-31

## 2018-10-02 RX ORDER — HYDRALAZINE HCL 50 MG
1 TABLET ORAL
Qty: 0 | Refills: 0 | COMMUNITY
Start: 2018-10-02

## 2018-10-02 RX ORDER — MIRTAZAPINE 45 MG/1
1 TABLET, ORALLY DISINTEGRATING ORAL
Qty: 0 | Refills: 0 | COMMUNITY
Start: 2018-10-02

## 2018-10-02 RX ADMIN — Medication 100 MILLIGRAM(S): at 13:56

## 2018-10-02 RX ADMIN — Medication 100 MILLIGRAM(S): at 05:12

## 2018-10-02 RX ADMIN — Medication 1 TABLET(S): at 05:12

## 2018-10-02 RX ADMIN — Medication 1 CAPSULE(S): at 02:16

## 2018-10-02 RX ADMIN — Medication 1 CAPSULE(S): at 03:00

## 2018-10-02 RX ADMIN — AMLODIPINE BESYLATE 10 MILLIGRAM(S): 2.5 TABLET ORAL at 05:12

## 2018-10-02 RX ADMIN — Medication 20 MILLIEQUIVALENT(S): at 12:10

## 2018-10-02 RX ADMIN — Medication 1 CAPSULE(S): at 09:12

## 2018-10-02 RX ADMIN — PANTOPRAZOLE SODIUM 40 MILLIGRAM(S): 20 TABLET, DELAYED RELEASE ORAL at 05:12

## 2018-10-02 RX ADMIN — SERTRALINE 25 MILLIGRAM(S): 25 TABLET, FILM COATED ORAL at 12:10

## 2018-10-02 RX ADMIN — Medication 1 TABLET(S): at 12:10

## 2018-10-02 RX ADMIN — Medication 1 CAPSULE(S): at 10:12

## 2018-10-02 NOTE — PROGRESS NOTE ADULT - ATTENDING COMMENTS
Patient was seen and examined on a day of discharge . Plan of care , discharge medications and recommendations discussed with consultants and clearance for discharge obtained .Social service , case management  and medical staff are aware of plan. Discharge summary  is  prepared electronically
Advanced care planning was discussed with patient and family.  Advanced care planning forms were reviewed and discussed.  Risks, benefits and alternatives of gastroenterologic procedures were discussed in detail and all questions were answered.    30 minutes spent.
62 yo white female with H/O HTN, RENAL ARTERY STENOSIS ,RECENT WEIGHT LOSS due to emesis , hx of  Seizure, Migraine type of headaches ,Depression/Anxiety Disorder who has had unexplained nausea, weakness, vomiting with additional inability to even keep her meds down. No fever, chills, abdominal pains, chest pain, melena or BRBPR. Patient was seen in ER earlier today due to the same symptoms and possible xanax withdrawal ,she takes it but keeps vomiting it . She was discharged and went to see pcp Dr Tristan Frederick  who advised to go back to ED for FTT workup and GI evaluation . As per PCP she recently has a lot of weight loss and also uncontrolled HTN due to ALEXANDER ,cardiology consult called ,patient refused stenting of renal artery in a past   Does not remember the last time she was endoscoped. Seen by GI CONSULT ,may require EGD ,ppi and antiemetics are started
64 yo white female with H/O HTN, RENAL ARTERY STENOSIS ,RECENT WEIGHT LOSS due to emesis , hx of  Seizure, Migraine type of headaches ,Depression/Anxiety Disorder who has had unexplained nausea, weakness, vomiting with additional inability to even keep her meds down. No fever, chills, abdominal pains, chest pain, melena or BRBPR. Patient was seen in ER earlier today due to the same symptoms and possible xanax withdrawal ,she takes it but keeps vomiting it . She was discharged and went to see pcp Dr Tristan Frederick  who advised to go back to ED for FTT workup and GI evaluation . As per PCP she recently has a lot of weight loss and also uncontrolled HTN due to ALEXANDER ,cardiology consult called ,patient refused stenting of renal artery in a past   Does not remember the last time she was endoscoped. Seen by GI CONSULT ,may require EGD ,ppi and antiemetics are started
62 yo white female with H/O HTN, RENAL ARTERY STENOSIS ,RECENT WEIGHT LOSS due to emesis , hx of  Seizure, Migraine type of headaches ,Depression/Anxiety Disorder who has had unexplained nausea, weakness, vomiting with additional inability to even keep her meds down. No fever, chills, abdominal pains, chest pain, melena or BRBPR. Patient was seen in ER earlier today due to the same symptoms and possible xanax withdrawal ,she takes it but keeps vomiting it . She was discharged and went to see pcp Dr Tristan Frederick  who advised to go back to ED for FTT workup and GI evaluation . As per PCP she recently has a lot of weight loss and also uncontrolled HTN due to ALEXANDER ,cardiology consult called ,patient refused stenting of renal artery in a past   Does not remember the last time she was endoscoped. Seen by GI CONSULT ,may require EGD ,ppi and antiemetics are started

## 2018-10-02 NOTE — DISCHARGE NOTE ADULT - HOSPITAL COURSE
62 yo white female with H/O HTN, RENAL ARTERY STENOSIS ,RECENT WEIGHT LOSS due to emesis , hx of  Seizure, Migraine type of headaches ,Depression/Anxiety Disorder who has had unexplained nausea, weakness, vomiting with additional inability to even keep her meds down. No fever, chills, abdominal pains, chest pain, melena or BRBPR. Patient was seen in ER earlier today due to the same symptoms and possible xanax withdrawal ,she takes it but keeps vomiting it . She was discharged and went to see pcp Dr Tristan Frederick  who advised to go back to ED for FTT workup and GI evaluation . As per PCP she recently has a lot of weight loss and also uncontrolled HTN due to ALEXANDER ,cardiology consult called ,patient refused stenting of renal artery in a past   Does not remember the last time she was endoscoped. Seen by GI CONSULT ,may require EGD ,ppi and antiemetics are started (25 Sep 2018 17:01) ·  Problem: Intractable vomiting with nausea, unspecified vomiting type.  Plan: improved  CT neg for acute pathology, no obstruction   sp egd- negative, pt refuses further w/u w colon given prior weight loss  esophagram ordered and pending  ges neg for gastroparesis  gerd precautions  ppi once a day, zofran prn  diet as tolerated   pt states she consumed regular consistency diet pta, denies dysphagia  will need close outpatient gi f/u upon dc including dysmotility studies and colon.

## 2018-10-02 NOTE — PROGRESS NOTE ADULT - PROVIDER SPECIALTY LIST ADULT
Anesthesia
Cardiology
Gastroenterology
Internal Medicine
Nephrology
Neurology
Neurology
Hospitalist
Nephrology
Nephrology

## 2018-10-02 NOTE — PROGRESS NOTE ADULT - SUBJECTIVE AND OBJECTIVE BOX
Patient is a 63y Female whom presented to the hospital with renal artery stenosis     PAST MEDICAL & SURGICAL HISTORY:  Renal arteriosclerosis  Constipation  Anxiety  Depression  HTN (hypertension)  Shingles  Pericarditis  Osteoporosis  Seizure disorder  Migraines  No significant past surgical history      MEDICATIONS  (STANDING):  amLODIPine   Tablet 5 milliGRAM(s) Oral daily  bisacodyl Suppository 10 milliGRAM(s) Rectal at bedtime  dextrose 5% + sodium chloride 0.45%. 1000 milliLiter(s) (75 mL/Hr) IV Continuous <Continuous>  docusate sodium 100 milliGRAM(s) Oral three times a day  enoxaparin Injectable 40 milliGRAM(s) SubCutaneous daily  lactobacillus acidophilus 1 Tablet(s) Oral every 8 hours  mirtazapine Soltab 15 milliGRAM(s) Oral at bedtime  multivitamin 1 Tablet(s) Oral daily  pantoprazole    Tablet 40 milliGRAM(s) Oral before breakfast  potassium chloride    Tablet ER 40 milliEquivalent(s) Oral every 4 hours  potassium chloride  10 mEq/100 mL IVPB 10 milliEquivalent(s) IV Intermittent every 1 hour  senna 2 Tablet(s) Oral at bedtime  sertraline 25 milliGRAM(s) Oral daily  topiramate 100 milliGRAM(s) Oral two times a day                                                          12.9   7.80  )-----------( 338      ( 02 Oct 2018 09:01 )             39.0       CBC Full  -  ( 02 Oct 2018 09:01 )  WBC Count : 7.80 K/uL  Hemoglobin : 12.9 g/dL  Hematocrit : 39.0 %  Platelet Count - Automated : 338 K/uL  Mean Cell Volume : 88.4 fl  Mean Cell Hemoglobin : 29.3 pg  Mean Cell Hemoglobin Concentration : 33.1 gm/dL  Auto Neutrophil # : x  Auto Lymphocyte # : x  Auto Monocyte # : x  Auto Eosinophil # : x  Auto Basophil # : x  Auto Neutrophil % : x  Auto Lymphocyte % : x  Auto Monocyte % : x  Auto Eosinophil % : x  Auto Basophil % : x      10-02    135  |  101  |  12  ----------------------------<  92  3.2<L>   |  24  |  1.10    Ca    9.2      02 Oct 2018 09:01    TPro  7.7  /  Alb  3.6  /  TBili  0.5  /  DBili  x   /  AST  31  /  ALT  37  /  AlkPhos  89  10-02      CAPILLARY BLOOD GLUCOSE          Vital Signs Last 24 Hrs  T(C): 37.2 (02 Oct 2018 13:49), Max: 37.2 (02 Oct 2018 13:49)  T(F): 98.9 (02 Oct 2018 13:49), Max: 98.9 (02 Oct 2018 13:49)  HR: 98 (02 Oct 2018 13:49) (85 - 104)  BP: 163/79 (02 Oct 2018 13:49) (160/76 - 184/85)  BP(mean): --  RR: 16 (02 Oct 2018 13:49) (16 - 17)  SpO2: 98% (02 Oct 2018 13:49) (97% - 98%)                  REVIEW OF SYSTEMS:    CONSTITUTIONAL: No weakness, fevers or chills  EYES/ENT: No visual changes;  no throat pain   NECK: No pain or stiffness  RESPIRATORY: No cough, wheezing, hemoptysis; No shortness of breath  CARDIOVASCULAR: No chest pain or palpitations  GASTROINTESTINAL: No abdominal or epigastric pain. No nausea, vomiting,     No diarrhea or constipation. No melena   GENITOURINARY: No dysuria, frequency or hematuria  NEUROLOGICAL: No numbness or weakness  SKIN: dry        PHYSICAL EXAM:    Constitutional: NAD  HEENT: conjunctive   clear   Neck:  No JVD  Respiratory: CTAB  Cardiovascular: S1 and S2  Gastrointestinal: BS+, soft, NT/ND  Extremities: No peripheral edema  Neurological: A/O x 3, no focal deficits  Psychiatric: Normal mood, normal affect  : No [place]  Skin: No rashes  Access: Not applicable

## 2018-10-02 NOTE — DISCHARGE NOTE ADULT - PLAN OF CARE
continue zofran and protonix continue current managmnet and medications ,followup with Gi FOR OUTPATIENT workup ( colonoscopy and esophageogramm) please alvarez for appointment continue home medications ,followup with neurologist dr Brown continue current managmnet and medications ,followup with Dr Urban continue home medications continue current managmnet and medications on remeron ,followup with psychiatrist in 2 mnts continue current managmnet and medications ,followup with Gi Dr Mcdonough FOR OUTPATIENT workup ( dysmoltility studies , colonoscopy and esophagogram) please call for appointment

## 2018-10-02 NOTE — PROGRESS NOTE ADULT - REASON FOR ADMISSION
nausea ,vomiting, weight loss

## 2018-10-02 NOTE — DISCHARGE NOTE ADULT - MEDICATION SUMMARY - MEDICATIONS TO TAKE
Advised patient to schedule appointment with Hematology.   I will START or STAY ON the medications listed below when I get home from the hospital:    butalbital-acetaminophen 50 mg-325 mg oral capsule  -- 1 cap(s) by mouth every 4 hours, As Needed  -- Indication: For Migraines    topiramate 100 mg oral tablet  -- 1 tab(s) by mouth 2 times a day  -- Indication: For Migraines    sertraline 25 mg oral tablet  -- 1 tab(s) by mouth once a day  -- Indication: For Depression    mirtazapine 15 mg oral tablet, disintegrating  -- 1 tab(s) by mouth once a day (at bedtime)  -- Indication: For Depression    Zofran ODT 4 mg oral tablet, disintegrating  -- 1 tab(s) by mouth 3 times a day, As Needed MDD:3 tabs   -- Indication: For Nausea and vomiting    ALPRAZolam 0.5 mg oral tablet  -- 1 tab(s) by mouth once a day (at bedtime), As needed, anxiety, insomnia  -- Indication: For Anxiety    amLODIPine 10 mg oral tablet  -- 1 tab(s) by mouth once a day  -- Indication: For HTN (hypertension)    docusate sodium 100 mg oral capsule  -- 1 cap(s) by mouth 3 times a day  -- Indication: For constipation    senna oral tablet  -- 2 tab(s) by mouth once a day (at bedtime) -Constipation   -- Indication: For constipation    pantoprazole 40 mg oral delayed release tablet  -- 1 tab(s) by mouth once a day (before a meal)  -- Indication: For gerd    hydrALAZINE 100 mg oral tablet  -- 1 tab(s) by mouth every 8 hours  -- Indication: For HTN (hypertension)    Multiple Vitamins oral tablet  -- 1 tab(s) by mouth once a day  -- Indication: For supplement

## 2018-10-02 NOTE — DISCHARGE NOTE ADULT - CARE PROVIDER_API CALL
Tristan Frederick), Family Medicine  [address] [place] [address]  Phone: [phone]  Fax: [phone]    Alan Mcdonough (), Gastroenterology; Internal Medicine  [address] [place] [address]  Phone: [phone]  Fax: [phone]    Saji Brown), Neurology  [address] [place] [address]  Phone: [phone]  Fax: [phone]    Seble Shah), Psychiatry  [address] [place] [address]  Phone: [phone]  Fax: [phone]

## 2018-10-02 NOTE — PROGRESS NOTE ADULT - SUBJECTIVE AND OBJECTIVE BOX
INTERVAL HPI/OVERNIGHT EVENTS:  pt seen and examined  c/o headache and mild nausea, states dry heaves resolved  denies abd pain, reports +bms  labs pending afebrile overnight  sp egd yesterday    MEDICATIONS  (STANDING):  amLODIPine   Tablet 10 milliGRAM(s) Oral daily  bisacodyl 5 milliGRAM(s) Oral at bedtime  enoxaparin Injectable 40 milliGRAM(s) SubCutaneous daily  hydrALAZINE 100 milliGRAM(s) Oral every 8 hours  lactobacillus acidophilus 1 Tablet(s) Oral every 8 hours  mirtazapine Soltab 15 milliGRAM(s) Oral at bedtime  multivitamin 1 Tablet(s) Oral daily  pantoprazole    Tablet 40 milliGRAM(s) Oral before breakfast  potassium chloride    Tablet ER 20 milliEquivalent(s) Oral daily  sertraline 25 milliGRAM(s) Oral daily  topiramate 100 milliGRAM(s) Oral two times a day    MEDICATIONS  (PRN):  acetaminophen   Tablet .. 650 milliGRAM(s) Oral every 6 hours PRN Mild Pain (1 - 3)  acetaminophen 300 mG/butalbital 50 mG/ caffeine 40 mG 1 Capsule(s) Oral every 4 hours PRN Severe Pain (7 - 10)  ALPRAZolam 0.5 milliGRAM(s) Oral at bedtime PRN anxiety, insomnia  metoclopramide Injectable 5 milliGRAM(s) IV Push every 6 hours PRN nausea  traMADol 50 milliGRAM(s) Oral every 8 hours PRN Moderate Pain (4 - 6)      Allergies    penicillin (Anaphylaxis)    Intolerances        Review of Systems:    General:  No wt loss, fevers, chills, night sweats, fatigue   Eyes:  Good vision, no reported pain  ENT:  No sore throat, pain, runny nose, dysphagia  CV:  No pain, palpitations, hypo/hypertension  Resp:  No dyspnea, cough, tachypnea, wheezing  GI:  No pain, +nausea, No vomiting, No diarrhea, No constipation, No weight loss, No fever, No pruritis, No rectal bleeding, No melena, No dysphagia  :  No pain, bleeding, incontinence, nocturia  Muscle:  No pain, weakness  Neuro:  ha  Psych:  No fatigue, insomnia, mood problems, depression  Endocrine:  No polyuria, polydypsia, cold/heat intolerance  Heme:  No petechiae, ecchymosis, easy bruisability  Skin:  No rash, tattoos, scars, edema      Vital Signs Last 24 Hrs  T(C): 37.1 (02 Oct 2018 04:37), Max: 37.2 (01 Oct 2018 14:56)  T(F): 98.8 (02 Oct 2018 04:37), Max: 99 (01 Oct 2018 14:56)  HR: 85 (02 Oct 2018 06:29) (85 - 110)  BP: 170/76 (02 Oct 2018 06:29) (144/74 - 184/85)  BP(mean): --  RR: 16 (02 Oct 2018 04:37) (16 - 18)  SpO2: 97% (02 Oct 2018 04:37) (97% - 100%)    PHYSICAL EXAM:    Constitutional: NAD, lying in bed frail appearing  HEENT: EOMI, poor dentition   Neck: No LAD  Respiratory: dec bs  Cardiovascular: S1 and S2, RRR  Gastrointestinal: soft nt nd   Extremities: No peripheral edema  Vascular: 2+ peripheral pulses  Neurological: Awake alert responds appropriately  Skin: No rashes    LABS:                        12.6   9.18  )-----------( 274      ( 01 Oct 2018 08:45 )             38.0     10-01    138  |  103  |  16  ----------------------------<  94  3.5   |  26  |  1.10    Ca    9.1      01 Oct 2018 08:45            RADIOLOGY & ADDITIONAL TESTS: INTERVAL HPI/OVERNIGHT EVENTS:  pt seen and examined  c/o headache and mild nausea, states dry heaves resolved  denies abd pain, reports +bms  labs pending afebrile overnight  sp egd yesterday  per overnight rn no vomtiing/diarrhea    MEDICATIONS  (STANDING):  amLODIPine   Tablet 10 milliGRAM(s) Oral daily  bisacodyl 5 milliGRAM(s) Oral at bedtime  enoxaparin Injectable 40 milliGRAM(s) SubCutaneous daily  hydrALAZINE 100 milliGRAM(s) Oral every 8 hours  lactobacillus acidophilus 1 Tablet(s) Oral every 8 hours  mirtazapine Soltab 15 milliGRAM(s) Oral at bedtime  multivitamin 1 Tablet(s) Oral daily  pantoprazole    Tablet 40 milliGRAM(s) Oral before breakfast  potassium chloride    Tablet ER 20 milliEquivalent(s) Oral daily  sertraline 25 milliGRAM(s) Oral daily  topiramate 100 milliGRAM(s) Oral two times a day    MEDICATIONS  (PRN):  acetaminophen   Tablet .. 650 milliGRAM(s) Oral every 6 hours PRN Mild Pain (1 - 3)  acetaminophen 300 mG/butalbital 50 mG/ caffeine 40 mG 1 Capsule(s) Oral every 4 hours PRN Severe Pain (7 - 10)  ALPRAZolam 0.5 milliGRAM(s) Oral at bedtime PRN anxiety, insomnia  metoclopramide Injectable 5 milliGRAM(s) IV Push every 6 hours PRN nausea  traMADol 50 milliGRAM(s) Oral every 8 hours PRN Moderate Pain (4 - 6)      Allergies    penicillin (Anaphylaxis)    Intolerances        Review of Systems:    General:  No wt loss, fevers, chills, night sweats, fatigue   Eyes:  Good vision, no reported pain  ENT:  No sore throat, pain, runny nose, dysphagia  CV:  No pain, palpitations, hypo/hypertension  Resp:  No dyspnea, cough, tachypnea, wheezing  GI:  No pain, +nausea, No vomiting, No diarrhea, No constipation, No weight loss, No fever, No pruritis, No rectal bleeding, No melena, No dysphagia  :  No pain, bleeding, incontinence, nocturia  Muscle:  No pain, weakness  Neuro:  ha  Psych:  No fatigue, insomnia, mood problems, depression  Endocrine:  No polyuria, polydypsia, cold/heat intolerance  Heme:  No petechiae, ecchymosis, easy bruisability  Skin:  No rash, tattoos, scars, edema      Vital Signs Last 24 Hrs  T(C): 37.1 (02 Oct 2018 04:37), Max: 37.2 (01 Oct 2018 14:56)  T(F): 98.8 (02 Oct 2018 04:37), Max: 99 (01 Oct 2018 14:56)  HR: 85 (02 Oct 2018 06:29) (85 - 110)  BP: 170/76 (02 Oct 2018 06:29) (144/74 - 184/85)  BP(mean): --  RR: 16 (02 Oct 2018 04:37) (16 - 18)  SpO2: 97% (02 Oct 2018 04:37) (97% - 100%)    PHYSICAL EXAM:    Constitutional: NAD, lying in bed frail appearing  HEENT: EOMI, poor dentition   Neck: No LAD  Respiratory: dec bs  Cardiovascular: S1 and S2, RRR  Gastrointestinal: soft nt nd   Extremities: No peripheral edema  Vascular: 2+ peripheral pulses  Neurological: Awake alert responds appropriately  Skin: No rashes    LABS:                        12.6   9.18  )-----------( 274      ( 01 Oct 2018 08:45 )             38.0     10-01    138  |  103  |  16  ----------------------------<  94  3.5   |  26  |  1.10    Ca    9.1      01 Oct 2018 08:45            RADIOLOGY & ADDITIONAL TESTS:

## 2018-10-02 NOTE — DISCHARGE NOTE ADULT - CARE PROVIDERS DIRECT ADDRESSES
,[email or web address],DirectAddress_Unknown,DirectAddress_Unknown,DirectAddress_Unknown

## 2018-10-02 NOTE — PROGRESS NOTE ADULT - PROBLEM SELECTOR PROBLEM 3
ASHD (arteriosclerotic heart disease)
ASHD (arteriosclerotic heart disease)
Loose stools
Loose stools
Migraines
ASHD (arteriosclerotic heart disease)
HTN (hypertension)
Migraines
HTN (hypertension)

## 2018-10-02 NOTE — PROGRESS NOTE ADULT - SUBJECTIVE AND OBJECTIVE BOX
Patient is a 63y Female with a known history of :  Loose stools (R19.5)  ASHD (arteriosclerotic heart disease) (I25.10)  Adjustment disorder with depressed mood (F43.21)  Prophylactic measure (Z29.9)  Migraines (G43.909)  Depression (F32.9)  Renal arteriosclerosis (I12.9)  HTN (hypertension) (I10)  FTT (failure to thrive) in adult (R62.7)  Intractable vomiting with nausea, unspecified vomiting type (R11.2)    HPI:  64 yo white female with H/O HTN, RENAL ARTERY STENOSIS ,RECENT WEIGHT LOSS due to emesis , hx of  Seizure, Migraine type of headaches ,Depression/Anxiety Disorder who has had unexplained nausea, weakness, vomiting with additional inability to even keep her meds down. No fever, chills, abdominal pains, chest pain, melena or BRBPR. Patient was seen in ER earlier today due to the same symptoms and possible xanax withdrawal ,she takes it but keeps vomiting it . She was discharged and went to see pcp Dr Tristan Frederick  who advised to go back to ED for FTT workup and GI evaluation . As per PCP she recently has a lot of weight loss and also uncontrolled HTN due to ALEXANDER ,cardiology consult called ,patient refused stenting of renal artery in a past   Does not remember the last time she was endoscoped. Seen by GI CONSULT ,may require EGD ,ppi and antiemetics are started (25 Sep 2018 17:01)      REVIEW OF SYSTEMS:    CONSTITUTIONAL: No fever, weight loss, or fatigue  EYES: No eye pain, visual disturbances, or discharge  ENMT:  No difficulty hearing, tinnitus, vertigo; No sinus or throat pain  NECK: No pain or stiffness  BREASTS: No pain, masses, or nipple discharge  RESPIRATORY: No cough, wheezing, chills or hemoptysis; No shortness of breath  CARDIOVASCULAR: No chest pain, palpitations, dizziness, or leg swelling  GASTROINTESTINAL: No abdominal or epigastric pain. No nausea, vomiting, or hematemesis; No diarrhea or constipation. No melena or hematochezia.  GENITOURINARY: No dysuria, frequency, hematuria, or incontinence  NEUROLOGICAL: No headaches, memory loss, loss of strength, numbness, or tremors  SKIN: No itching, burning, rashes, or lesions   LYMPH NODES: No enlarged glands  ENDOCRINE: No heat or cold intolerance; No hair loss  MUSCULOSKELETAL: No joint pain or swelling; No muscle, back, or extremity pain  PSYCHIATRIC: No depression, anxiety, mood swings, or difficulty sleeping  HEME/LYMPH: No easy bruising, or bleeding gums  ALLERGY AND IMMUNOLOGIC: No hives or eczema    MEDICATIONS  (STANDING):  amLODIPine   Tablet 10 milliGRAM(s) Oral daily  bisacodyl 5 milliGRAM(s) Oral at bedtime  docusate sodium 100 milliGRAM(s) Oral three times a day  enoxaparin Injectable 40 milliGRAM(s) SubCutaneous daily  hydrALAZINE 100 milliGRAM(s) Oral every 8 hours  lactobacillus acidophilus 1 Tablet(s) Oral every 8 hours  mirtazapine Soltab 15 milliGRAM(s) Oral at bedtime  multivitamin 1 Tablet(s) Oral daily  pantoprazole    Tablet 40 milliGRAM(s) Oral before breakfast  potassium chloride    Tablet ER 20 milliEquivalent(s) Oral daily  sertraline 25 milliGRAM(s) Oral daily  topiramate 100 milliGRAM(s) Oral two times a day    MEDICATIONS  (PRN):  acetaminophen   Tablet .. 650 milliGRAM(s) Oral every 6 hours PRN Mild Pain (1 - 3)  acetaminophen 300 mG/butalbital 50 mG/ caffeine 40 mG 1 Capsule(s) Oral every 4 hours PRN Severe Pain (7 - 10)  ALPRAZolam 0.5 milliGRAM(s) Oral at bedtime PRN anxiety, insomnia  metoclopramide Injectable 5 milliGRAM(s) IV Push every 6 hours PRN nausea  senna 2 Tablet(s) Oral at bedtime PRN Constipation  traMADol 50 milliGRAM(s) Oral every 8 hours PRN Moderate Pain (4 - 6)      ALLERGIES: penicillin (Anaphylaxis)      FAMILY HISTORY:  Family history of colon cancer in mother (Mother)      PHYSICAL EXAMINATION:  -----------------------------  T(C): 37.1 (10-02-18 @ 04:37), Max: 37.2 (10-01-18 @ 14:56)  HR: 85 (10-02-18 @ 06:29) (85 - 110)  BP: 170/76 (10-02-18 @ 06:29) (144/74 - 184/85)  RR: 16 (10-02-18 @ 04:37) (16 - 18)  SpO2: 97% (10-02-18 @ 04:37) (97% - 100%)  Wt(kg): --    10-01 @ 07:01  -  10-02 @ 07:00  --------------------------------------------------------  IN:    dextrose 5% + sodium chloride 0.45%.: 200 mL  Total IN: 200 mL    OUT:  Total OUT: 0 mL    Total NET: 200 mL            Constitutional: well developed, normal appearance, well groomed, well nourished, no deformities and no acute distress.   Eyes: the conjunctiva exhibited no abnormalities and the eyelids demonstrated no xanthelasmas.   HEENT: normal oral mucosa, no oral pallor and no oral cyanosis.   Neck: normal jugular venous A waves present, normal jugular venous V waves present and no jugular venous monterroso A waves.   Pulmonary: no respiratory distress, normal respiratory rhythm and effort, no accessory muscle use and lungs were clear to auscultation bilaterally.   Cardiovascular: heart rate and rhythm were normal, normal S1 and S2 and no murmur, gallop, rub, heave or thrill are present.   Abdomen: soft, non-tender, no hepato-splenomegaly and no abdominal mass palpated.   Musculoskeletal: the gait could not be assessed..   Extremities: no clubbing of the fingernails, no localized cyanosis, no petechial hemorrhages and no ischemic changes.   Skin: normal skin color and pigmentation, no rash, no venous stasis, no skin lesions, no skin ulcer and no xanthoma was observed.   Psychiatric: oriented to person, place, and time, the affect was normal, the mood was normal and not feeling anxious.     LABS:   --------  10-01    138  |  103  |  16  ----------------------------<  94  3.5   |  26  |  1.10    Ca    9.1      01 Oct 2018 08:45                           12.6   9.18  )-----------( 274      ( 01 Oct 2018 08:45 )             38.0                 RADIOLOGY:  -----------------        ECG:

## 2018-10-02 NOTE — PROGRESS NOTE ADULT - PROBLEM SELECTOR PLAN 2
BP monitoring  increase hydralazine 50 mg po q 6 hr
BP monitoring  increase hydralazine 50 mg po q 6 hr
may be contributing to n/v  care per primary team
may be contributing to n/v  started on fiorciet  care per primary team
may be contributing to n/v  started on fiorciet  care per primary team
see above
BP monitoring  increase hydralazine 50 mg po q 6 hr
BP monitoring  increase hydralazine 50 mg po q 6 hr
BP monitoring,continue current antihypertensive meds, low salt diet,followup with PMD in 1-2 weeks
DAILY WEIGHTS ,GI AND PSYCH EVAL CALLED ,NUTRITIONAL SERVICES EVAL ,MVI SUPPLEMENTS
DAILY WEIGHTS ,GI AND PSYCH EVAL CALLED ,NUTRITONAL SERVICES EVAL ,MVI SUPPLEMENTS
on norvasc
on norvasc and hydralazine  increase norvasc 10 mg po daily 9/30
DAILY WEIGHTS ,GI AND PSYCH EVAL CALLED ,NUTRITIONAL SERVICES EVAL ,MVI SUPPLEMENTS

## 2018-10-02 NOTE — DISCHARGE NOTE ADULT - NSTOBACCOREFERRAL_GEN_A_CS
Patient declined information Patient declined information/per patient she stopped smoking  15 years ago, refused referral to or education material on smoking cessation at this time

## 2018-10-02 NOTE — PROGRESS NOTE ADULT - PROBLEM SELECTOR PLAN 3
obtain ECHO to evaluate LVEF,cardiology consult,continue current managmnet,O2 supply,anticoagulation plan as per cardiology consult
bowel regimen dcd  cont bacid as tolerated  monitor for need of further w/u if persists
may be contributing to n/v  started on fiorciet  neuro following  care per primary team
obtain ECHO to evaluate LVEF,cardiology consult,continue current managmnet,O2 supply,anticoagulation plan as per cardiology consult
resolved  bowel regimen dcd  cont bacid as tolerated
continue home medications ,BP monitoring
obtain ECHO to evaluate LVEF,cardiology consult,continue current managmnet,O2 supply,anticoagulation plan as per cardiology consult
continue home medications ,BP monitoring

## 2018-10-02 NOTE — PROGRESS NOTE ADULT - PROBLEM SELECTOR PROBLEM 4
ASHD (arteriosclerotic heart disease)
Loose stools
Renal arteriosclerosis

## 2018-10-02 NOTE — PROGRESS NOTE ADULT - PROBLEM SELECTOR PLAN 4
no angina
resolved, pt reports having normal bms  cont bacid  bowel regimen as needed
continue current management and medications ,nephrology eval
continue current managmnet and medications ,nephrology eval
continue current management and medications ,nephrology eval

## 2018-10-02 NOTE — PROGRESS NOTE ADULT - SUBJECTIVE AND OBJECTIVE BOX
Neurology Follow up note    AUGUSTUS [id number]    HPI:  64 yo white female with H/O HTN, RENAL ARTERY STENOSIS ,RECENT WEIGHT LOSS due to emesis , hx of  Seizure, Migraine type of headaches ,Depression/Anxiety Disorder who has had unexplained nausea, weakness, vomiting with additional inability to even keep her meds down. No fever, chills, abdominal pains, chest pain, melena or BRBPR. Patient was seen in ER earlier today due to the same symptoms and possible xanax withdrawal ,she takes it but keeps vomiting it . She was discharged and went to see pcp Dr Tristan Frederick  who advised to go back to ED for FTT workup and GI evaluation . As per PCP she recently has a lot of weight loss and also uncontrolled HTN due to ALEXANDER ,cardiology consult called ,patient refused stenting of renal artery in a past   Does not remember the last time she was endoscoped. Seen by GI CONSULT ,may require EGD ,ppi and antiemetics are started (25 Sep 2018 17:01)      Interval History - HA BETTER.    Patient is seen, chart was reviewed and case was discussed with the treatment team.  Pt is not in any distress.   Lying on bed comfortably.   No events reported overnight.   No clinical seizure was reported.  Sitting on chair bed comfortably.    is at bedside.    Vital Signs Last 24 Hrs  T(C): 37.2 (02 Oct 2018 13:49), Max: 37.2 (02 Oct 2018 13:49)  T(F): 98.9 (02 Oct 2018 13:49), Max: 98.9 (02 Oct 2018 13:49)  HR: 98 (02 Oct 2018 13:49) (85 - 104)  BP: 163/79 (02 Oct 2018 13:49) (160/76 - 184/85)  BP(mean): --  RR: 16 (02 Oct 2018 13:49) (16 - 17)  SpO2: 98% (02 Oct 2018 13:49) (97% - 98%)        REVIEW OF SYSTEMS:    Constitutional: No fever, weight loss or fatigue  Eyes: No eye pain, visual disturbances, or discharge  ENT:  No difficulty hearing, tinnitus, vertigo; No sinus or throat pain  Neck: No pain or stiffness  Respiratory: No cough, wheezing, chills or hemoptysis  Cardiovascular: No chest pain, palpitations, shortness of breath, dizziness or leg swelling  Gastrointestinal: No abdominal or epigastric pain. No nausea, vomiting or hematemesis; No diarrhea or constipation. No melena or hematochezia.  Genitourinary: No dysuria, frequency, hematuria or incontinence  Neurological: No headaches, memory loss, loss of strength, numbness or tremors  Psychiatric: No depression, anxiety, mood swings or difficulty sleeping  Musculoskeletal: No joint pain or swelling; No muscle, back or extremity pain  Skin: No itching, burning, rashes or lesions   Lymph Nodes: No enlarged glands  Endocrine: No heat or cold intolerance; No hair loss, No h/o diabetes or thyroid dysfunction  Allergy and Immunologic: No hives or eczema    On Neurological Examination:    Mental Status - Pt is alert, awake, oriented X3.  Follows commands well and able to answer questions appropriately  .Mood and affect  normal    Speech -  Normal.     Cranial Nerves - Pupils 3 mm equal and reactive to light, extraocular eye movements intact.   Pt has no  facial asymmetry. Facial sensation is intact  .Tongue - is in midline.    Muscle tone - is normal.    Motor Exam - 5/5 all over,    Sensory Exam - Pt withdraws all extremities equally on stimulation. No asymmetry seen. No complaints of tingling, numbness.  .    coordination:    Finger to nose: normal.      Deep tendon Reflexes - 2 plus all over.       Neck Supple -  Yes.     MEDICATIONS    acetaminophen   Tablet .. 650 milliGRAM(s) Oral every 6 hours PRN  acetaminophen 300 mG/butalbital 50 mG/ caffeine 40 mG 1 Capsule(s) Oral every 4 hours PRN  ALPRAZolam 0.5 milliGRAM(s) Oral at bedtime PRN  amLODIPine   Tablet 10 milliGRAM(s) Oral daily  bisacodyl 5 milliGRAM(s) Oral at bedtime  docusate sodium 100 milliGRAM(s) Oral three times a day  enoxaparin Injectable 40 milliGRAM(s) SubCutaneous daily  hydrALAZINE 100 milliGRAM(s) Oral every 8 hours  lactobacillus acidophilus 1 Tablet(s) Oral every 8 hours  metoclopramide Injectable 5 milliGRAM(s) IV Push every 6 hours PRN  mirtazapine Soltab 15 milliGRAM(s) Oral at bedtime  multivitamin 1 Tablet(s) Oral daily  pantoprazole    Tablet 40 milliGRAM(s) Oral before breakfast  potassium chloride    Tablet ER 20 milliEquivalent(s) Oral daily  senna 2 Tablet(s) Oral at bedtime PRN  sertraline 25 milliGRAM(s) Oral daily  topiramate 100 milliGRAM(s) Oral two times a day  traMADol 50 milliGRAM(s) Oral every 8 hours PRN      Allergies    penicillin (Anaphylaxis)    Intolerances        LABS:  CBC Full  -  ( 02 Oct 2018 09:01 )  WBC Count : 7.80 K/uL  Hemoglobin : 12.9 g/dL  Hematocrit : 39.0 %  Platelet Count - Automated : 338 K/uL  Mean Cell Volume : 88.4 fl  Mean Cell Hemoglobin : 29.3 pg  Mean Cell Hemoglobin Concentration : 33.1 gm/dL  Auto Neutrophil # : x  Auto Lymphocyte # : x  Auto Monocyte # : x  Auto Eosinophil # : x        10-02    135  |  101  |  12  ----------------------------<  92  3.2<L>   |  24  |  1.10    Ca    9.2      02 Oct 2018 09:01    TPro  7.7  /  Alb  3.6  /  TBili  0.5  /  DBili  x   /  AST  31  /  ALT  37  /  AlkPhos  89  10-02    Hemoglobin A1C:     Vitamin B12     RADIOLOGY    ASSESSMENT AND PLAN:      HA RESOLVED.    CONTINUE TOPAMAX.  DC HOME TODAY  NEURO  FOLLOW UP.  Pain is accessed and addressed.

## 2018-10-02 NOTE — DISCHARGE NOTE ADULT - CARE PLAN
Principal Discharge DX:	Intractable vomiting with nausea, unspecified vomiting type  Goal:	continue zofran and protonix  Assessment and plan of treatment:	continue current managmnet and medications ,followup with Gi FOR OUTPATIENT workup ( colonoscopy and esophageogramm) please alvarez for appointment  Secondary Diagnosis:	Migraines  Assessment and plan of treatment:	continue home medications ,followup with neurologist dr Brown  Secondary Diagnosis:	Renal arteriosclerosis  Assessment and plan of treatment:	continue current managmnet and medications ,followup with Dr Urban  Secondary Diagnosis:	ASHD (arteriosclerotic heart disease)  Assessment and plan of treatment:	continue home medications  Secondary Diagnosis:	Prophylactic measure  Assessment and plan of treatment:	continue current managmnet and medications  Secondary Diagnosis:	Adjustment disorder with depressed mood  Assessment and plan of treatment:	on remeron ,followup with psychiatrist in 2 mnts Principal Discharge DX:	Intractable vomiting with nausea, unspecified vomiting type  Goal:	continue zofran and protonix  Assessment and plan of treatment:	continue current managmnet and medications ,followup with Gi Dr Mcdonough FOR OUTPATIENT workup ( dysmoltility studies , colonoscopy and esophagogram) please call for appointment  Secondary Diagnosis:	Migraines  Assessment and plan of treatment:	continue home medications ,followup with neurologist dr Brown  Secondary Diagnosis:	Renal arteriosclerosis  Assessment and plan of treatment:	continue current managmnet and medications ,followup with Dr Urban  Secondary Diagnosis:	ASHD (arteriosclerotic heart disease)  Assessment and plan of treatment:	continue home medications  Secondary Diagnosis:	Prophylactic measure  Assessment and plan of treatment:	continue current managmnet and medications  Secondary Diagnosis:	Adjustment disorder with depressed mood  Assessment and plan of treatment:	on remeron ,followup with psychiatrist in 2 mnts

## 2018-10-02 NOTE — PROGRESS NOTE ADULT - PROBLEM SELECTOR PROBLEM 2
HTN (hypertension)
FTT (failure to thrive) in adult
HTN (hypertension)
Migraines
FTT (failure to thrive) in adult
HTN (hypertension)
FTT (failure to thrive) in adult

## 2018-10-02 NOTE — PROGRESS NOTE ADULT - SUBJECTIVE AND OBJECTIVE BOX
Chart and available morning labs /imaging are reviewed electronically , urgent issues addressed . More information  is being added upon completion of rounds , when more information is collected and management discussed with consultants , medical staff and social service/case management on the floor PROGRESS NOTE  Patient is a 63y old  Female who presents with a chief complaint of nausea ,vomiting, weight loss (02 Oct 2018 11:16)  Chart and available morning labs /imaging are reviewed electronically , urgent issues addressed . More information  is being added upon completion of rounds , when more information is collected and management discussed with consultants , medical staff and social service/case management on the floor   OVERNIGHT  No new issues reported by medical staff . All above noted Patient is resting in a bed comfortably  .No distress noted   EGD is negative for PUD /TUMORS  ,small hiatal hernia found .  HPI:  62 yo white female with H/O HTN, RENAL ARTERY STENOSIS ,RECENT WEIGHT LOSS due to emesis , hx of  Seizure, Migraine type of headaches ,Depression/Anxiety Disorder who has had unexplained nausea, weakness, vomiting with additional inability to even keep her meds down. No fever, chills, abdominal pains, chest pain, melena or BRBPR. Patient was seen in ER earlier today due to the same symptoms and possible xanax withdrawal ,she takes it but keeps vomiting it . She was discharged and went to see pcp Dr Tristan Frederick  who advised to go back to ED for FTT workup and GI evaluation . As per PCP she recently has a lot of weight loss and also uncontrolled HTN due to ALEXANDER ,cardiology consult called ,patient refused stenting of renal artery in a past   Does not remember the last time she was endoscoped. Seen by GI CONSULT ,may require EGD ,ppi and antiemetics are started (25 Sep 2018 17:01)    PAST MEDICAL & SURGICAL HISTORY:  Renal arteriosclerosis  Constipation  Anxiety  Depression  HTN (hypertension)  Shingles  Pericarditis  Osteoporosis  Seizure disorder  Migraines  No significant past surgical history      MEDICATIONS  (STANDING):  amLODIPine   Tablet 10 milliGRAM(s) Oral daily  bisacodyl 5 milliGRAM(s) Oral at bedtime  docusate sodium 100 milliGRAM(s) Oral three times a day  enoxaparin Injectable 40 milliGRAM(s) SubCutaneous daily  hydrALAZINE 100 milliGRAM(s) Oral every 8 hours  lactobacillus acidophilus 1 Tablet(s) Oral every 8 hours  mirtazapine Soltab 15 milliGRAM(s) Oral at bedtime  multivitamin 1 Tablet(s) Oral daily  pantoprazole    Tablet 40 milliGRAM(s) Oral before breakfast  potassium chloride    Tablet ER 20 milliEquivalent(s) Oral daily  sertraline 25 milliGRAM(s) Oral daily  topiramate 100 milliGRAM(s) Oral two times a day    MEDICATIONS  (PRN):  acetaminophen   Tablet .. 650 milliGRAM(s) Oral every 6 hours PRN Mild Pain (1 - 3)  acetaminophen 300 mG/butalbital 50 mG/ caffeine 40 mG 1 Capsule(s) Oral every 4 hours PRN Severe Pain (7 - 10)  ALPRAZolam 0.5 milliGRAM(s) Oral at bedtime PRN anxiety, insomnia  metoclopramide Injectable 5 milliGRAM(s) IV Push every 6 hours PRN nausea  senna 2 Tablet(s) Oral at bedtime PRN Constipation  traMADol 50 milliGRAM(s) Oral every 8 hours PRN Moderate Pain (4 - 6)      OBJECTIVE    T(C): 37.2 (10-02-18 @ 13:49), Max: 37.2 (10-02-18 @ 13:49)  HR: 98 (10-02-18 @ 13:49) (85 - 104)  BP: 163/79 (10-02-18 @ 13:49) (160/76 - 184/85)  RR: 16 (10-02-18 @ 13:49) (16 - 17)  SpO2: 98% (10-02-18 @ 13:49) (97% - 98%)  Wt(kg): --  I&O's Summary    01 Oct 2018 07:01  -  02 Oct 2018 07:00  --------------------------------------------------------  IN: 200 mL / OUT: 0 mL / NET: 200 mL    02 Oct 2018 07:01  -  02 Oct 2018 17:31  --------------------------------------------------------  IN: 240 mL / OUT: 0 mL / NET: 240 mL          REVIEW OF SYSTEMS:  CONSTITUTIONAL: No fever, weight loss, or fatigue  EYES: No eye pain, visual disturbances, or discharge  ENMT:   No sinus or throat pain  NECK: No pain or stiffness  RESPIRATORY: No cough, wheezing, chills or hemoptysis; No shortness of breath  CARDIOVASCULAR: No chest pain, palpitations, dizziness, or leg swelling  GASTROINTESTINAL: No abdominal pain. No nausea, vomiting; No diarrhea or constipation. No melena or hematochezia.  GENITOURINARY: No dysuria, frequency, hematuria, or incontinence  NEUROLOGICAL: No headaches, memory loss, loss of strength, numbness, or tremors  SKIN: No itching, burning, rashes, or lesions   MUSCULOSKELETAL: No joint pain or swelling; No muscle, back, or extremity pain    PHYSICAL EXAM:  Appearance: NAD. VS past 24 hrs -as above   HEENT:   Moist oral mucosa. Conjunctiva clear b/l.   Neck : supple  Respiratory: Lungs CTAB.  Gastrointestinal:  Soft, nontender. No rebound. No rigidity. BS present	  Cardiovascular: RRR ,S1S2 present  Neurologic: Non-focal. Moving all extremities.  Extremities: No edema. No erythema. No calf tenderness.  Skin: No rashes, No ecchymoses, No cyanosis.	  wounds ,skin lesions-See skin assesment flow sheet   LABS:                        12.9   7.80  )-----------( 338      ( 02 Oct 2018 09:01 )             39.0     10-02    135  |  101  |  12  ----------------------------<  92  3.2<L>   |  24  |  1.10    Ca    9.2      02 Oct 2018 09:01    TPro  7.7  /  Alb  3.6  /  TBili  0.5  /  DBili  x   /  AST  31  /  ALT  37  /  AlkPhos  89  10-02    CAPILLARY BLOOD GLUCOSE              RADIOLOGY & ADDITIONAL TESTS:   reviewed elctronically  ASSESSMENT/PLAN: 	    Patient was seen and examined on a day of discharge . Plan of care , discharge medications and recommendations discussed with consultants and clearance for discharge obtained .Social service , case managment  and medical staff are aware of plan. Family is notified. Discharge summary  is  prepared electronically

## 2018-10-02 NOTE — PROGRESS NOTE ADULT - PROBLEM SELECTOR PROBLEM 1
Intractable vomiting with nausea, unspecified vomiting type
Renal arteriosclerosis
Intractable vomiting with nausea, unspecified vomiting type
Renal arteriosclerosis
Intractable vomiting with nausea, unspecified vomiting type
Renal arteriosclerosis

## 2018-10-02 NOTE — DISCHARGE NOTE ADULT - SECONDARY DIAGNOSIS.
Migraines Renal arteriosclerosis ASHD (arteriosclerotic heart disease) Prophylactic measure Adjustment disorder with depressed mood

## 2018-10-02 NOTE — PROGRESS NOTE ADULT - PROBLEM SELECTOR PLAN 1
improved  CT neg for acute pathology, no obstruction   sp egd- negative, pt refuses further w/u w colon given prior weight loss  esophagram ordered and pending  ges neg for gastroparesis  gerd precautions  ppi once a day, zofran prn  diet as tolerated   pt states she consumed regular consistency diet pta, denies dysphagia  will need close outpatient gi f/u upon dc including dysmotility studies and colon

## 2018-10-03 LAB
CORTICOSTEROID BINDING GLOBULIN RESULT: 2.7 MG/DL — SIGNIFICANT CHANGE UP
CORTIS F/TOTAL MFR SERPL: 8.4 % — SIGNIFICANT CHANGE UP
CORTIS SERPL-MCNC: 16 UG/DL — SIGNIFICANT CHANGE UP
CORTISOL, FREE RESULT: 1.3 UG/DL — SIGNIFICANT CHANGE UP
RENIN PLAS-CCNC: 47.98 NG/ML/HR — HIGH (ref 0.17–5.38)

## 2018-10-10 ENCOUNTER — APPOINTMENT (OUTPATIENT)
Dept: GASTROENTEROLOGY | Facility: CLINIC | Age: 63
End: 2018-10-10

## 2018-10-11 ENCOUNTER — EMERGENCY (EMERGENCY)
Facility: HOSPITAL | Age: 63
LOS: 1 days | Discharge: HOME | End: 2018-10-11
Attending: EMERGENCY MEDICINE
Payer: MEDICAID

## 2018-10-11 VITALS
WEIGHT: 104.06 LBS | OXYGEN SATURATION: 96 % | HEART RATE: 62 BPM | DIASTOLIC BLOOD PRESSURE: 87 MMHG | RESPIRATION RATE: 16 BRPM | TEMPERATURE: 98 F | SYSTOLIC BLOOD PRESSURE: 179 MMHG

## 2018-10-11 VITALS
SYSTOLIC BLOOD PRESSURE: 165 MMHG | HEART RATE: 73 BPM | TEMPERATURE: 98 F | RESPIRATION RATE: 16 BRPM | DIASTOLIC BLOOD PRESSURE: 67 MMHG | OXYGEN SATURATION: 100 %

## 2018-10-11 PROCEDURE — 99284 EMERGENCY DEPT VISIT MOD MDM: CPT

## 2018-10-11 PROCEDURE — 74176 CT ABD & PELVIS W/O CONTRAST: CPT

## 2018-10-11 PROCEDURE — 74176 CT ABD & PELVIS W/O CONTRAST: CPT | Mod: 26

## 2018-10-11 PROCEDURE — 99284 EMERGENCY DEPT VISIT MOD MDM: CPT | Mod: 25

## 2018-10-11 NOTE — ED ADULT NURSE NOTE - NS ED PATIENT SAFETY CONCERN
Operative Report


Operative Date


Oct 20, 2017.





Pre-Operative Diagnosis





Left Ankle Retained Hardware





Post-Operative Diagnosis





Same





Procedure(s) Performed





Left Ankle Retained Hardware Removal, Deep (Medial & Anterior)





Surgeon


Dr. TACHO Paez





Assistant Surgeon(s)


Dr. BRADEN Maldonado





Estimated Blood Loss


10





Findings


Painful retained hardware left ankle.  Fractures healed.





Fluids (cc crystalloids)


700





Specimens





One plate and 5 screws removed.  Do not send to lab per Dr. Paez.





Drains


n/a





Anesthesia


LMA





Complication(s)


None





Disposition


Recovery Room / PACU (Stable)





Implants


n/a





Indications


The patient is a 66 year old female who underwent previous ORIF of the left 

ankle over 3 months ago, with healed fractures, and has pain over the medial 

hardware, that is limiting her ability to progress with activities and physical 

therapy.  The patient would like to have the medial anterior hardware removed.  

The patient understands the risks of surgery, which include but are not limited 

to: bleeding, infection, re-operation, damage to nerves and arteries, continued 

pain, loss of reduction, refracture, the need for repeat surgery, decrease 

level of activity, and DVT.  The patient understand all of these instructions 

and explanations, all of their questions have been satisfactorily addressed.  

The patient have elected to proceed with surgery and the informed consent was 

signed.





Description of Procedure


The patient was taken to the Operating Room and placed in the supine position 

on the operating table.  After general anesthetic was administered a 

multidisciplinary time-out was performed identifying my initials on the left 

limb as the correct and operative limb.  Prior to the incision being made, 900 

milligrams of intravenous clindamycin was given.  The left leg was prepped and 

draped in the standard fashion.





The previous medial and anterior incisions were marked and injected with a 50:

50 mixture of 1% lidocaine and 0.5 % Marcaine with epi for a total of 10cc.  

The planned medial incision was made and carried down through the skin and 

subcutaneous tissue to the plate which was adjacent to the bone.  The length of 

the plate was identified and freed of any scar tissue.  The 2 proximal screws 

were easily removed and the medial malleoli are screw was partially backed out 

to allow the plate to be removed.  Fluoroscopy was brought in to ensure that 

the fracture was completely healed before removing the cannulated screw.  Any 

scar tissue was debrided with a rongeur.  At this point our attention was drawn 

to the 2 anterior to posterior screws and using the previous anterior incision 

is carried down through the skin, subcutaneous tissue and medial to the 

tibialis anterior tendon was carried down to the bone.  Using fluoroscopy to 

help findings cannulated screws, these were removed.  Final fluoroscopic images 

were obtained showing that the fractures remained healed and the lateral 

fibular plate and screws remained in place.  Her range of motion of the ankle 

at that point dorsiflexion neutral to 5.





The wounds were copiously irrigated.  The subcutaneous layer was closed with 3-

0 Vicryl. The skin was closed with 4-0 Monocryl followed by Dermabond.  Once 

the Dermabond had dried, Steri-Strips were placed over top. The sponge and 

needle counts were correct.  The wounds were covered with 4x4's, ABD's, Steril 

cast padding, and an AO splint was placed.  The patient was awakened and taken 

to the recovery room in stable condition.





Post-op Instructions:


The patient will be toe-touch weightbearing. Pain medicine prescription was 

given pre-operatively to be taken as needed.  The patient will work with 

physical therapy to regain full range of motion and gait training.  The patient 

will follow up with me in 10-15 days.


I attest to the content of the Intraoperative Record and any orders documented 

therein.  Any exceptions are noted below.
No

## 2018-10-11 NOTE — ED PROVIDER NOTE - PROGRESS NOTE DETAILS
pt wants to leave, she is insisting she gets dilaudid for pain but pt is barely able to keep her eyes open and is sleeping in the stretcher, . pt was offered tylenol but refused.  pt states every time she comes to the ER she gets dilaudid for her chronic pain, pt will be d/c home

## 2018-10-11 NOTE — ED ADULT NURSE NOTE - OBJECTIVE STATEMENT
Patient complaining of bilateral flank pain appeared sleepy at this time started tonight seen and evaluated by MD with orders made and carried out.

## 2018-10-11 NOTE — ED ADULT NURSE REASSESSMENT NOTE - NS ED NURSE REASSESS COMMENT FT1
Pt requested pain medication and offered tylenol for pain. Pt refusing tylenol and states "I'm not taking tylenol. Discharge me now." Dr. Soria made aware.

## 2018-10-11 NOTE — ED PROVIDER NOTE - OBJECTIVE STATEMENT
62yo female bib ems with left flank pain tonite. pt has hx of kidney stones, last one was  a year ago, and she passed it, started having constant left flank pain, non radiating, no vomiting or nausea, no urinary complaints. pt took xanax prior to arrival so she is somnolent

## 2018-10-11 NOTE — ED ADULT NURSE NOTE - NSIMPLEMENTINTERV_GEN_ALL_ED
Implemented All Universal Safety Interventions:  [place] to call system. Call bell, personal items and telephone within reach. Instruct patient to call for assistance. Room bathroom lighting operational. Non-slip footwear when patient is off stretcher. Physically safe environment: no spills, clutter or unnecessary equipment. Stretcher in lowest position, wheels locked, appropriate side rails in place.

## 2018-11-14 NOTE — PROGRESS NOTE ADULT - PROBLEM SELECTOR PLAN 6
Called pt to f/u on his 12 month smoking cessation quit status. Pt's caregiver Mrs. Cameron stated he smokes every now and then. Informed her he has benefits available, phone follow ups, and contact information if he was to decide he wanted to rejoin. Will complete smart form and resolve episode.    
continue current management and medications ,pain management
continue current managmnet and medications ,pain management
continue current management and medications ,pain management

## 2018-12-01 NOTE — ED PROVIDER NOTE - CPE EDP RESP NORM
Reason For Visit  YESENIA MOORE is here today for a dressing change and wound check. of right medial malleolus. A chaperone is not applicable. She is accompanied by no one.   :  services not used.      History of Present Illness  HPI: Patient presents to clinic still with c/o pain to ulcer. Pt states it did not improve. Walking does help alleviate pain however pt states is unable to sleep and if sitting has pain to wound. Strikethrough of drainage noted to nylon.  Order was placed by pcp for vascular consult.      Physical Exam  Wound appears to have gotten larger growing posteriorly. Wound bed has approx 10% adherent yellow slough with rest pale pink wound bed. Periwound is intact. No acute ssx of infection.   Pt believes pressure from compression on wound is making pain worse and so will accommodate that by offloading with u-shape foam around wound. Wound with moderate amount of serosanguineous exudate.   Good edema control to RLE.      Medial malleolus measures 0.9x1.6x0.2         Procedure  Removed compression wrap and washed leg with soap and water  CLeansed wound with Exsept cleanser  Applied body oil  Applied HFB ready transfer to wound  Applied u-shaped foam  Applied coflex with calamine  Covered with nylon stockinette         Assessment   1. Varicose veins with ulcer (I83.009,L97.909)   2. Type 2 diabetes mellitus without complications (E11.9)    Plan  Will alter protocol slightly to accommodate for drainage, new slough in wound bed.   Pt to make appointment with vascular, Dr. Betancourt.     Return to Clinic in 1 weeks.      Signatures   Electronically signed by : Tonie Bardales R.N.; Aug 23 2018 11:03AM CST     normal...

## 2018-12-19 NOTE — PROGRESS NOTE ADULT - SUBJECTIVE AND OBJECTIVE BOX
Message has been read and patient responded to PCP   DEPARTMENT OF ANESTHESIA  POST ANESTHETIC EVALUATION    The Patient was interviewed and evaluated    Vital Signs Last 24 Hrs  T(C): 37.1 (02 Oct 2018 04:37), Max: 37.2 (01 Oct 2018 14:56)  T(F): 98.8 (02 Oct 2018 04:37), Max: 99 (01 Oct 2018 14:56)  HR: 85 (02 Oct 2018 06:29) (85 - 110)  BP: 170/76 (02 Oct 2018 06:29) (144/74 - 184/85)  BP(mean): --  RR: 16 (02 Oct 2018 04:37) (16 - 18)  SpO2: 97% (02 Oct 2018 04:37) (97% - 100%)    Evaluation:      (x ) No apparent complications or complaints regarding anesthesia care at this time  (x ) All questions were answered    Condition:  (x ) Stable      ( ) Guarded      ( ) Critical    Recommendations:  (x ) None     ( ) Other: DEPARTMENT OF ANESTHESIA  POST ANESTHETIC EVALUATION    The Patient was interviewed and evaluated    Vital Signs Last 24 Hrs  T(C): 37.1 (02 Oct 2018 04:37), Max: 37.2 (01 Oct 2018 14:56)  T(F): 98.8 (02 Oct 2018 04:37), Max: 99 (01 Oct 2018 14:56)  HR: 85 (02 Oct 2018 06:29) (85 - 110)  BP: 170/76 (02 Oct 2018 06:29) (144/74 - 184/85)  BP(mean): --  RR: 16 (02 Oct 2018 04:37) (16 - 18)  SpO2: 97% (02 Oct 2018 04:37) (97% - 100%)    Evaluation:  s/p EGD    (x ) No apparent complications or complaints regarding anesthesia care at this time  (x ) All questions were answered    Condition:  (x ) Stable      ( ) Guarded      ( ) Critical    Recommendations:  (x ) None     ( ) Other:

## 2018-12-26 PROCEDURE — 85027 COMPLETE CBC AUTOMATED: CPT

## 2018-12-26 PROCEDURE — 85014 HEMATOCRIT: CPT

## 2018-12-26 PROCEDURE — 96375 TX/PRO/DX INJ NEW DRUG ADDON: CPT

## 2018-12-26 PROCEDURE — 85730 THROMBOPLASTIN TIME PARTIAL: CPT

## 2018-12-26 PROCEDURE — 96374 THER/PROPH/DIAG INJ IV PUSH: CPT

## 2018-12-26 PROCEDURE — 74176 CT ABD & PELVIS W/O CONTRAST: CPT

## 2018-12-26 PROCEDURE — 80048 BASIC METABOLIC PNL TOTAL CA: CPT

## 2018-12-26 PROCEDURE — 83605 ASSAY OF LACTIC ACID: CPT

## 2018-12-26 PROCEDURE — 70450 CT HEAD/BRAIN W/O DYE: CPT

## 2018-12-26 PROCEDURE — 82570 ASSAY OF URINE CREATININE: CPT

## 2018-12-26 PROCEDURE — 82947 ASSAY GLUCOSE BLOOD QUANT: CPT

## 2018-12-26 PROCEDURE — 84484 ASSAY OF TROPONIN QUANT: CPT

## 2018-12-26 PROCEDURE — 96376 TX/PRO/DX INJ SAME DRUG ADON: CPT

## 2018-12-26 PROCEDURE — 82436 ASSAY OF URINE CHLORIDE: CPT

## 2018-12-26 PROCEDURE — 82565 ASSAY OF CREATININE: CPT

## 2018-12-26 PROCEDURE — 71045 X-RAY EXAM CHEST 1 VIEW: CPT

## 2018-12-26 PROCEDURE — 93975 VASCULAR STUDY: CPT

## 2018-12-26 PROCEDURE — 82435 ASSAY OF BLOOD CHLORIDE: CPT

## 2018-12-26 PROCEDURE — 84132 ASSAY OF SERUM POTASSIUM: CPT

## 2018-12-26 PROCEDURE — 84295 ASSAY OF SERUM SODIUM: CPT

## 2018-12-26 PROCEDURE — 84244 ASSAY OF RENIN: CPT

## 2018-12-26 PROCEDURE — 85652 RBC SED RATE AUTOMATED: CPT

## 2018-12-26 PROCEDURE — 82330 ASSAY OF CALCIUM: CPT

## 2018-12-26 PROCEDURE — 84133 ASSAY OF URINE POTASSIUM: CPT

## 2018-12-26 PROCEDURE — 84300 ASSAY OF URINE SODIUM: CPT

## 2018-12-26 PROCEDURE — 82533 TOTAL CORTISOL: CPT

## 2018-12-26 PROCEDURE — 84100 ASSAY OF PHOSPHORUS: CPT

## 2018-12-26 PROCEDURE — 86803 HEPATITIS C AB TEST: CPT

## 2018-12-26 PROCEDURE — 80053 COMPREHEN METABOLIC PANEL: CPT

## 2018-12-26 PROCEDURE — 82088 ASSAY OF ALDOSTERONE: CPT

## 2018-12-26 PROCEDURE — 85610 PROTHROMBIN TIME: CPT

## 2018-12-26 PROCEDURE — 83735 ASSAY OF MAGNESIUM: CPT

## 2018-12-26 PROCEDURE — 99285 EMERGENCY DEPT VISIT HI MDM: CPT | Mod: 25

## 2018-12-26 PROCEDURE — 84540 ASSAY OF URINE/UREA-N: CPT

## 2018-12-26 PROCEDURE — 93306 TTE W/DOPPLER COMPLETE: CPT

## 2018-12-26 PROCEDURE — 81001 URINALYSIS AUTO W/SCOPE: CPT

## 2018-12-26 PROCEDURE — 82803 BLOOD GASES ANY COMBINATION: CPT

## 2018-12-26 PROCEDURE — 93005 ELECTROCARDIOGRAM TRACING: CPT

## 2019-01-14 ENCOUNTER — EMERGENCY (EMERGENCY)
Facility: HOSPITAL | Age: 64
LOS: 1 days | Discharge: HOME | End: 2019-01-14
Attending: EMERGENCY MEDICINE | Admitting: EMERGENCY MEDICINE
Payer: COMMERCIAL

## 2019-01-14 VITALS
OXYGEN SATURATION: 98 % | RESPIRATION RATE: 20 BRPM | SYSTOLIC BLOOD PRESSURE: 178 MMHG | DIASTOLIC BLOOD PRESSURE: 86 MMHG | HEART RATE: 59 BPM | TEMPERATURE: 98 F

## 2019-01-14 VITALS
RESPIRATION RATE: 18 BRPM | OXYGEN SATURATION: 97 % | DIASTOLIC BLOOD PRESSURE: 66 MMHG | SYSTOLIC BLOOD PRESSURE: 139 MMHG | WEIGHT: 115.08 LBS | HEART RATE: 61 BPM | TEMPERATURE: 98 F | HEIGHT: 61 IN

## 2019-01-14 PROCEDURE — 99283 EMERGENCY DEPT VISIT LOW MDM: CPT

## 2019-01-14 PROCEDURE — 72070 X-RAY EXAM THORAC SPINE 2VWS: CPT | Mod: 26

## 2019-01-14 PROCEDURE — 72070 X-RAY EXAM THORAC SPINE 2VWS: CPT

## 2019-01-14 PROCEDURE — 72128 CT CHEST SPINE W/O DYE: CPT

## 2019-01-14 PROCEDURE — 72128 CT CHEST SPINE W/O DYE: CPT | Mod: 26

## 2019-01-14 PROCEDURE — 99284 EMERGENCY DEPT VISIT MOD MDM: CPT | Mod: 25

## 2019-01-14 RX ORDER — DIAZEPAM 5 MG
1 TABLET ORAL
Qty: 9 | Refills: 0 | OUTPATIENT
Start: 2019-01-14 | End: 2019-01-16

## 2019-01-14 RX ORDER — OXYCODONE AND ACETAMINOPHEN 5; 325 MG/1; MG/1
2 TABLET ORAL ONCE
Qty: 0 | Refills: 0 | Status: DISCONTINUED | OUTPATIENT
Start: 2019-01-14 | End: 2019-01-14

## 2019-01-14 RX ORDER — LIDOCAINE 4 G/100G
1 CREAM TOPICAL ONCE
Qty: 0 | Refills: 0 | Status: COMPLETED | OUTPATIENT
Start: 2019-01-14 | End: 2019-01-14

## 2019-01-14 RX ADMIN — LIDOCAINE 1 PATCH: 4 CREAM TOPICAL at 21:43

## 2019-01-14 RX ADMIN — OXYCODONE AND ACETAMINOPHEN 2 TABLET(S): 5; 325 TABLET ORAL at 21:19

## 2019-01-14 RX ADMIN — OXYCODONE AND ACETAMINOPHEN 2 TABLET(S): 5; 325 TABLET ORAL at 20:19

## 2019-01-14 NOTE — ED ADULT NURSE NOTE - NSIMPLEMENTINTERV_GEN_ALL_ED
Implemented All Universal Safety Interventions:  [place] to call system. Call bell, personal items and telephone within reach. Instruct patient to call for assistance. Room bathroom lighting operational. Non-slip footwear when patient is off stretcher. Physically safe environment: no spills, clutter or unnecessary equipment. Stretcher in lowest position, wheels locked, appropriate side rails in place.

## 2019-01-14 NOTE — ED PROVIDER NOTE - OBJECTIVE STATEMENT
63 female presents to ER c/o mid thoracic back pain for the past 3 months, states she has a history of compression fracture, has been taking valium with some relief but pain is worsening, having difficulty moving, denies fever, no vomiting.

## 2019-01-14 NOTE — ED PROVIDER NOTE - PROGRESS NOTE DETAILS
patient told to f/u with orthopedics, given name of Dr. Li and norm, understands to return to ER for worsening pain, fever or worsening of symptoms

## 2019-02-06 ENCOUNTER — EMERGENCY (EMERGENCY)
Facility: HOSPITAL | Age: 64
LOS: 1 days | Discharge: HOME | End: 2019-02-06
Attending: EMERGENCY MEDICINE | Admitting: EMERGENCY MEDICINE
Payer: COMMERCIAL

## 2019-02-06 VITALS
WEIGHT: 115.08 LBS | SYSTOLIC BLOOD PRESSURE: 158 MMHG | HEIGHT: 61 IN | HEART RATE: 65 BPM | TEMPERATURE: 98 F | RESPIRATION RATE: 14 BRPM | DIASTOLIC BLOOD PRESSURE: 74 MMHG | OXYGEN SATURATION: 100 %

## 2019-02-06 VITALS
RESPIRATION RATE: 16 BRPM | HEART RATE: 61 BPM | OXYGEN SATURATION: 100 % | DIASTOLIC BLOOD PRESSURE: 70 MMHG | SYSTOLIC BLOOD PRESSURE: 159 MMHG | TEMPERATURE: 98 F

## 2019-02-06 PROCEDURE — 99284 EMERGENCY DEPT VISIT MOD MDM: CPT

## 2019-02-06 PROCEDURE — 99283 EMERGENCY DEPT VISIT LOW MDM: CPT

## 2019-02-06 RX ORDER — OXYCODONE AND ACETAMINOPHEN 5; 325 MG/1; MG/1
2 TABLET ORAL ONCE
Qty: 0 | Refills: 0 | Status: DISCONTINUED | OUTPATIENT
Start: 2019-02-06 | End: 2019-02-06

## 2019-02-06 RX ADMIN — OXYCODONE AND ACETAMINOPHEN 2 TABLET(S): 5; 325 TABLET ORAL at 23:08

## 2019-02-06 NOTE — ED ADULT TRIAGE NOTE - CHIEF COMPLAINT QUOTE
"I have back pain from compression fractures."  pt has upper back chronic pain, worse today with spasms

## 2019-02-06 NOTE — ED PROVIDER NOTE - OBJECTIVE STATEMENT
64yo female with back pain for several weeks. pt was diagnosed with old compression fractures of T spine, had MRI as outpt but orthopedist does not take HIP medicaid, pt has no pain meds left and is asking for pain meds, pain is constant non raidating, no tingling or weakness, pt has not taken anything else for pain

## 2019-02-06 NOTE — ED ADULT NURSE NOTE - OBJECTIVE STATEMENT
Pt. complained of chronic mid back pain worsening today starting at 12:00pm with spasms. Pt. reported history of compression fractures. Pt. stated she has not seen an orthopedist due to insurance.

## 2019-03-17 NOTE — H&P ADULT - NEGATIVE ENMT SYMPTOMS
no ear pain/no tinnitus/no nasal discharge/no hearing difficulty/no vertigo/no sinus symptoms/no nasal congestion
I have personally seen and examined this patient.  I have fully participated in the care of this patient. I have reviewed all pertinent clinical information, including history, physical exam, plan and the Resident’s note and agree except as noted.

## 2019-03-31 ENCOUNTER — EMERGENCY (EMERGENCY)
Facility: HOSPITAL | Age: 64
LOS: 1 days | Discharge: HOME | End: 2019-03-31
Attending: EMERGENCY MEDICINE | Admitting: EMERGENCY MEDICINE
Payer: COMMERCIAL

## 2019-03-31 VITALS
RESPIRATION RATE: 18 BRPM | TEMPERATURE: 99 F | HEIGHT: 62 IN | WEIGHT: 115.08 LBS | OXYGEN SATURATION: 97 % | SYSTOLIC BLOOD PRESSURE: 195 MMHG | HEART RATE: 70 BPM | DIASTOLIC BLOOD PRESSURE: 81 MMHG

## 2019-03-31 VITALS
OXYGEN SATURATION: 100 % | RESPIRATION RATE: 18 BRPM | SYSTOLIC BLOOD PRESSURE: 156 MMHG | TEMPERATURE: 99 F | HEART RATE: 65 BPM | DIASTOLIC BLOOD PRESSURE: 100 MMHG

## 2019-03-31 PROCEDURE — 99283 EMERGENCY DEPT VISIT LOW MDM: CPT

## 2019-03-31 RX ORDER — BUTALBITAL/ACETAMINOPHEN 50MG-650MG
1 TABLET ORAL
Qty: 0 | Refills: 0 | COMMUNITY

## 2019-03-31 RX ORDER — OXYCODONE HYDROCHLORIDE 5 MG/1
1 TABLET ORAL
Qty: 15 | Refills: 0
Start: 2019-03-31

## 2019-03-31 RX ORDER — OXYCODONE HYDROCHLORIDE 5 MG/1
10 TABLET ORAL ONCE
Qty: 0 | Refills: 0 | Status: DISCONTINUED | OUTPATIENT
Start: 2019-03-31 | End: 2019-03-31

## 2019-03-31 RX ADMIN — OXYCODONE HYDROCHLORIDE 10 MILLIGRAM(S): 5 TABLET ORAL at 21:30

## 2019-03-31 RX ADMIN — OXYCODONE HYDROCHLORIDE 10 MILLIGRAM(S): 5 TABLET ORAL at 21:11

## 2019-03-31 NOTE — ED PROVIDER NOTE - OBJECTIVE STATEMENT
62 y/o F with c/o persistent back pain after spinal surgery 2 days ago by Dr. Finkelstein.  pt has chronic osteoporosis and multiple compression fractures. Pt state she was discharged with percocet but was afraid to double the dose due to the Tylenol.  Pt has appointment with Dr. Li in 3 days.  Marino bowel/bladder dysfunction or any new trauma.

## 2019-03-31 NOTE — ED ADULT NURSE NOTE - NSSUSCREENINGQ1_ED_ALL_ED
Pt calling as he has been very fatigued since before his surgery on March 3rd.  He has been working out in the hopes he will start to feel better but he does not.  Asking for a call back to talk about this.  Please advise.  He can be reached at [phone].  OK to leave detailed message.  Jayna Rider  Patient      No

## 2019-03-31 NOTE — ED ADULT TRIAGE NOTE - CHIEF COMPLAINT QUOTE
Patient states she had a procedure to her back at her doctors office on 3/29/2019. C/O back pain, she was sent home with Percocet for pain but denies any relief to pain. Rates pain 9/10 on pain scale.

## 2019-04-26 NOTE — ED PROVIDER NOTE - QUALITY
-Keep trying to to follow low carbohydrate meal plan.    -Try to get some routine exercise.  Exercise helps lower glucose levels.    -Test glucose 1-2x/day- fasting and 2 hours after supper.   -Target levels are fasting , 2 hours after supper less than 180.  -No medication changes today.    -A1c today was 6.9%.  Great job!  Goal is to keep it less than 7.0%.    -Follow up in three months.   -Call with any concerns - PATRIZIA Shotr, [phone].   
throbbing

## 2019-05-19 ENCOUNTER — EMERGENCY (EMERGENCY)
Facility: HOSPITAL | Age: 64
LOS: 1 days | Discharge: HOME | End: 2019-05-19
Attending: EMERGENCY MEDICINE | Admitting: EMERGENCY MEDICINE
Payer: MEDICAID

## 2019-05-19 VITALS
SYSTOLIC BLOOD PRESSURE: 189 MMHG | RESPIRATION RATE: 14 BRPM | WEIGHT: 115.08 LBS | OXYGEN SATURATION: 98 % | HEIGHT: 62 IN | HEART RATE: 60 BPM | DIASTOLIC BLOOD PRESSURE: 72 MMHG | TEMPERATURE: 99 F

## 2019-05-19 VITALS
DIASTOLIC BLOOD PRESSURE: 76 MMHG | SYSTOLIC BLOOD PRESSURE: 164 MMHG | HEART RATE: 64 BPM | OXYGEN SATURATION: 98 % | RESPIRATION RATE: 15 BRPM | TEMPERATURE: 99 F

## 2019-05-19 DIAGNOSIS — Z98.890 OTHER SPECIFIED POSTPROCEDURAL STATES: Chronic | ICD-10-CM

## 2019-05-19 PROCEDURE — 99283 EMERGENCY DEPT VISIT LOW MDM: CPT

## 2019-05-19 RX ORDER — CYCLOBENZAPRINE HYDROCHLORIDE 10 MG/1
1 TABLET, FILM COATED ORAL
Qty: 12 | Refills: 0
Start: 2019-05-19 | End: 2019-05-22

## 2019-05-19 RX ORDER — CYCLOBENZAPRINE HYDROCHLORIDE 10 MG/1
10 TABLET, FILM COATED ORAL ONCE
Refills: 0 | Status: COMPLETED | OUTPATIENT
Start: 2019-05-19 | End: 2019-05-19

## 2019-05-19 RX ORDER — LIDOCAINE 4 G/100G
1 CREAM TOPICAL ONCE
Refills: 0 | Status: COMPLETED | OUTPATIENT
Start: 2019-05-19 | End: 2019-05-19

## 2019-05-19 RX ORDER — OXYCODONE AND ACETAMINOPHEN 5; 325 MG/1; MG/1
1 TABLET ORAL ONCE
Refills: 0 | Status: DISCONTINUED | OUTPATIENT
Start: 2019-05-19 | End: 2019-05-19

## 2019-05-19 RX ADMIN — LIDOCAINE 1 PATCH: 4 CREAM TOPICAL at 20:17

## 2019-05-19 RX ADMIN — OXYCODONE AND ACETAMINOPHEN 1 TABLET(S): 5; 325 TABLET ORAL at 20:17

## 2019-05-19 RX ADMIN — CYCLOBENZAPRINE HYDROCHLORIDE 10 MILLIGRAM(S): 10 TABLET, FILM COATED ORAL at 20:17

## 2019-05-19 NOTE — ED PROVIDER NOTE - OBJECTIVE STATEMENT
compression fractures x 2 are acting up p cleaning the house yesterday.  pt took Percocet 5mg at 1300 c no relief.  HO chronic back pain , but worse today.  no ho fall or trauma.   no other complaint.

## 2019-05-19 NOTE — ED PROVIDER NOTE - NSFOLLOWUPINSTRUCTIONS_ED_ALL_ED_FT
Take Flexeril and percocet as prescribed.  Apply lidocaine patch to affected as prescribed over the counter for 5 days.

## 2019-05-19 NOTE — ED ADULT NURSE NOTE - OBJECTIVE STATEMENT
patient a/o x 4 with a calm affect c/o back pain.  patient has had two compression fractures fixed by Md Belle, and pending two additional compression fractures of the spine.  patient states there was no trauma recently to cause the pain, but was walking up and down stairs all day yesterday and thinks she over did it.

## 2019-05-19 NOTE — ED PROVIDER NOTE - CARE PROVIDER_API CALL
Gualberto Ta (MD)  Anesthesiology; Pain Medicine  [address]  [place] [address]  Phone: [phone]  Fax: [phone]  Follow Up Time:

## 2019-05-19 NOTE — ED ADULT TRIAGE NOTE - CHIEF COMPLAINT QUOTE
"I have back pain."  pt c/o pain to middle upper back, has history of compression fractures and surgery, pt has no relief after regular pain medication of oxy at home

## 2019-05-19 NOTE — ED ADULT NURSE NOTE - CCCP TRG CHIEF CMPLNT
Addendum  created 02/17/17 1944 by Luis Manuel Tirado MD    Anesthesia Event edited, Anesthesia Intra Flowsheets edited, Anesthesia Intra LDAs edited, Anesthesia Intra Meds edited, Anesthesia Review and Sign - Ready for Procedure, Anesthesia Review and Sign - Signed, Anesthesia Staff edited, LDA properties accepted, Order sets accessed, Patient device added, Patient device removed, Problem List reviewed, Procedure Event Log accessed, Sign clinical note, SmartForm saved, Visit Navigator Flowsheet section accepted      
back pain general

## 2019-07-08 NOTE — ED PROVIDER NOTE - NS ED MD EM SELECTION
Pt states Rx has not been received. Refaxed at her refax to number provided.    [address]651 Exp Problem Focused - Mod. Complex

## 2019-07-09 ENCOUNTER — EMERGENCY (EMERGENCY)
Facility: HOSPITAL | Age: 64
LOS: 1 days | Discharge: HOME | End: 2019-07-09
Attending: EMERGENCY MEDICINE | Admitting: EMERGENCY MEDICINE
Payer: MEDICAID

## 2019-07-09 VITALS
TEMPERATURE: 99 F | HEART RATE: 63 BPM | DIASTOLIC BLOOD PRESSURE: 74 MMHG | SYSTOLIC BLOOD PRESSURE: 150 MMHG | WEIGHT: 111.99 LBS | RESPIRATION RATE: 18 BRPM | OXYGEN SATURATION: 97 %

## 2019-07-09 VITALS
DIASTOLIC BLOOD PRESSURE: 68 MMHG | SYSTOLIC BLOOD PRESSURE: 136 MMHG | TEMPERATURE: 98 F | OXYGEN SATURATION: 99 % | HEART RATE: 66 BPM | RESPIRATION RATE: 16 BRPM

## 2019-07-09 DIAGNOSIS — Z98.890 OTHER SPECIFIED POSTPROCEDURAL STATES: Chronic | ICD-10-CM

## 2019-07-09 LAB
ANION GAP SERPL CALC-SCNC: 9 MMOL/L — SIGNIFICANT CHANGE UP (ref 5–17)
BUN SERPL-MCNC: 17 MG/DL — SIGNIFICANT CHANGE UP (ref 7–23)
CALCIUM SERPL-MCNC: 8.1 MG/DL — LOW (ref 8.5–10.1)
CHLORIDE SERPL-SCNC: 116 MMOL/L — HIGH (ref 96–108)
CO2 SERPL-SCNC: 20 MMOL/L — LOW (ref 22–31)
CREAT SERPL-MCNC: 1.2 MG/DL — SIGNIFICANT CHANGE UP (ref 0.5–1.3)
CRP SERPL-MCNC: 0.8 MG/DL — HIGH (ref 0–0.4)
ERYTHROCYTE [SEDIMENTATION RATE] IN BLOOD: 18 MM/HR — SIGNIFICANT CHANGE UP (ref 0–20)
GLUCOSE SERPL-MCNC: 120 MG/DL — HIGH (ref 70–99)
HCT VFR BLD CALC: 38.8 % — SIGNIFICANT CHANGE UP (ref 34.5–45)
HGB BLD-MCNC: 12.3 G/DL — SIGNIFICANT CHANGE UP (ref 11.5–15.5)
MCHC RBC-ENTMCNC: 28.1 PG — SIGNIFICANT CHANGE UP (ref 27–34)
MCHC RBC-ENTMCNC: 31.7 GM/DL — LOW (ref 32–36)
MCV RBC AUTO: 88.8 FL — SIGNIFICANT CHANGE UP (ref 80–100)
NRBC # BLD: 0 /100 WBCS — SIGNIFICANT CHANGE UP (ref 0–0)
PLATELET # BLD AUTO: 253 K/UL — SIGNIFICANT CHANGE UP (ref 150–400)
POTASSIUM SERPL-MCNC: 4 MMOL/L — SIGNIFICANT CHANGE UP (ref 3.5–5.3)
POTASSIUM SERPL-SCNC: 4 MMOL/L — SIGNIFICANT CHANGE UP (ref 3.5–5.3)
RBC # BLD: 4.37 M/UL — SIGNIFICANT CHANGE UP (ref 3.8–5.2)
RBC # FLD: 14 % — SIGNIFICANT CHANGE UP (ref 10.3–14.5)
SODIUM SERPL-SCNC: 145 MMOL/L — SIGNIFICANT CHANGE UP (ref 135–145)
WBC # BLD: 5.48 K/UL — SIGNIFICANT CHANGE UP (ref 3.8–10.5)
WBC # FLD AUTO: 5.48 K/UL — SIGNIFICANT CHANGE UP (ref 3.8–10.5)

## 2019-07-09 PROCEDURE — 80048 BASIC METABOLIC PNL TOTAL CA: CPT

## 2019-07-09 PROCEDURE — 72128 CT CHEST SPINE W/O DYE: CPT | Mod: 26

## 2019-07-09 PROCEDURE — 85652 RBC SED RATE AUTOMATED: CPT

## 2019-07-09 PROCEDURE — 85027 COMPLETE CBC AUTOMATED: CPT

## 2019-07-09 PROCEDURE — 86140 C-REACTIVE PROTEIN: CPT

## 2019-07-09 PROCEDURE — 36415 COLL VENOUS BLD VENIPUNCTURE: CPT

## 2019-07-09 PROCEDURE — 99284 EMERGENCY DEPT VISIT MOD MDM: CPT | Mod: 25

## 2019-07-09 PROCEDURE — 72128 CT CHEST SPINE W/O DYE: CPT

## 2019-07-09 PROCEDURE — 99284 EMERGENCY DEPT VISIT MOD MDM: CPT

## 2019-07-09 NOTE — ED PROVIDER NOTE - FAMILY HISTORY
Mother  Still living? Unknown  Family history of colon cancer in mother, Age at diagnosis: Age Unknown     Father  Still living? No  Family history of heart disease, Age at diagnosis: Age Unknown

## 2019-07-09 NOTE — ED PROVIDER NOTE - CARE PROVIDER_API CALL
Myles Li)  Orthopaedic Surgery  [address] [place] [address]  Phone: [phone]  Fax: [phone]  Follow Up Time:

## 2019-07-09 NOTE — ED PROVIDER NOTE - CONSTITUTIONAL, MLM
normal... Patient seen and examined with nephew at bedside. Well appearing, well nourished, awake, alert, oriented to person, place, time/situation and in minimal distress due to back pain.

## 2019-07-09 NOTE — ED PROVIDER NOTE - OBJECTIVE STATEMENT
This is a 63 y/o Female with PMHx of thoracic compression fractures, osteoporosis, anxiety, depression, HTN d/t renal artery stenosis, migraines, seizure disorder, and shingles presenting with thoracic back pain after a fall on 7/1/2019. Patient was walking with her sister when she tripped and fell on her R knee, R elbow, hitting her L side of her head. Patient admits to losing consciousness for 1 second, but denies any related n/v, seizures, or giuseppe/retrograde memory loss. Back pain was initially noticed on 7/4/2019 and worsened over the weekend. Patient was woken up by pain last night and came to \[facility]\"" ED for evaluation this morning. Pain is located in the mid-thoracic region, midline over spinous processes, is dull in quality, and radiates anteriorly to the right ribs in the underbreast region. Pain worsens with lying down on her back as well as taking a deep breath and improves with lying on her side. Patient took two Tylenol this morning which reduced pain from 10/10 to current 8/10. Patient denies any associated bowel or bladder incontinence, headache, n/v, fever, chills, chest pain, and shortness of breath. Of note, patient had a kyphoplasty with Dr. Li (ortho) in March 2019 followed by a thoracic epidural injection with Dr. Finkelstein (pain management) three weeks ago. Epidural provided complete relief of thoracic back pain until fall.     PCP - Dr. Frederick

## 2019-07-09 NOTE — ED PROVIDER NOTE - ATTENDING CONTRIBUTION TO CARE
Post fall last week c/o back pain where exam revealed midline tenderness midthoracic spine. I agree with plan and management outlined by R-1.

## 2019-07-09 NOTE — ED ADULT NURSE NOTE - NSIMPLEMENTINTERV_GEN_ALL_ED
Implemented All Fall Risk Interventions:  [place] to call system. Call bell, personal items and telephone within reach. Instruct patient to call for assistance. Room bathroom lighting operational. Non-slip footwear when patient is off stretcher. Physically safe environment: no spills, clutter or unnecessary equipment. Stretcher in lowest position, wheels locked, appropriate side rails in place. Provide visual cue, wrist band, yellow gown, etc. Monitor gait and stability. Monitor for mental status changes and reorient to person, place, and time. Review medications for side effects contributing to fall risk. Reinforce activity limits and safety measures with patient and family.

## 2019-07-22 NOTE — CONSULT NOTE ADULT - PROBLEM SELECTOR RECOMMENDATION 3
send 3 sets of cardiac ensymes to rule out coronary event,obtain ECHO to evaluate LVEF,cardiology consult,continue current managmnet,O2 supply,anticoagulation plan as per cardiology consult
done

## 2019-07-23 NOTE — ED ADULT NURSE NOTE - NS ED NOTE ABUSE SUSPICION NEGLECT YN
Respiratory





- HPI Summary


HPI Summary: 


Patient is a 50-year-old male presenting to the ED with a 4 day history of 

cough with production of yellow sputum as well as bilateral conjunctival 

injection with purulent drainage, worse in the morning.  Patient is a smoker.  

Denies fevers, sweats, chills.  Cough is productive, but not painful.  Denies 

any SOB.  Has not used anything over-the-counter however until yesterday began 

to take Keflex, he stated he had at home for an infection from last year.  

Denies any headache, visual changes, including blurry vision or double vision, 

back pain, urinary symptoms.  He states he is otherwise healthy.  History of 

hypertension.  Takes no medications.





- History of Current Complaint


Chief Complaint: EDFluSymptoms


Stated Complaint: SORE THROAT PER PT


Time Seen by Provider: 07/23/19 09:06


Hx Obtained From: Patient


Onset/Duration: Sudden Onset


Timing: Constant


Initial Severity: Moderate


Current Severity: Moderate


Pain Intensity: 0


Character: Cough (Productive)


Sputum Amount: Large


Sputum Color: Green


Aggravating Factor(s): URI


Alleviating Factor(s): Neb. Bronchodilators (Frequency Of Use)





- Risk Factors


Status Asthmaticus Risk Factors: Negative


Pulmonary Embolism Risk Factors: Negative


Cardiac Risk Factors: Negative


Pseudomonas Risk Factors: Negative


Tuberculosis Risk Factors: Negative





- Allergy/Home Medications


Allergies/Adverse Reactions: 


 Allergies











Allergy/AdvReac Type Severity Reaction Status Date / Time


 


No Known Allergies Allergy   Verified 03/14/19 11:23














PMH/Surg Hx/FS Hx/Imm Hx


Previously Healthy: Yes


Endocrine/Hematology History: 


   Denies: Hx Diabetes, Hx Thyroid Disease


Cardiovascular History: 


   Denies: Hx Hypertension


Respiratory History: 


   Denies: Hx Asthma, Hx Chronic Obstructive Pulmonary Disease (COPD)


GI History: 


   Denies: Hx Ulcer


Infectious Disease History: No


Infectious Disease History: 


   Denies: Hx Hepatitis, Hx Human Immunodeficiency Virus (HIV), History Other 

Infectious Disease, Traveled Outside the [place] in Last 30 Days





- Family History


Known Family History: Positive: Hypertension





- Social History


Alcohol Use: Weekly


Substance Use Type: Reports: None


Smoking Status (MU): Heavy Every Day Tobacco Smoker


Type: Cigarettes


Amount Used/How Often: 1/2 PPD


Length of Time of Smoking/Using Tobacco: 25 YEARS





Review of Systems


Negative: Fever, Chills, Fatigue, Skin Diaphoresis


Positive: Drainage, Erythema


Negative: Palpitations, Chest Pain


Positive: Shortness Of Breath, Cough


Musculoskeletal: Negative


Neurological: Negative


All Other Systems Reviewed And Are Negative: Yes





Physical Exam


Triage Information Reviewed: Yes


Vital Signs On Initial Exam: 


 Initial Vitals











Temp Pulse Resp BP Pulse Ox


 


 98.0 F   73   18   150/99   94 


 


 07/23/19 08:35  07/23/19 08:35  07/23/19 08:35  07/23/19 08:35  07/23/19 08:35











Vital Signs Reviewed: Yes


Appearance: Positive: Well-Appearing, Well-Nourished


Skin: Positive: Warm, Skin Color Reflects Adequate Perfusion


Head/Face: Positive: Normal Head/Face Inspection


Eyes: Positive: EOMI, OSCAR, Conjunctiva Clear


Neck: Positive: Supple, No Lymphadenopathy


Respiratory/Lung Sounds: Positive: Clear to Auscultation, Breath Sounds Present


Cardiovascular: Positive: Pulses are Symmetrical in both Upper and Lower 

Extremities


Musculoskeletal: Positive: Strength/ROM Intact


Neurological: Positive: Speech Normal


Psychiatric: Positive: Affect/Mood Appropriate





Diagnostics





- Vital Signs


 Vital Signs











  Temp Pulse Resp BP Pulse Ox


 


 07/23/19 10:10  97.8 F  64  15  133/89  97


 


 07/23/19 09:48   64   133/89  97


 


 07/23/19 09:19   70   128/101  95


 


 07/23/19 09:00   85    94


 


 07/23/19 08:49   77   123/79  92


 


 07/23/19 08:48   73    94


 


 07/23/19 08:35  98.0 F  73  18  150/99  94














- Laboratory


Lab Statement: Any lab studies that have been ordered have been reviewed, and 

results considered in the medical decision making process.





Disposition





- Course


Course Of Treatment: During the ED course, the patient is evaluated for cough 

with production, congestion, slight rhinorrhea and bilateral conjunctival 

injection with purulent drainage, worse in the morning.  Denies fevers, sweats, 

chills.  Physical examination, patient appears well with no signs of 

conjunctival injection.  No cough is noted.  Lungs CTA.  RRR.  Patient states 

he is asymptomatic at this time, however in the morning over the past 4 days, 

he has had purulent drainage bilaterally to both eyes.  He is given polymyxin 

drops here in the ED.  While chest x-ray is negative, he states his cough is 

harsh, his difficulty breathing and has copious amounts production.  This 

patient is a smoker, we'll cover to avoid PNA with azithromycin.





- Diagnoses


Provider Diagnoses: 


 Bronchitis, Conjunctivitis








Discharge





- Sign-Out/Discharge


Documenting (check all that apply): Patient Departure


Patient Received Moderate/Deep Sedation with Procedure: No





- Discharge Plan


Condition: Stable


Disposition: HOME


Prescriptions: 


Albuterol HFA INHALER* [Ventolin HFA Inhaler*] 1 puff INH Q4H PRN #1 mdi


 PRN Reason: Shortness Of Breath


Azithromyxin ODILIA (NF) [Z-Odilia (Zithromax) 250 mg tabs #6] 2 tab PO .TODAY, THEN 

1 DAILY #6 tab


Patient Education Materials:  Acute Bronchitis (ED), Conjunctivitis (ED)


Forms:  *Work Release


Referrals: 


No Primary Care Phys,NOPCP [Primary Care Provider] - 


Additional Instructions: 


polymyxin drops every 4 hours while awake


azithromycin 2 tabs today and 1 tab daily x 4 days





- Billing Disposition and Condition


Condition: STABLE


Disposition: Home
No

## 2019-08-11 ENCOUNTER — EMERGENCY (EMERGENCY)
Facility: HOSPITAL | Age: 64
LOS: 1 days | Discharge: HOME | End: 2019-08-11
Attending: EMERGENCY MEDICINE | Admitting: EMERGENCY MEDICINE
Payer: MEDICAID

## 2019-08-11 VITALS
OXYGEN SATURATION: 100 % | SYSTOLIC BLOOD PRESSURE: 137 MMHG | TEMPERATURE: 98 F | RESPIRATION RATE: 12 BRPM | DIASTOLIC BLOOD PRESSURE: 74 MMHG | HEART RATE: 61 BPM | WEIGHT: 110.01 LBS

## 2019-08-11 VITALS
DIASTOLIC BLOOD PRESSURE: 72 MMHG | SYSTOLIC BLOOD PRESSURE: 141 MMHG | TEMPERATURE: 98 F | RESPIRATION RATE: 15 BRPM | OXYGEN SATURATION: 100 % | HEART RATE: 64 BPM

## 2019-08-11 DIAGNOSIS — Z98.890 OTHER SPECIFIED POSTPROCEDURAL STATES: Chronic | ICD-10-CM

## 2019-08-11 PROCEDURE — 99283 EMERGENCY DEPT VISIT LOW MDM: CPT

## 2019-08-11 PROCEDURE — 99284 EMERGENCY DEPT VISIT MOD MDM: CPT

## 2019-08-11 RX ORDER — OXYCODONE HYDROCHLORIDE 5 MG/1
1 TABLET ORAL
Qty: 16 | Refills: 0
Start: 2019-08-11 | End: 2019-08-14

## 2019-08-11 NOTE — ED PROVIDER NOTE - NSFOLLOWUPINSTRUCTIONS_ED_ALL_ED_FT
Rest  Take medications as directed and if needed  Follow-up with Dr. Li as scheduled  Return here if needed

## 2019-08-11 NOTE — ED ADULT NURSE NOTE - OBJECTIVE STATEMENT
Pt with chronic back pain & injury, also being followed by ortho outpatient to ED c/o mid back pain, pt states she was given pain medication by her ortho and had no relief of pain therefore she threw the medication out in the trash

## 2019-08-11 NOTE — ED PROVIDER NOTE - CONSTITUTIONAL, MLM
normal... Well appearing, thin white female,  well nourished, awake, alert, oriented to person, place, time/situation and in mild apparent distress.

## 2019-08-11 NOTE — ED PROVIDER NOTE - OBJECTIVE STATEMENT
63 yo white female with chronic back pain in pain management for same and now here with slight increase in pain. No red flag symptoms. PCP=Guillermo No recent trauma.

## 2019-08-11 NOTE — ED PROVIDER NOTE - CARE PROVIDER_API CALL
Myles Li)  Orthopaedic Surgery  [address] [place] [address]  Phone: [phone]  Fax: [phone]  Follow Up Time:

## 2019-08-15 ENCOUNTER — EMERGENCY (EMERGENCY)
Facility: HOSPITAL | Age: 64
LOS: 1 days | Discharge: HOME | End: 2019-08-15
Attending: EMERGENCY MEDICINE | Admitting: EMERGENCY MEDICINE
Payer: MEDICAID

## 2019-08-15 VITALS
DIASTOLIC BLOOD PRESSURE: 71 MMHG | SYSTOLIC BLOOD PRESSURE: 198 MMHG | RESPIRATION RATE: 17 BRPM | TEMPERATURE: 99 F | OXYGEN SATURATION: 99 % | WEIGHT: 111.99 LBS | HEART RATE: 62 BPM

## 2019-08-15 VITALS
SYSTOLIC BLOOD PRESSURE: 169 MMHG | OXYGEN SATURATION: 98 % | RESPIRATION RATE: 18 BRPM | HEART RATE: 61 BPM | DIASTOLIC BLOOD PRESSURE: 78 MMHG

## 2019-08-15 DIAGNOSIS — Z98.890 OTHER SPECIFIED POSTPROCEDURAL STATES: Chronic | ICD-10-CM

## 2019-08-15 LAB
ALBUMIN SERPL ELPH-MCNC: 3.4 G/DL — SIGNIFICANT CHANGE UP (ref 3.3–5)
ALP SERPL-CCNC: 69 U/L — SIGNIFICANT CHANGE UP (ref 40–120)
ALT FLD-CCNC: 18 U/L — SIGNIFICANT CHANGE UP (ref 12–78)
ANION GAP SERPL CALC-SCNC: 9 MMOL/L — SIGNIFICANT CHANGE UP (ref 5–17)
APTT BLD: 31.4 SEC — SIGNIFICANT CHANGE UP (ref 28.5–37)
AST SERPL-CCNC: 16 U/L — SIGNIFICANT CHANGE UP (ref 15–37)
BILIRUB SERPL-MCNC: 0.3 MG/DL — SIGNIFICANT CHANGE UP (ref 0.2–1.2)
BUN SERPL-MCNC: 21 MG/DL — SIGNIFICANT CHANGE UP (ref 7–23)
CALCIUM SERPL-MCNC: 9.1 MG/DL — SIGNIFICANT CHANGE UP (ref 8.5–10.1)
CHLORIDE SERPL-SCNC: 112 MMOL/L — HIGH (ref 96–108)
CO2 SERPL-SCNC: 21 MMOL/L — LOW (ref 22–31)
CREAT SERPL-MCNC: 1.1 MG/DL — SIGNIFICANT CHANGE UP (ref 0.5–1.3)
GLUCOSE SERPL-MCNC: 124 MG/DL — HIGH (ref 70–99)
HCT VFR BLD CALC: 37.5 % — SIGNIFICANT CHANGE UP (ref 34.5–45)
HGB BLD-MCNC: 11.9 G/DL — SIGNIFICANT CHANGE UP (ref 11.5–15.5)
INR BLD: 1.01 RATIO — SIGNIFICANT CHANGE UP (ref 0.88–1.16)
MCHC RBC-ENTMCNC: 27.9 PG — SIGNIFICANT CHANGE UP (ref 27–34)
MCHC RBC-ENTMCNC: 31.7 GM/DL — LOW (ref 32–36)
MCV RBC AUTO: 88 FL — SIGNIFICANT CHANGE UP (ref 80–100)
NRBC # BLD: 0 /100 WBCS — SIGNIFICANT CHANGE UP (ref 0–0)
PLATELET # BLD AUTO: 244 K/UL — SIGNIFICANT CHANGE UP (ref 150–400)
POTASSIUM SERPL-MCNC: 3.2 MMOL/L — LOW (ref 3.5–5.3)
POTASSIUM SERPL-SCNC: 3.2 MMOL/L — LOW (ref 3.5–5.3)
PROT SERPL-MCNC: 7.1 G/DL — SIGNIFICANT CHANGE UP (ref 6–8.3)
PROTHROM AB SERPL-ACNC: 11.5 SEC — SIGNIFICANT CHANGE UP (ref 10–12.9)
RBC # BLD: 4.26 M/UL — SIGNIFICANT CHANGE UP (ref 3.8–5.2)
RBC # FLD: 14.4 % — SIGNIFICANT CHANGE UP (ref 10.3–14.5)
SODIUM SERPL-SCNC: 142 MMOL/L — SIGNIFICANT CHANGE UP (ref 135–145)
TROPONIN I SERPL-MCNC: <.015 NG/ML — SIGNIFICANT CHANGE UP (ref 0.01–0.04)
WBC # BLD: 5.59 K/UL — SIGNIFICANT CHANGE UP (ref 3.8–10.5)
WBC # FLD AUTO: 5.59 K/UL — SIGNIFICANT CHANGE UP (ref 3.8–10.5)

## 2019-08-15 PROCEDURE — 85610 PROTHROMBIN TIME: CPT

## 2019-08-15 PROCEDURE — 36415 COLL VENOUS BLD VENIPUNCTURE: CPT

## 2019-08-15 PROCEDURE — 80053 COMPREHEN METABOLIC PANEL: CPT

## 2019-08-15 PROCEDURE — 93010 ELECTROCARDIOGRAM REPORT: CPT

## 2019-08-15 PROCEDURE — 70450 CT HEAD/BRAIN W/O DYE: CPT | Mod: 26

## 2019-08-15 PROCEDURE — 71046 X-RAY EXAM CHEST 2 VIEWS: CPT | Mod: 26

## 2019-08-15 PROCEDURE — 99284 EMERGENCY DEPT VISIT MOD MDM: CPT | Mod: 25

## 2019-08-15 PROCEDURE — 93005 ELECTROCARDIOGRAM TRACING: CPT

## 2019-08-15 PROCEDURE — 85027 COMPLETE CBC AUTOMATED: CPT

## 2019-08-15 PROCEDURE — 71046 X-RAY EXAM CHEST 2 VIEWS: CPT

## 2019-08-15 PROCEDURE — 85730 THROMBOPLASTIN TIME PARTIAL: CPT

## 2019-08-15 PROCEDURE — 84484 ASSAY OF TROPONIN QUANT: CPT

## 2019-08-15 PROCEDURE — 99284 EMERGENCY DEPT VISIT MOD MDM: CPT

## 2019-08-15 PROCEDURE — 70450 CT HEAD/BRAIN W/O DYE: CPT

## 2019-08-15 RX ORDER — ALPRAZOLAM 0.25 MG
0.5 TABLET ORAL ONCE
Refills: 0 | Status: DISCONTINUED | OUTPATIENT
Start: 2019-08-15 | End: 2019-08-15

## 2019-08-15 RX ORDER — ALPRAZOLAM 0.25 MG
1 TABLET ORAL
Qty: 9 | Refills: 0
Start: 2019-08-15

## 2019-08-15 RX ADMIN — Medication 0.5 MILLIGRAM(S): at 21:02

## 2019-08-15 NOTE — ED PROVIDER NOTE - PROGRESS NOTE DETAILS
Pt reports complete symptomatic relief. Labs noted for mild hypokalemia. Pt advised increased potassium in diet. CT negative. EKG wnl. Labs wnl. Trop neg x 1.  Pt asymptomatic. All results discussed with patient and advised prompt PMD follow-up. ED return precautions given. Pt verbalizes agreement and understanding of plan. No emergent concerns at this time. Pt stable for DC home. Pt reports complete symptomatic relief. Labs noted for mild hypokalemia. Pt advised increased potassium in diet. CT negative. EKG wnl. Labs wnl. Trop neg x 1.  Pt asymptomatic. Requests short course of Xanax. All results discussed with patient and advised prompt PMD follow-up. ED return precautions given. Pt verbalizes agreement and understanding of plan. No emergent concerns at this time. Pt stable for DC home.

## 2019-08-15 NOTE — ED PROVIDER NOTE - OBJECTIVE STATEMENT
65 yo F PMHx HTN, renal artery stenosis, migraines, seizure disorder presents to ED for evaluation of elevated BP since yesterday. Pt states that symptoms began after she saw a new PMD for the first time yesterday. Says the doctor made her anxious because she told that her that she had to stop taking Fioricet. Pt reports feeling anxious immediately following the appt and has since noticed her BP "spiking". Pt reports frontal HA and midsternal chest pain x about 2 hours. States VELEZ feels different than her migraines, feels like a "pressure headache"- describes CP as pressure/tightness. Pt requesting Xanax, feels that all these symptoms are related to her anxiety. Denies visual changes, dizziness, SOB, neck pain, abd pain, numbness/tingling, fever/chills.

## 2019-08-15 NOTE — ED ADULT NURSE NOTE - OBJECTIVE STATEMENT
Received patient awake and alert x 4, presenting to the ED with elevated blood pressure and headache. Patient also C/O non-radiating chest pain. Patient does have a history of migraines but verbalized this headache is due to her blood pressure being high. Patient took all her scheduled medication prior to arrival to the ED. Per patient, verbalized all this is due to her anxiety. Patient normally takes Xanax for her anxiety but ran out of her medication. EKG done and given to the PA. Patient placed on a cardiac monitor, blood work sent and IV access maintained. No acute distress noted, verbalized no other complaints, safety maintained, will continue to monitor.

## 2019-08-15 NOTE — ED PROVIDER NOTE - ATTENDING CONTRIBUTION TO CARE
I have personally performed a face to face diagnostic evaluation on this patient.  I have reviewed the PA note and agree with the history, exam, and plan of care, except as noted.  History and Exam by me shows patient c/o anxiety, elevated blood pressure and headache, patient alert and oriented, heart and lungs clear, abdomen soft, f/u labs, ekg, ct head, re-eval.

## 2019-08-15 NOTE — ED PROVIDER NOTE - CLINICAL SUMMARY MEDICAL DECISION MAKING FREE TEXT BOX
63 yo F with elevated BP, with headache and chest pain-- likely anxiety related-- will check labs, CT r/o bleed, and re-evaluate

## 2019-08-15 NOTE — ED PROVIDER NOTE - NSFOLLOWUPINSTRUCTIONS_ED_ALL_ED_FT
Take all your medication as directed. Follow up with your primary doctor in 2-3 days for further evaluation. Return to the ED immediately for new or concerning symptoms.

## 2019-09-21 ENCOUNTER — EMERGENCY (EMERGENCY)
Facility: HOSPITAL | Age: 64
LOS: 1 days | Discharge: HOME | End: 2019-09-21
Attending: EMERGENCY MEDICINE | Admitting: EMERGENCY MEDICINE
Payer: MEDICAID

## 2019-09-21 VITALS
WEIGHT: 119.93 LBS | OXYGEN SATURATION: 98 % | HEIGHT: 62 IN | RESPIRATION RATE: 16 BRPM | DIASTOLIC BLOOD PRESSURE: 73 MMHG | HEART RATE: 61 BPM | TEMPERATURE: 99 F | SYSTOLIC BLOOD PRESSURE: 147 MMHG

## 2019-09-21 DIAGNOSIS — Z98.890 OTHER SPECIFIED POSTPROCEDURAL STATES: Chronic | ICD-10-CM

## 2019-09-21 PROCEDURE — 72070 X-RAY EXAM THORAC SPINE 2VWS: CPT | Mod: 26

## 2019-09-21 PROCEDURE — 99283 EMERGENCY DEPT VISIT LOW MDM: CPT

## 2019-09-21 PROCEDURE — 99283 EMERGENCY DEPT VISIT LOW MDM: CPT | Mod: 25

## 2019-09-21 PROCEDURE — 72070 X-RAY EXAM THORAC SPINE 2VWS: CPT

## 2019-09-21 RX ORDER — DIAZEPAM 5 MG
1 TABLET ORAL
Qty: 6 | Refills: 0
Start: 2019-09-21 | End: 2019-09-22

## 2019-09-21 RX ORDER — LIDOCAINE 4 G/100G
1 CREAM TOPICAL ONCE
Refills: 0 | Status: COMPLETED | OUTPATIENT
Start: 2019-09-21 | End: 2019-09-21

## 2019-09-21 RX ORDER — DIAZEPAM 5 MG
0 TABLET ORAL
Qty: 0 | Refills: 0 | DISCHARGE

## 2019-09-21 RX ORDER — DIAZEPAM 5 MG
5 TABLET ORAL ONCE
Refills: 0 | Status: DISCONTINUED | OUTPATIENT
Start: 2019-09-21 | End: 2019-09-21

## 2019-09-21 RX ORDER — ALPRAZOLAM 0.25 MG
0 TABLET ORAL
Qty: 0 | Refills: 0 | DISCHARGE

## 2019-09-21 RX ADMIN — LIDOCAINE 1 PATCH: 4 CREAM TOPICAL at 21:46

## 2019-09-21 RX ADMIN — Medication 5 MILLIGRAM(S): at 21:22

## 2019-09-21 NOTE — ED PROVIDER NOTE - OBJECTIVE STATEMENT
64 y female presents with right upper back pain,  states she has had similar pain the past, states this pain began today after prepping to pain at home, lifting boxes, straining her back, states she took tylenol for pain, states she has hx of back surgery for osteoporosis in March,  Ortho Dr hung, PMD Dr Frederick  states she cannot take otc advil, motrin products, valium helps her with back pain.

## 2019-09-21 NOTE — ED ADULT NURSE NOTE - OBJECTIVE STATEMENT
Pt reports that she has an old back injury from 30 years ago, that flares up every so often. Today she was trying to paint, and felt her back spasm. Pt states that the only medication that helps her when this happens is Valium.

## 2019-09-21 NOTE — ED ADULT TRIAGE NOTE - CHIEF COMPLAINT QUOTE
right sided mid back pain while stretching and painting. + spasms this afternoon, pervious injury to mid back

## 2019-09-21 NOTE — ED PROVIDER NOTE - ATTENDING CONTRIBUTION TO CARE
Pt seen and examined and d/w PA.  agree with a and p.  pt is a 65 yo female hx osteoporosis and prior thoracic compression fx and kyphoplasty.  pt today lifting boxes and with left sided thoracic paraspinal pain with spasm. no relief with otc meds.  on exam pt uncomfortable with no bony ttp, but  thoracic paraspinal and trapezius spasm. no weakness of arms or legs.  pt tx with valium with good relief.  d/c valium,  nsaids, fu ortho.

## 2019-09-21 NOTE — ED PROVIDER NOTE - PATIENT PORTAL LINK FT
You can access the FollowMyHealth Patient Portal offered by [facility] by registering at the following website: [email or web address]. By joining [employer]’s FollowMyHealth portal, you will also be able to view your health information using other applications (apps) compatible with our system.

## 2019-09-22 RX ORDER — DIAZEPAM 5 MG
1 TABLET ORAL
Qty: 6 | Refills: 0
Start: 2019-09-22 | End: 2019-09-23

## 2019-09-30 NOTE — ED ADULT NURSE NOTE - DOES PATIENT HAVE ADVANCE DIRECTIVE
Anesthesia Post-op Note      Patient: Rizwan Aguirre      Vital Last Value   Temperature 37.2 °C (99 °F) (09/30/19 1625)   Pulse 88 (09/30/19 1646)   Respiratory 20 (09/30/19 1646)   Non-Invasive  Blood Pressure 149/88 (09/30/19 1646)   Arterial   Blood Pressure     Pulse Oximetry 96 % (09/30/19 1646)     Mental status recovered: patient participates in evaluation.  VS noted and are stable.  Respiratory function satisfactory; airway patent.  Cardiovascular function and hydration status satisfactory.  Adequate pain control.  Nausea and vomiting control satisfactory.  No apparent anesthetic complications.  Final Anesthesia Post-op Assessment    Patient: Rizwan Aguirre  Procedure(s) Performed: EGD WITH RT  Anesthesia type: MAC    Vitals Value Taken Time   Temp 37.2 °C (99 °F) 9/30/2019  4:25 PM   Pulse 88 9/30/2019  4:46 PM   Resp 20 9/30/2019  4:46 PM   SpO2 96 % 9/30/2019  4:46 PM   /88 9/30/2019  4:46 PM       Last 24 I/O:     Intake/Output Summary (Last 24 hours) at 9/30/2019 1657  Last data filed at 9/30/2019 1654  Gross per 24 hour   Intake 700 ml   Output --   Net 700 ml             No

## 2019-12-30 ENCOUNTER — EMERGENCY (EMERGENCY)
Facility: HOSPITAL | Age: 64
LOS: 1 days | Discharge: HOME | End: 2019-12-30
Attending: EMERGENCY MEDICINE | Admitting: EMERGENCY MEDICINE
Payer: MEDICAID

## 2019-12-30 VITALS
TEMPERATURE: 98 F | SYSTOLIC BLOOD PRESSURE: 121 MMHG | HEIGHT: 62 IN | HEART RATE: 61 BPM | WEIGHT: 130.07 LBS | RESPIRATION RATE: 15 BRPM | DIASTOLIC BLOOD PRESSURE: 76 MMHG | OXYGEN SATURATION: 99 %

## 2019-12-30 DIAGNOSIS — Z98.890 OTHER SPECIFIED POSTPROCEDURAL STATES: Chronic | ICD-10-CM

## 2019-12-30 PROCEDURE — 99283 EMERGENCY DEPT VISIT LOW MDM: CPT | Mod: 25

## 2019-12-30 PROCEDURE — 72070 X-RAY EXAM THORAC SPINE 2VWS: CPT | Mod: 26

## 2019-12-30 PROCEDURE — 72070 X-RAY EXAM THORAC SPINE 2VWS: CPT

## 2019-12-30 PROCEDURE — 99283 EMERGENCY DEPT VISIT LOW MDM: CPT

## 2019-12-30 RX ORDER — METHOCARBAMOL 500 MG/1
500 TABLET, FILM COATED ORAL ONCE
Refills: 0 | Status: COMPLETED | OUTPATIENT
Start: 2019-12-30 | End: 2019-12-30

## 2019-12-30 RX ORDER — OXYCODONE AND ACETAMINOPHEN 5; 325 MG/1; MG/1
1 TABLET ORAL ONCE
Refills: 0 | Status: DISCONTINUED | OUTPATIENT
Start: 2019-12-30 | End: 2019-12-30

## 2019-12-30 RX ORDER — LIDOCAINE 4 G/100G
1 CREAM TOPICAL ONCE
Refills: 0 | Status: COMPLETED | OUTPATIENT
Start: 2019-12-30 | End: 2019-12-30

## 2019-12-30 RX ADMIN — OXYCODONE AND ACETAMINOPHEN 1 TABLET(S): 5; 325 TABLET ORAL at 19:52

## 2019-12-30 RX ADMIN — METHOCARBAMOL 500 MILLIGRAM(S): 500 TABLET, FILM COATED ORAL at 19:52

## 2019-12-30 RX ADMIN — OXYCODONE AND ACETAMINOPHEN 1 TABLET(S): 5; 325 TABLET ORAL at 20:22

## 2019-12-30 RX ADMIN — LIDOCAINE 1 PATCH: 4 CREAM TOPICAL at 19:53

## 2019-12-30 NOTE — ED ADULT NURSE REASSESSMENT NOTE - NS ED NURSE REASSESS COMMENT FT1
Patient for discharge but no prescription as per Doctor Diane, was yelling and shouting and left without discharge papers.

## 2019-12-30 NOTE — ED PROVIDER NOTE - ATTENDING CONTRIBUTION TO CARE
I have personally performed a face to face diagnostic evaluation on this patient.  I have reviewed the PA note and agree with the history, exam, and plan of care, except as noted.  History and Exam by me shows  mid back pain and ran out pain med x 2-3 days.   pt is currently under care of Jonathan Finkelstein (pain management).   no other complaints.   back- nt, no erythema, swelling or dc.   pt ambulated in the ED s difficulty.  xr no acute fx.

## 2019-12-30 NOTE — ED ADULT TRIAGE NOTE - CHIEF COMPLAINT QUOTE
chronic back pain with lower back spasms. patient with schedule epidural Tuesday but patient unable to tolerate pain today

## 2019-12-30 NOTE — ED PROVIDER NOTE - PROGRESS NOTE DETAILS
call out to Dr Finkelstein, awaiting call back spoke with Jaqueline CARRINGTON , Dr Jonathan Finkelstein, case discussed, patient was given rx hydrocodone on 12/19/19, should have medication at home, will follow up with patient tomorrow, advised no rx at discharge patient verbally abusive to ED staff, eloped without discharge papers

## 2019-12-30 NOTE — ED ADULT NURSE NOTE - OBJECTIVE STATEMENT
Patient came in to ED history of chronic back pain reports worsening pain on the mid back tonight. Patient states scheduled for epidural on Tuesday but cannot tolerate pain that prompted ED consult.

## 2019-12-30 NOTE — ED PROVIDER NOTE - OBJECTIVE STATEMENT
64 y female presents with back pain, states she has a hx of chronic back pain, see pain management Dr Finkelstein,  could not reach him today,  is scheduled for epidural next week.  denies any recent trauma, falls.  states she has been on oxycodone and robaxin  for pain, also takes advil. states she was lifting heavy food trays at home for the holidays.   hx of osteoporosis.

## 2020-02-18 NOTE — ED PROVIDER NOTE - HISTORY ATTESTATION, MLM
I have reviewed and confirmed nurses' notes... Anesthesia Type: 1% lidocaine with epinephrine and a 1:10 solution of 8.4% sodium bicarbonate

## 2020-02-20 NOTE — ED ADULT TRIAGE NOTE - PAIN RATING/NUMBER SCALE (0-10): REST
9 Otezla Counseling: The side effects of Otezla were discussed with the patient, including but not limited to worsening or new depression, weight loss, diarrhea, nausea, upper respiratory tract infection, and headache. Patient instructed to call the office should any adverse effect occur.  The patient verbalized understanding of the proper use and possible adverse effects of Otezla.  All the patient's questions and concerns were addressed.

## 2020-02-24 NOTE — DISCHARGE NOTE ADULT - NS DC ANGIO PCI YN
Infusion Nursing Note:  Yoana Webber presents today for ketamine.    Patient seen by provider today: No   present during visit today: Not Applicable.    Note: Patient states depression might be slightly better, states she is not spending as much time in her room.  Her anxiety level remains the same.  Also states she has suicidal thoughts, but no plan.    Intravenous Access:  Peripheral IV placed.    Post Infusion Assessment:  Patient tolerated infusion without incident.  VS monitored per protocol.  Patient observed for 120 minutes post ketamine per protocol.  Blood return noted pre and post infusion.  Site patent and intact, free from redness, edema or discomfort.  No evidence of extravasations.  Access discontinued per protocol.       Discharge Plan:   Patient discharged in stable condition accompanied by: friend.  Departure Mode: Ambulatory.  Will return to clinic on Wednesday.  Esme Lee RN                         no

## 2020-04-01 ENCOUNTER — OUTPATIENT (OUTPATIENT)
Dept: OUTPATIENT SERVICES | Facility: HOSPITAL | Age: 65
LOS: 1 days | End: 2020-04-01
Payer: MEDICAID

## 2020-04-01 DIAGNOSIS — Z98.890 OTHER SPECIFIED POSTPROCEDURAL STATES: Chronic | ICD-10-CM

## 2020-04-01 PROCEDURE — G9001: CPT

## 2020-04-13 NOTE — ED ADULT NURSE NOTE - MODE OF DISCHARGE
Patient is calling to inform pcp that medication scripts have not be received by pharmacy please resend them     Thank you   Wheelchair

## 2020-04-28 DIAGNOSIS — Z71.89 OTHER SPECIFIED COUNSELING: ICD-10-CM

## 2020-07-29 ENCOUNTER — EMERGENCY (EMERGENCY)
Facility: HOSPITAL | Age: 65
LOS: 1 days | Discharge: HOME | End: 2020-07-29
Attending: EMERGENCY MEDICINE | Admitting: EMERGENCY MEDICINE
Payer: MEDICARE

## 2020-07-29 VITALS
OXYGEN SATURATION: 99 % | TEMPERATURE: 99 F | WEIGHT: 130.07 LBS | SYSTOLIC BLOOD PRESSURE: 131 MMHG | HEIGHT: 63 IN | DIASTOLIC BLOOD PRESSURE: 72 MMHG | RESPIRATION RATE: 16 BRPM | HEART RATE: 62 BPM

## 2020-07-29 VITALS
RESPIRATION RATE: 15 BRPM | SYSTOLIC BLOOD PRESSURE: 128 MMHG | DIASTOLIC BLOOD PRESSURE: 70 MMHG | HEART RATE: 60 BPM | TEMPERATURE: 98 F | OXYGEN SATURATION: 98 %

## 2020-07-29 DIAGNOSIS — Z98.890 OTHER SPECIFIED POSTPROCEDURAL STATES: Chronic | ICD-10-CM

## 2020-07-29 PROCEDURE — 96375 TX/PRO/DX INJ NEW DRUG ADDON: CPT

## 2020-07-29 PROCEDURE — 99284 EMERGENCY DEPT VISIT MOD MDM: CPT | Mod: 25

## 2020-07-29 PROCEDURE — 99285 EMERGENCY DEPT VISIT HI MDM: CPT

## 2020-07-29 PROCEDURE — 96374 THER/PROPH/DIAG INJ IV PUSH: CPT

## 2020-07-29 RX ORDER — METOCLOPRAMIDE HCL 10 MG
10 TABLET ORAL ONCE
Refills: 0 | Status: COMPLETED | OUTPATIENT
Start: 2020-07-29 | End: 2020-07-29

## 2020-07-29 RX ORDER — SODIUM CHLORIDE 9 MG/ML
1000 INJECTION INTRAMUSCULAR; INTRAVENOUS; SUBCUTANEOUS
Refills: 0 | Status: DISCONTINUED | OUTPATIENT
Start: 2020-07-29 | End: 2020-08-02

## 2020-07-29 RX ORDER — ATORVASTATIN CALCIUM 80 MG/1
0 TABLET, FILM COATED ORAL
Qty: 0 | Refills: 0 | DISCHARGE

## 2020-07-29 RX ORDER — MORPHINE SULFATE 50 MG/1
6 CAPSULE, EXTENDED RELEASE ORAL ONCE
Refills: 0 | Status: DISCONTINUED | OUTPATIENT
Start: 2020-07-29 | End: 2020-07-29

## 2020-07-29 RX ADMIN — Medication 10 MILLIGRAM(S): at 21:30

## 2020-07-29 RX ADMIN — MORPHINE SULFATE 4 MILLIGRAM(S): 50 CAPSULE, EXTENDED RELEASE ORAL at 21:30

## 2020-07-29 RX ADMIN — SODIUM CHLORIDE 2000 MILLILITER(S): 9 INJECTION INTRAMUSCULAR; INTRAVENOUS; SUBCUTANEOUS at 21:30

## 2020-07-29 NOTE — ED PROVIDER NOTE - NSFOLLOWUPINSTRUCTIONS_ED_ALL_ED_FT
drink lots of fluids, tylenol for pain, return for fever, intractible pain or rash, follow with neuro next week.

## 2020-07-29 NOTE — ED ADULT TRIAGE NOTE - CHIEF COMPLAINT QUOTE
65 year old female presents to the ED with c/o migraine and nausea without vomiting. PMH of migraines. Denies fall/trauma, new onset change in vision/hearing.

## 2020-07-29 NOTE — ED PROVIDER NOTE - OBJECTIVE STATEMENT
Pt is a 66 yo female hx migraines. pt states one month ago had topomax changed to generic and began with recurrent typical migraines again. pt states pain now severe and with photophobia and phonophobia, pain on left side of head, no f/c, rash or neuro deficits.  no n/v.   no a/a factors

## 2020-07-29 NOTE — ED PROVIDER NOTE - CLINICAL SUMMARY MEDICAL DECISION MAKING FREE TEXT BOX
pt is a 64 yo female p/w typical migraine since being changed to generic topomax, neuro intact, pt states doesn't get releif with motrin, ivf, reglan, morphine, reassess, pt is a 66 yo female p/w typical migraine since being changed to generic topomax, neuro intact, pt states doesn't get releif with motrin, ivf, reglan, morphine, reassess,  improved d/c fu neuro

## 2020-07-29 NOTE — ED ADULT NURSE NOTE - PMH
Anxiety    Constipation    Depression    HTN (hypertension)    Migraines    Osteoporosis    Pericarditis    Renal arteriosclerosis    Seizure disorder    Shingles Anxiety    Constipation    Depression    Drug-seeking behavior    HTN (hypertension)    Migraines    Osteoporosis    Pericarditis    Renal arteriosclerosis    Seizure disorder    Shingles

## 2020-07-29 NOTE — ED PROVIDER NOTE - PATIENT PORTAL LINK FT
You can access the FollowMyHealth Patient Portal offered by [facility] by registering at the following website: [email or web address]. By joining [employer]’s FollowMyHealth portal, you will also be able to view your health information using other applications (apps) compatible with our system.

## 2020-07-29 NOTE — ED PROVIDER NOTE - CHPI ED SYMPTOMS NEG
no dizziness/no weakness/no numbness/no vomiting/no loss of consciousness/no nausea/no fever/no change in level of consciousness/no blurred vision/no confusion

## 2020-07-29 NOTE — ED ADULT NURSE REASSESSMENT NOTE - NS ED NURSE REASSESS COMMENT FT1
pt feeling better -dced home verbalized understanding of d/c instruction - ambulated from ED in stable condition

## 2020-07-29 NOTE — ED ADULT NURSE NOTE - NSIMPLEMENTINTERV_GEN_ALL_ED
Implemented All Universal Safety Interventions:  [place] to call system. Call bell, personal items and telephone within reach. Instruct patient to call for assistance. Room bathroom lighting operational. Non-slip footwear when patient is off stretcher. Physically safe environment: no spills, clutter or unnecessary equipment. Stretcher in lowest position, wheels locked, appropriate side rails in place.

## 2020-08-05 ENCOUNTER — EMERGENCY (EMERGENCY)
Facility: HOSPITAL | Age: 65
LOS: 1 days | Discharge: HOME | End: 2020-08-05
Attending: EMERGENCY MEDICINE | Admitting: EMERGENCY MEDICINE
Payer: MEDICARE

## 2020-08-05 VITALS
RESPIRATION RATE: 16 BRPM | SYSTOLIC BLOOD PRESSURE: 157 MMHG | HEART RATE: 63 BPM | DIASTOLIC BLOOD PRESSURE: 87 MMHG | OXYGEN SATURATION: 97 % | WEIGHT: 130.07 LBS | HEIGHT: 63 IN | TEMPERATURE: 99 F

## 2020-08-05 DIAGNOSIS — Z98.890 OTHER SPECIFIED POSTPROCEDURAL STATES: Chronic | ICD-10-CM

## 2020-08-05 PROCEDURE — 96375 TX/PRO/DX INJ NEW DRUG ADDON: CPT

## 2020-08-05 PROCEDURE — 99284 EMERGENCY DEPT VISIT MOD MDM: CPT | Mod: 25

## 2020-08-05 PROCEDURE — 96374 THER/PROPH/DIAG INJ IV PUSH: CPT

## 2020-08-05 PROCEDURE — 99283 EMERGENCY DEPT VISIT LOW MDM: CPT

## 2020-08-05 RX ORDER — MORPHINE SULFATE 50 MG/1
4 CAPSULE, EXTENDED RELEASE ORAL ONCE
Refills: 0 | Status: DISCONTINUED | OUTPATIENT
Start: 2020-08-05 | End: 2020-08-05

## 2020-08-05 RX ORDER — METOCLOPRAMIDE HCL 10 MG
10 TABLET ORAL ONCE
Refills: 0 | Status: COMPLETED | OUTPATIENT
Start: 2020-08-05 | End: 2020-08-05

## 2020-08-05 RX ORDER — KETOROLAC TROMETHAMINE 30 MG/ML
15 SYRINGE (ML) INJECTION ONCE
Refills: 0 | Status: DISCONTINUED | OUTPATIENT
Start: 2020-08-05 | End: 2020-08-05

## 2020-08-05 RX ADMIN — Medication 15 MILLIGRAM(S): at 23:46

## 2020-08-05 RX ADMIN — Medication 10 MILLIGRAM(S): at 23:46

## 2020-08-05 RX ADMIN — MORPHINE SULFATE 4 MILLIGRAM(S): 50 CAPSULE, EXTENDED RELEASE ORAL at 23:46

## 2020-08-05 NOTE — ED PROVIDER NOTE - PMH
Anxiety    Constipation    Depression    Drug-seeking behavior    HTN (hypertension)    Migraines    Osteoporosis    Pericarditis    Renal arteriosclerosis    Seizure disorder    Shingles

## 2020-08-05 NOTE — ED PROVIDER NOTE - CONSTITUTIONAL, MLM
normal... Uncomfortable appearing female, awake, alert, oriented to person, place, time/situation and in mild apparent distress.

## 2020-08-05 NOTE — ED PROVIDER NOTE - CARE PROVIDER_API CALL
Alize Coulter  NEUROLOGY  [address] [place] [address]  Phone: [phone]  Fax: [phone]  Follow Up Time:

## 2020-08-05 NOTE — ED PROVIDER NOTE - PATIENT PORTAL LINK FT
You can access the FollowMyHealth Patient Portal offered by [facility] by registering at the following website: [email or web address]. By joining [employer]’s FollowMyHealth portal, you will also be able to view your health information using other applications (apps) compatible with our system.

## 2020-08-05 NOTE — ED PROVIDER NOTE - OBJECTIVE STATEMENT
64 yo white female here for evaluation of her typical migraine like headache, left sided, gradual onset with mild nausea, photophobia and phonophobia. Onset was yesterday.  Denies any fever, chills, neck pain, cough or sore throat.

## 2020-08-06 VITALS
HEART RATE: 57 BPM | SYSTOLIC BLOOD PRESSURE: 136 MMHG | TEMPERATURE: 98 F | RESPIRATION RATE: 16 BRPM | DIASTOLIC BLOOD PRESSURE: 80 MMHG | OXYGEN SATURATION: 97 %

## 2020-08-06 PROBLEM — Z76.5 MALINGERER [CONSCIOUS SIMULATION]: Chronic | Status: ACTIVE | Noted: 2020-07-29

## 2020-08-06 NOTE — ED ADULT NURSE NOTE - OBJECTIVE STATEMENT
Pt comes from triage. Pt reports she has been having a migraine all day today, tried oral medications without relief. Pt breathing easy. unlabored, no signs of distress. Pt ambulatory with a steady gait.

## 2020-08-06 NOTE — ED ADULT NURSE NOTE - CHPI ED NUR SYMPTOMS NEG
no vomiting/no dizziness/no fever/no chills/no nausea/no tingling/no weakness/no decreased eating/drinking

## 2020-08-07 ENCOUNTER — EMERGENCY (EMERGENCY)
Facility: HOSPITAL | Age: 65
LOS: 1 days | Discharge: HOME | End: 2020-08-07
Attending: EMERGENCY MEDICINE | Admitting: EMERGENCY MEDICINE
Payer: MEDICARE

## 2020-08-07 VITALS
HEART RATE: 63 BPM | SYSTOLIC BLOOD PRESSURE: 147 MMHG | RESPIRATION RATE: 16 BRPM | HEIGHT: 63 IN | TEMPERATURE: 98 F | OXYGEN SATURATION: 97 % | DIASTOLIC BLOOD PRESSURE: 44 MMHG | WEIGHT: 130.07 LBS

## 2020-08-07 DIAGNOSIS — Z98.890 OTHER SPECIFIED POSTPROCEDURAL STATES: Chronic | ICD-10-CM

## 2020-08-07 PROCEDURE — 99284 EMERGENCY DEPT VISIT MOD MDM: CPT

## 2020-08-07 NOTE — ED ADULT TRIAGE NOTE - CHIEF COMPLAINT QUOTE
persistent headache x2 days  topimax changed from brand to generic and feels worse since the change.

## 2020-08-07 NOTE — ED ADULT NURSE NOTE - OBJECTIVE STATEMENT
66 y/o F patient presents to ED from home c/o headache today. Patient reports she was recently seen in ED for similar headache and headache feels same but with more pain as previous headaches. As per patient she recently had medication change to generic topamax and patient reports she believes headache is due to that. Patient A&Ox4. lungs CTA. skin warm and intact. abdomen soft. Patient denies HA, dizziness, SOB, chest pain, abdominal pain, N/V/D. Safety and comfort measures provided and maintained.

## 2020-08-08 VITALS
RESPIRATION RATE: 18 BRPM | HEART RATE: 69 BPM | OXYGEN SATURATION: 99 % | SYSTOLIC BLOOD PRESSURE: 158 MMHG | DIASTOLIC BLOOD PRESSURE: 69 MMHG

## 2020-08-08 RX ORDER — METOCLOPRAMIDE HCL 10 MG
10 TABLET ORAL ONCE
Refills: 0 | Status: COMPLETED | OUTPATIENT
Start: 2020-08-08 | End: 2020-08-08

## 2020-08-08 RX ORDER — SODIUM CHLORIDE 9 MG/ML
500 INJECTION INTRAMUSCULAR; INTRAVENOUS; SUBCUTANEOUS ONCE
Refills: 0 | Status: COMPLETED | OUTPATIENT
Start: 2020-08-08 | End: 2020-08-08

## 2020-08-08 RX ORDER — HYDROMORPHONE HYDROCHLORIDE 2 MG/ML
0.5 INJECTION INTRAMUSCULAR; INTRAVENOUS; SUBCUTANEOUS ONCE
Refills: 0 | Status: DISCONTINUED | OUTPATIENT
Start: 2020-08-08 | End: 2020-08-08

## 2020-08-08 RX ADMIN — SODIUM CHLORIDE 500 MILLILITER(S): 9 INJECTION INTRAMUSCULAR; INTRAVENOUS; SUBCUTANEOUS at 01:31

## 2020-08-08 RX ADMIN — HYDROMORPHONE HYDROCHLORIDE 0.5 MILLIGRAM(S): 2 INJECTION INTRAMUSCULAR; INTRAVENOUS; SUBCUTANEOUS at 00:46

## 2020-08-08 RX ADMIN — Medication 10 MILLIGRAM(S): at 00:31

## 2020-08-08 RX ADMIN — HYDROMORPHONE HYDROCHLORIDE 0.5 MILLIGRAM(S): 2 INJECTION INTRAMUSCULAR; INTRAVENOUS; SUBCUTANEOUS at 00:31

## 2020-08-08 RX ADMIN — SODIUM CHLORIDE 500 MILLILITER(S): 9 INJECTION INTRAMUSCULAR; INTRAVENOUS; SUBCUTANEOUS at 00:31

## 2020-08-08 NOTE — ED PROVIDER NOTE - PATIENT PORTAL LINK FT
You can access the FollowMyHealth Patient Portal offered by [facility] by registering at the following website: [email or web address]. By joining [employer]’s FollowMyHealth portal, you will also be able to view your health information using other applications (apps) compatible with our system.

## 2020-08-08 NOTE — ED PROVIDER NOTE - PROGRESS NOTE DETAILS
patient feeling better, offered ct scan of head to rule out any intracranial pathology and declined, states this headache is her nor patient feeling better, offered ct scan of head to rule out any intracranial pathology and declined, states this headache is her normal, patient states pain has improved and wants to go home, states she will see her neurologist on Monday

## 2020-08-08 NOTE — ED PROVIDER NOTE - TEMPLATE
Subjective   HPI Comments: Started after chiro adjustment    Patient is a 41 y.o. male presenting with chest pain.   Chest Pain   Pain location:  L chest  Pain quality: sharp    Pain radiates to:  Does not radiate  Pain severity:  Moderate  Onset quality:  Gradual  Timing:  Constant  Chronicity:  New  Context: breathing and movement    Relieved by:  Nothing  Worsened by:  Deep breathing and movement  Ineffective treatments:  None tried  Associated symptoms: shortness of breath    Associated symptoms: no abdominal pain, no back pain and no fever        Review of Systems   Constitutional: Negative for fever.   Respiratory: Positive for shortness of breath.    Cardiovascular: Positive for chest pain.   Gastrointestinal: Negative for abdominal pain.   Musculoskeletal: Negative for back pain.   All other systems reviewed and are negative.      Past Medical History:   Diagnosis Date   • Depression    • Gastric ulcer    • GERD (gastroesophageal reflux disease)    • Hyperlipidemia    • Hypertension    • Lyme disease    • Sleep apnea        No Known Allergies    Past Surgical History:   Procedure Laterality Date   • KNEE SURGERY     • LYMPHADENECTOMY     • VASECTOMY         Family History   Problem Relation Age of Onset   • Cancer Other    • Hyperlipidemia Other    • Hypertension Other    • Kidney disease Other        Social History     Social History   • Marital status:      Spouse name: N/A   • Number of children: N/A   • Years of education: N/A     Social History Main Topics   • Smoking status: Former Smoker   • Smokeless tobacco: None   • Alcohol use Yes      Comment: SOCIAL   • Drug use: No   • Sexual activity: Defer     Other Topics Concern   • None     Social History Narrative           Objective   Physical Exam   Constitutional: He is oriented to person, place, and time. He appears well-developed and well-nourished.   HENT:   Head: Normocephalic and atraumatic.   Right Ear: External ear normal.   Left Ear:  External ear normal.   Nose: Nose normal.   Mouth/Throat: Oropharynx is clear and moist.   Eyes: Conjunctivae and EOM are normal. Pupils are equal, round, and reactive to light.   Neck: Normal range of motion. Neck supple.   Cardiovascular: Normal rate, regular rhythm, normal heart sounds and intact distal pulses.    Pulmonary/Chest: Effort normal and breath sounds normal. He exhibits tenderness. He exhibits no mass.       Abdominal: Soft. Bowel sounds are normal.   Musculoskeletal: Normal range of motion.   Neurological: He is alert and oriented to person, place, and time.   Skin: Skin is warm and dry.   Psychiatric: He has a normal mood and affect. His behavior is normal. Judgment normal.       Procedures         ED Course  ED Course   Comment By Time   Labs and cts reassuring. Well aware of the ss of worsening condition. All thankful and agreeable. EDEL Santacruz 10/18 1142                  [facility]    Final diagnoses:   Left-sided chest wall pain   Left sided chest pain            EDEL Santacruz  10/18/17 2427     Neuro

## 2020-08-08 NOTE — ED PROVIDER NOTE - OBJECTIVE STATEMENT
65 female presents to ER c/o migraine headache, states this is her typical migraine headache, severe, some nausea, no vomiting, no fever, states only dilaudid helps her. Patient in ER recently for the same, states the generic topamax is not helping her headache.

## 2020-08-08 NOTE — ED PROVIDER NOTE - NSCAREINITIATED _GEN_ER
Jacobo Pradhan(Attending) Alert-The patient is alert, awake and responds to voice. The patient is oriented to time, place, and person. The triage nurse is able to obtain subjective information.

## 2020-08-10 ENCOUNTER — TRANSCRIPTION ENCOUNTER (OUTPATIENT)
Age: 65
End: 2020-08-10

## 2020-08-10 ENCOUNTER — INPATIENT (INPATIENT)
Facility: HOSPITAL | Age: 65
LOS: 3 days | Discharge: HOME | DRG: 270 | End: 2020-08-14
Attending: INTERNAL MEDICINE | Admitting: INTERNAL MEDICINE
Payer: MEDICARE

## 2020-08-10 VITALS
WEIGHT: 130.07 LBS | RESPIRATION RATE: 17 BRPM | SYSTOLIC BLOOD PRESSURE: 134 MMHG | HEART RATE: 65 BPM | TEMPERATURE: 99 F | HEIGHT: 63 IN | OXYGEN SATURATION: 96 % | DIASTOLIC BLOOD PRESSURE: 81 MMHG

## 2020-08-10 DIAGNOSIS — I10 ESSENTIAL (PRIMARY) HYPERTENSION: ICD-10-CM

## 2020-08-10 DIAGNOSIS — I12.9 HYPERTENSIVE CHRONIC KIDNEY DISEASE WITH STAGE 1 THROUGH STAGE 4 CHRONIC KIDNEY DISEASE, OR UNSPECIFIED CHRONIC KIDNEY DISEASE: ICD-10-CM

## 2020-08-10 DIAGNOSIS — Z98.890 OTHER SPECIFIED POSTPROCEDURAL STATES: Chronic | ICD-10-CM

## 2020-08-10 DIAGNOSIS — I82.412 ACUTE EMBOLISM AND THROMBOSIS OF LEFT FEMORAL VEIN: ICD-10-CM

## 2020-08-10 LAB
ALBUMIN SERPL ELPH-MCNC: 3.3 G/DL — SIGNIFICANT CHANGE UP (ref 3.3–5)
ALP SERPL-CCNC: 93 U/L — SIGNIFICANT CHANGE UP (ref 40–120)
ALT FLD-CCNC: 29 U/L — SIGNIFICANT CHANGE UP (ref 12–78)
ANION GAP SERPL CALC-SCNC: 6 MMOL/L — SIGNIFICANT CHANGE UP (ref 5–17)
APTT BLD: 30.9 SEC — SIGNIFICANT CHANGE UP (ref 27.5–35.5)
AST SERPL-CCNC: 31 U/L — SIGNIFICANT CHANGE UP (ref 15–37)
BASOPHILS # BLD AUTO: 0.02 K/UL — SIGNIFICANT CHANGE UP (ref 0–0.2)
BASOPHILS NFR BLD AUTO: 0.3 % — SIGNIFICANT CHANGE UP (ref 0–2)
BILIRUB SERPL-MCNC: 0.2 MG/DL — SIGNIFICANT CHANGE UP (ref 0.2–1.2)
BUN SERPL-MCNC: 30 MG/DL — HIGH (ref 7–23)
CALCIUM SERPL-MCNC: 9.2 MG/DL — SIGNIFICANT CHANGE UP (ref 8.5–10.1)
CHLORIDE SERPL-SCNC: 114 MMOL/L — HIGH (ref 96–108)
CO2 SERPL-SCNC: 23 MMOL/L — SIGNIFICANT CHANGE UP (ref 22–31)
CREAT SERPL-MCNC: 1.5 MG/DL — HIGH (ref 0.5–1.3)
EOSINOPHIL # BLD AUTO: 0.21 K/UL — SIGNIFICANT CHANGE UP (ref 0–0.5)
EOSINOPHIL NFR BLD AUTO: 3.1 % — SIGNIFICANT CHANGE UP (ref 0–6)
GLUCOSE SERPL-MCNC: 131 MG/DL — HIGH (ref 70–99)
HCT VFR BLD CALC: 37.5 % — SIGNIFICANT CHANGE UP (ref 34.5–45)
HGB BLD-MCNC: 11.8 G/DL — SIGNIFICANT CHANGE UP (ref 11.5–15.5)
IMM GRANULOCYTES NFR BLD AUTO: 0.7 % — SIGNIFICANT CHANGE UP (ref 0–1.5)
INR BLD: 1.04 RATIO — SIGNIFICANT CHANGE UP (ref 0.88–1.16)
INR BLD: 1.05 RATIO — SIGNIFICANT CHANGE UP (ref 0.88–1.16)
LYMPHOCYTES # BLD AUTO: 1.12 K/UL — SIGNIFICANT CHANGE UP (ref 1–3.3)
LYMPHOCYTES # BLD AUTO: 16.4 % — SIGNIFICANT CHANGE UP (ref 13–44)
MCHC RBC-ENTMCNC: 27.2 PG — SIGNIFICANT CHANGE UP (ref 27–34)
MCHC RBC-ENTMCNC: 31.5 GM/DL — LOW (ref 32–36)
MCV RBC AUTO: 86.4 FL — SIGNIFICANT CHANGE UP (ref 80–100)
MONOCYTES # BLD AUTO: 0.8 K/UL — SIGNIFICANT CHANGE UP (ref 0–0.9)
MONOCYTES NFR BLD AUTO: 11.7 % — SIGNIFICANT CHANGE UP (ref 2–14)
NEUTROPHILS # BLD AUTO: 4.65 K/UL — SIGNIFICANT CHANGE UP (ref 1.8–7.4)
NEUTROPHILS NFR BLD AUTO: 67.8 % — SIGNIFICANT CHANGE UP (ref 43–77)
NRBC # BLD: 0 /100 WBCS — SIGNIFICANT CHANGE UP (ref 0–0)
PLATELET # BLD AUTO: 165 K/UL — SIGNIFICANT CHANGE UP (ref 150–400)
POTASSIUM SERPL-MCNC: 3.9 MMOL/L — SIGNIFICANT CHANGE UP (ref 3.5–5.3)
POTASSIUM SERPL-SCNC: 3.9 MMOL/L — SIGNIFICANT CHANGE UP (ref 3.5–5.3)
PROT SERPL-MCNC: 7 G/DL — SIGNIFICANT CHANGE UP (ref 6–8.3)
PROTHROM AB SERPL-ACNC: 12.2 SEC — SIGNIFICANT CHANGE UP (ref 10.6–13.6)
PROTHROM AB SERPL-ACNC: 12.3 SEC — SIGNIFICANT CHANGE UP (ref 10.6–13.6)
RBC # BLD: 4.34 M/UL — SIGNIFICANT CHANGE UP (ref 3.8–5.2)
RBC # FLD: 15.7 % — HIGH (ref 10.3–14.5)
SARS-COV-2 RNA SPEC QL NAA+PROBE: SIGNIFICANT CHANGE UP
SODIUM SERPL-SCNC: 143 MMOL/L — SIGNIFICANT CHANGE UP (ref 135–145)
WBC # BLD: 6.85 K/UL — SIGNIFICANT CHANGE UP (ref 3.8–10.5)
WBC # FLD AUTO: 6.85 K/UL — SIGNIFICANT CHANGE UP (ref 3.8–10.5)

## 2020-08-10 PROCEDURE — 99285 EMERGENCY DEPT VISIT HI MDM: CPT

## 2020-08-10 PROCEDURE — 93971 EXTREMITY STUDY: CPT | Mod: 26,LT

## 2020-08-10 PROCEDURE — 71045 X-RAY EXAM CHEST 1 VIEW: CPT | Mod: 26

## 2020-08-10 PROCEDURE — 93010 ELECTROCARDIOGRAM REPORT: CPT

## 2020-08-10 PROCEDURE — 73502 X-RAY EXAM HIP UNI 2-3 VIEWS: CPT | Mod: 26,LT

## 2020-08-10 RX ORDER — TRAMADOL HYDROCHLORIDE 50 MG/1
50 TABLET ORAL
Refills: 0 | Status: DISCONTINUED | OUTPATIENT
Start: 2020-08-10 | End: 2020-08-10

## 2020-08-10 RX ORDER — KETOROLAC TROMETHAMINE 30 MG/ML
15 SYRINGE (ML) INJECTION ONCE
Refills: 0 | Status: DISCONTINUED | OUTPATIENT
Start: 2020-08-10 | End: 2020-08-10

## 2020-08-10 RX ORDER — SODIUM CHLORIDE 9 MG/ML
1000 INJECTION, SOLUTION INTRAVENOUS
Refills: 0 | Status: DISCONTINUED | OUTPATIENT
Start: 2020-08-10 | End: 2020-08-11

## 2020-08-10 RX ORDER — PANTOPRAZOLE SODIUM 20 MG/1
40 TABLET, DELAYED RELEASE ORAL
Refills: 0 | Status: DISCONTINUED | OUTPATIENT
Start: 2020-08-10 | End: 2020-08-11

## 2020-08-10 RX ORDER — HEPARIN SODIUM 5000 [USP'U]/ML
4500 INJECTION INTRAVENOUS; SUBCUTANEOUS EVERY 6 HOURS
Refills: 0 | Status: DISCONTINUED | OUTPATIENT
Start: 2020-08-10 | End: 2020-08-11

## 2020-08-10 RX ORDER — ACETAMINOPHEN 500 MG
650 TABLET ORAL EVERY 6 HOURS
Refills: 0 | Status: DISCONTINUED | OUTPATIENT
Start: 2020-08-10 | End: 2020-08-11

## 2020-08-10 RX ORDER — HEPARIN SODIUM 5000 [USP'U]/ML
4500 INJECTION INTRAVENOUS; SUBCUTANEOUS ONCE
Refills: 0 | Status: COMPLETED | OUTPATIENT
Start: 2020-08-10 | End: 2020-08-10

## 2020-08-10 RX ORDER — HEPARIN SODIUM 5000 [USP'U]/ML
INJECTION INTRAVENOUS; SUBCUTANEOUS
Qty: 25000 | Refills: 0 | Status: DISCONTINUED | OUTPATIENT
Start: 2020-08-10 | End: 2020-08-11

## 2020-08-10 RX ORDER — HEPARIN SODIUM 5000 [USP'U]/ML
2000 INJECTION INTRAVENOUS; SUBCUTANEOUS EVERY 6 HOURS
Refills: 0 | Status: DISCONTINUED | OUTPATIENT
Start: 2020-08-10 | End: 2020-08-11

## 2020-08-10 RX ORDER — LABETALOL HCL 100 MG
200 TABLET ORAL ONCE
Refills: 0 | Status: COMPLETED | OUTPATIENT
Start: 2020-08-10 | End: 2020-08-10

## 2020-08-10 RX ORDER — KETOROLAC TROMETHAMINE 30 MG/ML
15 SYRINGE (ML) INJECTION EVERY 6 HOURS
Refills: 0 | Status: DISCONTINUED | OUTPATIENT
Start: 2020-08-10 | End: 2020-08-10

## 2020-08-10 RX ADMIN — Medication 15 MILLIGRAM(S): at 14:14

## 2020-08-10 RX ADMIN — Medication 200 MILLIGRAM(S): at 20:48

## 2020-08-10 RX ADMIN — HEPARIN SODIUM 4500 UNIT(S): 5000 INJECTION INTRAVENOUS; SUBCUTANEOUS at 17:20

## 2020-08-10 RX ADMIN — HEPARIN SODIUM 1100 UNIT(S)/HR: 5000 INJECTION INTRAVENOUS; SUBCUTANEOUS at 17:20

## 2020-08-10 RX ADMIN — SODIUM CHLORIDE 100 MILLILITER(S): 9 INJECTION, SOLUTION INTRAVENOUS at 21:06

## 2020-08-10 NOTE — ED PROVIDER NOTE - CLINICAL SUMMARY MEDICAL DECISION MAKING FREE TEXT BOX
64 yo f pw LLE edema,  +DVT extends up to the femoral. Case discussed with Dr Eb Cortes heparin drip with possible thrombectomy pending CTA venogram of abdomen and LE.

## 2020-08-10 NOTE — CONSULT NOTE ADULT - ATTENDING COMMENTS
Patient evaluated at the bedside. US reviewed. Extensive LLE DVT up to L CFV at least. Young age with significant swelling. CT Venogram to be performed in AM given Cr of 1.5. 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 I would plan on performing thrombectomy tomorrow if possible. Continue IV hydration and IV heparin. Keep LLE elevated with 3 pillows

## 2020-08-10 NOTE — ED ADULT NURSE NOTE - CHIEF COMPLAINT QUOTE
"I was discharged from [facility] yesterday for a migraine and when they discharged me, I was outside sitting on a curb. When I went to get up off the curb, I strained myself and now my leg is all swollen and painful"

## 2020-08-10 NOTE — ED ADULT NURSE NOTE - OBJECTIVE STATEMENT
patient comes to ED for evaluation of swelling left leg which she reports happened suddenly after getting up from sitting on a curb pt has swelling left leg noted

## 2020-08-10 NOTE — CONSULT NOTE ADULT - ASSESSMENT
SANDRA COFFMAN [facility] [phone]  1955 DOA 8/10/2020 DR SAI GANT  ALLERGY      pncl   CONTACT      Madelyn KNOX                Initial evaluation/Pulmonary Critical Care consultation requested on  8/10/2020  by Dr Gant  from Dr Villegas   Patient examined chart reviewed    HOSPITAL ADMISSION   PATIENT CAME  FROM (if information available)      SANDRA COFFMAN [facility] [phone]  1955 DOA 8/10/2020 DR SAI GANT          REVIEW OF SYMPTOMS      Able to give ROS  Yes     RELIABLE No   CONSTITUTIONAL Weakness Yes  Chills No Vision changes No  ENDOCRINE No unexplained hair loss No heat or cold intolerance    ALLERGY No hives  Sore throat No   RESP Coughing blood no  Shortness of breath YES   NEURO No Headache  Confusion Pain neck No   CARDIAC No Chest pain No Palpitations   GI No Pain abdomen NO   Vomiting NO     PHYSICAL EXAM    HEENT Unremarkable PERRLA atraumatic   RESP Fair air entry EXP prolonged    Harsh breath sound Resp distres mild   CARDIAC S1 S2 No S3     NO JVD    ABDOMEN SOFT BS PRESENT NOT DISTENDED No hepatosplenomegaly PEDAL EDEMA present No calf tenderness  NO rash   GENERAL Not TOXIC looking    HPI/PMH.       65 year old female with PMH of renal artery stenosis, HTN, seizures, migraines, osteoporosis, depression, anxiety, here with extensive Left lower extremity DVT involving the left common femoral, femoral, popliteal, and calf veins. symptomatic 2 d Pt has been seen by vasc and thrombectom planned for    Pulm consulted 8/10/2020   On arrival 130/80 po 96%   home meds amlodipine 10 topiramat 100.2 protonix losartan labetalol zoloft     PAST MEDICAL & SURGICAL HISTORY:  Drug-seeking behavior  Renal arteriosclerosis  Constipation  Anxiety  Depression  HTN (hypertension)  Shingles  Pericarditis  Osteoporosis  Seizure disorder  Migraines  History of spine surgery x3      OXYGENATION      8/10/2020 ra 97%    VITALS/LABS       8/10/2020 afeb 67 150/70       RELEVANT DATA    V duplex 8/10/2020 V duplx dvt lle l cf f popliteal   W 8/10/2020 w 6.8  Hb 8/10/2020 Hb 11.8   Plt 8/10/2020 plt 165   INR 8/10/2020 inr 1   Na 8/10/2020 Na 143   Cr 8/10/2020 Cr 1.5    XR 8/10/2020 xr l hip no fx                                         PT DATA/[facility]ST [facility]ACT[facility]  ALLERGY         pncl            WT                 8/10/2020 59                     BMI                     8/10/2020 23       CrCl                    ADVANCED DIRECTIVE     LINES TUBES POA.              HEAD OF BED ELEVATION Yes        PATIENT DATA/ASSESSMENT/RECOMMENDATIONS     EXTENSIVE DVT   May be candidate for clot removl   [facility] on case         OVERALL PLAN.    65 year old female with PMH of renal artery stenosis, HTN, seizures, migraines, osteoporosis, depression, anxiety, here with extensive Left lower extremity DVT involving the left common femoral, femoral, popliteal, and calf veins.   home meds amlodipine 10 topiramat 100.2 protonix losartan labetalol zoloft   Pt admitted 8/10/2020   seen by [facility] started on iv ufh on 8/10/2020     DISPOSITION PLANNING.               Extensive dvt   Thrombectomy being planned by [facility]   Pulm status stable    Will check cxr and echo to ro r heart strain   Will avoid cta for now   Has sl elevated creat Consider renal eval hu if contrast planned  Dr Dimas called                   TIME SPENT Over 55 minutes aggregate care time spent on encounter; activities included combinations of  direct pt care, counseling and/or coordinating care reviewing notes, lab data/ imaging, discussion with multidisciplinary team/ pt /family. Risks, benefits, alternatives of planned interventions when applicable were discussed in detail and questions answered as best as possible    SANDRA COFFMAN [facility] [phone]  1955 DOA 8/10/2020 DR SAI GANT

## 2020-08-10 NOTE — ED PROVIDER NOTE - ATTENDING CONTRIBUTION TO CARE
I have personally performed a face to face diagnostic evaluation on this patient.  I have reviewed the PA note and agree with the history, exam, and plan of care, except as noted.  History and Exam by me shows patient presents to ER c/o left lower extremity swelling for 2 days, denies falling, states it feels like she pulled a muscle, feeling pain to medial thigh and posterior calf, pain worse with walking, patient alert and oriented, heart and lungs clear, abdomen soft, left lower extremity edema, (+)DP pulse, cap refill < 2 sec, slightly mottled appearance of medial thigh, blanching, concern for DVT, f/u US, labs, re-eval.

## 2020-08-10 NOTE — ED ADULT NURSE REASSESSMENT NOTE - NS ED NURSE REASSESS COMMENT FT1
Received report from Darin Ortiz. patient resting in bed and eating. no complaints of pain/discomfort at this time. Safety and comfort measures provided and maintained.

## 2020-08-10 NOTE — CONSULT NOTE ADULT - SUBJECTIVE AND OBJECTIVE BOX
HPI:  65 year old female with PMH of renal artery stenosis, HTN, seizures, migraines, osteoporosis, depression, anxiety, presents with 3 day history of worsening Left lower leg, thigh and groin pain, 8/10 at the worst, which began after she sat down hard on a curb and was unable to get up without help; she though she had pulled a muscle.  Pain is worse with claudication, better with elevation of extremity.  Over the last 2 days, patient noticed her Left leg was becoming increasingly swollen, hot, and tender.  She took tylenol with minimal relief.  Patient admits to a sedentary lifestyle.  She denies any personal or family history of blood clot, dizziness, chest pain, shortness of breath, abdominal pain, numbness, tingling, or loss of sensation to lower extremities.    PAST MEDICAL & SURGICAL HISTORY:  Drug-seeking behavior  Renal arteriosclerosis  Constipation  Anxiety  Depression  HTN (hypertension)  Shingles  Pericarditis  Osteoporosis  Seizure disorder  Migraines  History of spine surgery x3    REVIEW OF SYSTEMS:  CONSTITUTIONAL: No weakness, fevers or chills  EYES/ENT: No visual changes;  No vertigo or throat pain   NECK: No pain or stiffness  RESPIRATORY: No cough, wheezing, hemoptysis; No shortness of breath  CARDIOVASCULAR: No chest pain or palpitations  GASTROINTESTINAL: No abdominal or epigastric pain. No nausea, vomiting, or hematemesis; No diarrhea or constipation. No melena or hematochezia.  GENITOURINARY: No dysuria, frequency or hematuria  NEUROLOGICAL: No numbness or weakness  SKIN: No itching, burning, rashes, or lesions   All other review of systems is negative unless indicated above.    MEDICATIONS  (STANDING):  heparin   Injectable 4500 Unit(s) IV Push once  heparin  Infusion.  Unit(s)/Hr (11 mL/Hr) IV Continuous <Continuous>    MEDICATIONS  (PRN):  heparin   Injectable 4500 Unit(s) IV Push every 6 hours PRN For aPTT less than 40  heparin   Injectable 2000 Unit(s) IV Push every 6 hours PRN For aPTT between 40 - 57    Allergies  penicillin (Anaphylaxis)    SOCIAL HISTORY:  Former smoker, quit 15 years ago, 25 pack years.  No ETOH or illicit drug use    FAMILY HISTORY:  Family history of heart disease (Father)  Family history of colon cancer in mother (Mother)    Vital Signs Last 24 Hrs  T(C): 37.1 (10 Aug 2020 12:58), Max: 37.1 (10 Aug 2020 12:58)  T(F): 98.7 (10 Aug 2020 12:58), Max: 98.7 (10 Aug 2020 12:58)  HR: 65 (10 Aug 2020 12:58) (65 - 65)  BP: 134/81 (10 Aug 2020 12:58) (134/81 - 134/81)  RR: 17 (10 Aug 2020 12:58) (17 - 17)  SpO2: 96% (10 Aug 2020 12:58) (96% - 96%)    PHYSICAL EXAM  GENERAL:  Well-nourished, well-developed female lying comfortably in bed in NAD  HEAD:  Normocephalic, atraumatic  CARDIO:  Regular rate and rhythm.  No murmur, gallop or rub appreciated.  RESPIRATORY:  Clear to auscultation bilaterally.  No wheezing, rales or rhonchi appreciated.  ABDOMEN:  Soft, nondistended, nontender.  EXTREMITIES: Left lower extremity: warm, +edema of calf and thigh, +calf, thigh, and groin tenderness, +Luis's sign.  Bilateral lower extremity: DP/PT pulses 2+, strength 5/5, sensation intact.  NEURO:  A&O x 3    LABS:                        11.8   6.85  )-----------( 165      ( 10 Aug 2020 14:21 )             37.5     08-10    143  |  114<H>  |  30<H>  ----------------------------<  131<H>  3.9   |  23  |  1.50<H>    Ca    9.2      10 Aug 2020 14:21    TPro  7.0  /  Alb  3.3  /  TBili  0.2  /  DBili  x   /  AST  31  /  ALT  29  /  AlkPhos  93  08-10    PT/INR - ( 10 Aug 2020 15:44 )   PT: 12.3 sec;   INR: 1.05 ratio  PTT - ( 10 Aug 2020 15:44 )  PTT:30.9 sec    RADIOLOGY & ADDITIONAL STUDIES:  < from: US Duplex Venous Lower Ext Ltd, Left (08.10.20 @ 14:42) >  FINDINGS:  There is deep venous thrombosis involving the left common femoral, femoral and popliteal veins.  The contralateral common femoral vein is patent.  Left calf vein thrombosis is detected.    IMPRESSION:  Positive for extensive left lower extremity deep venous thrombosis.

## 2020-08-10 NOTE — ED PROVIDER NOTE - OBJECTIVE STATEMENT
64 yo f pmhx sig for htn, osteoporosis pw L LE swelling and pain that began 2 d ago, reports that pain is aching mod in severity began 2 d ago with L calf swelling and pain now radiates to the L groin with now weakness or numbness. Denies trauma, falls, dysuria, urgency and frequency.    I have reviewed available current nursing and previous documentation of past medical, surgical, family, and/or social history.

## 2020-08-10 NOTE — CONSULT NOTE ADULT - SUBJECTIVE AND OBJECTIVE BOX
Patient is a 65y Female whom presented to the hospital with ckd and andrew     PAST MEDICAL & SURGICAL HISTORY:  Drug-seeking behavior  Renal arteriosclerosis  Constipation  Anxiety  Depression  HTN (hypertension)  Shingles  Pericarditis  Osteoporosis  Seizure disorder  Migraines  History of back surgery      MEDICATIONS  (STANDING):  heparin  Infusion.  Unit(s)/Hr (11 mL/Hr) IV Continuous <Continuous>  lactated ringers. 1000 milliLiter(s) (100 mL/Hr) IV Continuous <Continuous>  pantoprazole    Tablet 40 milliGRAM(s) Oral before breakfast      Allergies    penicillin (Anaphylaxis)    Intolerances        SOCIAL HISTORY:  Denies ETOh,Smoking,     FAMILY HISTORY:  Family history of heart disease  Family history of colon cancer in mother      REVIEW OF SYSTEMS:    CONSTITUTIONAL: No weakness, fevers or chills  EYES/ENT: No visual changes;  no throat pain   NECK: No pain or stiffness  RESPIRATORY: No cough, wheezing, hemoptysis; No shortness of breath  CARDIOVASCULAR: No chest pain or palpitations  GASTROINTESTINAL: No abdominal or epigastric pain. No nausea, vomiting,     No diarrhea or constipation. No melena   GENITOURINARY: No dysuria, frequency or hematuria  NEUROLOGICAL: No numbness or weakness  SKIN: dry  complaining of pain, lower leg.    VITAL:  T(C): , Max: 37.1 (08-10-20 @ 12:58)  T(F): , Max: 98.7 (08-10-20 @ 12:58)  HR: 64 (08-10-20 @ 19:45)  BP: 144/79 (08-10-20 @ 19:45)  BP(mean): --  RR: 18 (08-10-20 @ 19:45)  SpO2: 97% (08-10-20 @ 19:45)  Wt(kg): --    I and O's:    Height (cm): 160.02 (08-10 @ 12:58)  Weight (kg): 59 (08-10 @ 12:58)  BMI (kg/m2): 23 (08-10 @ 12:58)  BSA (m2): 1.61 (08-10 @ 12:58)    PHYSICAL EXAM:    Constitutional: NAD  HEENT: conjunctive   clear   Neck:  No JVD  Respiratory: CTAB  Cardiovascular: S1 and S2  Gastrointestinal: BS+, soft, NT/ND  Extremities: pos  peripheral edema  Neurological: no focal deficits  Skin: dry    LABS:                        11.8   6.85  )-----------( 165      ( 10 Aug 2020 14:21 )             37.5     08-10    143  |  114<H>  |  30<H>  ----------------------------<  131<H>  3.9   |  23  |  1.50<H>    Ca    9.2      10 Aug 2020 14:21    TPro  7.0  /  Alb  3.3  /  TBili  0.2  /  DBili  x   /  AST  31  /  ALT  29  /  AlkPhos  93  08-10      Urine Studies:          RADIOLOGY & ADDITIONAL STUDIES:

## 2020-08-10 NOTE — CONSULT NOTE ADULT - PROBLEM SELECTOR RECOMMENDATION 9
will obtained base line bun and cr   iv fluid lactated ringers 100 cc per hr  will f/u with bun and cr after contrast   check ua and urine protein , cretinine

## 2020-08-10 NOTE — CONSULT NOTE ADULT - ASSESSMENT
65 year old female with PMH of renal artery stenosis, HTN, seizures, migraines, osteoporosis, depression, anxiety, here with extensive Left lower extremity DVT involving the left common femoral, femoral, popliteal, and calf veins.    - Follow up CT venogram of abdomen/pelvis to evaluate Left iliac vein  - Continue heparin drip  - Discussed with Dr. Fall 65 year old female with PMH of renal artery stenosis, HTN, seizures, migraines, osteoporosis, depression, anxiety, here with extensive Left lower extremity DVT involving the left common femoral, femoral, popliteal, and calf veins.    - Follow up CT venogram of abdomen/pelvis to evaluate Left iliac vein- ok to do in AM, possible thrombectomy in afternoon tomorrow.  - Continue heparin drip  - Elevate LLE  - Discussed with Dr. Fall

## 2020-08-10 NOTE — CONSULT NOTE ADULT - SUBJECTIVE AND OBJECTIVE BOX
[facility] [place]     Patient is a 65y old  Female who presents with a chief complaint of     HPI:      HPI:    65y Female for Cardiology Consult    PAST MEDICAL & SURGICAL HISTORY:  Drug-seeking behavior  Renal arteriosclerosis  Constipation  Anxiety  Depression  HTN (hypertension)  Shingles  Pericarditis  Osteoporosis  Seizure disorder  Migraines  History of back surgery      FAMILY HISTORY:  Family history of heart disease  Family history of colon cancer in mother      SOCIAL HISTORY:   Alcohol:  Smoking:    Allergies    penicillin (Anaphylaxis)    Intolerances        MEDICATIONS  (STANDING):  heparin  Infusion.  Unit(s)/Hr (11 mL/Hr) IV Continuous <Continuous>  lactated ringers. 1000 milliLiter(s) (100 mL/Hr) IV Continuous <Continuous>  pantoprazole    Tablet 40 milliGRAM(s) Oral before breakfast    MEDICATIONS  (PRN):  acetaminophen   Tablet .. 650 milliGRAM(s) Oral every 6 hours PRN Temp greater or equal to 38C (100.4F), Mild Pain (1 - 3)  heparin   Injectable 4500 Unit(s) IV Push every 6 hours PRN For aPTT less than 40  heparin   Injectable 2000 Unit(s) IV Push every 6 hours PRN For aPTT between 40 - 57      REVIEW OF SYSTEMS:  CONSTITUTIONAL: No fever, weight loss, chills, shakes, or fat  RESPIRATORY: No cough, wheezing, hemoptysis, or shortness of breath  CARDIOVASCULAR: No chest pain, dyspnea, palpitations, dizziness, syncope, paroxysmal nocturnal dyspnea, orthopnea, or arm or leg swelling  GASTROINTESTINAL: No abdominal  or epigastric pain, nausea, vomiting, hematemesis, diarrhea, constipation, melena or bright red bloo  NEUROLOGICAL: No headaches, memory loss, slurred speech, limb weakness, loss of strength, numbness, or tremors  SKIN: No itching, burning, rashes, or lesions  ENDOCRINE: No heat or cold intolerance, or hair loss  MUSCULOSKELETAL: No joint pain or swelling, muscle, back, or extremity pain  HEME/LYMPH: No easy bruising or bleeding gums  ALLERY AND IMMUNOLOGIC: No hives or rash.    Vital Signs Last 24 Hrs  T(C): 36.8 (10 Aug 2020 19:45), Max: 37.1 (10 Aug 2020 12:58)  T(F): 98.3 (10 Aug 2020 19:45), Max: 98.7 (10 Aug 2020 12:58)  HR: 64 (10 Aug 2020 19:45) (64 - 67)  BP: 144/79 (10 Aug 2020 19:45) (134/81 - 153/71)  BP(mean): --  RR: 18 (10 Aug 2020 19:45) (17 - 18)  SpO2: 97% (10 Aug 2020 19:45) (96% - 97%)    PHYSICAL EXAM:  HEAD:  Atraumatic, Normocephalic  EYES: EOMI, PERRLA, conjunctiva and sclera clear  NECK: Supple and normal thyroid.  No JVD or carotid bruit.   HEART: S1, S2 regular , 1/6 soft ejection systolic murmur in mitral area , no thrill and no gallops .  PULMONARY: Bilateral vesicular breathing , few scattered ronchi and few scattered rales are present .  ABDOMEN: Soft nontender and positive bowl sounds   EXTREMITIES:  No clubbing, cyanosis, or pedal  edema  NEUROLOGICAL: AAOX3 , no focal deficit .    LABS:                        11.8   6.85  )-----------( 165      ( 10 Aug 2020 14:21 )             37.5     08-10    143  |  114<H>  |  30<H>  ----------------------------<  131<H>  3.9   |  23  |  1.50<H>    Ca    9.2      10 Aug 2020 14:21    TPro  7.0  /  Alb  3.3  /  TBili  0.2  /  DBili  x   /  AST  31  /  ALT  29  /  AlkPhos  93  08-10        PT/INR - ( 10 Aug 2020 15:44 )   PT: 12.3 sec;   INR: 1.05 ratio         PTT - ( 10 Aug 2020 15:44 )  PTT:30.9 sec    BNP      EKG:  ECHO:  IMAGING:    Assessment and Plan :     Will continue to follow during hospital course and give further recommendations as needed . Thanks for your referral . if any questions please contact me at office [phone] [phone]) Lio Fischer MD [facility]  Cardiology Consult :    Patient is a 65y old  Female who presents with a chief complaint of     HPI:      HPI:    65y Female for Cardiology Consult    PAST MEDICAL & SURGICAL HISTORY:  Drug-seeking behavior  Renal arteriosclerosis  Constipation  Anxiety  Depression  HTN (hypertension)  Shingles  Pericarditis  Osteoporosis  Seizure disorder  Migraines  History of back surgery      FAMILY HISTORY:  Family history of heart disease  Family history of colon cancer in mother      SOCIAL HISTORY:   Alcohol:  Smoking:    Allergies    penicillin (Anaphylaxis)    Intolerances        MEDICATIONS  (STANDING):  heparin  Infusion.  Unit(s)/Hr (11 mL/Hr) IV Continuous <Continuous>  lactated ringers. 1000 milliLiter(s) (100 mL/Hr) IV Continuous <Continuous>  pantoprazole    Tablet 40 milliGRAM(s) Oral before breakfast    MEDICATIONS  (PRN):  acetaminophen   Tablet .. 650 milliGRAM(s) Oral every 6 hours PRN Temp greater or equal to 38C (100.4F), Mild Pain (1 - 3)  heparin   Injectable 4500 Unit(s) IV Push every 6 hours PRN For aPTT less than 40  heparin   Injectable 2000 Unit(s) IV Push every 6 hours PRN For aPTT between 40 - 57      Vital Signs Last 24 Hrs  T(C): 36.8 (10 Aug 2020 19:45), Max: 37.1 (10 Aug 2020 12:58)  T(F): 98.3 (10 Aug 2020 19:45), Max: 98.7 (10 Aug 2020 12:58)  HR: 64 (10 Aug 2020 19:45) (64 - 67)  BP: 144/79 (10 Aug 2020 19:45) (134/81 - 153/71)  BP(mean): --  RR: 18 (10 Aug 2020 19:45) (17 - 18)  SpO2: 97% (10 Aug 2020 19:45) (96% - 97%)                        11.8   6.85  )-----------( 165      ( 10 Aug 2020 14:21 )             37.5     08-10    143  |  114<H>  |  30<H>  ----------------------------<  131<H>  3.9   |  23  |  1.50<H>    Ca    9.2      10 Aug 2020 14:21    TPro  7.0  /  Alb  3.3  /  TBili  0.2  /  DBili  x   /  AST  31  /  ALT  29  /  AlkPhos  93  08-10        PT/INR - ( 10 Aug 2020 15:44 )   PT: 12.3 sec;   INR: 1.05 ratio         PTT - ( 10 Aug 2020 15:44 )  PTT:30.9 sec    Assessment and Plan :   FULL CONSULT DICTATED .  65 years old female with H/O Hypertensin has significant pain and swelling of left lower extremity and has extensive DVT . Cardiac stable so far .  Will continue to follow during hospital course and give further recommendations as needed . Thanks for your referral . if any questions please contact me at office [phone] [phone])

## 2020-08-10 NOTE — ED ADULT TRIAGE NOTE - CHIEF COMPLAINT QUOTE
"I was discharged from [facility] yesterday for a migraine and when they discharged me, I was outside sitting on a curb. When I went to get up off the curb, I strained myself and now my leg is all swollen and painful"

## 2020-08-10 NOTE — ED PROVIDER NOTE - PHYSICAL EXAMINATION
Physical Exam    Vital Signs: I have reviewed the initial vital signs.  Constitutional: well-nourished, appears stated age, no acute distress  Eyes: PERRLA, and symmetrical lids.  ENT: Neck supple with no adenopathy, moist MM.  Cardiovascular: regular rate, regular rhythm, well-perfused extremities  Respiratory: unlabored respiratory effort, clear to auscultation bilaterally  Gastrointestinal: soft, non-tender abdomen, no guarding  Musculoskeletal: supple neck, +LLE edema with calf swelling and ttp along the L thigh with pitting L pedal edema  Integumentary: warm, dry, no rash  Neurologic: awake, alert, cranial nerves II-XII grossly intact, extremities’ motor and sensory functions grossly intact  Psychiatric: A&Ox3, appropriate mood, appropriate affect

## 2020-08-10 NOTE — CONSULT NOTE ADULT - SUBJECTIVE AND OBJECTIVE BOX
AUGUSTUS AGUILAPAULINA    [facility] APER 06    Patient is a 65y old  Female who presents with a chief complaint of      Allergies    penicillin (Anaphylaxis)    Intolerances        HPI:      PAST MEDICAL & SURGICAL HISTORY:  Drug-seeking behavior  Renal arteriosclerosis  Constipation  Anxiety  Depression  HTN (hypertension)  Shingles  Pericarditis  Osteoporosis  Seizure disorder  Migraines  History of back surgery      FAMILY HISTORY:  Family history of heart disease  Family history of colon cancer in mother        MEDICATIONS   acetaminophen   Tablet .. 650 milliGRAM(s) Oral every 6 hours PRN  heparin   Injectable 4500 Unit(s) IV Push every 6 hours PRN  heparin   Injectable 2000 Unit(s) IV Push every 6 hours PRN  heparin  Infusion.  Unit(s)/Hr IV Continuous <Continuous>  ketorolac   Injectable 15 milliGRAM(s) IV Push every 6 hours PRN  lactated ringers. 1000 milliLiter(s) IV Continuous <Continuous>  pantoprazole    Tablet 40 milliGRAM(s) Oral before breakfast      Vital Signs Last 24 Hrs  T(C): 36.8 (10 Aug 2020 19:45), Max: 37.1 (10 Aug 2020 12:58)  T(F): 98.3 (10 Aug 2020 19:45), Max: 98.7 (10 Aug 2020 12:58)  HR: 64 (10 Aug 2020 19:45) (64 - 67)  BP: 144/79 (10 Aug 2020 19:45) (134/81 - 153/71)  BP(mean): --  RR: 18 (10 Aug 2020 19:45) (17 - 18)  SpO2: 97% (10 Aug 2020 19:45) (96% - 97%)            LABS:                        11.8   6.85  )-----------( 165      ( 10 Aug 2020 14:21 )             37.5     08-10    143  |  114<H>  |  30<H>  ----------------------------<  131<H>  3.9   |  23  |  1.50<H>    Ca    9.2      10 Aug 2020 14:21    TPro  7.0  /  Alb  3.3  /  TBili  0.2  /  DBili  x   /  AST  31  /  ALT  29  /  AlkPhos  93  08-10    PT/INR - ( 10 Aug 2020 15:44 )   PT: 12.3 sec;   INR: 1.05 ratio         PTT - ( 10 Aug 2020 15:44 )  PTT:30.9 sec          WBC:  WBC Count: 6.85 K/uL (08-10 @ 14:21)      MICROBIOLOGY:  RECENT CULTURES:              PT/INR - ( 10 Aug 2020 15:44 )   PT: 12.3 sec;   INR: 1.05 ratio         PTT - ( 10 Aug 2020 15:44 )  PTT:30.9 sec    Sodium:  Sodium, Serum: 143 mmol/L (08-10 @ 14:21)      1.50 mg/dL 08-10 @ 14:21      Hemoglobin:  Hemoglobin: 11.8 g/dL (08-10 @ 14:21)      Platelets: Platelet Count - Automated: 165 K/uL (08-10 @ 14:21)      LIVER FUNCTIONS - ( 10 Aug 2020 14:21 )  Alb: 3.3 g/dL / Pro: 7.0 g/dL / ALK PHOS: 93 U/L / ALT: 29 U/L / AST: 31 U/L / GGT: x                 RADIOLOGY & ADDITIONAL STUDIES:

## 2020-08-10 NOTE — CONSULT NOTE ADULT - ASSESSMENT
66 yo f pmhx sig for htn, osteoporosis pw L LE swelling and pain that began 2 d ago, possible ckd will obtained base line bun and cr

## 2020-08-11 ENCOUNTER — RESULT REVIEW (OUTPATIENT)
Age: 65
End: 2020-08-11

## 2020-08-11 DIAGNOSIS — R09.89 OTHER SPECIFIED SYMPTOMS AND SIGNS INVOLVING THE CIRCULATORY AND RESPIRATORY SYSTEMS: ICD-10-CM

## 2020-08-11 DIAGNOSIS — I82.412 ACUTE EMBOLISM AND THROMBOSIS OF LEFT FEMORAL VEIN: ICD-10-CM

## 2020-08-11 DIAGNOSIS — K59.00 CONSTIPATION, UNSPECIFIED: ICD-10-CM

## 2020-08-11 DIAGNOSIS — G43.909 MIGRAINE, UNSPECIFIED, NOT INTRACTABLE, WITHOUT STATUS MIGRAINOSUS: ICD-10-CM

## 2020-08-11 DIAGNOSIS — G40.909 EPILEPSY, UNSPECIFIED, NOT INTRACTABLE, WITHOUT STATUS EPILEPTICUS: ICD-10-CM

## 2020-08-11 DIAGNOSIS — F32.9 MAJOR DEPRESSIVE DISORDER, SINGLE EPISODE, UNSPECIFIED: ICD-10-CM

## 2020-08-11 DIAGNOSIS — Z29.9 ENCOUNTER FOR PROPHYLACTIC MEASURES, UNSPECIFIED: ICD-10-CM

## 2020-08-11 DIAGNOSIS — N17.9 ACUTE KIDNEY FAILURE, UNSPECIFIED: ICD-10-CM

## 2020-08-11 DIAGNOSIS — R50.9 FEVER, UNSPECIFIED: ICD-10-CM

## 2020-08-11 LAB
ALBUMIN SERPL ELPH-MCNC: 2.8 G/DL — LOW (ref 3.3–5)
ALP SERPL-CCNC: 77 U/L — SIGNIFICANT CHANGE UP (ref 40–120)
ALT FLD-CCNC: 24 U/L — SIGNIFICANT CHANGE UP (ref 12–78)
ANION GAP SERPL CALC-SCNC: 6 MMOL/L — SIGNIFICANT CHANGE UP (ref 5–17)
APTT BLD: 158.4 SEC — CRITICAL HIGH (ref 27.5–35.5)
APTT BLD: 69 SEC — HIGH (ref 27.5–35.5)
APTT BLD: > 200 SEC (ref 27.5–35.5)
AST SERPL-CCNC: 20 U/L — SIGNIFICANT CHANGE UP (ref 15–37)
BASOPHILS # BLD AUTO: 0.02 K/UL — SIGNIFICANT CHANGE UP (ref 0–0.2)
BASOPHILS NFR BLD AUTO: 0.3 % — SIGNIFICANT CHANGE UP (ref 0–2)
BILIRUB SERPL-MCNC: 0.3 MG/DL — SIGNIFICANT CHANGE UP (ref 0.2–1.2)
BUN SERPL-MCNC: 26 MG/DL — HIGH (ref 7–23)
CALCIUM SERPL-MCNC: 8.8 MG/DL — SIGNIFICANT CHANGE UP (ref 8.5–10.1)
CHLORIDE SERPL-SCNC: 114 MMOL/L — HIGH (ref 96–108)
CHOLEST SERPL-MCNC: 167 MG/DL — SIGNIFICANT CHANGE UP (ref 10–199)
CO2 SERPL-SCNC: 25 MMOL/L — SIGNIFICANT CHANGE UP (ref 22–31)
CREAT SERPL-MCNC: 1.2 MG/DL — SIGNIFICANT CHANGE UP (ref 0.5–1.3)
EOSINOPHIL # BLD AUTO: 0.26 K/UL — SIGNIFICANT CHANGE UP (ref 0–0.5)
EOSINOPHIL NFR BLD AUTO: 4 % — SIGNIFICANT CHANGE UP (ref 0–6)
GLUCOSE SERPL-MCNC: 109 MG/DL — HIGH (ref 70–99)
HCT VFR BLD CALC: 35.6 % — SIGNIFICANT CHANGE UP (ref 34.5–45)
HCT VFR BLD CALC: 37 % — SIGNIFICANT CHANGE UP (ref 34.5–45)
HDLC SERPL-MCNC: 47 MG/DL — LOW
HGB BLD-MCNC: 11.1 G/DL — LOW (ref 11.5–15.5)
HGB BLD-MCNC: 11.8 G/DL — SIGNIFICANT CHANGE UP (ref 11.5–15.5)
IMM GRANULOCYTES NFR BLD AUTO: 0.5 % — SIGNIFICANT CHANGE UP (ref 0–1.5)
LIPID PNL WITH DIRECT LDL SERPL: 88 MG/DL — SIGNIFICANT CHANGE UP
LYMPHOCYTES # BLD AUTO: 0.82 K/UL — LOW (ref 1–3.3)
LYMPHOCYTES # BLD AUTO: 12.7 % — LOW (ref 13–44)
MAGNESIUM SERPL-MCNC: 2.3 MG/DL — SIGNIFICANT CHANGE UP (ref 1.6–2.6)
MCHC RBC-ENTMCNC: 27.1 PG — SIGNIFICANT CHANGE UP (ref 27–34)
MCHC RBC-ENTMCNC: 27.4 PG — SIGNIFICANT CHANGE UP (ref 27–34)
MCHC RBC-ENTMCNC: 31.2 GM/DL — LOW (ref 32–36)
MCHC RBC-ENTMCNC: 31.9 GM/DL — LOW (ref 32–36)
MCV RBC AUTO: 86 FL — SIGNIFICANT CHANGE UP (ref 80–100)
MCV RBC AUTO: 86.8 FL — SIGNIFICANT CHANGE UP (ref 80–100)
MONOCYTES # BLD AUTO: 0.59 K/UL — SIGNIFICANT CHANGE UP (ref 0–0.9)
MONOCYTES NFR BLD AUTO: 9.1 % — SIGNIFICANT CHANGE UP (ref 2–14)
NEUTROPHILS # BLD AUTO: 4.74 K/UL — SIGNIFICANT CHANGE UP (ref 1.8–7.4)
NEUTROPHILS NFR BLD AUTO: 73.4 % — SIGNIFICANT CHANGE UP (ref 43–77)
NRBC # BLD: 0 /100 WBCS — SIGNIFICANT CHANGE UP (ref 0–0)
NRBC # BLD: 0 /100 WBCS — SIGNIFICANT CHANGE UP (ref 0–0)
NT-PROBNP SERPL-SCNC: 519 PG/ML — HIGH (ref 0–125)
PHOSPHATE SERPL-MCNC: 2.6 MG/DL — SIGNIFICANT CHANGE UP (ref 2.5–4.5)
PLATELET # BLD AUTO: 129 K/UL — LOW (ref 150–400)
PLATELET # BLD AUTO: 140 K/UL — LOW (ref 150–400)
POTASSIUM SERPL-MCNC: 4 MMOL/L — SIGNIFICANT CHANGE UP (ref 3.5–5.3)
POTASSIUM SERPL-SCNC: 4 MMOL/L — SIGNIFICANT CHANGE UP (ref 3.5–5.3)
PROT SERPL-MCNC: 6 G/DL — SIGNIFICANT CHANGE UP (ref 6–8.3)
RBC # BLD: 4.1 M/UL — SIGNIFICANT CHANGE UP (ref 3.8–5.2)
RBC # BLD: 4.3 M/UL — SIGNIFICANT CHANGE UP (ref 3.8–5.2)
RBC # FLD: 16.1 % — HIGH (ref 10.3–14.5)
RBC # FLD: 16.1 % — HIGH (ref 10.3–14.5)
SARS-COV-2 IGG SERPL QL IA: NEGATIVE — SIGNIFICANT CHANGE UP
SARS-COV-2 IGM SERPL IA-ACNC: 0.01 INDEX — SIGNIFICANT CHANGE UP
SARS-COV-2 RNA SPEC QL NAA+PROBE: SIGNIFICANT CHANGE UP
SODIUM SERPL-SCNC: 145 MMOL/L — SIGNIFICANT CHANGE UP (ref 135–145)
TOTAL CHOLESTEROL/HDL RATIO MEASUREMENT: 3.5 RATIO — SIGNIFICANT CHANGE UP (ref 3.3–7.1)
TRIGL SERPL-MCNC: 157 MG/DL — HIGH (ref 10–149)
WBC # BLD: 5.89 K/UL — SIGNIFICANT CHANGE UP (ref 3.8–10.5)
WBC # BLD: 6.46 K/UL — SIGNIFICANT CHANGE UP (ref 3.8–10.5)
WBC # FLD AUTO: 5.89 K/UL — SIGNIFICANT CHANGE UP (ref 3.8–10.5)
WBC # FLD AUTO: 6.46 K/UL — SIGNIFICANT CHANGE UP (ref 3.8–10.5)

## 2020-08-11 PROCEDURE — 88304 TISSUE EXAM BY PATHOLOGIST: CPT | Mod: 26

## 2020-08-11 PROCEDURE — 74177 CT ABD & PELVIS W/CONTRAST: CPT | Mod: 26

## 2020-08-11 PROCEDURE — 78580 LUNG PERFUSION IMAGING: CPT | Mod: 26

## 2020-08-11 RX ORDER — TRAMADOL HYDROCHLORIDE 50 MG/1
50 TABLET ORAL EVERY 6 HOURS
Refills: 0 | Status: DISCONTINUED | OUTPATIENT
Start: 2020-08-11 | End: 2020-08-13

## 2020-08-11 RX ORDER — SERTRALINE 25 MG/1
50 TABLET, FILM COATED ORAL DAILY
Refills: 0 | Status: DISCONTINUED | OUTPATIENT
Start: 2020-08-11 | End: 2020-08-11

## 2020-08-11 RX ORDER — OXYCODONE HYDROCHLORIDE 5 MG/1
5 TABLET ORAL ONCE
Refills: 0 | Status: DISCONTINUED | OUTPATIENT
Start: 2020-08-11 | End: 2020-08-11

## 2020-08-11 RX ORDER — ASPIRIN/CALCIUM CARB/MAGNESIUM 324 MG
81 TABLET ORAL DAILY
Refills: 0 | Status: DISCONTINUED | OUTPATIENT
Start: 2020-08-12 | End: 2020-08-14

## 2020-08-11 RX ORDER — TOPIRAMATE 25 MG
100 TABLET ORAL
Refills: 0 | Status: DISCONTINUED | OUTPATIENT
Start: 2020-08-11 | End: 2020-08-13

## 2020-08-11 RX ORDER — SERTRALINE 25 MG/1
50 TABLET, FILM COATED ORAL DAILY
Refills: 0 | Status: DISCONTINUED | OUTPATIENT
Start: 2020-08-11 | End: 2020-08-14

## 2020-08-11 RX ORDER — HYDROMORPHONE HYDROCHLORIDE 2 MG/ML
0.5 INJECTION INTRAMUSCULAR; INTRAVENOUS; SUBCUTANEOUS
Refills: 0 | Status: DISCONTINUED | OUTPATIENT
Start: 2020-08-11 | End: 2020-08-11

## 2020-08-11 RX ORDER — PANTOPRAZOLE SODIUM 20 MG/1
40 TABLET, DELAYED RELEASE ORAL
Refills: 0 | Status: DISCONTINUED | OUTPATIENT
Start: 2020-08-11 | End: 2020-08-14

## 2020-08-11 RX ORDER — ASPIRIN/CALCIUM CARB/MAGNESIUM 324 MG
81 TABLET ORAL DAILY
Refills: 0 | Status: DISCONTINUED | OUTPATIENT
Start: 2020-08-11 | End: 2020-08-11

## 2020-08-11 RX ORDER — ONDANSETRON 8 MG/1
4 TABLET, FILM COATED ORAL ONCE
Refills: 0 | Status: COMPLETED | OUTPATIENT
Start: 2020-08-11 | End: 2020-08-11

## 2020-08-11 RX ORDER — ACETAMINOPHEN 500 MG
650 TABLET ORAL EVERY 6 HOURS
Refills: 0 | Status: DISCONTINUED | OUTPATIENT
Start: 2020-08-11 | End: 2020-08-11

## 2020-08-11 RX ORDER — LOSARTAN POTASSIUM 100 MG/1
50 TABLET, FILM COATED ORAL DAILY
Refills: 0 | Status: DISCONTINUED | OUTPATIENT
Start: 2020-08-11 | End: 2020-08-11

## 2020-08-11 RX ORDER — SODIUM CHLORIDE 9 MG/ML
1000 INJECTION, SOLUTION INTRAVENOUS
Refills: 0 | Status: DISCONTINUED | OUTPATIENT
Start: 2020-08-11 | End: 2020-08-11

## 2020-08-11 RX ORDER — LOSARTAN POTASSIUM 100 MG/1
50 TABLET, FILM COATED ORAL DAILY
Refills: 0 | Status: DISCONTINUED | OUTPATIENT
Start: 2020-08-12 | End: 2020-08-14

## 2020-08-11 RX ORDER — ACETAMINOPHEN 500 MG
1000 TABLET ORAL ONCE
Refills: 0 | Status: COMPLETED | OUTPATIENT
Start: 2020-08-11 | End: 2020-08-11

## 2020-08-11 RX ORDER — HEPARIN SODIUM 5000 [USP'U]/ML
2000 INJECTION INTRAVENOUS; SUBCUTANEOUS EVERY 6 HOURS
Refills: 0 | Status: DISCONTINUED | OUTPATIENT
Start: 2020-08-11 | End: 2020-08-13

## 2020-08-11 RX ORDER — MORPHINE SULFATE 50 MG/1
2 CAPSULE, EXTENDED RELEASE ORAL ONCE
Refills: 0 | Status: DISCONTINUED | OUTPATIENT
Start: 2020-08-11 | End: 2020-08-11

## 2020-08-11 RX ORDER — HEPARIN SODIUM 5000 [USP'U]/ML
900 INJECTION INTRAVENOUS; SUBCUTANEOUS
Qty: 25000 | Refills: 0 | Status: DISCONTINUED | OUTPATIENT
Start: 2020-08-11 | End: 2020-08-13

## 2020-08-11 RX ORDER — HEPARIN SODIUM 5000 [USP'U]/ML
INJECTION INTRAVENOUS; SUBCUTANEOUS
Qty: 25000 | Refills: 0 | Status: DISCONTINUED | OUTPATIENT
Start: 2020-08-11 | End: 2020-08-11

## 2020-08-11 RX ORDER — HEPARIN SODIUM 5000 [USP'U]/ML
4500 INJECTION INTRAVENOUS; SUBCUTANEOUS EVERY 6 HOURS
Refills: 0 | Status: DISCONTINUED | OUTPATIENT
Start: 2020-08-11 | End: 2020-08-13

## 2020-08-11 RX ORDER — ACETAMINOPHEN 500 MG
650 TABLET ORAL EVERY 6 HOURS
Refills: 0 | Status: DISCONTINUED | OUTPATIENT
Start: 2020-08-11 | End: 2020-08-14

## 2020-08-11 RX ORDER — TOPIRAMATE 25 MG
100 TABLET ORAL
Refills: 0 | Status: DISCONTINUED | OUTPATIENT
Start: 2020-08-11 | End: 2020-08-11

## 2020-08-11 RX ADMIN — SERTRALINE 50 MILLIGRAM(S): 25 TABLET, FILM COATED ORAL at 12:45

## 2020-08-11 RX ADMIN — PANTOPRAZOLE SODIUM 40 MILLIGRAM(S): 20 TABLET, DELAYED RELEASE ORAL at 06:23

## 2020-08-11 RX ADMIN — SODIUM CHLORIDE 75 MILLILITER(S): 9 INJECTION, SOLUTION INTRAVENOUS at 17:19

## 2020-08-11 RX ADMIN — HYDROMORPHONE HYDROCHLORIDE 0.5 MILLIGRAM(S): 2 INJECTION INTRAMUSCULAR; INTRAVENOUS; SUBCUTANEOUS at 19:46

## 2020-08-11 RX ADMIN — HYDROMORPHONE HYDROCHLORIDE 0.5 MILLIGRAM(S): 2 INJECTION INTRAMUSCULAR; INTRAVENOUS; SUBCUTANEOUS at 19:31

## 2020-08-11 RX ADMIN — Medication 100 MILLIGRAM(S): at 06:23

## 2020-08-11 RX ADMIN — LOSARTAN POTASSIUM 50 MILLIGRAM(S): 100 TABLET, FILM COATED ORAL at 06:23

## 2020-08-11 RX ADMIN — SODIUM CHLORIDE 75 MILLILITER(S): 9 INJECTION, SOLUTION INTRAVENOUS at 20:16

## 2020-08-11 RX ADMIN — Medication 650 MILLIGRAM(S): at 01:18

## 2020-08-11 RX ADMIN — HEPARIN SODIUM 0 UNIT(S)/HR: 5000 INJECTION INTRAVENOUS; SUBCUTANEOUS at 02:07

## 2020-08-11 RX ADMIN — HEPARIN SODIUM 900 UNIT(S)/HR: 5000 INJECTION INTRAVENOUS; SUBCUTANEOUS at 22:39

## 2020-08-11 RX ADMIN — HEPARIN SODIUM 900 UNIT(S)/HR: 5000 INJECTION INTRAVENOUS; SUBCUTANEOUS at 03:26

## 2020-08-11 RX ADMIN — Medication 650 MILLIGRAM(S): at 11:31

## 2020-08-11 RX ADMIN — MORPHINE SULFATE 2 MILLIGRAM(S): 50 CAPSULE, EXTENDED RELEASE ORAL at 02:00

## 2020-08-11 RX ADMIN — HEPARIN SODIUM 900 UNIT(S)/HR: 5000 INJECTION INTRAVENOUS; SUBCUTANEOUS at 12:42

## 2020-08-11 RX ADMIN — Medication 650 MILLIGRAM(S): at 11:30

## 2020-08-11 RX ADMIN — Medication 81 MILLIGRAM(S): at 12:45

## 2020-08-11 RX ADMIN — MORPHINE SULFATE 2 MILLIGRAM(S): 50 CAPSULE, EXTENDED RELEASE ORAL at 01:23

## 2020-08-11 RX ADMIN — Medication 400 MILLIGRAM(S): at 16:37

## 2020-08-11 RX ADMIN — ONDANSETRON 4 MILLIGRAM(S): 8 TABLET, FILM COATED ORAL at 17:15

## 2020-08-11 RX ADMIN — HEPARIN SODIUM 1100 UNIT(S)/HR: 5000 INJECTION INTRAVENOUS; SUBCUTANEOUS at 20:14

## 2020-08-11 NOTE — PROGRESS NOTE ADULT - SUBJECTIVE AND OBJECTIVE BOX
Patient is a 65y Female whom presented to the hospital with ckd and andrew     PAST MEDICAL & SURGICAL HISTORY:  Drug-seeking behavior  Renal arteriosclerosis  Constipation  Anxiety  Depression  HTN (hypertension)  Shingles  Pericarditis  Osteoporosis  Seizure disorder  Migraines  History of back surgery      MEDICATIONS  (STANDING):  MEDICATIONS  (STANDING):  aspirin enteric coated 81 milliGRAM(s) Oral daily  heparin  Infusion. 900 Unit(s)/Hr (9 mL/Hr) IV Continuous <Continuous>  losartan 50 milliGRAM(s) Oral daily  pantoprazole    Tablet 40 milliGRAM(s) Oral before breakfast  sertraline 50 milliGRAM(s) Oral daily  topiramate 100 milliGRAM(s) Oral two times a day    MEDICATIONS  (PRN):  acetaminophen   Tablet .. 650 milliGRAM(s) Oral every 6 hours PRN Temp greater or equal to 38C (100.4F), Mild Pain (1 - 3)  heparin   Injectable 4500 Unit(s) IV Push every 6 hours PRN For aPTT less than 40  heparin   Injectable 2000 Unit(s) IV Push every 6 hours PRN For aPTT between 40 - 57  traMADol 50 milliGRAM(s) Oral every 6 hours PRN Moderate Pain (4 - 6)      Allergies    penicillin (Anaphylaxis)    Intolerances        SOCIAL HISTORY:  Denies ETOh,Smoking,     FAMILY HISTORY:  Family history of heart disease  Family history of colon cancer in mother      REVIEW OF SYSTEMS:    CONSTITUTIONAL: No weakness, fevers or chills  EYES/ENT: No visual changes;  no throat pain   NECK: No pain or stiffness  RESPIRATORY: No cough, wheezing, hemoptysis; No shortness of breath  CARDIOVASCULAR: No chest pain or palpitations  GASTROINTESTINAL: No abdominal or epigastric pain. No nausea, vomiting,     No diarrhea or constipation. No melena   GENITOURINARY: No dysuria, frequency or hematuria  NEUROLOGICAL: No numbness or weakness  SKIN: dry  complaining of pain, lower leg.                            11.1   6.46  )-----------( 140      ( 11 Aug 2020 06:47 )             35.6       CBC Full  -  ( 11 Aug 2020 06:47 )  WBC Count : 6.46 K/uL  RBC Count : 4.10 M/uL  Hemoglobin : 11.1 g/dL  Hematocrit : 35.6 %  Platelet Count - Automated : 140 K/uL  Mean Cell Volume : 86.8 fl  Mean Cell Hemoglobin : 27.1 pg  Mean Cell Hemoglobin Concentration : 31.2 gm/dL  Auto Neutrophil # : 4.74 K/uL  Auto Lymphocyte # : 0.82 K/uL  Auto Monocyte # : 0.59 K/uL  Auto Eosinophil # : 0.26 K/uL  Auto Basophil # : 0.02 K/uL  Auto Neutrophil % : 73.4 %  Auto Lymphocyte % : 12.7 %  Auto Monocyte % : 9.1 %  Auto Eosinophil % : 4.0 %  Auto Basophil % : 0.3 %      08-11    145  |  114<H>  |  26<H>  ----------------------------<  109<H>  4.0   |  25  |  1.20    Ca    8.8      11 Aug 2020 06:47  Phos  2.6     08-11  Mg     2.3     08-11    TPro  6.0  /  Alb  2.8<L>  /  TBili  0.3  /  DBili  x   /  AST  20  /  ALT  24  /  AlkPhos  77  08-11      CAPILLARY BLOOD GLUCOSE          Vital Signs Last 24 Hrs  T(C): 37.3 (11 Aug 2020 21:03), Max: 38.1 (11 Aug 2020 00:35)  T(F): 99.1 (11 Aug 2020 21:03), Max: 100.6 (11 Aug 2020 00:47)  HR: 82 (11 Aug 2020 21:03) (71 - 88)  BP: 120/71 (11 Aug 2020 21:03) (120/71 - 186/75)  BP(mean): --  RR: 17 (11 Aug 2020 21:03) (12 - 19)  SpO2: 96% (11 Aug 2020 21:03) (95% - 98%)        PT/INR - ( 10 Aug 2020 15:44 )   PT: 12.3 sec;   INR: 1.05 ratio         PTT - ( 11 Aug 2020 19:02 )  PTT:> 200 sec    PHYSICAL EXAM:    Constitutional: NAD  HEENT: conjunctive   clear   Neck:  No JVD  Respiratory: CTAB  Cardiovascular: S1 and S2  Gastrointestinal: BS+, soft, NT/ND  Extremities: pos  peripheral edema  Neurological: no focal deficits  Skin: dry

## 2020-08-11 NOTE — H&P ADULT - VASCULAR DETAILS
Left lower extremity: warm, +edema of calf and thigh, +calf, thigh, and groin tenderness, +Luis's sign.  Bilateral lower extremity: DP/PT pulses 2+, strength 5/5, sensation intact.

## 2020-08-11 NOTE — PROGRESS NOTE ADULT - SUBJECTIVE AND OBJECTIVE BOX
Patient was seen lying comfortably in bed. No acute events overnight.  Pt admits to LLE pain, worse with movement, tolerable with medications.   Ambulated to bathroom this AM and voiding without difficulty. Pt has passed flatus.  Pt is denying any chest pain, sob, dizziness, weakness, double vision, blurry vision, headaches, fevers, or chills.     VITALS  T(C): 37.9 (08-11-20 @ 04:55), Max: 38.1 (08-11-20 @ 00:35)  HR: 87 (08-11-20 @ 04:55) (64 - 87)  BP: 124/89 (08-11-20 @ 04:55) (124/89 - 169/84)  RR: 19 (08-11-20 @ 04:55) (17 - 19)  SpO2: 95% (08-11-20 @ 04:55) (95% - 97%)    I/Os  08-10-20 @ 07:01  -  08-11-20 @ 06:41  --------------------------------------------------------  IN: 747 mL / OUT: 0 mL / NET: 747 mL    PE  General: Pt is lying in bed, NAD, A+O x 3  Cardio: RRR  Lungs: NLB  Incision: is clean dry and intact covered by _.   Abdomen: Belly is soft and mildly tender to palpation  Ext: Calves are soft and non tender to palpation b/l.  ( - ) edema    PHYSICAL EXAM  GENERAL:  Well-nourished, well-developed female lying comfortably in bed in NAD  CARDIO:  Regular rate and rhythm.    RESPIRATORY:  NLB  ABDOMEN:  Soft, nondistended, nontender.  EXTREMITIES: Left lower extremity: warm, +edema of calf and thigh, +calf, thigh, and groin tenderness, +Luis's sign.  Bilateral lower extremity: DP/PT pulses 2+, strength 5/5, sensation intact.   NEURO:  A&O x 3    IMAGING  8/10: LE US: Positive for extensive left lower extremity deep venous thrombosis up to L CFV at least    ASSESSMENT  65 year old female with PMH of renal artery stenosis, HTN, seizures, migraines, osteoporosis, depression, anxiety, here with extensive Left lower extremity DVT involving the left common femoral, femoral, popliteal, and calf veins.    PLAN  CT venogram TODAY 8/11 of abdomen/pelvis to evaluate Left iliac vein- possible thrombectomy in afternoon today.  Respiration- IS use encouraged   Ambulation- OOB as tolerated, keep LLE elevated with at least 3 pillows   Diet- NPO, IVF  May use ice pack to groin PRN   Pain meds PRN  f/u AM labs, especially Creatinine   DVT ppx: Heparin drip, f/u PTT

## 2020-08-11 NOTE — PROVIDER CONTACT NOTE (MEDICATION) - SITUATION
PATIENT  HANDOFF  RECEIVED FROM ANESTHESIA KEIRY , AND ZEB RODRIGUEZ. PATIENT S/P LEFT LOWER EXTREMITY THROMBECTOMY, PT RECEIVED ON HEPARIN GTT AT 1900UNITS/HR,  PER ZEB RODRIGUEZ PATIENT SHOULD REMAIN ON  HEPARIN GTT AT RATE ABOVE RATE

## 2020-08-11 NOTE — H&P ADULT - PROBLEM SELECTOR PLAN 4
continue current management and present  medications ,sz precautions SERIAL BMP Due to elevated creatinine 1.5 and planned CTA nephrology consult called ,also patient is given toradol for pain management in view of drug seeking behavior and may contribute to elevation of renal indices

## 2020-08-11 NOTE — PROGRESS NOTE ADULT - SUBJECTIVE AND OBJECTIVE BOX
[facility] [place]       HPI:  64 yo Female with  pmhx significant  for HTN , Osteoporosis ,Sz ,Pericarditis , Renal atherosclerosis , Shingles ,Migraines ,Depression ,Constipation ,Anxiety presents to ED with  L LE swelling & pain that began 2 days  ago, reports that pain is aching moderate  in severity ,found to have  L calf swelling and pain now radiates to the L groin with now weakness or numbness. Denies trauma, falls, dysuria, urgency and frequency .Leg venous doppler was performed and demonstrated extensive involving the left common femoral, femoral, popliteal, and calf veins. LLE DVT ,started on heparin drip and vascular surgery consulted for possible thrombectomy Due to elevated creatinine 1.5 and planned CTA nephrology consult called ,also patient is given toradol for pain management in view of drug seeking behavior Seen by pulmonologist and cardiologist for clearance for planned surgery ,clot removal .ECHO ordered to evaluate for R heart strain . (11 Aug 2020 06:09)        SUBJECTIVE:      ALLERGIES:  Allergies    penicillin (Anaphylaxis)    Intolerances          MEDICATIONS  (STANDING):  aspirin enteric coated 81 milliGRAM(s) Oral daily  heparin  Infusion. 900 Unit(s)/Hr (9 mL/Hr) IV Continuous <Continuous>  losartan 50 milliGRAM(s) Oral daily  pantoprazole    Tablet 40 milliGRAM(s) Oral before breakfast  sertraline 50 milliGRAM(s) Oral daily  topiramate 100 milliGRAM(s) Oral two times a day    MEDICATIONS  (PRN):  acetaminophen   Tablet .. 650 milliGRAM(s) Oral every 6 hours PRN Temp greater or equal to 38C (100.4F), Mild Pain (1 - 3)  heparin   Injectable 4500 Unit(s) IV Push every 6 hours PRN For aPTT less than 40  heparin   Injectable 2000 Unit(s) IV Push every 6 hours PRN For aPTT between 40 - 57  traMADol 50 milliGRAM(s) Oral every 6 hours PRN Moderate Pain (4 - 6)      REVIEW OF SYSTEMS:  CONSTITUTIONAL: No fever,  RESPIRATORY: No cough, wheezing, shortness of breath  CARDIOVASCULAR: No chest pain, dyspnea, palpitations, dizziness, syncope, paroxysmal nocturnal dyspnea, orthopnea, or arm or leg swelling  GASTROINTESTINAL: No abdominal  or epigastric pain, nausea, vomiting,  diarrhea  NEUROLOGICAL: No headaches,  loss of strength, numbness, or tremors    Vital Signs Last 24 Hrs  T(C): 37.3 (11 Aug 2020 21:03), Max: 38.1 (11 Aug 2020 00:35)  T(F): 99.1 (11 Aug 2020 21:03), Max: 100.6 (11 Aug 2020 00:47)  HR: 82 (11 Aug 2020 21:03) (71 - 88)  BP: 120/71 (11 Aug 2020 21:03) (120/71 - 186/75)  BP(mean): --  RR: 17 (11 Aug 2020 21:03) (12 - 19)  SpO2: 96% (11 Aug 2020 21:03) (95% - 98%)    PHYSICAL EXAM:  HEAD:  Atraumatic, Normocephalic  NECK: Supple and normal thyroid.  No JVD or carotid bruit.   HEART: S1, S2 regular , 1/6 soft ejection systolic murmur in mitral area , no thrill and no gallops .  PULMONARY: Bilateral vesicular breathing , few scattered ronchi and few scattered rales are present .  ABDOMEN: Soft nontender and positive bowl sounds   EXTREMITIES:  No clubbing, cyanosis, or pedal  edema  NEUROLOGICAL: AAOX3 , no focal deficit .    LABS:                        11.1   6.46  )-----------( 140      ( 11 Aug 2020 06:47 )             35.6     08-11    145  |  114<H>  |  26<H>  ----------------------------<  109<H>  4.0   |  25  |  1.20    Ca    8.8      11 Aug 2020 06:47  Phos  2.6     08-11  Mg     2.3     08-11    TPro  6.0  /  Alb  2.8<L>  /  TBili  0.3  /  DBili  x   /  AST  20  /  ALT  24  /  AlkPhos  77  08-11        PT/INR - ( 10 Aug 2020 15:44 )   PT: 12.3 sec;   INR: 1.05 ratio         PTT - ( 11 Aug 2020 19:02 )  PTT:> 200 sec    BNPSerum Pro-Brain Natriuretic Peptide: 519 pg/mL (08-11 @ 06:47)        EKG:  ECHO:  IMAGING:    Assessment/Plan    Will continue to follow during hospital course and give further recommendations as needed . Thanks for your referral . if any questions please contact me at office [phone]) Lio Fischer MD [facility] .  Cardiology F/U :      HPI:  64 yo Female with  pmhx significant  for HTN , Osteoporosis ,Sz ,Pericarditis , Renal atherosclerosis , Shingles ,Migraines ,Depression ,Constipation ,Anxiety presents to ED with  L LE swelling & pain that began 2 days  ago, reports that pain is aching moderate  in severity ,found to have  L calf swelling and pain now radiates to the L groin with now weakness or numbness. Denies trauma, falls, dysuria, urgency and frequency .Leg venous doppler was performed and demonstrated extensive involving the left common femoral, femoral, popliteal, and calf veins. LLE DVT ,started on heparin drip and vascular surgery consulted for possible thrombectomy Due to elevated creatinine 1.5 and planned CTA nephrology consult called ,also patient is given toradol for pain management in view of drug seeking behavior Seen by pulmonologist and cardiologist for clearance for planned surgery ,clot removal .ECHO ordered to evaluate for R heart strain . (11 Aug 2020 06:09)        SUBJECTIVE: Patient lying comfortable .      ALLERGIES:  Allergies    penicillin (Anaphylaxis)    Intolerances          MEDICATIONS  (STANDING):  aspirin enteric coated 81 milliGRAM(s) Oral daily  heparin  Infusion. 900 Unit(s)/Hr (9 mL/Hr) IV Continuous <Continuous>  losartan 50 milliGRAM(s) Oral daily  pantoprazole    Tablet 40 milliGRAM(s) Oral before breakfast  sertraline 50 milliGRAM(s) Oral daily  topiramate 100 milliGRAM(s) Oral two times a day    MEDICATIONS  (PRN):  acetaminophen   Tablet .. 650 milliGRAM(s) Oral every 6 hours PRN Temp greater or equal to 38C (100.4F), Mild Pain (1 - 3)  heparin   Injectable 4500 Unit(s) IV Push every 6 hours PRN For aPTT less than 40  heparin   Injectable 2000 Unit(s) IV Push every 6 hours PRN For aPTT between 40 - 57  traMADol 50 milliGRAM(s) Oral every 6 hours PRN Moderate Pain (4 - 6)      REVIEW OF SYSTEMS:  CONSTITUTIONAL: No fever,  RESPIRATORY: No cough, wheezing, shortness of breath  CARDIOVASCULAR: No chest pain, dyspnea, palpitations, dizziness, syncope, paroxysmal nocturnal dyspnea, orthopnea, or arm but has left leg swelling  GASTROINTESTINAL: No abdominal  or epigastric pain, nausea, vomiting,  diarrhea  NEUROLOGICAL: No headaches,  loss of strength, numbness, or tremors  Skin : No itching .  Hematology : No bleeding .  Endocrinology : No heat and cold intolerance .  Psychiatry : Patient is calm .  Musculoskeletal : Patient has mild arthritis .    Vital Signs Last 24 Hrs  T(C): 37.3 (11 Aug 2020 21:03), Max: 38.1 (11 Aug 2020 00:35)  T(F): 99.1 (11 Aug 2020 21:03), Max: 100.6 (11 Aug 2020 00:47)  HR: 82 (11 Aug 2020 21:03) (71 - 88)  BP: 120/71 (11 Aug 2020 21:03) (120/71 - 186/75)  BP(mean): --  RR: 17 (11 Aug 2020 21:03) (12 - 19)  SpO2: 96% (11 Aug 2020 21:03) (95% - 98%)    PHYSICAL EXAM:  HEAD:  Atraumatic, Normocephalic  NECK: Supple and normal thyroid.  No JVD or carotid bruit.   HEART: S1, S2 regular , 1/6 soft ejection systolic murmur in mitral area , no thrill and no gallops .  PULMONARY: Bilateral vesicular breathing , few scattered ronchi and few scattered rales are present .  ABDOMEN: Soft nontender and positive bowl sounds   EXTREMITIES:  No clubbing, cyanosis, and left leg swelling present .  NEUROLOGICAL: AAOX3 , no focal deficit .  Skin : No rashes .  Musculoskeletal : No joint swellings .      LABS:                        11.1   6.46  )-----------( 140      ( 11 Aug 2020 06:47 )             35.6     08-11    145  |  114<H>  |  26<H>  ----------------------------<  109<H>  4.0   |  25  |  1.20    Ca    8.8      11 Aug 2020 06:47  Phos  2.6     08-11  Mg     2.3     08-11    TPro  6.0  /  Alb  2.8<L>  /  TBili  0.3  /  DBili  x   /  AST  20  /  ALT  24  /  AlkPhos  77  08-11        PT/INR - ( 10 Aug 2020 15:44 )   PT: 12.3 sec;   INR: 1.05 ratio         PTT - ( 11 Aug 2020 19:02 )  PTT:> 200 sec    BNPSerum Pro-Brain Natriuretic Peptide: 519 pg/mL (08-11 @ 06:47)        Assessment/Plan  Patient has :  1) Extensive DVT of left leg S/P thrombectomy . Post operative stable so far .  2) H/O Hypertension and WAI stable .  3) Anemia .  Plan : 1) cardiac stable . 2) Patient on full anticoagulation 3) Monitor hemoglobin and electrolytes .  Will continue to follow during hospital course and give further recommendations as needed . Thanks for your referral . if any questions please contact me at office ([phone])

## 2020-08-11 NOTE — H&P ADULT - PROBLEM SELECTOR PLAN 8
Gastrointestinal stress ulcer prophylaxis and DVT prophylaxis administered continue home medications

## 2020-08-11 NOTE — BRIEF OPERATIVE NOTE - NSICDXBRIEFPOSTOP_GEN_ALL_CORE_FT
POST-OP DIAGNOSIS:  Deep vein thrombosis (DVT) 11-Aug-2020 15:54:10 Left lower extremity DVT- Left Popliteal, femoral and common iliac Jenny Zarco

## 2020-08-11 NOTE — H&P ADULT - NSHPLABSRESULTS_GEN_ALL_CORE
< from: US Duplex Venous Lower Ext Ltd, Left (08.10.20 @ 14:42) >    EXAM:  US DPLX LWR EXT VEINS LTD LT                            PROCEDURE DATE:  08/10/2020          INTERPRETATION:  CLINICAL INFORMATION: Left leg pain and swelling    COMPARISON: None available.    TECHNIQUE: Duplex sonography of the LEFT LOWER extremity veins with color and spectral Doppler, with and without compression.    FINDINGS:    There is deep venous thrombosis involving the left common femoral, femoral and popliteal veins.    The contralateral common femoral vein is patent.    Left calf vein thrombosis is detected.    IMPRESSION:  Positive for extensive left lower extremity deep venous thrombosis.    < end of copied text >    < from: Xray Hip 2-3 Views, Left (08.10.20 @ 13:58) >      INTERPRETATION:  Fall left hip pain    2 views left hip.    No displaced fracture or dislocation. Senile demineralization. Mild narrowing left hip joint. Vascularcalcification.    Impression: No displaced fracture    < end of copied text >

## 2020-08-11 NOTE — PROVIDER CONTACT NOTE (OTHER) - ACTION/TREATMENT ORDERED:
evaluate I's & O's
bladder scan result >999, straight cath and monitor urine and bladder scan in 8hrs

## 2020-08-11 NOTE — BRIEF OPERATIVE NOTE - NSICDXBRIEFPROCEDURE_GEN_ALL_CORE_FT
PROCEDURES:  Thrombectomy, mechanical, vein, endovascular 11-Aug-2020 15:56:34 Left lower extremity thrombectomy popliteal and femoral vein, Left common iliac vein angioplasty and stent Jenny Zarco
19-Nov-2018 08:52

## 2020-08-11 NOTE — PROGRESS NOTE ADULT - ASSESSMENT
cont rx cont rx      SANDRA COFFMAN [facility] [phone]  1955 DOA 8/10/2020 DR SAI DÍAZ          REVIEW OF SYMPTOMS      Able to give ROS  Yes     RELIABLE No   CONSTITUTIONAL Weakness Yes  Chills No Vision changes No  ENDOCRINE No unexplained hair loss No heat or cold intolerance    ALLERGY No hives  Sore throat No   RESP Coughing blood no  Shortness of breath YES   NEURO No Headache  Confusion Pain neck No   CARDIAC No Chest pain No Palpitations   GI No Pain abdomen NO   Vomiting NO     PHYSICAL EXAM    HEENT Unremarkable PERRLA atraumatic   RESP Fair air entry EXP prolonged    Harsh breath sound Resp distres mild   CARDIAC S1 S2 No S3     NO JVD    ABDOMEN SOFT BS PRESENT NOT DISTENDED No hepatosplenomegaly PEDAL EDEMA present No calf tenderness  NO rash   GENERAL Not TOXIC looking      OXYGENATION      8/ ra 97%- ra 97%     VITALS/LABS       2020 100f 87 120/80     RELEVANT DATA    COVID igg 2020 igg neg-dane   COVID pcr 8/10/2020 pcr Neg-iv   V duplex 8/10/2020 V duplx dvt lle l cf f popliteal   ctap 2020 ctap ic extension of lle dvt into lower ivc   BNP 2020 bnp 519   W 8/ w 6.8 - 6.4  Pr 2020 Pr .11   CXR 8/10/2020 CXR no lobar consolidation   Hb 8/ Hb 11.8 -11.1  Plt 8/10/2020 plt 165   INR 8/10/2020 inr 1   Na 8/10/2020 Na 143   Cr 8/ Cr 1.5-1.2     XR 8/10/2020 xr l hip no fx                                         PATIENT DATA/ASSESSMENT/RECOMMENDATIONS     EXTENSIVE DVT   Candidate for clot removl   Vasc on case   ATELECTASIS   Inc manfred   PULMONARY CLEARANCE   Low grade temp with procalc below 0.21 and no leukocytosis can be explained by large dvt   CXR 8/10 did not show pneumonia   ID has been consulted   I do not suspect infection   Ra po is 97% (2020)   Pt has ss atelectasis and incentive manfred was ordered  VQ scan was ordered as we did not want to give another dye load with cta but regardless of whether or not she has pulmonar elbolism no new pulmonary intervention is being contemplaed other than hepartin drip  She is not a candidate for systemic thrombolysis as she remains hemodynamically stable so even if she has pe treatment would be iv ufh   Pt is in optimal pulm status and is cleared from pulm standpoint     Addendum  dw radiologist on 2020   Few q defects 2 seg in r and 1 seg in l indeterminate      (Only q scan was done so it was classified as indeterminate but from comparing with normal cxr i think it is high probability because of mismatch with xray)                                          OVERALL PLAN.    65 year old female with PMH of renal artery stenosis, HTN, seizures, migraines, osteoporosis, depression, anxiety, here with extensive Left lower extremity DVT involving the left common femoral, femoral, popliteal, and calf veins.   home meds amlodipine 10 topiramat 100.2 protonix losartan labetalol zoloft   Pt admitted 8/10/2020   seen by [facility] started on iv ufh on 8/10/2020   Q scan 2020 was indeterminate   2020 pr was .11 and there is low grade fever but no leukocytosis    DISPOSITION PLANNING.               Extensive dvt   Thrombectomy being planned by [facility]   Pulm status stable    Pt cleared from pulm standpint on 2020      TIME SPENT Over 25 minutes aggregate care time spent on encounter; activities included combinations of  direct pt care, counseling and/or coordinating care reviewing notes, lab data/ imaging, discussion with multidisciplinary team/ pt /family. Risks, benefits, alternatives of planned interventions when applicable were discussed in detail and questions answered as best as possible    SANDRA COFFMAN [facility] [phone]  1955 DOA 8/10/2020 DR SAI DÍAZ

## 2020-08-11 NOTE — H&P ADULT - PROBLEM SELECTOR PLAN 3
SERIAL BMP Due to elevated creatinine 1.5 and planned CTA nephrology consult called ,also patient is given toradol for pain management in view of drug seeking behavior and may contribute to elevation of renal indices LIKELY REACTIVE 2/2 TO DVT

## 2020-08-11 NOTE — BRIEF OPERATIVE NOTE - OPERATION/FINDINGS
Uncomplicated Left lower extremity popliteal, femoral and iliac vein thrombectomy performed and Left common iliac vein stent and angioplasty performed.

## 2020-08-11 NOTE — PROVIDER CONTACT NOTE (MEDICATION) - ACTION/TREATMENT ORDERED:
PTT DONE ENDORSED TO MARIA LUISA PALOMARES PATIENTS PRIMARY NURSE TO FOLLOW UP WITH  PT PTT RESULTS AND FOLLOW NOMOGRAM

## 2020-08-11 NOTE — CONSULT NOTE ADULT - SUBJECTIVE AND OBJECTIVE BOX
HPI:  64YO F PMH HTN , Osteoporosis, Seizure ,Pericarditis, presented to the ED with cc of Left LE swelling & pain that began 2 days ago up to groin. No erythema. Denies trauma, falls. Denies fever chills n/v/d CP SOB cough abd pain. Venous doppler demonstrated extensive DVT involving the left common femoral, femoral, popliteal, and calf veins. Having low grade temps. Tmax 100.6. WBC WNL . Procalcitonin level unimpressive. CXR with no consolidation. Pt is awake alert. NAD. No hx of COVID 19 exposure. No recent travel. Denies sedentery lifestyle.     Infectious Disease consult was called to evaluate pt due to fever,    Past Medical & Surgical Hx:  PAST MEDICAL & SURGICAL HISTORY:  Drug-seeking behavior  Renal arteriosclerosis  Constipation  Anxiety  Depression  HTN (hypertension)  Shingles  Pericarditis  Osteoporosis  Seizure disorder  Migraines  History of back surgery      Social History--  EtOH: denies ***  Tobacco: denies ***  Drug Use: denies ***    Travel/Environmental/Occupational History:  *** inserth T/E/O Hx ***    FAMILY HISTORY:  Family history of heart disease  Family history of colon cancer in mother      Allergies    penicillin (Anaphylaxis)    Intolerances        Home/ Out patient  Medications :    Current Inpatient Medications :    ANTIBIOTICS:       OTHER RELEVANT MEDICATIONS :  acetaminophen   Tablet .. 650 milliGRAM(s) Oral every 6 hours  aspirin enteric coated 81 milliGRAM(s) Oral daily  heparin   Injectable 4500 Unit(s) IV Push every 6 hours PRN  heparin   Injectable 2000 Unit(s) IV Push every 6 hours PRN  heparin  Infusion.  Unit(s)/Hr IV Continuous <Continuous>  lactated ringers. 1000 milliLiter(s) IV Continuous <Continuous>  losartan 50 milliGRAM(s) Oral daily  pantoprazole    Tablet 40 milliGRAM(s) Oral before breakfast  sertraline 50 milliGRAM(s) Oral daily  topiramate 100 milliGRAM(s) Oral two times a day      ROS:  Unable to obtain due to :     ROS:  CONSTITUTIONAL:  Negative fever or chills, feels well, good appetite  EYES:  Negative  blurry vision or double vision  CARDIOVASCULAR:  Negative for chest pain or palpitations  RESPIRATORY:  Negative for cough, wheezing, or SOB   GASTROINTESTINAL:  Negative for nausea, vomiting, diarrhea, constipation, or abdominal pain  GENITOURINARY:  Negative frequency, urgency , dysuria or hematuria   NEUROLOGIC:  No headache, confusion, dizziness, lightheadedness  All other systems were reviewed and are negative          I&O's Detail    10 Aug 2020 07:01  -  11 Aug 2020 07:00  --------------------------------------------------------  IN:    heparin  Infusion.: 47 mL    lactated ringers.: 700 mL  Total IN: 747 mL    OUT:  Total OUT: 0 mL    Total NET: 747 mL          Physical Exam:  Vital Signs Last 24 Hrs  T(C): 37.9 (11 Aug 2020 07:05), Max: 38.1 (11 Aug 2020 00:35)  T(F): 100.3 (11 Aug 2020 07:05), Max: 100.6 (11 Aug 2020 00:47)  HR: 80 (11 Aug 2020 07:05) (64 - 87)  BP: 138/78 (11 Aug 2020 07:05) (124/89 - 169/84)  BP(mean): --  RR: 16 (11 Aug 2020 07:05) (16 - 19)  SpO2: 97% (11 Aug 2020 07:05) (95% - 97%)      General: well developed well nourished, in no acute distress  Eyes: sclera anicteric, pupils equal and reactive to light  ENMT: buccal mucosa moist, pharynx not injected  Neck: supple, trachea midline  Lungs: clear, no wheeze/rhonchi  Cardiovascular: regular rate and rhythm, S1 S2  Abdomen: soft, nontender, no organomegaly present, bowel sounds normal  Neurological:  alert and oriented x3, Cranial Nerves II-XII grossly intact  Skin:no increased ecchymosis/petechiae/purpura  Lymph Nodes: no palpable cervical/supraclavicular lymph nodes enlargements  Extremities: no cyanosis/clubbing/edema    Labs:                         11.1   6.46  )-----------( 140      ( 11 Aug 2020 06:47 )             35.6   08-11    145  |  114<H>  |  26<H>  ----------------------------<  109<H>  4.0   |  25  |  1.20    Ca    8.8      11 Aug 2020 06:47  Phos  2.6     08-11  Mg     2.3     08-11    TPro  6.0  /  Alb  2.8<L>  /  TBili  0.3  /  DBili  x   /  AST  20  /  ALT  24  /  AlkPhos  77  08-11      RECENT CULTURES:  pending    COVID-19 PCR . (08.11.20 @ 11:32)    COVID-19 PCR: [facility]    COVID-19  Antibody - for prior infection screening (08.11.20 @ 09:36)    COVID-19 IgG Antibody Index: 0.07: Roche ECLIA Total AB (JENNIFER)  NOTE: This result index represents a total antibody measurement,  which  includes IgG, IgA, and IgM  Negative <= 0.99 Index  Positive >= 1.00 Index Index    RADIOLOGY & ADDITIONAL STUDIES:  EXAM:  XR CHEST PORTABLE URGENT 1V                            PROCEDURE DATE:  08/10/2020          INTERPRETATION:  Chest portable.    Clinical History: Admission chest radiograph. Deep venous thrombosis.    Comparison: 8/15/2019.    Single AP viewsubmitted.  There is external artifact from patient's bra.    The evaluation of the cardiomediastinal silhouette is limited on portable technique.  Mildly tortuous, calcified aorta.    The lungs are mildly hyperinflated which may reflect emphysematous disease.  There is minimal linear subsegmental atelectasis and/or fibrosis left lower lung zone.  No lobar lung consolidation or significant pleural effusion is noted.    Multilevel thoracic vertebroplasty is noted.      EXAM:  NM PULM PERFUSION IMG                            PROCEDURE DATE:  08/11/2020          INTERPRETATION:  RADIOPHARMACEUTICAL: 3.0 mCi Tc-99m-MAA, I.V.    CLINICAL STATEMENT: 65 year-old female with DVT    TECHNIQUE:  Perfusion images of the lungs were obtained following administration of Tc-99m-MAA. Images were obtained in the anterior, posterior, both lateral, and all 4 oblique projections. The study was interpreted in conjunction with chest radiograph of 8/10/2020.    No prior VQ scan    FINDINGS: There are segmental perfusion defects involving the posterior segment of the right upper lobe, medial segment of the right middle lobe and inferior lingula.    IMPRESSION: Abnormal perfusion lung scan.    Indeterminate probability of pulmonary embolus.        Assessment :   64YO F PMH HTN , Osteoporosis, Seizure ,Pericarditis admitted with Left LE swelling & pain found to have extensive DVT involving the left common femoral, femoral, popliteal, and calf veins. Low grade temps likely reactive to extensive DVT.  WBC WNL . Procalcitonin level unimpressive. CXR with no consolidation. No hx of COVID 19 exposure. COVID 19 pcr and antibody serology negative. VQ scan noted possibly PE.    Plan :   Defer antibiotics  Vascular following for possible thrombectomy  Trend temps and cbc  Fu cultures  Anticoagulant per Primary team and vascular  Heme Onc evaluation  No objections from ID standpoint for vascular procedure    D/w Dr Gant and Dr Villegas      Continue with present regime .  Approptiate use of antibiotics and adverse effects reviewed.      I have discussed the above plan of care with patient/family in detail. They expressed understanding of the treatment plan . Risks, benefits and alternatives discussed in detail. I have asked if they have any questions or concerns and appropriately addressed them to the best of my ability .      > 45 minutes spent in direct patient care reviewing  the notes, lab data/ imaging , discussion with multidisciplinary team. All questions were addressed and answered to the best of my capacity .    Thank you for allowing me to participate in the care of your patient .      Cynthia Owens MD  Infectious Disease  [phone]

## 2020-08-11 NOTE — H&P ADULT - ASSESSMENT
66 yo Female with  pmhx significant  for HTN , Osteoporosis ,Sz ,Pericarditis , Renal atherosclerosis , Shingles ,Migraines ,Depression ,Constipation ,Anxiety presents to ED with  L LE swelling & pain that began 2 days  ago, reports that pain is aching moderate  in severity ,found to have  L calf swelling and pain now radiates to the L groin with now weakness or numbness. Denies trauma, falls, dysuria, urgency and frequency .Leg venous doppler was performed and demonstrated extensive involving the left common femoral, femoral, popliteal, and calf veins. LLE DVT ,started on heparin drip and vascular surgery consulted for possible thrombectomy Due to elevated creatinine 1.5 and planned CTA nephrology consult called ,also patient is given toradol for pain management in view of drug seeking behavior Seen by pulmonologist and cardiologist for clearance for planned surgery ,clot removal .ECHO ordered to evaluate for R heart strain .

## 2020-08-11 NOTE — PROGRESS NOTE ADULT - SUBJECTIVE AND OBJECTIVE BOX
AUGUSTUS FLORES    [facility] TELE 313 W1    Patient is a 65y old  Female who presents with a chief complaint of LEFT LEG PAIN AND SWELLING (11 Aug 2020 06:41)       Allergies    penicillin (Anaphylaxis)    Intolerances        HPI:  64 yo Female with  pmhx significant  for HTN , Osteoporosis ,Sz ,Pericarditis , Renal atherosclerosis , Shingles ,Migraines ,Depression ,Constipation ,Anxiety presents to ED with  L LE swelling & pain that began 2 days  ago, reports that pain is aching moderate  in severity ,found to have  L calf swelling and pain now radiates to the L groin with now weakness or numbness. Denies trauma, falls, dysuria, urgency and frequency .Leg venous doppler was performed and demonstrated extensive involving the left common femoral, femoral, popliteal, and calf veins. LLE DVT ,started on heparin drip and vascular surgery consulted for possible thrombectomy Due to elevated creatinine 1.5 and planned CTA nephrology consult called ,also patient is given toradol for pain management in view of drug seeking behavior Seen by pulmonologist and cardiologist for clearance for planned surgery ,clot removal .ECHO ordered to evaluate for R heart strain . (11 Aug 2020 06:09)      PAST MEDICAL & SURGICAL HISTORY:  Drug-seeking behavior  Renal arteriosclerosis  Constipation  Anxiety  Depression  HTN (hypertension)  Shingles  Pericarditis  Osteoporosis  Seizure disorder  Migraines  History of back surgery      FAMILY HISTORY:  Family history of heart disease  Family history of colon cancer in mother        MEDICATIONS   acetaminophen   Tablet .. 650 milliGRAM(s) Oral every 6 hours  aspirin enteric coated 81 milliGRAM(s) Oral daily  heparin   Injectable 4500 Unit(s) IV Push every 6 hours PRN  heparin   Injectable 2000 Unit(s) IV Push every 6 hours PRN  heparin  Infusion.  Unit(s)/Hr IV Continuous <Continuous>  lactated ringers. 1000 milliLiter(s) IV Continuous <Continuous>  losartan 50 milliGRAM(s) Oral daily  pantoprazole    Tablet 40 milliGRAM(s) Oral before breakfast  sertraline 50 milliGRAM(s) Oral daily  topiramate 100 milliGRAM(s) Oral two times a day      Vital Signs Last 24 Hrs  T(C): 37.9 (11 Aug 2020 07:05), Max: 38.1 (11 Aug 2020 00:35)  T(F): 100.3 (11 Aug 2020 07:05), Max: 100.6 (11 Aug 2020 00:47)  HR: 80 (11 Aug 2020 07:05) (64 - 87)  BP: 138/78 (11 Aug 2020 07:05) (124/89 - 169/84)  BP(mean): --  RR: 16 (11 Aug 2020 07:05) (16 - 19)  SpO2: 97% (11 Aug 2020 07:05) (95% - 97%)      08-10-20 @ 07:01  -  08-11-20 @ 07:00  --------------------------------------------------------  IN: 747 mL / OUT: 0 mL / NET: 747 mL            LABS:                        11.1   6.46  )-----------( 140      ( 11 Aug 2020 06:47 )             35.6     08-11    145  |  114<H>  |  26<H>  ----------------------------<  109<H>  4.0   |  25  |  1.20    Ca    8.8      11 Aug 2020 06:47  Phos  2.6     08-11  Mg     2.3     08-11    TPro  6.0  /  Alb  2.8<L>  /  TBili  0.3  /  DBili  x   /  AST  20  /  ALT  24  /  AlkPhos  77  08-11    PT/INR - ( 10 Aug 2020 15:44 )   PT: 12.3 sec;   INR: 1.05 ratio         PTT - ( 11 Aug 2020 00:37 )  PTT:158.4 sec          WBC:  WBC Count: 6.46 K/uL (08-11 @ 06:47)  WBC Count: 5.89 K/uL (08-11 @ 00:37)  WBC Count: 6.85 K/uL (08-10 @ 14:21)      MICROBIOLOGY:  RECENT CULTURES:              PT/INR - ( 10 Aug 2020 15:44 )   PT: 12.3 sec;   INR: 1.05 ratio         PTT - ( 11 Aug 2020 00:37 )  PTT:158.4 sec    Sodium:  Sodium, Serum: 145 mmol/L (08-11 @ 06:47)  Sodium, Serum: 143 mmol/L (08-10 @ 14:21)      1.20 mg/dL 08-11 @ 06:47  1.50 mg/dL 08-10 @ 14:21      Hemoglobin:  Hemoglobin: 11.1 g/dL (08-11 @ 06:47)  Hemoglobin: 11.8 g/dL (08-11 @ 00:37)  Hemoglobin: 11.8 g/dL (08-10 @ 14:21)      Platelets: Platelet Count - Automated: 140 K/uL (08-11 @ 06:47)  Platelet Count - Automated: 129 K/uL (08-11 @ 00:37)  Platelet Count - Automated: 165 K/uL (08-10 @ 14:21)      LIVER FUNCTIONS - ( 11 Aug 2020 06:47 )  Alb: 2.8 g/dL / Pro: 6.0 g/dL / ALK PHOS: 77 U/L / ALT: 24 U/L / AST: 20 U/L / GGT: x                 RADIOLOGY & ADDITIONAL STUDIES:

## 2020-08-11 NOTE — H&P ADULT - PROBLEM SELECTOR PLAN 1
Leg venous doppler was performed and demonstrated extensive involving the left common femoral, femoral, popliteal, and calf veins. LLE DVT ,started on heparin drip and vascular surgery consulted for possible thrombectomy

## 2020-08-11 NOTE — H&P ADULT - ATTENDING COMMENTS
64 yo Female with  pmhx significant  for HTN , Osteoporosis ,Sz ,Pericarditis , Renal atherosclerosis , Shingles ,Migraines ,Depression ,Constipation ,Anxiety presents to ED with  L LE swelling & pain that began 2 days  ago, reports that pain is aching moderate  in severity ,found to have  L calf swelling and pain now radiates to the L groin with now weakness or numbness. Denies trauma, falls, dysuria, urgency and frequency .Leg venous doppler was performed and demonstrated extensive involving the left common femoral, femoral, popliteal, and calf veins. LLE DVT ,started on heparin drip and vascular surgery consulted for possible thrombectomy Due to elevated creatinine 1.5 and planned CTA nephrology consult called ,also patient is given toradol for pain management in view of drug seeking behavior Seen by pulmonologist and cardiologist for clearance for planned surgery ,clot removal .ECHO ordered to evaluate for R heart strain . MEDICALLY OPTIMIZED FOR OR AND CLEARED BY CARDIOLOGIST AND PULMONOLOGIST .ID CLEARANCE REQUESTED AND CASE DISCUSSED WITH  -LOW GRADE FEVER IS LIKELY REACTIVE AND 2./2 TO DVT ,NO NEED IN ANTIBACTERIAL TX AT THIS TIME .

## 2020-08-11 NOTE — CHART NOTE - NSCHARTNOTEFT_GEN_A_CORE
Called by RN for abdominal pain. Pt seen and examined at bedside. States she has significant pain low in her abdomen. Denies nausea/vomiting, constipation, belching, dyspnea.     General: WN/WD in mild distress due to pain  Respiratory: CTA B/L, no wheezes  CV: RRR, S1S2, 3/6 systolic murmur, no rubs or gallops  Abdominal: Soft, tender pelvic area, significant distention in pelvis  Extremities: No edema, warm, well perfused,  +2 peripheral pulses  Neurology: A&Ox3, nonfocal      A/P: 66 y/o  F pmhx  HTN , Osteoporosis , Renal atherosclerosis , Shingles ,Migraines ,Depression ,Constipation ,Anxiety presents to ED with  L LE swelling & pain  s/p LLE thrombectomy with pop/fem/iliac/common iliac vein stent placement, for extensive DVT/poss May Thurner syndrome, #POD1    Post Operative urinary retension  - Bladder scan showed >999cc, Straight cath relieved 1100 cc clear yellow urine  - Trial of void for 8 hours. Strict Intake and output   - RN to call with status changes      ADDENDUM     RN called for Fever of 101. Patient seen and examined at bedside. Patient states that her abdominal pressure relieved from bladder decompression, but deeper abdominal pain remains.  - Gave morphine 4 IV once for complaint of severe pelvic pain 8/10, likely from bladder spasm after over distension   - Fever unclear origin, likely post surgical inflammation on POD #1.  UA and Uculture ordered.

## 2020-08-11 NOTE — BRIEF OPERATIVE NOTE - NSICDXBRIEFPREOP_GEN_ALL_CORE_FT
PRE-OP DIAGNOSIS:  Deep vein thrombosis (DVT) 11-Aug-2020 15:53:06 Left lower extremity DVT- Left Popliteal, femoral and common iliac Jenny Zarco

## 2020-08-11 NOTE — H&P ADULT - HISTORY OF PRESENT ILLNESS
66 y/o female who pmhx HTN, HLD, renal artery stenosis, CKD, hx of substance abuse, chronic back pain      IN THE ED:  Temp 98.7  F , HR 65 , /81, RR 17 , SpO2 96% on RA  S/P hearin gtt, 1L LR, 15mg IVP ketorolac  EKG:  Labs significant for BUN 30/1.50, glucose 131  US duplex left LE: Positive for extensive left lower extremity deep venous thrombosis.  Xray Hip: Impression: No displaced fracture
66 yo Female with  pmhx significant  for HTN , Osteoporosis ,Sz ,Pericarditis , Renal atherosclerosis , Shingles ,Migraines ,Depression ,Constipation ,Anxiety presents to ED with  L LE swelling & pain that began 2 days  ago, reports that pain is aching moderate  in severity ,found to have  L calf swelling and pain now radiates to the L groin with now weakness or numbness. Denies trauma, falls, dysuria, urgency and frequency .Leg venous doppler was performed and demonstrated extensive involving the left common femoral, femoral, popliteal, and calf veins. LLE DVT ,started on heparin drip and vascular surgery consulted for possible thrombectomy Due to elevated creatinine 1.5 and planned CTA nephrology consult called ,also patient is given toradol for pain management in view of drug seeking behavior Seen by pulmonologist and cardiologist for clearance for planned surgery ,clot removal .ECHO ordered to evaluate for R heart strain .

## 2020-08-11 NOTE — H&P ADULT - NSICDXPASTMEDICALHX_GEN_ALL_CORE_FT
PAST MEDICAL HISTORY:  Anxiety     Constipation     Depression     Drug-seeking behavior     HTN (hypertension)     Migraines     Osteoporosis     Pericarditis     Renal arteriosclerosis     Seizure disorder     Shingles

## 2020-08-12 DIAGNOSIS — R33.9 RETENTION OF URINE, UNSPECIFIED: ICD-10-CM

## 2020-08-12 LAB
ALBUMIN SERPL ELPH-MCNC: 2.5 G/DL — LOW (ref 3.3–5)
ALP SERPL-CCNC: 68 U/L — SIGNIFICANT CHANGE UP (ref 40–120)
ALT FLD-CCNC: 19 U/L — SIGNIFICANT CHANGE UP (ref 12–78)
ANION GAP SERPL CALC-SCNC: 6 MMOL/L — SIGNIFICANT CHANGE UP (ref 5–17)
ANION GAP SERPL CALC-SCNC: 6 MMOL/L — SIGNIFICANT CHANGE UP (ref 5–17)
APTT BLD: 101.5 SEC — HIGH (ref 27.5–35.5)
APTT BLD: 83.6 SEC — HIGH (ref 27.5–35.5)
APTT BLD: 97.7 SEC — HIGH (ref 27.5–35.5)
AST SERPL-CCNC: 16 U/L — SIGNIFICANT CHANGE UP (ref 15–37)
BILIRUB SERPL-MCNC: 0.4 MG/DL — SIGNIFICANT CHANGE UP (ref 0.2–1.2)
BUN SERPL-MCNC: 14 MG/DL — SIGNIFICANT CHANGE UP (ref 7–23)
BUN SERPL-MCNC: 15 MG/DL — SIGNIFICANT CHANGE UP (ref 7–23)
CALCIUM SERPL-MCNC: 8.2 MG/DL — LOW (ref 8.5–10.1)
CALCIUM SERPL-MCNC: 8.5 MG/DL — SIGNIFICANT CHANGE UP (ref 8.5–10.1)
CHLORIDE SERPL-SCNC: 113 MMOL/L — HIGH (ref 96–108)
CHLORIDE SERPL-SCNC: 113 MMOL/L — HIGH (ref 96–108)
CO2 SERPL-SCNC: 25 MMOL/L — SIGNIFICANT CHANGE UP (ref 22–31)
CO2 SERPL-SCNC: 26 MMOL/L — SIGNIFICANT CHANGE UP (ref 22–31)
CREAT SERPL-MCNC: 1 MG/DL — SIGNIFICANT CHANGE UP (ref 0.5–1.3)
CREAT SERPL-MCNC: 1 MG/DL — SIGNIFICANT CHANGE UP (ref 0.5–1.3)
GLUCOSE SERPL-MCNC: 105 MG/DL — HIGH (ref 70–99)
GLUCOSE SERPL-MCNC: 98 MG/DL — SIGNIFICANT CHANGE UP (ref 70–99)
HCT VFR BLD CALC: 32.7 % — LOW (ref 34.5–45)
HCT VFR BLD CALC: 33.5 % — LOW (ref 34.5–45)
HGB BLD-MCNC: 10.5 G/DL — LOW (ref 11.5–15.5)
HGB BLD-MCNC: 10.5 G/DL — LOW (ref 11.5–15.5)
MCHC RBC-ENTMCNC: 27.3 PG — SIGNIFICANT CHANGE UP (ref 27–34)
MCHC RBC-ENTMCNC: 27.5 PG — SIGNIFICANT CHANGE UP (ref 27–34)
MCHC RBC-ENTMCNC: 31.3 GM/DL — LOW (ref 32–36)
MCHC RBC-ENTMCNC: 32.1 GM/DL — SIGNIFICANT CHANGE UP (ref 32–36)
MCV RBC AUTO: 85.6 FL — SIGNIFICANT CHANGE UP (ref 80–100)
MCV RBC AUTO: 87 FL — SIGNIFICANT CHANGE UP (ref 80–100)
NRBC # BLD: 0 /100 WBCS — SIGNIFICANT CHANGE UP (ref 0–0)
NRBC # BLD: 0 /100 WBCS — SIGNIFICANT CHANGE UP (ref 0–0)
PHOSPHATE SERPL-MCNC: 2.5 MG/DL — SIGNIFICANT CHANGE UP (ref 2.5–4.5)
PLATELET # BLD AUTO: 144 K/UL — LOW (ref 150–400)
PLATELET # BLD AUTO: 146 K/UL — LOW (ref 150–400)
POTASSIUM SERPL-MCNC: 3.6 MMOL/L — SIGNIFICANT CHANGE UP (ref 3.5–5.3)
POTASSIUM SERPL-MCNC: 3.9 MMOL/L — SIGNIFICANT CHANGE UP (ref 3.5–5.3)
POTASSIUM SERPL-SCNC: 3.6 MMOL/L — SIGNIFICANT CHANGE UP (ref 3.5–5.3)
POTASSIUM SERPL-SCNC: 3.9 MMOL/L — SIGNIFICANT CHANGE UP (ref 3.5–5.3)
PROT SERPL-MCNC: 5.9 G/DL — LOW (ref 6–8.3)
RBC # BLD: 3.82 M/UL — SIGNIFICANT CHANGE UP (ref 3.8–5.2)
RBC # BLD: 3.85 M/UL — SIGNIFICANT CHANGE UP (ref 3.8–5.2)
RBC # FLD: 16.2 % — HIGH (ref 10.3–14.5)
RBC # FLD: 16.3 % — HIGH (ref 10.3–14.5)
SODIUM SERPL-SCNC: 144 MMOL/L — SIGNIFICANT CHANGE UP (ref 135–145)
SODIUM SERPL-SCNC: 145 MMOL/L — SIGNIFICANT CHANGE UP (ref 135–145)
TROPONIN I SERPL-MCNC: <.015 NG/ML — SIGNIFICANT CHANGE UP (ref 0.01–0.04)
WBC # BLD: 6.8 K/UL — SIGNIFICANT CHANGE UP (ref 3.8–10.5)
WBC # BLD: 7.51 K/UL — SIGNIFICANT CHANGE UP (ref 3.8–10.5)
WBC # FLD AUTO: 6.8 K/UL — SIGNIFICANT CHANGE UP (ref 3.8–10.5)
WBC # FLD AUTO: 7.51 K/UL — SIGNIFICANT CHANGE UP (ref 3.8–10.5)

## 2020-08-12 RX ORDER — SENNA PLUS 8.6 MG/1
2 TABLET ORAL AT BEDTIME
Refills: 0 | Status: DISCONTINUED | OUTPATIENT
Start: 2020-08-12 | End: 2020-08-14

## 2020-08-12 RX ORDER — LABETALOL HCL 100 MG
200 TABLET ORAL THREE TIMES A DAY
Refills: 0 | Status: DISCONTINUED | OUTPATIENT
Start: 2020-08-12 | End: 2020-08-14

## 2020-08-12 RX ORDER — HYDROMORPHONE HYDROCHLORIDE 2 MG/ML
0.5 INJECTION INTRAMUSCULAR; INTRAVENOUS; SUBCUTANEOUS ONCE
Refills: 0 | Status: DISCONTINUED | OUTPATIENT
Start: 2020-08-12 | End: 2020-08-12

## 2020-08-12 RX ORDER — MORPHINE SULFATE 50 MG/1
4 CAPSULE, EXTENDED RELEASE ORAL ONCE
Refills: 0 | Status: DISCONTINUED | OUTPATIENT
Start: 2020-08-12 | End: 2020-08-12

## 2020-08-12 RX ORDER — POLYETHYLENE GLYCOL 3350 17 G/17G
17 POWDER, FOR SOLUTION ORAL DAILY
Refills: 0 | Status: DISCONTINUED | OUTPATIENT
Start: 2020-08-12 | End: 2020-08-14

## 2020-08-12 RX ORDER — MAGNESIUM HYDROXIDE 400 MG/1
30 TABLET, CHEWABLE ORAL DAILY
Refills: 0 | Status: DISCONTINUED | OUTPATIENT
Start: 2020-08-12 | End: 2020-08-14

## 2020-08-12 RX ADMIN — MORPHINE SULFATE 4 MILLIGRAM(S): 50 CAPSULE, EXTENDED RELEASE ORAL at 02:00

## 2020-08-12 RX ADMIN — Medication 200 MILLIGRAM(S): at 21:06

## 2020-08-12 RX ADMIN — Medication 200 MILLIGRAM(S): at 16:03

## 2020-08-12 RX ADMIN — Medication 100 MILLIGRAM(S): at 18:19

## 2020-08-12 RX ADMIN — HYDROMORPHONE HYDROCHLORIDE 0.5 MILLIGRAM(S): 2 INJECTION INTRAMUSCULAR; INTRAVENOUS; SUBCUTANEOUS at 21:53

## 2020-08-12 RX ADMIN — SERTRALINE 50 MILLIGRAM(S): 25 TABLET, FILM COATED ORAL at 12:57

## 2020-08-12 RX ADMIN — Medication 650 MILLIGRAM(S): at 19:29

## 2020-08-12 RX ADMIN — Medication 100 MILLIGRAM(S): at 05:17

## 2020-08-12 RX ADMIN — HEPARIN SODIUM 900 UNIT(S)/HR: 5000 INJECTION INTRAVENOUS; SUBCUTANEOUS at 13:59

## 2020-08-12 RX ADMIN — MORPHINE SULFATE 4 MILLIGRAM(S): 50 CAPSULE, EXTENDED RELEASE ORAL at 02:15

## 2020-08-12 RX ADMIN — HYDROMORPHONE HYDROCHLORIDE 0.5 MILLIGRAM(S): 2 INJECTION INTRAMUSCULAR; INTRAVENOUS; SUBCUTANEOUS at 21:04

## 2020-08-12 RX ADMIN — Medication 650 MILLIGRAM(S): at 18:21

## 2020-08-12 RX ADMIN — LOSARTAN POTASSIUM 50 MILLIGRAM(S): 100 TABLET, FILM COATED ORAL at 05:17

## 2020-08-12 RX ADMIN — PANTOPRAZOLE SODIUM 40 MILLIGRAM(S): 20 TABLET, DELAYED RELEASE ORAL at 05:18

## 2020-08-12 RX ADMIN — SENNA PLUS 2 TABLET(S): 8.6 TABLET ORAL at 21:06

## 2020-08-12 RX ADMIN — HEPARIN SODIUM 900 UNIT(S)/HR: 5000 INJECTION INTRAVENOUS; SUBCUTANEOUS at 06:27

## 2020-08-12 NOTE — PROGRESS NOTE ADULT - SUBJECTIVE AND OBJECTIVE BOX
AUGUSTUS FLORES is a 65yFemale , patient examined and chart reviewed.    INTERVAL HPI/ OVERNIGHT EVENTS:   Sp Left lower extremity thrombectomy popliteal and femoral vein, Left common iliac vein angioplasty and stent pd 1.  Feels well.    PAST MEDICAL & SURGICAL HISTORY:  Drug-seeking behavior  Renal arteriosclerosis  Constipation  Anxiety  Depression  HTN (hypertension)  Shingles  Pericarditis  Osteoporosis  Seizure disorder  Migraines  History of back surgery      For details regarding the patient's social history, family history, and other miscellaneous elements, please refer the initial infectious diseases consultation and/or the admitting history and physical examination for this admission.    ROS:  CONSTITUTIONAL:  Negative fever or chills  EYES:  Negative  blurry vision or double vision  CARDIOVASCULAR:  Negative for chest pain or palpitations  RESPIRATORY:  Negative for cough, wheezing, or SOB   GASTROINTESTINAL:  Negative for nausea, vomiting, diarrhea, constipation, or abdominal pain  GENITOURINARY:  Negative frequency, urgency or dysuria  NEUROLOGIC:  No headache, confusion, dizziness, lightheadedness  All other systems were reviewed and are negative     ALLERGIES  penicillin (Anaphylaxis)      Current inpatient medications :    ANTIBIOTICS/RELEVANT:      acetaminophen   Tablet .. 650 milliGRAM(s) Oral every 6 hours PRN  aspirin enteric coated 81 milliGRAM(s) Oral daily  bisacodyl Suppository 10 milliGRAM(s) Rectal daily PRN  heparin   Injectable 4500 Unit(s) IV Push every 6 hours PRN  heparin   Injectable 2000 Unit(s) IV Push every 6 hours PRN  heparin  Infusion. 900 Unit(s)/Hr IV Continuous <Continuous>  labetalol 200 milliGRAM(s) Oral three times a day  losartan 50 milliGRAM(s) Oral daily  magnesium hydroxide Suspension 30 milliLiter(s) Oral daily PRN  pantoprazole    Tablet 40 milliGRAM(s) Oral before breakfast  polyethylene glycol 3350 17 Gram(s) Oral daily  senna 2 Tablet(s) Oral at bedtime  sertraline 50 milliGRAM(s) Oral daily  topiramate 100 milliGRAM(s) Oral two times a day  traMADol 50 milliGRAM(s) Oral every 6 hours PRN      Objective:    08-11 @ 07:01  -  08-12 @ 07:00  --------------------------------------------------------  IN: 2015 mL / OUT: 0 mL / NET: 2015 mL    08-12 @ 07:01  -  08-12 @ 17:04  --------------------------------------------------------  IN: 0 mL / OUT: 1300 mL / NET: -1300 mL      T(C): 37.2 (08-12-20 @ 16:04), Max: 38.4 (08-12-20 @ 00:52)  HR: 76 (08-12-20 @ 16:04) (75 - 82)  BP: 161/77 (08-12-20 @ 16:04) (120/71 - 177/85)  RR: 17 (08-12-20 @ 16:04) (15 - 18)  SpO2: 97% (08-12-20 @ 16:04) (94% - 97%)    Physical Exam:  General: no acute distress  Eyes: sclera anicteric, pupils equal and reactive to light  ENMT: buccal mucosa moist, pharynx not injected  Neck: supple, trachea midline  Lungs: clear, no wheeze/rhonchi  Cardiovascular: regular rate and rhythm, S1 S2  Abdomen: soft, nontender, no organomegaly present, bowel sounds normal  Neurological: alert and oriented x3, Cranial Nerves II-XII grossly intact  Skin: no increased ecchymosis/petechiae/purpura  Lymph Nodes: no palpable cervical/supraclavicular lymph nodes enlargements  Extremities: Left LE drsg c/d/i    LABS:                          10.5   6.80  )-----------( 146      ( 12 Aug 2020 05:02 )             33.5       08-12    145  |  113<H>  |  14  ----------------------------<  98  3.9   |  26  |  1.00    Ca    8.5      12 Aug 2020 05:02  Phos  2.5     08-12  Mg     2.3     08-11    TPro  5.9<L>  /  Alb  2.5<L>  /  TBili  0.4  /  DBili  x   /  AST  16  /  ALT  19  /  AlkPhos  68  08-12      PT/INR - ( 12 Aug 2020 05:02 )   PT: 14.2 sec;   INR: 1.23 ratio         PTT - ( 12 Aug 2020 13:23 )  PTT:83.6 sec    MICROBIOLOGY:  COVID-19 PCR . (08.11.20 @ 11:32)    COVID-19 PCR: NotDetec    COVID-19  Antibody - for prior infection screening (08.11.20 @ 09:36)    COVID-19 IgG Antibody Index: 0.07: Roche ECLIA Total AB (JENNIFER)  NOTE: This result index represents a total antibody measurement,  which  includes IgG, IgA, and IgM  Negative <= 0.99 Index  Positive >= 1.00 Index Index    RADIOLOGY & ADDITIONAL STUDIES:  EXAM:  XR CHEST PORTABLE URGENT 1V                            PROCEDURE DATE:  08/10/2020          INTERPRETATION:  Chest portable.    Clinical History: Admission chest radiograph. Deep venous thrombosis.    Comparison: 8/15/2019.    Single AP viewsubmitted.  There is external artifact from patient's bra.    The evaluation of the cardiomediastinal silhouette is limited on portable technique.  Mildly tortuous, calcified aorta.    The lungs are mildly hyperinflated which may reflect emphysematous disease.  There is minimal linear subsegmental atelectasis and/or fibrosis left lower lung zone.  No lobar lung consolidation or significant pleural effusion is noted.    Multilevel thoracic vertebroplasty is noted.      EXAM:  NM PULM PERFUSION IMG                            PROCEDURE DATE:  08/11/2020          INTERPRETATION:  RADIOPHARMACEUTICAL: 3.0 mCi Tc-99m-MAA, I.V.    CLINICAL STATEMENT: 65 year-old female with DVT    TECHNIQUE:  Perfusion images of the lungs were obtained following administration of Tc-99m-MAA. Images were obtained in the anterior, posterior, both lateral, and all 4 oblique projections. The study was interpreted in conjunction with chest radiograph of 8/10/2020.    No prior VQ scan    FINDINGS: There are segmental perfusion defects involving the posterior segment of the right upper lobe, medial segment of the right middle lobe and inferior lingula.    IMPRESSION: Abnormal perfusion lung scan.    Indeterminate probability of pulmonary embolus.        Assessment :   64YO F PMH HTN , Osteoporosis, Seizure ,Pericarditis admitted with Left LE swelling & pain found to have extensive DVT involving the left common femoral, femoral, popliteal, and calf veins. Low grade temps likely reactive to extensive DVT.  WBC WNL . Procalcitonin level unimpressive. CXR with no consolidation. No hx of COVID 19 exposure. COVID 19 pcr and antibody serology negative. VQ scan noted possibly PE. Sp Left lower extremity thrombectomy popliteal and femoral vein, Left common iliac vein angioplasty and stent pd 1.    Plan :   Defer antibiotics  Trend temps and cbc  Anticoagulant per Primary team and vascular  Heme Onc evaluation  Stable from ID standpoint    D/w Dr Gant      Continue with present regiment.  Appropriate use of antibiotics and adverse effects reviewed.      I have discussed the above plan of care with patient/ family in detail. They expressed understanding of the  treatment plan . Risks, benefits and alternatives discussed in detail. I have asked if they have any questions or concerns and appropriately addressed them to the best of my ability .    > 35 minutes were spent in direct patient care reviewing notes, medications ,labs data/ imaging , discussion with multidisciplinary team.    Thank you for allowing me to participate in care of your patient .    Cynthia Owens MD  Infectious Disease  [phone]

## 2020-08-12 NOTE — PROGRESS NOTE ADULT - SUBJECTIVE AND OBJECTIVE BOX
AUGUSTUS FLORES    [facility] TELE 313 W1    Patient is a 65y old  Female who presents with a chief complaint of LEFT LEG PAIN AND SWELLING (12 Aug 2020 00:34)       Allergies    penicillin (Anaphylaxis)    Intolerances        HPI:  66 yo Female with  pmhx significant  for HTN , Osteoporosis ,Sz ,Pericarditis , Renal atherosclerosis , Shingles ,Migraines ,Depression ,Constipation ,Anxiety presents to ED with  L LE swelling & pain that began 2 days  ago, reports that pain is aching moderate  in severity ,found to have  L calf swelling and pain now radiates to the L groin with now weakness or numbness. Denies trauma, falls, dysuria, urgency and frequency .Leg venous doppler was performed and demonstrated extensive involving the left common femoral, femoral, popliteal, and calf veins. LLE DVT ,started on heparin drip and vascular surgery consulted for possible thrombectomy Due to elevated creatinine 1.5 and planned CTA nephrology consult called ,also patient is given toradol for pain management in view of drug seeking behavior Seen by pulmonologist and cardiologist for clearance for planned surgery ,clot removal .ECHO ordered to evaluate for R heart strain . (11 Aug 2020 06:09)      PAST MEDICAL & SURGICAL HISTORY:  Drug-seeking behavior  Renal arteriosclerosis  Constipation  Anxiety  Depression  HTN (hypertension)  Shingles  Pericarditis  Osteoporosis  Seizure disorder  Migraines  History of back surgery      FAMILY HISTORY:  Family history of heart disease  Family history of colon cancer in mother        MEDICATIONS   acetaminophen   Tablet .. 650 milliGRAM(s) Oral every 6 hours PRN  aspirin enteric coated 81 milliGRAM(s) Oral daily  heparin   Injectable 4500 Unit(s) IV Push every 6 hours PRN  heparin   Injectable 2000 Unit(s) IV Push every 6 hours PRN  heparin  Infusion. 900 Unit(s)/Hr IV Continuous <Continuous>  losartan 50 milliGRAM(s) Oral daily  pantoprazole    Tablet 40 milliGRAM(s) Oral before breakfast  sertraline 50 milliGRAM(s) Oral daily  topiramate 100 milliGRAM(s) Oral two times a day  traMADol 50 milliGRAM(s) Oral every 6 hours PRN      Vital Signs Last 24 Hrs  T(C): 36.6 (12 Aug 2020 05:31), Max: 38.4 (12 Aug 2020 00:52)  T(F): 97.9 (12 Aug 2020 05:31), Max: 101.1 (12 Aug 2020 00:52)  HR: 80 (12 Aug 2020 05:31) (75 - 88)  BP: 159/74 (12 Aug 2020 05:31) (120/71 - 186/75)  BP(mean): --  RR: 17 (12 Aug 2020 05:31) (12 - 18)  SpO2: 96% (12 Aug 2020 05:31) (94% - 98%)      08-10-20 @ 07:01  -  08-11-20 @ 07:00  --------------------------------------------------------  IN: 747 mL / OUT: 0 mL / NET: 747 mL    08-11-20 @ 07:01  -  08-12-20 @ 06:35  --------------------------------------------------------  IN: 2015 mL / OUT: 0 mL / NET: 2015 mL            LABS:                        10.5   6.80  )-----------( 146      ( 12 Aug 2020 05:02 )             33.5     08-12    145  |  113<H>  |  14  ----------------------------<  98  3.9   |  26  |  1.00    Ca    8.5      12 Aug 2020 05:02  Phos  2.5     08-12  Mg     2.3     08-11    TPro  5.9<L>  /  Alb  2.5<L>  /  TBili  0.4  /  DBili  x   /  AST  16  /  ALT  19  /  AlkPhos  68  08-12    PT/INR - ( 12 Aug 2020 05:02 )   PT: 14.2 sec;   INR: 1.23 ratio         PTT - ( 12 Aug 2020 05:02 )  PTT:97.7 sec          WBC:  WBC Count: 6.80 K/uL (08-12 @ 05:02)  WBC Count: 7.51 K/uL (08-12 @ 03:22)  WBC Count: 6.46 K/uL (08-11 @ 06:47)  WBC Count: 5.89 K/uL (08-11 @ 00:37)  WBC Count: 6.85 K/uL (08-10 @ 14:21)      MICROBIOLOGY:  RECENT CULTURES:        CARDIAC MARKERS ( 12 Aug 2020 05:02 )  <.015 ng/mL / x     / x     / x     / x            PT/INR - ( 12 Aug 2020 05:02 )   PT: 14.2 sec;   INR: 1.23 ratio         PTT - ( 12 Aug 2020 05:02 )  PTT:97.7 sec    Sodium:  Sodium, Serum: 145 mmol/L (08-12 @ 05:02)  Sodium, Serum: 144 mmol/L (08-12 @ 03:22)  Sodium, Serum: 145 mmol/L (08-11 @ 06:47)  Sodium, Serum: 143 mmol/L (08-10 @ 14:21)      1.00 mg/dL 08-12 @ 05:02  1.00 mg/dL 08-12 @ 03:22  1.20 mg/dL 08-11 @ 06:47  1.50 mg/dL 08-10 @ 14:21      Hemoglobin:  Hemoglobin: 10.5 g/dL (08-12 @ 05:02)  Hemoglobin: 10.5 g/dL (08-12 @ 03:22)  Hemoglobin: 11.1 g/dL (08-11 @ 06:47)  Hemoglobin: 11.8 g/dL (08-11 @ 00:37)  Hemoglobin: 11.8 g/dL (08-10 @ 14:21)      Platelets: Platelet Count - Automated: 146 K/uL (08-12 @ 05:02)  Platelet Count - Automated: 144 K/uL (08-12 @ 03:22)  Platelet Count - Automated: 140 K/uL (08-11 @ 06:47)  Platelet Count - Automated: 129 K/uL (08-11 @ 00:37)  Platelet Count - Automated: 165 K/uL (08-10 @ 14:21)      LIVER FUNCTIONS - ( 12 Aug 2020 05:02 )  Alb: 2.5 g/dL / Pro: 5.9 g/dL / ALK PHOS: 68 U/L / ALT: 19 U/L / AST: 16 U/L / GGT: x                 RADIOLOGY & ADDITIONAL STUDIES:

## 2020-08-12 NOTE — PROGRESS NOTE ADULT - SUBJECTIVE AND OBJECTIVE BOX
PROGRESS NOTE  Patient is a 65y old  Female who presents with a chief complaint of LEFT LEG PAIN AND SWELLING (12 Aug 2020 15:46)    Chart and available morning labs /imaging are reviewed electronically , urgent issues addressed . More information  is being added upon completion of rounds , when more information is collected and management discussed with consultants , medical staff and social service/case management on the floor   OVERNIGHT    Developed UR postopertivrely and ryan cath was placed   HPI:  66 yo Female with  pmhx significant  for HTN , Osteoporosis ,Sz ,Pericarditis , Renal atherosclerosis , Shingles ,Migraines ,Depression ,Constipation ,Anxiety presents to ED with  L LE swelling & pain that began 2 days  ago, reports that pain is aching moderate  in severity ,found to have  L calf swelling and pain now radiates to the L groin with now weakness or numbness. Denies trauma, falls, dysuria, urgency and frequency .Leg venous doppler was performed and demonstrated extensive involving the left common femoral, femoral, popliteal, and calf veins. LLE DVT ,started on heparin drip and vascular surgery consulted for possible thrombectomy Due to elevated creatinine 1.5 and planned CTA nephrology consult called ,also patient is given toradol for pain management in view of drug seeking behavior Seen by pulmonologist and cardiologist for clearance for planned surgery ,clot removal .ECHO ordered to evaluate for R heart strain . (11 Aug 2020 06:09)    PAST MEDICAL & SURGICAL HISTORY:  Drug-seeking behavior  Renal arteriosclerosis  Constipation  Anxiety  Depression  HTN (hypertension)  Shingles  Pericarditis  Osteoporosis  Seizure disorder  Migraines  History of back surgery      MEDICATIONS  (STANDING):  aspirin enteric coated 81 milliGRAM(s) Oral daily  heparin  Infusion. 900 Unit(s)/Hr (9 mL/Hr) IV Continuous <Continuous>  labetalol 200 milliGRAM(s) Oral three times a day  losartan 50 milliGRAM(s) Oral daily  pantoprazole    Tablet 40 milliGRAM(s) Oral before breakfast  sertraline 50 milliGRAM(s) Oral daily  topiramate 100 milliGRAM(s) Oral two times a day    MEDICATIONS  (PRN):  acetaminophen   Tablet .. 650 milliGRAM(s) Oral every 6 hours PRN Temp greater or equal to 38C (100.4F), Mild Pain (1 - 3)  heparin   Injectable 4500 Unit(s) IV Push every 6 hours PRN For aPTT less than 40  heparin   Injectable 2000 Unit(s) IV Push every 6 hours PRN For aPTT between 40 - 57  traMADol 50 milliGRAM(s) Oral every 6 hours PRN Moderate Pain (4 - 6)      OBJECTIVE    T(C): 37.3 (08-12-20 @ 11:52), Max: 38.4 (08-12-20 @ 00:52)  HR: 80 (08-12-20 @ 11:52) (75 - 87)  BP: 142/72 (08-12-20 @ 11:52) (120/71 - 186/75)  RR: 16 (08-12-20 @ 11:52) (12 - 18)  SpO2: 97% (08-12-20 @ 11:52) (94% - 98%)  Wt(kg): --  I&O's Summary    11 Aug 2020 07:01  -  12 Aug 2020 07:00  --------------------------------------------------------  IN: 2015 mL / OUT: 0 mL / NET: 2015 mL    12 Aug 2020 07:01  -  12 Aug 2020 16:02  --------------------------------------------------------  IN: 0 mL / OUT: 1300 mL / NET: -1300 mL          REVIEW OF SYSTEMS:  CONSTITUTIONAL: No fever, weight loss, or fatigue  EYES: No eye pain, visual disturbances, or discharge  ENMT:   No sinus or throat pain  NECK: No pain or stiffness  RESPIRATORY: No cough, wheezing, chills or hemoptysis; No shortness of breath  CARDIOVASCULAR: No chest pain, palpitations, dizziness, or leg swelling  GASTROINTESTINAL: No abdominal pain. No nausea, vomiting; No diarrhea or constipation. No melena or hematochezia.  GENITOURINARY: No dysuria, frequency, hematuria, or incontinence  NEUROLOGICAL: No headaches, memory loss, loss of strength, numbness, or tremors  SKIN: No itching, burning, rashes, or lesions   MUSCULOSKELETAL: No joint pain or swelling; No muscle, back, or extremity pain    PHYSICAL EXAM:  Appearance: NAD. VS past 24 hrs -as above   HEENT:   Moist oral mucosa. Conjunctiva clear b/l.   Neck : supple  Respiratory: Lungs CTAB.  Gastrointestinal:  Soft, nontender. No rebound. No rigidity. BS present	  Cardiovascular: RRR ,S1S2 present  Neurologic: Non-focal. Moving all extremities.  Extremities: Extremities: no pedal edema or calf tenderness noted- LEft leg bandaged from toe to thigh.  Dressing not removed- dopplerable distal pulse.    : Ryan in place.   Skin: No rashes, No ecchymoses, No cyanosis.	  wounds ,skin lesions-See skin assesment flow sheet   LABS:                        10.5   6.80  )-----------( 146      ( 12 Aug 2020 05:02 )             33.5     08-12    145  |  113<H>  |  14  ----------------------------<  98  3.9   |  26  |  1.00    Ca    8.5      12 Aug 2020 05:02  Phos  2.5     08-12  Mg     2.3     08-11    TPro  5.9<L>  /  Alb  2.5<L>  /  TBili  0.4  /  DBili  x   /  AST  16  /  ALT  19  /  AlkPhos  68  08-12    CAPILLARY BLOOD GLUCOSE        PT/INR - ( 12 Aug 2020 05:02 )   PT: 14.2 sec;   INR: 1.23 ratio         PTT - ( 12 Aug 2020 13:23 )  PTT:83.6 sec      RADIOLOGY & ADDITIONAL TESTS:< from: NM Pulmonary Perfusion Scan (08.11.20 @ 12:16) >  EXAM:  NM PULM PERFUSION IMG                            PROCEDURE DATE:  08/11/2020          INTERPRETATION:  RADIOPHARMACEUTICAL: 3.0 mCi Tc-99m-MAA, I.V.    CLINICAL STATEMENT: 65 year-old female with DVT    TECHNIQUE:  Perfusion images of the lungs were obtained following administration of Tc-99m-MAA. Images were obtained in the anterior, posterior, both lateral, and all 4 oblique projections. The study was interpreted in conjunction with chest radiograph of 8/10/2020.    No prior VQ scan    FINDINGS: There are segmental perfusion defects involving the posterior segment of the right upper lobe, medial segment of the right middle lobe and inferior lingula.    IMPRESSION: Abnormal perfusion lung scan.    Indeterminate probability of pulmonary embolus.    Dr. Villegas was informed of these results by telephone with read back at 12:20 PM on 8/11/2020.    < end of copied text >  45minutes spent on this visit, 50% visit time spent in care co-ordination with other attendings and counselling patient  I have discussed care plan with patient and HCP,expressed understanding of problems treatment and their effect and side effects, alternatives in detail,I have asked if they have any questions and concerns and appropriately addressed them to best of my ability    reviewed elctronically  ASSESSMENT/PLAN: 	    45minutes spent on this visit, 50% visit time spent in care co-ordination with other attendings and counselling patient  I have discussed care plan with patient and HCP,expressed understanding of problems treatment and their effect and side effects, alternatives in detail,I have asked if they have any questions and concerns and appropriately addressed them to best of my ability

## 2020-08-12 NOTE — CHART NOTE - NSCHARTNOTEFT_GEN_A_CORE
Called by RN for patient reporting seizure activity. Patient seen and examined at bedside. Patient states her seizures are can be partial or grandmal. She states she experienced a partial seizure this evening where she was falling asleep and then next thing she remembered she was standing next to her bed, clenching her fists and clenching her jaw. Prior to this episode, does not remember dizziness, chest pain, headache, nausea, dyspnea or fever. She states her partial seizures are similar to this event. Patient states her insurance changed and she can no longer get coverage for her brand name Topamax. Patient reports on the generic topiramate, she had multiple seizures in the past two months, last seizure in mid July.       Vitals  Vital Signs Last 24 Hrs  T(C): 36.8 (13 Aug 2020 00:03), Max: 37.4 (12 Aug 2020 08:12)  T(F): 98.2 (13 Aug 2020 00:03), Max: 99.4 (12 Aug 2020 08:12)  HR: 72 (13 Aug 2020 00:03) (72 - 84)  BP: 163/77 (13 Aug 2020 00:03) (142/72 - 177/85)  BP(mean): --  RR: 18 (13 Aug 2020 00:03) (16 - 18)  SpO2: 92% (13 Aug 2020 00:03) (92% - 97%)      General: WN/WD NAD, resting comfortably in bed  HEENT; NCAT, no lesions or bleeding inside the mouth or tongue  Respiratory: CTA B/L, no wheezes, rales, rhonchi  CV: RRR, S1S2, 3/6 systolic murmur, rubs or gallops  Abdominal: Soft, NT, ND +BS  Extremities: No edema, warm, well perfused,  +2 peripheral pulses  Neurology: A&Ox3, strength 5/5 UE and LE, CN II-XI intact, sensation intact,  nonfocal       A/P:   64 y/o  F pmhx  HTN , Osteoporosis , Renal atherosclerosis , Shingles ,Migraines ,Depression ,Constipation ,Anxiety presents to ED with  L LE swelling & pain  s/p LLE thrombectomy with pop/fem/iliac/common iliac vein stent placement, for extensive DVT/poss May Thurner syndrome, #POD1      Unwitnessed Seizure activity  - Possible Focal seizure with impairment of consciousness in patient with known epilepsy   - Unclear if seizure activity occurred given patient is AxOx4 without postictal symptoms <30 minutes between patient relaying event to the nurse and examination by this practitioner    - Possible that seizure activity could be provoked by surgery vs hospital sleep deprivation, POD#2  - STAT CBC BM Mg phos ordered  - No current toxic disturbances, no liver failure or renal failure  - Medications reviewed, currently on Topiramate home dosing.  - Consult placed to Dr. Brown, neurology  - Will continue to follow. RN to call with status changes      ADDENDUM     - Labs within normal limits other than low Phos.  Repleted. Called by RN for patient reporting seizure activity. Patient seen and examined at bedside. Patient states her seizures are can be partial or grandmal. She states she experienced a partial seizure this evening where she was falling asleep and then next thing she remembered she was standing next to her bed, clenching her fists and clenching her jaw. Prior to this episode, does not remember dizziness, chest pain, headache, nausea, dyspnea or fever. She states her partial seizures are similar to this event. Patient states her insurance changed and she can no longer get coverage for her brand name Topamax. Patient reports on the generic topiramate, she had multiple seizures in the past two months, last seizure in mid July.       Vitals  Vital Signs Last 24 Hrs  T(C): 36.8 (13 Aug 2020 00:03), Max: 37.4 (12 Aug 2020 08:12)  T(F): 98.2 (13 Aug 2020 00:03), Max: 99.4 (12 Aug 2020 08:12)  HR: 72 (13 Aug 2020 00:03) (72 - 84)  BP: 163/77 (13 Aug 2020 00:03) (142/72 - 177/85)  BP(mean): --  RR: 18 (13 Aug 2020 00:03) (16 - 18)  SpO2: 92% (13 Aug 2020 00:03) (92% - 97%)      General: WN/WD NAD, resting comfortably in bed  HEENT; NCAT, no lesions or bleeding inside the mouth or tongue  Respiratory: CTA B/L, no wheezes, rales, rhonchi  CV: RRR, S1S2, 3/6 systolic murmur, rubs or gallops  Abdominal: Soft, NT, ND +BS  Extremities: No edema, warm, well perfused,  +2 peripheral pulses  Neurology: A&Ox3, strength 5/5 UE and LE, CN II-XI intact, sensation intact,  nonfocal       A/P:   66 y/o  F pmhx  HTN , Osteoporosis , Renal atherosclerosis , Shingles ,Migraines ,Depression ,Constipation ,Anxiety presents to ED with  L LE swelling & pain  s/p LLE thrombectomy with pop/fem/iliac/common iliac vein stent placement, for extensive DVT/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 Thurner syndrome, #POD1      Unwitnessed Seizure activity  - Possible Focal seizure with impairment of consciousness in patient with known epilepsy   - Unclear if seizure activity occurred given patient is AxOx4 without postictal symptoms <30 minutes between patient relaying event to the nurse and examination by this practitioner    - Possible that seizure activity could be provoked by surgery vs hospital sleep deprivation, POD#2  - Of note, telemonitor at time of event showed Sinus tachycardia to 100-118, then returned to her baseline of NSR 70's  - STAT CBC BM Mg phos ordered  - No current toxic disturbances, no liver failure or renal failure  - Medications reviewed, currently on Topiramate home dosing.  - Consult placed to Dr. Brown, neurology  - Will continue to follow. RN to call with status changes      ADDENDUM     - Labs within normal limits other than low Phos.  Repleted. Called by RN for patient reporting seizure activity. RN reports she did not witness any seizure activity. Patient seen and examined at bedside. Patient states her seizures are can be partial or grandmal. She states she experienced a partial seizure this evening where she was falling asleep and then next thing she remembered she was standing next to her bed, clenching her fists and clenching her jaw. Prior to this episode, does not remember dizziness, chest pain, headache, nausea, dyspnea or fever. She states her partial seizures are similar to this event. Patient states her insurance changed and she can no longer get coverage for her brand name Topamax. Patient reports on the generic topiramate, she had multiple seizures in the past two months, last seizure in mid July.       Vitals  Vital Signs Last 24 Hrs  T(C): 36.8 (13 Aug 2020 00:03), Max: 37.4 (12 Aug 2020 08:12)  T(F): 98.2 (13 Aug 2020 00:03), Max: 99.4 (12 Aug 2020 08:12)  HR: 72 (13 Aug 2020 00:03) (72 - 84)  BP: 163/77 (13 Aug 2020 00:03) (142/72 - 177/85)  BP(mean): --  RR: 18 (13 Aug 2020 00:03) (16 - 18)  SpO2: 92% (13 Aug 2020 00:03) (92% - 97%)      General: WN/WD NAD, resting comfortably in bed  HEENT; NCAT, no lesions or bleeding inside the mouth or tongue  Respiratory: CTA B/L, no wheezes, rales, rhonchi  CV: RRR, S1S2, 3/6 systolic murmur, rubs or gallops  Abdominal: Soft, NT, ND +BS  Extremities: No edema, warm, well perfused,  +2 peripheral pulses  Neurology: A&Ox3, strength 5/5 UE and LE, CN II-XI intact, sensation intact,  nonfocal       A/P:   64 y/o  F pmhx  HTN , Osteoporosis , Renal atherosclerosis , Shingles ,Migraines ,Depression ,Constipation ,Anxiety presents to ED with  L LE swelling & pain  s/p LLE thrombectomy with pop/fem/iliac/common iliac vein stent placement, for extensive DVT/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 Thurner syndrome, #POD1      Unwitnessed Seizure activity  - Possible Focal seizure with impairment of consciousness in patient with known epilepsy   - Unclear if seizure activity occurred given patient is AxOx4 without postictal symptoms <30 minutes between patient relaying event to the nurse and examination by this practitioner    - Possible that seizure activity could be provoked by surgery vs hospital sleep deprivation, POD#2  - Of note, telemonitor at time of event showed Sinus tachycardia to 100-118, then returned to her baseline of NSR 70's  - STAT CBC BM Mg phos ordered  - No current toxic disturbances, no liver failure or renal failure  - Medications reviewed, currently on Topiramate home dosing.  - Consult placed to Dr. Brown, neurology  - Will continue to follow. RN to call with status changes      ADDENDUM     - Labs within normal limits other than low Phos.  Repleted.

## 2020-08-12 NOTE — CONSULT NOTE ADULT - ASSESSMENT
[ASSESSMENT and  PLAN]  65 year old female with PMH of renal artery stenosis, HTN, seizures, migraines, osteoporosis, depression, anxiety    Admitted with extensive Left lower extremity DVT involving the left common femoral, femoral, popliteal, and calf veins. symptomatic 2d.   V duplex 8/10/2020 DVT in LLE in LCFV, FV, L CFL, L popoliteal DVT,   EXTENSIVE DVT   Pt seen by vasc. s/p thrombectomy 8/11/20   Pulm consulted 8/10. On arrival 130/80 po 96%   home meds amlodipine 10 topiramat, protonix losartan labetalol zoloft         S/p Thrombectomy.  IV heparin per vascualar surgery  Pulm status stable.  VQ indeterminate    Slight elevated creat. Consider renal eval Dr Dimas called.    Anemia due to chronic disease    RECOMMENDATIONS  IV heparin per vascular surgery.   transition to Eliquis 10mg q12h x7d, then 5mg q12h thereafter.     Will check cxr and echo to ro r heart strain. Will avoid CTA for now, pending improved Cr.     Renal eval for Cr.     Thank you for consulting us.     Family Madelyn KNOX                I have discussed the above plan of care with patient in detail. They expressed understanding of the treatment plan . Risks, benefits and alternatives discussed in detail. I have asked if they have any questions or concerns and appropriately addressed them; all questions answered to their satisfactions and in lay terms.     Discussed with Dr Gant.   > 45  minutes spent in direct patient care, examining and counseling patient,  reviewing  the notes, lab data/ imaging , discussion with multidisciplinary team.     Thank you for consulting us.   No additional recommendations at current time.   Will sign off on case for now.   Please call, or re-consult if needed.

## 2020-08-12 NOTE — PROGRESS NOTE ADULT - SUBJECTIVE AND OBJECTIVE BOX
Post Operative Note  Patient: AUGUSTUS FLORES 65y (1955) Female   MRN: [id number]  Location: [facility] TELE 313 W1  Visit: 08-10-20 Inpatient  Date: 08-12-20 @ 00:46    Procedure: s/p LLE thrombectomy with stent placement for extensive DVT poss May Thurner syndrome,     Subjective:   64 y/o F seen and examined at bedside. Pt offers no complaints at this time in NAD. Pt tolerting reg diet however states has decreased appetite at this time, denies any N/V. Pt denies any fevers, chills, chest pain, shortness of breath,  nausea, vomiting or diarrhea.    Objective:  Vitals: T(F): 99.1 (08-11-20 @ 21:03), Max: 100.6 (08-11-20 @ 00:47)  HR: 82 (08-11-20 @ 21:03)  BP: 120/71 (08-11-20 @ 21:03) (120/71 - 186/75)  RR: 17 (08-11-20 @ 21:03)  SpO2: 96% (08-11-20 @ 21:03)      In:   08-10-20 @ 07:01  -  08-11-20 @ 07:00  --------------------------------------------------------  IN: 747 mL    08-11-20 @ 07:01  -  08-12-20 @ 00:46  --------------------------------------------------------  IN: 1715 mL      PHYSICAL EXAM:  GENERAL: No acute distress, well-developed  HEAD:  Atraumatic, Normocephalic  LOWER EXTREM: LLE with ace wrap dressing in place c/d/i    MSK:  5/5 strength at all muscle groups crossing ankle joint b/l.  NEURO: Grossly intact    Medications: [Standing]  acetaminophen   Tablet .. 650 milliGRAM(s) Oral every 6 hours PRN  aspirin enteric coated 81 milliGRAM(s) Oral daily  heparin   Injectable 4500 Unit(s) IV Push every 6 hours PRN  heparin   Injectable 2000 Unit(s) IV Push every 6 hours PRN  heparin  Infusion. 900 Unit(s)/Hr IV Continuous <Continuous>  losartan 50 milliGRAM(s) Oral daily  pantoprazole    Tablet 40 milliGRAM(s) Oral before breakfast  sertraline 50 milliGRAM(s) Oral daily  topiramate 100 milliGRAM(s) Oral two times a day  traMADol 50 milliGRAM(s) Oral every 6 hours PRN    Medications: [PRN]  acetaminophen   Tablet .. 650 milliGRAM(s) Oral every 6 hours PRN  aspirin enteric coated 81 milliGRAM(s) Oral daily  heparin   Injectable 4500 Unit(s) IV Push every 6 hours PRN  heparin   Injectable 2000 Unit(s) IV Push every 6 hours PRN  heparin  Infusion. 900 Unit(s)/Hr IV Continuous <Continuous>  losartan 50 milliGRAM(s) Oral daily  pantoprazole    Tablet 40 milliGRAM(s) Oral before breakfast  sertraline 50 milliGRAM(s) Oral daily  topiramate 100 milliGRAM(s) Oral two times a day  traMADol 50 milliGRAM(s) Oral every 6 hours PRN    Labs:                        11.1   6.46  )-----------( 140      ( 11 Aug 2020 06:47 )             35.6     08-11    145  |  114<H>  |  26<H>  ----------------------------<  109<H>  4.0   |  25  |  1.20    Ca    8.8      11 Aug 2020 06:47  Phos  2.6     08-11  Mg     2.3     08-11    TPro  6.0  /  Alb  2.8<L>  /  TBili  0.3  /  DBili  x   /  AST  20  /  ALT  24  /  AlkPhos  77  08-11    PT/INR - ( 10 Aug 2020 15:44 )   PT: 12.3 sec;   INR: 1.05 ratio         PTT - ( 11 Aug 2020 19:02 )  PTT:> 200 sec      Assessment:  64 y/o F s/p LLE thrombectomy with pop/fem/iliac/common iliac vein stent placement, for extensive DVT/poss May Thurner syndrome,     Plan:  - pain control, supportive care  - REG diet  - serial exams  - labs in am  -Care per primary team    Surgical Team Contact Information  Spectralink: Ext: [phone] or [phone]  Pager: [phone]    Date/Time: 08-12-20 @ 00:46

## 2020-08-12 NOTE — PROGRESS NOTE ADULT - SUBJECTIVE AND OBJECTIVE BOX
Postoperative Day #: 1    65y Female admitted with Embolism and thrombosis of left femoral vein  S/P LLE thrombectomy.    Currently with urinary retention-= ryan placed this AM  .    Patient seen and examined bedside resting comfortably.   Pt with no complaints currently.  Tolerating diet feels well.  States overnight had some issues with urinary retention.  Notified by nurse this AM after 1 bladder scan then straight cath and second bladder scan showing 600 cc in place.  Advised placing ryan.  ryan placed.  Pt much more comfortable.  Denies fevers, chills, SOB, chest pain, back pain, lower leg/calf tenderness.     T(F): 99.4 (08-12-20 @ 08:12), Max: 101.1 (08-12-20 @ 00:52)  HR: 80 (08-12-20 @ 08:12) (75 - 88)  BP: 177/85 (08-12-20 @ 08:12) (120/71 - 186/75)  RR: 18 (08-12-20 @ 08:12) (12 - 18)  SpO2: 97% (08-12-20 @ 08:12) (94% - 98%)  Wt(kg): --  CAPILLARY BLOOD GLUCOSE          PHYSICAL EXAM:  General: NAD  Neuro:  Alert & oriented x 3  HEENT: NCAT, EOMI, conjunctiva clear  CV: +S1+S2 regular rate and rhythm  Lung: clear to ausculation bilaterally  Abdomen: soft, NT, ND. BS+  Extremities: no pedal edema or calf tenderness noted- LEft leg bandaged from toe to thigh.  Dressing not removed- dopplerable distal pulse.    : Ryan in place.     LABS:                        10.5   6.80  )-----------( 146      ( 12 Aug 2020 05:02 )             33.5     08-12    145  |  113<H>  |  14  ----------------------------<  98  3.9   |  26  |  1.00    Ca    8.5      12 Aug 2020 05:02  Phos  2.5     08-12  Mg     2.3     08-11    TPro  5.9<L>  /  Alb  2.5<L>  /  TBili  0.4  /  DBili  x   /  AST  16  /  ALT  19  /  AlkPhos  68  08-12    PT/INR - ( 12 Aug 2020 05:02 )   PT: 14.2 sec;   INR: 1.23 ratio         PTT - ( 12 Aug 2020 05:02 )  PTT:97.7 sec  I&O's Detail    11 Aug 2020 07:01  -  12 Aug 2020 07:00  --------------------------------------------------------  IN:    heparin  Infusion.: 63 mL    heparin  Infusion.: 27 mL    lactated ringers.: 525 mL    Other: 1400 mL  Total IN: 2015 mL    OUT:  Total OUT: 0 mL    Total NET: 2015 mL      12 Aug 2020 07:01  -  12 Aug 2020 11:01  --------------------------------------------------------  IN:  Total IN: 0 mL    OUT:    Indwelling Catheter - Urethral: 800 mL  Total OUT: 800 mL    Total NET: -800 mL          Impression: 65y Female admitted with Embolism and thrombosis of left femoral vein  S/P LLE thrombectomy, now with urinary retention, ryan placement     PMH Drug-seeking behavior  Renal arteriosclerosis  Constipation  Anxiety  Depression  HTN (hypertension)  Shingles  Pericarditis  Osteoporosis  Seizure disorder  Migraines      Plan:  -continue VTE prophylaxis- heparin drip, ASA- will transition to PO AC per medicine.  Recommend if eliquis approved by insurance may start with Eliquis 10 BID for 7 days, then go to Eliquis 5 mg BID, life long.  If not feasible, then start on coumadin. May D/C heparin once patient  INR is therapeutic if starting coumadin or may d/c heparin once starts Eliquis  -Increase activity with PT, OOB, Ambulate- currently may ambulate.    -Patient instructed on and encouraged incentive spirometry use  -continue local wound care  -Continue current medical management per medicine.   -f/u AM labs  -will discuss with Dr Bear. Postoperative Day #: 1    65y Female admitted with Embolism and thrombosis of left femoral vein  S/P LLE thrombectomy.    Currently with urinary retention-= ryan placed this AM  .    Patient seen and examined bedside resting comfortably.   Pt with no complaints currently.  Tolerating diet feels well.  States overnight had some issues with urinary retention.  Notified by nurse this AM after 1 bladder scan then straight cath and second bladder scan showing 600 cc in place.  Advised placing ryan.  ryan placed.  Pt much more comfortable.  Denies fevers, chills, SOB, chest pain, back pain, lower leg/calf tenderness.     T(F): 99.4 (08-12-20 @ 08:12), Max: 101.1 (08-12-20 @ 00:52)  HR: 80 (08-12-20 @ 08:12) (75 - 88)  BP: 177/85 (08-12-20 @ 08:12) (120/71 - 186/75)  RR: 18 (08-12-20 @ 08:12) (12 - 18)  SpO2: 97% (08-12-20 @ 08:12) (94% - 98%)  Wt(kg): --  CAPILLARY BLOOD GLUCOSE          PHYSICAL EXAM:  General: NAD  Neuro:  Alert & oriented x 3  HEENT: NCAT, EOMI, conjunctiva clear  CV: +S1+S2 regular rate and rhythm  Lung: clear to ausculation bilaterally  Abdomen: soft, NT, ND. BS+  Extremities: no pedal edema or calf tenderness noted- LEft leg bandaged from toe to thigh.  Dressing not removed- dopplerable distal pulse.    : Ryan in place.     LABS:                        10.5   6.80  )-----------( 146      ( 12 Aug 2020 05:02 )             33.5     08-12    145  |  113<H>  |  14  ----------------------------<  98  3.9   |  26  |  1.00    Ca    8.5      12 Aug 2020 05:02  Phos  2.5     08-12  Mg     2.3     08-11    TPro  5.9<L>  /  Alb  2.5<L>  /  TBili  0.4  /  DBili  x   /  AST  16  /  ALT  19  /  AlkPhos  68  08-12    PT/INR - ( 12 Aug 2020 05:02 )   PT: 14.2 sec;   INR: 1.23 ratio         PTT - ( 12 Aug 2020 05:02 )  PTT:97.7 sec  I&O's Detail    11 Aug 2020 07:01  -  12 Aug 2020 07:00  --------------------------------------------------------  IN:    heparin  Infusion.: 63 mL    heparin  Infusion.: 27 mL    lactated ringers.: 525 mL    Other: 1400 mL  Total IN: 2015 mL    OUT:  Total OUT: 0 mL    Total NET: 2015 mL      12 Aug 2020 07:01  -  12 Aug 2020 11:01  --------------------------------------------------------  IN:  Total IN: 0 mL    OUT:    Indwelling Catheter - Urethral: 800 mL  Total OUT: 800 mL    Total NET: -800 mL          Impression: 65y Female admitted with Embolism and thrombosis of left femoral vein  S/P LLE thrombectomy, now with urinary retention, ryan placement     PMH Drug-seeking behavior  Renal arteriosclerosis  Constipation  Anxiety  Depression  HTN (hypertension)  Shingles  Pericarditis  Osteoporosis  Seizure disorder  Migraines      Plan:  -continue VTE prophylaxis- heparin drip, ASA- will transition to PO AC per medicine.  Recommend if eliquis approved by insurance may start with Eliquis 10 BID for 7 days, then go to Eliquis 5 mg BID, life long.  If not feasible, then start on coumadin. May D/C heparin once patient  INR is therapeutic if starting coumadin or may d/c heparin once starts Eliquis  -Increase activity with PT, OOB, Ambulate- currently may ambulate.    -Patient instructed on and encouraged incentive spirometry use  -continue local wound care  -Continue current medical management per medicine.   -Suggest Urology consult for ryan managment/urinary retention.   -will discuss with Dr Bear.

## 2020-08-12 NOTE — PROVIDER CONTACT NOTE (OTHER) - ASSESSMENT
patient tender to touch, bladder scan result >999, patient evaluated by Dr. Nowak,  bladder scan result 1100
bladder scan, urinate 100ml urine
patient c/o abdominal discomfort s/p surgery  thrombectomy. bladder scan x's 4 >300 resulted each time

## 2020-08-12 NOTE — CONSULT NOTE ADULT - SUBJECTIVE AND OBJECTIVE BOX
INCOMPLETE NOTE.  Documentation in Progress  PT SEEN AND EVALUATED.   FULL/ADDITIONAL RECOMMENDATIONS TO FOLLOW   ***************************************************************      Patient is a 65y old  Female who presents with a chief complaint of LEFT LEG PAIN AND SWELLING (12 Aug 2020 10:59)      HPI:  64 yo Female with  pmhx significant  for HTN , Osteoporosis ,Sz ,Pericarditis , Renal atherosclerosis , Shingles ,Migraines ,Depression ,Constipation ,Anxiety presents to ED with  L LE swelling & pain that began 2 days  ago, reports that pain is aching moderate  in severity ,found to have  L calf swelling and pain now radiates to the L groin with now weakness or numbness. Denies trauma, falls, dysuria, urgency and frequency .Leg venous doppler was performed and demonstrated extensive involving the left common femoral, femoral, popliteal, and calf veins. LLE DVT ,started on heparin drip and vascular surgery consulted for possible thrombectomy Due to elevated creatinine 1.5 and planned CTA nephrology consult called ,also patient is given toradol for pain management in view of drug seeking behavior Seen by pulmonologist and cardiologist for clearance for planned surgery ,clot removal .ECHO ordered to evaluate for R heart strain . (11 Aug 2020 06:09)        PAST MEDICAL & SURGICAL HISTORY:  Drug-seeking behavior  Renal arteriosclerosis  Constipation  Anxiety  Depression  HTN (hypertension)  Shingles  Pericarditis  Osteoporosis  Seizure disorder  Migraines  History of back surgery       HEALTH ISSUES - PROBLEM Dx:  RUPERTO (acute kidney injury): RUPERTO (acute kidney injury)  Suspected pulmonary embolism  Low grade fever: Low grade fever  Prophylactic measure: Prophylactic measure  Depression: Depression  Constipation: Constipation  Migraines: Migraines  Seizure disorder: Seizure disorder  Acute deep vein thrombosis (DVT) of femoral vein of left lower extremity: Acute deep vein thrombosis (DVT) of femoral vein of left lower extremity  HTN (hypertension): HTN (hypertension)  Renal arteriosclerosis: Renal arteriosclerosis          Embolism and thrombosis of left femoral vein (I82.412)  Drug-seeking behavior (Z76.5)  Renal arteriosclerosis (I12.9)  Constipation (K59.00)  Anxiety (F41.9)  Depression (F32.9)  HTN (hypertension) (I10)  Shingles (B02.9)  Pericarditis (423.9)  Osteoporosis (733.00)  Seizure disorder (345.90)  Migraines (346.90)  History of back surgery (Z98.890)  No significant past surgical history ([id number])      FAMILY HISTORY:  Family history of heart disease  Family history of colon cancer in mother        [SOCIAL HISTORY: ]     smoking:       EtOH:       illicit drugs:       occupation:       marital status:       Other:       [ALLERGIES/INTOLERANCES:]  Allergies    penicillin (Anaphylaxis)    Intolerances          [MEDICATIONS]  MEDICATIONS  (STANDING):  aspirin enteric coated 81 milliGRAM(s) Oral daily  heparin  Infusion. 900 Unit(s)/Hr (9 mL/Hr) IV Continuous <Continuous>  losartan 50 milliGRAM(s) Oral daily  pantoprazole    Tablet 40 milliGRAM(s) Oral before breakfast  sertraline 50 milliGRAM(s) Oral daily  topiramate 100 milliGRAM(s) Oral two times a day    MEDICATIONS  (PRN):  acetaminophen   Tablet .. 650 milliGRAM(s) Oral every 6 hours PRN Temp greater or equal to 38C (100.4F), Mild Pain (1 - 3)  heparin   Injectable 4500 Unit(s) IV Push every 6 hours PRN For aPTT less than 40  heparin   Injectable 2000 Unit(s) IV Push every 6 hours PRN For aPTT between 40 - 57  traMADol 50 milliGRAM(s) Oral every 6 hours PRN Moderate Pain (4 - 6)        [REVIEW OF SYSTEMS: ]  CONSTITUTIONAL: normal, no fever, no shakes, no chills   EYES: No eye pain, no visual disturbances, no discharge  ENMT:  no discharge  NECK: No pain, no stiffness  BREASTS: No pain, no masses, no nipple discharge  RESPIRATORY: No cough, no wheezing, no chills, no hemoptysis; No shortness of breath  CARDIOVASCULAR: No chest pain, no palpitations, no dizziness, no leg swelling  GASTROINTESTINAL: No abdominal, no epigastric pain. No nausea, no vomiting, no hematemesis; No diarrhea , no constipation. No melena, no hematochezia.  GENITOURINARY: No dysuria, no frequency, no hematuria, no incontinence  NEUROLOGICAL: No headaches, no memory loss, no loss of strength, no numbness, no tremors  SKIN: No itching, no burning, no rashes, no lesions   LYMPH NODES: No enlarged glands  ENDOCRINE: No heat or cold intolerance; No hair loss  MUSCULOSKELETAL: No joint pain or swelling; No muscle, no back, no extremity pain  PSYCHIATRIC: No depression, no anxiety, no mood swings, no difficulty sleeping  HEME/LYMPH: No easy bruising, no bleeding gums      [VITALS SIGNS 24hrs]  Vital Signs Last 24 Hrs  T(C): 37.3 (12 Aug 2020 11:52), Max: 38.4 (12 Aug 2020 00:52)  T(F): 99.1 (12 Aug 2020 11:52), Max: 101.1 (12 Aug 2020 00:52)  HR: 80 (12 Aug 2020 11:52) (75 - 88)  BP: 142/72 (12 Aug 2020 11:52) (120/71 - 186/75)  BP(mean): --  RR: 16 (12 Aug 2020 11:52) (12 - 18)  SpO2: 97% (12 Aug 2020 11:52) (94% - 98%)    [PHYSICAL EXAM]  General: adult in NAD,  WN,  WD.  HEENT: clear oropharynx, anicteric sclera, pink conjunctivae.  Neck: supple, no masses.  CV: normal S1S2, no murmur, no rubs, no gallops.  Lungs: clear to auscultation, no wheezes, no rales, no rhonchi.  Abdomen: soft, non-tender, non-distended, no hepatosplenomegaly, normal BS, no guarding.  Ext: no clubbing, no cyanosis, no edema.  Skin: no rashes,  no petechiae, no venous stasis changes.  Neuro: alert and oriented X3, no focal motor deficits.  LN: no SC WOO.      [LABS: ]                        10.5   6.80  )-----------( 146      ( 12 Aug 2020 05:02 )             33.5     CBC Full  -  ( 12 Aug 2020 05:02 )  WBC Count : 6.80 K/uL  RBC Count : 3.85 M/uL  Hemoglobin : 10.5 g/dL  Hematocrit : 33.5 %  Platelet Count - Automated : 146 K/uL  Mean Cell Volume : 87.0 fl  Mean Cell Hemoglobin : 27.3 pg  Mean Cell Hemoglobin Concentration : 31.3 gm/dL  Auto Neutrophil # : x  Auto Lymphocyte # : x  Auto Monocyte # : x  Auto Eosinophil # : x  Auto Basophil # : x  Auto Neutrophil % : x  Auto Lymphocyte % : x  Auto Monocyte % : x  Auto Eosinophil % : x  Auto Basophil % : x    08-12    145  |  113<H>  |  14  ----------------------------<  98  3.9   |  26  |  1.00    Ca    8.5      12 Aug 2020 05:02  Phos  2.5     08-12  Mg     2.3     08-11    TPro  5.9<L>  /  Alb  2.5<L>  /  TBili  0.4  /  DBili  x   /  AST  16  /  ALT  19  /  AlkPhos  68  08-12    PT/INR - ( 12 Aug 2020 05:02 )   PT: 14.2 sec;   INR: 1.23 ratio         PTT - ( 12 Aug 2020 05:02 )  PTT:97.7 sec  LIVER FUNCTIONS - ( 12 Aug 2020 05:02 )  Alb: 2.5 g/dL / Pro: 5.9 g/dL / ALK PHOS: 68 U/L / ALT: 19 U/L / AST: 16 U/L / GGT: x           CARDIAC MARKERS ( 12 Aug 2020 05:02 )  <.015 ng/mL / x     / x     / x     / x              WBC  TREND (5 Days)  WBC Count: 6.80 K/uL (08-12 @ 05:02)  WBC Count: 7.51 K/uL (08-12 @ 03:22)  WBC Count: 6.46 K/uL (08-11 @ 06:47)  WBC Count: 5.89 K/uL (08-11 @ 00:37)  WBC Count: 6.85 K/uL (08-10 @ 14:21)    HGB  TREND (5 Days)  Hemoglobin: 10.5 g/dL (08-12 @ 05:02)  Hemoglobin: 10.5 g/dL (08-12 @ 03:22)  Hemoglobin: 11.1 g/dL (08-11 @ 06:47)  Hemoglobin: 11.8 g/dL (08-11 @ 00:37)  Hemoglobin: 11.8 g/dL (08-10 @ 14:21)    HCT  TREND (5 Days)  Hematocrit: 33.5 % (08-12 @ 05:02)  Hematocrit: 32.7 % (08-12 @ 03:22)  Hematocrit: 35.6 % (08-11 @ 06:47)  Hematocrit: 37.0 % (08-11 @ 00:37)  Hematocrit: 37.5 % (08-10 @ 14:21)    PLT  TREND (5 Days)  Platelet Count - Automated: 146 K/uL (08-12 @ 05:02)  Platelet Count - Automated: 144 K/uL (08-12 @ 03:22)  Platelet Count - Automated: 140 K/uL (08-11 @ 06:47)  Platelet Count - Automated: 129 K/uL (08-11 @ 00:37)  Platelet Count - Automated: 165 K/uL (08-10 @ 14:21)      Anemia Studies             [RADIOLOGY & ADDITIONAL STUDIES:] Patient is a 65y old  Female who presents with a chief complaint of LEFT LEG PAIN AND SWELLING (12 Aug 2020 10:59)      HPI:  66 yo Female with  pmhx significant  for HTN , Osteoporosis ,Sz ,Pericarditis , Renal atherosclerosis , Shingles ,Migraines ,Depression ,Constipation ,Anxiety presents to ED with  L LE swelling & pain that began 2 days  ago, reports that pain is aching moderate  in severity ,found to have  L calf swelling and pain now radiates to the L groin with now weakness or numbness. Denies trauma, falls, dysuria, urgency and frequency .Leg venous doppler was performed and demonstrated extensive involving the left common femoral, femoral, popliteal, and calf veins. LLE DVT ,started on heparin drip and vascular surgery consulted for possible thrombectomy Due to elevated creatinine 1.5 and planned CTA nephrology consult called ,also patient is given toradol for pain management in view of drug seeking behavior Seen by pulmonologist and cardiologist for clearance for planned surgery ,clot removal .ECHO ordered to evaluate for R heart strain . (11 Aug 2020 06:09)    Heme asked to see for DVT.   Seen by vascular s/p thrombectomy.   On IV heparin.   Denies provocation factor.   no travel, no surgeries, no immobility  no cigarettes, no family hx      PAST MEDICAL & SURGICAL HISTORY:  Drug-seeking behavior  Renal arteriosclerosis  Constipation  Anxiety  Depression  HTN (hypertension)  Shingles  Pericarditis  Osteoporosis  Seizure disorder  Migraines  History of back surgery       HEALTH ISSUES - PROBLEM Dx:  RUPERTO (acute kidney injury): RUPERTO (acute kidney injury)  Suspected pulmonary embolism  Low grade fever: Low grade fever  Prophylactic measure: Prophylactic measure  Depression: Depression  Constipation: Constipation  Migraines: Migraines  Seizure disorder: Seizure disorder  Acute deep vein thrombosis (DVT) of femoral vein of left lower extremity: Acute deep vein thrombosis (DVT) of femoral vein of left lower extremity  HTN (hypertension): HTN (hypertension)  Renal arteriosclerosis: Renal arteriosclerosis      Embolism and thrombosis of left femoral vein (I82.412)  Drug-seeking behavior (Z76.5)  Renal arteriosclerosis (I12.9)  Constipation (K59.00)  Anxiety (F41.9)  Depression (F32.9)  HTN (hypertension) (I10)  Shingles (B02.9)  Pericarditis (423.9)  Osteoporosis (733.00)  Seizure disorder (345.90)  Migraines (346.90)  History of back surgery (Z98.890)  No significant past surgical history ([id number])      FAMILY HISTORY:  Family history of heart disease  Family history of colon cancer in mother        [SOCIAL HISTORY: ]     smoking:  denies     EtOH:  denies     illicit drugs:  denies     occupation:       marital status:       Other:       [ALLERGIES/INTOLERANCES:]  Allergies      penicillin (Anaphylaxis)  Intolerances          [MEDICATIONS]  MEDICATIONS  (STANDING):  aspirin enteric coated 81 milliGRAM(s) Oral daily  heparin  Infusion. 900 Unit(s)/Hr (9 mL/Hr) IV Continuous <Continuous>  losartan 50 milliGRAM(s) Oral daily  pantoprazole    Tablet 40 milliGRAM(s) Oral before breakfast  sertraline 50 milliGRAM(s) Oral daily  topiramate 100 milliGRAM(s) Oral two times a day    MEDICATIONS  (PRN):  acetaminophen   Tablet .. 650 milliGRAM(s) Oral every 6 hours PRN Temp greater or equal to 38C (100.4F), Mild Pain (1 - 3)  heparin   Injectable 4500 Unit(s) IV Push every 6 hours PRN For aPTT less than 40  heparin   Injectable 2000 Unit(s) IV Push every 6 hours PRN For aPTT between 40 - 57  traMADol 50 milliGRAM(s) Oral every 6 hours PRN Moderate Pain (4 - 6)        [REVIEW OF SYSTEMS: ]  CONSTITUTIONAL: normal, no fever, no shakes, no chills   EYES: No eye pain, no visual disturbances, no discharge  ENMT:  no discharge  NECK: No pain, no stiffness  BREASTS: No pain, no masses, no nipple discharge  RESPIRATORY: No cough, no wheezing, no chills, no hemoptysis; No shortness of breath  CARDIOVASCULAR: No chest pain, no palpitations, no dizziness, no leg swelling  GASTROINTESTINAL: No abdominal, no epigastric pain. No nausea, no vomiting, no hematemesis; No diarrhea , no constipation. No melena, no hematochezia.  GENITOURINARY: No dysuria, no frequency, no hematuria, no incontinence  NEUROLOGICAL: No headaches, no memory loss, no loss of strength, no numbness, no tremors  SKIN: No itching, no burning, no rashes, no lesions   LYMPH NODES: No enlarged glands  ENDOCRINE: No heat or cold intolerance; No hair loss  MUSCULOSKELETAL: No joint pain or swelling; No muscle, no back, no extremity pain  PSYCHIATRIC: No depression, no anxiety, no mood swings, no difficulty sleeping  HEME/LYMPH: No easy bruising, no bleeding gums      [VITALS SIGNS 24hrs]  Vital Signs Last 24 Hrs  T(C): 37.3 (12 Aug 2020 11:52), Max: 38.4 (12 Aug 2020 00:52)  T(F): 99.1 (12 Aug 2020 11:52), Max: 101.1 (12 Aug 2020 00:52)  HR: 80 (12 Aug 2020 11:52) (75 - 88)  BP: 142/72 (12 Aug 2020 11:52) (120/71 - 186/75)  BP(mean): --  RR: 16 (12 Aug 2020 11:52) (12 - 18)  SpO2: 97% (12 Aug 2020 11:52) (94% - 98%)    [PHYSICAL EXAM]  General: adult in NAD,  WN,  WD.  HEENT: clear oropharynx, anicteric sclera, pink conjunctivae.  Neck: supple, no masses.  CV: normal S1S2, no murmur, no rubs, no gallops.  Lungs: clear to auscultation, no wheezes, no rales, no rhonchi.  Abdomen: soft, non-tender, non-distended, no hepatosplenomegaly, normal BS, no guarding.  Ext: no clubbing, no cyanosis, no edema.  Skin: no rashes,  no petechiae, no venous stasis changes.  Neuro: alert and oriented X3, no focal motor deficits.  LN: no SC WOO.      [LABS: ]                        10.5   6.80  )-----------( 146      ( 12 Aug 2020 05:02 )             33.5     CBC Full  -  ( 12 Aug 2020 05:02 )  WBC Count : 6.80 K/uL  RBC Count : 3.85 M/uL  Hemoglobin : 10.5 g/dL  Hematocrit : 33.5 %  Platelet Count - Automated : 146 K/uL  Mean Cell Volume : 87.0 fl  Mean Cell Hemoglobin : 27.3 pg  Mean Cell Hemoglobin Concentration : 31.3 gm/dL  Auto Neutrophil # : x  Auto Lymphocyte # : x  Auto Monocyte # : x  Auto Eosinophil # : x  Auto Basophil # : x  Auto Neutrophil % : x  Auto Lymphocyte % : x  Auto Monocyte % : x  Auto Eosinophil % : x  Auto Basophil % : x    08-12    145  |  113<H>  |  14  ----------------------------<  98  3.9   |  26  |  1.00    Ca    8.5      12 Aug 2020 05:02  Phos  2.5     08-12  Mg     2.3     08-11    TPro  5.9<L>  /  Alb  2.5<L>  /  TBili  0.4  /  DBili  x   /  AST  16  /  ALT  19  /  AlkPhos  68  08-12    PT/INR - ( 12 Aug 2020 05:02 )   PT: 14.2 sec;   INR: 1.23 ratio         PTT - ( 12 Aug 2020 05:02 )  PTT:97.7 sec  LIVER FUNCTIONS - ( 12 Aug 2020 05:02 )  Alb: 2.5 g/dL / Pro: 5.9 g/dL / ALK PHOS: 68 U/L / ALT: 19 U/L / AST: 16 U/L / GGT: x           CARDIAC MARKERS ( 12 Aug 2020 05:02 )  <.015 ng/mL / x     / x     / x     / x              WBC  TREND (5 Days)  WBC Count: 6.80 K/uL (08-12 @ 05:02)  WBC Count: 7.51 K/uL (08-12 @ 03:22)  WBC Count: 6.46 K/uL (08-11 @ 06:47)  WBC Count: 5.89 K/uL (08-11 @ 00:37)  WBC Count: 6.85 K/uL (08-10 @ 14:21)    HGB  TREND (5 Days)  Hemoglobin: 10.5 g/dL (08-12 @ 05:02)  Hemoglobin: 10.5 g/dL (08-12 @ 03:22)  Hemoglobin: 11.1 g/dL (08-11 @ 06:47)  Hemoglobin: 11.8 g/dL (08-11 @ 00:37)  Hemoglobin: 11.8 g/dL (08-10 @ 14:21)    HCT  TREND (5 Days)  Hematocrit: 33.5 % (08-12 @ 05:02)  Hematocrit: 32.7 % (08-12 @ 03:22)  Hematocrit: 35.6 % (08-11 @ 06:47)  Hematocrit: 37.0 % (08-11 @ 00:37)  Hematocrit: 37.5 % (08-10 @ 14:21)    PLT  TREND (5 Days)  Platelet Count - Automated: 146 K/uL (08-12 @ 05:02)  Platelet Count - Automated: 144 K/uL (08-12 @ 03:22)  Platelet Count - Automated: 140 K/uL (08-11 @ 06:47)  Platelet Count - Automated: 129 K/uL (08-11 @ 00:37)  Platelet Count - Automated: 165 K/uL (08-10 @ 14:21)          [RADIOLOGY & ADDITIONAL STUDIES:]     < from: US Duplex Venous Lower Ext Ltd, Left (08.10.20 @ 14:42) >  EXAM:  US DPLX LWR EXT VEINS LTD LT                        PROCEDURE DATE:  08/10/2020    INTERPRETATION:  CLINICAL INFORMATION: Left leg pain and swelling  COMPARISON: None available.  TECHNIQUE: Duplex sonography of the LEFT LOWER extremity veins with color and spectral Doppler, with and without compression.  FINDINGS:  ·	There is deep venous thrombosis involving the left common femoral, femoral and popliteal veins.  ·	The contralateral common femoral vein is patent.  ·	Left calf vein thrombosis is detected.  IMPRESSION:  ·	Positive for extensive left lower extremity deep venous thrombosis.  ·	Findings were discussed with ZEB Aguilar by telephone on 8/10/2020 at 1453 hours.  < end of copied text >      < from: NM Pulmonary Perfusion Scan (08.11.20 @ 12:16) >  EXAM:  NM PULM PERFUSION IMG                        PROCEDURE DATE:  08/11/2020    INTERPRETATION:  RADIOPHARMACEUTICAL: 3.0 mCi Tc-99m-MAA, I.V.  CLINICAL STATEMENT: 65 year-old female with DVT  TECHNIQUE:  Perfusion images of the lungs were obtained following administration of Tc-99m-MAA. Images were obtained in the anterior, posterior, both lateral, and all 4 oblique projections. The study was interpreted in conjunction with chest radiograph of 8/10/2020.  No prior VQ scan  FINDINGS: There are segmental perfusion defects involving the posterior segment of the right upper lobe, medial segment of the right middle lobe and inferior lingula.  IMPRESSION:   ·	Abnormal perfusion lung scan.  ·	Indeterminate probability of pulmonary embolus.  ·	Dr. Villegas was informed of these results by telephone with read back at 12:20 PM on 8/11/2020.  < end of copied text >

## 2020-08-12 NOTE — PROGRESS NOTE ADULT - SUBJECTIVE AND OBJECTIVE BOX
[facility] [place]       HPI:  66 yo Female with  pmhx significant  for HTN , Osteoporosis ,Sz ,Pericarditis , Renal atherosclerosis , Shingles ,Migraines ,Depression ,Constipation ,Anxiety presents to ED with  L LE swelling & pain that began 2 days  ago, reports that pain is aching moderate  in severity ,found to have  L calf swelling and pain now radiates to the L groin with now weakness or numbness. Denies trauma, falls, dysuria, urgency and frequency .Leg venous doppler was performed and demonstrated extensive involving the left common femoral, femoral, popliteal, and calf veins. LLE DVT ,started on heparin drip and vascular surgery consulted for possible thrombectomy Due to elevated creatinine 1.5 and planned CTA nephrology consult called ,also patient is given toradol for pain management in view of drug seeking behavior Seen by pulmonologist and cardiologist for clearance for planned surgery ,clot removal .ECHO ordered to evaluate for R heart strain . (11 Aug 2020 06:09)        SUBJECTIVE:      ALLERGIES:  Allergies    penicillin (Anaphylaxis)    Intolerances          MEDICATIONS  (STANDING):  aspirin enteric coated 81 milliGRAM(s) Oral daily  heparin  Infusion. 900 Unit(s)/Hr (9 mL/Hr) IV Continuous <Continuous>  labetalol 200 milliGRAM(s) Oral three times a day  losartan 50 milliGRAM(s) Oral daily  pantoprazole    Tablet 40 milliGRAM(s) Oral before breakfast  polyethylene glycol 3350 17 Gram(s) Oral daily  senna 2 Tablet(s) Oral at bedtime  sertraline 50 milliGRAM(s) Oral daily  topiramate 100 milliGRAM(s) Oral two times a day    MEDICATIONS  (PRN):  acetaminophen   Tablet .. 650 milliGRAM(s) Oral every 6 hours PRN Temp greater or equal to 38C (100.4F), Mild Pain (1 - 3)  bisacodyl Suppository 10 milliGRAM(s) Rectal daily PRN Constipation  heparin   Injectable 4500 Unit(s) IV Push every 6 hours PRN For aPTT less than 40  heparin   Injectable 2000 Unit(s) IV Push every 6 hours PRN For aPTT between 40 - 57  magnesium hydroxide Suspension 30 milliLiter(s) Oral daily PRN Constipation  traMADol 50 milliGRAM(s) Oral every 6 hours PRN Moderate Pain (4 - 6)      REVIEW OF SYSTEMS:  CONSTITUTIONAL: No fever,  RESPIRATORY: No cough, wheezing, shortness of breath  CARDIOVASCULAR: No chest pain, dyspnea, palpitations, dizziness, syncope, paroxysmal nocturnal dyspnea, orthopnea, or arm or leg swelling  GASTROINTESTINAL: No abdominal  or epigastric pain, nausea, vomiting,  diarrhea  NEUROLOGICAL: No headaches,  loss of strength, numbness, or tremors    Vital Signs Last 24 Hrs  T(C): 37.4 (12 Aug 2020 19:24), Max: 38.4 (12 Aug 2020 00:52)  T(F): 99.3 (12 Aug 2020 19:24), Max: 101.1 (12 Aug 2020 00:52)  HR: 84 (12 Aug 2020 21:09) (72 - 84)  BP: 160/77 (12 Aug 2020 21:09) (137/75 - 177/85)  BP(mean): --  RR: 18 (12 Aug 2020 19:24) (16 - 18)  SpO2: 96% (12 Aug 2020 19:24) (94% - 97%)    PHYSICAL EXAM:  HEAD:  Atraumatic, Normocephalic  NECK: Supple and normal thyroid.  No JVD or carotid bruit.   HEART: S1, S2 regular , 1/6 soft ejection systolic murmur in mitral area , no thrill and no gallops .  PULMONARY: Bilateral vesicular breathing , few scattered ronchi and few scattered rales are present .  ABDOMEN: Soft nontender and positive bowl sounds   EXTREMITIES:  No clubbing, cyanosis, or pedal  edema  NEUROLOGICAL: AAOX3 , no focal deficit .    LABS:                        10.5   6.80  )-----------( 146      ( 12 Aug 2020 05:02 )             33.5     08-12    145  |  113<H>  |  14  ----------------------------<  98  3.9   |  26  |  1.00    Ca    8.5      12 Aug 2020 05:02  Phos  2.5     08-12  Mg     2.3     08-11    TPro  5.9<L>  /  Alb  2.5<L>  /  TBili  0.4  /  DBili  x   /  AST  16  /  ALT  19  /  AlkPhos  68  08-12    CARDIAC MARKERS ( 12 Aug 2020 05:02 )  <.015 ng/mL / x     / x     / x     / x          PT/INR - ( 12 Aug 2020 05:02 )   PT: 14.2 sec;   INR: 1.23 ratio         PTT - ( 12 Aug 2020 13:23 )  PTT:83.6 sec    BNP      EKG:  ECHO:  IMAGING:    Assessment/Plan    Will continue to follow during hospital course and give further recommendations as needed . Thanks for your referral . if any questions please contact me at office ([phone] [phone]) Lio Fischer MD [facility]  Cardiology F/U :      HPI:  64 yo Female with  pmhx significant  for HTN , Osteoporosis ,Sz ,Pericarditis , Renal atherosclerosis , Shingles ,Migraines ,Depression ,Constipation ,Anxiety presents to ED with  L LE swelling & pain that began 2 days  ago, reports that pain is aching moderate  in severity ,found to have  L calf swelling and pain now radiates to the L groin with now weakness or numbness. Denies trauma, falls, dysuria, urgency and frequency .Leg venous doppler was performed and demonstrated extensive involving the left common femoral, femoral, popliteal, and calf veins. LLE DVT ,started on heparin drip and vascular surgery consulted for possible thrombectomy Due to elevated creatinine 1.5 and planned CTA nephrology consult called ,also patient is given toradol for pain management in view of drug seeking behavior Seen by pulmonologist and cardiologist for clearance for planned surgery ,clot removal .ECHO ordered to evaluate for R heart strain . (11 Aug 2020 06:09)        SUBJECTIVE: Patient lying comfortable .      ALLERGIES:  Allergies    penicillin (Anaphylaxis)    Intolerances          MEDICATIONS  (STANDING):  aspirin enteric coated 81 milliGRAM(s) Oral daily  heparin  Infusion. 900 Unit(s)/Hr (9 mL/Hr) IV Continuous <Continuous>  labetalol 200 milliGRAM(s) Oral three times a day  losartan 50 milliGRAM(s) Oral daily  pantoprazole    Tablet 40 milliGRAM(s) Oral before breakfast  polyethylene glycol 3350 17 Gram(s) Oral daily  senna 2 Tablet(s) Oral at bedtime  sertraline 50 milliGRAM(s) Oral daily  topiramate 100 milliGRAM(s) Oral two times a day    MEDICATIONS  (PRN):  acetaminophen   Tablet .. 650 milliGRAM(s) Oral every 6 hours PRN Temp greater or equal to 38C (100.4F), Mild Pain (1 - 3)  bisacodyl Suppository 10 milliGRAM(s) Rectal daily PRN Constipation  heparin   Injectable 4500 Unit(s) IV Push every 6 hours PRN For aPTT less than 40  heparin   Injectable 2000 Unit(s) IV Push every 6 hours PRN For aPTT between 40 - 57  magnesium hydroxide Suspension 30 milliLiter(s) Oral daily PRN Constipation  traMADol 50 milliGRAM(s) Oral every 6 hours PRN Moderate Pain (4 - 6)      REVIEW OF SYSTEMS:  CONSTITUTIONAL: No fever,  RESPIRATORY: No cough, wheezing, shortness of breath  CARDIOVASCULAR: No chest pain, dyspnea, palpitations, dizziness, syncope, paroxysmal nocturnal dyspnea, orthopnea, or arm swelling and left leg swelling present .  GASTROINTESTINAL: No abdominal  or epigastric pain, nausea, vomiting,  diarrhea  NEUROLOGICAL: No headaches,  loss of strength, numbness, or tremors    Vital Signs Last 24 Hrs  T(C): 37.4 (12 Aug 2020 19:24), Max: 38.4 (12 Aug 2020 00:52)  T(F): 99.3 (12 Aug 2020 19:24), Max: 101.1 (12 Aug 2020 00:52)  HR: 84 (12 Aug 2020 21:09) (72 - 84)  BP: 160/77 (12 Aug 2020 21:09) (137/75 - 177/85)  BP(mean): --  RR: 18 (12 Aug 2020 19:24) (16 - 18)  SpO2: 96% (12 Aug 2020 19:24) (94% - 97%)    PHYSICAL EXAM:  HEAD:  Atraumatic, Normocephalic  NECK: Supple and normal thyroid.  No JVD or carotid bruit.   HEART: S1, S2 regular , 1/6 soft ejection systolic murmur in mitral area , no thrill and no gallops .  PULMONARY: Bilateral vesicular breathing , few scattered rhonchi and few scattered rales are present .  ABDOMEN: Soft nontender and positive bowl sounds   EXTREMITIES:  No clubbing, cyanosis, or pedal  edema  NEUROLOGICAL: AAOX3 , no focal deficit .  Skin : No itching .  Hematology : No bleeding   Endocrinology : No heat and cold intolerance .  Psychiatry : Patient is calm .  Musculoskeletal : Patient has mild arthritis .    LABS:                        10.5   6.80  )-----------( 146      ( 12 Aug 2020 05:02 )             33.5     08-12    145  |  113<H>  |  14  ----------------------------<  98  3.9   |  26  |  1.00    Ca    8.5      12 Aug 2020 05:02  Phos  2.5     08-12  Mg     2.3     08-11    TPro  5.9<L>  /  Alb  2.5<L>  /  TBili  0.4  /  DBili  x   /  AST  16  /  ALT  19  /  AlkPhos  68  08-12    CARDIAC MARKERS ( 12 Aug 2020 05:02 )  <.015 ng/mL / x     / x     / x     / x          PT/INR - ( 12 Aug 2020 05:02 )   PT: 14.2 sec;   INR: 1.23 ratio         PTT - ( 12 Aug 2020 13:23 )  PTT:83.6 sec      Assessment/Plan  Patient has :  1) Extensive DVT of left leg S/P thrombectomy . Post operative stable so far .  2) H/O Hypertension and WAI stable .  3) Anemia .  Plan : 1) cardiac stable . 2) Patient on full anticoagulation 3) Monitor hemoglobin and electrolytes .  Will continue to follow during hospital course and give further recommendations as needed . Thanks for your referral . if any questions please contact me at office ([phone] [phone])

## 2020-08-12 NOTE — PROGRESS NOTE ADULT - SUBJECTIVE AND OBJECTIVE BOX
Patient is a 65y Female whom presented to the hospital with ckd and andrew   pateint seen and examined nad , no fever , no chills    PAST MEDICAL & SURGICAL HISTORY:  Drug-seeking behavior  Renal arteriosclerosis  Constipation  Anxiety  Depression  HTN (hypertension)  Shingles  Pericarditis  Osteoporosis  Seizure disorder  Migraines  History of back surgery      MEDICATIONS  (STANDING):  MEDICATIONS  (STANDING):  aspirin enteric coated 81 milliGRAM(s) Oral daily  heparin  Infusion. 900 Unit(s)/Hr (9 mL/Hr) IV Continuous <Continuous>  losartan 50 milliGRAM(s) Oral daily  pantoprazole    Tablet 40 milliGRAM(s) Oral before breakfast  sertraline 50 milliGRAM(s) Oral daily  topiramate 100 milliGRAM(s) Oral two times a day    MEDICATIONS  (PRN):  acetaminophen   Tablet .. 650 milliGRAM(s) Oral every 6 hours PRN Temp greater or equal to 38C (100.4F), Mild Pain (1 - 3)  heparin   Injectable 4500 Unit(s) IV Push every 6 hours PRN For aPTT less than 40  heparin   Injectable 2000 Unit(s) IV Push every 6 hours PRN For aPTT between 40 - 57  traMADol 50 milliGRAM(s) Oral every 6 hours PRN Moderate Pain (4 - 6)      Allergies    penicillin (Anaphylaxis)    Intolerances        SOCIAL HISTORY:  Denies ETOh,Smoking,     FAMILY HISTORY:  Family history of heart disease  Family history of colon cancer in mother      REVIEW OF SYSTEMS:  RESPIRATORY: No cough, wheezing, hemoptysis; No shortness of breath  CARDIOVASCULAR: No chest pain or palpitations  GASTROINTESTINAL: No abdominal or epigastric pain. No nausea, vomiting,     No diarrhea or constipation. No melena   GENITOURINARY: No dysuria, frequency or hematuria  SKIN: dry  complaining of pain, lower leg.                                     10.5   6.80  )-----------( 146      ( 12 Aug 2020 05:02 )             33.5       CBC Full  -  ( 12 Aug 2020 05:02 )  WBC Count : 6.80 K/uL  RBC Count : 3.85 M/uL  Hemoglobin : 10.5 g/dL  Hematocrit : 33.5 %  Platelet Count - Automated : 146 K/uL  Mean Cell Volume : 87.0 fl  Mean Cell Hemoglobin : 27.3 pg  Mean Cell Hemoglobin Concentration : 31.3 gm/dL  Auto Neutrophil # : x  Auto Lymphocyte # : x  Auto Monocyte # : x  Auto Eosinophil # : x  Auto Basophil # : x  Auto Neutrophil % : x  Auto Lymphocyte % : x  Auto Monocyte % : x  Auto Eosinophil % : x  Auto Basophil % : x      08-12    145  |  113<H>  |  14  ----------------------------<  98  3.9   |  26  |  1.00    Ca    8.5      12 Aug 2020 05:02  Phos  2.5     08-12  Mg     2.3     08-11    TPro  5.9<L>  /  Alb  2.5<L>  /  TBili  0.4  /  DBili  x   /  AST  16  /  ALT  19  /  AlkPhos  68  08-12      CAPILLARY BLOOD GLUCOSE          Vital Signs Last 24 Hrs  T(C): 37.3 (12 Aug 2020 11:52), Max: 38.4 (12 Aug 2020 00:52)  T(F): 99.1 (12 Aug 2020 11:52), Max: 101.1 (12 Aug 2020 00:52)  HR: 80 (12 Aug 2020 11:52) (75 - 88)  BP: 142/72 (12 Aug 2020 11:52) (120/71 - 186/75)  BP(mean): --  RR: 16 (12 Aug 2020 11:52) (12 - 18)  SpO2: 97% (12 Aug 2020 11:52) (94% - 98%)        PT/INR - ( 12 Aug 2020 05:02 )   PT: 14.2 sec;   INR: 1.23 ratio         PTT - ( 12 Aug 2020 13:23 )  PTT:83.6 sec                         PHYSICAL EXAM:    Respiratory: CTAB  Cardiovascular: S1 and S2  Gastrointestinal: BS+, soft, NT/ND  Extremities: pos  peripheral edema  Neurological: no focal deficits  Skin: dry

## 2020-08-12 NOTE — PROGRESS NOTE ADULT - ASSESSMENT
cont rx cont rx      SANDRA COFFMAN [facility] [phone]  1955 DOA 8/10/2020 DR SAI DÍAZ          REVIEW OF SYMPTOMS      Able to give ROS  Yes     RELIABLE No   CONSTITUTIONAL Weakness Yes  Chills No Vision changes No  ENDOCRINE No unexplained hair loss No heat or cold intolerance    ALLERGY No hives  Sore throat No   RESP Coughing blood no  Shortness of breath YES   NEURO No Headache  Confusion Pain neck No   CARDIAC No Chest pain No Palpitations   GI No Pain abdomen NO   Vomiting NO     PHYSICAL EXAM    HEENT Unremarkable PERRLA atraumatic   RESP Fair air entry EXP prolonged    Harsh breath sound Resp distres mild   CARDIAC S1 S2 No S3     NO JVD    ABDOMEN SOFT BS PRESENT NOT DISTENDED No hepatosplenomegaly PEDAL EDEMA present No calf tenderness  NO rash   GENERAL Not TOXIC looking      OXYGENATION      8/ ra 97%- ra 97%     VITALS/LABS       2020 100f 87 120/80     EVENTS.     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        DYSPHAGIA EVAL .    DIET. 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    65 year old female with PMH of renal artery stenosis, HTN, seizures, migraines, osteoporosis, depression, anxiety, here with extensive Left lower extremity DVT involving the left common femoral, femoral, popliteal, and calf veins.   home meds amlodipine 10 topiramat 100.2 protonix losartan labetalol zoloft   Pt admitted 8/10/2020   seen by vasc started on iv ufh on 8/10/2020   Q scan 2020 was indeterminate   2020 pr was .11 and there is low grade fever but no leukocytosis  Thrombectomy done and l iliac stent inserted  Dr Fall and iv ufh resumed     DISPOSITION PLANNING.               Extensive dvt sp l thrombectomy    On iv ufh To change to apixaban and dc planning when vasc surg agrees     TIME SPENT Over 25 minutes aggregate care time spent on encounter; activities included combinations of  direct pt care, counseling and/or coordinating care reviewing notes, lab data/ imaging, discussion with multidisciplinary team/ pt /family. Risks, benefits, alternatives of planned interventions when applicable were discussed in detail and questions answered as best as possible    SANDRA COFFMAN [facility] [phone]  1955 DOA 8/10/2020 DR SAI DÍAZ

## 2020-08-12 NOTE — PROGRESS NOTE ADULT - ASSESSMENT
64 yo f pmhx sig for htn, osteoporosis pw L LE swelling and pain that began 2 d ago, possible ckd will obtained base line bun and cr

## 2020-08-13 LAB
ALBUMIN SERPL ELPH-MCNC: 2.5 G/DL — LOW (ref 3.3–5)
ALP SERPL-CCNC: 67 U/L — SIGNIFICANT CHANGE UP (ref 40–120)
ALT FLD-CCNC: 18 U/L — SIGNIFICANT CHANGE UP (ref 12–78)
ANION GAP SERPL CALC-SCNC: 4 MMOL/L — LOW (ref 5–17)
ANION GAP SERPL CALC-SCNC: 7 MMOL/L — SIGNIFICANT CHANGE UP (ref 5–17)
APTT BLD: 39.2 SEC — HIGH (ref 27.5–35.5)
APTT BLD: 40.1 SEC — HIGH (ref 27.5–35.5)
AST SERPL-CCNC: 14 U/L — LOW (ref 15–37)
BILIRUB SERPL-MCNC: 0.4 MG/DL — SIGNIFICANT CHANGE UP (ref 0.2–1.2)
BUN SERPL-MCNC: 19 MG/DL — SIGNIFICANT CHANGE UP (ref 7–23)
BUN SERPL-MCNC: 21 MG/DL — SIGNIFICANT CHANGE UP (ref 7–23)
CALCIUM SERPL-MCNC: 8.8 MG/DL — SIGNIFICANT CHANGE UP (ref 8.5–10.1)
CALCIUM SERPL-MCNC: 9 MG/DL — SIGNIFICANT CHANGE UP (ref 8.5–10.1)
CHLORIDE SERPL-SCNC: 113 MMOL/L — HIGH (ref 96–108)
CHLORIDE SERPL-SCNC: 114 MMOL/L — HIGH (ref 96–108)
CO2 SERPL-SCNC: 23 MMOL/L — SIGNIFICANT CHANGE UP (ref 22–31)
CO2 SERPL-SCNC: 26 MMOL/L — SIGNIFICANT CHANGE UP (ref 22–31)
CREAT SERPL-MCNC: 1.1 MG/DL — SIGNIFICANT CHANGE UP (ref 0.5–1.3)
CREAT SERPL-MCNC: 1.2 MG/DL — SIGNIFICANT CHANGE UP (ref 0.5–1.3)
CULTURE RESULTS: SIGNIFICANT CHANGE UP
GLUCOSE SERPL-MCNC: 104 MG/DL — HIGH (ref 70–99)
GLUCOSE SERPL-MCNC: 107 MG/DL — HIGH (ref 70–99)
HCT VFR BLD CALC: 33.2 % — LOW (ref 34.5–45)
HCT VFR BLD CALC: 33.5 % — LOW (ref 34.5–45)
HGB BLD-MCNC: 10.4 G/DL — LOW (ref 11.5–15.5)
HGB BLD-MCNC: 10.5 G/DL — LOW (ref 11.5–15.5)
MAGNESIUM SERPL-MCNC: 2.3 MG/DL — SIGNIFICANT CHANGE UP (ref 1.6–2.6)
MCHC RBC-ENTMCNC: 27.2 PG — SIGNIFICANT CHANGE UP (ref 27–34)
MCHC RBC-ENTMCNC: 27.5 PG — SIGNIFICANT CHANGE UP (ref 27–34)
MCHC RBC-ENTMCNC: 31 GM/DL — LOW (ref 32–36)
MCHC RBC-ENTMCNC: 31.6 GM/DL — LOW (ref 32–36)
MCV RBC AUTO: 86.9 FL — SIGNIFICANT CHANGE UP (ref 80–100)
MCV RBC AUTO: 87.5 FL — SIGNIFICANT CHANGE UP (ref 80–100)
NRBC # BLD: 0 /100 WBCS — SIGNIFICANT CHANGE UP (ref 0–0)
NRBC # BLD: 0 /100 WBCS — SIGNIFICANT CHANGE UP (ref 0–0)
PHOSPHATE SERPL-MCNC: 2.3 MG/DL — LOW (ref 2.5–4.5)
PLATELET # BLD AUTO: 173 K/UL — SIGNIFICANT CHANGE UP (ref 150–400)
PLATELET # BLD AUTO: 186 K/UL — SIGNIFICANT CHANGE UP (ref 150–400)
POTASSIUM SERPL-MCNC: 3.7 MMOL/L — SIGNIFICANT CHANGE UP (ref 3.5–5.3)
POTASSIUM SERPL-MCNC: 4.3 MMOL/L — SIGNIFICANT CHANGE UP (ref 3.5–5.3)
POTASSIUM SERPL-SCNC: 3.7 MMOL/L — SIGNIFICANT CHANGE UP (ref 3.5–5.3)
POTASSIUM SERPL-SCNC: 4.3 MMOL/L — SIGNIFICANT CHANGE UP (ref 3.5–5.3)
PROT SERPL-MCNC: 6 G/DL — SIGNIFICANT CHANGE UP (ref 6–8.3)
RBC # BLD: 3.82 M/UL — SIGNIFICANT CHANGE UP (ref 3.8–5.2)
RBC # BLD: 3.83 M/UL — SIGNIFICANT CHANGE UP (ref 3.8–5.2)
RBC # FLD: 15.9 % — HIGH (ref 10.3–14.5)
RBC # FLD: 15.9 % — HIGH (ref 10.3–14.5)
SODIUM SERPL-SCNC: 143 MMOL/L — SIGNIFICANT CHANGE UP (ref 135–145)
SODIUM SERPL-SCNC: 144 MMOL/L — SIGNIFICANT CHANGE UP (ref 135–145)
SPECIMEN SOURCE: SIGNIFICANT CHANGE UP
WBC # BLD: 5.54 K/UL — SIGNIFICANT CHANGE UP (ref 3.8–10.5)
WBC # BLD: 6.3 K/UL — SIGNIFICANT CHANGE UP (ref 3.8–10.5)
WBC # FLD AUTO: 5.54 K/UL — SIGNIFICANT CHANGE UP (ref 3.8–10.5)
WBC # FLD AUTO: 6.3 K/UL — SIGNIFICANT CHANGE UP (ref 3.8–10.5)

## 2020-08-13 PROCEDURE — 71045 X-RAY EXAM CHEST 1 VIEW: CPT | Mod: 26

## 2020-08-13 RX ORDER — POLYETHYLENE GLYCOL 3350 17 G/17G
17 POWDER, FOR SOLUTION ORAL
Qty: 0 | Refills: 0 | DISCHARGE
Start: 2020-08-13

## 2020-08-13 RX ORDER — TOPIRAMATE 25 MG
5 TABLET ORAL
Qty: 300 | Refills: 0
Start: 2020-08-13 | End: 2020-09-11

## 2020-08-13 RX ORDER — SERTRALINE 25 MG/1
0 TABLET, FILM COATED ORAL
Qty: 0 | Refills: 0 | DISCHARGE

## 2020-08-13 RX ORDER — LABETALOL HCL 100 MG
0 TABLET ORAL
Qty: 0 | Refills: 0 | DISCHARGE

## 2020-08-13 RX ORDER — APIXABAN 2.5 MG/1
1 TABLET, FILM COATED ORAL
Qty: 60 | Refills: 0
Start: 2020-08-13 | End: 2020-09-11

## 2020-08-13 RX ORDER — PANTOPRAZOLE SODIUM 20 MG/1
0 TABLET, DELAYED RELEASE ORAL
Qty: 0 | Refills: 0 | DISCHARGE

## 2020-08-13 RX ORDER — LOSARTAN POTASSIUM 100 MG/1
1 TABLET, FILM COATED ORAL
Qty: 0 | Refills: 0 | DISCHARGE
Start: 2020-08-13

## 2020-08-13 RX ORDER — OXYCODONE HYDROCHLORIDE 5 MG/1
1 TABLET ORAL
Qty: 20 | Refills: 0
Start: 2020-08-13 | End: 2020-08-17

## 2020-08-13 RX ORDER — OXYCODONE HYDROCHLORIDE 5 MG/1
10 TABLET ORAL EVERY 6 HOURS
Refills: 0 | Status: DISCONTINUED | OUTPATIENT
Start: 2020-08-13 | End: 2020-08-14

## 2020-08-13 RX ORDER — APIXABAN 2.5 MG/1
10 TABLET, FILM COATED ORAL EVERY 12 HOURS
Refills: 0 | Status: DISCONTINUED | OUTPATIENT
Start: 2020-08-13 | End: 2020-08-13

## 2020-08-13 RX ORDER — SODIUM,POTASSIUM PHOSPHATES 278-250MG
1 POWDER IN PACKET (EA) ORAL ONCE
Refills: 0 | Status: COMPLETED | OUTPATIENT
Start: 2020-08-13 | End: 2020-08-13

## 2020-08-13 RX ORDER — LABETALOL HCL 100 MG
1 TABLET ORAL
Qty: 0 | Refills: 0 | DISCHARGE
Start: 2020-08-13

## 2020-08-13 RX ORDER — HYDROMORPHONE HYDROCHLORIDE 2 MG/ML
0.5 INJECTION INTRAMUSCULAR; INTRAVENOUS; SUBCUTANEOUS EVERY 4 HOURS
Refills: 0 | Status: DISCONTINUED | OUTPATIENT
Start: 2020-08-13 | End: 2020-08-13

## 2020-08-13 RX ORDER — OXYCODONE HYDROCHLORIDE 5 MG/1
5 TABLET ORAL EVERY 6 HOURS
Refills: 0 | Status: DISCONTINUED | OUTPATIENT
Start: 2020-08-13 | End: 2020-08-14

## 2020-08-13 RX ORDER — ASPIRIN/CALCIUM CARB/MAGNESIUM 324 MG
1 TABLET ORAL
Qty: 0 | Refills: 0 | DISCHARGE
Start: 2020-08-13

## 2020-08-13 RX ORDER — SENNA PLUS 8.6 MG/1
2 TABLET ORAL
Qty: 0 | Refills: 0 | DISCHARGE
Start: 2020-08-13

## 2020-08-13 RX ORDER — LOSARTAN POTASSIUM 100 MG/1
0 TABLET, FILM COATED ORAL
Qty: 0 | Refills: 0 | DISCHARGE

## 2020-08-13 RX ORDER — TOPIRAMATE 25 MG
125 TABLET ORAL
Refills: 0 | Status: DISCONTINUED | OUTPATIENT
Start: 2020-08-13 | End: 2020-08-14

## 2020-08-13 RX ORDER — ACETAMINOPHEN 500 MG
2 TABLET ORAL
Qty: 0 | Refills: 0 | DISCHARGE
Start: 2020-08-13

## 2020-08-13 RX ORDER — HYDROMORPHONE HYDROCHLORIDE 2 MG/ML
1 INJECTION INTRAMUSCULAR; INTRAVENOUS; SUBCUTANEOUS EVERY 4 HOURS
Refills: 0 | Status: DISCONTINUED | OUTPATIENT
Start: 2020-08-13 | End: 2020-08-13

## 2020-08-13 RX ORDER — PANTOPRAZOLE SODIUM 20 MG/1
1 TABLET, DELAYED RELEASE ORAL
Qty: 0 | Refills: 0 | DISCHARGE
Start: 2020-08-13

## 2020-08-13 RX ORDER — SERTRALINE 25 MG/1
1 TABLET, FILM COATED ORAL
Qty: 0 | Refills: 0 | DISCHARGE
Start: 2020-08-13

## 2020-08-13 RX ORDER — APIXABAN 2.5 MG/1
2 TABLET, FILM COATED ORAL
Qty: 0 | Refills: 0 | DISCHARGE
Start: 2020-08-13

## 2020-08-13 RX ORDER — APIXABAN 2.5 MG/1
10 TABLET, FILM COATED ORAL
Refills: 0 | Status: DISCONTINUED | OUTPATIENT
Start: 2020-08-13 | End: 2020-08-14

## 2020-08-13 RX ADMIN — HYDROMORPHONE HYDROCHLORIDE 1 MILLIGRAM(S): 2 INJECTION INTRAMUSCULAR; INTRAVENOUS; SUBCUTANEOUS at 08:34

## 2020-08-13 RX ADMIN — SERTRALINE 50 MILLIGRAM(S): 25 TABLET, FILM COATED ORAL at 11:51

## 2020-08-13 RX ADMIN — TRAMADOL HYDROCHLORIDE 50 MILLIGRAM(S): 50 TABLET ORAL at 11:51

## 2020-08-13 RX ADMIN — Medication 1 PACKET(S): at 14:47

## 2020-08-13 RX ADMIN — Medication 100 MILLIGRAM(S): at 06:16

## 2020-08-13 RX ADMIN — LOSARTAN POTASSIUM 50 MILLIGRAM(S): 100 TABLET, FILM COATED ORAL at 06:16

## 2020-08-13 RX ADMIN — Medication 10 MILLIGRAM(S): at 14:42

## 2020-08-13 RX ADMIN — Medication 81 MILLIGRAM(S): at 11:51

## 2020-08-13 RX ADMIN — TRAMADOL HYDROCHLORIDE 50 MILLIGRAM(S): 50 TABLET ORAL at 12:53

## 2020-08-13 RX ADMIN — HEPARIN SODIUM 1100 UNIT(S)/HR: 5000 INJECTION INTRAVENOUS; SUBCUTANEOUS at 08:44

## 2020-08-13 RX ADMIN — Medication 125 MILLIGRAM(S): at 18:08

## 2020-08-13 RX ADMIN — Medication 200 MILLIGRAM(S): at 06:16

## 2020-08-13 RX ADMIN — APIXABAN 10 MILLIGRAM(S): 2.5 TABLET, FILM COATED ORAL at 12:11

## 2020-08-13 RX ADMIN — HEPARIN SODIUM 4500 UNIT(S): 5000 INJECTION INTRAVENOUS; SUBCUTANEOUS at 08:45

## 2020-08-13 RX ADMIN — PANTOPRAZOLE SODIUM 40 MILLIGRAM(S): 20 TABLET, DELAYED RELEASE ORAL at 06:16

## 2020-08-13 RX ADMIN — HYDROMORPHONE HYDROCHLORIDE 1 MILLIGRAM(S): 2 INJECTION INTRAMUSCULAR; INTRAVENOUS; SUBCUTANEOUS at 07:51

## 2020-08-13 RX ADMIN — OXYCODONE HYDROCHLORIDE 10 MILLIGRAM(S): 5 TABLET ORAL at 20:49

## 2020-08-13 RX ADMIN — OXYCODONE HYDROCHLORIDE 10 MILLIGRAM(S): 5 TABLET ORAL at 14:41

## 2020-08-13 RX ADMIN — OXYCODONE HYDROCHLORIDE 10 MILLIGRAM(S): 5 TABLET ORAL at 15:30

## 2020-08-13 RX ADMIN — Medication 200 MILLIGRAM(S): at 14:41

## 2020-08-13 RX ADMIN — Medication 1 PACKET(S): at 08:29

## 2020-08-13 RX ADMIN — Medication 62.5 MILLIMOLE(S): at 14:47

## 2020-08-13 NOTE — PHYSICAL THERAPY INITIAL EVALUATION ADULT - DID THE PATIENT HAVE SURGERY?
yes/Thrombectomy, mechanical, vein, endovascular 11-Aug-2020 15:56:34 Left lower extremity thrombectomy popliteal and femoral vein, Left common iliac vein angioplasty and stent

## 2020-08-13 NOTE — PROGRESS NOTE ADULT - SUBJECTIVE AND OBJECTIVE BOX
AUGUSTUS FLORES    [facility] TELE 313 W1    Patient is a 65y old  Female who presents with a chief complaint of LEFT LEG PAIN AND SWELLING (12 Aug 2020 22:15)       Allergies    penicillin (Anaphylaxis)    Intolerances        HPI:  64 yo Female with  pmhx significant  for HTN , Osteoporosis ,Sz ,Pericarditis , Renal atherosclerosis , Shingles ,Migraines ,Depression ,Constipation ,Anxiety presents to ED with  L LE swelling & pain that began 2 days  ago, reports that pain is aching moderate  in severity ,found to have  L calf swelling and pain now radiates to the L groin with now weakness or numbness. Denies trauma, falls, dysuria, urgency and frequency .Leg venous doppler was performed and demonstrated extensive involving the left common femoral, femoral, popliteal, and calf veins. LLE DVT ,started on heparin drip and vascular surgery consulted for possible thrombectomy Due to elevated creatinine 1.5 and planned CTA nephrology consult called ,also patient is given toradol for pain management in view of drug seeking behavior Seen by pulmonologist and cardiologist for clearance for planned surgery ,clot removal .ECHO ordered to evaluate for R heart strain . (11 Aug 2020 06:09)      PAST MEDICAL & SURGICAL HISTORY:  Drug-seeking behavior  Renal arteriosclerosis  Constipation  Anxiety  Depression  HTN (hypertension)  Shingles  Pericarditis  Osteoporosis  Seizure disorder  Migraines  History of back surgery      FAMILY HISTORY:  Family history of heart disease  Family history of colon cancer in mother        MEDICATIONS   acetaminophen   Tablet .. 650 milliGRAM(s) Oral every 6 hours PRN  aspirin enteric coated 81 milliGRAM(s) Oral daily  bisacodyl Suppository 10 milliGRAM(s) Rectal daily PRN  heparin   Injectable 4500 Unit(s) IV Push every 6 hours PRN  heparin   Injectable 2000 Unit(s) IV Push every 6 hours PRN  heparin  Infusion. 900 Unit(s)/Hr IV Continuous <Continuous>  HYDROmorphone  Injectable 1 milliGRAM(s) IV Push every 4 hours PRN  labetalol 200 milliGRAM(s) Oral three times a day  losartan 50 milliGRAM(s) Oral daily  magnesium hydroxide Suspension 30 milliLiter(s) Oral daily PRN  pantoprazole    Tablet 40 milliGRAM(s) Oral before breakfast  polyethylene glycol 3350 17 Gram(s) Oral daily  senna 2 Tablet(s) Oral at bedtime  sertraline 50 milliGRAM(s) Oral daily  topiramate 100 milliGRAM(s) Oral two times a day  traMADol 50 milliGRAM(s) Oral every 6 hours PRN      Vital Signs Last 24 Hrs  T(C): 37.4 (13 Aug 2020 08:09), Max: 37.4 (12 Aug 2020 19:24)  T(F): 99.3 (13 Aug 2020 08:09), Max: 99.3 (12 Aug 2020 19:24)  HR: 76 (13 Aug 2020 08:09) (72 - 84)  BP: 129/78 (13 Aug 2020 08:09) (129/78 - 178/78)  BP(mean): --  RR: 17 (13 Aug 2020 08:09) (16 - 18)  SpO2: 95% (13 Aug 2020 08:09) (92% - 97%)      08-12-20 @ 07:01  -  08-13-20 @ 07:00  --------------------------------------------------------  IN: 99 mL / OUT: 2100 mL / NET: -2001 mL            LABS:                        10.4   5.54  )-----------( 186      ( 13 Aug 2020 06:42 )             33.5     08-13    144  |  114<H>  |  21  ----------------------------<  104<H>  3.7   |  23  |  1.10    Ca    8.8      13 Aug 2020 06:42  Phos  2.3     08-13  Mg     2.3     08-13    TPro  6.0  /  Alb  2.5<L>  /  TBili  0.4  /  DBili  x   /  AST  14<L>  /  ALT  18  /  AlkPhos  67  08-13    PT/INR - ( 12 Aug 2020 05:02 )   PT: 14.2 sec;   INR: 1.23 ratio         PTT - ( 13 Aug 2020 06:42 )  PTT:39.2 sec          WBC:  WBC Count: 5.54 K/uL (08-13 @ 06:42)  WBC Count: 6.30 K/uL (08-13 @ 01:35)  WBC Count: 6.80 K/uL (08-12 @ 05:02)  WBC Count: 7.51 K/uL (08-12 @ 03:22)  WBC Count: 6.46 K/uL (08-11 @ 06:47)  WBC Count: 5.89 K/uL (08-11 @ 00:37)  WBC Count: 6.85 K/uL (08-10 @ 14:21)      MICROBIOLOGY:  RECENT CULTURES:  08-12 .Urine Clean Catch (Midstream) XXXX XXXX   <10,000 CFU/mL Normal Urogenital Yeni          CARDIAC MARKERS ( 12 Aug 2020 05:02 )  <.015 ng/mL / x     / x     / x     / x            PT/INR - ( 12 Aug 2020 05:02 )   PT: 14.2 sec;   INR: 1.23 ratio         PTT - ( 13 Aug 2020 06:42 )  PTT:39.2 sec    Sodium:  Sodium, Serum: 144 mmol/L (08-13 @ 06:42)  Sodium, Serum: 143 mmol/L (08-13 @ 01:35)  Sodium, Serum: 145 mmol/L (08-12 @ 05:02)  Sodium, Serum: 144 mmol/L (08-12 @ 03:22)  Sodium, Serum: 145 mmol/L (08-11 @ 06:47)  Sodium, Serum: 143 mmol/L (08-10 @ 14:21)      1.10 mg/dL 08-13 @ 06:42  1.20 mg/dL 08-13 @ 01:35  1.00 mg/dL 08-12 @ 05:02  1.00 mg/dL 08-12 @ 03:22  1.20 mg/dL 08-11 @ 06:47  1.50 mg/dL 08-10 @ 14:21      Hemoglobin:  Hemoglobin: 10.4 g/dL (08-13 @ 06:42)  Hemoglobin: 10.5 g/dL (08-13 @ 01:35)  Hemoglobin: 10.5 g/dL (08-12 @ 05:02)  Hemoglobin: 10.5 g/dL (08-12 @ 03:22)  Hemoglobin: 11.1 g/dL (08-11 @ 06:47)  Hemoglobin: 11.8 g/dL (08-11 @ 00:37)  Hemoglobin: 11.8 g/dL (08-10 @ 14:21)      Platelets: Platelet Count - Automated: 186 K/uL (08-13 @ 06:42)  Platelet Count - Automated: 173 K/uL (08-13 @ 01:35)  Platelet Count - Automated: 146 K/uL (08-12 @ 05:02)  Platelet Count - Automated: 144 K/uL (08-12 @ 03:22)  Platelet Count - Automated: 140 K/uL (08-11 @ 06:47)  Platelet Count - Automated: 129 K/uL (08-11 @ 00:37)  Platelet Count - Automated: 165 K/uL (08-10 @ 14:21)      LIVER FUNCTIONS - ( 13 Aug 2020 06:42 )  Alb: 2.5 g/dL / Pro: 6.0 g/dL / ALK PHOS: 67 U/L / ALT: 18 U/L / AST: 14 U/L / GGT: x                 RADIOLOGY & ADDITIONAL STUDIES:

## 2020-08-13 NOTE — PHYSICAL THERAPY INITIAL EVALUATION ADULT - PERTINENT HX OF CURRENT PROBLEM, REHAB EVAL
66 yo Female with  pmhx significant  for HTN , Osteoporosis ,Sz ,Pericarditis , Renal atherosclerosis , Shingles ,Migraines ,Depression ,Constipation ,Anxiety presents to ED with  L LE swelling & pain that began 2 days  ago, reports that pain is aching moderate  in severity ,found to have  L calf swelling and pain now radiates to the L groin with now weakness or numbness.

## 2020-08-13 NOTE — PROGRESS NOTE ADULT - SUBJECTIVE AND OBJECTIVE BOX
PROGRESS NOTE  Patient is a 65y old  Female who presents with a chief complaint of LEFT LEG PAIN AND SWELLING (13 Aug 2020 18:07)  Chart and available morning labs /imaging are reviewed electronically , urgent issues addressed . More information  is being added upon completion of rounds , when more information is collected and management discussed with consultants , medical staff and social service/case management on the floor     OVERNIGHT    No new issues reported by medical staff . All above noted Patient is resting in a bed comfortably ..No distress noted   HPI:  66 yo Female with  pmhx significant  for HTN , Osteoporosis ,Sz ,Pericarditis , Renal atherosclerosis , Shingles ,Migraines ,Depression ,Constipation ,Anxiety presents to ED with  L LE swelling & pain that began 2 days  ago, reports that pain is aching moderate  in severity ,found to have  L calf swelling and pain now radiates to the L groin with now weakness or numbness. Denies trauma, falls, dysuria, urgency and frequency .Leg venous doppler was performed and demonstrated extensive involving the left common femoral, femoral, popliteal, and calf veins. LLE DVT ,started on heparin drip and vascular surgery consulted for possible thrombectomy Due to elevated creatinine 1.5 and planned CTA nephrology consult called ,also patient is given toradol for pain management in view of drug seeking behavior Seen by pulmonologist and cardiologist for clearance for planned surgery ,clot removal .ECHO ordered to evaluate for R heart strain . (11 Aug 2020 06:09)    PAST MEDICAL & SURGICAL HISTORY:  Drug-seeking behavior  Renal arteriosclerosis  Constipation  Anxiety  Depression  HTN (hypertension)  Shingles  Pericarditis  Osteoporosis  Seizure disorder  Migraines  History of back surgery      MEDICATIONS  (STANDING):  apixaban 10 milliGRAM(s) Oral <User Schedule>  aspirin enteric coated 81 milliGRAM(s) Oral daily  labetalol 200 milliGRAM(s) Oral three times a day  losartan 50 milliGRAM(s) Oral daily  pantoprazole    Tablet 40 milliGRAM(s) Oral before breakfast  polyethylene glycol 3350 17 Gram(s) Oral daily  senna 2 Tablet(s) Oral at bedtime  sertraline 50 milliGRAM(s) Oral daily  topiramate 125 milliGRAM(s) Oral two times a day    MEDICATIONS  (PRN):  acetaminophen   Tablet .. 650 milliGRAM(s) Oral every 6 hours PRN Temp greater or equal to 38C (100.4F), Mild Pain (1 - 3)  bisacodyl Suppository 10 milliGRAM(s) Rectal daily PRN Constipation  magnesium hydroxide Suspension 30 milliLiter(s) Oral daily PRN Constipation  oxyCODONE    IR 5 milliGRAM(s) Oral every 6 hours PRN Moderate Pain (4 - 6)  oxyCODONE    IR 10 milliGRAM(s) Oral every 6 hours PRN Severe Pain (7 - 10)      OBJECTIVE    T(C): 37.7 (08-13-20 @ 17:16), Max: 37.7 (08-13-20 @ 17:16)  HR: 83 (08-13-20 @ 17:26) (72 - 84)  BP: 155/74 (08-13-20 @ 17:26) (129/78 - 178/78)  RR: 18 (08-13-20 @ 17:16) (17 - 18)  SpO2: 96% (08-13-20 @ 17:26) (92% - 97%)  Wt(kg): --  I&O's Summary    12 Aug 2020 07:01  -  13 Aug 2020 07:00  --------------------------------------------------------  IN: 99 mL / OUT: 2100 mL / NET: -2001 mL          REVIEW OF SYSTEMS:  CONSTITUTIONAL: No fever, weight loss, or fatigue  EYES: No eye pain, visual disturbances, or discharge  ENMT:   No sinus or throat pain  NECK: No pain or stiffness  RESPIRATORY: No cough, wheezing, chills or hemoptysis; No shortness of breath  CARDIOVASCULAR: No chest pain, palpitations, dizziness, or leg swelling  GASTROINTESTINAL: No abdominal pain. No nausea, vomiting; No diarrhea or constipation. No melena or hematochezia.  GENITOURINARY: No dysuria, frequency, hematuria, or incontinence  NEUROLOGICAL: No headaches, memory loss, loss of strength, numbness, or tremors  SKIN: No itching, burning, rashes, or lesions   MUSCULOSKELETAL: No joint pain or swelling; No muscle, back, or extremity pain    PHYSICAL EXAM:  Appearance: NAD. VS past 24 hrs -as above   HEENT:   Moist oral mucosa. Conjunctiva clear b/l.   Neck : supple  Respiratory: Lungs CTAB.  Gastrointestinal:  Soft, nontender. No rebound. No rigidity. BS present	  Cardiovascular: RRR ,S1S2 present  Neurologic: Non-focal. Moving all extremities.  Extremities: Leg improving edema. No erythema. No calf tenderness.  Skin: No rashes, No ecchymoses, No cyanosis.	  wounds ,skin lesions-See skin assesment flow sheet   LABS:                        10.4   5.54  )-----------( 186      ( 13 Aug 2020 06:42 )             33.5     08-13    144  |  114<H>  |  21  ----------------------------<  104<H>  3.7   |  23  |  1.10    Ca    8.8      13 Aug 2020 06:42  Phos  2.3     08-13  Mg     2.3     08-13    TPro  6.0  /  Alb  2.5<L>  /  TBili  0.4  /  DBili  x   /  AST  14<L>  /  ALT  18  /  AlkPhos  67  08-13    CAPILLARY BLOOD GLUCOSE        PT/INR - ( 12 Aug 2020 05:02 )   PT: 14.2 sec;   INR: 1.23 ratio         PTT - ( 13 Aug 2020 14:37 )  PTT:40.1 sec      Culture - Urine (collected 12 Aug 2020 09:08)  Source: .Urine Clean Catch (Midstream)  Final Report (13 Aug 2020 07:17):    <10,000 CFU/mL Normal Urogenital Yeni      RADIOLOGY & ADDITIONAL TESTS:< from: Xray Chest 1 View- PORTABLE-Routine (08.13.20 @ 08:28) >  EXAM:  XR CHEST PORTABLE ROUTINE 1V                            PROCEDURE DATE:  08/13/2020          INTERPRETATION:  Follow-up.    AP chest. Prior 8/10/2020.    No change heart mediastinum. Fibrotic atelectatic changes left base similar to prior. No consolidation effusion or other interval change.    Impression: No active infiltrates. Stable exam.    < end of copied text >     reviewed elctronically  ASSESSMENT/PLAN: 	    45minutes spent on this visit, 50% visit time spent in care co-ordination with other attendings and counselling patient  I have discussed care plan with patient and HCP,expressed understanding of problems treatment and their effect and side effects, alternatives in detail,I have asked if they have any questions and concerns and appropriately addressed them to best of my ability

## 2020-08-13 NOTE — PROGRESS NOTE ADULT - ASSESSMENT
[ASSESSMENT and  PLAN]  65 year old female with PMH of renal artery stenosis, HTN, seizures, migraines, osteoporosis, depression, anxiety    Admitted with extensive Left lower extremity DVT involving the left common femoral, femoral, popliteal, and calf veins. symptomatic 2d.   V duplex 8/10/2020 DVT in LLE in LCFV, FV, L CFL, L popoliteal DVT,   EXTENSIVE DVT   Pt seen by vasc. s/p thrombectomy 8/11/20   Pulm consulted 8/10. On arrival 130/80 po 96%   home meds amlodipine 10 topiramat, protonix losartan labetalol zoloft         S/p Thrombectomy.  IV heparin per vascualar surgery  Pulm status stable.  VQ indeterminate    Slight elevated creat. Consider renal eval Dr Dimas called.    Anemia due to chronic disease    RECOMMENDATIONS  has been transitioned to eliquis  will evaluate further as an outpatient   please call if addiitonal heme evaluation required  thank you

## 2020-08-13 NOTE — PHYSICAL THERAPY INITIAL EVALUATION ADULT - ADDITIONAL COMMENTS
Patient lives with sister in private home, + 3 DANI with railing then resides on main level.  Patient was independent in all ADLs and ambulated independently without device/

## 2020-08-13 NOTE — CONSULT NOTE ADULT - CONSULT REQUESTED DATE/TIME
10-Aug-2020 16:21
10-Aug-2020 21:42
10-Aug-2020 23:13
11-Aug-2020 12:10
13-Aug-2020
10-Aug-2020
12-Aug-2020 13:14

## 2020-08-13 NOTE — PROGRESS NOTE ADULT - ASSESSMENT
cont rx cont rx      SANDRA COFFMAN [facility] [phone]  1955 DOA 8/10/2020 DR SAI DÍAZ          REVIEW OF SYMPTOMS      Able to give ROS  Yes     RELIABLE No   CONSTITUTIONAL Weakness Yes  Chills No Vision changes No  ENDOCRINE No unexplained hair loss No heat or cold intolerance    ALLERGY No hives  Sore throat No   RESP Coughing blood no  Shortness of breath YES   NEURO No Headache  Confusion Pain neck No   CARDIAC No Chest pain No Palpitations   GI No Pain abdomen NO   Vomiting NO     PHYSICAL EXAM    HEENT Unremarkable PERRLA atraumatic   RESP Fair air entry EXP prolonged    Harsh breath sound Resp distres mild   CARDIAC S1 S2 No S3     NO JVD    ABDOMEN SOFT BS PRESENT NOT DISTENDED No hepatosplenomegaly PEDAL EDEMA present No calf tenderness  NO rash   GENERAL Not TOXIC looking      OXYGENATION      8/ ra 97%- ra 97%     VITALS/LABS       2020 afeb 72 150/80 18     EVENTS.     2020 Uncomplicated lle popliteal femoral and iliac vein thrombectomy and l common iliac vein stent May Thurner syndrome (Dr Fall)          PT DATA/BEST PRACTICE  ALLERGY         pncl            WT                 8/10/2020 59                     BMI                     8/10/2020 23       CrCl                    ADVANCED DIRECTIVE     LINES TUBES POA.   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   apixaban ()      DYSPHAGIA EVAL .    DIET. dash ()                                                                          IV F.    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    65 year old female with PMH of renal artery stenosis, HTN, seizures, migraines, osteoporosis, depression, anxiety, here with extensive Left lower extremity DVT involving the left common femoral, femoral, popliteal, and calf veins.   home meds amlodipine 10 topiramat 100.2 protonix losartan labetalol zoloft   Pt admitted 8/10/2020   [facility]   Q scan 2020 was indeterminate   2020 pr was .11 and there is low grade fever but no leukocytosis  Thrombectomy done and l iliac stent inserted  Dr Fall and iv ufh resumed   2020 iv ufh changed to apixcaban after getting ok from surg    DISPOSITION PLANNING.               Extensive dvt sp l thrombectomy    IV ufh changed to apixaban and start dc planning                  NEED FOR HOME OXYGEN. 2020 No   NEED FOR ANTIBIOTICS. 2020 No   NEED FOR HOME ANTICOAGULATION. 2020 Apixaban 10.2.7d then apixaban 5.2.6 m   NEED FOR HOME NIV. 2020 No   NEED FOR FOLLOWUP. Call Dr Blair Villegas  to arrange pulmonary followup within 2 weeks   PLUMONARY CLEARANCE FOR DISCHARGE. 2020 Cleared     TIME SPENT Over 25 minutes aggregate care time spent on encounter; activities included combinations of  direct pt care, counseling and/or coordinating care reviewing notes, lab data/ imaging, discussion with multidisciplinary team/ pt /family. Risks, benefits, alternatives of planned interventions when applicable were discussed in detail and questions answered as best as possible    SANDRA COFFMAN [facility] [phone]  1955 DOA 8/10/2020 DR SAI DÍAZ

## 2020-08-13 NOTE — CONSULT NOTE ADULT - ASSESSMENT
Acute urinary Retention following surgery (Thrombectomy), cath in place and patient most likely experienced UR because of anesthesia and fluids, plan TOV 8/14 EDGAR, patient aware if unsuccessful TOV can be discharged with catheter  Has atrophic left kidney and right renal renal  cyst with normal creatinine    Can follow as OP cyst

## 2020-08-13 NOTE — PHYSICAL THERAPY INITIAL EVALUATION ADULT - OCCUPATION
Retired Azathioprine Pregnancy And Lactation Text: This medication is Pregnancy Category D and isn't considered safe during pregnancy. It is unknown if this medication is excreted in breast milk.

## 2020-08-13 NOTE — PROGRESS NOTE ADULT - SUBJECTIVE AND OBJECTIVE BOX
neuro cons dict  recurrence of seizure  increase topamax to 125 mg bid  avoid tramdol as it can precipitate seizure

## 2020-08-13 NOTE — CONSULT NOTE ADULT - SUBJECTIVE AND OBJECTIVE BOX
CHIEF COMPLAINT: Cannot urinate,     HISTORY OF PRESENT ILLNESS:  66 yo female s/p left thrombectomy for extensive left DVT developed UR post op and failed TOV, cath placed and  consult  requested.    Patient denies antecedent  issues/consultations.      Had BM today, feels good and wants to have Redmond removed.  PAST MEDICAL & SURGICAL HISTORY:  Drug-seeking behavior  Renal arteriosclerosis  Constipation  Anxiety  Depression  HTN (hypertension)  Shingles  Pericarditis  Osteoporosis  Seizure disorder  Migraines  History of back surgery      REVIEW OF SYSTEMS:    CONSTITUTIONAL: No weakness, fevers or chills  EYES/ENT: No visual changes;  No vertigo or throat pain   NECK: No pain or stiffness  RESPIRATORY: No cough, wheezing, hemoptysis; No shortness of breath  CARDIOVASCULAR: No chest pain or palpitations  GASTROINTESTINAL: No abdominal or epigastric pain. No nausea, vomiting, or hematemesis; No diarrhea or constipation. No melena or hematochezia.  NEUROLOGICAL: No numbness or weakness  SKIN: No itching, burning, rashes, or lesions       MEDICATIONS  (STANDING):  apixaban 10 milliGRAM(s) Oral <User Schedule>  aspirin enteric coated 81 milliGRAM(s) Oral daily  labetalol 200 milliGRAM(s) Oral three times a day  losartan 50 milliGRAM(s) Oral daily  pantoprazole    Tablet 40 milliGRAM(s) Oral before breakfast  polyethylene glycol 3350 17 Gram(s) Oral daily  senna 2 Tablet(s) Oral at bedtime  sertraline 50 milliGRAM(s) Oral daily  topiramate 125 milliGRAM(s) Oral two times a day    MEDICATIONS  (PRN):  acetaminophen   Tablet .. 650 milliGRAM(s) Oral every 6 hours PRN Temp greater or equal to 38C (100.4F), Mild Pain (1 - 3)  bisacodyl Suppository 10 milliGRAM(s) Rectal daily PRN Constipation  magnesium hydroxide Suspension 30 milliLiter(s) Oral daily PRN Constipation  oxyCODONE    IR 5 milliGRAM(s) Oral every 6 hours PRN Moderate Pain (4 - 6)  oxyCODONE    IR 10 milliGRAM(s) Oral every 6 hours PRN Severe Pain (7 - 10)      Allergies    penicillin (Anaphylaxis)    Intolerances        SOCIAL HISTORY:    FAMILY HISTORY:  Family history of heart disease  Family history of colon cancer in mother      Vital Signs Last 24 Hrs  T(C): 37.7 (13 Aug 2020 17:16), Max: 37.7 (13 Aug 2020 17:16)  T(F): 99.8 (13 Aug 2020 17:16), Max: 99.8 (13 Aug 2020 17:16)  HR: 83 (13 Aug 2020 17:26) (72 - 84)  BP: 155/74 (13 Aug 2020 17:26) (129/78 - 178/78)  BP(mean): --  RR: 18 (13 Aug 2020 17:16) (17 - 18)  SpO2: 96% (13 Aug 2020 17:26) (92% - 97%)    PHYSICAL EXAM:    Constitutional: NAD, well-developed  HEENT: AALIYAH, EOMI, Normal Hearing, MMM  Neck: No LAD, No JVD  Back: Normal spine flexure, No CVA tenderness  Respiratory: CTAB   Cardiovascular: S1 and S2, RRR, no M/G/R  Abd: BS+, soft, NT/ND, No CVAT  : Redmond in place with grossly clear urine  Extremities: no edema/tenderness  Vascular: 2+ peripheral pulses  Neurological: A/O x 3, no focal deficits  Psychiatric: Normal mood, normal affect      LABS:                        10.4   5.54  )-----------( 186      ( 13 Aug 2020 06:42 )             33.5     08-13    144  |  114<H>  |  21  ----------------------------<  104<H>  3.7   |  23  |  1.10    Ca    8.8      13 Aug 2020 06:42  Phos  2.3     08-13  Mg     2.3     08-13    TPro  6.0  /  Alb  2.5<L>  /  TBili  0.4  /  DBili  x   /  AST  14<L>  /  ALT  18  /  AlkPhos  67  08-13    PT/INR - ( 12 Aug 2020 05:02 )   PT: 14.2 sec;   INR: 1.23 ratio         PTT - ( 13 Aug 2020 14:37 )  PTT:40.1 sec    Urine Culture: 08-12 @ 09:08  Urine Culure Resuls   <10,000 CFU/mL Normal Urogenital Yeni  Organism --    Hemoglobin: 10.4 g/dL (08-13 @ 06:42)  Hematocrit: 33.5 % (08-13 @ 06:42)  Hemoglobin: 10.5 g/dL (08-13 @ 01:35)  Hematocrit: 33.2 % (08-13 @ 01:35)  Hemoglobin: 10.5 g/dL (08-12 @ 05:02)  Hematocrit: 33.5 % (08-12 @ 05:02)  Hemoglobin: 10.5 g/dL (08-12 @ 03:22)  Hematocrit: 32.7 % (08-12 @ 03:22)      RADIOLOGY & ADDITIONAL STUDIES:  < from: CT Abdomen and Pelvis w/ IV Cont (08.11.20 @ 09:07) >  EXAM:  CT ABDOMEN AND PELVIS IC                            PROCEDURE DATE:  08/11/2020          INTERPRETATION:  CLINICAL INFORMATION: Left lower extremity deep venous thrombosis for evaluation of left iliac vein thrombosis    COMPARISON: CT abdomen dated 10/11/2018.    PROCEDURE:  CT of the Abdomen and Pelvis was performed with intravenous contrast. Imaging was delayed for evaluation of venous thrombosis.  Intravenous contrast: 90 ml Omnipaque 350. 10 ml discarded.  Oral contrast: None.  Sagittal and coronal reformats were performed.    FINDINGS:  LOWER CHEST: Trace bilateral pleural effusions and dependent lung atelectasis. Small hiatal hernia.    LIVER: Tiny segment 4 low density that cannot be characterized.  BILE DUCTS: Normal caliber.  GALLBLADDER: Within normal limits.  SPLEEN: Within normal limits.  PANCREAS: Within normal limits.  ADRENALS: Approximately 1.3 cm sized left adrenal nodule, stable.  KIDNEYS/URETERS: Atrophic left kidney, similar to the prior study. Small mildly complex cyst upper right kidney.    BLADDER: Within normal limits.  REPRODUCTIVE ORGANS: Uterus and adnexa within normal limits.    BOWEL: No bowel obstruction. Appendix is normal. Uncomplicated predominantly sigmoid diverticulosis.  PERITONEUM: Trace right pelvic ascites.  VESSELS: Atherosclerotic changes. Probable left renal artery stenosis. Evidence of acute thrombosis involving the left femoral, external and common iliac veins as well as the lower portion of IVC.  RETROPERITONEUM/LYMPH NODES: Nolymphadenopathy.  ABDOMINAL WALL: Within normal limits.  BONES: Probable benign sclerosis involving the left iliac bone. Prior vertebroplasty T12.    IMPRESSION:  Extension of left lower extremity deep venous thrombosis into the lower portion of IVC.    Atrophic left kidney, probably related to vascular compromise.    Additional findings as above.        Findings were discussed with Dr. [id number] 8/11/2020 9:48 AM by Dr. Yancey with read back confirmation.              SLIM YANCEY M.D., ATTENDING RADIOLOGIST  This document has been electronically signed. Aug 11 2020  9:49AM    < end of copied text >

## 2020-08-13 NOTE — PROGRESS NOTE ADULT - SUBJECTIVE AND OBJECTIVE BOX
[facility] [place]       HPI:  66 yo Female with  pmhx significant  for HTN , Osteoporosis ,Sz ,Pericarditis , Renal atherosclerosis , Shingles ,Migraines ,Depression ,Constipation ,Anxiety presents to ED with  L LE swelling & pain that began 2 days  ago, reports that pain is aching moderate  in severity ,found to have  L calf swelling and pain now radiates to the L groin with now weakness or numbness. Denies trauma, falls, dysuria, urgency and frequency .Leg venous doppler was performed and demonstrated extensive involving the left common femoral, femoral, popliteal, and calf veins. LLE DVT ,started on heparin drip and vascular surgery consulted for possible thrombectomy Due to elevated creatinine 1.5 and planned CTA nephrology consult called ,also patient is given toradol for pain management in view of drug seeking behavior Seen by pulmonologist and cardiologist for clearance for planned surgery ,clot removal .ECHO ordered to evaluate for R heart strain . (11 Aug 2020 06:09)        SUBJECTIVE:      ALLERGIES:  Allergies    penicillin (Anaphylaxis)    Intolerances          MEDICATIONS  (STANDING):  apixaban 10 milliGRAM(s) Oral <User Schedule>  aspirin enteric coated 81 milliGRAM(s) Oral daily  labetalol 200 milliGRAM(s) Oral three times a day  losartan 50 milliGRAM(s) Oral daily  pantoprazole    Tablet 40 milliGRAM(s) Oral before breakfast  polyethylene glycol 3350 17 Gram(s) Oral daily  senna 2 Tablet(s) Oral at bedtime  sertraline 50 milliGRAM(s) Oral daily  topiramate 125 milliGRAM(s) Oral two times a day    MEDICATIONS  (PRN):  acetaminophen   Tablet .. 650 milliGRAM(s) Oral every 6 hours PRN Temp greater or equal to 38C (100.4F), Mild Pain (1 - 3)  bisacodyl Suppository 10 milliGRAM(s) Rectal daily PRN Constipation  magnesium hydroxide Suspension 30 milliLiter(s) Oral daily PRN Constipation  oxyCODONE    IR 5 milliGRAM(s) Oral every 6 hours PRN Moderate Pain (4 - 6)  oxyCODONE    IR 10 milliGRAM(s) Oral every 6 hours PRN Severe Pain (7 - 10)      REVIEW OF SYSTEMS:  CONSTITUTIONAL: No fever,  RESPIRATORY: No cough, wheezing, shortness of breath  CARDIOVASCULAR: No chest pain, dyspnea, palpitations, dizziness, syncope, paroxysmal nocturnal dyspnea, orthopnea, or arm or leg swelling  GASTROINTESTINAL: No abdominal  or epigastric pain, nausea, vomiting,  diarrhea  NEUROLOGICAL: No headaches,  loss of strength, numbness, or tremors    Vital Signs Last 24 Hrs  T(C): 37.7 (13 Aug 2020 17:16), Max: 37.7 (13 Aug 2020 17:16)  T(F): 99.8 (13 Aug 2020 17:16), Max: 99.8 (13 Aug 2020 17:16)  HR: 83 (13 Aug 2020 17:26) (72 - 84)  BP: 155/74 (13 Aug 2020 17:26) (129/78 - 178/78)  BP(mean): --  RR: 18 (13 Aug 2020 17:16) (17 - 18)  SpO2: 96% (13 Aug 2020 17:26) (92% - 97%)    PHYSICAL EXAM:  HEAD:  Atraumatic, Normocephalic  NECK: Supple and normal thyroid.  No JVD or carotid bruit.   HEART: S1, S2 regular , 1/6 soft ejection systolic murmur in mitral area , no thrill and no gallops .  PULMONARY: Bilateral vesicular breathing , few scattered ronchi and few scattered rales are present .  ABDOMEN: Soft nontender and positive bowl sounds   EXTREMITIES:  No clubbing, cyanosis, or pedal  edema  NEUROLOGICAL: AAOX3 , no focal deficit .    LABS:                        10.4   5.54  )-----------( 186      ( 13 Aug 2020 06:42 )             33.5     08-13    144  |  114<H>  |  21  ----------------------------<  104<H>  3.7   |  23  |  1.10    Ca    8.8      13 Aug 2020 06:42  Phos  2.3     08-13  Mg     2.3     08-13    TPro  6.0  /  Alb  2.5<L>  /  TBili  0.4  /  DBili  x   /  AST  14<L>  /  ALT  18  /  AlkPhos  67  08-13    CARDIAC MARKERS ( 12 Aug 2020 05:02 )  <.015 ng/mL / x     / x     / x     / x          PT/INR - ( 12 Aug 2020 05:02 )   PT: 14.2 sec;   INR: 1.23 ratio         PTT - ( 13 Aug 2020 14:37 )  PTT:40.1 sec    BNP      EKG:  ECHO:  IMAGING:    Assessment/Plan    Will continue to follow during hospital course and give further recommendations as needed . Thanks for your referral . if any questions please contact me at office ([phone] [phone]) Lio Fischer MD [facility]  Cardiology F/U :       HPI:  66 yo Female with  pmhx significant  for HTN , Osteoporosis ,Sz ,Pericarditis , Renal atherosclerosis , Shingles ,Migraines ,Depression ,Constipation ,Anxiety presents to ED with  L LE swelling & pain that began 2 days  ago, reports that pain is aching moderate  in severity ,found to have  L calf swelling and pain now radiates to the L groin with now weakness or numbness. Denies trauma, falls, dysuria, urgency and frequency .Leg venous doppler was performed and demonstrated extensive involving the left common femoral, femoral, popliteal, and calf veins. LLE DVT ,started on heparin drip and vascular surgery consulted for possible thrombectomy Due to elevated creatinine 1.5 and planned CTA nephrology consult called ,also patient is given toradol for pain management in view of drug seeking behavior Seen by pulmonologist and cardiologist for clearance for planned surgery ,clot removal .ECHO ordered to evaluate for R heart strain . (11 Aug 2020 06:09)        SUBJECTIVE: Patient lying comfortable .      ALLERGIES:  Allergies    penicillin (Anaphylaxis)    Intolerances          MEDICATIONS  (STANDING):  apixaban 10 milliGRAM(s) Oral <User Schedule>  aspirin enteric coated 81 milliGRAM(s) Oral daily  labetalol 200 milliGRAM(s) Oral three times a day  losartan 50 milliGRAM(s) Oral daily  pantoprazole    Tablet 40 milliGRAM(s) Oral before breakfast  polyethylene glycol 3350 17 Gram(s) Oral daily  senna 2 Tablet(s) Oral at bedtime  sertraline 50 milliGRAM(s) Oral daily  topiramate 125 milliGRAM(s) Oral two times a day    MEDICATIONS  (PRN):  acetaminophen   Tablet .. 650 milliGRAM(s) Oral every 6 hours PRN Temp greater or equal to 38C (100.4F), Mild Pain (1 - 3)  bisacodyl Suppository 10 milliGRAM(s) Rectal daily PRN Constipation  magnesium hydroxide Suspension 30 milliLiter(s) Oral daily PRN Constipation  oxyCODONE    IR 5 milliGRAM(s) Oral every 6 hours PRN Moderate Pain (4 - 6)  oxyCODONE    IR 10 milliGRAM(s) Oral every 6 hours PRN Severe Pain (7 - 10)      REVIEW OF SYSTEMS:  CONSTITUTIONAL: No fever,  RESPIRATORY: No cough, wheezing, shortness of breath  CARDIOVASCULAR: No chest pain, dyspnea, palpitations, dizziness, syncope, paroxysmal nocturnal dyspnea, orthopnea, or arm  swelling and left leg swelling improved .  GASTROINTESTINAL: No abdominal  or epigastric pain, nausea, vomiting,  diarrhea  NEUROLOGICAL: No headaches,  loss of strength, numbness, or tremors  Skin : No itching  Hematology : No bleeding .  Endocrinology : No heat and cold intolerance .  Psychiatry : Patient is calm .  Musculoskeletal : patient has mild arthritis .    Vital Signs Last 24 Hrs  T(C): 37.7 (13 Aug 2020 17:16), Max: 37.7 (13 Aug 2020 17:16)  T(F): 99.8 (13 Aug 2020 17:16), Max: 99.8 (13 Aug 2020 17:16)  HR: 83 (13 Aug 2020 17:26) (72 - 84)  BP: 155/74 (13 Aug 2020 17:26) (129/78 - 178/78)  BP(mean): --  RR: 18 (13 Aug 2020 17:16) (17 - 18)  SpO2: 96% (13 Aug 2020 17:26) (92% - 97%)    PHYSICAL EXAM:  HEAD:  Atraumatic, Normocephalic  NECK: Supple and normal thyroid.  No JVD or carotid bruit.   HEART: S1, S2 regular , 1/6 soft ejection systolic murmur in mitral area , no thrill and no gallops .  PULMONARY: Bilateral vesicular breathing , few scattered ronchi and few scattered rales are present .  ABDOMEN: Soft nontender and positive bowl sounds   EXTREMITIES:  No clubbing, cyanosis, or pedal  edema  NEUROLOGICAL: AAOX3 , no focal deficit .  Skin : No rashes .  Musculoskeletal : No joint swellings .    LABS:                        10.4   5.54  )-----------( 186      ( 13 Aug 2020 06:42 )             33.5     08-13    144  |  114<H>  |  21  ----------------------------<  104<H>  3.7   |  23  |  1.10    Ca    8.8      13 Aug 2020 06:42  Phos  2.3     08-13  Mg     2.3     08-13    TPro  6.0  /  Alb  2.5<L>  /  TBili  0.4  /  DBili  x   /  AST  14<L>  /  ALT  18  /  AlkPhos  67  08-13    CARDIAC MARKERS ( 12 Aug 2020 05:02 )  <.015 ng/mL / x     / x     / x     / x          PT/INR - ( 12 Aug 2020 05:02 )   PT: 14.2 sec;   INR: 1.23 ratio         PTT - ( 13 Aug 2020 14:37 )  PTT:40.1 sec    Assessment/Plan  Patient has :  1) Extensive DVT of left leg S/P thrombectomy . Post operative stable so far .  2) H/O Hypertension and BP intermittently mild elevated .  3) Anemia .  Plan : 1) cardiac stable . 2) Patient on full anticoagulation 3) Monitor hemoglobin and electrolytes . 4) Increase Losartan   Will continue to follow during hospital course and give further recommendations as needed . Thanks for your referral . if any questions please contact me at office ([phone] [phone])

## 2020-08-13 NOTE — PROGRESS NOTE ADULT - SUBJECTIVE AND OBJECTIVE BOX
SUBJECTIVE:  64 y/o F seen and examined at bedside. Last night event noted. Patient with no new complaints at this time, states she is feeling well and has no pain. She is tolerating regular diet. Patient denies any fevers, chills, chest pain, shortness of breath, extremity pain, nausea, vomiting.    Vital Signs Last 24 Hrs  T(C): 37.4 (13 Aug 2020 08:09), Max: 37.4 (12 Aug 2020 19:24)  T(F): 99.3 (13 Aug 2020 08:09), Max: 99.3 (12 Aug 2020 19:24)  HR: 76 (13 Aug 2020 08:09) (72 - 84)  BP: 129/78 (13 Aug 2020 08:09) (129/78 - 178/78)  BP(mean): --  RR: 17 (13 Aug 2020 08:09) (16 - 18)  SpO2: 95% (13 Aug 2020 08:09) (92% - 97%)    PHYSICAL EXAM:  GENERAL: NAD, resting comfortably in bed, pleasantly eating breakfast  HEAD:  Atraumatic, Normocephalic  EXT: Surgical dressing clean, dry and intact. Prolene sutures removed and clean dressing placed. No calf tenderness or LE edema bilaterally. Dopplerable pulses B/L. Motor and sensation intact.  NEUROLOGY: A&O x 3    LABS:                        10.4   5.54  )-----------( 186      ( 13 Aug 2020 06:42 )             33.5     08-13    144  |  114<H>  |  21  ----------------------------<  104<H>  3.7   |  23  |  1.10    Ca    8.8      13 Aug 2020 06:42  Phos  2.3     08-13  Mg     2.3     08-13    TPro  6.0  /  Alb  2.5<L>  /  TBili  0.4  /  DBili  x   /  AST  14<L>  /  ALT  18  /  AlkPhos  67  08-13    PT/INR - ( 12 Aug 2020 05:02 )   PT: 14.2 sec;   INR: 1.23 ratio         PTT - ( 13 Aug 2020 06:42 )  PTT:39.2 sec      ASSESSMENT:  64 y/o F s/p LLE thrombectomy with pop/fem/iliac/common iliac vein stent placement, for extensive DVT/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  - pain control, supportive care, OOB  - Regular diet   - continue DVT prophylaxis with heparin drip - transition to PO AC per medicine. Recommendation: if Eliquis approved by insurance, may start with Eliquis 10mg BID x 7 days, then Eliquis 5mg BID. If not feasible, then start coumadin with heparin bridge.  - D/c planning as per primary  - Will discuss with Dr. Fall    Surgical Team Contact Information  Spectralink: Ext: [phone] or [phone]  Pager: [phone]

## 2020-08-13 NOTE — PROGRESS NOTE ADULT - SUBJECTIVE AND OBJECTIVE BOX
Interval History:  has been bridged to eliquis  failed urine trial and has ryan    Chart reviewed and events noted;   Overnight events:    MEDICATIONS  (STANDING):  apixaban 10 milliGRAM(s) Oral <User Schedule>  aspirin enteric coated 81 milliGRAM(s) Oral daily  labetalol 200 milliGRAM(s) Oral three times a day  losartan 50 milliGRAM(s) Oral daily  pantoprazole    Tablet 40 milliGRAM(s) Oral before breakfast  polyethylene glycol 3350 17 Gram(s) Oral daily  senna 2 Tablet(s) Oral at bedtime  sertraline 50 milliGRAM(s) Oral daily  topiramate 125 milliGRAM(s) Oral two times a day    MEDICATIONS  (PRN):  acetaminophen   Tablet .. 650 milliGRAM(s) Oral every 6 hours PRN Temp greater or equal to 38C (100.4F), Mild Pain (1 - 3)  bisacodyl Suppository 10 milliGRAM(s) Rectal daily PRN Constipation  magnesium hydroxide Suspension 30 milliLiter(s) Oral daily PRN Constipation  oxyCODONE    IR 5 milliGRAM(s) Oral every 6 hours PRN Moderate Pain (4 - 6)  oxyCODONE    IR 10 milliGRAM(s) Oral every 6 hours PRN Severe Pain (7 - 10)      Vital Signs Last 24 Hrs  T(C): 36.8 (13 Aug 2020 11:57), Max: 37.4 (12 Aug 2020 19:24)  T(F): 98.2 (13 Aug 2020 11:57), Max: 99.3 (12 Aug 2020 19:24)  HR: 72 (13 Aug 2020 11:57) (72 - 84)  BP: 156/83 (13 Aug 2020 11:57) (129/78 - 178/78)  BP(mean): --  RR: 18 (13 Aug 2020 11:57) (17 - 18)  SpO2: 95% (13 Aug 2020 11:57) (92% - 97%)    PHYSICAL EXAM  General: adult in NAD  HEENT: clear oropharynx, anicteric sclera, pink conjunctivae  Neck: supple  CV: normal S1S2 with no murmur rubs or gallops  Lungs: clear to auscultation, no wheezes, no rhales  Abdomen: soft non-tender non-distended, no hepato/splenomegaly  Ext: no clubbing cyanosis or edema  Skin: no rashes and no petichiae  Neuro: alert and oriented X3 no focal deficits      LABS:  CBC Full  -  ( 13 Aug 2020 06:42 )  WBC Count : 5.54 K/uL  RBC Count : 3.83 M/uL  Hemoglobin : 10.4 g/dL  Hematocrit : 33.5 %  Platelet Count - Automated : 186 K/uL  Mean Cell Volume : 87.5 fl  Mean Cell Hemoglobin : 27.2 pg  Mean Cell Hemoglobin Concentration : 31.0 gm/dL  Auto Neutrophil # : x  Auto Lymphocyte # : x  Auto Monocyte # : x  Auto Eosinophil # : x  Auto Basophil # : x  Auto Neutrophil % : x  Auto Lymphocyte % : x  Auto Monocyte % : x  Auto Eosinophil % : x  Auto Basophil % : x    08-13    144  |  114<H>  |  21  ----------------------------<  104<H>  3.7   |  23  |  1.10    Ca    8.8      13 Aug 2020 06:42  Phos  2.3     08-13  Mg     2.3     08-13    TPro  6.0  /  Alb  2.5<L>  /  TBili  0.4  /  DBili  x   /  AST  14<L>  /  ALT  18  /  AlkPhos  67  08-13    PT/INR - ( 12 Aug 2020 05:02 )   PT: 14.2 sec;   INR: 1.23 ratio         PTT - ( 13 Aug 2020 14:37 )  PTT:40.1 sec    fe studies      WBC trend  5.54 K/uL (08-13-20 @ 06:42)  6.30 K/uL (08-13-20 @ 01:35)  6.80 K/uL (08-12-20 @ 05:02)  7.51 K/uL (08-12-20 @ 03:22)  6.46 K/uL (08-11-20 @ 06:47)  5.89 K/uL (08-11-20 @ 00:37)      Hgb trend  10.4 g/dL (08-13-20 @ 06:42)  10.5 g/dL (08-13-20 @ 01:35)  10.5 g/dL (08-12-20 @ 05:02)  10.5 g/dL (08-12-20 @ 03:22)  11.1 g/dL (08-11-20 @ 06:47)  11.8 g/dL (08-11-20 @ 00:37)      plt trend  186 K/uL (08-13-20 @ 06:42)  173 K/uL (08-13-20 @ 01:35)  146 K/uL (08-12-20 @ 05:02)  144 K/uL (08-12-20 @ 03:22)  140 K/uL (08-11-20 @ 06:47)  129 K/uL (08-11-20 @ 00:37)        RADIOLOGY & ADDITIONAL STUDIES:

## 2020-08-13 NOTE — PROGRESS NOTE ADULT - SUBJECTIVE AND OBJECTIVE BOX
AUGUSTUS FLORES is a 65yFemale , patient examined and chart reviewed.    INTERVAL HPI/ OVERNIGHT EVENTS:   NAD afebrile.  Feels well.  Requesting ryan to be taken out.    PAST MEDICAL & SURGICAL HISTORY:  Drug-seeking behavior  Renal arteriosclerosis  Constipation  Anxiety  Depression  HTN (hypertension)  Shingles  Pericarditis  Osteoporosis  Seizure disorder  Migraines  History of back surgery      For details regarding the patient's social history, family history, and other miscellaneous elements, please refer the initial infectious diseases consultation and/or the admitting history and physical examination for this admission.    ROS:  CONSTITUTIONAL:  Negative fever or chills  EYES:  Negative  blurry vision or double vision  CARDIOVASCULAR:  Negative for chest pain or palpitations  RESPIRATORY:  Negative for cough, wheezing, or SOB   GASTROINTESTINAL:  Negative for nausea, vomiting, diarrhea, constipation, or abdominal pain  GENITOURINARY:  Negative frequency, urgency or dysuria  NEUROLOGIC:  No headache, confusion, dizziness, lightheadedness  All other systems were reviewed and are negative     ALLERGIES  penicillin (Anaphylaxis)      Current inpatient medications :  MEDICATIONS  (STANDING):  apixaban 10 milliGRAM(s) Oral <User Schedule>  aspirin enteric coated 81 milliGRAM(s) Oral daily  labetalol 200 milliGRAM(s) Oral three times a day  losartan 50 milliGRAM(s) Oral daily  pantoprazole    Tablet 40 milliGRAM(s) Oral before breakfast  polyethylene glycol 3350 17 Gram(s) Oral daily  senna 2 Tablet(s) Oral at bedtime  sertraline 50 milliGRAM(s) Oral daily  topiramate 125 milliGRAM(s) Oral two times a day    MEDICATIONS  (PRN):  acetaminophen   Tablet .. 650 milliGRAM(s) Oral every 6 hours PRN Temp greater or equal to 38C (100.4F), Mild Pain (1 - 3)  bisacodyl Suppository 10 milliGRAM(s) Rectal daily PRN Constipation  magnesium hydroxide Suspension 30 milliLiter(s) Oral daily PRN Constipation  oxyCODONE    IR 5 milliGRAM(s) Oral every 6 hours PRN Moderate Pain (4 - 6)  oxyCODONE    IR 10 milliGRAM(s) Oral every 6 hours PRN Severe Pain (7 - 10)    Objective:  Vital Signs Last 24 Hrs  T(C): 36.8 (13 Aug 2020 11:57), Max: 37.4 (12 Aug 2020 19:24)  T(F): 98.2 (13 Aug 2020 11:57), Max: 99.3 (12 Aug 2020 19:24)  HR: 72 (13 Aug 2020 11:57) (72 - 84)  BP: 156/83 (13 Aug 2020 11:57) (129/78 - 178/78)  RR: 18 (13 Aug 2020 11:57) (17 - 18)  SpO2: 95% (13 Aug 2020 11:57) (92% - 97%)    Physical Exam:  General: no acute distress  Eyes: sclera anicteric, pupils equal and reactive to light  ENMT: buccal mucosa moist, pharynx not injected  Neck: supple, trachea midline  Lungs: clear, no wheeze/rhonchi  Cardiovascular: regular rate and rhythm, S1 S2  Abdomen: soft, nontender, no organomegaly present, bowel sounds normal  Neurological: alert and oriented x3, Cranial Nerves II-XII grossly intact  Skin: no increased ecchymosis/petechiae/purpura  Lymph Nodes: no palpable cervical/supraclavicular lymph nodes enlargements  Extremities: Left LE drsg c/d/i    LABS:                        10.4   5.54  )-----------( 186      ( 13 Aug 2020 06:42 )             33.5   08-13    144  |  114<H>  |  21  ----------------------------<  104<H>  3.7   |  23  |  1.10    Ca    8.8      13 Aug 2020 06:42  Phos  2.3     08-13  Mg     2.3     08-13    TPro  6.0  /  Alb  2.5<L>  /  TBili  0.4  /  DBili  x   /  AST  14<L>  /  ALT  18  /  AlkPhos  67  08-13    MICROBIOLOGY:  COVID-19 PCR . (08.11.20 @ 11:32)    COVID-19 PCR: [facility]    COVID-19  Antibody - for prior infection screening (08.11.20 @ 09:36)    COVID-19 IgG Antibody Index: 0.07: Roche ECLIA Total AB (JENNIFER)  NOTE: This result index represents a total antibody measurement,  which  includes IgG, IgA, and IgM  Negative <= 0.99 Index  Positive >= 1.00 Index Index      Culture - Urine (collected 12 Aug 2020 09:08)  Source: .Urine Clean Catch (Midstream)  Final Report (13 Aug 2020 07:17):    <10,000 CFU/mL Normal Urogenital Yeni    RADIOLOGY & ADDITIONAL STUDIES:  EXAM:  XR CHEST PORTABLE URGENT 1V                            PROCEDURE DATE:  08/10/2020          INTERPRETATION:  Chest portable.    Clinical History: Admission chest radiograph. Deep venous thrombosis.    Comparison: 8/15/2019.    Single AP viewsubmitted.  There is external artifact from patient's bra.    The evaluation of the cardiomediastinal silhouette is limited on portable technique.  Mildly tortuous, calcified aorta.    The lungs are mildly hyperinflated which may reflect emphysematous disease.  There is minimal linear subsegmental atelectasis and/or fibrosis left lower lung zone.  No lobar lung consolidation or significant pleural effusion is noted.    Multilevel thoracic vertebroplasty is noted.      EXAM:  NM PULM PERFUSION IMG                            PROCEDURE DATE:  08/11/2020          INTERPRETATION:  RADIOPHARMACEUTICAL: 3.0 mCi Tc-99m-MAA, I.V.    CLINICAL STATEMENT: 65 year-old female with DVT    TECHNIQUE:  Perfusion images of the lungs were obtained following administration of Tc-99m-MAA. Images were obtained in the anterior, posterior, both lateral, and all 4 oblique projections. The study was interpreted in conjunction with chest radiograph of 8/10/2020.    No prior VQ scan    FINDINGS: There are segmental perfusion defects involving the posterior segment of the right upper lobe, medial segment of the right middle lobe and inferior lingula.    IMPRESSION: Abnormal perfusion lung scan.    Indeterminate probability of pulmonary embolus.        Assessment :   64YO F PMH HTN , Osteoporosis, Seizure ,Pericarditis admitted with Left LE swelling & pain found to have extensive DVT involving the left common femoral, femoral, popliteal, and calf veins. Low grade temps likely reactive to extensive DVT.  WBC WNL . Procalcitonin level unimpressive. CXR with no consolidation. No hx of COVID 19 exposure. COVID 19 pcr and antibody serology negative. VQ scan noted possibly PE. Sp Left lower extremity thrombectomy popliteal and femoral vein, Left common iliac vein angioplasty and stent 8/11/2020 Overall stable.    Plan :   Defer antibiotics  Trend temps and cbc  Anticoagulant per Primary team and vascular  Heme Onc following  Dc Ryan per primary team  Stable from ID standpoint    D/w Dr Gant      Continue with present regiment.  Appropriate use of antibiotics and adverse effects reviewed.      I have discussed the above plan of care with patient/ family in detail. They expressed understanding of the  treatment plan . Risks, benefits and alternatives discussed in detail. I have asked if they have any questions or concerns and appropriately addressed them to the best of my ability .    > 35 minutes were spent in direct patient care reviewing notes, medications ,labs data/ imaging , discussion with multidisciplinary team.    Thank you for allowing me to participate in care of your patient .    Cynthia Owens MD  Infectious Disease  [phone]

## 2020-08-14 ENCOUNTER — TRANSCRIPTION ENCOUNTER (OUTPATIENT)
Age: 65
End: 2020-08-14

## 2020-08-14 VITALS
TEMPERATURE: 98 F | OXYGEN SATURATION: 97 % | RESPIRATION RATE: 18 BRPM | HEART RATE: 66 BPM | SYSTOLIC BLOOD PRESSURE: 159 MMHG | DIASTOLIC BLOOD PRESSURE: 79 MMHG

## 2020-08-14 LAB
ANION GAP SERPL CALC-SCNC: 7 MMOL/L — SIGNIFICANT CHANGE UP (ref 5–17)
BUN SERPL-MCNC: 18 MG/DL — SIGNIFICANT CHANGE UP (ref 7–23)
CALCIUM SERPL-MCNC: 9 MG/DL — SIGNIFICANT CHANGE UP (ref 8.5–10.1)
CHLORIDE SERPL-SCNC: 116 MMOL/L — HIGH (ref 96–108)
CO2 SERPL-SCNC: 22 MMOL/L — SIGNIFICANT CHANGE UP (ref 22–31)
CREAT SERPL-MCNC: 1.1 MG/DL — SIGNIFICANT CHANGE UP (ref 0.5–1.3)
GLUCOSE SERPL-MCNC: 91 MG/DL — SIGNIFICANT CHANGE UP (ref 70–99)
HCT VFR BLD CALC: 33.4 % — LOW (ref 34.5–45)
HGB BLD-MCNC: 10.6 G/DL — LOW (ref 11.5–15.5)
MCHC RBC-ENTMCNC: 27.2 PG — SIGNIFICANT CHANGE UP (ref 27–34)
MCHC RBC-ENTMCNC: 31.7 GM/DL — LOW (ref 32–36)
MCV RBC AUTO: 85.6 FL — SIGNIFICANT CHANGE UP (ref 80–100)
NRBC # BLD: 0 /100 WBCS — SIGNIFICANT CHANGE UP (ref 0–0)
PLATELET # BLD AUTO: 201 K/UL — SIGNIFICANT CHANGE UP (ref 150–400)
POTASSIUM SERPL-MCNC: 4.1 MMOL/L — SIGNIFICANT CHANGE UP (ref 3.5–5.3)
POTASSIUM SERPL-SCNC: 4.1 MMOL/L — SIGNIFICANT CHANGE UP (ref 3.5–5.3)
RBC # BLD: 3.9 M/UL — SIGNIFICANT CHANGE UP (ref 3.8–5.2)
RBC # FLD: 15.7 % — HIGH (ref 10.3–14.5)
SODIUM SERPL-SCNC: 145 MMOL/L — SIGNIFICANT CHANGE UP (ref 135–145)
WBC # BLD: 6.06 K/UL — SIGNIFICANT CHANGE UP (ref 3.8–10.5)
WBC # FLD AUTO: 6.06 K/UL — SIGNIFICANT CHANGE UP (ref 3.8–10.5)

## 2020-08-14 PROCEDURE — 84100 ASSAY OF PHOSPHORUS: CPT

## 2020-08-14 PROCEDURE — C1887: CPT

## 2020-08-14 PROCEDURE — 96365 THER/PROPH/DIAG IV INF INIT: CPT

## 2020-08-14 PROCEDURE — 93971 EXTREMITY STUDY: CPT

## 2020-08-14 PROCEDURE — C1769: CPT

## 2020-08-14 PROCEDURE — 85730 THROMBOPLASTIN TIME PARTIAL: CPT

## 2020-08-14 PROCEDURE — 74177 CT ABD & PELVIS W/CONTRAST: CPT

## 2020-08-14 PROCEDURE — 73502 X-RAY EXAM HIP UNI 2-3 VIEWS: CPT

## 2020-08-14 PROCEDURE — 80053 COMPREHEN METABOLIC PANEL: CPT

## 2020-08-14 PROCEDURE — 84443 ASSAY THYROID STIM HORMONE: CPT

## 2020-08-14 PROCEDURE — C1876: CPT

## 2020-08-14 PROCEDURE — 93306 TTE W/DOPPLER COMPLETE: CPT

## 2020-08-14 PROCEDURE — 99285 EMERGENCY DEPT VISIT HI MDM: CPT

## 2020-08-14 PROCEDURE — 96375 TX/PRO/DX INJ NEW DRUG ADDON: CPT

## 2020-08-14 PROCEDURE — 87635 SARS-COV-2 COVID-19 AMP PRB: CPT

## 2020-08-14 PROCEDURE — C1889: CPT

## 2020-08-14 PROCEDURE — 86769 SARS-COV-2 COVID-19 ANTIBODY: CPT

## 2020-08-14 PROCEDURE — 88304 TISSUE EXAM BY PATHOLOGIST: CPT

## 2020-08-14 PROCEDURE — C1725: CPT

## 2020-08-14 PROCEDURE — 76000 FLUOROSCOPY <1 HR PHYS/QHP: CPT

## 2020-08-14 PROCEDURE — 78580 LUNG PERFUSION IMAGING: CPT

## 2020-08-14 PROCEDURE — 97161 PT EVAL LOW COMPLEX 20 MIN: CPT

## 2020-08-14 PROCEDURE — C1894: CPT

## 2020-08-14 PROCEDURE — 83880 ASSAY OF NATRIURETIC PEPTIDE: CPT

## 2020-08-14 PROCEDURE — 71045 X-RAY EXAM CHEST 1 VIEW: CPT

## 2020-08-14 PROCEDURE — 83735 ASSAY OF MAGNESIUM: CPT

## 2020-08-14 PROCEDURE — 87086 URINE CULTURE/COLONY COUNT: CPT

## 2020-08-14 PROCEDURE — 85027 COMPLETE CBC AUTOMATED: CPT

## 2020-08-14 PROCEDURE — 99284 EMERGENCY DEPT VISIT MOD MDM: CPT | Mod: 25

## 2020-08-14 PROCEDURE — 85610 PROTHROMBIN TIME: CPT

## 2020-08-14 PROCEDURE — 96361 HYDRATE IV INFUSION ADD-ON: CPT

## 2020-08-14 PROCEDURE — C1757: CPT

## 2020-08-14 PROCEDURE — 84484 ASSAY OF TROPONIN QUANT: CPT

## 2020-08-14 PROCEDURE — 93005 ELECTROCARDIOGRAM TRACING: CPT

## 2020-08-14 PROCEDURE — 84145 PROCALCITONIN (PCT): CPT

## 2020-08-14 PROCEDURE — A9540: CPT

## 2020-08-14 PROCEDURE — 36415 COLL VENOUS BLD VENIPUNCTURE: CPT

## 2020-08-14 PROCEDURE — 80061 LIPID PANEL: CPT

## 2020-08-14 PROCEDURE — 80048 BASIC METABOLIC PNL TOTAL CA: CPT

## 2020-08-14 PROCEDURE — 96374 THER/PROPH/DIAG INJ IV PUSH: CPT

## 2020-08-14 RX ORDER — LOSARTAN POTASSIUM 100 MG/1
100 TABLET, FILM COATED ORAL DAILY
Refills: 0 | Status: DISCONTINUED | OUTPATIENT
Start: 2020-08-14 | End: 2020-08-14

## 2020-08-14 RX ADMIN — APIXABAN 10 MILLIGRAM(S): 2.5 TABLET, FILM COATED ORAL at 02:09

## 2020-08-14 RX ADMIN — LOSARTAN POTASSIUM 100 MILLIGRAM(S): 100 TABLET, FILM COATED ORAL at 06:10

## 2020-08-14 RX ADMIN — POLYETHYLENE GLYCOL 3350 17 GRAM(S): 17 POWDER, FOR SOLUTION ORAL at 11:00

## 2020-08-14 RX ADMIN — Medication 125 MILLIGRAM(S): at 06:09

## 2020-08-14 RX ADMIN — SERTRALINE 50 MILLIGRAM(S): 25 TABLET, FILM COATED ORAL at 13:15

## 2020-08-14 RX ADMIN — PANTOPRAZOLE SODIUM 40 MILLIGRAM(S): 20 TABLET, DELAYED RELEASE ORAL at 06:09

## 2020-08-14 RX ADMIN — APIXABAN 10 MILLIGRAM(S): 2.5 TABLET, FILM COATED ORAL at 10:57

## 2020-08-14 RX ADMIN — OXYCODONE HYDROCHLORIDE 10 MILLIGRAM(S): 5 TABLET ORAL at 10:57

## 2020-08-14 RX ADMIN — OXYCODONE HYDROCHLORIDE 10 MILLIGRAM(S): 5 TABLET ORAL at 11:40

## 2020-08-14 RX ADMIN — Medication 200 MILLIGRAM(S): at 13:15

## 2020-08-14 RX ADMIN — Medication 200 MILLIGRAM(S): at 06:10

## 2020-08-14 RX ADMIN — Medication 1 CAPSULE(S): at 02:25

## 2020-08-14 NOTE — PROGRESS NOTE ADULT - SUBJECTIVE AND OBJECTIVE BOX
Progress Note:     Post op retention cath d/c'd this AM and patient reports normal voiding    PAST MEDICAL & SURGICAL HISTORY:  Drug-seeking behavior  Renal arteriosclerosis  Constipation  Anxiety  Depression  HTN (hypertension)  Shingles  Pericarditis  Osteoporosis  Seizure disorder  Migraines  History of back surgery      MEDICATIONS  (STANDING):  apixaban 10 milliGRAM(s) Oral <User Schedule>  labetalol 200 milliGRAM(s) Oral three times a day  losartan 100 milliGRAM(s) Oral daily  pantoprazole    Tablet 40 milliGRAM(s) Oral before breakfast  polyethylene glycol 3350 17 Gram(s) Oral daily  senna 2 Tablet(s) Oral at bedtime  sertraline 50 milliGRAM(s) Oral daily  topiramate 125 milliGRAM(s) Oral two times a day    MEDICATIONS  (PRN):  acetaminophen   Tablet .. 650 milliGRAM(s) Oral every 6 hours PRN Temp greater or equal to 38C (100.4F), Mild Pain (1 - 3)  bisacodyl Suppository 10 milliGRAM(s) Rectal daily PRN Constipation  magnesium hydroxide Suspension 30 milliLiter(s) Oral daily PRN Constipation  oxyCODONE    IR 5 milliGRAM(s) Oral every 6 hours PRN Moderate Pain (4 - 6)  oxyCODONE    IR 10 milliGRAM(s) Oral every 6 hours PRN Severe Pain (7 - 10)      Allergies    penicillin (Anaphylaxis)    Intolerances            FAMILY HISTORY:  Family history of heart disease  Family history of colon cancer in mother      Vital Signs Last 24 Hrs  T(C): 37.1 (14 Aug 2020 07:58), Max: 37.7 (13 Aug 2020 17:16)  T(F): 98.7 (14 Aug 2020 07:58), Max: 99.8 (13 Aug 2020 17:16)  HR: 74 (14 Aug 2020 07:58) (72 - 88)  BP: 157/78 (14 Aug 2020 07:58) (155/74 - 163/83)  BP(mean): --  RR: 17 (14 Aug 2020 07:58) (17 - 18)  SpO2: 95% (14 Aug 2020 07:58) (95% - 96%)    PHYSICAL EXAM:    Constitutional: NAD, well-developed  Abd: BS+, soft, NT/ND, No CVAT, bladder non- percussible   Extremities: No peripheral edema      LABS:                        10.6   6.06  )-----------( 201      ( 14 Aug 2020 07:23 )             33.4     08-14    145  |  116<H>  |  18  ----------------------------<  91  4.1   |  22  |  1.10    Ca    9.0      14 Aug 2020 07:23  Phos  2.3     08-13  Mg     2.3     08-13    TPro  6.0  /  Alb  2.5<L>  /  TBili  0.4  /  DBili  x   /  AST  14<L>  /  ALT  18  /  AlkPhos  67  08-13    PTT - ( 13 Aug 2020 14:37 )  PTT:40.1 sec    Urine Culture:   Hemoglobin: 10.6 g/dL (08-14 @ 07:23)  Hematocrit: 33.4 % (08-14 @ 07:23)  Hemoglobin: 10.4 g/dL (08-13 @ 06:42)  Hematocrit: 33.5 % (08-13 @ 06:42)  Hemoglobin: 10.5 g/dL (08-13 @ 01:35)  Hematocrit: 33.2 % (08-13 @ 01:35)      RADIOLOGY & ADDITIONAL STUDIES:

## 2020-08-14 NOTE — DISCHARGE NOTE PROVIDER - NSDCCPCAREPLAN_GEN_ALL_CORE_FT
PRINCIPAL DISCHARGE DIAGNOSIS  Diagnosis: Acute deep vein thrombosis (DVT) of femoral vein of left lower extremity  Assessment and Plan of Treatment: ON ELIQUIS      SECONDARY DISCHARGE DIAGNOSES  Diagnosis: At high risk for pulmonary embolism  Assessment and Plan of Treatment: VQ SCAN WAS INDETERMINATE , RULED OUT FOR PE BUT IT WAS SUSPECTED BY PULM  ,NEEDS FOLLOWUP WITHIN ONE MONTH    Diagnosis: RUPERTO (acute kidney injury)  Assessment and Plan of Treatment: IMPROVED    Diagnosis: Urinary retention  Assessment and Plan of Treatment: RESOLVED    Diagnosis: Seizure disorder  Assessment and Plan of Treatment: Seizure disorder    Diagnosis: Migraines  Assessment and Plan of Treatment: Migraines    Diagnosis: Renal arteriosclerosis  Assessment and Plan of Treatment: Renal arteriosclerosis    Diagnosis: Depression  Assessment and Plan of Treatment: Depression    Diagnosis: Constipation  Assessment and Plan of Treatment: Constipation

## 2020-08-14 NOTE — PROGRESS NOTE ADULT - NSHPATTENDINGPLANDISCUSS_GEN_ALL_CORE
Consultants , PMD and RN
Consultants , PMD and RN
Consultants , RN and PMD
MED STAFF ,   , , . ,pharmacist , AND PATIENT .
MED STAFF ,   , , . AND PATIENT .
MED STAFF , RADIOLOGIST , , . AND PATIENT .

## 2020-08-14 NOTE — DISCHARGE NOTE PROVIDER - CARE PROVIDER_API CALL
Arely Crump)  Vascular Surgery  [address] [place] [address]  Phone: [phone]  Fax: [phone]  Follow Up Time: 2 weeks    Blair Villegas  CRITICAL CARE MEDICINE  [address] [place] [id number]  Phone: [phone]  Fax: [phone]  Follow Up Time: 1 month    Markell Dowd  [facility]  [address] [facility] [facility] [address]  [place] [address]  Phone: [phone]  Fax: [phone]  Follow Up Time: 1 month    Tristan Frederick  [facility] MEDICINE  [address] [place] [address]  Phone: [phone]  Fax: [phone]  Follow Up Time: 1 week

## 2020-08-14 NOTE — PROGRESS NOTE ADULT - PROBLEM SELECTOR PLAN 1
ACUTE RENAL FAILURE: improved to base line   will sign out   f/u prn
ACUTE RENAL FAILURE: s/p iv fluid   Serum creatinine is  improved 1.2        No uremic symptoms  Will continue to avoid nephrotoxic drugs.  hold  diuretic.  hold   ACE inhibitor.  htn start losartan 50 mg will f/u with bun and cr
S/P LLE thrombectomy.    -continue VTE prophylaxis- heparin drip, ASA- will transition to PO AC per medicine.  Recommend if eliquis approved by insurance may start with Eliquis 10 BID for 7 days, then go to Eliquis 5 mg BID, life long.  If not feasible, then start on coumadin. May D/C heparin once patient  INR is therapeutic if starting coumadin or may d/c heparin once starts Eliquis
S/P LLE thrombectomy.    -continue VTE prophylaxis- heparin drip, ASA- will transition to PO AC per medicine.  Recommend if eliquis approved by insurance may start with Eliquis 10 BID for 7 days, then go to Eliquis 5 mg BID, life long.  If not feasible, then start on coumadin. May D/C heparin once patient  INR is therapeutic if starting coumadin or may d/c heparin once starts Eliquis
started on eliquis ,tolerates well .

## 2020-08-14 NOTE — PROGRESS NOTE ADULT - PROBLEM SELECTOR PROBLEM 1
RUPERTO (acute kidney injury)
Acute deep vein thrombosis (DVT) of femoral vein of left lower extremity
Acute deep vein thrombosis (DVT) of femoral vein of left lower extremity
RUPERTO (acute kidney injury)
Acute deep vein thrombosis (DVT) of femoral vein of left lower extremity

## 2020-08-14 NOTE — DISCHARGE NOTE PROVIDER - NSDCHHNEEDSERVICE_GEN_ALL_CORE
Rehabilitation services/Medication teaching and assessment/Teaching and training/Observation and assessment

## 2020-08-14 NOTE — PROGRESS NOTE ADULT - PROBLEM SELECTOR PLAN 6
continue current management and present  medications ,sz precautions

## 2020-08-14 NOTE — DISCHARGE NOTE PROVIDER - CARE PROVIDERS DIRECT ADDRESSES
,DirectAddress_Unknown,[email or web address],DirectAddress_Unknown,[email or web address]

## 2020-08-14 NOTE — DISCHARGE NOTE PROVIDER - NSDCMRMEDTOKEN_GEN_ALL_CORE_FT
acetaminophen 325 mg oral tablet: 2 tab(s) orally every 4 hours, As Needed - MILD PAIN .OTC   apixaban 5 mg oral tablet: pack for DVT /PE -USE AS DIRECTED   aspirin 81 mg oral delayed release tablet: 1 tab(s) orally once a day  labetalol 200 mg oral tablet: 1 tab(s) orally 3 times a day  losartan 50 mg oral tablet: 1 tab(s) orally once a day  oxyCODONE 5 mg oral tablet: 1 tab(s) orally every 6 hours, As needed, Moderate Pain (4 - 6) MDD:4  pantoprazole 40 mg oral delayed release tablet: 1 tab(s) orally once a day (before a meal)  polyethylene glycol 3350 oral powder for reconstitution: 17 gram(s) orally once a day OTC  senna oral tablet: 2 tab(s) orally once a day (at bedtime) OTC  sertraline 50 mg oral tablet: 1 tab(s) orally once a day  topiramate 25 mg oral tablet: 5 tab(s) orally 2 times a day

## 2020-08-14 NOTE — PROGRESS NOTE ADULT - PROBLEM SELECTOR PLAN 5
ryan cath ,void trial in 2-3 days , eval ,bowel regimen

## 2020-08-14 NOTE — PROGRESS NOTE ADULT - SUBJECTIVE AND OBJECTIVE BOX
AUGUSTUS FLORES    V TELE 313 W1    Patient is a 65y old  Female who presents with a chief complaint of LEFT LEG PAIN AND SWELLING (13 Aug 2020 18:53)       Allergies    penicillin (Anaphylaxis)    Intolerances        HPI:  64 yo Female with  pmhx significant  for HTN , Osteoporosis ,Sz ,Pericarditis , Renal atherosclerosis , Shingles ,Migraines ,Depression ,Constipation ,Anxiety presents to ED with  L LE swelling & pain that began 2 days  ago, reports that pain is aching moderate  in severity ,found to have  L calf swelling and pain now radiates to the L groin with now weakness or numbness. Denies trauma, falls, dysuria, urgency and frequency .Leg venous doppler was performed and demonstrated extensive involving the left common femoral, femoral, popliteal, and calf veins. LLE DVT ,started on heparin drip and vascular surgery consulted for possible thrombectomy Due to elevated creatinine 1.5 and planned CTA nephrology consult called ,also patient is given toradol for pain management in view of drug seeking behavior Seen by pulmonologist and cardiologist for clearance for planned surgery ,clot removal .ECHO ordered to evaluate for R heart strain . (11 Aug 2020 06:09)      PAST MEDICAL & SURGICAL HISTORY:  Drug-seeking behavior  Renal arteriosclerosis  Constipation  Anxiety  Depression  HTN (hypertension)  Shingles  Pericarditis  Osteoporosis  Seizure disorder  Migraines  History of back surgery      FAMILY HISTORY:  Family history of heart disease  Family history of colon cancer in mother        MEDICATIONS   acetaminophen   Tablet .. 650 milliGRAM(s) Oral every 6 hours PRN  apixaban 10 milliGRAM(s) Oral <User Schedule>  bisacodyl Suppository 10 milliGRAM(s) Rectal daily PRN  labetalol 200 milliGRAM(s) Oral three times a day  losartan 100 milliGRAM(s) Oral daily  magnesium hydroxide Suspension 30 milliLiter(s) Oral daily PRN  oxyCODONE    IR 5 milliGRAM(s) Oral every 6 hours PRN  oxyCODONE    IR 10 milliGRAM(s) Oral every 6 hours PRN  pantoprazole    Tablet 40 milliGRAM(s) Oral before breakfast  polyethylene glycol 3350 17 Gram(s) Oral daily  senna 2 Tablet(s) Oral at bedtime  sertraline 50 milliGRAM(s) Oral daily  topiramate 125 milliGRAM(s) Oral two times a day      Vital Signs Last 24 Hrs  T(C): 37.1 (14 Aug 2020 07:58), Max: 37.7 (13 Aug 2020 17:16)  T(F): 98.7 (14 Aug 2020 07:58), Max: 99.8 (13 Aug 2020 17:16)  HR: 74 (14 Aug 2020 07:58) (72 - 88)  BP: 157/78 (14 Aug 2020 07:58) (155/74 - 163/83)  BP(mean): --  RR: 17 (14 Aug 2020 07:58) (17 - 18)  SpO2: 95% (14 Aug 2020 07:58) (95% - 96%)            LABS:                        10.6   6.06  )-----------( 201      ( 14 Aug 2020 07:23 )             33.4     08-14    145  |  116<H>  |  18  ----------------------------<  91  4.1   |  22  |  1.10    Ca    9.0      14 Aug 2020 07:23  Phos  2.3     08-13  Mg     2.3     08-13    TPro  6.0  /  Alb  2.5<L>  /  TBili  0.4  /  DBili  x   /  AST  14<L>  /  ALT  18  /  AlkPhos  67  08-13    PTT - ( 13 Aug 2020 14:37 )  PTT:40.1 sec          WBC:  WBC Count: 6.06 K/uL (08-14 @ 07:23)  WBC Count: 5.54 K/uL (08-13 @ 06:42)  WBC Count: 6.30 K/uL (08-13 @ 01:35)  WBC Count: 6.80 K/uL (08-12 @ 05:02)  WBC Count: 7.51 K/uL (08-12 @ 03:22)  WBC Count: 6.46 K/uL (08-11 @ 06:47)  WBC Count: 5.89 K/uL (08-11 @ 00:37)  WBC Count: 6.85 K/uL (08-10 @ 14:21)      MICROBIOLOGY:  RECENT CULTURES:  08-12 .Urine Clean Catch (Midstream) XXXX XXXX   <10,000 CFU/mL Normal Urogenital Yeni                PTT - ( 13 Aug 2020 14:37 )  PTT:40.1 sec    Sodium:  Sodium, Serum: 145 mmol/L (08-14 @ 07:23)  Sodium, Serum: 144 mmol/L (08-13 @ 06:42)  Sodium, Serum: 143 mmol/L (08-13 @ 01:35)  Sodium, Serum: 145 mmol/L (08-12 @ 05:02)  Sodium, Serum: 144 mmol/L (08-12 @ 03:22)  Sodium, Serum: 145 mmol/L (08-11 @ 06:47)  Sodium, Serum: 143 mmol/L (08-10 @ 14:21)      1.10 mg/dL 08-14 @ 07:23  1.10 mg/dL 08-13 @ 06:42  1.20 mg/dL 08-13 @ 01:35  1.00 mg/dL 08-12 @ 05:02  1.00 mg/dL 08-12 @ 03:22  1.20 mg/dL 08-11 @ 06:47  1.50 mg/dL 08-10 @ 14:21      Hemoglobin:  Hemoglobin: 10.6 g/dL (08-14 @ 07:23)  Hemoglobin: 10.4 g/dL (08-13 @ 06:42)  Hemoglobin: 10.5 g/dL (08-13 @ 01:35)  Hemoglobin: 10.5 g/dL (08-12 @ 05:02)  Hemoglobin: 10.5 g/dL (08-12 @ 03:22)  Hemoglobin: 11.1 g/dL (08-11 @ 06:47)  Hemoglobin: 11.8 g/dL (08-11 @ 00:37)  Hemoglobin: 11.8 g/dL (08-10 @ 14:21)      Platelets: Platelet Count - Automated: 201 K/uL (08-14 @ 07:23)  Platelet Count - Automated: 186 K/uL (08-13 @ 06:42)  Platelet Count - Automated: 173 K/uL (08-13 @ 01:35)  Platelet Count - Automated: 146 K/uL (08-12 @ 05:02)  Platelet Count - Automated: 144 K/uL (08-12 @ 03:22)  Platelet Count - Automated: 140 K/uL (08-11 @ 06:47)  Platelet Count - Automated: 129 K/uL (08-11 @ 00:37)  Platelet Count - Automated: 165 K/uL (08-10 @ 14:21)      LIVER FUNCTIONS - ( 13 Aug 2020 06:42 )  Alb: 2.5 g/dL / Pro: 6.0 g/dL / ALK PHOS: 67 U/L / ALT: 18 U/L / AST: 14 U/L / GGT: x                 RADIOLOGY & ADDITIONAL STUDIES:

## 2020-08-14 NOTE — DISCHARGE NOTE PROVIDER - NSDCCPTREATMENT_GEN_ALL_CORE_FT
PRINCIPAL PROCEDURE  Procedure: Thrombectomy, mechanical, vein, endovascular  Findings and Treatment: Left lower extremity thrombectomy popliteal and femoral vein, Left common iliac vein angioplasty and stent

## 2020-08-14 NOTE — PHARMACOTHERAPY INTERVENTION NOTE - COMMENTS
Provided patient with Eliquis starter pack via m2b. Counseled regarding medication use and anticoagulant safety

## 2020-08-14 NOTE — PROGRESS NOTE ADULT - SUBJECTIVE AND OBJECTIVE BOX
PROGRESS NOTE  Patient is a 65y old  Female who presents with a chief complaint of LEFT LEG PAIN AND SWELLING (14 Aug 2020 12:03)    Chart and available morning labs /imaging are reviewed electronically , urgent issues addressed . More information  is being added upon completion of rounds , when more information is collected and management discussed with consultants , medical staff and social service/case management on the floor   OVERNIGHT  No new issues reported by medical staff . All above noted Patient is resting in a bed comfortably .Cleared y vascular sx for d/c home  .No distress noted     HPI:  64 yo Female with  pmhx significant  for HTN , Osteoporosis ,Sz ,Pericarditis , Renal atherosclerosis , Shingles ,Migraines ,Depression ,Constipation ,Anxiety presents to ED with  L LE swelling & pain that began 2 days  ago, reports that pain is aching moderate  in severity ,found to have  L calf swelling and pain now radiates to the L groin with now weakness or numbness. Denies trauma, falls, dysuria, urgency and frequency .Leg venous doppler was performed and demonstrated extensive involving the left common femoral, femoral, popliteal, and calf veins. LLE DVT ,started on heparin drip and vascular surgery consulted for possible thrombectomy Due to elevated creatinine 1.5 and planned CTA nephrology consult called ,also patient is given toradol for pain management in view of drug seeking behavior Seen by pulmonologist and cardiologist for clearance for planned surgery ,clot removal .ECHO ordered to evaluate for R heart strain . (11 Aug 2020 06:09)    PAST MEDICAL & SURGICAL HISTORY:  Drug-seeking behavior  Renal arteriosclerosis  Constipation  Anxiety  Depression  HTN (hypertension)  Shingles  Pericarditis  Osteoporosis  Seizure disorder  Migraines  History of back surgery      MEDICATIONS  (STANDING):  apixaban 10 milliGRAM(s) Oral <User Schedule>  labetalol 200 milliGRAM(s) Oral three times a day  losartan 100 milliGRAM(s) Oral daily  pantoprazole    Tablet 40 milliGRAM(s) Oral before breakfast  polyethylene glycol 3350 17 Gram(s) Oral daily  senna 2 Tablet(s) Oral at bedtime  sertraline 50 milliGRAM(s) Oral daily  topiramate 125 milliGRAM(s) Oral two times a day    MEDICATIONS  (PRN):  acetaminophen   Tablet .. 650 milliGRAM(s) Oral every 6 hours PRN Temp greater or equal to 38C (100.4F), Mild Pain (1 - 3)  bisacodyl Suppository 10 milliGRAM(s) Rectal daily PRN Constipation  magnesium hydroxide Suspension 30 milliLiter(s) Oral daily PRN Constipation  oxyCODONE    IR 5 milliGRAM(s) Oral every 6 hours PRN Moderate Pain (4 - 6)  oxyCODONE    IR 10 milliGRAM(s) Oral every 6 hours PRN Severe Pain (7 - 10)      OBJECTIVE    T(C): 36.6 (08-14-20 @ 11:52), Max: 37.7 (08-13-20 @ 17:16)  HR: 66 (08-14-20 @ 11:52) (66 - 88)  BP: 159/79 (08-14-20 @ 11:52) (155/74 - 163/83)  RR: 18 (08-14-20 @ 11:52) (17 - 18)  SpO2: 97% (08-14-20 @ 11:52) (95% - 97%)  Wt(kg): --  I&O's Summary        REVIEW OF SYSTEMS:  CONSTITUTIONAL: No fever, weight loss, or fatigue  EYES: No eye pain, visual disturbances, or discharge  ENMT:   No sinus or throat pain  NECK: No pain or stiffness  RESPIRATORY: No cough, wheezing, chills or hemoptysis; No shortness of breath  CARDIOVASCULAR: No chest pain, palpitations, dizziness, or leg swelling  GASTROINTESTINAL: No abdominal pain. No nausea, vomiting; No diarrhea or constipation. No melena or hematochezia.  GENITOURINARY: No dysuria, frequency, hematuria, or incontinence  NEUROLOGICAL: No headaches, memory loss, loss of strength, numbness, or tremors  SKIN: No itching, burning, rashes, or lesions   MUSCULOSKELETAL: No joint pain or swelling; No muscle, back, or extremity pain    PHYSICAL EXAM:  Appearance: NAD. VS past 24 hrs -as above   HEENT:   Moist oral mucosa. Conjunctiva clear b/l.   Neck : supple  Respiratory: Lungs CTAB.  Gastrointestinal:  Soft, nontender. No rebound. No rigidity. BS present	  Cardiovascular: RRR ,S1S2 present  Neurologic: Non-focal. Moving all extremities.  Extremities: No edema. No erythema. No calf tenderness.  Skin: No rashes, No ecchymoses, No cyanosis.	  wounds ,skin lesions-See skin assesment flow sheet EXTREMITIES:  Popliteal access site with small amount of bloody drainage, cleaned, dressed with steris, gauze and tegaderm.  No skin erythema or purulent drainage.  Bilateral lower extremities warm, no tenderness, cyanosis or edema.  DP/PT pulses palpable bilaterally.  LABS:                        10.6   6.06  )-----------( 201      ( 14 Aug 2020 07:23 )             33.4     08-14    145  |  116<H>  |  18  ----------------------------<  91  4.1   |  22  |  1.10    Ca    9.0      14 Aug 2020 07:23  Phos  2.3     08-13  Mg     2.3     08-13    TPro  6.0  /  Alb  2.5<L>  /  TBili  0.4  /  DBili  x   /  AST  14<L>  /  ALT  18  /  AlkPhos  67  08-13    CAPILLARY BLOOD GLUCOSE        PTT - ( 13 Aug 2020 14:37 )  PTT:40.1 sec      Culture - Urine (collected 12 Aug 2020 09:08)  Source: .Urine Clean Catch (Midstream)  Final Report (13 Aug 2020 07:17):    <10,000 CFU/mL Normal Urogenital Yeni      RADIOLOGY & ADDITIONAL TESTS:< from: TTE Echo Complete w/o Contrast w/ Doppler (08.11.20 @ 10:04) >   EXAM:  ECHO TTE WO CON COMP W DOPP         PROCEDURE DATE:  08/11/2020        INTERPRETATION:  Ordering Physician: DAWIT AVILA [id number]  Indication: DVT.  cardiac murmur.  Technician: FRANCK.  Study Quality: Good  A complete echocardiographic study was performed utilizing standard protocol including spectral and color Doppler in all echocardiographic windows.  Height: 160cm  Weight: 59kg  BSA: 1.61m2  Blood Pressure:124/70  MEASUREMENTS  IVS: 1.1cm  PWT: 1.1cm  LA: 2.9cm  AO: 3.0cm  LVIDd: 3.9cm.  LVIDs: 2. 8cm  LVEF: 55%.  PASP: 30mm Hg  FINDINGS  Left Ventricle: Normal in size with  mild septal hypokinesia with overall preserved LV systolic function with estimated LVEF 50-55% is present.  Right Ventricle: Normal in size and normal RV systolic function is present.  Left Atrium: Normal in size.  Right Atrium: Normal in size.  Mitral Valve: Mild mitral annular calcification is present. Mitral valve opens normal during diastole . Mild mitral regurgitation is present.  Aortic Valve: Aortic root is mild sclerotic and of normal dimension. Aortic valve opens normal during  systole .  Tricuspid Valve: Mild Tricuspid regurgitation with calculated  PA systolic pressure 30 mmHg is present. No evidence of pulmonary hypertension.  Pulmonic Valve: Trace pulmonary regurgitation is present.  Diastolic Function: Grade 1 LV diastolic dysfunction is present.  Pericardium/Pleura: No evidence of any pericardial effusion.  No intracardiac mass ,  thrombus or vegetations and  tumor.  Mild concentric left ventricular hypertrophy is present.  CONCLUSIONS:  Normal LV size with  mild septal hypokinesia with overall preserved LV systolic function with estimated LVEF 50-55%.    Grade 1 LV diastolic dysfunction is present.    Mild mitral regurgitationis present.    Mild tricuspid regurgitation is present. No evidence of any pulmonary hypertension.    Mild aortic regurgitation is present.    Correlate clinically above findings.      < end of copied text >     reviewed elctronically  ASSESSMENT/PLAN: 	    Patient was seen and examined on a day of discharge . Plan of care , discharge medications and recommendations discussed with consultants and clearance for discharge obtained .Social service , case management  and medical staff are aware of plan. Family is notified. Discharge summary  is  prepared electronically 75minutes spent on this visit, 50% visit time spent in care co-ordination with other attendings and counselling patient  I have discussed care plan with patient and HCP,expressed understanding of problems treatment and their effect and side effects, alternatives in detail,I have asked if they have any questions and concerns and appropriately addressed them to best of my ability

## 2020-08-14 NOTE — PROGRESS NOTE ADULT - SUBJECTIVE AND OBJECTIVE BOX
Neurology Follow up note    AUGUSTUS [id number]    HPI:  64 yo Female with  pmhx significant  for HTN , Osteoporosis ,Sz ,Pericarditis , Renal atherosclerosis , Shingles ,Migraines ,Depression ,Constipation ,Anxiety presents to ED with  L LE swelling & pain that began 2 days  ago, reports that pain is aching moderate  in severity ,found to have  L calf swelling and pain now radiates to the L groin with now weakness or numbness. Denies trauma, falls, dysuria, urgency and frequency .Leg venous doppler was performed and demonstrated extensive involving the left common femoral, femoral, popliteal, and calf veins. LLE DVT ,started on heparin drip and vascular surgery consulted for possible thrombectomy Due to elevated creatinine 1.5 and planned CTA nephrology consult called ,also patient is given toradol for pain management in view of drug seeking behavior Seen by pulmonologist and cardiologist for clearance for planned surgery ,clot removal .ECHO ordered to evaluate for R heart strain . (11 Aug 2020 06:09)      Interval History -doing well    Patient is seen, chart was reviewed and case was discussed with the treatment team.  Pt is not in any distress.   Lying on bed comfortably.   No events reported overnight.       Vital Signs Last 24 Hrs  T(C): 37.1 (14 Aug 2020 07:58), Max: 37.7 (13 Aug 2020 17:16)  T(F): 98.7 (14 Aug 2020 07:58), Max: 99.8 (13 Aug 2020 17:16)  HR: 74 (14 Aug 2020 07:58) (72 - 88)  BP: 157/78 (14 Aug 2020 07:58) (155/74 - 163/83)  BP(mean): --  RR: 17 (14 Aug 2020 07:58) (17 - 18)  SpO2: 95% (14 Aug 2020 07:58) (95% - 96%)        REVIEW OF SYSTEMS:    Constitutional: No fever, weight loss or fatigue  Eyes: No eye pain, visual disturbances, or discharge  ENT:  No difficulty hearing, tinnitus, vertigo; No sinus or throat pain  Neck: No pain or stiffness  Respiratory: No cough, wheezing, chills or hemoptysis  Cardiovascular: No chest pain, palpitations, shortness of breath,   Gastrointestinal: No abdominal or epigastric pain. No nausea, vomiting or hematemesis;  Genitourinary: No dysuria, frequency, hematuria or incontinence  Neurological: No headaches, memory loss,   Psychiatric: No d mood swings or difficulty sleeping  Musculoskeletal: No joint pain or swelling;  Skin: No itching, burning, rashes or lesions   Lymph Nodes: No enlarged glands  Endocrine: No heat or cold intolerance; No hair loss,   Allergy and Immunologic: No hives or eczema    On Neurological Examination:    Mental Status - Pt is alert, awake, oriented X3.. Follows commands well and able to answer questions appropriately.  Mood and affect  normal    Speech -  Normal.     Cranial Nerves - Pupils 3 mm equal and reactive to light, extraocular eye movements intact. Pt has no visual field deficit.  Pt has no  facial asymmetry. Facial sensation is intact.Tongue - is in midline.    Muscle tone - is normal     Motor Exam - 5/5 of UE  LE 4/5  No drift. No shaking or tremors.    Sensory Exam - . Pt withdraws all extremities equally on stimulation. No asymmetry seen. No complaints of tingling, numbness.      coordination:    Finger to nose: normal      Deep tendon Reflexes - 2 plus all over.       Neck Supple -  Yes.     MEDICATIONS    acetaminophen   Tablet .. 650 milliGRAM(s) Oral every 6 hours PRN  apixaban 10 milliGRAM(s) Oral <User Schedule>  bisacodyl Suppository 10 milliGRAM(s) Rectal daily PRN  labetalol 200 milliGRAM(s) Oral three times a day  losartan 100 milliGRAM(s) Oral daily  magnesium hydroxide Suspension 30 milliLiter(s) Oral daily PRN  oxyCODONE    IR 5 milliGRAM(s) Oral every 6 hours PRN  oxyCODONE    IR 10 milliGRAM(s) Oral every 6 hours PRN  pantoprazole    Tablet 40 milliGRAM(s) Oral before breakfast  polyethylene glycol 3350 17 Gram(s) Oral daily  senna 2 Tablet(s) Oral at bedtime  sertraline 50 milliGRAM(s) Oral daily  topiramate 125 milliGRAM(s) Oral two times a day      Allergies    penicillin (Anaphylaxis)    Intolerances        LABS:  CBC Full  -  ( 14 Aug 2020 07:23 )  WBC Count : 6.06 K/uL  RBC Count : 3.90 M/uL  Hemoglobin : 10.6 g/dL  Hematocrit : 33.4 %  Platelet Count - Automated : 201 K/uL  Mean Cell Volume : 85.6 fl  Mean Cell Hemoglobin : 27.2 pg  Mean Cell Hemoglobin Concentration : 31.7 gm/dL  Auto Neutrophil # : x    Auto Eosinophil % : x  Auto Basophil % : x      08-14    145  |  116<H>  |  18  ----------------------------<  91  4.1   |  22  |  1.10    Ca    9.0      14 Aug 2020 07:23  Phos  2.3     08-13  Mg     2.3     08-13    TPro  6.0  /  Alb  2.5<L>  /  TBili  0.4  /  DBili  x   /  AST  14<L>  /  ALT  18  /  AlkPhos  67  08-13    Hemoglobin A1C:     Vitamin B12     RADIOLOGY    ASSESSMENT AND PLAN:      SEEN FOR RECURRENCE OF SEIZURE  SP LEFT  LE THROMBECTOMY     INCREASED TOPAMAX   MG BID  ON AC  Physical therapy evaluation.  OOB to chair/ambulation with assistance only.  Pain is accessed and addressed.  Would continue to follow.

## 2020-08-14 NOTE — PROGRESS NOTE ADULT - REASON FOR ADMISSION
LEFT LEG PAIN AND SWELLING

## 2020-08-14 NOTE — DISCHARGE NOTE NURSING/CASE MANAGEMENT/SOCIAL WORK - PATIENT PORTAL LINK FT
You can access the FollowMyHealth Patient Portal offered by [facility] by registering at the following website: [email or web address]. By joining [facility] [facility]’s FollowMyHealth portal, you will also be able to view your health information using other applications (apps) compatible with our system.

## 2020-08-14 NOTE — DISCHARGE NOTE PROVIDER - PROVIDER TOKENS
PROVIDER:[TOKEN:[[id number]],FOLLOWUP:[2 weeks]],PROVIDER:[TOKEN:[[id number]],FOLLOWUP:[1 month]],PROVIDER:[TOKEN:[[id number]],FOLLOWUP:[1 month]],PROVIDER:[TOKEN:[[id number]],FOLLOWUP:[1 week]]

## 2020-08-14 NOTE — PROGRESS NOTE ADULT - ATTENDING COMMENTS
66 yo Female with  pmhx significant  for HTN , Osteoporosis ,Sz ,Pericarditis , Renal atherosclerosis , Shingles ,Migraines ,Depression ,Constipation ,Anxiety presents to ED with  L LE swelling & pain that began 2 days  ago, reports that pain is aching moderate  in severity ,found to have  L calf swelling and pain now radiates to the L groin with now weakness or numbness. Denies trauma, falls, dysuria, urgency and frequency .Leg venous doppler was performed and demonstrated extensive involving the left common femoral, femoral, popliteal, and calf veins. LLE DVT ,started on heparin drip and vascular surgery consulted for possible thrombectomy Due to elevated creatinine 1.5 and planned CTA nephrology consult called ,also patient is given toradol for pain management in view of drug seeking behavior Seen by pulmonologist and cardiologist for clearance for planned surgery ,clot removal .ECHO ordered to evaluate for R heart strain .08/11/20 -S/P LLE THROMBECTOMY .Seen by hemonc to advice on OAC-continue VTE prophylaxis- heparin drip, ASA- will transition to PO AC per medicine.  Recommend if eliquis approved by insurance may start with Eliquis 10 BID for 7 days, then go to Eliquis 5 mg BID, life long.  If not feasible, then start on coumadin. May D/C heparin once patient  INR is therapeutic if starting coumadin or may d/c heparin once starts Eliquis .Will send rx to the pharmacy tomorrow for Eliquis to find out copayment and will ask  to assist with discharge planning
64 yo Female with  pmhx significant  for HTN , Osteoporosis ,Sz ,Pericarditis , Renal atherosclerosis , Shingles ,Migraines ,Depression ,Constipation ,Anxiety presents to ED with  L LE swelling & pain that began 2 days  ago, reports that pain is aching moderate  in severity ,found to have  L calf swelling and pain now radiates to the L groin with now weakness or numbness. Denies trauma, falls, dysuria, urgency and frequency .Leg venous doppler was performed and demonstrated extensive involving the left common femoral, femoral, popliteal, and calf veins. LLE DVT ,started on heparin drip and vascular surgery consulted for possible thrombectomy Due to elevated creatinine 1.5 and planned CTA nephrology consult called ,also patient is given toradol for pain management in view of drug seeking behavior Seen by pulmonologist and cardiologist for clearance for planned surgery ,clot removal .ECHO ordered to evaluate for R heart strain .08/11/20 -S/P LLE THROMBECTOMY .Seen by hemonc to advice on OAC-continue VTE prophylaxis- heparin drip, ASA- will transition to PO AC per medicine.  Recommend if eliquis approved by insurance may start with Eliquis 10 BID for 7 days, then go to Eliquis 5 mg BID, life long.  If not feasible, then start on coumadin. May D/C heparin once patient  INR is therapeutic if starting coumadin or may d/c heparin once starts Eliquis .Will send rx to the pharmacy tomorrow for Eliquis to find out copayment and will ask  to assist with discharge planning
66 yo Female with  pmhx significant  for HTN , Osteoporosis ,Sz ,Pericarditis , Renal atherosclerosis , Shingles ,Migraines ,Depression ,Constipation ,Anxiety presents to ED with  L LE swelling & pain that began 2 days  ago, reports that pain is aching moderate  in severity ,found to have  L calf swelling and pain now radiates to the L groin with now weakness or numbness. Denies trauma, falls, dysuria, urgency and frequency .Leg venous doppler was performed and demonstrated extensive involving the left common femoral, femoral, popliteal, and calf veins. LLE DVT ,started on heparin drip and vascular surgery consulted for possible thrombectomy Due to elevated creatinine 1.5 and planned CTA nephrology consult called ,also patient is given toradol for pain management in view of drug seeking behavior Seen by pulmonologist and cardiologist for clearance for planned surgery ,clot removal .ECHO ordered to evaluate for R heart strain .08/11/20 -S/P LLE THROMBECTOMY .Seen by hemonc to advice on OAC-continue VTE prophylaxis- heparin drip, ASA- will transition to PO AC per medicine.  Recommend if eliquis approved by insurance may start with Eliquis 10 BID for 7 days, then go to Eliquis 5 mg BID, life long.  If not feasible, then start on coumadin. May D/C heparin once patient  INR is therapeutic if starting coumadin or may d/c heparin once starts Eliquis .Will send rx to the pharmacy tomorrow for Eliquis to find out copayment and will ask  to assist with discharge planning

## 2020-08-14 NOTE — PROGRESS NOTE ADULT - PROBLEM SELECTOR PLAN 2
continue home medications ,cardiology cons and clearance ,ECHO ordered and BNP
continue home medications ,cardiology cons and clearance ,ECHO ordered and BNP
continue home medications ,cardiology cons and clearance ,ECHO done and read by Dr Fischer ,cleared for discharge

## 2020-08-14 NOTE — PROGRESS NOTE ADULT - ASSESSMENT
cont rx cont rx        OXYGENATION      8/ ra 97%- ra 97%     VITALS/LABS      2020 afeb 70 150/70      SANDRA COFFMAN [facility] [phone]  1955 DOA 8/10/2020 DR SAI DÍAZ          REVIEW OF SYMPTOMS      Able to give ROS  Yes     RELIABLE No   CONSTITUTIONAL Weakness Yes  Chills No Vision changes No  ENDOCRINE No unexplained hair loss No heat or cold intolerance    ALLERGY No hives  Sore throat No   RESP Coughing blood no  Shortness of breath YES   NEURO No Headache  Confusion Pain neck No   CARDIAC No Chest pain No Palpitations   GI No Pain abdomen NO   Vomiting NO     PHYSICAL EXAM    HEENT Unremarkable PERRLA atraumatic   RESP Fair air entry EXP prolonged    Harsh breath sound Resp distres mild   CARDIAC S1 S2 No S3     NO JVD    ABDOMEN SOFT BS PRESENT NOT DISTENDED No hepatosplenomegaly PEDAL EDEMA present No calf tenderness  NO rash   GENERAL Not TOXIC looking    PT DATA/BEST PRACTICE  ALLERGY         pncl            WT                 8/10/2020 59                     BMI                     8/10/2020 23       CrCl                    ADVANCED DIRECTIVE     LINES TUBES POA.   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   apixaban ()      DYSPHAGIA EVAL .    DIET. dash ()                                                                          IV F.    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    65 year old female with PMH of renal artery stenosis, HTN, seizures, migraines, osteoporosis, depression, anxiety, here with extensive Left lower extremity DVT involving the left common femoral, femoral, popliteal, and calf veins.   home meds amlodipine 10 topiramat 100.2 protonix losartan labetalol zoloft   Pt admitted 8/10/2020   seen by [facility] started on iv ufh on 8/10/2020   Q scan 2020 was indeterminate   2020 pr was .11 and there is low grade fever but no leukocytosis  Thrombectomy done and l iliac stent inserted  Dr Fall and iv ufh resumed   2020 iv ufh changed to apixcaban after getting ok from surg    DISPOSITION PLANNING.               Extensive dvt sp l thrombectomy    IV ufh changed to apixaban and start dc planning                  NEED FOR HOME OXYGEN. 2020 No   NEED FOR ANTIBIOTICS. 2020 No   NEED FOR HOME ANTICOAGULATION. 2020 Apixaban 10.2.7d then apixaban 5.2.6 m   NEED FOR HOME NIV. 2020 No   NEED FOR FOLLOWUP. Call Dr Blair Villegas  to arrange pulmonary followup within 2 weeks   PLUMONARY CLEARANCE FOR DISCHARGE. 2020 Cleared     TIME SPENT Over 25 minutes aggregate care time spent on encounter; activities included combinations of  direct pt care, counseling and/or coordinating care reviewing notes, lab data/ imaging, discussion with multidisciplinary team/ pt /family. Risks, benefits, alternatives of planned interventions when applicable were discussed in detail and questions answered as best as possible    SANDRA COFFMAN [facility] [phone]  1955 DOA 8/10/2020 DR SAI DÍAZ

## 2020-08-14 NOTE — PROGRESS NOTE ADULT - PROVIDER SPECIALTY LIST ADULT
Anesthesia
Cardiology
Hospitalist
Hospitalist
Infectious Disease
Infectious Disease
Nephrology
Neurology
Pulmonology
Urology
Vascular Surgery
Neurology
Heme/Onc
Nephrology
Hospitalist

## 2020-08-14 NOTE — PROGRESS NOTE ADULT - PROBLEM SELECTOR PLAN 4
SERIAL BMP Due to elevated creatinine 1.5 and planned CTA nephrology consult called ,also patient is given toradol for pain management in view of drug seeking behavior and may contribute to elevation of renal indices

## 2020-08-14 NOTE — DISCHARGE NOTE PROVIDER - HOSPITAL COURSE
64 yo Female with  pmhx significant  for HTN , Osteoporosis ,Sz ,Pericarditis , Renal atherosclerosis , Shingles ,Migraines ,Depression ,Constipation ,Anxiety presents to ED with  L LE swelling & pain that began 2 days  ago, reports that pain is aching moderate  in severity ,found to have  L calf swelling and pain now radiates to the L groin with now weakness or numbness. Denies trauma, falls, dysuria, urgency and frequency .Leg venous doppler was performed and demonstrated extensive involving the left common femoral, femoral, popliteal, and calf veins. LLE DVT ,started on heparin drip and vascular surgery consulted for possible thrombectomy Due to elevated creatinine 1.5 and planned CTA nephrology consult called ,also patient is given toradol for pain management in view of drug seeking behavior Seen by pulmonologist and cardiologist for clearance for planned surgery ,clot removal .ECHO ordered to evaluate for R heart strain .    ·  Problem: Acute deep vein thrombosis (DVT) of femoral vein of left lower extremity.  Plan: S/P LLE thrombectomy.  WILL BE DISCHARGED ON ELIQUIS AS PER DVT PROTOCOL     -continue VTE prophylaxis- heparin drip, ASA-   Recommend if eliquis approved by insurance may start with Eliquis 10 BID for 7 days, then go to Eliquis 5 mg BID, life long.    prolene sutures removed yesterday, with some bleeding today from popliteal wound after receiving eliquis.    - Wound dressed with steri-strips, gauze and tegaderm.  Continue daily dressing changes.    - Light compression with stocking or ACE bandage to bilateral lower extremities    - Continue Eliquis 10mg BID x 7 days total, then Eliquis 5mg BID    - Cleared for discharge from vascular standpoint.  Signing off.    ·  Problem: HTN (hypertension).  Plan: continue home medications ,cardiology cons and clearance ,ECHO ordered and BNP.     ·  Problem: Low grade fever.  Plan: LIKELY REACTIVE 2/2 TO DVT.     ·  Problem: Renal arteriosclerosis.  Plan: SERIAL BMP Due to elevated creatinine 1.5 and planned CTA nephrology consult called ,also patient is given toradol for pain management in view of drug seeking behavior and may contribute to elevation of renal indices.     ·  Problem: Urinary retention.  Plan: ryan cath ,void trial in 2-3 days , eval ,bowel regimen.     Problem: Seizure disorder. Plan: continue current management and present  medications ,sz precautions    ·  Problem: Migraines.  Plan: on topamax.     ·  Problem: Constipation.  Plan: bowel regimen.     ·  Problem: Depression.  Plan: continue home medications.     Problem: Prophylactic measure. Plan; Gastrointestinal stress ulcer prophylaxis and DVT prophylaxis administered.

## 2020-08-14 NOTE — PROGRESS NOTE ADULT - PROBLEM SELECTOR PLAN 10
[Patient] : patient
Gastrointestinal stress ulcer prophylaxis and DVT prophylaxis administered

## 2020-08-14 NOTE — DISCHARGE NOTE PROVIDER - NSDCFUADDINST_GEN_ALL_CORE_FT
- Wound dressed with steri-strips, gauze and tegaderm.  Continue daily dressing changes.  - Light compression with stocking or ACE bandage to bilateral lower extremities

## 2020-08-14 NOTE — PROGRESS NOTE ADULT - SUBJECTIVE AND OBJECTIVE BOX
STATUS POST:  LLE thrombectomy with pop/fem/iliac/common iliac vein stent placement  POST OPERATIVE DAY #:  3    SUBJECTIVE:  Patient seen and examined at bedside.  Patient with some bloody drainage from LLE popliteal wound.    Vital Signs Last 24 Hrs  T(C): 37.1 (14 Aug 2020 07:58), Max: 37.7 (13 Aug 2020 17:16)  T(F): 98.7 (14 Aug 2020 07:58), Max: 99.8 (13 Aug 2020 17:16)  HR: 74 (14 Aug 2020 07:58) (72 - 88)  BP: 157/78 (14 Aug 2020 07:58) (155/74 - 163/83)  RR: 17 (14 Aug 2020 07:58) (17 - 18)  SpO2: 95% (14 Aug 2020 07:58) (95% - 96%)    PHYSICAL EXAM  GENERAL:  Well-nourished, well-developed female lying comfortably in bed in NAD  HEAD:  Normocephalic, atraumatic  EXTREMITIES:  Popliteal access site with small amount of bloody drainage, cleaned, dressed with steris, gauze and tegaderm.  No skin erythema or purulent drainage.  Bilateral lower extremities warm, no tenderness, cyanosis or edema.  DP/PT pulses palpable bilaterally.  NEURO:  A&O x 3    MEDICATIONS  (STANDING):  apixaban 10 milliGRAM(s) Oral <User Schedule>  labetalol 200 milliGRAM(s) Oral three times a day  losartan 100 milliGRAM(s) Oral daily  pantoprazole    Tablet 40 milliGRAM(s) Oral before breakfast  polyethylene glycol 3350 17 Gram(s) Oral daily  senna 2 Tablet(s) Oral at bedtime  sertraline 50 milliGRAM(s) Oral daily  topiramate 125 milliGRAM(s) Oral two times a day    MEDICATIONS  (PRN):  acetaminophen   Tablet .. 650 milliGRAM(s) Oral every 6 hours PRN Temp greater or equal to 38C (100.4F), Mild Pain (1 - 3)  bisacodyl Suppository 10 milliGRAM(s) Rectal daily PRN Constipation  magnesium hydroxide Suspension 30 milliLiter(s) Oral daily PRN Constipation  oxyCODONE    IR 5 milliGRAM(s) Oral every 6 hours PRN Moderate Pain (4 - 6)  oxyCODONE    IR 10 milliGRAM(s) Oral every 6 hours PRN Severe Pain (7 - 10)    LABS:                        10.6   6.06  )-----------( 201      ( 14 Aug 2020 07:23 )             33.4     08-14    145  |  116<H>  |  18  ----------------------------<  91  4.1   |  22  |  1.10    Ca    9.0      14 Aug 2020 07:23  Phos  2.3     08-13  Mg     2.3     08-13    TPro  6.0  /  Alb  2.5<L>  /  TBili  0.4  /  DBili  x   /  AST  14<L>  /  ALT  18  /  AlkPhos  67  08-13    PTT - ( 13 Aug 2020 14:37 )  PTT:40.1 sec    Culture - Urine (collected 12 Aug 2020 09:08)  Source: .Urine Clean Catch (Midstream)  Final Report (13 Aug 2020 07:17):    <10,000 CFU/mL Normal Urogenital Yeni    ASSESSMENT & PLAN  65 year old female POD#3 s/p LLE thrombectomy with pop/fem/iliac/common iliac vein stent placement, for extensive DVT/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    - Wound dressed with steri-strips, gauze and tegaderm.  Continue daily dressing changes.  - Light compression with stocking or ACE bandage to bilateral lower extremities  - Continue Eliquis 10mg BID x 7 days total, then Eliquis 5mg BID  - Cleared for discharge from vascular standpoint.  Signing off.  - Case discussed with Dr. Fall

## 2020-08-17 ENCOUNTER — APPOINTMENT (OUTPATIENT)
Dept: NEUROLOGY | Facility: CLINIC | Age: 65
End: 2020-08-17

## 2020-08-30 ENCOUNTER — INPATIENT (INPATIENT)
Facility: HOSPITAL | Age: 65
LOS: 1 days | Discharge: HOME | DRG: 300 | End: 2020-09-01
Attending: INTERNAL MEDICINE | Admitting: INTERNAL MEDICINE
Payer: MEDICARE

## 2020-08-30 VITALS
WEIGHT: 145.06 LBS | RESPIRATION RATE: 16 BRPM | DIASTOLIC BLOOD PRESSURE: 77 MMHG | HEART RATE: 62 BPM | SYSTOLIC BLOOD PRESSURE: 149 MMHG | TEMPERATURE: 99 F | OXYGEN SATURATION: 98 % | HEIGHT: 62 IN

## 2020-08-30 DIAGNOSIS — Z98.890 OTHER SPECIFIED POSTPROCEDURAL STATES: Chronic | ICD-10-CM

## 2020-08-30 DIAGNOSIS — Z29.9 ENCOUNTER FOR PROPHYLACTIC MEASURES, UNSPECIFIED: ICD-10-CM

## 2020-08-30 DIAGNOSIS — I82.409 ACUTE EMBOLISM AND THROMBOSIS OF UNSPECIFIED DEEP VEINS OF UNSPECIFIED LOWER EXTREMITY: ICD-10-CM

## 2020-08-30 DIAGNOSIS — F41.9 ANXIETY DISORDER, UNSPECIFIED: ICD-10-CM

## 2020-08-30 DIAGNOSIS — I10 ESSENTIAL (PRIMARY) HYPERTENSION: ICD-10-CM

## 2020-08-30 DIAGNOSIS — R07.9 CHEST PAIN, UNSPECIFIED: ICD-10-CM

## 2020-08-30 DIAGNOSIS — G43.909 MIGRAINE, UNSPECIFIED, NOT INTRACTABLE, WITHOUT STATUS MIGRAINOSUS: ICD-10-CM

## 2020-08-30 DIAGNOSIS — Z95.820 PERIPHERAL VASCULAR ANGIOPLASTY STATUS WITH IMPLANTS AND GRAFTS: Chronic | ICD-10-CM

## 2020-08-30 DIAGNOSIS — K59.00 CONSTIPATION, UNSPECIFIED: ICD-10-CM

## 2020-08-30 DIAGNOSIS — I51.9 HEART DISEASE, UNSPECIFIED: ICD-10-CM

## 2020-08-30 LAB
ALBUMIN SERPL ELPH-MCNC: 3 G/DL — LOW (ref 3.3–5)
ALP SERPL-CCNC: 95 U/L — SIGNIFICANT CHANGE UP (ref 40–120)
ALT FLD-CCNC: 29 U/L — SIGNIFICANT CHANGE UP (ref 12–78)
ANION GAP SERPL CALC-SCNC: 9 MMOL/L — SIGNIFICANT CHANGE UP (ref 5–17)
APPEARANCE UR: CLEAR — SIGNIFICANT CHANGE UP
APTT BLD: 31.9 SEC — SIGNIFICANT CHANGE UP (ref 27.5–35.5)
AST SERPL-CCNC: 22 U/L — SIGNIFICANT CHANGE UP (ref 15–37)
BACTERIA # UR AUTO: ABNORMAL
BASOPHILS # BLD AUTO: 0.06 K/UL — SIGNIFICANT CHANGE UP (ref 0–0.2)
BASOPHILS NFR BLD AUTO: 0.8 % — SIGNIFICANT CHANGE UP (ref 0–2)
BILIRUB SERPL-MCNC: 0.3 MG/DL — SIGNIFICANT CHANGE UP (ref 0.2–1.2)
BILIRUB UR-MCNC: NEGATIVE — SIGNIFICANT CHANGE UP
BLD GP AB SCN SERPL QL: SIGNIFICANT CHANGE UP
BUN SERPL-MCNC: 25 MG/DL — HIGH (ref 7–23)
CALCIUM SERPL-MCNC: 8.6 MG/DL — SIGNIFICANT CHANGE UP (ref 8.5–10.1)
CHLORIDE SERPL-SCNC: 115 MMOL/L — HIGH (ref 96–108)
CO2 SERPL-SCNC: 21 MMOL/L — LOW (ref 22–31)
COLOR SPEC: SIGNIFICANT CHANGE UP
CREAT SERPL-MCNC: 1.3 MG/DL — SIGNIFICANT CHANGE UP (ref 0.5–1.3)
DIFF PNL FLD: NEGATIVE — SIGNIFICANT CHANGE UP
EOSINOPHIL # BLD AUTO: 0.4 K/UL — SIGNIFICANT CHANGE UP (ref 0–0.5)
EOSINOPHIL NFR BLD AUTO: 5.6 % — SIGNIFICANT CHANGE UP (ref 0–6)
EPI CELLS # UR: SIGNIFICANT CHANGE UP
GLUCOSE SERPL-MCNC: 96 MG/DL — SIGNIFICANT CHANGE UP (ref 70–99)
GLUCOSE UR QL: NEGATIVE — SIGNIFICANT CHANGE UP
HCT VFR BLD CALC: 34.2 % — LOW (ref 34.5–45)
HCT VFR BLD CALC: 34.8 % — SIGNIFICANT CHANGE UP (ref 34.5–45)
HGB BLD-MCNC: 10.7 G/DL — LOW (ref 11.5–15.5)
HGB BLD-MCNC: 10.8 G/DL — LOW (ref 11.5–15.5)
IMM GRANULOCYTES NFR BLD AUTO: 0.6 % — SIGNIFICANT CHANGE UP (ref 0–1.5)
INR BLD: 1.13 RATIO — SIGNIFICANT CHANGE UP (ref 0.88–1.16)
INR BLD: 1.23 RATIO — HIGH (ref 0.88–1.16)
KETONES UR-MCNC: NEGATIVE — SIGNIFICANT CHANGE UP
LEUKOCYTE ESTERASE UR-ACNC: ABNORMAL
LIDOCAIN IGE QN: 212 U/L — SIGNIFICANT CHANGE UP (ref 73–393)
LYMPHOCYTES # BLD AUTO: 1.5 K/UL — SIGNIFICANT CHANGE UP (ref 1–3.3)
LYMPHOCYTES # BLD AUTO: 21.1 % — SIGNIFICANT CHANGE UP (ref 13–44)
MAGNESIUM SERPL-MCNC: 2.5 MG/DL — SIGNIFICANT CHANGE UP (ref 1.6–2.6)
MCHC RBC-ENTMCNC: 26.9 PG — LOW (ref 27–34)
MCHC RBC-ENTMCNC: 27.2 PG — SIGNIFICANT CHANGE UP (ref 27–34)
MCHC RBC-ENTMCNC: 31 GM/DL — LOW (ref 32–36)
MCHC RBC-ENTMCNC: 31.3 GM/DL — LOW (ref 32–36)
MCV RBC AUTO: 86.8 FL — SIGNIFICANT CHANGE UP (ref 80–100)
MCV RBC AUTO: 87 FL — SIGNIFICANT CHANGE UP (ref 80–100)
MONOCYTES # BLD AUTO: 0.81 K/UL — SIGNIFICANT CHANGE UP (ref 0–0.9)
MONOCYTES NFR BLD AUTO: 11.4 % — SIGNIFICANT CHANGE UP (ref 2–14)
NEUTROPHILS # BLD AUTO: 4.29 K/UL — SIGNIFICANT CHANGE UP (ref 1.8–7.4)
NEUTROPHILS NFR BLD AUTO: 60.5 % — SIGNIFICANT CHANGE UP (ref 43–77)
NITRITE UR-MCNC: NEGATIVE — SIGNIFICANT CHANGE UP
NRBC # BLD: 0 /100 WBCS — SIGNIFICANT CHANGE UP (ref 0–0)
NRBC # BLD: 0 /100 WBCS — SIGNIFICANT CHANGE UP (ref 0–0)
PH UR: 7 — SIGNIFICANT CHANGE UP (ref 5–8)
PLATELET # BLD AUTO: 227 K/UL — SIGNIFICANT CHANGE UP (ref 150–400)
PLATELET # BLD AUTO: 235 K/UL — SIGNIFICANT CHANGE UP (ref 150–400)
POTASSIUM SERPL-MCNC: 4.2 MMOL/L — SIGNIFICANT CHANGE UP (ref 3.5–5.3)
POTASSIUM SERPL-SCNC: 4.2 MMOL/L — SIGNIFICANT CHANGE UP (ref 3.5–5.3)
PROT SERPL-MCNC: 6.6 G/DL — SIGNIFICANT CHANGE UP (ref 6–8.3)
PROT UR-MCNC: NEGATIVE — SIGNIFICANT CHANGE UP
PROTHROM AB SERPL-ACNC: 13.1 SEC — SIGNIFICANT CHANGE UP (ref 10.6–13.6)
PROTHROM AB SERPL-ACNC: 14.2 SEC — HIGH (ref 10.6–13.6)
RBC # BLD: 3.93 M/UL — SIGNIFICANT CHANGE UP (ref 3.8–5.2)
RBC # BLD: 4.01 M/UL — SIGNIFICANT CHANGE UP (ref 3.8–5.2)
RBC # FLD: 15.3 % — HIGH (ref 10.3–14.5)
RBC # FLD: 15.4 % — HIGH (ref 10.3–14.5)
RBC CASTS # UR COMP ASSIST: SIGNIFICANT CHANGE UP /HPF (ref 0–4)
SARS-COV-2 IGG SERPL QL IA: NEGATIVE — SIGNIFICANT CHANGE UP
SARS-COV-2 IGM SERPL IA-ACNC: 0.08 INDEX — SIGNIFICANT CHANGE UP
SARS-COV-2 RNA SPEC QL NAA+PROBE: SIGNIFICANT CHANGE UP
SODIUM SERPL-SCNC: 145 MMOL/L — SIGNIFICANT CHANGE UP (ref 135–145)
SP GR SPEC: 1 — LOW (ref 1.01–1.02)
TROPONIN I SERPL-MCNC: <.015 NG/ML — SIGNIFICANT CHANGE UP (ref 0.01–0.04)
TROPONIN I SERPL-MCNC: <.015 NG/ML — SIGNIFICANT CHANGE UP (ref 0.01–0.04)
UROBILINOGEN FLD QL: NEGATIVE — SIGNIFICANT CHANGE UP
WBC # BLD: 7.1 K/UL — SIGNIFICANT CHANGE UP (ref 3.8–10.5)
WBC # BLD: 7.71 K/UL — SIGNIFICANT CHANGE UP (ref 3.8–10.5)
WBC # FLD AUTO: 7.1 K/UL — SIGNIFICANT CHANGE UP (ref 3.8–10.5)
WBC # FLD AUTO: 7.71 K/UL — SIGNIFICANT CHANGE UP (ref 3.8–10.5)
WBC UR QL: SIGNIFICANT CHANGE UP

## 2020-08-30 PROCEDURE — 93970 EXTREMITY STUDY: CPT | Mod: 26

## 2020-08-30 PROCEDURE — 71045 X-RAY EXAM CHEST 1 VIEW: CPT | Mod: 26,CS

## 2020-08-30 PROCEDURE — 74177 CT ABD & PELVIS W/CONTRAST: CPT | Mod: 26

## 2020-08-30 PROCEDURE — 99285 EMERGENCY DEPT VISIT HI MDM: CPT

## 2020-08-30 PROCEDURE — 71275 CT ANGIOGRAPHY CHEST: CPT | Mod: 26

## 2020-08-30 PROCEDURE — 93010 ELECTROCARDIOGRAM REPORT: CPT

## 2020-08-30 RX ORDER — SODIUM CHLORIDE 9 MG/ML
1000 INJECTION INTRAMUSCULAR; INTRAVENOUS; SUBCUTANEOUS ONCE
Refills: 0 | Status: COMPLETED | OUTPATIENT
Start: 2020-08-30 | End: 2020-08-30

## 2020-08-30 RX ORDER — SERTRALINE 25 MG/1
1 TABLET, FILM COATED ORAL
Qty: 0 | Refills: 0 | DISCHARGE

## 2020-08-30 RX ORDER — LOSARTAN POTASSIUM 100 MG/1
100 TABLET, FILM COATED ORAL DAILY
Refills: 0 | Status: DISCONTINUED | OUTPATIENT
Start: 2020-08-30 | End: 2020-09-01

## 2020-08-30 RX ORDER — IOHEXOL 300 MG/ML
30 INJECTION, SOLUTION INTRAVENOUS ONCE
Refills: 0 | Status: COMPLETED | OUTPATIENT
Start: 2020-08-30 | End: 2020-08-30

## 2020-08-30 RX ORDER — ACETAMINOPHEN 500 MG
650 TABLET ORAL EVERY 6 HOURS
Refills: 0 | Status: DISCONTINUED | OUTPATIENT
Start: 2020-08-30 | End: 2020-09-01

## 2020-08-30 RX ORDER — TOPIRAMATE 25 MG
100 TABLET ORAL
Refills: 0 | Status: DISCONTINUED | OUTPATIENT
Start: 2020-08-30 | End: 2020-09-01

## 2020-08-30 RX ORDER — AMLODIPINE BESYLATE 2.5 MG/1
10 TABLET ORAL DAILY
Refills: 0 | Status: DISCONTINUED | OUTPATIENT
Start: 2020-08-30 | End: 2020-09-01

## 2020-08-30 RX ORDER — PANTOPRAZOLE SODIUM 20 MG/1
40 TABLET, DELAYED RELEASE ORAL ONCE
Refills: 0 | Status: COMPLETED | OUTPATIENT
Start: 2020-08-30 | End: 2020-08-30

## 2020-08-30 RX ORDER — CHOLECALCIFEROL (VITAMIN D3) 125 MCG
1 CAPSULE ORAL
Qty: 0 | Refills: 0 | DISCHARGE

## 2020-08-30 RX ORDER — PANTOPRAZOLE SODIUM 20 MG/1
40 TABLET, DELAYED RELEASE ORAL
Refills: 0 | Status: DISCONTINUED | OUTPATIENT
Start: 2020-08-30 | End: 2020-09-01

## 2020-08-30 RX ORDER — TOPIRAMATE 25 MG
1 TABLET ORAL
Qty: 0 | Refills: 0 | DISCHARGE

## 2020-08-30 RX ORDER — OXYCODONE AND ACETAMINOPHEN 5; 325 MG/1; MG/1
1 TABLET ORAL EVERY 6 HOURS
Refills: 0 | Status: DISCONTINUED | OUTPATIENT
Start: 2020-08-30 | End: 2020-09-01

## 2020-08-30 RX ORDER — ASPIRIN/CALCIUM CARB/MAGNESIUM 324 MG
81 TABLET ORAL DAILY
Refills: 0 | Status: DISCONTINUED | OUTPATIENT
Start: 2020-08-30 | End: 2020-09-01

## 2020-08-30 RX ORDER — LOSARTAN POTASSIUM 100 MG/1
1 TABLET, FILM COATED ORAL
Qty: 0 | Refills: 0 | DISCHARGE

## 2020-08-30 RX ORDER — SERTRALINE 25 MG/1
25 TABLET, FILM COATED ORAL DAILY
Refills: 0 | Status: DISCONTINUED | OUTPATIENT
Start: 2020-08-30 | End: 2020-09-01

## 2020-08-30 RX ORDER — SENNA PLUS 8.6 MG/1
2 TABLET ORAL AT BEDTIME
Refills: 0 | Status: DISCONTINUED | OUTPATIENT
Start: 2020-08-30 | End: 2020-09-01

## 2020-08-30 RX ORDER — LANOLIN ALCOHOL/MO/W.PET/CERES
5 CREAM (GRAM) TOPICAL AT BEDTIME
Refills: 0 | Status: DISCONTINUED | OUTPATIENT
Start: 2020-08-30 | End: 2020-09-01

## 2020-08-30 RX ORDER — MAGNESIUM HYDROXIDE 400 MG/1
30 TABLET, CHEWABLE ORAL DAILY
Refills: 0 | Status: DISCONTINUED | OUTPATIENT
Start: 2020-08-30 | End: 2020-09-01

## 2020-08-30 RX ORDER — LIDOCAINE 4 G/100G
1 CREAM TOPICAL ONCE
Refills: 0 | Status: COMPLETED | OUTPATIENT
Start: 2020-08-30 | End: 2020-08-30

## 2020-08-30 RX ORDER — ASPIRIN/CALCIUM CARB/MAGNESIUM 324 MG
1 TABLET ORAL
Qty: 0 | Refills: 0 | DISCHARGE

## 2020-08-30 RX ORDER — ACETAMINOPHEN 500 MG
650 TABLET ORAL ONCE
Refills: 0 | Status: COMPLETED | OUTPATIENT
Start: 2020-08-30 | End: 2020-08-30

## 2020-08-30 RX ORDER — LABETALOL HCL 100 MG
200 TABLET ORAL THREE TIMES A DAY
Refills: 0 | Status: DISCONTINUED | OUTPATIENT
Start: 2020-08-30 | End: 2020-09-01

## 2020-08-30 RX ORDER — HEPARIN SODIUM 5000 [USP'U]/ML
INJECTION INTRAVENOUS; SUBCUTANEOUS
Qty: 25000 | Refills: 0 | Status: DISCONTINUED | OUTPATIENT
Start: 2020-08-30 | End: 2020-09-01

## 2020-08-30 RX ORDER — HEPARIN SODIUM 5000 [USP'U]/ML
5500 INJECTION INTRAVENOUS; SUBCUTANEOUS EVERY 6 HOURS
Refills: 0 | Status: DISCONTINUED | OUTPATIENT
Start: 2020-08-30 | End: 2020-09-01

## 2020-08-30 RX ORDER — HEPARIN SODIUM 5000 [USP'U]/ML
2500 INJECTION INTRAVENOUS; SUBCUTANEOUS EVERY 6 HOURS
Refills: 0 | Status: DISCONTINUED | OUTPATIENT
Start: 2020-08-30 | End: 2020-09-01

## 2020-08-30 RX ADMIN — IOHEXOL 30 MILLILITER(S): 300 INJECTION, SOLUTION INTRAVENOUS at 07:48

## 2020-08-30 RX ADMIN — LIDOCAINE 1 PATCH: 4 CREAM TOPICAL at 07:48

## 2020-08-30 RX ADMIN — Medication 650 MILLIGRAM(S): at 18:10

## 2020-08-30 RX ADMIN — OXYCODONE AND ACETAMINOPHEN 1 TABLET(S): 5; 325 TABLET ORAL at 21:53

## 2020-08-30 RX ADMIN — Medication 650 MILLIGRAM(S): at 07:48

## 2020-08-30 RX ADMIN — Medication 650 MILLIGRAM(S): at 17:10

## 2020-08-30 RX ADMIN — LIDOCAINE 1 PATCH: 4 CREAM TOPICAL at 22:02

## 2020-08-30 RX ADMIN — HEPARIN SODIUM 1000 UNIT(S)/HR: 5000 INJECTION INTRAVENOUS; SUBCUTANEOUS at 21:58

## 2020-08-30 RX ADMIN — SODIUM CHLORIDE 1000 MILLILITER(S): 9 INJECTION INTRAMUSCULAR; INTRAVENOUS; SUBCUTANEOUS at 07:51

## 2020-08-30 RX ADMIN — HEPARIN SODIUM 0 UNIT(S)/HR: 5000 INJECTION INTRAVENOUS; SUBCUTANEOUS at 20:52

## 2020-08-30 RX ADMIN — PANTOPRAZOLE SODIUM 40 MILLIGRAM(S): 20 TABLET, DELAYED RELEASE ORAL at 07:47

## 2020-08-30 RX ADMIN — HEPARIN SODIUM 1200 UNIT(S)/HR: 5000 INJECTION INTRAVENOUS; SUBCUTANEOUS at 13:29

## 2020-08-30 RX ADMIN — OXYCODONE AND ACETAMINOPHEN 1 TABLET(S): 5; 325 TABLET ORAL at 23:00

## 2020-08-30 RX ADMIN — Medication 650 MILLIGRAM(S): at 08:48

## 2020-08-30 RX ADMIN — SODIUM CHLORIDE 1000 MILLILITER(S): 9 INJECTION INTRAMUSCULAR; INTRAVENOUS; SUBCUTANEOUS at 08:52

## 2020-08-30 RX ADMIN — Medication 100 MILLIGRAM(S): at 17:44

## 2020-08-30 RX ADMIN — Medication 200 MILLIGRAM(S): at 21:49

## 2020-08-30 RX ADMIN — Medication 200 MILLIGRAM(S): at 13:53

## 2020-08-30 NOTE — ED PROVIDER NOTE - NS_EDPROVIDERDISPOUSERTYPE_ED_A_ED
Pt medicated as ordered. Pt's HR down to 82 before administration of medications or fluids. Attending Attestation (For Attendings USE Only)...

## 2020-08-30 NOTE — H&P ADULT - NSICDXPASTSURGICALHX_GEN_ALL_CORE_FT
PAST SURGICAL HISTORY:  H/O vertebroplasty     History of back surgery Kyphoplasty    Status post peripheral artery angioplasty with insertion of stent

## 2020-08-30 NOTE — CONSULT NOTE ADULT - SUBJECTIVE AND OBJECTIVE BOX
AUGUSTUS FLORES  MRN-[id number]  65y    VASCULAR SURGERY/ DR. RODRIGUEZ     THIS IS A  64 YO FEMALE WITH PAST MEDICAL HISTORY OF HTN DUE TO RENAL ARTERY STENOSIS, NEUROPATHY, ANXIETY, DEPRESSION, OSTEOPOROSIS, PERICARDITIS, RECENT HOSPITAL ADMISSION 08/10/2020 FOR LEFT LOWER LEG DVT S/P THROMBECTOMY LEFT  POPLITEAL, FEMORAL, ILIAC VEIN AND COMMON ILIAC VEIN STENT AND ANGIOPLASTY 08/11/2020 , DISCHARGED HOME ON ELIQUIS ON 08/14/2020, ADMITS TO BE COMPLIANT WITH ELIQUIS PRESENTED TO ER AT THE DIRECTION OF HER CARDIOLOGIST AND INTERNIST FOR EVALUATION OF LEFT SIDED CHEST / RIB/ SUBCOSTAL PAIN/ DISCOMFORT.    SHE DENIES ANY SOB, HOWARD, COUGH, HEMOPTYSIS, PLEURITIC CHEST PAIN, FEVER, CHILLS, N/V, RECENT TRAVEL, LONG PERIOD STANDING, TRAUMA.     SHE HAS MINIMAL LEFT CALF PAIN.                 PAST MEDICAL & SURGICAL HISTORY:    Neuropathy (G62.9)  DJD (degenerative joint disease) (M19.90)  Benign essential HTN (I10)  DVT, lower extremity (I82.409)  Grade I diastolic dysfunction (I51.9)  Drug-seeking behavior (Z76.5)  Renal arteriosclerosis (I12.9)  Constipation (K59.00)  Anxiety (F41.9)  Depression (F32.9)  HTN (hypertension) (I10)  Shingles (B02.9)  Pericarditis (423.9)  Osteoporosis (733.00)  Seizure disorder (345.90)  Migraines (346.90)  H/O vertebroplasty (Z98.890)  History of back surgery (Z98.890): Kyphoplasty    S/P THROMBECTOMY LEFT  POPLITEAL, FEMORAL, ILIAC VEIN AND COMMON ILIAC VEIN STENT AND ANGIOPLASTY 08/11/2020       Home Medications:    amLODIPine 10 mg oral tablet: 1 tab(s) orally once a day (30 Aug 2020 12:29)  aspirin 81 mg oral delayed release tablet: 1 tab(s) orally once a day (30 Aug 2020 12:29)  Eliquis 5 mg oral tablet: 1 tab(s) orally 2 times a day (30 Aug 2020 12:29)  Esgic oral tablet: 1 tab(s) orally every 4 hours, As Needed for Migraines (30 Aug 2020 12:29)  labetalol 200 mg oral tablet: 1 tab(s) orally 3 times a day (30 Aug 2020 12:29)  losartan 100 mg oral tablet: 1 tab(s) orally once a day (30 Aug 2020 12:29)  pantoprazole 40 mg oral delayed release tablet: 1 tab(s) orally once a day (before a meal) (30 Aug 2020 12:29)  pregabalin 100 mg oral capsule: 1 cap(s) orally 3 times a day, As Needed (30 Aug 2020 12:29)  sertraline 25 mg oral tablet: 1 tab(s) orally once a day (30 Aug 2020 12:29)  topiramate 100 mg oral tablet: 1 tab(s) orally 2 times a day (30 Aug 2020 12:29)  Vitamin D3 5000 intl units (125 mcg) oral capsule: 1 cap(s) orally once a day (30 Aug 2020 12:29)      Allergies    penicillin (Anaphylaxis)      REVIEW OF SYSTEMS:    CONSTITUTIONAL: No weakness, fevers or chills  EYES/ENT: No visual changes;  No vertigo or throat pain   NECK: No pain or stiffness  RESPIRATORY: No cough, wheezing, hemoptysis; No shortness of breath. LEFT SIDED RIB AND SUBCOSTAL PAIN   CARDIOVASCULAR: No chest pain or palpitations  GASTROINTESTINAL: No abdominal or epigastric pain. No nausea, vomiting, or hematemesis; No diarrhea or constipation. No melena or hematochezia.  GENITOURINARY: No dysuria, frequency or hematuria  NEUROLOGICAL: No numbness or weakness  SKIN: No itching, rashes    MEDICATIONS  (STANDING):  aspirin enteric coated 81 milliGRAM(s) Oral daily  heparin  Infusion.  Unit(s)/Hr (12 mL/Hr) IV Continuous <Continuous>  labetalol 200 milliGRAM(s) Oral three times a day  losartan 100 milliGRAM(s) Oral daily  pantoprazole    Tablet 40 milliGRAM(s) Oral before breakfast  senna 2 Tablet(s) Oral at bedtime  sertraline 25 milliGRAM(s) Oral daily  topiramate 100 milliGRAM(s) Oral two times a day    MEDICATIONS  (PRN):  acetaminophen   Tablet .. 650 milliGRAM(s) Oral every 6 hours PRN Temp greater or equal to 38C (100.4F), Mild Pain (1 - 3)  heparin   Injectable 5500 Unit(s) IV Push every 6 hours PRN For aPTT less than 40  heparin   Injectable 2500 Unit(s) IV Push every 6 hours PRN For aPTT between 40 - 57  magnesium hydroxide Suspension 30 milliLiter(s) Oral daily PRN Constipation  melatonin 5 milliGRAM(s) Oral at bedtime PRN Insomnia  oxycodone    5 mG/acetaminophen 325 mG 1 Tablet(s) Oral every 6 hours PRN Moderate Pain (4 - 6)         Vital Signs (24 Hrs):  T(C): 36.8 (08-30-20 @ 12:59), Max: 37.2 (08-30-20 @ 07:04)  HR: 62 (08-30-20 @ 12:59) (62 - 62)  BP: 165/70 (08-30-20 @ 12:59) (149/77 - 165/70)  RR: 17 (08-30-20 @ 12:59) (16 - 17)  SpO2: 98% (08-30-20 @ 12:59) (98% - 98%)  Daily Height in cm: 157.48 (30 Aug 2020 07:04)        PHYSICAL EXAM:    GENERAL: NAD  HEAD:  ATRAUMATIC, NORMOCEPHALIC  EYES: EOMI, PERRLA, CONJUNCTIVA AND SCLERA CLEAR   ENT: MOIST MUCOUS MEMBRANE   NECK: SUPPLE, NO JVD  CHEST/LUNG: CLEAR TO AUSCULTATION, NO W/R/R  HEART: REGULAR RATE, RHYTHM, NO MURMUR, RUBS, GALLOPS  ABDOMEN:  + BS, SOFT, NOT DISTENDED, NO PALPABLE MASS, NON TENDER TO PALPATION, NO GUARDING/ RIGIDITY. NO CVA  TENDERNESS   EXTREMITIES:  2+ PERIPHERAL PULSES, BRISK CAPILLARY REFILL. NO CLUBBING, CYANOSIS, OR EDEMA.   LEFT CALF TENDERNESS, NO ERYTHEMA, SOFT COMPARTMENTS    NERVOUS SYSTEM: ALERT AND ORIENTED X3, CLEAR SPEECH . NO DEFICITS   MUSCULOSKELETAL : FROM ALL 4 EXTREMITIES, FULL AND EQUAL STRENGTH   SKIN: NO RASH, INTACT                       10.8   7.10  )-----------( 235      ( 30 Aug 2020 07:38 )             34.8       30 Aug 2020 07:38    145    |  115    |  25     ----------------------------<  96     4.2     |  21     |  1.30     Ca    8.6        30 Aug 2020 07:38  Mg     2.5       30 Aug 2020 07:38    TPro  6.6    /  Alb  3.0    /  TBili  0.3    /  DBili  x      /  AST  22     /  ALT  29     /  AlkPhos  95     30 Aug 2020 07:38    PT/INR - ( 30 Aug 2020 07:38 )   PT: 13.1 sec;   INR: 1.13 ratio     PTT - ( 30 Aug 2020 07:38 )  PTT:31.9 sec    CT Angio Chest w/ IV Cont (08.30.20 @ 10:06)   EXAM:  CT ABDOMEN AND PELVIS OC IC                        EXAM:  CT ANGIO CHEST (W)AW IC                       INTERPRETATION:  CLINICAL INFORMATION: Left-sided upper abdominal pain.  Recent thrombectomy for left lower extremity deep venous thrombosis.    COMPARISON: CT scan abdomen and pelvis 8/11/2020.    PROCEDURE:  CT Angiography of the Chest was performed followed by portal venous phase imaging of the Abdomen and Pelvis.  IV Contrast: 90 ml of Omnipaque 350 was injected intravenously. 10 ml were discarded.  Oral contrast: None.  Sagittal and coronal reformats were performed as well as 3D (MIP) reconstructions.    FINDINGS:    CHEST:    LUNGS AND LARGE AIRWAYS: PLEURA:    Small bilateral pleural effusions.    The central airways remain patent.  There is linear atelectasis right middle and lower lobes.  There is linear atelectasis left lingula and lower lobes.    VESSELS: There is no CT evidence for acute pulmonary embolism.    There is atherosclerotic calcification of the thoracic aorta with ectasia of the ascending aorta measuring up to 3.3 cm. Coronary artery calcification is present.    HEART: Mild cardiomegaly. No pericardial effusion.    MEDIASTINUM AND CLEMENT: No enlarged lymphadenopathy.  Small hiatal hernia.    CHEST WALL AND LOWER NECK: Within normal limits.    ABDOMEN AND PELVIS:    LIVER: Tiny indeterminate hypodense lesion anterior aspect medial segment left hepatic lobe, stable in appearance.  BILE DUCTS: Normal caliber.  GALLBLADDER: Within normal limits.  SPLEEN: Within normal limits.  PANCREAS: Within normal limits.  ADRENALS: Stable appearance of the indeterminant left adrenal nodule.  KIDNEYS/URETERS:  Small, atrophic left kidney.  Small indeterminate hypodense lesion interpolar right kidney, stable in appearance.  No hydronephrosis.    BLADDER: Within normal limits.  REPRODUCTIVE ORGANS: Uterus and adnexa are within normal limits.    BOWEL: No bowel obstruction.  Appendix normal.  PERITONEUM: No ascites.    VESSELS: Atherosclerotic calcification of the abdominal aorta, which is normal in caliber.  Left common iliac vein stent grafts, which appears patent.    RETROPERITONEUM/LYMPH NODES: No enlarged retroperitoneal lymphadenopathy.  ABDOMINAL WALL: Within normal limits.    BONES: Vertebroplasties T5, T7 and T12.  Degenerative changes of the spine.  Focal sclerosis at the iliac aspect of the left sacroiliac joint is stable in appearance.    IMPRESSION:    No acute pulmonary embolism demonstrated.    Small bilateral pleural effusions and linear atelectasis as discussed.    No acute intra-abdominal pathology demonstrated.    ADDISON JONES M.D., ATTENDING RADIOLOGIST    US Duplex Venous Lower Ext Complete, Bilateral (08.30.20 @ 08:22)                    INTERPRETATION:  CLINICAL INFORMATION: Chest pain and shortness of breath. Recent thrombectomy left leg for extensive deep venous thrombosis.    COMPARISON: Doppler venous sonogram 8/10/2020.    TECHNIQUE: Duplex sonography of the BILATERAL LOWER extremity veins with color and spectral Doppler, with and without compression.    FINDINGS:    There is normal compressibility and flow within the left common femoral and femoral veins.  There is thrombus with incompressibility and lack of flow within the left popliteal and posterior tibial vein.    There is normal compressibility of the right common femoral, femoral and popliteal veins.  Doppler examination shows normal spontaneous and phasic flow.  No calf vein thrombosis is detected.    IMPRESSION:    Deep venous thrombosis left popliteal through posterior tibial vein; cannot distinguish between acute thrombus versus acute on chronic deep venous thrombus.    ADDISON JONES M.D., ATTENDING RADIOLOGIST    ASSESSMENT AND PLAN     LEFT POPLITEAL THROUGH POSTERIOR TIBIAL VEIN DVT , MOST LIKELY REPRESENTING THE SAME/ RESIDUAL DVT FROM 08/10/2020 ADMISSION    NO EVIDENCE OF ILIAC/ FEMORAL VEIN DVT, THROMBECTOMY ON 08/11/2020 WAS SUCCESSFUL  AGREE WITH ANTICOAGULATION WITH HEPARIN DRIP, ASA AND TRANSITION BACK TO ELIQUIS/ PO AC OF CHOICE WITH OUT PATIENT FOLLOW UP MONITORING    NO NEED FOR VASCULAR INTERVENTION AT THIS TIME   MEDICAL, HEM/ONC, PULMONARY  EVALUATION, CONSULT NOTED  RECOMMEND CARDIOLOGY CONSULT    ABOVE DISCUSSED WITH DR. RODRIGUEZ

## 2020-08-30 NOTE — ED ADULT NURSE REASSESSMENT NOTE - NS ED NURSE REASSESS COMMENT FT1
DVT found in left leg, started on Heparin gtt at 1200 Units per hour which was witnessed by MARIA LUISA Lieberman.  Computer not allowing second RN to cosign Heparin gtt rate stating the "task was already performed"  Heparin bolus not initiated as pt took Eliquis this morning.

## 2020-08-30 NOTE — ED PROVIDER NOTE - PROGRESS NOTE DETAILS
results dw dr darby regarding recurrent dvt he adv that because pt is on eliquis and that she has sx to admit to exclude other autoimmune coagulopathy alice quiroz will accept

## 2020-08-30 NOTE — CONSULT NOTE ADULT - SUBJECTIVE AND OBJECTIVE BOX
AUGUSTUS AYLAPAULINA    [facility] APER 03    Patient is a 65y old  Female who presents with a chief complaint of      Allergies    penicillin (Anaphylaxis)    Intolerances        HPI:      PAST MEDICAL & SURGICAL HISTORY:  Drug-seeking behavior  Renal arteriosclerosis  Constipation  Anxiety  Depression  HTN (hypertension)  Shingles  Pericarditis  Osteoporosis  Seizure disorder  Migraines  History of back surgery: Kyphoplasty      FAMILY HISTORY:  Family history of heart disease  Family history of colon cancer in mother        MEDICATIONS       Vital Signs Last 24 Hrs  T(C): 37.2 (30 Aug 2020 07:04), Max: 37.2 (30 Aug 2020 07:04)  T(F): 98.9 (30 Aug 2020 07:04), Max: 98.9 (30 Aug 2020 07:04)  HR: 62 (30 Aug 2020 07:04) (62 - 62)  BP: 149/77 (30 Aug 2020 07:04) (149/77 - 149/77)  BP(mean): --  RR: 16 (30 Aug 2020 07:04) (16 - 16)  SpO2: 98% (30 Aug 2020 07:04) (98% - 98%)            LABS:                        10.8   7.10  )-----------( 235      ( 30 Aug 2020 07:38 )             34.8     08-30    145  |  115<H>  |  25<H>  ----------------------------<  96  4.2   |  21<L>  |  1.30    Ca    8.6      30 Aug 2020 07:38  Mg     2.5     08-30    TPro  6.6  /  Alb  3.0<L>  /  TBili  0.3  /  DBili  x   /  AST  22  /  ALT  29  /  AlkPhos  95  08-30    PT/INR - ( 30 Aug 2020 07:38 )   PT: 13.1 sec;   INR: 1.13 ratio         PTT - ( 30 Aug 2020 07:38 )  PTT:31.9 sec  Urinalysis Basic - ( 30 Aug 2020 09:38 )    Color: Pale Yellow / Appearance: Clear / S.005 / pH: x  Gluc: x / Ketone: Negative  / Bili: Negative / Urobili: Negative   Blood: x / Protein: Negative / Nitrite: Negative   Leuk Esterase: Trace / RBC: 0-2 /HPF / WBC 0-2   Sq Epi: x / Non Sq Epi: Occasional / Bacteria: Occasional            WBC:  WBC Count: 7.10 K/uL ( @ 07:38)      MICROBIOLOGY:  RECENT CULTURES:        CARDIAC MARKERS ( 30 Aug 2020 07:38 )  <.015 ng/mL / x     / x     / x     / x            PT/INR - ( 30 Aug 2020 07:38 )   PT: 13.1 sec;   INR: 1.13 ratio         PTT - ( 30 Aug 2020 07:38 )  PTT:31.9 sec    Sodium:  Sodium, Serum: 145 mmol/L ( @ 07:38)      1.30 mg/dL  @ 07:38      Hemoglobin:  Hemoglobin: 10.8 g/dL ( @ 07:38)      Platelets: Platelet Count - Automated: 235 K/uL ( @ 07:38)      LIVER FUNCTIONS - ( 30 Aug 2020 07:38 )  Alb: 3.0 g/dL / Pro: 6.6 g/dL / ALK PHOS: 95 U/L / ALT: 29 U/L / AST: 22 U/L / GGT: x             Urinalysis Basic - ( 30 Aug 2020 09:38 )    Color: Pale Yellow / Appearance: Clear / S.005 / pH: x  Gluc: x / Ketone: Negative  / Bili: Negative / Urobili: Negative   Blood: x / Protein: Negative / Nitrite: Negative   Leuk Esterase: Trace / RBC: 0-2 /HPF / WBC 0-2   Sq Epi: x / Non Sq Epi: Occasional / Bacteria: Occasional        RADIOLOGY & ADDITIONAL STUDIES:

## 2020-08-30 NOTE — ED PROVIDER NOTE - OBJECTIVE STATEMENT
Pt is a 66 yo f who has multiple comorbidities was recently admitted to dr Gant for DVT and underwent thrombectomy of her left leg.  she was discharged to home on eliquis and daily asa.  she is a remote former smoker no longer a drinker denies current drug use. allergic to pcn pmd is [facility].  For past few days she endorses left lower chest/upper left abd pain that makes taking a deep breath difficult. she consulted with her pmd and cardiologist who recommended she come to er for evaluation of her sx.  no rash no fever no trauma no bleeding no bruising no n v d no other co  pain is worse on deep inspiration  no recent travel or prolonged immobilization

## 2020-08-30 NOTE — CONSULT NOTE ADULT - ASSESSMENT
DVT of left lower extremity s/p thrombectomy and discharged on eliquis.  Abnormal doppler of unclear significance but has persistant clot. Unclear that this represents eliquis failure and requires vascular evaluation. No evidence of PE on CT angio  anemia stable and probable secondary to chronic disease ie. renal insufficiency    Recommendations:  1.  vascular evaluation.  May require heparin drip temporarily and bridge back to eliquis. last eliquis this morning   2.  followup CBC  3.  further heme recommendations pending above  discussed with Dr. Gant and patient
SANDRA COFFMAN [facility] [id number]  1955 DOA 2020 DR SAI GANT            Initial evaluation/Pulmonary Critical Care consultation requested on 2020   by Dr Gant   from Dr Villegas   Patient examined chart reviewed    HOSPITAL ADMISSION   PATIENT CAME  FROM (if information available)      SANDRA COFFMAN [facility] [phone]  1955 DOA 2020 DR SAI GANT          REVIEW OF SYMPTOMS      Able to give ROS  Yes     RELIABLE No   CONSTITUTIONAL Weakness Yes  Chills No Vision changes No  ENDOCRINE No unexplained hair loss No heat or cold intolerance    ALLERGY No hives  Sore throat No   RESP Coughing blood no  Shortness of breath YES   NEURO No Headache  Confusion Pain neck No   CARDIAC No Chest pain No Palpitations   GI No Pain abdomen NO   Vomiting NO     PHYSICAL EXAM    HEENT Unremarkable PERRLA atraumatic   RESP Fair air entry EXP prolonged    Harsh breath sound Resp distres mild   CARDIAC S1 S2 No S3     NO JVD    ABDOMEN SOFT BS PRESENT NOT DISTENDED No hepatosplenomegaly PEDAL EDEMA present No calf tenderness  NO rash   GENERAL Not TOXIC looking    CC. 2020 pain under left rib associated with shortness of breath, since she left here after surgery, (8/10) getting worse  shortness of breath, pain ,rib    HPI/PMH.        65 year old female remote fpormer smoker and etoh with PMH of renal artery stenosis, HTN, seizures, migraines, osteoporosis, depression, anxiety, recently admitted 8/ with extensive Left lower extremity DVT involving the left common femoral, femoral, popliteal, and calf veins. 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)     Q scan was indeterminate   ECHO  ef 55% pasp 30 dd1  Pt was DCed  on therapeutic apixaban but has been having progressive pain l rib pleuritic and   V duplx 2020 showed v duplx thrombus l popl and post tibial cannot distinguish ac v ac on chronic   cta ch 2020 was negative for pe     Pt was admitted 2020 Pulm consulted     home meds  protonix 40 labetalol 200.3 asa 81 sertraline 50 losartan 50  topiramat 125.2         OXYGENATION        VITALS/LABS       2020 afeb 62 140/70       COURSE    2020 Admitted for unexplained pleuritic l chest pain several days while on apixaban in setting of recent thrombectomy l leg with neg cta ch (2020) and poss ac on chr l popl dvt (2020)       DISPOSITION PLANNING.  CHEST PAIN  RO MI Check enz  RO VTE RO apixaban failure Will get hemat and vasc surg eval to decide if she does not have underlying APLA or other thrombophilia and to follow up on recent vasc surgery and stent insertion   RO PERICARDITIS Cardio eval            TIME SPENT   Over 25 minutes aggregate care time spent on encounter; activities included   direct patient care, counseling and/or coordinating care reviewing notes, lab data/ imaging , discussion with multidisciplinary team/ patient  /family and explaining in detail risks, benefits, alternatives  of the recommendations

## 2020-08-30 NOTE — ED PROVIDER NOTE - RESPIRATORY, MLM
Breath sounds clear and equal bilaterally. pt has discomfort on deep inspiration pointing to left lower chest abd

## 2020-08-30 NOTE — ED ADULT NURSE REASSESSMENT NOTE - NS ED NURSE REASSESS COMMENT FT1
Repeat lab work (PTT, CBC and Troponin) drawn using a butterfly needle and sent to the lab as ordered prior to report being given to the floor.  Floor RN Soham is aware of this.

## 2020-08-30 NOTE — ED PROVIDER NOTE - CLINICAL SUMMARY MEDICAL DECISION MAKING FREE TEXT BOX
ro recurrent pe, dvt ro splenic infarct or bleed ro perforated peptic ulcer in pt on factor x inhibiotirs for recent dvt sp thrombectomy with other comorbidities ro acs ro pn ct cta labs ekg us doppler

## 2020-08-30 NOTE — H&P ADULT - ATTENDING COMMENTS
Pt is a 66 yo Female ,well known to me after recent hospitalization with  pmhx significant  for HTN , Osteoporosis ,Sz ,Pericarditis , Renal atherosclerosis , Shingles ,Migraines ,Depression ,Constipation ,Anxiety ,DJD ,hx of vertebroplasties recent admission with extensive ACUTE DVT involving the left common femoral, femoral, popliteal, and calf veins, ,S/P Thrombectomy( mechanical, vein, endovascular  Left lower extremity thrombectomy popliteal and femoral vein, Left common iliac vein angioplasty and stent by Dr. Zarco, [facility] 08/11/20 ),  she was discharged  home on Eliquis and daily ASA. For past few days she endorses left lower chest/upper left abd pain that makes taking a deep breath difficult. Patient  consulted with her pmd and cardiologist who recommended she come to ED for evaluation of her symptoms Patient admits pain is worse on deep inspiration ,no recent travel or prolonged immobilization .CTA was performed in ED no evidence of PE ,seen by pulmonologist .CTT demonstrated bilateral pleural effusions Admitted  to telemetry unit for monitoring , send 3 sets of cardiac enzymes to rule out acute coronary event, obtain ECHO to evaluate LVEF, cardiology consult  ,continue current management, O2 supply, anticoagulation plan as per cardiology consult Dr Fischer .Hematology and vascular surgery consults are requested .patient started on heparin drip in ER .

## 2020-08-30 NOTE — ED PROVIDER NOTE - CONSTITUTIONAL, MLM
normal... Well appearing, awake, alert, oriented to person, place, time/situation and in no apparent distress. no pallor

## 2020-08-30 NOTE — ED ADULT NURSE REASSESSMENT NOTE - NS ED NURSE REASSESS COMMENT FT1
Report received from MARIA LUISA Meade in [facility].  Patient has pending repeat labs at 1930 and bed assignment in hospital, pending report to the floor.

## 2020-08-30 NOTE — CONSULT NOTE ADULT - ATTENDING COMMENTS
65F with extensive iliofemoral and distal LLE DVT s/p percutaneous thrombectomy and left iliac vein stent on 8/11/20  Now admitted with chest pain.  She reports that her left leg swelling has significantly improved  CT abdomen confirms patent left iliac stent.  Venous duplex shows patent left common femoral and femoral veins with persistent DVT in the popliteal and PTV.  She does not require any thrombectomy or IVC filter placement.  I suspect the DVTs seen on most recent venous duplex do not represent new thrombosis but residual clot burden from previous DVT.  Continue therapeutic AC

## 2020-08-30 NOTE — ED ADULT TRIAGE NOTE - CHIEF COMPLAINT QUOTE
pain under left rib associated with shortness of breath, since she left here after surgery, (8/10) getting worse

## 2020-08-30 NOTE — ED ADULT NURSE NOTE - OBJECTIVE STATEMENT
Presents to ER with left sided chest pain. Pt states she just had surgery here 2 weeks ago for thrombectomy of the left leg.  Was started on Eliquis twice a day.  States pain worsens when she palpates under her breast. Denies any long car rides or plane rides.

## 2020-08-30 NOTE — H&P ADULT - ASSESSMENT
Pt is a 66 yo Female ,well known to me after recent hospitalization with  pmhx significant  for HTN , Osteoporosis ,Sz ,Pericarditis , Renal atherosclerosis , Shingles ,Migraines ,Depression ,Constipation ,Anxiety ,DJD ,hx of vertebroplasties recent admission with extensive ACUTE DVT involving the left common femoral, femoral, popliteal, and calf veins, ,S/P Thrombectomy( mechanical, vein, endovascular  Left lower extremity thrombectomy popliteal and femoral vein, Left common iliac vein angioplasty and stent by Dr. Zarco, [facility] 08/11/20 ),  she was discharged  home on Eliquis and daily ASA. For past few days she endorses left lower chest/upper left abd pain that makes taking a deep breath difficult. Patient  consulted with her pmd and cardiologist who recommended she come to ED for evaluation of her symptoms Patient admits pain is worse on deep inspiration ,no recent travel or prolonged immobilization .CTA was performed in ED no evidence of PE ,seen by pulmonologist .CTT demonstrated bilateral pleural effusions Admitted  to telemetry unit for monitoring , send 3 sets of cardiac enzymes to rule out acute coronary event, obtain ECHO to evaluate LVEF, cardiology consult  ,continue current management, O2 supply, anticoagulation plan as per cardiology consult Dr Fischer .Hematology and vascular surgery consults are requested .patient started on heparin drip in ER .

## 2020-08-30 NOTE — H&P ADULT - PROBLEM SELECTOR PLAN 1
Admitted  to telemetry unit for monitoring , send 3 sets of cardiac enzymes to rule out acute coronary event, obtain ECHO to evaluate LVEF, cardiology consult  ,continue current management, O2 supply, anticoagulation plan as per cardiology consult

## 2020-08-30 NOTE — H&P ADULT - NEGATIVE GASTROINTESTINAL SYMPTOMS
no constipation/no hematochezia/no steatorrhea/no hiccoughs/no diarrhea/no change in bowel habits/no nausea/no vomiting/no flatulence/no abdominal pain/no melena/no jaundice

## 2020-08-30 NOTE — H&P ADULT - NSICDXPASTMEDICALHX_GEN_ALL_CORE_FT
PAST MEDICAL HISTORY:  Anxiety     Benign essential HTN     Constipation     Depression     DJD (degenerative joint disease)     Drug-seeking behavior     DVT, lower extremity     Grade I diastolic dysfunction     HTN (hypertension)     Migraines     Neuropathy     Osteoporosis     Pericarditis     Renal arteriosclerosis     Seizure disorder     Shingles

## 2020-08-30 NOTE — ED ADULT TRIAGE NOTE - NSWEIGHTCALCTOOLDRUG_GEN_A_CORE
ATTENDING STATEMENT    I discussed the case with the Resident. I agree with the Resident's findings and plan, as documented in today's note.     Dr. Marlon Florence        used

## 2020-08-30 NOTE — CONSULT NOTE ADULT - SUBJECTIVE AND OBJECTIVE BOX
[facility]     Patient is a 65y old  Female who presents with a chief complaint of LEFT SIDED CHEST PAIN (30 Aug 2020 16:18)      HPI:  Pt is a 64 yo Female ,well known to me after recent hospitalization with  pmhx significant  for HTN , Osteoporosis ,Sz ,Pericarditis , Renal atherosclerosis , Shingles ,Migraines ,Depression ,Constipation ,Anxiety ,DJD ,hx of vertebroplasties recent admission with extensive ACUTE DVT involving the left common femoral, femoral, popliteal, and calf veins, ,S/P Thrombectomy( mechanical, vein, endovascular  Left lower extremity thrombectomy popliteal and femoral vein, Left common iliac vein angioplasty and stent by Dr. Zarco, Replaced by [facility] 20 ),  she was discharged  home on Eliquis and daily ASA. For past few days she endorses left lower chest/upper left abd pain that makes taking a deep breath difficult. Patient  consulted with her pmd and cardiologist who recommended she come to ED for evaluation of her symptoms Patient admits pain is worse on deep inspiration ,no recent travel or prolonged immobilization .CTA was performed in ED no evidence of PE ,seen by pulmonologist .CTT demonstrated bilateral pleural effusions Admitted  to telemetry unit for monitoring , send 3 sets of cardiac enzymes to rule out acute coronary event, obtain ECHO to evaluate LVEF, cardiology consult  ,continue current management, O2 supply, anticoagulation plan as per cardiology consult Dr Fischer .Hematology and vascular surgery consults are requested .patient started on heparin drip in ER . (30 Aug 2020 14:16)      HPI:    65y Female for Cardiology Consult    PAST MEDICAL & SURGICAL HISTORY:  Neuropathy  DJD (degenerative joint disease)  Benign essential HTN  DVT, lower extremity  Grade I diastolic dysfunction  Drug-seeking behavior  Renal arteriosclerosis  Constipation  Anxiety  Depression  HTN (hypertension)  Shingles  Pericarditis  Osteoporosis  Seizure disorder  Migraines  H/O vertebroplasty  Status post peripheral artery angioplasty with insertion of stent  History of back surgery: Kyphoplasty      FAMILY HISTORY:  Family history of heart disease  Family history of colon cancer in mother      SOCIAL HISTORY:   Alcohol:  Smoking:    Allergies    penicillin (Anaphylaxis)    Intolerances        MEDICATIONS  (STANDING):  amLODIPine   Tablet 10 milliGRAM(s) Oral daily  aspirin enteric coated 81 milliGRAM(s) Oral daily  heparin  Infusion.  Unit(s)/Hr (12 mL/Hr) IV Continuous <Continuous>  labetalol 200 milliGRAM(s) Oral three times a day  losartan 100 milliGRAM(s) Oral daily  pantoprazole    Tablet 40 milliGRAM(s) Oral before breakfast  senna 2 Tablet(s) Oral at bedtime  sertraline 25 milliGRAM(s) Oral daily  topiramate 100 milliGRAM(s) Oral two times a day    MEDICATIONS  (PRN):  acetaminophen   Tablet .. 650 milliGRAM(s) Oral every 6 hours PRN Temp greater or equal to 38C (100.4F), Mild Pain (1 - 3)  heparin   Injectable 5500 Unit(s) IV Push every 6 hours PRN For aPTT less than 40  heparin   Injectable 2500 Unit(s) IV Push every 6 hours PRN For aPTT between 40 - 57  magnesium hydroxide Suspension 30 milliLiter(s) Oral daily PRN Constipation  melatonin 5 milliGRAM(s) Oral at bedtime PRN Insomnia  oxycodone    5 mG/acetaminophen 325 mG 1 Tablet(s) Oral every 6 hours PRN Moderate Pain (4 - 6)      REVIEW OF SYSTEMS:  CONSTITUTIONAL: No fever, weight loss, chills, shakes, or fat  RESPIRATORY: No cough, wheezing, hemoptysis, or shortness of breath  CARDIOVASCULAR: No chest pain, dyspnea, palpitations, dizziness, syncope, paroxysmal nocturnal dyspnea, orthopnea, or arm or leg swelling  GASTROINTESTINAL: No abdominal  or epigastric pain, nausea, vomiting, hematemesis, diarrhea, constipation, melena or bright red bloo  NEUROLOGICAL: No headaches, memory loss, slurred speech, limb weakness, loss of strength, numbness, or tremors  SKIN: No itching, burning, rashes, or lesions  ENDOCRINE: No heat or cold intolerance, or hair loss  MUSCULOSKELETAL: No joint pain or swelling, muscle, back, or extremity pain  HEME/LYMPH: No easy bruising or bleeding gums  ALLERY AND IMMUNOLOGIC: No hives or rash.    Vital Signs Last 24 Hrs  T(C): 36.9 (30 Aug 2020 19:59), Max: 37.2 (30 Aug 2020 07:04)  T(F): 98.4 (30 Aug 2020 19:59), Max: 98.9 (30 Aug 2020 07:04)  HR: 60 (30 Aug 2020 19:59) (60 - 74)  BP: 186/75 (30 Aug 2020 19:59) (136/78 - 186/75)  BP(mean): --  RR: 18 (30 Aug 2020 19:59) (16 - 18)  SpO2: 97% (30 Aug 2020 19:59) (97% - 99%)    PHYSICAL EXAM:  HEAD:  Atraumatic, Normocephalic  EYES: EOMI, PERRLA, conjunctiva and sclera clear  NECK: Supple and normal thyroid.  No JVD or carotid bruit.   HEART: S1, S2 regular , 1/6 soft ejection systolic murmur in mitral area , no thrill and no gallops .  PULMONARY: Bilateral vesicular breathing , few scattered ronchi and few scattered rales are present .  ABDOMEN: Soft nontender and positive bowl sounds   EXTREMITIES:  No clubbing, cyanosis, or pedal  edema  NEUROLOGICAL: AAOX3 , no focal deficit .    LABS:                        10.7   7.71  )-----------( 227      ( 30 Aug 2020 19:49 )             34.2     08-30    145  |  115<H>  |  25<H>  ----------------------------<  96  4.2   |  21<L>  |  1.30    Ca    8.6      30 Aug 2020 07:38  Mg     2.5     08-30    TPro  6.6  /  Alb  3.0<L>  /  TBili  0.3  /  DBili  x   /  AST  22  /  ALT  29  /  AlkPhos  95  08-30    CARDIAC MARKERS ( 30 Aug 2020 19:51 )  <.015 ng/mL / x     / x     / x     / x      CARDIAC MARKERS ( 30 Aug 2020 07:38 )  <.015 ng/mL / x     / x     / x     / x          PT/INR - ( 30 Aug 2020 19:49 )   PT: 14.2 sec;   INR: 1.23 ratio         PTT - ( 30 Aug 2020 19:49 )  PTT:136.8 sec  Urinalysis Basic - ( 30 Aug 2020 09:38 )    Color: Pale Yellow / Appearance: Clear / S.005 / pH: x  Gluc: x / Ketone: Negative  / Bili: Negative / Urobili: Negative   Blood: x / Protein: Negative / Nitrite: Negative   Leuk Esterase: Trace / RBC: 0-2 /HPF / WBC 0-2   Sq Epi: x / Non Sq Epi: Occasional / Bacteria: Occasional      BNP      EKG:  ECHO:  IMAGING:    Assessment and Plan :     Will continue to follow during hospital course and give further recommendations as needed . Thanks for your referral . if any questions please contact me at office [phone] [phone]) Lio Fischer MD [facility]  Cardiology Consult :    Patient is a 65y old  Female who presents with a chief complaint of LEFT SIDED CHEST PAIN (30 Aug 2020 16:18)      HPI:  Pt is a 66 yo Female ,well known to me after recent hospitalization with  pmhx significant  for HTN , Osteoporosis ,Sz ,Pericarditis , Renal atherosclerosis , Shingles ,Migraines ,Depression ,Constipation ,Anxiety ,DJD ,hx of vertebroplasties recent admission with extensive ACUTE DVT involving the left common femoral, femoral, popliteal, and calf veins, ,S/P Thrombectomy( mechanical, vein, endovascular  Left lower extremity thrombectomy popliteal and femoral vein, Left common iliac vein angioplasty and stent by Dr. Zarco, [facility] 20 ),  she was discharged  home on Eliquis and daily ASA. For past few days she endorses left lower chest/upper left abd pain that makes taking a deep breath difficult. Patient  consulted with her pmd and cardiologist who recommended she come to ED for evaluation of her symptoms Patient admits pain is worse on deep inspiration ,no recent travel or prolonged immobilization .CTA was performed in ED no evidence of PE ,seen by pulmonologist .CTT demonstrated bilateral pleural effusions Admitted  to telemetry unit for monitoring , send 3 sets of cardiac enzymes to rule out acute coronary event, obtain ECHO to evaluate LVEF, cardiology consult  ,continue current management, O2 supply, anticoagulation plan as per cardiology consult Dr Fischer .Hematology and vascular surgery consults are requested .patient started on heparin drip in ER . (30 Aug 2020 14:16)      HPI:    65y Female for Cardiology Consult    PAST MEDICAL & SURGICAL HISTORY:  Neuropathy  DJD (degenerative joint disease)  Benign essential HTN  DVT, lower extremity  Grade I diastolic dysfunction  Drug-seeking behavior  Renal arteriosclerosis  Constipation  Anxiety  Depression  HTN (hypertension)  Shingles  Pericarditis  Osteoporosis  Seizure disorder  Migraines  H/O vertebroplasty  Status post peripheral artery angioplasty with insertion of stent  History of back surgery: Kyphoplasty      FAMILY HISTORY:  Family history of heart disease  Family history of colon cancer in mother      SOCIAL HISTORY:   Alcohol:  Smoking:    Allergies    penicillin (Anaphylaxis)    Intolerances        MEDICATIONS  (STANDING):  amLODIPine   Tablet 10 milliGRAM(s) Oral daily  aspirin enteric coated 81 milliGRAM(s) Oral daily  heparin  Infusion.  Unit(s)/Hr (12 mL/Hr) IV Continuous <Continuous>  labetalol 200 milliGRAM(s) Oral three times a day  losartan 100 milliGRAM(s) Oral daily  pantoprazole    Tablet 40 milliGRAM(s) Oral before breakfast  senna 2 Tablet(s) Oral at bedtime  sertraline 25 milliGRAM(s) Oral daily  topiramate 100 milliGRAM(s) Oral two times a day    MEDICATIONS  (PRN):  acetaminophen   Tablet .. 650 milliGRAM(s) Oral every 6 hours PRN Temp greater or equal to 38C (100.4F), Mild Pain (1 - 3)  heparin   Injectable 5500 Unit(s) IV Push every 6 hours PRN For aPTT less than 40  heparin   Injectable 2500 Unit(s) IV Push every 6 hours PRN For aPTT between 40 - 57  magnesium hydroxide Suspension 30 milliLiter(s) Oral daily PRN Constipation  melatonin 5 milliGRAM(s) Oral at bedtime PRN Insomnia  oxycodone    5 mG/acetaminophen 325 mG 1 Tablet(s) Oral every 6 hours PRN Moderate Pain (4 - 6)    Vital Signs Last 24 Hrs  T(C): 36.9 (30 Aug 2020 19:59), Max: 37.2 (30 Aug 2020 07:04)  T(F): 98.4 (30 Aug 2020 19:59), Max: 98.9 (30 Aug 2020 07:04)  HR: 60 (30 Aug 2020 19:59) (60 - 74)  BP: 186/75 (30 Aug 2020 19:59) (136/78 - 186/75)  BP(mean): --  RR: 18 (30 Aug 2020 19:59) (16 - 18)  SpO2: 97% (30 Aug 2020 19:59) (97% - 99%)                        10.7   7.71  )-----------( 227      ( 30 Aug 2020 19:49 )             34.2     08-30    145  |  115<H>  |  25<H>  ----------------------------<  96  4.2   |  21<L>  |  1.30    Ca    8.6      30 Aug 2020 07:38  Mg     2.5     08-30    TPro  6.6  /  Alb  3.0<L>  /  TBili  0.3  /  DBili  x   /  AST  22  /  ALT  29  /  AlkPhos  95  08-30    CARDIAC MARKERS ( 30 Aug 2020 19:51 )  <.015 ng/mL / x     / x     / x     / x      CARDIAC MARKERS ( 30 Aug 2020 07:38 )  <.015 ng/mL / x     / x     / x     / x          PT/INR - ( 30 Aug 2020 19:49 )   PT: 14.2 sec;   INR: 1.23 ratio         PTT - ( 30 Aug 2020 19:49 )  PTT:136.8 sec  Urinalysis Basic - ( 30 Aug 2020 09:38 )    Color: Pale Yellow / Appearance: Clear / S.005 / pH: x  Gluc: x / Ketone: Negative  / Bili: Negative / Urobili: Negative   Blood: x / Protein: Negative / Nitrite: Negative   Leuk Esterase: Trace / RBC: 0-2 /HPF / WBC 0-2   Sq Epi: x / Non Sq Epi: Occasional / Bacteria: Occasional      Assessment and Plan :   FULL CONSULT DICTATED .  65 years old female has sharp chest pain on left lower part of chest and left upper abdomen sharp pain . Troponin I negative so far . Chest pain is atypical in nature . Patient BP mild elevated . Patient has residual clot in left lower extremity . Cardiac stable so far .  Will continue to follow during hospital course and give further recommendations as needed . Thanks for your referral . if any questions please contact me at office ([phone])

## 2020-08-30 NOTE — CONSULT NOTE ADULT - SUBJECTIVE AND OBJECTIVE BOX
Patient is a 65y old  Female who presents with a chief complaint of     HPI:  65 year old female asked to evaluate for hypercoaguability. Patient admitted [facility] several weeks ago with DVT requiring thrombectomy.  Had equivical v/q scan for PE and discharged on eliquis.  Patient now presents with left pleuritic chest pain.  Ct hazel negative but abnormal doppler of LE with persistent clot ?acute on chronic.  Now being seen in additional heme evaluation       ROS:  Negative except for:    PAST MEDICAL & SURGICAL HISTORY:  Drug-seeking behavior  Renal arteriosclerosis  Constipation  Anxiety  Depression  HTN (hypertension)  Shingles  Pericarditis  Osteoporosis  Seizure disorder  Migraines  History of back surgery: Kyphoplasty      SOCIAL HISTORY:    FAMILY HISTORY:  Family history of heart disease  Family history of colon cancer in mother      MEDICATIONS  (STANDING):    MEDICATIONS  (PRN):      Allergies    penicillin (Anaphylaxis)    Intolerances        Vital Signs Last 24 Hrs  T(C): 37.2 (30 Aug 2020 07:04), Max: 37.2 (30 Aug 2020 07:04)  T(F): 98.9 (30 Aug 2020 07:04), Max: 98.9 (30 Aug 2020 07:04)  HR: 62 (30 Aug 2020 07:04) (62 - 62)  BP: 149/77 (30 Aug 2020 07:04) (149/77 - 149/77)  BP(mean): --  RR: 16 (30 Aug 2020 07:04) (16 - 16)  SpO2: 98% (30 Aug 2020 07:04) (98% - 98%)    PHYSICAL EXAM  General: adult in NAD  HEENT: clear oropharynx, anicteric sclera, pink conjunctivae  Neck: supple  CV: normal S1S2 with no murmur rubs or gallops  Lungs: clear to auscultation, no wheezes, no rhales  Abdomen: soft non-tender non-distended, no hepato/splenomegaly  Ext: no clubbing cyanosis or edema  Skin: no rashes and no petichiae  Neuro: alert and oriented X3 no focal deficits      LABS:    CBC Full  -  ( 30 Aug 2020 07:38 )  WBC Count : 7.10 K/uL  RBC Count : 4.01 M/uL  Hemoglobin : 10.8 g/dL  Hematocrit : 34.8 %  Platelet Count - Automated : 235 K/uL  Mean Cell Volume : 86.8 fl  Mean Cell Hemoglobin : 26.9 pg  Mean Cell Hemoglobin Concentration : 31.0 gm/dL  Auto Neutrophil # : 4.29 K/uL  Auto Lymphocyte # : 1.50 K/uL  Auto Monocyte # : 0.81 K/uL  Auto Eosinophil # : 0.40 K/uL  Auto Basophil # : 0.06 K/uL  Auto Neutrophil % : 60.5 %  Auto Lymphocyte % : 21.1 %  Auto Monocyte % : 11.4 %  Auto Eosinophil % : 5.6 %  Auto Basophil % : 0.8 %    08-30    145  |  115<H>  |  25<H>  ----------------------------<  96  4.2   |  21<L>  |  1.30    Ca    8.6      30 Aug 2020 07:38  Mg     2.5     08-30    TPro  6.6  /  Alb  3.0<L>  /  TBili  0.3  /  DBili  x   /  AST  22  /  ALT  29  /  AlkPhos  95  08-30    PT/INR - ( 30 Aug 2020 07:38 )   PT: 13.1 sec;   INR: 1.13 ratio         PTT - ( 30 Aug 2020 07:38 )  PTT:31.9 sec          BLOOD SMEAR INTERPRETATION:    RADIOLOGY & ADDITIONAL STUDIES:    < from: CT Angio Chest w/ IV Cont (08.30.20 @ 10:06) >  EXAM:  CT ABDOMEN AND PELVIS OC IC                          EXAM:  CT ANGIO CHEST (W)AW IC                            PROCEDURE DATE:  08/30/2020          INTERPRETATION:  CLINICAL INFORMATION: Left-sided upper abdominal pain.  Recent thrombectomy for left lower extremity deep venous thrombosis.    COMPARISON: CT scan abdomen and pelvis 8/11/2020.    PROCEDURE:  CT Angiography of the Chest was performed followed by portal venous phase imaging of the Abdomen and Pelvis.  IV Contrast: 90 ml of Omnipaque 350 was injected intravenously. 10 ml were discarded.  Oral contrast: None.  Sagittal and coronal reformats were performed as well as 3D (MIP) reconstructions.    FINDINGS:    CHEST:    LUNGS AND LARGE AIRWAYS: PLEURA:    Small bilateral pleural effusions.    The central airways remain patent.  There is linear atelectasis right middle and lower lobes.  There is linear atelectasis left lingula and lower lobes.    VESSELS: There is no CT evidence for acute pulmonary embolism.    There is atherosclerotic calcification of the thoracic aorta with ectasia of the ascending aorta measuring up to 3.3 cm. Coronary artery calcification is present.    HEART: Mild cardiomegaly. No pericardial effusion.    MEDIASTINUM AND CLEMENT: No enlarged lymphadenopathy.  Small hiatal hernia.    CHEST WALL AND LOWER NECK: Within normal limits.    ABDOMEN AND PELVIS:    LIVER: Tiny indeterminate hypodense lesion anterior aspect medial segment left hepatic lobe, stable in appearance.  BILE DUCTS: Normal caliber.  GALLBLADDER: Within normal limits.  SPLEEN: Within normal limits.  PANCREAS: Within normal limits.  ADRENALS: Stable appearance of the indeterminant left adrenal nodule.  KIDNEYS/URETERS:  Small, atrophic left kidney.  Small indeterminate hypodense lesion interpolar right kidney, stable in appearance.  No hydronephrosis.    BLADDER: Within normal limits.  REPRODUCTIVE ORGANS: Uterus and adnexa are within normal limits.    BOWEL: No bowel obstruction.  Appendix normal.  PERITONEUM: No ascites.    VESSELS: Atherosclerotic calcification of the abdominal aorta, which is normal in caliber.  Left common iliac vein stent grafts, which appears patent.    RETROPERITONEUM/LYMPH NODES: No enlarged retroperitoneal lymphadenopathy.  ABDOMINAL WALL: Within normal limits.    BONES: Vertebroplasties T5, T7 and T12.  Degenerative changes of the spine.  Focal sclerosis at the iliac aspect of the left sacroiliac joint is stable in appearance.    IMPRESSION:    No acute pulmonary embolism demonstrated.    Small bilateral pleural effusions and linear atelectasis as discussed.    No acute intra-abdominal pathology demonstrated.    Other findings as discussed.                ADDISON JONES M.D., ATTENDING RADIOLOGIST  This document has been electronically signed. Aug 30 2020 10:32AM          < end of copied text >    < from: US Duplex Venous Lower Ext Complete, Bilateral (08.30.20 @ 08:22) >  EXAM:  US DPLX LWR EXT VEINS COMPL BI                            PROCEDURE DATE:  08/30/2020          INTERPRETATION:  CLINICAL INFORMATION: Chest pain and shortness of breath. Recent thrombectomy left leg for extensive deep venous thrombosis.    COMPARISON: Doppler venous sonogram 8/10/2020.    TECHNIQUE: Duplex sonography of the BILATERAL LOWER extremity veins with color and spectral Doppler, with and without compression.    FINDINGS:    There is normal compressibility and flow within the left common femoral and femoral veins.  There is thrombus with incompressibility and lack of flow within the left popliteal and posterior tibial vein.    There is normal compressibility of the right common femoral, femoral and popliteal veins.  Doppler examination shows normal spontaneous and phasic flow.  No calf vein thrombosis is detected.    IMPRESSION:    Deep venous thrombosis left popliteal through posterior tibial vein; cannot distinguish between acute thrombus versus acute on chronic deep venous thrombus.                  ADDISON JONES M.D., ATTENDING RADIOLOGIST    < end of copied text >

## 2020-08-30 NOTE — ED ADULT NURSE NOTE - NSIMPLEMENTINTERV_GEN_ALL_ED
Implemented All Fall Risk Interventions:  [place] to call system. Call bell, personal items and telephone within reach. Instruct patient to call for assistance. Room bathroom lighting operational. Non-slip footwear when patient is off stretcher. Physically safe environment: no spills, clutter or unnecessary equipment. Stretcher in lowest position, wheels locked, appropriate side rails in place. Provide visual cue, wrist band, yellow gown, etc. Monitor gait and stability. Monitor for mental status changes and reorient to person, place, and time. Review medications for side effects contributing to fall risk. Reinforce activity limits and safety measures with patient and family.

## 2020-08-30 NOTE — H&P ADULT - HISTORY OF PRESENT ILLNESS
Pt is a 66 yo Female ,well known to me after recent hospitalization with  pmhx significant  for HTN , Osteoporosis ,Sz ,Pericarditis , Renal atherosclerosis , Shingles ,Migraines ,Depression ,Constipation ,Anxiety ,DJD ,hx of vertebroplasties recent admission with extensive ACUTE DVT involving the left common femoral, femoral, popliteal, and calf veins, ,S/P Thrombectomy( mechanical, vein, endovascular  Left lower extremity thrombectomy popliteal and femoral vein, Left common iliac vein angioplasty and stent by Dr. Zarco, [facility] 08/11/20 ),  she was discharged  home on Eliquis and daily ASA. For past few days she endorses left lower chest/upper left abd pain that makes taking a deep breath difficult. Patient  consulted with her pmd and cardiologist who recommended she come to ED for evaluation of her symptoms Patient admits pain is worse on deep inspiration ,no recent travel or prolonged immobilization .CTA was performed in ED no evidence of PE ,seen by pulmonologist .CTT demonstrated bilateral pleural effusions Admitted  to telemetry unit for monitoring , send 3 sets of cardiac enzymes to rule out acute coronary event, obtain ECHO to evaluate LVEF, cardiology consult  ,continue current management, O2 supply, anticoagulation plan as per cardiology consult Dr Fischer .Hematology and vascular surgery consults are requested .patient started on heparin drip in ER .

## 2020-08-30 NOTE — H&P ADULT - PROBLEM SELECTOR PLAN 2
on heparin gtt ,hypercoagulable workup as per hematology consult , serial cbc ,pt/inr ,case d/w Dr QUARLES

## 2020-08-30 NOTE — H&P ADULT - NSHPLABSRESULTS_GEN_ALL_CORE
< from: CT Angio Chest w/ IV Cont (08.30.20 @ 10:06) >    CHEST WALL AND LOWER NECK: Within normal limits.    ABDOMEN AND PELVIS:    LIVER: Tiny indeterminate hypodense lesion anterior aspect medial segment left hepatic lobe, stable in appearance.  BILE DUCTS: Normal caliber.  GALLBLADDER: Within normal limits.  SPLEEN: Within normal limits.  PANCREAS: Within normal limits.  ADRENALS: Stable appearance of the indeterminant left adrenal nodule.  KIDNEYS/URETERS:  Small, atrophic left kidney.  Small indeterminate hypodense lesion interpolar right kidney, stable in appearance.  No hydronephrosis.    BLADDER: Within normal limits.  REPRODUCTIVE ORGANS: Uterus and adnexa are within normal limits.    BOWEL: No bowel obstruction.  Appendix normal.  PERITONEUM: No ascites.    VESSELS: Atherosclerotic calcification of the abdominal aorta, which is normal in caliber.  Left common iliac vein stent grafts, which appears patent.    RETROPERITONEUM/LYMPH NODES: No enlarged retroperitoneal lymphadenopathy.  ABDOMINAL WALL: Within normal limits.    BONES: Vertebroplasties T5, T7 and T12.  Degenerative changes of the spine.  Focal sclerosis at the iliac aspect of the left sacroiliac joint is stable in appearance.    IMPRESSION:    No acute pulmonary embolism demonstrated.    Small bilateral pleural effusions and linear atelectasis as discussed.    No acute intra-abdominal pathology demonstrated.    < end of copied text >    < from: TTE Echo Complete w/o Contrast w/ Doppler (08.11.20 @ 10:04) >    Mild concentric left ventricular hypertrophy is present.  CONCLUSIONS:  Normal LV size with  mild septal hypokinesia with overall preserved LV systolic function with estimated LVEF 50-55%.    Grade 1 LV diastolic dysfunction is present.    Mild mitral regurgitationis present.    Mild tricuspid regurgitation is present. No evidence of any pulmonary hypertension.    Mild aortic regurgitation is present.    Correlate clinically above findings.      < end of copied text >

## 2020-08-30 NOTE — H&P ADULT - PROBLEM SELECTOR PLAN 3
TTE 08/11 reviewed , continue current management ,serial chest xrays to monitor pleural effusions  ,cardiology cons requested

## 2020-08-30 NOTE — ED PROVIDER NOTE - GASTROINTESTINAL, MLM
Abdomen soft,tender to palpation luq iwith reproduction of her sx no palpable spleen no guarding no bruising no rash

## 2020-08-30 NOTE — H&P ADULT - RS GEN PE MLT RESP DETAILS PC
respirations non-labored/diminished breath sounds, L/diminished breath sounds, R/airway patent/good air movement/breath sounds equal

## 2020-08-31 ENCOUNTER — TRANSCRIPTION ENCOUNTER (OUTPATIENT)
Age: 65
End: 2020-08-31

## 2020-08-31 LAB
ALBUMIN SERPL ELPH-MCNC: 3.2 G/DL — LOW (ref 3.3–5)
ALP SERPL-CCNC: 98 U/L — SIGNIFICANT CHANGE UP (ref 40–120)
ALT FLD-CCNC: 29 U/L — SIGNIFICANT CHANGE UP (ref 12–78)
ANION GAP SERPL CALC-SCNC: 6 MMOL/L — SIGNIFICANT CHANGE UP (ref 5–17)
APTT BLD: 107.5 SEC — HIGH (ref 27.5–35.5)
APTT BLD: 123.9 SEC — CRITICAL HIGH (ref 27.5–35.5)
APTT BLD: 60.3 SEC — HIGH (ref 27.5–35.5)
AST SERPL-CCNC: 21 U/L — SIGNIFICANT CHANGE UP (ref 15–37)
BILIRUB SERPL-MCNC: 0.2 MG/DL — SIGNIFICANT CHANGE UP (ref 0.2–1.2)
BUN SERPL-MCNC: 24 MG/DL — HIGH (ref 7–23)
CALCIUM SERPL-MCNC: 9 MG/DL — SIGNIFICANT CHANGE UP (ref 8.5–10.1)
CARDIOLIPIN AB SER-ACNC: NEGATIVE — SIGNIFICANT CHANGE UP
CHLORIDE SERPL-SCNC: 114 MMOL/L — HIGH (ref 96–108)
CHOLEST SERPL-MCNC: 232 MG/DL — HIGH (ref 10–199)
CO2 SERPL-SCNC: 26 MMOL/L — SIGNIFICANT CHANGE UP (ref 22–31)
CREAT SERPL-MCNC: 1.3 MG/DL — SIGNIFICANT CHANGE UP (ref 0.5–1.3)
D DIMER BLD IA.RAPID-MCNC: 261 NG/ML DDU — HIGH
DRVVT 50/50: 40 SEC — SIGNIFICANT CHANGE UP
DRVVT SCREEN TO CONFIRM RATIO: SIGNIFICANT CHANGE UP
DSDNA AB SER-ACNC: <12 IU/ML — SIGNIFICANT CHANGE UP
GLUCOSE SERPL-MCNC: 95 MG/DL — SIGNIFICANT CHANGE UP (ref 70–99)
HCT VFR BLD CALC: 36.8 % — SIGNIFICANT CHANGE UP (ref 34.5–45)
HDLC SERPL-MCNC: 56 MG/DL — SIGNIFICANT CHANGE UP
HGB BLD-MCNC: 11.2 G/DL — LOW (ref 11.5–15.5)
LA NT DPL PPP QL: 65 SEC — SIGNIFICANT CHANGE UP
LIPID PNL WITH DIRECT LDL SERPL: 142 MG/DL — HIGH
MAGNESIUM SERPL-MCNC: 2.5 MG/DL — SIGNIFICANT CHANGE UP (ref 1.6–2.6)
MCHC RBC-ENTMCNC: 26.8 PG — LOW (ref 27–34)
MCHC RBC-ENTMCNC: 30.4 GM/DL — LOW (ref 32–36)
MCV RBC AUTO: 88 FL — SIGNIFICANT CHANGE UP (ref 80–100)
NORMALIZED SCT PPP-RTO: 0.93 RATIO — SIGNIFICANT CHANGE UP (ref 0–1.16)
NORMALIZED SCT PPP-RTO: SIGNIFICANT CHANGE UP
NRBC # BLD: 0 /100 WBCS — SIGNIFICANT CHANGE UP (ref 0–0)
NT-PROBNP SERPL-SCNC: 688 PG/ML — HIGH (ref 0–125)
PHOSPHATE SERPL-MCNC: 3.8 MG/DL — SIGNIFICANT CHANGE UP (ref 2.5–4.5)
PLATELET # BLD AUTO: 241 K/UL — SIGNIFICANT CHANGE UP (ref 150–400)
POTASSIUM SERPL-MCNC: 4.6 MMOL/L — SIGNIFICANT CHANGE UP (ref 3.5–5.3)
POTASSIUM SERPL-SCNC: 4.6 MMOL/L — SIGNIFICANT CHANGE UP (ref 3.5–5.3)
PROT SERPL-MCNC: 6.7 G/DL — SIGNIFICANT CHANGE UP (ref 6–8.3)
RBC # BLD: 4.18 M/UL — SIGNIFICANT CHANGE UP (ref 3.8–5.2)
RBC # FLD: 15.7 % — HIGH (ref 10.3–14.5)
SODIUM SERPL-SCNC: 146 MMOL/L — HIGH (ref 135–145)
TOTAL CHOLESTEROL/HDL RATIO MEASUREMENT: 4.1 RATIO — SIGNIFICANT CHANGE UP (ref 3.3–7.1)
TRIGL SERPL-MCNC: 171 MG/DL — HIGH (ref 10–149)
TROPONIN I SERPL-MCNC: <.015 NG/ML — SIGNIFICANT CHANGE UP (ref 0.01–0.04)
TSH SERPL-MCNC: 6.96 UIU/ML — HIGH (ref 0.36–3.74)
WBC # BLD: 6.87 K/UL — SIGNIFICANT CHANGE UP (ref 3.8–10.5)
WBC # FLD AUTO: 6.87 K/UL — SIGNIFICANT CHANGE UP (ref 3.8–10.5)

## 2020-08-31 PROCEDURE — 93010 ELECTROCARDIOGRAM REPORT: CPT

## 2020-08-31 PROCEDURE — 71100 X-RAY EXAM RIBS UNI 2 VIEWS: CPT | Mod: 26

## 2020-08-31 RX ORDER — LIDOCAINE 4 G/100G
1 CREAM TOPICAL DAILY
Refills: 0 | Status: DISCONTINUED | OUTPATIENT
Start: 2020-08-31 | End: 2020-09-01

## 2020-08-31 RX ORDER — HYDROMORPHONE HYDROCHLORIDE 2 MG/ML
0.5 INJECTION INTRAMUSCULAR; INTRAVENOUS; SUBCUTANEOUS ONCE
Refills: 0 | Status: DISCONTINUED | OUTPATIENT
Start: 2020-08-31 | End: 2020-08-31

## 2020-08-31 RX ADMIN — SERTRALINE 25 MILLIGRAM(S): 25 TABLET, FILM COATED ORAL at 14:23

## 2020-08-31 RX ADMIN — OXYCODONE AND ACETAMINOPHEN 1 TABLET(S): 5; 325 TABLET ORAL at 12:22

## 2020-08-31 RX ADMIN — HEPARIN SODIUM 800 UNIT(S)/HR: 5000 INJECTION INTRAVENOUS; SUBCUTANEOUS at 20:41

## 2020-08-31 RX ADMIN — LOSARTAN POTASSIUM 100 MILLIGRAM(S): 100 TABLET, FILM COATED ORAL at 05:42

## 2020-08-31 RX ADMIN — Medication 81 MILLIGRAM(S): at 14:23

## 2020-08-31 RX ADMIN — HEPARIN SODIUM 900 UNIT(S)/HR: 5000 INJECTION INTRAVENOUS; SUBCUTANEOUS at 06:18

## 2020-08-31 RX ADMIN — AMLODIPINE BESYLATE 10 MILLIGRAM(S): 2.5 TABLET ORAL at 05:42

## 2020-08-31 RX ADMIN — HYDROMORPHONE HYDROCHLORIDE 0.5 MILLIGRAM(S): 2 INJECTION INTRAMUSCULAR; INTRAVENOUS; SUBCUTANEOUS at 01:24

## 2020-08-31 RX ADMIN — Medication 100 MILLIGRAM(S): at 05:42

## 2020-08-31 RX ADMIN — HYDROMORPHONE HYDROCHLORIDE 0.5 MILLIGRAM(S): 2 INJECTION INTRAMUSCULAR; INTRAVENOUS; SUBCUTANEOUS at 01:11

## 2020-08-31 RX ADMIN — HEPARIN SODIUM 800 UNIT(S)/HR: 5000 INJECTION INTRAVENOUS; SUBCUTANEOUS at 12:49

## 2020-08-31 RX ADMIN — Medication 100 MILLIGRAM(S): at 18:44

## 2020-08-31 RX ADMIN — OXYCODONE AND ACETAMINOPHEN 1 TABLET(S): 5; 325 TABLET ORAL at 12:51

## 2020-08-31 RX ADMIN — OXYCODONE AND ACETAMINOPHEN 1 TABLET(S): 5; 325 TABLET ORAL at 18:44

## 2020-08-31 RX ADMIN — Medication 200 MILLIGRAM(S): at 05:42

## 2020-08-31 RX ADMIN — PANTOPRAZOLE SODIUM 40 MILLIGRAM(S): 20 TABLET, DELAYED RELEASE ORAL at 05:41

## 2020-08-31 RX ADMIN — Medication 200 MILLIGRAM(S): at 14:22

## 2020-08-31 RX ADMIN — Medication 200 MILLIGRAM(S): at 21:05

## 2020-08-31 RX ADMIN — OXYCODONE AND ACETAMINOPHEN 1 TABLET(S): 5; 325 TABLET ORAL at 19:41

## 2020-08-31 NOTE — PROGRESS NOTE ADULT - SUBJECTIVE AND OBJECTIVE BOX
[facility] [place]       HPI:  Pt is a 66 yo Female ,well known to me after recent hospitalization with  pmhx significant  for HTN , Osteoporosis ,Sz ,Pericarditis , Renal atherosclerosis , Shingles ,Migraines ,Depression ,Constipation ,Anxiety ,DJD ,hx of vertebroplasties recent admission with extensive ACUTE DVT involving the left common femoral, femoral, popliteal, and calf veins, ,S/P Thrombectomy( mechanical, vein, endovascular  Left lower extremity thrombectomy popliteal and femoral vein, Left common iliac vein angioplasty and stent by Dr. Zarco, [facility] 20 ),  she was discharged  home on Eliquis and daily ASA. For past few days she endorses left lower chest/upper left abd pain that makes taking a deep breath difficult. Patient  consulted with her pmd and cardiologist who recommended she come to ED for evaluation of her symptoms Patient admits pain is worse on deep inspiration ,no recent travel or prolonged immobilization .CTA was performed in ED no evidence of PE ,seen by pulmonologist .CTT demonstrated bilateral pleural effusions Admitted  to telemetry unit for monitoring , send 3 sets of cardiac enzymes to rule out acute coronary event, obtain ECHO to evaluate LVEF, cardiology consult  ,continue current management, O2 supply, anticoagulation plan as per cardiology consult Dr Fischer .Hematology and vascular surgery consults are requested .patient started on heparin drip in ER . (30 Aug 2020 14:16)        SUBJECTIVE:      ALLERGIES:  Allergies    penicillin (Anaphylaxis)    Intolerances          MEDICATIONS  (STANDING):  amLODIPine   Tablet 10 milliGRAM(s) Oral daily  aspirin enteric coated 81 milliGRAM(s) Oral daily  heparin  Infusion.  Unit(s)/Hr (12 mL/Hr) IV Continuous <Continuous>  labetalol 200 milliGRAM(s) Oral three times a day  lidocaine   Patch 1 Patch Transdermal daily  losartan 100 milliGRAM(s) Oral daily  pantoprazole    Tablet 40 milliGRAM(s) Oral before breakfast  senna 2 Tablet(s) Oral at bedtime  sertraline 25 milliGRAM(s) Oral daily  topiramate 100 milliGRAM(s) Oral two times a day    MEDICATIONS  (PRN):  acetaminophen   Tablet .. 650 milliGRAM(s) Oral every 6 hours PRN Temp greater or equal to 38C (100.4F), Mild Pain (1 - 3)  heparin   Injectable 5500 Unit(s) IV Push every 6 hours PRN For aPTT less than 40  heparin   Injectable 2500 Unit(s) IV Push every 6 hours PRN For aPTT between 40 - 57  magnesium hydroxide Suspension 30 milliLiter(s) Oral daily PRN Constipation  melatonin 5 milliGRAM(s) Oral at bedtime PRN Insomnia  oxycodone    5 mG/acetaminophen 325 mG 1 Tablet(s) Oral every 6 hours PRN Moderate Pain (4 - 6)      REVIEW OF SYSTEMS:  CONSTITUTIONAL: No fever,  RESPIRATORY: No cough, wheezing, shortness of breath  CARDIOVASCULAR: No chest pain, dyspnea, palpitations, dizziness, syncope, paroxysmal nocturnal dyspnea, orthopnea, or arm or leg swelling  GASTROINTESTINAL: No abdominal  or epigastric pain, nausea, vomiting,  diarrhea  NEUROLOGICAL: No headaches,  loss of strength, numbness, or tremors    Vital Signs Last 24 Hrs  T(C): 37 (31 Aug 2020 13:), Max: 37 (31 Aug 2020 05:)  T(F): 98.6 (31 Aug 2020 13:), Max: 98.6 (31 Aug 2020 05:31)  HR: 59 (31 Aug 2020 13:31) (59 - 62)  BP: 129/73 (31 Aug 2020 13:) (129/73 - 150/79)  BP(mean): --  RR: 18 (31 Aug 2020 13:) (18 - 18)  SpO2: 96% (31 Aug 2020 13:) (96% - 97%)    PHYSICAL EXAM:  HEAD:  Atraumatic, Normocephalic  NECK: Supple and normal thyroid.  No JVD or carotid bruit.   HEART: S1, S2 regular , 1/6 soft ejection systolic murmur in mitral area , no thrill and no gallops .  PULMONARY: Bilateral vesicular breathing , few scattered ronchi and few scattered rales are present .  ABDOMEN: Soft nontender and positive bowl sounds   EXTREMITIES:  No clubbing, cyanosis, or pedal  edema  NEUROLOGICAL: AAOX3 , no focal deficit .    LABS:                        11.2   6.87  )-----------( 241      ( 31 Aug 2020 05:30 )             36.8         146<H>  |  114<H>  |  24<H>  ----------------------------<  95  4.6   |  26  |  1.30    Ca    9.0      31 Aug 2020 05:30  Phos  3.8       Mg     2.5         TPro  6.7  /  Alb  3.2<L>  /  TBili  0.2  /  DBili  x   /  AST  21  /  ALT  29  /  AlkPhos  98  31    CARDIAC MARKERS ( 31 Aug 2020 05:30 )  <.015 ng/mL / x     / x     / x     / x      CARDIAC MARKERS ( 30 Aug 2020 19:51 )  <.015 ng/mL / x     / x     / x     / x      CARDIAC MARKERS ( 30 Aug 2020 07:38 )  <.015 ng/mL / x     / x     / x     / x          PT/INR - ( 31 Aug 2020 05:30 )   PT: 13.1 sec;   INR: 1.13 ratio         PTT - ( 31 Aug 2020 11:51 )  PTT:107.5 sec  Urinalysis Basic - ( 30 Aug 2020 09:38 )    Color: Pale Yellow / Appearance: Clear / S.005 / pH: x  Gluc: x / Ketone: Negative  / Bili: Negative / Urobili: Negative   Blood: x / Protein: Negative / Nitrite: Negative   Leuk Esterase: Trace / RBC: 0-2 /HPF / WBC 0-2   Sq Epi: x / Non Sq Epi: Occasional / Bacteria: Occasional      BNPSerum Pro-Brain Natriuretic Peptide: 688 pg/mL ( @ 05:30)        EKG:  ECHO:  IMAGING:    Assessment/Plan    Will continue to follow during hospital course and give further recommendations as needed . Thanks for your referral . if any questions please contact me at office ([phone] [phone]) Lio Fischer MD [facility]  Cardiology F/U :      HPI:  Pt is a 64 yo Female ,well known to me after recent hospitalization with  pmhx significant  for HTN , Osteoporosis ,Sz ,Pericarditis , Renal atherosclerosis , Shingles ,Migraines ,Depression ,Constipation ,Anxiety ,DJD ,hx of vertebroplasties recent admission with extensive ACUTE DVT involving the left common femoral, femoral, popliteal, and calf veins, ,S/P Thrombectomy( mechanical, vein, endovascular  Left lower extremity thrombectomy popliteal and femoral vein, Left common iliac vein angioplasty and stent by Dr. Zarco, [facility] 20 ),  she was discharged  home on Eliquis and daily ASA. For past few days she endorses left lower chest/upper left abd pain that makes taking a deep breath difficult. Patient  consulted with her pmd and cardiologist who recommended she come to ED for evaluation of her symptoms Patient admits pain is worse on deep inspiration ,no recent travel or prolonged immobilization .CTA was performed in ED no evidence of PE ,seen by pulmonologist .CTT demonstrated bilateral pleural effusions Admitted  to telemetry unit for monitoring , send 3 sets of cardiac enzymes to rule out acute coronary event, obtain ECHO to evaluate LVEF, cardiology consult  ,continue current management, O2 supply, anticoagulation plan as per cardiology consult Dr Fischer .Hematology and vascular surgery consults are requested .patient started on heparin drip in ER . (30 Aug 2020 14:16)        SUBJECTIVE: Patient lying comfortable .      ALLERGIES:  Allergies    penicillin (Anaphylaxis)    Intolerances          MEDICATIONS  (STANDING):  amLODIPine   Tablet 10 milliGRAM(s) Oral daily  aspirin enteric coated 81 milliGRAM(s) Oral daily  heparin  Infusion.  Unit(s)/Hr (12 mL/Hr) IV Continuous <Continuous>  labetalol 200 milliGRAM(s) Oral three times a day  lidocaine   Patch 1 Patch Transdermal daily  losartan 100 milliGRAM(s) Oral daily  pantoprazole    Tablet 40 milliGRAM(s) Oral before breakfast  senna 2 Tablet(s) Oral at bedtime  sertraline 25 milliGRAM(s) Oral daily  topiramate 100 milliGRAM(s) Oral two times a day    MEDICATIONS  (PRN):  acetaminophen   Tablet .. 650 milliGRAM(s) Oral every 6 hours PRN Temp greater or equal to 38C (100.4F), Mild Pain (1 - 3)  heparin   Injectable 5500 Unit(s) IV Push every 6 hours PRN For aPTT less than 40  heparin   Injectable 2500 Unit(s) IV Push every 6 hours PRN For aPTT between 40 - 57  magnesium hydroxide Suspension 30 milliLiter(s) Oral daily PRN Constipation  melatonin 5 milliGRAM(s) Oral at bedtime PRN Insomnia  oxycodone    5 mG/acetaminophen 325 mG 1 Tablet(s) Oral every 6 hours PRN Moderate Pain (4 - 6)      REVIEW OF SYSTEMS:  CONSTITUTIONAL: No fever,  RESPIRATORY: No cough, wheezing, shortness of breath  CARDIOVASCULAR: Mild sharp  chest PAIN  and much better .No  dyspnea, palpitations, dizziness, syncope, paroxysmal nocturnal dyspnea, orthopnea, or arm or leg swelling  GASTROINTESTINAL: No abdominal  or epigastric pain, nausea, vomiting,  diarrhea  NEUROLOGICAL: No headaches,  loss of strength, numbness, or tremors  Skin : No itching .  Hematology : No bleeding .  Endocrinology : No heat and cold intolerance .  Psychiatry : Patient is calm .  Musculoskeletal : Patient has mild arthritis .    Vital Signs Last 24 Hrs  T(C): 37 (31 Aug 2020 13:31), Max: 37 (31 Aug 2020 05:31)  T(F): 98.6 (31 Aug 2020 13:31), Max: 98.6 (31 Aug 2020 05:31)  HR: 59 (31 Aug 2020 13:31) (59 - 62)  BP: 129/73 (31 Aug 2020 13:31) (129/73 - 150/79)  BP(mean): --  RR: 18 (31 Aug 2020 13:31) (18 - 18)  SpO2: 96% (31 Aug 2020 13:31) (96% - 97%)    PHYSICAL EXAM:  HEAD:  Atraumatic, Normocephalic  NECK: Supple and normal thyroid.  No JVD or carotid bruit.   HEART: S1, S2 regular , 1/6 soft ejection systolic murmur in mitral area , no thrill and no gallops .  PULMONARY: Bilateral vesicular breathing , few scattered ronchi and few scattered rales are present .  ABDOMEN: Soft nontender and positive bowl sounds   EXTREMITIES:  No clubbing, cyanosis, or pedal  edema  NEUROLOGICAL: AAOX3 , no focal deficit .  Skin : No rashes .  Musculoskeletal : No joint swellings .    LABS:                        11.2   6.87  )-----------( 241      ( 31 Aug 2020 05:30 )             36.8     08-31    146<H>  |  114<H>  |  24<H>  ----------------------------<  95  4.6   |  26  |  1.30    Ca    9.0      31 Aug 2020 05:30  Phos  3.8     0831  Mg     2.5         TPro  6.7  /  Alb  3.2<L>  /  TBili  0.2  /  DBili  x   /  AST  21  /  ALT  29  /  AlkPhos  98  31    CARDIAC MARKERS ( 31 Aug 2020 05:30 )  <.015 ng/mL / x     / x     / x     / x      CARDIAC MARKERS ( 30 Aug 2020 19:51 )  <.015 ng/mL / x     / x     / x     / x      CARDIAC MARKERS ( 30 Aug 2020 07:38 )  <.015 ng/mL / x     / x     / x     / x          PT/INR - ( 31 Aug 2020 05:30 )   PT: 13.1 sec;   INR: 1.13 ratio         PTT - ( 31 Aug 2020 11:51 )  PTT:107.5 sec  Urinalysis Basic - ( 30 Aug 2020 09:38 )    Color: Pale Yellow / Appearance: Clear / S.005 / pH: x  Gluc: x / Ketone: Negative  / Bili: Negative / Urobili: Negative   Blood: x / Protein: Negative / Nitrite: Negative   Leuk Esterase: Trace / RBC: 0-2 /HPF / WBC 0-2   Sq Epi: x / Non Sq Epi: Occasional / Bacteria: Occasional      BNPSerum Pro-Brain Natriuretic Peptide: 688 pg/mL ( @ 05:30)        Assessment/Plan  Patient has :  1) Atypical chest pain . MI ruled out . No cardiac arrythmia .  2) Recurrent or persistent old  DVT of left lower extremity .  3) Hypertension and BP stable so far .  4) Elevated Pro-BNP and no evidence of CHF .  Plan : 1) cardiac stable so far . 2) Continue anticoagulation as per hematology . 3) Continue Hemoglobin and electrolytes .  Will continue to follow during hospital course and give further recommendations as needed . Thanks for your referral . if any questions please contact me at office ([phone])

## 2020-08-31 NOTE — DISCHARGE NOTE PROVIDER - CARE PROVIDER_API CALL
Tristan Frederick  FAMILY MEDICINE  [address] [place] [address]  Phone: [phone]  Fax: [phone]  Follow Up Time: 2 weeks    Skylar Peoples  HEMATOLOGY  [address], [address]  [place] [address]  Phone: [phone]  Fax: [phone]  Follow Up Time: 1 month    Arely Crump)  Vascular Surgery  [address] [place] [id number]  Phone: [phone]  Fax: [phone]  Follow Up Time: 2 weeks

## 2020-08-31 NOTE — DISCHARGE NOTE PROVIDER - PROVIDER TOKENS
PROVIDER:[TOKEN:[[id number]],FOLLOWUP:[2 weeks]],PROVIDER:[TOKEN:[[id number]],FOLLOWUP:[1 month]],PROVIDER:[TOKEN:[[id number]],FOLLOWUP:[2 weeks]]

## 2020-08-31 NOTE — PROGRESS NOTE ADULT - SUBJECTIVE AND OBJECTIVE BOX
PROGRESS NOTE  Patient is a 65y old  Female who presents with a chief complaint of LEFT SIDED CHEST PAIN (31 Aug 2020 09:46)  Chart and available morning labs /imaging are reviewed electronically , urgent issues addressed . More information  is being added upon completion of rounds , when more information is collected and management discussed with consultants , medical staff and social service/case management on the floor   LATE ENTRY- patient was seen and examined earlier today . Plan of care was discussed with med staff and unit coordinator .   OVERNIGHT  No new issues reported by medical staff . All above noted .Ambulates in a hallway ,admits pain in L calf and L chest   Seen by vasc sx and hem cons during rounds , plan of care discussed ,resume eliquis after heparin drip recommended F/up with Dr Parson in the office in 2 weeks after d/c   HPI:  Pt is a 64 yo Female ,well known to me after recent hospitalization with  pmhx significant  for HTN , Osteoporosis ,Sz ,Pericarditis , Renal atherosclerosis , Shingles ,Migraines ,Depression ,Constipation ,Anxiety ,DJD ,hx of vertebroplasties recent admission with extensive ACUTE DVT involving the left common femoral, femoral, popliteal, and calf veins, ,S/P Thrombectomy( mechanical, vein, endovascular  Left lower extremity thrombectomy popliteal and femoral vein, Left common iliac vein angioplasty and stent by Dr. Zarco, [facility] 20 ),  she was discharged  home on Eliquis and daily ASA. For past few days she endorses left lower chest/upper left abd pain that makes taking a deep breath difficult. Patient  consulted with her pmd and cardiologist who recommended she come to ED for evaluation of her symptoms Patient admits pain is worse on deep inspiration ,no recent travel or prolonged immobilization .CTA was performed in ED no evidence of PE ,seen by pulmonologist .CTT demonstrated bilateral pleural effusions Admitted  to telemetry unit for monitoring , send 3 sets of cardiac enzymes to rule out acute coronary event, obtain ECHO to evaluate LVEF, cardiology consult  ,continue current management, O2 supply, anticoagulation plan as per cardiology consult Dr Fischer .Hematology and vascular surgery consults are requested .patient started on heparin drip in ER . (30 Aug 2020 14:16)    PAST MEDICAL & SURGICAL HISTORY:  Neuropathy  DJD (degenerative joint disease)  Benign essential HTN  DVT, lower extremity  Grade I diastolic dysfunction  Drug-seeking behavior  Renal arteriosclerosis  Constipation  Anxiety  Depression  HTN (hypertension)  Shingles  Pericarditis  Osteoporosis  Seizure disorder  Migraines  H/O vertebroplasty  Status post peripheral artery angioplasty with insertion of stent  History of back surgery: Kyphoplasty      MEDICATIONS  (STANDING):  amLODIPine   Tablet 10 milliGRAM(s) Oral daily  aspirin enteric coated 81 milliGRAM(s) Oral daily  heparin  Infusion.  Unit(s)/Hr (12 mL/Hr) IV Continuous <Continuous>  labetalol 200 milliGRAM(s) Oral three times a day  losartan 100 milliGRAM(s) Oral daily  pantoprazole    Tablet 40 milliGRAM(s) Oral before breakfast  senna 2 Tablet(s) Oral at bedtime  sertraline 25 milliGRAM(s) Oral daily  topiramate 100 milliGRAM(s) Oral two times a day    MEDICATIONS  (PRN):  acetaminophen   Tablet .. 650 milliGRAM(s) Oral every 6 hours PRN Temp greater or equal to 38C (100.4F), Mild Pain (1 - 3)  heparin   Injectable 5500 Unit(s) IV Push every 6 hours PRN For aPTT less than 40  heparin   Injectable 2500 Unit(s) IV Push every 6 hours PRN For aPTT between 40 - 57  magnesium hydroxide Suspension 30 milliLiter(s) Oral daily PRN Constipation  melatonin 5 milliGRAM(s) Oral at bedtime PRN Insomnia  oxycodone    5 mG/acetaminophen 325 mG 1 Tablet(s) Oral every 6 hours PRN Moderate Pain (4 - 6)      OBJECTIVE    T(C): 37 (20 @ 13:31), Max: 37 (20 @ 16:58)  HR: 59 (20 @ 13:31) (59 - 74)  BP: 129/73 (20 @ 13:31) (129/73 - 186/75)  RR: 18 (20 @ 13:31) (16 - 18)  SpO2: 96% (20 @ 13:31) (96% - 99%)  Wt(kg): --  I&O's Summary    30 Aug 2020 07:01  -  31 Aug 2020 07:00  --------------------------------------------------------  IN: 412 mL / OUT: 0 mL / NET: 412 mL          REVIEW OF SYSTEMS:  CONSTITUTIONAL: No fever, weight loss, or fatigue  EYES: No eye pain, visual disturbances, or discharge  ENMT:   No sinus or throat pain  NECK: No pain or stiffness  RESPIRATORY: No cough, wheezing, chills or hemoptysis; No shortness of breath  CARDIOVASCULAR: L chest pain   GASTROINTESTINAL: No abdominal pain. No nausea, vomiting; No diarrhea or constipation. No melena or hematochezia.  GENITOURINARY: No dysuria, frequency, hematuria, or incontinence  NEUROLOGICAL: No headaches, memory loss, loss of strength, numbness, or tremors  SKIN: No itching, burning, rashes, or lesions   MUSCULOSKELETAL: No joint pain or swelling; No muscle, back, or extremity pain    PHYSICAL EXAM:  Appearance: NAD. VS past 24 hrs -as above   HEENT:   Moist oral mucosa. Conjunctiva clear b/l.   Neck : supple  Respiratory: Lungs CTAB. Chest pain -pain on palpation of  left sided anterior aspect ribs ,no hematomas or brusing noted   Gastrointestinal:  Soft, nontender. No rebound. No rigidity. BS present	  Cardiovascular: RRR ,S1S2 present  Neurologic: Non-focal. Moving all extremities.  Extremities: No edema. No erythema. No calf tenderness.  Skin: No rashes, No ecchymoses, No cyanosis.	  wounds ,skin lesions-See skin assesment flow sheet   LABS:                        11.2   6.87  )-----------( 241      ( 31 Aug 2020 05:30 )             36.8     08-31    146<H>  |  114<H>  |  24<H>  ----------------------------<  95  4.6   |  26  |  1.30    Ca    9.0      31 Aug 2020 05:30  Phos  3.8     08-31  Mg     2.5     08-31    TPro  6.7  /  Alb  3.2<L>  /  TBili  0.2  /  DBili  x   /  AST  21  /  ALT  29  /  AlkPhos  98  08-31    CAPILLARY BLOOD GLUCOSE        PT/INR - ( 31 Aug 2020 05:30 )   PT: 13.1 sec;   INR: 1.13 ratio         PTT - ( 31 Aug 2020 11:51 )  PTT:107.5 sec  Urinalysis Basic - ( 30 Aug 2020 09:38 )    Color: Pale Yellow / Appearance: Clear / S.005 / pH: x  Gluc: x / Ketone: Negative  / Bili: Negative / Urobili: Negative   Blood: x / Protein: Negative / Nitrite: Negative   Leuk Esterase: Trace / RBC: 0-2 /HPF / WBC 0-2   Sq Epi: x / Non Sq Epi: Occasional / Bacteria: Occasional        RADIOLOGY & ADDITIONAL TESTS:< from: CT Angio Chest w/ IV Cont (20 @ 10:06) >  EXAM:  CT ABDOMEN AND PELVIS OC IC                          EXAM:  CT ANGIO CHEST (W)AW IC                            PROCEDURE DATE:  2020          INTERPRETATION:  CLINICAL INFORMATION: Left-sided upper abdominal pain.  Recent thrombectomy for left lower extremity deep venous thrombosis.    COMPARISON: CT scan abdomen and pelvis 2020.    PROCEDURE:  CT Angiography of the Chest was performed followed by portal venous phase imaging of the Abdomen and Pelvis.  IV Contrast: 90 ml of Omnipaque 350 was injected intravenously. 10 ml were discarded.  Oral contrast: None.  Sagittal and coronal reformats were performed as well as 3D (MIP) reconstructions.    FINDINGS:    CHEST:    LUNGS AND LARGE AIRWAYS: PLEURA:    Small bilateral pleural effusions.    The central airways remain patent.  There is linear atelectasis right middle and lower lobes.  There is linear atelectasis left lingula and lower lobes.    VESSELS: There is no CT evidence for acute pulmonary embolism.    There is atherosclerotic calcification of the thoracic aorta with ectasia of the ascending aorta measuring up to 3.3 cm. Coronary artery calcification is present.    HEART: Mild cardiomegaly. No pericardial effusion.    MEDIASTINUM AND CLEMENT: No enlarged lymphadenopathy.  Small hiatal hernia.    CHEST WALL AND LOWER NECK: Within normal limits.    ABDOMEN AND PELVIS:    LIVER: Tiny indeterminate hypodense lesion anterior aspect medial segment left hepatic lobe, stable in appearance.  BILE DUCTS: Normal caliber.  GALLBLADDER: Within normal limits.  SPLEEN: Within normal limits.  PANCREAS: Within normal limits.  ADRENALS: Stable appearance of the indeterminant left adrenal nodule.  KIDNEYS/URETERS:  Small, atrophic left kidney.  Small indeterminate hypodense lesion interpolar right kidney, stable in appearance.  No hydronephrosis.    BLADDER: Within normal limits.  REPRODUCTIVE ORGANS: Uterus and adnexa are within normal limits.    BOWEL: No bowel obstruction.  Appendix normal.  PERITONEUM: No ascites.    VESSELS: Atherosclerotic calcification of the abdominal aorta, which is normal in caliber.  Left common iliac vein stent grafts, which appears patent.    RETROPERITONEUM/LYMPH NODES: No enlarged retroperitoneal lymphadenopathy.  ABDOMINAL WALL: Within normal limits.    BONES: Vertebroplasties T5, T7 and T12.  Degenerative changes of the spine.  Focal sclerosis at the iliac aspect of the left sacroiliac joint is stable in appearance.    IMPRESSION:    No acute pulmonary embolism demonstrated.    Small bilateral pleural effusions and linear atelectasis as discussed.    No acute intra-abdominal pathology demonstrated.    Other findings as discussed.    < end of copied text >     reviewed elctronically  ASSESSMENT/PLAN: 	    45minutes spent on this visit, 50% visit time spent in care co-ordination with other attendings and counselling patient  I have discussed care plan with patient and HCP,expressed understanding of problems treatment and their effect and side effects, alternatives in detail,I have asked if they have any questions and concerns and appropriately addressed them to best of my ability

## 2020-08-31 NOTE — PROGRESS NOTE ADULT - SUBJECTIVE AND OBJECTIVE BOX
Patient seen and examined;  Chart reviewed and events noted;   Feels overall the same; continues to have intermittent chest pain of unclear etiology; Has calf pain that comes and goes    MEDICATIONS  (STANDING):  amLODIPine   Tablet 10 milliGRAM(s) Oral daily  aspirin enteric coated 81 milliGRAM(s) Oral daily  heparin  Infusion.  Unit(s)/Hr (12 mL/Hr) IV Continuous <Continuous>  labetalol 200 milliGRAM(s) Oral three times a day  losartan 100 milliGRAM(s) Oral daily  pantoprazole    Tablet 40 milliGRAM(s) Oral before breakfast  senna 2 Tablet(s) Oral at bedtime  sertraline 25 milliGRAM(s) Oral daily  topiramate 100 milliGRAM(s) Oral two times a day    MEDICATIONS  (PRN):  acetaminophen   Tablet .. 650 milliGRAM(s) Oral every 6 hours PRN Temp greater or equal to 38C (100.4F), Mild Pain (1 - 3)  heparin   Injectable 5500 Unit(s) IV Push every 6 hours PRN For aPTT less than 40  heparin   Injectable 2500 Unit(s) IV Push every 6 hours PRN For aPTT between 40 - 57  magnesium hydroxide Suspension 30 milliLiter(s) Oral daily PRN Constipation  melatonin 5 milliGRAM(s) Oral at bedtime PRN Insomnia  oxycodone    5 mG/acetaminophen 325 mG 1 Tablet(s) Oral every 6 hours PRN Moderate Pain (4 - 6)      Vital Signs Last 24 Hrs  T(C): 37 (31 Aug 2020 05:31), Max: 37 (30 Aug 2020 16:58)  T(F): 98.6 (31 Aug 2020 05:31), Max: 98.6 (30 Aug 2020 16:58)  HR: 62 (31 Aug 2020 09:13) (60 - 74)  BP: 150/79 (31 Aug 2020 05:31) (136/78 - 186/75)  BP(mean): --  RR: 18 (31 Aug 2020 05:31) (16 - 18)  SpO2: 97% (31 Aug 2020 05:31) (97% - 99%)    PHYSICAL EXAM  General: adult in NAD  HEENT: clear oropharynx, anicteric sclera, pink conjunctivae  Neck: supple  CV: normal S1S2 with no murmur rubs or gallops  Lungs: clear to auscultation, no wheezes, no rhales  Abdomen: soft non-tender non-distended, no hepato/splenomegaly  Ext: minimal edema left lower extremity  Skin: no rashes and no petichiae  Neuro: alert and oriented X3 no focal deficits      LABS:                        11.2   6.87  )-----------( 241      ( 31 Aug 2020 05:30 )             36.8     08-31    146<H>  |  114<H>  |  24<H>  ----------------------------<  95  4.6   |  26  |  1.30    Ca    9.0      31 Aug 2020 05:30  Phos  3.8     08-31  Mg     2.5     08-31    TPro  6.7  /  Alb  3.2<L>  /  TBili  0.2  /  DBili  x   /  AST  21  /  ALT  29  /  AlkPhos  98  08-31    PT/INR - ( 31 Aug 2020 05:30 )   PT: 13.1 sec;   INR: 1.13 ratio    PTT - ( 31 Aug 2020 05:30 )  PTT:123.9 sec      RADIOLOGY STUDIES:  < from: US Duplex Venous Lower Ext Complete, Bilateral (08.30.20 @ 08:22) >  FINDINGS:    There is normal compressibility and flow within the left common femoral and femoral veins.  There is thrombus with incompressibility and lack of flow within the left popliteal and posterior tibial vein.    There is normal compressibility of the right common femoral, femoral and popliteal veins.  Doppler examination shows normal spontaneous and phasic flow.  No calf vein thrombosis is detected.    IMPRESSION:    Deep venous thrombosis left popliteal through posterior tibial vein; cannot distinguish between acute thrombus versus acute on chronic deep venous thrombus.    < end of copied text >      < from: CT Angio Chest abdomen pelvis w/ IV Cont (08.30.20 @ 10:06) >  IMPRESSION:    No acute pulmonary embolism demonstrated.    Small bilateral pleural effusions and linear atelectasis as discussed.    No acute intra-abdominal pathology demonstrated.    < end of copied text >

## 2020-08-31 NOTE — DISCHARGE NOTE PROVIDER - HOSPITAL COURSE
Pt is a 66 yo Female ,well known to me after recent hospitalization with  pmhx significant  for HTN , Osteoporosis ,Sz ,Pericarditis , Renal atherosclerosis , Shingles ,Migraines ,Depression ,Constipation ,Anxiety ,DJD ,hx of vertebroplasties recent admission with extensive ACUTE DVT involving the left common femoral, femoral, popliteal, and calf veins, ,S/P Thrombectomy( mechanical, vein, endovascular  Left lower extremity thrombectomy popliteal and femoral vein, Left common iliac vein angioplasty and stent by Dr. Zarco, [facility] 08/11/20 ),  she was discharged  home on Eliquis and daily ASA. For past few days she endorses left lower chest/upper left abd pain that makes taking a deep breath difficult. Patient  consulted with her pmd and cardiologist who recommended she come to ED for evaluation of her symptoms Patient admits pain is worse on deep inspiration ,no recent travel or prolonged immobilization .CTA was performed in ED no evidence of PE ,seen by pulmonologist .CTT demonstrated bilateral pleural effusions Admitted  to telemetry unit for monitoring , send 3 sets of cardiac enzymes to rule out acute coronary event, obtain ECHO to evaluate LVEF, cardiology consult  ,continue current management, O2 supply, anticoagulation plan as per cardiology consult Dr Fischer .Hematology and vascular surgery consults are requested .patient started on heparin drip in ER .    ·  Problem: Chest pain.  Plan: likely atypical ,rule out rib fractures ,xray ordered ,continue pain management .No evidence of acute ischemic event.     ·  Problem: DVT (deep venous thrombosis).  Plan: on heparin gtt ,hypercoagulable workup as per hematology consult , serial cbc ,pt/inr ,case d/w hematologist and vascular sx -resume eliquis upon d/c recommended ( alternatively -heparin ) and f/up in the office .RULED OUT FOR PE BY NEGATIVE  CTA -  on heparin in a hospital ,as per hemtologist and can transition back to DOAC (Eliquis 5mg Q12 hour) when ready for discharge; alternatives such as Lovenox 1mg/kg Q12 hours and warfarin with goal INR 2-3 discussed with patient    ·  Problem: Grade I diastolic dysfunction.  Plan: TTE 08/11 reviewed , continue current management ,serial chest xrays to monitor pleural effusions  ,cardiology cons requested.     ·  Problem: Benign essential HTN.  Plan: continue home medications.     ·  Problem: Migraines.  Plan: on topamax ,tylenol prn.     Problem: Anxiety. Plan: xanax prn ,melatonin prn sleep.    ·  Problem: Constipation.  Plan: bowel regimen.     ·  Problem: Prophylactic measure.  Plan: Gastrointestinal stress ulcer prophylaxis and DVT prophylaxis administered. Pt is a 64 yo Female ,well known to me after recent hospitalization with  pmhx significant  for HTN , Osteoporosis ,Sz ,Pericarditis , Renal atherosclerosis , Shingles ,Migraines ,Depression ,Constipation ,Anxiety ,DJD ,hx of vertebroplasties recent admission with extensive ACUTE DVT involving the left common femoral, femoral, popliteal, and calf veins, ,S/P Thrombectomy( mechanical, vein, endovascular  Left lower extremity thrombectomy popliteal and femoral vein, Left common iliac vein angioplasty and stent by Dr. Zarco, [facility] 08/11/20 ),  she was discharged  home on Eliquis and daily ASA. For past few days she endorses left lower chest/upper left abd pain that makes taking a deep breath difficult. Patient  consulted with her pmd and cardiologist who recommended she come to ED for evaluation of her symptoms Patient admits pain is worse on deep inspiration ,no recent travel or prolonged immobilization .CTA was performed in ED no evidence of PE ,seen by pulmonologist .CTT demonstrated bilateral pleural effusions Admitted  to telemetry unit for monitoring , send 3 sets of cardiac enzymes to rule out acute coronary event, obtain ECHO to evaluate LVEF, cardiology consult  ,continue current management, O2 supply, anticoagulation plan as per cardiology consult Dr Fischer .Hematology and vascular surgery consults are requested .patient started on heparin drip in ER .    ·  Problem: Chest pain.  Plan: likely atypical ,rule out rib fractures ,xray ordered ,continue pain management .No evidence of acute ischemic event.     ·  Problem: DVT (deep venous thrombosis).  Plan: on heparin gtt ,hypercoagulable workup as per hematology consult , serial cbc ,pt/inr ,case d/w hematologist and vascular sx -resume eliquis upon d/c recommended ( alternatively -heparin ) and f/up in the office .RULED OUT FOR PE BY NEGATIVE  CTA -  on heparin in a hospital ,as per hemtologist and can transition back to DOAC (Eliquis 5mg Q12 hour) when ready for discharge; alternatives such as Lovenox 1mg/kg Q12 hours and warfarin with goal INR 2-3 discussed with patient    ·  Problem: Grade I diastolic dysfunction.  Plan: TTE 08/11 reviewed , continue current management ,serial chest xrays to monitor pleural effusions  ,cardiology cons requested.     ·  Problem: Benign essential HTN.  Plan: continue home medications.     ·  Problem: Migraines.  Plan: on topamax ,tylenol prn.     Problem: Anxiety. Plan: xanax prn ,melatonin prn sleep.    ·  Problem: Constipation.  Plan: bowel regimen.     ·  Problem: Prophylactic measure.  Plan: Gastrointestinal stress ulcer prophylaxis and DVT prophylaxis administered.     Patient seen and discussed with Dr Aly and daphne/fran pineda and advised to resume her Eliquis today in the evening and continue as before. Patient also advised to follow her Vascular surgeon and hematologist.

## 2020-08-31 NOTE — PROGRESS NOTE ADULT - SUBJECTIVE AND OBJECTIVE BOX
Postoperative Day #: 21  Hospital day: 1    65y Female admitted with Acute embolism and thrombosis of deep vein of lower extremity  S/P DVT embolectomy with Dr. Bear on 8/10/2020    Patient seen and examined bedside resting comfortably.   Pt without complaints.  Denies fevers, SOB< chest pain, back pain, lower leg/calf tenderness.     T(F): 98.6 (08-31-20 @ 05:31), Max: 98.6 (08-30-20 @ 16:58)  HR: 62 (08-31-20 @ 05:31) (60 - 74)  BP: 150/79 (08-31-20 @ 05:31) (136/78 - 186/75)  RR: 18 (08-31-20 @ 05:31) (16 - 18)  SpO2: 97% (08-31-20 @ 05:31) (97% - 99%)  Wt(kg): --  CAPILLARY BLOOD GLUCOSE          PHYSICAL EXAM:  General: NAD  Neuro:  Alert & oriented x 3  Extremities: Left calf tenderness.  NO swelling or erythema.  Good strength as compared b/l.  Able to move leg without difficulty.      LABS:                        11.2   6.87  )-----------( 241      ( 31 Aug 2020 05:30 )             36.8     08-31    146<H>  |  114<H>  |  24<H>  ----------------------------<  95  4.6   |  26  |  1.30    Ca    9.0      31 Aug 2020 05:30  Phos  3.8     08-31  Mg     2.5     08-31    TPro  6.7  /  Alb  3.2<L>  /  TBili  0.2  /  DBili  x   /  AST  21  /  ALT  29  /  AlkPhos  98  08-31    PT/INR - ( 31 Aug 2020 05:30 )   PT: 13.1 sec;   INR: 1.13 ratio         PTT - ( 31 Aug 2020 05:30 )  PTT:123.9 sec  I&O's Detail    30 Aug 2020 07:01  -  31 Aug 2020 07:00  --------------------------------------------------------  IN:    heparin  Infusion.: 112 mL    Oral Fluid: 300 mL  Total IN: 412 mL    OUT:  Total OUT: 0 mL    Total NET: 412 mL          Impression: 65y Female admitted with Acute embolism and thrombosis of deep vein of lower extremity  S/P Embolectomy on 8/10/2020 on left leg.     PMH Neuropathy  DJD (degenerative joint disease)  Benign essential HTN  DVT, lower extremity  Grade I diastolic dysfunction  Drug-seeking behavior  Renal arteriosclerosis  Constipation  Anxiety  Depression  HTN (hypertension)  Shingles  Pericarditis  Osteoporosis  Seizure disorder  Migraines      Plan:  -continue Heparin Drip ASA.  Will transition back to PO eliquis or AC of choice prior to discharge.    -Increase activity with PT, OOB, Ambulate  -Patient instructed on and encouraged incentive spirometry use  -Continue current medical management  -No vascular surgical intervention at this time.   -will discuss with Dr Sims/Dr Bear

## 2020-08-31 NOTE — DISCHARGE NOTE PROVIDER - NSDCMRMEDTOKEN_GEN_ALL_CORE_FT
amLODIPine 10 mg oral tablet: 1 tab(s) orally once a day  aspirin 81 mg oral delayed release tablet: 1 tab(s) orally once a day  Eliquis 5 mg oral tablet: 1 tab(s) orally 2 times a day  Esgic oral tablet: 1 tab(s) orally every 4 hours, As Needed for Migraines  labetalol 200 mg oral tablet: 1 tab(s) orally 3 times a day  losartan 100 mg oral tablet: 1 tab(s) orally once a day  pantoprazole 40 mg oral delayed release tablet: 1 tab(s) orally once a day (before a meal)  pregabalin 100 mg oral capsule: 1 cap(s) orally 3 times a day, As Needed  sertraline 25 mg oral tablet: 1 tab(s) orally once a day  topiramate 100 mg oral tablet: 1 tab(s) orally 2 times a day  Vitamin D3 5000 intl units (125 mcg) oral capsule: 1 cap(s) orally once a day acetaminophen 325 mg oral tablet: 2 tab(s) orally every 6 hours, As needed, Temp greater or equal to 38C (100.4F), Mild Pain (1 - 3)  amLODIPine 10 mg oral tablet: 1 tab(s) orally once a day  aspirin 81 mg oral delayed release tablet: 1 tab(s) orally once a day  Eliquis 5 mg oral tablet: 1 tab(s) orally 2 times a day  Esgic oral tablet: 1 tab(s) orally every 4 hours, As Needed for Migraines  labetalol 200 mg oral tablet: 1 tab(s) orally 3 times a day  lidocaine 5% topical film: Apply topically to affected area once a day  losartan 100 mg oral tablet: 1 tab(s) orally once a day  pantoprazole 40 mg oral delayed release tablet: 1 tab(s) orally once a day (before a meal)  pregabalin 100 mg oral capsule: 1 cap(s) orally 3 times a day, As Needed  senna oral tablet: 2 tab(s) orally once a day (at bedtime)  sertraline 25 mg oral tablet: 1 tab(s) orally once a day  topiramate 100 mg oral tablet: 1 tab(s) orally 2 times a day  Vitamin D3 5000 intl units (125 mcg) oral capsule: 1 cap(s) orally once a day

## 2020-08-31 NOTE — DISCHARGE NOTE PROVIDER - NSDCCPCAREPLAN_GEN_ALL_CORE_FT
PRINCIPAL DISCHARGE DIAGNOSIS  Diagnosis: DVT (deep venous thrombosis)  Assessment and Plan of Treatment:       SECONDARY DISCHARGE DIAGNOSES  Diagnosis: Chest pain  Assessment and Plan of Treatment: Chest pain    Diagnosis: Constipation  Assessment and Plan of Treatment: Constipation    Diagnosis: Migraines  Assessment and Plan of Treatment: Migraines    Diagnosis: Grade I diastolic dysfunction  Assessment and Plan of Treatment: Grade I diastolic dysfunction    Diagnosis: Benign essential HTN  Assessment and Plan of Treatment: Benign essential HTN    Diagnosis: Anxiety  Assessment and Plan of Treatment: Anxiety

## 2020-08-31 NOTE — PROGRESS NOTE ADULT - SUBJECTIVE AND OBJECTIVE BOX
AUGUSTUS FLROES    [id number]    Patient is a 65y old  Female who presents with a chief complaint of LEFT SIDED CHEST PAIN (31 Aug 2020 09:12)       Allergies    penicillin (Anaphylaxis)    Intolerances        HPI:  Pt is a 66 yo Female ,well known to me after recent hospitalization with  pmhx significant  for HTN , Osteoporosis ,Sz ,Pericarditis , Renal atherosclerosis , Shingles ,Migraines ,Depression ,Constipation ,Anxiety ,DJD ,hx of vertebroplasties recent admission with extensive ACUTE DVT involving the left common femoral, femoral, popliteal, and calf veins, ,S/P Thrombectomy( mechanical, vein, endovascular  Left lower extremity thrombectomy popliteal and femoral vein, Left common iliac vein angi[facility]  she was discharged  home on Eliquis and daily ASA. For past few days she endorses left lower chest/upper left abd pain that makes taking a deep breath difficult. Patient  consulted with her pmd and cardiologist who recommended she come to ED for evaluation of her symptoms Patient admits pain is worse on deep inspiration ,no recent travel or prolonged immobilization .CTA was performed in ED no evidence of PE ,seen by pulmonologist .CTT demonstrated bilateral pleural effusions Admitted  to telemetry unit for monitoring , send 3 sets of cardiac enzymes to rule out acute coronary event, obtain ECHO to evaluate LVEF, cardiology consult  ,continue current management, O2 supply, anticoagulation plan as per cardiology consult Dr Fischer .Hematology and vascular surgery consults are requested .patient started on heparin drip in ER . (30 Aug 2020 14:16)      PAST MEDICAL & SURGICAL HISTORY:  Neuropathy  DJD (degenerative joint disease)  Benign essential HTN  DVT, lower extremity  Grade I diastolic dysfunction  Drug-seeking behavior  Renal arteriosclerosis  Constipation  Anxiety  Depression  HTN (hypertension)  Shingles  Pericarditis  Osteoporosis  Seizure disorder  Migraines  H/O vertebroplasty  Status post peripheral artery angioplasty with insertion of stent  History of back surgery: Kyphoplasty      FAMILY HISTORY:  Family history of heart disease  Family history of colon cancer in mother        MEDICATIONS   acetaminophen   Tablet .. 650 milliGRAM(s) Oral every 6 hours PRN  amLODIPine   Tablet 10 milliGRAM(s) Oral daily  aspirin enteric coated 81 milliGRAM(s) Oral daily  heparin   Injectable 5500 Unit(s) IV Push every 6 hours PRN  heparin   Injectable 2500 Unit(s) IV Push every 6 hours PRN  heparin  Infusion.  Unit(s)/Hr IV Continuous <Continuous>  labetalol 200 milliGRAM(s) Oral three times a day  losartan 100 milliGRAM(s) Oral daily  magnesium hydroxide Suspension 30 milliLiter(s) Oral daily PRN  melatonin 5 milliGRAM(s) Oral at bedtime PRN  oxycodone    5 mG/acetaminophen 325 mG 1 Tablet(s) Oral every 6 hours PRN  pantoprazole    Tablet 40 milliGRAM(s) Oral before breakfast  senna 2 Tablet(s) Oral at bedtime  sertraline 25 milliGRAM(s) Oral daily  topiramate 100 milliGRAM(s) Oral two times a day      Vital Signs Last 24 Hrs  T(C): 37 (31 Aug 2020 05:31), Max: 37 (30 Aug 2020 16:58)  T(F): 98.6 (31 Aug 2020 05:31), Max: 98.6 (30 Aug 2020 16:58)  HR: 62 (31 Aug 2020 09:13) (60 - 74)  BP: 150/79 (31 Aug 2020 05:31) (136/78 - 186/75)  BP(mean): --  RR: 18 (31 Aug 2020 05:31) (16 - 18)  SpO2: 97% (31 Aug 2020 05:31) (97% - 99%)      20 @ 07:01  -  20 @ 07:00  --------------------------------------------------------  IN: 412 mL / OUT: 0 mL / NET: 412 mL            LABS:                        11.2   6.87  )-----------( 241      ( 31 Aug 2020 05:30 )             36.8         146<H>  |  114<H>  |  24<H>  ----------------------------<  95  4.6   |  26  |  1.30    Ca    9.0      31 Aug 2020 05:30  Phos  3.8       Mg     2.5         TPro  6.7  /  Alb  3.2<L>  /  TBili  0.2  /  DBili  x   /  AST  21  /  ALT  29  /  AlkPhos  98      PT/INR - ( 31 Aug 2020 05:30 )   PT: 13.1 sec;   INR: 1.13 ratio         PTT - ( 31 Aug 2020 05:30 )  PTT:123.9 sec  Urinalysis Basic - ( 30 Aug 2020 09:38 )    Color: Pale Yellow / Appearance: Clear / S.005 / pH: x  Gluc: x / Ketone: Negative  / Bili: Negative / Urobili: Negative   Blood: x / Protein: Negative / Nitrite: Negative   Leuk Esterase: Trace / RBC: 0-2 /HPF / WBC 0-2   Sq Epi: x / Non Sq Epi: Occasional / Bacteria: Occasional            WBC:  WBC Count: 6.87 K/uL ( @ 05:30)  WBC Count: 7.71 K/uL ( @ 19:49)  WBC Count: 7.10 K/uL ( @ 07:38)      MICROBIOLOGY:  RECENT CULTURES:        CARDIAC MARKERS ( 31 Aug 2020 05:30 )  <.015 ng/mL / x     / x     / x     / x      CARDIAC MARKERS ( 30 Aug 2020 19:51 )  <.015 ng/mL / x     / x     / x     / x      CARDIAC MARKERS ( 30 Aug 2020 07:38 )  <.015 ng/mL / x     / x     / x     / x            PT/INR - ( 31 Aug 2020 05:30 )   PT: 13.1 sec;   INR: 1.13 ratio         PTT - ( 31 Aug 2020 05:30 )  PTT:123.9 sec    Sodium:  Sodium, Serum: 146 mmol/L ( @ 05:30)  Sodium, Serum: 145 mmol/L ( @ 07:38)      1.30 mg/dL  05:30  1.30 mg/dL  @ 07:38      Hemoglobin:  Hemoglobin: 11.2 g/dL ( @ 05:30)  Hemoglobin: 10.7 g/dL ( @ 19:49)  Hemoglobin: 10.8 g/dL ( @ 07:38)      Platelets: Platelet Count - Automated: 241 K/uL ( @ 05:30)  Platelet Count - Automated: 227 K/uL ( @ 19:49)  Platelet Count - Automated: 235 K/uL ( @ 07:38)      LIVER FUNCTIONS - ( 31 Aug 2020 05:30 )  Alb: 3.2 g/dL / Pro: 6.7 g/dL / ALK PHOS: 98 U/L / ALT: 29 U/L / AST: 21 U/L / GGT: x             Urinalysis Basic - ( 30 Aug 2020 09:38 )    Color: Pale Yellow / Appearance: Clear / S.005 / pH: x  Gluc: x / Ketone: Negative  / Bili: Negative / Urobili: Negative   Blood: x / Protein: Negative / Nitrite: Negative   Leuk Esterase: Trace / RBC: 0-2 /HPF / WBC 0-2   Sq Epi: x / Non Sq Epi: Occasional / Bacteria: Occasional        RADIOLOGY & ADDITIONAL STUDIES:

## 2020-08-31 NOTE — DISCHARGE NOTE PROVIDER - NSDCFUSCHEDAPPT_GEN_ALL_CORE_FT
AUGUSTUS FLORES ; 09/21/2020 ; NPP [facility] AUGUSTUS FLORES ; 09/21/2020 ; NPP [facility]

## 2020-09-01 ENCOUNTER — TRANSCRIPTION ENCOUNTER (OUTPATIENT)
Age: 65
End: 2020-09-01

## 2020-09-01 VITALS
RESPIRATION RATE: 18 BRPM | TEMPERATURE: 99 F | OXYGEN SATURATION: 95 % | HEART RATE: 68 BPM | DIASTOLIC BLOOD PRESSURE: 67 MMHG | SYSTOLIC BLOOD PRESSURE: 118 MMHG

## 2020-09-01 LAB
ANION GAP SERPL CALC-SCNC: 6 MMOL/L — SIGNIFICANT CHANGE UP (ref 5–17)
APTT BLD: 82.2 SEC — HIGH (ref 27.5–35.5)
BUN SERPL-MCNC: 16 MG/DL — SIGNIFICANT CHANGE UP (ref 7–23)
CALCIUM SERPL-MCNC: 9.4 MG/DL — SIGNIFICANT CHANGE UP (ref 8.5–10.1)
CHLORIDE SERPL-SCNC: 114 MMOL/L — HIGH (ref 96–108)
CO2 SERPL-SCNC: 23 MMOL/L — SIGNIFICANT CHANGE UP (ref 22–31)
CREAT SERPL-MCNC: 1.2 MG/DL — SIGNIFICANT CHANGE UP (ref 0.5–1.3)
GLUCOSE SERPL-MCNC: 97 MG/DL — SIGNIFICANT CHANGE UP (ref 70–99)
HCT VFR BLD CALC: 33.3 % — LOW (ref 34.5–45)
HGB BLD-MCNC: 10.1 G/DL — LOW (ref 11.5–15.5)
MCHC RBC-ENTMCNC: 26.6 PG — LOW (ref 27–34)
MCHC RBC-ENTMCNC: 30.3 GM/DL — LOW (ref 32–36)
MCV RBC AUTO: 87.6 FL — SIGNIFICANT CHANGE UP (ref 80–100)
NRBC # BLD: 0 /100 WBCS — SIGNIFICANT CHANGE UP (ref 0–0)
PLATELET # BLD AUTO: 186 K/UL — SIGNIFICANT CHANGE UP (ref 150–400)
POTASSIUM SERPL-MCNC: 3.9 MMOL/L — SIGNIFICANT CHANGE UP (ref 3.5–5.3)
POTASSIUM SERPL-SCNC: 3.9 MMOL/L — SIGNIFICANT CHANGE UP (ref 3.5–5.3)
RBC # BLD: 3.8 M/UL — SIGNIFICANT CHANGE UP (ref 3.8–5.2)
RBC # FLD: 15.5 % — HIGH (ref 10.3–14.5)
SODIUM SERPL-SCNC: 143 MMOL/L — SIGNIFICANT CHANGE UP (ref 135–145)
WBC # BLD: 6.32 K/UL — SIGNIFICANT CHANGE UP (ref 3.8–10.5)
WBC # FLD AUTO: 6.32 K/UL — SIGNIFICANT CHANGE UP (ref 3.8–10.5)

## 2020-09-01 PROCEDURE — 84484 ASSAY OF TROPONIN QUANT: CPT

## 2020-09-01 PROCEDURE — 71275 CT ANGIOGRAPHY CHEST: CPT

## 2020-09-01 PROCEDURE — 99053 MED SERV 10PM-8AM 24 HR FAC: CPT

## 2020-09-01 PROCEDURE — 80061 LIPID PANEL: CPT

## 2020-09-01 PROCEDURE — U0003: CPT

## 2020-09-01 PROCEDURE — 86850 RBC ANTIBODY SCREEN: CPT

## 2020-09-01 PROCEDURE — 85730 THROMBOPLASTIN TIME PARTIAL: CPT

## 2020-09-01 PROCEDURE — 83880 ASSAY OF NATRIURETIC PEPTIDE: CPT

## 2020-09-01 PROCEDURE — 85027 COMPLETE CBC AUTOMATED: CPT

## 2020-09-01 PROCEDURE — 81001 URINALYSIS AUTO W/SCOPE: CPT

## 2020-09-01 PROCEDURE — 83735 ASSAY OF MAGNESIUM: CPT

## 2020-09-01 PROCEDURE — 96374 THER/PROPH/DIAG INJ IV PUSH: CPT | Mod: XU

## 2020-09-01 PROCEDURE — 86225 DNA ANTIBODY NATIVE: CPT

## 2020-09-01 PROCEDURE — 71045 X-RAY EXAM CHEST 1 VIEW: CPT

## 2020-09-01 PROCEDURE — 83690 ASSAY OF LIPASE: CPT

## 2020-09-01 PROCEDURE — 85610 PROTHROMBIN TIME: CPT

## 2020-09-01 PROCEDURE — 80048 BASIC METABOLIC PNL TOTAL CA: CPT

## 2020-09-01 PROCEDURE — 85379 FIBRIN DEGRADATION QUANT: CPT

## 2020-09-01 PROCEDURE — 86901 BLOOD TYPING SEROLOGIC RH(D): CPT

## 2020-09-01 PROCEDURE — 71100 X-RAY EXAM RIBS UNI 2 VIEWS: CPT

## 2020-09-01 PROCEDURE — 84443 ASSAY THYROID STIM HORMONE: CPT

## 2020-09-01 PROCEDURE — 96361 HYDRATE IV INFUSION ADD-ON: CPT

## 2020-09-01 PROCEDURE — 74177 CT ABD & PELVIS W/CONTRAST: CPT

## 2020-09-01 PROCEDURE — 86769 SARS-COV-2 COVID-19 ANTIBODY: CPT

## 2020-09-01 PROCEDURE — 84100 ASSAY OF PHOSPHORUS: CPT

## 2020-09-01 PROCEDURE — 93005 ELECTROCARDIOGRAM TRACING: CPT

## 2020-09-01 PROCEDURE — 86900 BLOOD TYPING SEROLOGIC ABO: CPT

## 2020-09-01 PROCEDURE — 86147 CARDIOLIPIN ANTIBODY EA IG: CPT

## 2020-09-01 PROCEDURE — 93306 TTE W/DOPPLER COMPLETE: CPT

## 2020-09-01 PROCEDURE — 80053 COMPREHEN METABOLIC PANEL: CPT

## 2020-09-01 PROCEDURE — 93970 EXTREMITY STUDY: CPT

## 2020-09-01 PROCEDURE — 36415 COLL VENOUS BLD VENIPUNCTURE: CPT

## 2020-09-01 PROCEDURE — 99285 EMERGENCY DEPT VISIT HI MDM: CPT

## 2020-09-01 RX ORDER — ONDANSETRON 8 MG/1
4 TABLET, FILM COATED ORAL ONCE
Refills: 0 | Status: COMPLETED | OUTPATIENT
Start: 2020-09-01 | End: 2020-09-01

## 2020-09-01 RX ORDER — APIXABAN 2.5 MG/1
5 TABLET, FILM COATED ORAL EVERY 12 HOURS
Refills: 0 | Status: DISCONTINUED | OUTPATIENT
Start: 2020-09-01 | End: 2020-09-01

## 2020-09-01 RX ORDER — ACETAMINOPHEN 500 MG
2 TABLET ORAL
Qty: 0 | Refills: 0 | DISCHARGE
Start: 2020-09-01

## 2020-09-01 RX ORDER — LIDOCAINE 4 G/100G
1 CREAM TOPICAL
Qty: 7 | Refills: 0
Start: 2020-09-01 | End: 2020-09-07

## 2020-09-01 RX ORDER — SENNA PLUS 8.6 MG/1
2 TABLET ORAL
Qty: 60 | Refills: 0
Start: 2020-09-01 | End: 2020-09-30

## 2020-09-01 RX ADMIN — PANTOPRAZOLE SODIUM 40 MILLIGRAM(S): 20 TABLET, DELAYED RELEASE ORAL at 06:11

## 2020-09-01 RX ADMIN — OXYCODONE AND ACETAMINOPHEN 1 TABLET(S): 5; 325 TABLET ORAL at 03:33

## 2020-09-01 RX ADMIN — LOSARTAN POTASSIUM 100 MILLIGRAM(S): 100 TABLET, FILM COATED ORAL at 06:11

## 2020-09-01 RX ADMIN — Medication 200 MILLIGRAM(S): at 06:11

## 2020-09-01 RX ADMIN — Medication 5 MILLIGRAM(S): at 02:58

## 2020-09-01 RX ADMIN — ONDANSETRON 4 MILLIGRAM(S): 8 TABLET, FILM COATED ORAL at 00:59

## 2020-09-01 RX ADMIN — OXYCODONE AND ACETAMINOPHEN 1 TABLET(S): 5; 325 TABLET ORAL at 02:59

## 2020-09-01 RX ADMIN — HEPARIN SODIUM 800 UNIT(S)/HR: 5000 INJECTION INTRAVENOUS; SUBCUTANEOUS at 03:31

## 2020-09-01 RX ADMIN — Medication 100 MILLIGRAM(S): at 06:11

## 2020-09-01 RX ADMIN — AMLODIPINE BESYLATE 10 MILLIGRAM(S): 2.5 TABLET ORAL at 06:11

## 2020-09-01 NOTE — PROGRESS NOTE ADULT - SUBJECTIVE AND OBJECTIVE BOX
Patient is a 65y old  Female who presents with a chief complaint of LEFT SIDED CHEST PAIN (31 Aug 2020 20:18)      INTERVAL OVER NIGHT EVENTS:    MEDICATIONS  (STANDING):  amLODIPine   Tablet 10 milliGRAM(s) Oral daily  aspirin enteric coated 81 milliGRAM(s) Oral daily  heparin  Infusion.  Unit(s)/Hr (12 mL/Hr) IV Continuous <Continuous>  labetalol 200 milliGRAM(s) Oral three times a day  lidocaine   Patch 1 Patch Transdermal daily  losartan 100 milliGRAM(s) Oral daily  pantoprazole    Tablet 40 milliGRAM(s) Oral before breakfast  senna 2 Tablet(s) Oral at bedtime  sertraline 25 milliGRAM(s) Oral daily  topiramate 100 milliGRAM(s) Oral two times a day    MEDICATIONS  (PRN):  acetaminophen   Tablet .. 650 milliGRAM(s) Oral every 6 hours PRN Temp greater or equal to 38C (100.4F), Mild Pain (1 - 3)  heparin   Injectable 5500 Unit(s) IV Push every 6 hours PRN For aPTT less than 40  heparin   Injectable 2500 Unit(s) IV Push every 6 hours PRN For aPTT between 40 - 57  magnesium hydroxide Suspension 30 milliLiter(s) Oral daily PRN Constipation  melatonin 5 milliGRAM(s) Oral at bedtime PRN Insomnia  oxycodone    5 mG/acetaminophen 325 mG 1 Tablet(s) Oral every 6 hours PRN Moderate Pain (4 - 6)      Allergies    penicillin (Anaphylaxis)    Intolerances        REVIEW OF SYSTEMS:  CONSTITUTIONAL: No fever, weight loss, or fatigue  EYES: No eye pain, visual disturbances, or discharge  ENMT:  No difficulty hearing, tinnitus, vertigo; No sinus or throat pain  NECK: No pain or stiffness  BREASTS: No pain, masses, or nipple discharge  RESPIRATORY: No cough, wheezing, chills or hemoptysis; No shortness of breath  CARDIOVASCULAR: No chest pain, palpitations, dizziness, or leg swelling  GASTROINTESTINAL: No abdominal or epigastric pain. No nausea, vomiting, or hematemesis; No diarrhea or constipation. No melena or hematochezia.  GENITOURINARY: No dysuria, frequency, hematuria, or incontinence  NEUROLOGICAL: No headaches, memory loss, loss of strength, numbness, or tremors  SKIN: No itching, burning, rashes, or lesions   LYMPH NODES: No enlarged glands  ENDOCRINE: No heat or cold intolerance; No hair loss  MUSCULOSKELETAL: No joint pain or swelling; No muscle, back, or extremity pain  PSYCHIATRIC: No depression, anxiety, mood swings, or difficulty sleeping  HEME/LYMPH: No easy bruising, or bleeding gums  ALLERGY AND IMMUNOLOGIC: No hives or eczema    Vital Signs Last 24 Hrs  T(C): 37 (01 Sep 2020 05:31), Max: 37 (31 Aug 2020 13:31)  T(F): 98.6 (01 Sep 2020 05:31), Max: 98.6 (31 Aug 2020 13:31)  HR: 68 (01 Sep 2020 05:31) (59 - 68)  BP: 118/67 (01 Sep 2020 05:31) (112/60 - 129/73)  BP(mean): --  RR: 18 (01 Sep 2020 05:) (16 - 18)  SpO2: 95% (01 Sep 2020 05:31) (95% - 96%)    PHYSICAL EXAM:  GENERAL: NAD, well-groomed, well-developed  HEAD:  Atraumatic, Normocephalic  EYES: EOMI, PERRLA, conjunctiva and sclera clear  ENMT: No tonsillar erythema, exudates, or enlargement; Moist mucous membranes, Good dentition, No lesions  NECK: Supple, No JVD, Normal thyroid  NERVOUS SYSTEM:  Alert & Oriented X3, Motor Strength 5/5 B/L upper and lower extremities; DTRs 2+ intact and symmetric  CHEST/LUNG: Clear to percussion bilaterally; No rales, rhonchi, wheezing, or rubs  HEART: Regular rate and rhythm; No murmurs, rubs, or gallops  ABDOMEN: Soft, Nontender, Nondistended; Bowel sounds present  EXTREMITIES:  2+ Peripheral Pulses, No clubbing, cyanosis, or edema  LYMPH: No lymphadenopathy noted  SKIN: No rashes or lesions    LABS:                        10.1   6.32  )-----------( 186      ( 01 Sep 2020 07:24 )             33.3     09-    143  |  114<H>  |  16  ----------------------------<  97  3.9   |  23  |  1.20    Ca    9.4      01 Sep 2020 07:24  Phos  3.8     08-31  Mg     2.5     08-31    TPro  6.7  /  Alb  3.2<L>  /  TBili  0.2  /  DBili  x   /  AST  21  /  ALT  29  /  AlkPhos  98  08-31    PT/INR - ( 31 Aug 2020 05:30 )   PT: 13.1 sec;   INR: 1.13 ratio         PTT - ( 01 Sep 2020 02:56 )  PTT:82.2 sec  Urinalysis Basic - ( 30 Aug 2020 09:38 )    Color: Pale Yellow / Appearance: Clear / S.005 / pH: x  Gluc: x / Ketone: Negative  / Bili: Negative / Urobili: Negative   Blood: x / Protein: Negative / Nitrite: Negative   Leuk Esterase: Trace / RBC: 0-2 /HPF / WBC 0-2   Sq Epi: x / Non Sq Epi: Occasional / Bacteria: Occasional      CAPILLARY BLOOD GLUCOSE          RADIOLOGY & ADDITIONAL TESTS:    Imaging Personally Reviewed:  [ ] YES  [ ] NO    Consultant(s) Notes Reviewed:  [ ] YES  [ ] NO    Care Discussed with Consultants/Other Providers [ ] YES  [ ] NO

## 2020-09-01 NOTE — PROGRESS NOTE ADULT - REASON FOR ADMISSION
LEFT SIDED CHEST PAIN

## 2020-09-01 NOTE — PROGRESS NOTE ADULT - PROBLEM SELECTOR PLAN 8
Gastrointestinal stress ulcer prophylaxis and DVT prophylaxis administered
Gastrointestinal stress ulcer prophylaxis and DVT prophylaxis administered

## 2020-09-01 NOTE — PROGRESS NOTE ADULT - PROBLEM SELECTOR PLAN 1
likely atypical ,rule out rib fractures ,xray ordered ,continue pain management .No evidence of acute ischemic event
likely atypical ,rule out rib fractures ,xray ordered ,continue pain management .No evidence of acute ischemic event

## 2020-09-01 NOTE — PROGRESS NOTE ADULT - SUBJECTIVE AND OBJECTIVE BOX
AUGUSTUS FLORES    PLV [id number]    Patient is a 65y old  Female who presents with a chief complaint of LEFT SIDED CHEST PAIN (01 Sep 2020 08:41)       Allergies    penicillin (Anaphylaxis)    Intolerances        HPI:  Pt is a 66 yo Female ,well known to me after recent hospitalization with  pmhx significant  for HTN , Osteoporosis ,Sz ,Pericarditis , Renal atherosclerosis , Shingles ,Migraines ,Depression ,Constipation ,Anxiety ,DJD ,hx of vertebroplasties recent admission with extensive ACUTE DVT involving the left common femoral, femoral, popliteal, and calf veins, ,S/P Thrombectomy( mechanical, vein, endovascular  Left lower extremity thrombectomy popliteal and femoral vein, Left common iliac vein angioplasty and stent by Dr. Zarco, [facility] 20 ),  she was discharged  home on Eliquis and daily ASA. For past few days she endorses left lower chest/upper left abd pain that makes taking a deep breath difficult. Patient  consulted with her pmd and cardiologist who recommended she come to ED for evaluation of her symptoms Patient admits pain is worse on deep inspiration ,no recent travel or prolonged immobilization .CTA was performed in ED no evidence of PE ,seen by pulmonologist .CTT demonstrated bilateral pleural effusions Admitted  to telemetry unit for monitoring , send 3 sets of cardiac enzymes to rule out acute coronary event, obtain ECHO to evaluate LVEF, cardiology consult  ,continue current management, O2 supply, anticoagulation plan as per cardiology consult Dr Fischer .Hematology and vascular surgery consults are requested .patient started on heparin drip in ER . (30 Aug 2020 14:16)      PAST MEDICAL & SURGICAL HISTORY:  Neuropathy  DJD (degenerative joint disease)  Benign essential HTN  DVT, lower extremity  Grade I diastolic dysfunction  Drug-seeking behavior  Renal arteriosclerosis  Constipation  Anxiety  Depression  HTN (hypertension)  Shingles  Pericarditis  Osteoporosis  Seizure disorder  Migraines  H/O vertebroplasty  Status post peripheral artery angioplasty with insertion of stent  History of back surgery: Kyphoplasty      FAMILY HISTORY:  Family history of heart disease  Family history of colon cancer in mother        MEDICATIONS   acetaminophen   Tablet .. 650 milliGRAM(s) Oral every 6 hours PRN  amLODIPine   Tablet 10 milliGRAM(s) Oral daily  aspirin enteric coated 81 milliGRAM(s) Oral daily  heparin   Injectable 5500 Unit(s) IV Push every 6 hours PRN  heparin   Injectable 2500 Unit(s) IV Push every 6 hours PRN  heparin  Infusion.  Unit(s)/Hr IV Continuous <Continuous>  labetalol 200 milliGRAM(s) Oral three times a day  lidocaine   Patch 1 Patch Transdermal daily  losartan 100 milliGRAM(s) Oral daily  magnesium hydroxide Suspension 30 milliLiter(s) Oral daily PRN  melatonin 5 milliGRAM(s) Oral at bedtime PRN  oxycodone    5 mG/acetaminophen 325 mG 1 Tablet(s) Oral every 6 hours PRN  pantoprazole    Tablet 40 milliGRAM(s) Oral before breakfast  senna 2 Tablet(s) Oral at bedtime  sertraline 25 milliGRAM(s) Oral daily  topiramate 100 milliGRAM(s) Oral two times a day      Vital Signs Last 24 Hrs  T(C): 37 (01 Sep 2020 05:31), Max: 37 (31 Aug 2020 13:31)  T(F): 98.6 (01 Sep 2020 05:31), Max: 98.6 (31 Aug 2020 13:31)  HR: 68 (01 Sep 2020 05:31) (59 - 68)  BP: 118/67 (01 Sep 2020 05:31) (112/60 - 129/73)  BP(mean): --  RR: 18 (01 Sep 2020 05:31) (16 - 18)  SpO2: 95% (01 Sep 2020 05:31) (95% - 96%)      20 @ 07:01  -  -20 @ 07:00  --------------------------------------------------------  IN: 180 mL / OUT: 0 mL / NET: 180 mL            LABS:                        10.1   6.32  )-----------( 186      ( 01 Sep 2020 07:24 )             33.3     09-    143  |  114<H>  |  16  ----------------------------<  97  3.9   |  23  |  1.20    Ca    9.4      01 Sep 2020 07:24  Phos  3.8     0831  Mg     2.5         TPro  6.7  /  Alb  3.2<L>  /  TBili  0.2  /  DBili  x   /  AST  21  /  ALT  29  /  AlkPhos  98  08-31    PT/INR - ( 31 Aug 2020 05:30 )   PT: 13.1 sec;   INR: 1.13 ratio         PTT - ( 01 Sep 2020 02:56 )  PTT:82.2 sec  Urinalysis Basic - ( 30 Aug 2020 09:38 )    Color: Pale Yellow / Appearance: Clear / S.005 / pH: x  Gluc: x / Ketone: Negative  / Bili: Negative / Urobili: Negative   Blood: x / Protein: Negative / Nitrite: Negative   Leuk Esterase: Trace / RBC: 0-2 /HPF / WBC 0-2   Sq Epi: x / Non Sq Epi: Occasional / Bacteria: Occasional            WBC:  WBC Count: 6.32 K/uL ( @ 07:24)  WBC Count: 6.87 K/uL ( @ 05:30)  WBC Count: 7.71 K/uL ( @ 19:49)  WBC Count: 7.10 K/uL ( @ 07:38)      MICROBIOLOGY:  RECENT CULTURES:        CARDIAC MARKERS ( 31 Aug 2020 05:30 )  <.015 ng/mL / x     / x     / x     / x      CARDIAC MARKERS ( 30 Aug 2020 19:51 )  <.015 ng/mL / x     / x     / x     / x            PT/INR - ( 31 Aug 2020 05:30 )   PT: 13.1 sec;   INR: 1.13 ratio         PTT - ( 01 Sep 2020 02:56 )  PTT:82.2 sec    Sodium:  Sodium, Serum: 143 mmol/L ( @ 07:24)  Sodium, Serum: 146 mmol/L ( @ 05:30)  Sodium, Serum: 145 mmol/L ( @ 07:38)      1.20 mg/dL  @ 07:24  1.30 mg/dL  05:30  1.30 mg/dL  @ 07:38      Hemoglobin:  Hemoglobin: 10.1 g/dL ( @ 07:24)  Hemoglobin: 11.2 g/dL ( @ 05:30)  Hemoglobin: 10.7 g/dL ( @ 19:49)  Hemoglobin: 10.8 g/dL ( @ 07:38)      Platelets: Platelet Count - Automated: 186 K/uL ( @ 07:24)  Platelet Count - Automated: 241 K/uL ( @ 05:30)  Platelet Count - Automated: 227 K/uL ( @ 19:49)  Platelet Count - Automated: 235 K/uL ( @ 07:38)      LIVER FUNCTIONS - ( 31 Aug 2020 05:30 )  Alb: 3.2 g/dL / Pro: 6.7 g/dL / ALK PHOS: 98 U/L / ALT: 29 U/L / AST: 21 U/L / GGT: x             Urinalysis Basic - ( 30 Aug 2020 09:38 )    Color: Pale Yellow / Appearance: Clear / S.005 / pH: x  Gluc: x / Ketone: Negative  / Bili: Negative / Urobili: Negative   Blood: x / Protein: Negative / Nitrite: Negative   Leuk Esterase: Trace / RBC: 0-2 /HPF / WBC 0-2   Sq Epi: x / Non Sq Epi: Occasional / Bacteria: Occasional        RADIOLOGY & ADDITIONAL STUDIES:

## 2020-09-01 NOTE — PROGRESS NOTE ADULT - ASSESSMENT
64 yo woman with DVT of left lower extremity (femoral to calf) s/p thrombectomy by vascular ~2 wks ago and discharged on Eliquis.   Adm w left chest pain, PE and MI have been ruled out. Chest pain now resolved.  Doppler  w left popliteal and tibial veins - unclear if this is acute vs. acute on chronic. No vascular intervention planned. Clinically asx.    - as per plan can transition Heparin back to DOAC (Eliquis 5mg Q12 hour) when ready for discharge(alternatives such as Lovenox 1mg/kg Q12 hours and warfarin with goal INR 2-3 were discussed with patient, but she chooses Eliquis)  - discussed w pt and Medicine  - stable from Heme standpoint for planned discharge    will sign off, please call if any questions
66 yo woman with DVT of left lower extremity (femoral) s/p thrombectomy by vascular and discharged on eliquis.  Abnormal doppler of unclear significance but has persistent thrombus in popliteal and tibial veins - unclear if this is acute vs. acute on chronic. No vascular intervention planned. Given this clot is below the previous evacuated clot, most certainly could represent acute on chronic and would not represents failure of DOAC.     - currently on heparin and can transition back to DOAC (Eliquis 5mg Q12 hour) when ready for discharge; alternatives such as Lovenox 1mg/kg Q12 hours and warfarin with goal INR 2-3 discussed with patient  - etiology of chest pain is still unclear; no evidence of Pulmonary Embolism; cardiology following  - will continue to follow with you  - case discussed with patient, Vascular (Dr. Parson) and hospitalist (Dr. Gant); please note, patient has appointment as outpatient with Dr. Sexton (hematology at [facility])
Pt is a 64 yo Female ,well known to me after recent hospitalization with  pmhx significant  for HTN , Osteoporosis ,Sz ,Pericarditis , Renal atherosclerosis , Shingles ,Migraines ,Depression ,Constipation ,Anxiety ,DJD ,hx of vertebroplasties recent admission with extensive ACUTE DVT involving the left common femoral, femoral, popliteal, and calf veins, ,S/P Thrombectomy( mechanical, vein, endovascular  Left lower extremity thrombectomy popliteal and femoral vein, Left common iliac vein angioplasty and stent by Dr. Zarco, [facility] 08/11/20 ),  she was discharged  home on Eliquis and daily ASA. For past few days she endorses left lower chest/upper left abd pain that makes taking a deep breath difficult. Patient  consulted with her pmd and cardiologist who recommended she come to ED for evaluation of her symptoms Patient admits pain is worse on deep inspiration ,no recent travel or prolonged immobilization .CTA was performed in ED no evidence of PE ,seen by pulmonologist .CTT demonstrated bilateral pleural effusions Admitted  to telemetry unit for monitoring , send 3 sets of cardiac enzymes to rule out acute coronary event, obtain ECHO to evaluate LVEF, cardiology consult  ,continue current management, O2 supply, anticoagulation plan as per cardiology consult Dr Fischer .Hematology and vascular surgery consults are requested .patient started on heparin drip in ER .
SANDRA COFFMAN [facility] [phone]  1955 DOA 2020 DR SAI DÍAZ          REVIEW OF SYMPTOMS      Able to give ROS  Yes     RELIABLE No   CONSTITUTIONAL Weakness Yes  Chills No Vision changes No  ENDOCRINE No unexplained hair loss No heat or cold intolerance    ALLERGY No hives  Sore throat No   RESP Coughing blood no  Shortness of breath YES   NEURO No Headache  Confusion Pain neck No   CARDIAC No Chest pain No Palpitations   GI No Pain abdomen NO   Vomiting NO     PHYSICAL EXAM    HEENT Unremarkable PERRLA atraumatic   RESP Fair air entry EXP prolonged    Harsh breath sound Resp distres mild   CARDIAC S1 S2 No S3     NO JVD    ABDOMEN SOFT BS PRESENT NOT DISTENDED No hepatosplenomegaly PEDAL EDEMA present No calf tenderness  NO rash   GENERAL Not TOXIC looking      OXYGENATION      2020 ra 97%     VITALS/LABS       2020 afeb 62 150/70     EVENTS.     2020 RO MI RO Pericarditis RO breakthru VTE salazar was started   2020  TSH 6.9  2020 Hemat recommended to transition back to DOAC when ready for dc and hemat felt that it does not represent failure of DOAC          PATIENT SUMMARY.   65 year old female remote former smoker and etoh with PMH of renal artery stenosis, HTN, seizures, migraines, osteoporosis, depression, anxiety, recently admitted 8/ with extensive Left lower extremity DVT involving the left common femoral, femoral, popliteal, and calf veins. 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)     Q scan was indeterminate   ECHO  ef 55% pasp 30 dd1  Pt was DCed  on therapeutic apixaban but developed  progressive pain l rib pleuritic and   V duplx 2020 showed v duplx thrombus l popl and post tibial cannot distinguish ac v ac on chronic   cta ch 2020 was negative for pe     Pt was admitted 2020 Pulm consulted       COURSE  2020 Admitted for unexplained pleuritic l chest pain several days while on apixaban in setting of recent thrombectomy l leg with neg cta ch (2020) and poss ac on chr l popl dvt (2020)   2020 Hemat and Vasc consulted Pt started on heparin drip  p            DISPOSITION PLANNING.  CHEST PAIN  RO MI Tr 1 Neg-iv x 2 (2020)  RO VTE 2020 Hemat recommended to transition back to DOAC when ready for dc and hemat felt that it does not represent failure of DOAC    No vasc intervention planned   RO PERICARDITIS Cardio eval      PLAN DC planning on eliquis when cleared by Cardio         TIME SPENT   Over 25 minutes aggregate care time spent on encounter; activities included   direct patient care, counseling and/or coordinating care reviewing notes, lab data/ imaging , discussion with multidisciplinary team/ patient  /family and explaining in detail risks, benefits, alternatives  of the recommendations     SANDRA COFFMAN [facility] [phone]  1955 DOA 2020 DR SAI DÍAZ
SANDRA COFFMAN [facility] [phone]  1955 DOA 2020 DR SAI DÍAZ          REVIEW OF SYMPTOMS      Able to give ROS  Yes     RELIABLE No   CONSTITUTIONAL Weakness Yes  Chills No Vision changes No  ENDOCRINE No unexplained hair loss No heat or cold intolerance    ALLERGY No hives  Sore throat No   RESP Coughing blood no  Shortness of breath YES   NEURO No Headache  Confusion Pain neck No   CARDIAC No Chest pain No Palpitations   GI No Pain abdomen NO   Vomiting NO     PHYSICAL EXAM    HEENT Unremarkable PERRLA atraumatic   RESP Fair air entry EXP prolonged    Harsh breath sound Resp distres mild   CARDIAC S1 S2 No S3     NO JVD    ABDOMEN SOFT BS PRESENT NOT DISTENDED No hepatosplenomegaly PEDAL EDEMA present No calf tenderness  NO rash   GENERAL Not TOXIC looking      OXYGENATION      2020 ra 97%     VITALS/LABS       2020 afeb 68 18 112/60     EVENTS.     2020 Pt cleared for dc by Cardio Vasc and Hemat to be dced back on apixaban Pain felt to be musculoskeletal reproducible and local tenderness elicited on chest wall suggesting that this was not DOAC failure       PATIENT SUMMARY.   65 year old female remote former smoker and etoh with PMH of renal artery stenosis, HTN, seizures, migraines, osteoporosis, depression, anxiety, recently admitted 8/ with extensive Left lower extremity DVT involving the left common femoral, femoral, popliteal, and calf veins. 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)     Q scan was indeterminate   ECHO  ef 55% pasp 30 dd1  Pt was DCed  on therapeutic apixaban but developed  progressive pain l rib pleuritic and   V duplx 2020 showed v duplx thrombus l popl and post tibial cannot distinguish ac v ac on chronic   cta ch 2020 was negative for pe     Pt was admitted 2020 Pulm consulted       COURSE  2020 Admitted for unexplained pleuritic l chest pain several days while on apixaban in setting of recent thrombectomy l leg with neg cta ch (2020) and poss ac on chr l popl dvt (2020)   2020 Hemat and Vasc consulted Pt started on heparin drip  p          DISPOSITION PLANNING.  CHEST PAIN  RO MI Tr 1 Neg-iv x 2 (2020)  RO VTE 2020 Pt seen by Hemat Hemat recommended to transition back to DOAC when ready for dc and hemat felt that it does not represent failure of DOAC    Pt seen by vasc No vasc intervention planned  DOAC restarted    RO PERICARDITIS Cardio cleared pt for discharge 2020      PLAN DC planning on eliquis when cleared by Cardio  Will need hemat followup after discharge within 2 w    Pt changed to apixaban as recommended by vasc and hemat       TIME SPENT   Over 25 minutes aggregate care time spent on encounter; activities included   direct patient care, counseling and/or coordinating care reviewing notes, lab data/ imaging , discussion with multidisciplinary team/ patient  /family and explaining in detail risks, benefits, alternatives  of the recommendations     SANDRA COFFMAN [facility] [phone]  1955 DOA 2020 DR SAI DÍAZ

## 2020-09-01 NOTE — PROGRESS NOTE ADULT - ATTENDING COMMENTS
Pt is a 64 yo Female ,well known to me after recent hospitalization with  pmhx significant  for HTN , Osteoporosis ,Sz ,Pericarditis , Renal atherosclerosis , Shingles ,Migraines ,Depression ,Constipation ,Anxiety ,DJD ,hx of vertebroplasties recent admission with extensive ACUTE DVT involving the left common femoral, femoral, popliteal, and calf veins, ,S/P Thrombectomy( mechanical, vein, endovascular  Left lower extremity thrombectomy popliteal and femoral vein, Left common iliac vein angioplasty and stent by Dr. Zarco, [facility] to [facility] 08/11/20 ),  she was discharged  home on Eliquis and daily ASA. For past few days she endorses left lower chest/upper left abd pain that makes taking a deep breath difficult. Patient  consulted with her pmd and cardiologist who recommended she come to ED for evaluation of her symptoms Patient admits pain is worse on deep inspiration ,no recent travel or prolonged immobilization .CTA was performed in ED no evidence of PE ,seen by pulmonologist .CTT demonstrated bilateral pleural effusions Admitted  to telemetry unit for monitoring , send 3 sets of cardiac enzymes to rule out acute coronary event, obtain ECHO to evaluate LVEF, cardiology consult  ,continue current management, O2 supply, anticoagulation plan as per cardiology consult Dr Fischer .Hematology and vascular surgery consults are requested .patient started on heparin drip in ER .
Patient left subcostal pain relieved and she wants to go home.  Discussed with hematologist in detail and patient d/c back on Eliquis.

## 2020-09-01 NOTE — PROGRESS NOTE ADULT - PROBLEM SELECTOR PLAN 3
TTE 08/11 reviewed , continue current management ,serial chest xrays to monitor pleural effusions  ,cardiology cons requested
TTE 08/11 reviewed , continue current management ,serial chest xrays to monitor pleural effusions  ,cardiology cons requested

## 2020-09-01 NOTE — PROGRESS NOTE ADULT - SUBJECTIVE AND OBJECTIVE BOX
All interim records and events noted.    feeling well, no longer w chest pain, wants to go home      MEDICATIONS  (STANDING):  amLODIPine   Tablet 10 milliGRAM(s) Oral daily  aspirin enteric coated 81 milliGRAM(s) Oral daily  heparin  Infusion.  Unit(s)/Hr (12 mL/Hr) IV Continuous <Continuous>  labetalol 200 milliGRAM(s) Oral three times a day  lidocaine   Patch 1 Patch Transdermal daily  losartan 100 milliGRAM(s) Oral daily  pantoprazole    Tablet 40 milliGRAM(s) Oral before breakfast  senna 2 Tablet(s) Oral at bedtime  sertraline 25 milliGRAM(s) Oral daily  topiramate 100 milliGRAM(s) Oral two times a day    MEDICATIONS  (PRN):  acetaminophen   Tablet .. 650 milliGRAM(s) Oral every 6 hours PRN Temp greater or equal to 38C (100.4F), Mild Pain (1 - 3)  heparin   Injectable 5500 Unit(s) IV Push every 6 hours PRN For aPTT less than 40  heparin   Injectable 2500 Unit(s) IV Push every 6 hours PRN For aPTT between 40 - 57  magnesium hydroxide Suspension 30 milliLiter(s) Oral daily PRN Constipation  melatonin 5 milliGRAM(s) Oral at bedtime PRN Insomnia  oxycodone    5 mG/acetaminophen 325 mG 1 Tablet(s) Oral every 6 hours PRN Moderate Pain (4 - 6)      Vital Signs Last 24 Hrs  T(C): 37 (01 Sep 2020 05:31), Max: 37 (31 Aug 2020 13:31)  T(F): 98.6 (01 Sep 2020 05:31), Max: 98.6 (31 Aug 2020 13:31)  HR: 68 (01 Sep 2020 05:31) (59 - 68)  BP: 118/67 (01 Sep 2020 05:31) (112/60 - 129/73)  BP(mean): --  RR: 18 (01 Sep 2020 05:31) (16 - 18)  SpO2: 95% (01 Sep 2020 05:31) (95% - 96%)    PHYSICAL EXAM  General: well developed  well nourished, in no acute distress  Head: atraumatic, normocephalic  ENT: sclera anicteric, buccal mucosa moist  Neck: supple, trachea midline, no palpable masses  CV: S1 S2, regular rate and rhythm  Lungs: clear to auscultation, no wheezes/rhonchi  Abdomen: soft, nontender, bowel sounds present, no palpable hepatosplenomegaly  Extrem: no clubbing/cyanosis/edema  Skin: no significant increased ecchymosis/petechiae  Neuro: alert and oriented X3,  no focal deficits      LABS:             10.1   6.32  )-----------( 186      ( 09-01 @ 07:24 )             33.3                11.2   6.87  )-----------( 241      ( 08-31 @ 05:30 )             36.8                10.7   7.71  )-----------( 227      ( 08-30 @ 19:49 )             34.2                10.8   7.10  )-----------( 235      ( 08-30 @ 07:38 )             34.8       09-01    143  |  114<H>  |  16  ----------------------------<  97  3.9   |  23  |  1.20    Ca    9.4      01 Sep 2020 07:24  Phos  3.8     08-31  Mg     2.5     08-31    TPro  6.7  /  Alb  3.2<L>  /  TBili  0.2  /  DBili  x   /  AST  21  /  ALT  29  /  AlkPhos  98  08-31 09-01 @ 02:56  PT-- INR--  PTT82.2  08-31 @ 19:40  PT-- INR--  PTT60.3  08-31 @ 11:51  PT-- INR--  RXT835.5  08-31 @ 05:30  PT13.1 INR1.13  CTK308.9  08-30 @ 19:49  PT14.2 INR1.23  MEG804.8  08-30 @ 07:38  PT13.1 INR1.13  PTT31.9      RADIOLOGY & ADDITIONAL STUDIES:    IMPRESSION/RECOMMENDATIONS:

## 2020-09-01 NOTE — DISCHARGE NOTE NURSING/CASE MANAGEMENT/SOCIAL WORK - PATIENT PORTAL LINK FT
You can access the FollowMyHealth Patient Portal offered by [facility] by registering at the following website: [email or web address]. By joining [employer]’s FollowMyHealth portal, you will also be able to view your health information using other applications (apps) compatible with our system.

## 2020-09-01 NOTE — PROGRESS NOTE ADULT - PROBLEM SELECTOR PLAN 2
on heparin gtt ,hypercoagulable workup as per hematology consult , serial cbc ,pt/inr ,case d/w Dr QUARLES
on heparin gtt ,hypercoagulable workup as per hematology consult , serial cbc ,pt/inr ,case d/w Dr QUARLES

## 2020-09-03 PROBLEM — I51.9 HEART DISEASE, UNSPECIFIED: Chronic | Status: ACTIVE | Noted: 2020-08-30

## 2020-09-03 PROBLEM — M19.90 UNSPECIFIED OSTEOARTHRITIS, UNSPECIFIED SITE: Chronic | Status: ACTIVE | Noted: 2020-08-30

## 2020-09-03 PROBLEM — G62.9 POLYNEUROPATHY, UNSPECIFIED: Chronic | Status: ACTIVE | Noted: 2020-08-30

## 2020-09-03 PROBLEM — I10 ESSENTIAL (PRIMARY) HYPERTENSION: Chronic | Status: ACTIVE | Noted: 2020-08-30

## 2020-09-03 PROBLEM — I82.409 ACUTE EMBOLISM AND THROMBOSIS OF UNSPECIFIED DEEP VEINS OF UNSPECIFIED LOWER EXTREMITY: Chronic | Status: ACTIVE | Noted: 2020-08-30

## 2020-09-14 ENCOUNTER — APPOINTMENT (OUTPATIENT)
Dept: VASCULAR SURGERY | Facility: CLINIC | Age: 65
End: 2020-09-14

## 2020-09-21 ENCOUNTER — APPOINTMENT (OUTPATIENT)
Dept: NEUROLOGY | Facility: CLINIC | Age: 65
End: 2020-09-21
Payer: MEDICARE

## 2020-09-21 ENCOUNTER — APPOINTMENT (OUTPATIENT)
Dept: VASCULAR SURGERY | Facility: CLINIC | Age: 65
End: 2020-09-21
Payer: MEDICARE

## 2020-09-21 VITALS
WEIGHT: 142 LBS | DIASTOLIC BLOOD PRESSURE: 77 MMHG | SYSTOLIC BLOOD PRESSURE: 138 MMHG | HEIGHT: 62 IN | HEART RATE: 58 BPM | BODY MASS INDEX: 26.13 KG/M2 | OXYGEN SATURATION: 98 %

## 2020-09-21 VITALS
RESPIRATION RATE: 16 BRPM | DIASTOLIC BLOOD PRESSURE: 70 MMHG | SYSTOLIC BLOOD PRESSURE: 130 MMHG | OXYGEN SATURATION: 98 % | WEIGHT: 140 LBS | BODY MASS INDEX: 25.76 KG/M2 | HEART RATE: 60 BPM | HEIGHT: 62 IN | TEMPERATURE: 97.1 F

## 2020-09-21 PROCEDURE — 99214 OFFICE O/P EST MOD 30 MIN: CPT

## 2020-09-21 PROCEDURE — 99212 OFFICE O/P EST SF 10 MIN: CPT

## 2020-09-21 RX ORDER — AMLODIPINE BESYLATE 10 MG/1
10 TABLET ORAL
Refills: 0 | Status: ACTIVE | COMMUNITY

## 2020-09-21 RX ORDER — CHOLECALCIFEROL (VITAMIN D3) 125 MCG
125 MCG CAPSULE ORAL
Qty: 30 | Refills: 0 | Status: ACTIVE | COMMUNITY
Start: 2020-05-20

## 2020-09-21 RX ORDER — LABETALOL HYDROCHLORIDE 200 MG/1
200 TABLET, FILM COATED ORAL
Refills: 0 | Status: ACTIVE | COMMUNITY

## 2020-09-21 RX ORDER — LOSARTAN POTASSIUM 100 MG/1
100 TABLET, FILM COATED ORAL
Refills: 0 | Status: ACTIVE | COMMUNITY

## 2020-09-21 RX ORDER — BUTALBITAL AND ACETAMINOPHEN 50; 300 MG/1; MG/1
CAPSULE ORAL
Refills: 0 | Status: ACTIVE | COMMUNITY

## 2020-09-21 RX ORDER — PREGABALIN 75 MG/1
75 CAPSULE ORAL
Refills: 0 | Status: ACTIVE | COMMUNITY

## 2020-09-21 RX ORDER — PANTOPRAZOLE 40 MG/1
40 TABLET, DELAYED RELEASE ORAL
Refills: 0 | Status: ACTIVE | COMMUNITY

## 2020-09-21 RX ORDER — HYDROCODONE BITARTRATE AND ACETAMINOPHEN 5; 325 MG/1; MG/1
5-325 TABLET ORAL
Qty: 56 | Refills: 0 | Status: ACTIVE | COMMUNITY
Start: 2020-05-08

## 2020-09-21 NOTE — PHYSICAL EXAM
[Ankle Swelling (On Exam)] : not present [Varicose Veins Of Lower Extremities] : bilaterally [Ankle Swelling On The Right] : mild [] : not present [de-identified] : L pop fossa healed

## 2020-09-21 NOTE — REASON FOR VISIT
[de-identified] : LLE INARI thrombectomy with L CIV stent [de-identified] : 8/11/2020 [de-identified] : No issues. BLE are same size. On eliquis

## 2020-09-22 NOTE — HISTORY OF PRESENT ILLNESS
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  . 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 A stent was placed by Dr. Garcia at [facility] and started on Eliquis.\par \par She has been on Topamax 100 mg bid for seizure prophylaxis, however she had a seizure last month on the generic version. Her partial seizures start with headache followed by aura of visual disturbance followed by loss of consciousness.

## 2020-09-22 NOTE — CONSULT LETTER
[Dear  ___] : Dear ~CARLOTA, [Consult Letter:] : I had the pleasure of evaluating your patient, [unfilled]. [Please see my note below.] : Please see my note below. [Consult Closing:] : Thank you very much for allowing me to participate in the care of this patient.  If you have any questions, please do not hesitate to contact me. [Sincerely,] : Sincerely, [FreeTextEntry2] : Tristan Frederick MD\par [address]\par [address]\[place] [address]

## 2020-11-03 ENCOUNTER — EMERGENCY (EMERGENCY)
Facility: HOSPITAL | Age: 65
LOS: 1 days | Discharge: HOME | End: 2020-11-03
Attending: EMERGENCY MEDICINE | Admitting: EMERGENCY MEDICINE
Payer: MEDICARE

## 2020-11-03 VITALS
DIASTOLIC BLOOD PRESSURE: 74 MMHG | RESPIRATION RATE: 15 BRPM | HEART RATE: 66 BPM | OXYGEN SATURATION: 99 % | SYSTOLIC BLOOD PRESSURE: 155 MMHG | TEMPERATURE: 98 F

## 2020-11-03 VITALS
RESPIRATION RATE: 14 BRPM | TEMPERATURE: 98 F | OXYGEN SATURATION: 98 % | SYSTOLIC BLOOD PRESSURE: 186 MMHG | HEIGHT: 62 IN | DIASTOLIC BLOOD PRESSURE: 85 MMHG | HEART RATE: 98 BPM | WEIGHT: 138.01 LBS

## 2020-11-03 DIAGNOSIS — Z98.890 OTHER SPECIFIED POSTPROCEDURAL STATES: Chronic | ICD-10-CM

## 2020-11-03 DIAGNOSIS — Z95.820 PERIPHERAL VASCULAR ANGIOPLASTY STATUS WITH IMPLANTS AND GRAFTS: Chronic | ICD-10-CM

## 2020-11-03 LAB
ALBUMIN SERPL ELPH-MCNC: 3.7 G/DL — SIGNIFICANT CHANGE UP (ref 3.3–5)
ALP SERPL-CCNC: 89 U/L — SIGNIFICANT CHANGE UP (ref 40–120)
ALT FLD-CCNC: 17 U/L — SIGNIFICANT CHANGE UP (ref 12–78)
ANION GAP SERPL CALC-SCNC: 9 MMOL/L — SIGNIFICANT CHANGE UP (ref 5–17)
AST SERPL-CCNC: 17 U/L — SIGNIFICANT CHANGE UP (ref 15–37)
BASOPHILS # BLD AUTO: 0.04 K/UL — SIGNIFICANT CHANGE UP (ref 0–0.2)
BASOPHILS NFR BLD AUTO: 0.7 % — SIGNIFICANT CHANGE UP (ref 0–2)
BILIRUB SERPL-MCNC: 0.3 MG/DL — SIGNIFICANT CHANGE UP (ref 0.2–1.2)
BUN SERPL-MCNC: 15 MG/DL — SIGNIFICANT CHANGE UP (ref 7–23)
CALCIUM SERPL-MCNC: 9.4 MG/DL — SIGNIFICANT CHANGE UP (ref 8.5–10.1)
CHLORIDE SERPL-SCNC: 106 MMOL/L — SIGNIFICANT CHANGE UP (ref 96–108)
CO2 SERPL-SCNC: 27 MMOL/L — SIGNIFICANT CHANGE UP (ref 22–31)
CREAT SERPL-MCNC: 1.3 MG/DL — SIGNIFICANT CHANGE UP (ref 0.5–1.3)
EOSINOPHIL # BLD AUTO: 0.27 K/UL — SIGNIFICANT CHANGE UP (ref 0–0.5)
EOSINOPHIL NFR BLD AUTO: 4.9 % — SIGNIFICANT CHANGE UP (ref 0–6)
ERYTHROCYTE [SEDIMENTATION RATE] IN BLOOD: 5 MM/HR — SIGNIFICANT CHANGE UP (ref 0–20)
GLUCOSE SERPL-MCNC: 86 MG/DL — SIGNIFICANT CHANGE UP (ref 70–99)
HCT VFR BLD CALC: 42 % — SIGNIFICANT CHANGE UP (ref 34.5–45)
HGB BLD-MCNC: 13.7 G/DL — SIGNIFICANT CHANGE UP (ref 11.5–15.5)
IMM GRANULOCYTES NFR BLD AUTO: 0.4 % — SIGNIFICANT CHANGE UP (ref 0–1.5)
LYMPHOCYTES # BLD AUTO: 1.1 K/UL — SIGNIFICANT CHANGE UP (ref 1–3.3)
LYMPHOCYTES # BLD AUTO: 20.1 % — SIGNIFICANT CHANGE UP (ref 13–44)
MCHC RBC-ENTMCNC: 26.2 PG — LOW (ref 27–34)
MCHC RBC-ENTMCNC: 32.6 GM/DL — SIGNIFICANT CHANGE UP (ref 32–36)
MCV RBC AUTO: 80.5 FL — SIGNIFICANT CHANGE UP (ref 80–100)
MONOCYTES # BLD AUTO: 0.84 K/UL — SIGNIFICANT CHANGE UP (ref 0–0.9)
MONOCYTES NFR BLD AUTO: 15.4 % — HIGH (ref 2–14)
NEUTROPHILS # BLD AUTO: 3.19 K/UL — SIGNIFICANT CHANGE UP (ref 1.8–7.4)
NEUTROPHILS NFR BLD AUTO: 58.5 % — SIGNIFICANT CHANGE UP (ref 43–77)
NRBC # BLD: 0 /100 WBCS — SIGNIFICANT CHANGE UP (ref 0–0)
PLATELET # BLD AUTO: 261 K/UL — SIGNIFICANT CHANGE UP (ref 150–400)
POTASSIUM SERPL-MCNC: 3.5 MMOL/L — SIGNIFICANT CHANGE UP (ref 3.5–5.3)
POTASSIUM SERPL-SCNC: 3.5 MMOL/L — SIGNIFICANT CHANGE UP (ref 3.5–5.3)
PROT SERPL-MCNC: 7.2 G/DL — SIGNIFICANT CHANGE UP (ref 6–8.3)
RBC # BLD: 5.22 M/UL — HIGH (ref 3.8–5.2)
RBC # FLD: 15.5 % — HIGH (ref 10.3–14.5)
SODIUM SERPL-SCNC: 142 MMOL/L — SIGNIFICANT CHANGE UP (ref 135–145)
WBC # BLD: 5.46 K/UL — SIGNIFICANT CHANGE UP (ref 3.8–10.5)
WBC # FLD AUTO: 5.46 K/UL — SIGNIFICANT CHANGE UP (ref 3.8–10.5)

## 2020-11-03 PROCEDURE — 99284 EMERGENCY DEPT VISIT MOD MDM: CPT | Mod: 25

## 2020-11-03 PROCEDURE — 86140 C-REACTIVE PROTEIN: CPT

## 2020-11-03 PROCEDURE — 72129 CT CHEST SPINE W/DYE: CPT | Mod: 26

## 2020-11-03 PROCEDURE — 96372 THER/PROPH/DIAG INJ SC/IM: CPT

## 2020-11-03 PROCEDURE — 99284 EMERGENCY DEPT VISIT MOD MDM: CPT

## 2020-11-03 PROCEDURE — 72129 CT CHEST SPINE W/DYE: CPT

## 2020-11-03 PROCEDURE — 85025 COMPLETE CBC W/AUTO DIFF WBC: CPT

## 2020-11-03 PROCEDURE — 36415 COLL VENOUS BLD VENIPUNCTURE: CPT

## 2020-11-03 PROCEDURE — 85652 RBC SED RATE AUTOMATED: CPT

## 2020-11-03 PROCEDURE — 80053 COMPREHEN METABOLIC PANEL: CPT

## 2020-11-03 RX ORDER — ACETAMINOPHEN 500 MG
650 TABLET ORAL ONCE
Refills: 0 | Status: COMPLETED | OUTPATIENT
Start: 2020-11-03 | End: 2020-11-03

## 2020-11-03 RX ORDER — LIDOCAINE 4 G/100G
1 CREAM TOPICAL ONCE
Refills: 0 | Status: COMPLETED | OUTPATIENT
Start: 2020-11-03 | End: 2020-11-03

## 2020-11-03 RX ORDER — SODIUM CHLORIDE 9 MG/ML
1000 INJECTION INTRAMUSCULAR; INTRAVENOUS; SUBCUTANEOUS
Refills: 0 | Status: DISCONTINUED | OUTPATIENT
Start: 2020-11-03 | End: 2020-11-07

## 2020-11-03 RX ORDER — ONDANSETRON 8 MG/1
4 TABLET, FILM COATED ORAL ONCE
Refills: 0 | Status: COMPLETED | OUTPATIENT
Start: 2020-11-03 | End: 2020-11-03

## 2020-11-03 RX ORDER — DEXAMETHASONE 0.5 MG/5ML
10 ELIXIR ORAL ONCE
Refills: 0 | Status: COMPLETED | OUTPATIENT
Start: 2020-11-03 | End: 2020-11-03

## 2020-11-03 RX ORDER — METHOCARBAMOL 500 MG/1
1000 TABLET, FILM COATED ORAL ONCE
Refills: 0 | Status: COMPLETED | OUTPATIENT
Start: 2020-11-03 | End: 2020-11-03

## 2020-11-03 RX ORDER — IBUPROFEN 200 MG
600 TABLET ORAL ONCE
Refills: 0 | Status: COMPLETED | OUTPATIENT
Start: 2020-11-03 | End: 2020-11-03

## 2020-11-03 RX ADMIN — SODIUM CHLORIDE 200 MILLILITER(S): 9 INJECTION INTRAMUSCULAR; INTRAVENOUS; SUBCUTANEOUS at 20:18

## 2020-11-03 RX ADMIN — Medication 650 MILLIGRAM(S): at 20:05

## 2020-11-03 RX ADMIN — Medication 650 MILLIGRAM(S): at 19:25

## 2020-11-03 RX ADMIN — ONDANSETRON 4 MILLIGRAM(S): 8 TABLET, FILM COATED ORAL at 19:25

## 2020-11-03 RX ADMIN — LIDOCAINE 1 PATCH: 4 CREAM TOPICAL at 19:26

## 2020-11-03 RX ADMIN — Medication 10 MILLIGRAM(S): at 19:26

## 2020-11-03 NOTE — ED PROVIDER NOTE - MUSCULOSKELETAL MINIMAL EXAM
+ TTP thoracic midline and right paraspinal tenderness. dp pulses equal and intact bilaterally. normal gait.

## 2020-11-03 NOTE — ED PROVIDER NOTE - CARE PROVIDER_API CALL
Gualberto Ta  ANESTHESIOLOGY  [address]  [place] [address]  Phone: [phone]  Fax: [phone]  Follow Up Time: 1-3 Days

## 2020-11-03 NOTE — ED ADULT NURSE NOTE - PMH
Anxiety    Benign essential HTN    Constipation    Depression    DJD (degenerative joint disease)    Drug-seeking behavior    DVT, lower extremity    Grade I diastolic dysfunction    HTN (hypertension)    Migraines    Neuropathy    Osteoporosis    Pericarditis    Renal arteriosclerosis    Seizure disorder    Shingles

## 2020-11-03 NOTE — ED PROVIDER NOTE - CLINICAL SUMMARY MEDICAL DECISION MAKING FREE TEXT BOX
66 yo female with h/o dvt on eliquis, migraines, seizures, chronic back pain presents to the ED c/o worsening right sided back pain,. no fall or trauma. Patient follows with pain management, takes tramadol for pain with minimal relief. Patient with multiple previous kyphoplasty, last > 1 year. Patient also received trigger point injections with pain management 2 weeks ago with minimal improvement. A/P: Patient ambulating in ED. no red flag signs or symptoms. Will check labs and CT thoracic spine, pain meds, reassess.

## 2020-11-03 NOTE — ED ADULT NURSE REASSESSMENT NOTE - CAPILLARY REFILL
----- Message from Jaden Terrellcynditaj sent at 9/29/2020  1:54 PM EDT -----  Subject: Message to Provider    QUESTIONS  Information for Provider? PT needs form filled out for UP[facility]/NPI number   that needs to be sent back to her to give to [facility].   ---------------------------------------------------------------------------  --------------  [address]  What is the best way for the office to contact you? OK to leave message on   voicemail  Preferred Call Back Phone Number? [phone]  ---------------------------------------------------------------------------  --------------  SCRIPT ANSWERS  Relationship to Patient?  Self 2 seconds or less

## 2020-11-03 NOTE — ED ADULT NURSE REASSESSMENT NOTE - NSIMPLEMENTINTERV_GEN_ALL_ED
Implemented All Fall with Harm Risk Interventions:  [place] to call system. Call bell, personal items and telephone within reach. Instruct patient to call for assistance. Room bathroom lighting operational. Non-slip footwear when patient is off stretcher. Physically safe environment: no spills, clutter or unnecessary equipment. Stretcher in lowest position, wheels locked, appropriate side rails in place. Provide visual cue, wrist band, yellow gown, etc. Monitor gait and stability. Monitor for mental status changes and reorient to person, place, and time. Review medications for side effects contributing to fall risk. Reinforce activity limits and safety measures with patient and family. Provide visual clues: red socks.

## 2020-11-03 NOTE — ED PROVIDER NOTE - OBJECTIVE STATEMENT
64 yo female with h/o dvt on eliquis, migraines, seizures, chronic back pain presents to the ED c/o worsening right sided back pain,. no fall or trauma. Patient follows with pain management, takes tramadol for pain with minimal relief. Patient with multiple previous kyphoplasty, last > 1 year. Patient also received trigger point injections with pain management 2 weeks ago with minimal improvement. Pain is similar to chronic pain however worse today. Patient ambulating with discomfort. Denies fever, chills, chest pain, sob, abd pain, vomiting urinary sxs. no UE/LE weakness or paresthesias, no saddle anesthesia, no bowel or bladder incontinence/retention, no h/o IVDA. Patient admits to feeling nauseous from the tramadol, no vomiting.

## 2020-11-03 NOTE — ED PROVIDER NOTE - NSFOLLOWUPINSTRUCTIONS_ED_ALL_ED_FT
[employer]® CareNotes®     :  [facility]  	                       CHRONIC BACK PAIN - Discharge Care           Chronic Back Pain    WHAT YOU NEED TO KNOW:    Chronic back pain is back pain that lasts 3 months or longer. This may include pain that has not been controlled or does not improve with treatment. Your back pain may cause weakness or pain that spreads to your arms or legs.    DISCHARGE INSTRUCTIONS:    Call your doctor if:   •You have severe pain.      •You have new numbness, tingling, or weakness, especially in your lower back, legs, arms, or genital area.      •You lose control of your bladder or bowel movements.       •You have a fever or sudden weight loss.      •You have new or worse pain.      •You have questions or concerns about your condition or care.      Medicines: You may need any of the following:   •NSAIDs help decrease swelling and pain or fever. This medicine is available with or without a doctor's order. NSAIDs can cause stomach bleeding or kidney problems in certain people. If you take blood thinner medicine, always ask your healthcare provider if NSAIDs are safe for you. Always read the medicine label and follow directions.      •Acetaminophen decreases pain and fever. It is available without a doctor's order. Ask how much to take and how often to take it. Follow directions. Read the labels of all other medicines you are using to see if they also contain acetaminophen, or ask your doctor or pharmacist. Acetaminophen can cause liver damage if not taken correctly. Do not use more than 4 grams (4,000 milligrams) total of acetaminophen in one day.       •Muscle relaxers help decrease muscle spasms and back pain.      •Prescription pain medicine called narcotics or opioids may be given for certain types of chronic pain. Ask your healthcare provider how to take this medicine safely.      •Take your medicine as directed. Contact your healthcare provider if you think your medicine is not helping or if you have side effects. Tell him or her if you are allergic to any medicine. Keep a list of the medicines, vitamins, and herbs you take. Include the amounts, and when and why you take them. Bring the list or the pill bottles to follow-up visits. Carry your medicine list with you in case of an emergency.      Manage your symptoms:   •Apply ice for 15 to 20 minutes every hour, or as directed. Use an ice pack, or put crushed ice in a plastic bag. Cover it with a towel before you apply it to your skin. Ice decreases pain and helps prevent tissue damage.      •Apply heat for 20 to 30 minutes every 2 hours, or as directed. Heat helps decrease pain and muscle spasms.      •Use massage to loosen tense muscles. Massage may relieve back pain caused by tight muscles. Regular massages may help prevent this kind of back pain.      •Ask about acupuncture for pain relief. Back pain is sometimes relieved with acupuncture. Talk to your healthcare provider before you get this treatment to make sure it is safe for you.      Other ways to relieve or prevent back pain:   •Manage stress. Stress can cause back pain or make it worse. Some ways to reduce stress are listening to music, meditating, or using aromatherapy. It may help to talk with a therapist about anything that is causing you stress. Your healthcare provider can give you more information.      •Stay active as much as you can without causing more pain. Ask your healthcare provider what exercises are right for you. Do not sit or lie down for long periods. This could make your back pain worse. Yoga or similar gentle movements may help relieve pain and tension in your back. Go slowly and do not strain your back as you do any movement.      •Be careful when you lift heavy objects. Do not lift anything heavy until your pain is gone. Never strain your back when you lift a heavy item. If possible, ask someone to help you.      •Go to physical therapy as directed. A physical therapist can teach you exercises to help improve movement and strength, and to decrease pain.      Follow up with your healthcare provider as directed: You may be referred to a sports medicine or spine specialist. Write down your questions so you remember to ask them during your visits.       © Copyright [employer] 2020           back to top                          © Copyright [employer] 2020

## 2020-11-03 NOTE — ED PROVIDER NOTE - NEUROLOGICAL, MLM
Alert and oriented, no focal deficits, no motor or sensory deficits. no saddle anesthesia. LE strength 5/5 bilaterally.

## 2020-11-03 NOTE — ED PROVIDER NOTE - ATTENDING CONTRIBUTION TO CARE
I have personally performed a face to face diagnostic evaluation on this patient.  I have reviewed the PA note and agree with the history, exam, and plan of care, except as noted.  History and Exam by me shows  low-mid back pain since injection to back 2 weeks ago. no recent fall or trauma or fever.   pt is in nad.  a n o x 3.   thoracic back- mild tenderness , diffuse c no erythema or dc. I have personally performed a face to face diagnostic evaluation on this patient.  I have reviewed the PA note and agree with the history, exam, and plan of care, except as noted.  History and Exam by me shows  low-mid back pain since injection to back 2 weeks ago. no recent fall or trauma or fever.   pt is in nad.  a n o x 3.   thoracic back- mild tenderness , diffuse c no erythema or dc.  lab and ct were unremarkable.

## 2020-11-03 NOTE — ED PROVIDER NOTE - PATIENT PORTAL LINK FT
You can access the FollowMyHealth Patient Portal offered by [facility] by registering at the following website: [email or web address]. By joining [employer]’s FollowMyHealth portal, you will also be able to view your health information using other applications (apps) compatible with our system.

## 2020-11-03 NOTE — ED ADULT NURSE REASSESSMENT NOTE - NS ED NURSE REASSESS COMMENT FT1
Pt reports that she came with a bottle of pain pills and she gave it to the triage nurse when she came in. Pt reports that the bottle of pills are now missing. RN Charge Chloe made aware. Nephew at home called. Nephew verified that he brought the pills home after dropping off his aunt.  Pt discharged.
Pt received from MARIA LUISA Foley. A+O x 4 from home. Reports chronic back pain, mostly on the right mid area. Denies recent fall/trauma. NO bruising, bleeding, or deformity noted. skin warm dry and intact. Multiple medications administered for pain. Pt refused Motrin and Robaxin. Dr. Diane notified. will continue to monitor.
2020 02:36

## 2020-11-04 LAB — CRP SERPL-MCNC: 1.21 MG/DL — HIGH (ref 0–0.4)

## 2020-11-08 ENCOUNTER — EMERGENCY (EMERGENCY)
Facility: HOSPITAL | Age: 65
LOS: 1 days | Discharge: HOME | End: 2020-11-08
Attending: STUDENT IN AN ORGANIZED HEALTH CARE EDUCATION/TRAINING PROGRAM | Admitting: STUDENT IN AN ORGANIZED HEALTH CARE EDUCATION/TRAINING PROGRAM
Payer: MEDICARE

## 2020-11-08 VITALS
TEMPERATURE: 98 F | DIASTOLIC BLOOD PRESSURE: 76 MMHG | HEART RATE: 72 BPM | RESPIRATION RATE: 15 BRPM | OXYGEN SATURATION: 98 % | SYSTOLIC BLOOD PRESSURE: 138 MMHG

## 2020-11-08 VITALS
SYSTOLIC BLOOD PRESSURE: 135 MMHG | HEART RATE: 77 BPM | TEMPERATURE: 99 F | DIASTOLIC BLOOD PRESSURE: 87 MMHG | OXYGEN SATURATION: 96 % | RESPIRATION RATE: 15 BRPM | HEIGHT: 62 IN | WEIGHT: 139.99 LBS

## 2020-11-08 DIAGNOSIS — Z98.890 OTHER SPECIFIED POSTPROCEDURAL STATES: Chronic | ICD-10-CM

## 2020-11-08 DIAGNOSIS — Z95.820 PERIPHERAL VASCULAR ANGIOPLASTY STATUS WITH IMPLANTS AND GRAFTS: Chronic | ICD-10-CM

## 2020-11-08 LAB
ALBUMIN SERPL ELPH-MCNC: 3.4 G/DL — SIGNIFICANT CHANGE UP (ref 3.3–5)
ALP SERPL-CCNC: 84 U/L — SIGNIFICANT CHANGE UP (ref 40–120)
ALT FLD-CCNC: 15 U/L — SIGNIFICANT CHANGE UP (ref 12–78)
ANION GAP SERPL CALC-SCNC: 13 MMOL/L — SIGNIFICANT CHANGE UP (ref 5–17)
APPEARANCE UR: CLEAR — SIGNIFICANT CHANGE UP
AST SERPL-CCNC: 15 U/L — SIGNIFICANT CHANGE UP (ref 15–37)
BASOPHILS # BLD AUTO: 0.04 K/UL — SIGNIFICANT CHANGE UP (ref 0–0.2)
BASOPHILS NFR BLD AUTO: 0.7 % — SIGNIFICANT CHANGE UP (ref 0–2)
BILIRUB SERPL-MCNC: 0.6 MG/DL — SIGNIFICANT CHANGE UP (ref 0.2–1.2)
BILIRUB UR-MCNC: NEGATIVE — SIGNIFICANT CHANGE UP
BUN SERPL-MCNC: 14 MG/DL — SIGNIFICANT CHANGE UP (ref 7–23)
CALCIUM SERPL-MCNC: 9.7 MG/DL — SIGNIFICANT CHANGE UP (ref 8.5–10.1)
CHLORIDE SERPL-SCNC: 101 MMOL/L — SIGNIFICANT CHANGE UP (ref 96–108)
CO2 SERPL-SCNC: 23 MMOL/L — SIGNIFICANT CHANGE UP (ref 22–31)
COLOR SPEC: SIGNIFICANT CHANGE UP
CREAT SERPL-MCNC: 1.2 MG/DL — SIGNIFICANT CHANGE UP (ref 0.5–1.3)
DIFF PNL FLD: NEGATIVE — SIGNIFICANT CHANGE UP
EOSINOPHIL # BLD AUTO: 0.23 K/UL — SIGNIFICANT CHANGE UP (ref 0–0.5)
EOSINOPHIL NFR BLD AUTO: 3.8 % — SIGNIFICANT CHANGE UP (ref 0–6)
GLUCOSE SERPL-MCNC: 82 MG/DL — SIGNIFICANT CHANGE UP (ref 70–99)
GLUCOSE UR QL: NEGATIVE — SIGNIFICANT CHANGE UP
HCT VFR BLD CALC: 40 % — SIGNIFICANT CHANGE UP (ref 34.5–45)
HGB BLD-MCNC: 13.5 G/DL — SIGNIFICANT CHANGE UP (ref 11.5–15.5)
IMM GRANULOCYTES NFR BLD AUTO: 0.7 % — SIGNIFICANT CHANGE UP (ref 0–1.5)
KETONES UR-MCNC: ABNORMAL
LEUKOCYTE ESTERASE UR-ACNC: ABNORMAL
LIDOCAIN IGE QN: 194 U/L — SIGNIFICANT CHANGE UP (ref 73–393)
LYMPHOCYTES # BLD AUTO: 0.9 K/UL — LOW (ref 1–3.3)
LYMPHOCYTES # BLD AUTO: 14.7 % — SIGNIFICANT CHANGE UP (ref 13–44)
MCHC RBC-ENTMCNC: 26.6 PG — LOW (ref 27–34)
MCHC RBC-ENTMCNC: 33.8 GM/DL — SIGNIFICANT CHANGE UP (ref 32–36)
MCV RBC AUTO: 78.7 FL — LOW (ref 80–100)
MONOCYTES # BLD AUTO: 1.03 K/UL — HIGH (ref 0–0.9)
MONOCYTES NFR BLD AUTO: 16.9 % — HIGH (ref 2–14)
NEUTROPHILS # BLD AUTO: 3.87 K/UL — SIGNIFICANT CHANGE UP (ref 1.8–7.4)
NEUTROPHILS NFR BLD AUTO: 63.2 % — SIGNIFICANT CHANGE UP (ref 43–77)
NITRITE UR-MCNC: NEGATIVE — SIGNIFICANT CHANGE UP
NRBC # BLD: 0 /100 WBCS — SIGNIFICANT CHANGE UP (ref 0–0)
PH UR: 7 — SIGNIFICANT CHANGE UP (ref 5–8)
PLATELET # BLD AUTO: 216 K/UL — SIGNIFICANT CHANGE UP (ref 150–400)
POTASSIUM SERPL-MCNC: 3.1 MMOL/L — LOW (ref 3.5–5.3)
POTASSIUM SERPL-SCNC: 3.1 MMOL/L — LOW (ref 3.5–5.3)
PROT SERPL-MCNC: 7 G/DL — SIGNIFICANT CHANGE UP (ref 6–8.3)
PROT UR-MCNC: NEGATIVE — SIGNIFICANT CHANGE UP
RBC # BLD: 5.08 M/UL — SIGNIFICANT CHANGE UP (ref 3.8–5.2)
RBC # FLD: 15.5 % — HIGH (ref 10.3–14.5)
SODIUM SERPL-SCNC: 137 MMOL/L — SIGNIFICANT CHANGE UP (ref 135–145)
SP GR SPEC: 1.01 — SIGNIFICANT CHANGE UP (ref 1.01–1.02)
UROBILINOGEN FLD QL: NEGATIVE — SIGNIFICANT CHANGE UP
WBC # BLD: 6.11 K/UL — SIGNIFICANT CHANGE UP (ref 3.8–10.5)
WBC # FLD AUTO: 6.11 K/UL — SIGNIFICANT CHANGE UP (ref 3.8–10.5)

## 2020-11-08 PROCEDURE — 96366 THER/PROPH/DIAG IV INF ADDON: CPT

## 2020-11-08 PROCEDURE — 36415 COLL VENOUS BLD VENIPUNCTURE: CPT

## 2020-11-08 PROCEDURE — 99284 EMERGENCY DEPT VISIT MOD MDM: CPT | Mod: 25

## 2020-11-08 PROCEDURE — 96365 THER/PROPH/DIAG IV INF INIT: CPT | Mod: XU

## 2020-11-08 PROCEDURE — 87086 URINE CULTURE/COLONY COUNT: CPT

## 2020-11-08 PROCEDURE — 99284 EMERGENCY DEPT VISIT MOD MDM: CPT

## 2020-11-08 PROCEDURE — 96375 TX/PRO/DX INJ NEW DRUG ADDON: CPT

## 2020-11-08 PROCEDURE — 96361 HYDRATE IV INFUSION ADD-ON: CPT

## 2020-11-08 PROCEDURE — 80053 COMPREHEN METABOLIC PANEL: CPT

## 2020-11-08 PROCEDURE — 83690 ASSAY OF LIPASE: CPT

## 2020-11-08 PROCEDURE — 74177 CT ABD & PELVIS W/CONTRAST: CPT | Mod: 26

## 2020-11-08 PROCEDURE — 85025 COMPLETE CBC W/AUTO DIFF WBC: CPT

## 2020-11-08 PROCEDURE — 74177 CT ABD & PELVIS W/CONTRAST: CPT

## 2020-11-08 PROCEDURE — 81001 URINALYSIS AUTO W/SCOPE: CPT

## 2020-11-08 RX ORDER — POTASSIUM CHLORIDE 20 MEQ
40 PACKET (EA) ORAL ONCE
Refills: 0 | Status: COMPLETED | OUTPATIENT
Start: 2020-11-08 | End: 2020-11-08

## 2020-11-08 RX ORDER — ONDANSETRON 8 MG/1
4 TABLET, FILM COATED ORAL ONCE
Refills: 0 | Status: COMPLETED | OUTPATIENT
Start: 2020-11-08 | End: 2020-11-08

## 2020-11-08 RX ORDER — MORPHINE SULFATE 50 MG/1
4 CAPSULE, EXTENDED RELEASE ORAL ONCE
Refills: 0 | Status: DISCONTINUED | OUTPATIENT
Start: 2020-11-08 | End: 2020-11-08

## 2020-11-08 RX ORDER — POTASSIUM CHLORIDE 20 MEQ
10 PACKET (EA) ORAL
Refills: 0 | Status: COMPLETED | OUTPATIENT
Start: 2020-11-08 | End: 2020-11-08

## 2020-11-08 RX ORDER — SODIUM CHLORIDE 9 MG/ML
1000 INJECTION INTRAMUSCULAR; INTRAVENOUS; SUBCUTANEOUS ONCE
Refills: 0 | Status: COMPLETED | OUTPATIENT
Start: 2020-11-08 | End: 2020-11-08

## 2020-11-08 RX ADMIN — Medication 40 MILLIEQUIVALENT(S): at 22:11

## 2020-11-08 RX ADMIN — ONDANSETRON 4 MILLIGRAM(S): 8 TABLET, FILM COATED ORAL at 22:11

## 2020-11-08 RX ADMIN — SODIUM CHLORIDE 1000 MILLILITER(S): 9 INJECTION INTRAMUSCULAR; INTRAVENOUS; SUBCUTANEOUS at 19:15

## 2020-11-08 RX ADMIN — MORPHINE SULFATE 4 MILLIGRAM(S): 50 CAPSULE, EXTENDED RELEASE ORAL at 19:45

## 2020-11-08 RX ADMIN — Medication 10 MILLIEQUIVALENT(S): at 23:10

## 2020-11-08 RX ADMIN — SODIUM CHLORIDE 1000 MILLILITER(S): 9 INJECTION INTRAMUSCULAR; INTRAVENOUS; SUBCUTANEOUS at 20:15

## 2020-11-08 RX ADMIN — MORPHINE SULFATE 4 MILLIGRAM(S): 50 CAPSULE, EXTENDED RELEASE ORAL at 19:17

## 2020-11-08 RX ADMIN — Medication 10 MILLIEQUIVALENT(S): at 21:50

## 2020-11-08 RX ADMIN — ONDANSETRON 4 MILLIGRAM(S): 8 TABLET, FILM COATED ORAL at 19:17

## 2020-11-08 RX ADMIN — Medication 100 MILLIEQUIVALENT(S): at 22:10

## 2020-11-08 RX ADMIN — Medication 100 MILLIEQUIVALENT(S): at 20:50

## 2020-11-08 NOTE — ED ADULT NURSE NOTE - OBJECTIVE STATEMENT
65 year old female presents to the ED complaining of urinary symptoms and abdominal pain. Patient reports left sided abdominal pain radiating to the left flank pain and urinary infrequency since yesterday. Patient also complains of nausea without vomiting. Patient reports normal bowel movements. Patient denies pain or burning on urination, only difficulty urination or oliguria. Patient reports otherwise she feels her usual self. Denies fever/chills. Afebrile in ED. Patient denies sick contacts or known COVID exposure.

## 2020-11-08 NOTE — ED ADULT NURSE NOTE - NSIMPLEMENTINTERV_GEN_ALL_ED
Implemented All Fall with Harm Risk Interventions:  [place] to call system. Call bell, personal items and telephone within reach. Instruct patient to call for assistance. Room bathroom lighting operational. Non-slip footwear when patient is off stretcher. Physically safe environment: no spills, clutter or unnecessary equipment. Stretcher in lowest position, wheels locked, appropriate side rails in place. Provide visual cue, wrist band, yellow gown, etc. Monitor gait and stability. Monitor for mental status changes and reorient to person, place, and time. Review medications for side effects contributing to fall risk. Reinforce activity limits and safety measures with patient and family. Provide visual clues: red socks. Implemented All Universal Safety Interventions:  [place] to call system. Call bell, personal items and telephone within reach. Instruct patient to call for assistance. Room bathroom lighting operational. Non-slip footwear when patient is off stretcher. Physically safe environment: no spills, clutter or unnecessary equipment. Stretcher in lowest position, wheels locked, appropriate side rails in place.

## 2020-11-08 NOTE — ED PROVIDER NOTE - ATTENDING CONTRIBUTION TO CARE
I, Omero Underwood DO, personally saw the patient with ACP.  I have personally performed a face to face diagnostic evaluation on this patient.  I have reviewed the ACP note and agree with the history, exam, and plan of care, except as noted.    65 F history of renal artery stenosis here with LLQ pain and difficulty urinating. Also with nausea, vomiting. On exam, patient with LLQ tenderness. r/o kidney pathology, diverticulitis, acute urinary retention.

## 2020-11-08 NOTE — ED PROVIDER NOTE - NSFOLLOWUPINSTRUCTIONS_ED_ALL_ED_FT
Acute Abdominal Pain    WHAT YOU NEED TO KNOW:    The cause of your abdominal pain may not be found. If a cause is found, treatment will depend on what the cause is.     DISCHARGE INSTRUCTIONS:    Return to the emergency department if:     You vomit blood or cannot stop vomiting.      You have blood in your bowel movement or it looks like tar.       You have bleeding from your rectum.       Your abdomen is larger than usual, more painful, and hard.       You have severe pain in your abdomen.       You stop passing gas and having bowel movements.       You feel weak, dizzy, or faint.    Contact your healthcare provider if:     You have a fever.      You have new signs and symptoms.      Your symptoms do not get better with treatment.       You have questions or concerns about your condition or care.    Medicines may be given to decrease pain, treat an infection, and manage your symptoms. Take your medicine as directed. Call your healthcare provider if you think your medicine is not helping or if you have side effects. Tell him if you are allergic to any medicine. Keep a list of the medicines, vitamins, and herbs you take. Include the amounts, and when and why you take them. Bring the list or the pill bottles to follow-up visits. Carry your medicine list with you in case of an emergency.    Manage your symptoms:     Apply heat on your abdomen for 20 to 30 minutes every 2 hours for as many days as directed. Heat helps decrease pain and muscle spasms.       Manage your stress. Stress may cause abdominal pain. Your healthcare provider may recommend relaxation techniques and deep breathing exercises to help decrease your stress. Your healthcare provider may recommend you talk to someone about your stress or anxiety, such as a counselor or a trusted friend. Get plenty of sleep and exercise regularly.       Limit or do not drink alcohol. Alcohol can make your abdominal pain worse. Ask your healthcare provider if it is safe for you to drink alcohol. Also ask how much is safe for you to drink.       Do not smoke. Nicotine and other chemicals in cigarettes can damage your esophagus and stomach. Ask your healthcare provider for information if you currently smoke and need help to quit. E-cigarettes or smokeless tobacco still contain nicotine. Talk to your healthcare provider before you use these products.     Make changes to the food you eat as directed: Do not eat foods that cause abdominal pain or other symptoms. Eat small meals more often.     Eat more high-fiber foods if you are constipated. High-fiber foods include fruits, vegetables, whole-grain foods, and legumes.       Do not eat foods that cause gas if you have bloating. Examples include broccoli, cabbage, and cauliflower. Do not drink soda or carbonated drinks, because these may also cause gas.       Do not eat foods or drinks that contain sorbitol or fructose if you have diarrhea and bloating. Some examples are fruit juices, candy, jelly, and sugar-free gum.       Do not eat high-fat foods, such as fried foods, cheeseburgers, hot dogs, and desserts.      Limit or do not drink caffeine. Caffeine may make symptoms, such as heart burn or nausea, worse.       Drink plenty of liquids to prevent dehydration from diarrhea or vomiting. Ask your healthcare provider how much liquid to drink each day and which liquids are best for you.

## 2020-11-08 NOTE — ED PROVIDER NOTE - CLINICAL SUMMARY MEDICAL DECISION MAKING FREE TEXT BOX
64 yo female with h/o dvt on eliquis, HTN, renal artery stenosis, chronic back pain - follows with pain management, migraines presents to the ED c/o LLQ abdominal pain since yesterday. Associated with difficulty urinary, states only small amount of urine coming out. Patient seen in ED 5 days for chronic back pain. Back pain is unchanged today. Associated with nausea and vomiting. no urinary incontinence. normal BMs, mild diarrhea.  Denies fever, chills, chest pain, sob, dysuria, hematuria, LE weakness or paresthesias. Patient ambulating. A/P: labs, UA, CT abd/pelvis, meds, reassess

## 2020-11-08 NOTE — ED PROVIDER NOTE - OBJECTIVE STATEMENT
64 yo female with h/o dvt on eliquis, HTN, renal artery stenosis, chronic back pain - follows with pain management, migraines presents to the ED c/o LLQ abdominal pain since yesterday. Associated with difficulty urinary, states only small amount of urine coming out. Patient seen in ED 5 days for chronic back pain. Back pain is unchanged today. Associated with nausea and vomiting. no urinary incontinence. normal BMs, mild diarrhea.  Denies fever, chills, chest pain, sob, dysuria, hematuria, LE weakness or paresthesias. Patient ambulating.     pmd: Dr. Frederick

## 2020-11-08 NOTE — ED PROVIDER NOTE - CARE PROVIDER_API CALL
Tristan Frederick  FAMILY MEDICINE  [address] [place] [address]  Phone: [phone]  Fax: [phone]  Established Patient  Follow Up Time: 1-3 Days

## 2020-11-08 NOTE — ED ADULT NURSE NOTE - PSH
H/O vertebroplasty    History of back surgery  Kyphoplasty  Status post peripheral artery angioplasty with insertion of stent

## 2020-11-08 NOTE — ED PROVIDER NOTE - PATIENT PORTAL LINK FT
You can access the FollowMyHealth Patient Portal offered by [facility] by registering at the following website: [email or web address]. By joining [employer]’s FollowMyHealth portal, you will also be able to view your health information using other applications (apps) compatible with our system.

## 2020-11-09 LAB
CULTURE RESULTS: SIGNIFICANT CHANGE UP
SPECIMEN SOURCE: SIGNIFICANT CHANGE UP

## 2020-11-11 ENCOUNTER — EMERGENCY (EMERGENCY)
Facility: HOSPITAL | Age: 65
LOS: 1 days | Discharge: HOME | End: 2020-11-11
Attending: EMERGENCY MEDICINE | Admitting: STUDENT IN AN ORGANIZED HEALTH CARE EDUCATION/TRAINING PROGRAM
Payer: MEDICARE

## 2020-11-11 VITALS
HEART RATE: 65 BPM | SYSTOLIC BLOOD PRESSURE: 150 MMHG | DIASTOLIC BLOOD PRESSURE: 80 MMHG | OXYGEN SATURATION: 98 % | WEIGHT: 139.99 LBS | TEMPERATURE: 98 F | HEIGHT: 62 IN | RESPIRATION RATE: 14 BRPM

## 2020-11-11 DIAGNOSIS — Z98.890 OTHER SPECIFIED POSTPROCEDURAL STATES: Chronic | ICD-10-CM

## 2020-11-11 DIAGNOSIS — Z95.820 PERIPHERAL VASCULAR ANGIOPLASTY STATUS WITH IMPLANTS AND GRAFTS: Chronic | ICD-10-CM

## 2020-11-11 LAB
ALBUMIN SERPL ELPH-MCNC: 3.6 G/DL — SIGNIFICANT CHANGE UP (ref 3.3–5)
ALP SERPL-CCNC: 80 U/L — SIGNIFICANT CHANGE UP (ref 40–120)
ALT FLD-CCNC: 62 U/L — SIGNIFICANT CHANGE UP (ref 12–78)
ANION GAP SERPL CALC-SCNC: 5 MMOL/L — SIGNIFICANT CHANGE UP (ref 5–17)
APPEARANCE UR: CLEAR — SIGNIFICANT CHANGE UP
AST SERPL-CCNC: 56 U/L — HIGH (ref 15–37)
BASOPHILS # BLD AUTO: 0.04 K/UL — SIGNIFICANT CHANGE UP (ref 0–0.2)
BASOPHILS NFR BLD AUTO: 0.8 % — SIGNIFICANT CHANGE UP (ref 0–2)
BILIRUB SERPL-MCNC: 0.4 MG/DL — SIGNIFICANT CHANGE UP (ref 0.2–1.2)
BILIRUB UR-MCNC: NEGATIVE — SIGNIFICANT CHANGE UP
BUN SERPL-MCNC: 15 MG/DL — SIGNIFICANT CHANGE UP (ref 7–23)
CALCIUM SERPL-MCNC: 9.6 MG/DL — SIGNIFICANT CHANGE UP (ref 8.5–10.1)
CHLORIDE SERPL-SCNC: 110 MMOL/L — HIGH (ref 96–108)
CO2 SERPL-SCNC: 24 MMOL/L — SIGNIFICANT CHANGE UP (ref 22–31)
COLOR SPEC: YELLOW — SIGNIFICANT CHANGE UP
CREAT SERPL-MCNC: 1.2 MG/DL — SIGNIFICANT CHANGE UP (ref 0.5–1.3)
DIFF PNL FLD: NEGATIVE — SIGNIFICANT CHANGE UP
EOSINOPHIL # BLD AUTO: 0.16 K/UL — SIGNIFICANT CHANGE UP (ref 0–0.5)
EOSINOPHIL NFR BLD AUTO: 3.2 % — SIGNIFICANT CHANGE UP (ref 0–6)
GLUCOSE SERPL-MCNC: 104 MG/DL — HIGH (ref 70–99)
GLUCOSE UR QL: NEGATIVE — SIGNIFICANT CHANGE UP
HCT VFR BLD CALC: 42.1 % — SIGNIFICANT CHANGE UP (ref 34.5–45)
HGB BLD-MCNC: 13.9 G/DL — SIGNIFICANT CHANGE UP (ref 11.5–15.5)
IMM GRANULOCYTES NFR BLD AUTO: 0.6 % — SIGNIFICANT CHANGE UP (ref 0–1.5)
KETONES UR-MCNC: ABNORMAL
LEUKOCYTE ESTERASE UR-ACNC: ABNORMAL
LYMPHOCYTES # BLD AUTO: 0.87 K/UL — LOW (ref 1–3.3)
LYMPHOCYTES # BLD AUTO: 17.4 % — SIGNIFICANT CHANGE UP (ref 13–44)
MCHC RBC-ENTMCNC: 26.6 PG — LOW (ref 27–34)
MCHC RBC-ENTMCNC: 33 GM/DL — SIGNIFICANT CHANGE UP (ref 32–36)
MCV RBC AUTO: 80.5 FL — SIGNIFICANT CHANGE UP (ref 80–100)
MONOCYTES # BLD AUTO: 0.88 K/UL — SIGNIFICANT CHANGE UP (ref 0–0.9)
MONOCYTES NFR BLD AUTO: 17.6 % — HIGH (ref 2–14)
NEUTROPHILS # BLD AUTO: 3.02 K/UL — SIGNIFICANT CHANGE UP (ref 1.8–7.4)
NEUTROPHILS NFR BLD AUTO: 60.4 % — SIGNIFICANT CHANGE UP (ref 43–77)
NITRITE UR-MCNC: NEGATIVE — SIGNIFICANT CHANGE UP
NRBC # BLD: 0 /100 WBCS — SIGNIFICANT CHANGE UP (ref 0–0)
PH UR: 8 — SIGNIFICANT CHANGE UP (ref 5–8)
PLATELET # BLD AUTO: 261 K/UL — SIGNIFICANT CHANGE UP (ref 150–400)
POTASSIUM SERPL-MCNC: 4 MMOL/L — SIGNIFICANT CHANGE UP (ref 3.5–5.3)
POTASSIUM SERPL-SCNC: 4 MMOL/L — SIGNIFICANT CHANGE UP (ref 3.5–5.3)
PROT SERPL-MCNC: 6.9 G/DL — SIGNIFICANT CHANGE UP (ref 6–8.3)
PROT UR-MCNC: NEGATIVE — SIGNIFICANT CHANGE UP
RBC # BLD: 5.23 M/UL — HIGH (ref 3.8–5.2)
RBC # FLD: 15.9 % — HIGH (ref 10.3–14.5)
SODIUM SERPL-SCNC: 139 MMOL/L — SIGNIFICANT CHANGE UP (ref 135–145)
SP GR SPEC: 1.01 — SIGNIFICANT CHANGE UP (ref 1.01–1.02)
UROBILINOGEN FLD QL: NEGATIVE — SIGNIFICANT CHANGE UP
WBC # BLD: 5 K/UL — SIGNIFICANT CHANGE UP (ref 3.8–10.5)
WBC # FLD AUTO: 5 K/UL — SIGNIFICANT CHANGE UP (ref 3.8–10.5)

## 2020-11-11 PROCEDURE — 96375 TX/PRO/DX INJ NEW DRUG ADDON: CPT

## 2020-11-11 PROCEDURE — 99284 EMERGENCY DEPT VISIT MOD MDM: CPT | Mod: 25

## 2020-11-11 PROCEDURE — 36415 COLL VENOUS BLD VENIPUNCTURE: CPT

## 2020-11-11 PROCEDURE — 96374 THER/PROPH/DIAG INJ IV PUSH: CPT

## 2020-11-11 PROCEDURE — 99284 EMERGENCY DEPT VISIT MOD MDM: CPT

## 2020-11-11 PROCEDURE — 81001 URINALYSIS AUTO W/SCOPE: CPT

## 2020-11-11 PROCEDURE — 87086 URINE CULTURE/COLONY COUNT: CPT

## 2020-11-11 PROCEDURE — 85025 COMPLETE CBC W/AUTO DIFF WBC: CPT

## 2020-11-11 PROCEDURE — 80053 COMPREHEN METABOLIC PANEL: CPT

## 2020-11-11 RX ORDER — ONDANSETRON 8 MG/1
1 TABLET, FILM COATED ORAL
Qty: 8 | Refills: 0
Start: 2020-11-11 | End: 2020-11-12

## 2020-11-11 RX ORDER — ONDANSETRON 8 MG/1
4 TABLET, FILM COATED ORAL ONCE
Refills: 0 | Status: COMPLETED | OUTPATIENT
Start: 2020-11-11 | End: 2020-11-11

## 2020-11-11 RX ORDER — AZITHROMYCIN 500 MG/1
1 TABLET, FILM COATED ORAL
Qty: 3 | Refills: 0
Start: 2020-11-11 | End: 2020-11-13

## 2020-11-11 RX ORDER — SODIUM CHLORIDE 9 MG/ML
1000 INJECTION INTRAMUSCULAR; INTRAVENOUS; SUBCUTANEOUS ONCE
Refills: 0 | Status: COMPLETED | OUTPATIENT
Start: 2020-11-11 | End: 2020-11-11

## 2020-11-11 RX ORDER — MORPHINE SULFATE 50 MG/1
4 CAPSULE, EXTENDED RELEASE ORAL ONCE
Refills: 0 | Status: DISCONTINUED | OUTPATIENT
Start: 2020-11-11 | End: 2020-11-11

## 2020-11-11 RX ADMIN — MORPHINE SULFATE 4 MILLIGRAM(S): 50 CAPSULE, EXTENDED RELEASE ORAL at 15:19

## 2020-11-11 RX ADMIN — ONDANSETRON 4 MILLIGRAM(S): 8 TABLET, FILM COATED ORAL at 15:19

## 2020-11-11 RX ADMIN — SODIUM CHLORIDE 1000 MILLILITER(S): 9 INJECTION INTRAMUSCULAR; INTRAVENOUS; SUBCUTANEOUS at 15:19

## 2020-11-11 NOTE — ED PROVIDER NOTE - PATIENT PORTAL LINK FT
You can access the FollowMyHealth Patient Portal offered by [facility] by registering at the following website: [email or web address]. By joining [employer] [facility]’s FollowMyHealth portal, you will also be able to view your health information using other applications (apps) compatible with our system.

## 2020-11-11 NOTE — ED PROVIDER NOTE - PROGRESS NOTE DETAILS
patient walked to the bathroom and urinated after iv meds, post void residual 0 as per MARIA LUISA Raya patient states feeling better, results discussed, wbc in urine 11-25, rx ceftin sent to pharmacy, patient requested zofran rx, discussed pain medication, patient has an open rx for tramadol,  advised follow up with spine specialist Dr Bee, call tomorrow to arrange follow up, if condition worsens return to ED patient states feeling better, results discussed, wbc in urine 11-25, rx zithromax sent to pharmacy, patient states she cannot take pcn, does well with zithromax abx, requested zofran rx, discussed pain medication, patient has an open rx for tramadol,  advised follow up with spine specialist Dr Bee, call tomorrow to arrange follow up, if condition worsens return to ED

## 2020-11-11 NOTE — ED PROVIDER NOTE - OBJECTIVE STATEMENT
65 y female presents with left flank pain, nausea, vomiting, decreased urine output for 1 day,  states she was seen in ED on Sunday for flank pain, had labs and ct done, no acute findings,  states she was given iv zofran and morphine, she felt better and was able to void, state she has hx of renal artery stenosis, "this happens to me from time to time",  states she is taking her medications.  sees a urologist CALVIN Mera, PMD Dr Frederick

## 2020-11-11 NOTE — ED PROVIDER NOTE - CARE PROVIDER_API CALL
Gualberto Ta  ANESTHESIOLOGY  [address]  [place] [address]  Phone: [phone]  Fax: [phone]  Follow Up Time: 1-3 Days    Dr Urban, Renal Doctor,   Phone: (   )    -  Fax: (   )    -  Established Patient  Follow Up Time: 1-3 Days

## 2020-11-11 NOTE — ED PROVIDER NOTE - DURATION
"Requested Prescriptions   Pending Prescriptions Disp Refills     hydrOXYzine (ATARAX) 10 MG tablet  Last Written Prescription Date:  historical  Last Fill Quantity: na,  # refills: na   Last office visit: 4/26/2018 with prescribing provider:  4/26/2018     Future Office Visit:   Next 5 appointments (look out 90 days)     Apr 30, 2018 10:00 AM CDT   Return Visit with Cindy El MD   [facility]    [facility]  [address]  [facility] [place] [address]   [phone]                  120 tablet      Sig: Take 3 tablets (30 mg) by mouth every 6 hours as needed for itching    Antihistamines Protocol Passed    4/28/2018  5:27 PM       Passed - Recent (12 mo) or future (30 days) visit within the authorizing provider's specialty    Patient had office visit in the last 12 months or has a visit in the next 30 days with authorizing provider or within the authorizing provider's specialty.  See \"Patient Info\" tab in inbasket, or \"Choose Columns\" in Meds & Orders section of the refill encounter.           Passed - Patient is age 3 or older    Apply age and/or weight-based dosing for peds patients age 3 and older.    Forward request to provider for patients under the age of 3.            nystatin (MYCOSTATIN) [id number] UNIT/ML suspension  Last Written Prescription Date:  historical  Last Fill Quantity: na,  # refills: na   Last office visit: 4/26/2018 with prescribing provider:  4/26/2018     Future Office Visit:   Next 5 appointments (look out 90 days)     Apr 30, 2018 10:00 AM CDT   Return Visit with Cindy El MD   [facility]    [facility]  [address]  [facility] [place] [address] [phone]                  280 mL     There is no refill protocol information for this order      Signed Prescriptions Disp Refills     nystatin (MYCOSTATIN) [id number] UNIT/ML suspension "      There is no refill protocol information for this order           yesterday

## 2020-11-11 NOTE — ED PROVIDER NOTE - PROVIDER TOKENS
PROVIDER:[TOKEN:[[id number]],FOLLOWUP:[1-3 Days]],FREE:[LAST:[Dr Urban, Renal Doctor],PHONE:[(   )    -],FAX:[(   )    -],FOLLOWUP:[1-3 Days],ESTABLISHEDPATIENT:[T]]

## 2020-11-11 NOTE — ED PROVIDER NOTE - CARE PLAN
Principal Discharge DX:	UTI (urinary tract infection)  Secondary Diagnosis:	Back pain, unspecified back location, unspecified back pain laterality, unspecified chronicity

## 2020-11-11 NOTE — ED ADULT NURSE NOTE - NSIMPLEMENTINTERV_GEN_ALL_ED
Implemented All Universal Safety Interventions:  [place] to call system. Call bell, personal items and telephone within reach. Instruct patient to call for assistance. Room bathroom lighting operational. Non-slip footwear when patient is off stretcher. Physically safe environment: no spills, clutter or unnecessary equipment. Stretcher in lowest position, wheels locked, appropriate side rails in place.

## 2020-11-11 NOTE — ED PROVIDER NOTE - ATTENDING CONTRIBUTION TO CARE
Carlos BEASLEY MD have performed the initial face to face bedside interview with this patient regarding history of present illness, review of symptoms and relevant past medical, social and family history.  I completed an independent physical examination.  I was the initial provider who evaluated this patient. I have have reviewed and discussed evaluation and management of patient with the ACP.  I have communicated any changes to the patient’s plan of care and disposition with the ACP. 66 yo white female with Renal Artery Stenosis on left now with left flank pain again, constant x one day, non radiating, increased with movement. Exam revealed white female in NAD with pain upon palpation left flank area.

## 2020-11-11 NOTE — ED PROVIDER NOTE - NSFOLLOWUPINSTRUCTIONS_ED_ALL_ED_FT
advised follow up with spine specialist given information for Dr Bee  advised follow up with pmd  recommend over the counter tylenol , lidocaine patch as directed for pain  follow up with renal specialist Dr Urban  if condition worsens return to ED

## 2020-11-12 LAB
CULTURE RESULTS: SIGNIFICANT CHANGE UP
SPECIMEN SOURCE: SIGNIFICANT CHANGE UP

## 2020-11-14 ENCOUNTER — EMERGENCY (EMERGENCY)
Facility: HOSPITAL | Age: 65
LOS: 1 days | Discharge: HOME | End: 2020-11-14
Attending: EMERGENCY MEDICINE | Admitting: EMERGENCY MEDICINE
Payer: MEDICARE

## 2020-11-14 VITALS
HEIGHT: 62 IN | WEIGHT: 139.99 LBS | OXYGEN SATURATION: 96 % | RESPIRATION RATE: 22 BRPM | TEMPERATURE: 99 F | SYSTOLIC BLOOD PRESSURE: 153 MMHG | HEART RATE: 74 BPM | DIASTOLIC BLOOD PRESSURE: 88 MMHG

## 2020-11-14 DIAGNOSIS — Z98.890 OTHER SPECIFIED POSTPROCEDURAL STATES: Chronic | ICD-10-CM

## 2020-11-14 DIAGNOSIS — Z95.820 PERIPHERAL VASCULAR ANGIOPLASTY STATUS WITH IMPLANTS AND GRAFTS: Chronic | ICD-10-CM

## 2020-11-14 PROCEDURE — 99283 EMERGENCY DEPT VISIT LOW MDM: CPT

## 2020-11-14 RX ORDER — ONDANSETRON 8 MG/1
4 TABLET, FILM COATED ORAL ONCE
Refills: 0 | Status: COMPLETED | OUTPATIENT
Start: 2020-11-14 | End: 2020-11-14

## 2020-11-14 RX ORDER — MORPHINE SULFATE 50 MG/1
4 CAPSULE, EXTENDED RELEASE ORAL ONCE
Refills: 0 | Status: DISCONTINUED | OUTPATIENT
Start: 2020-11-14 | End: 2020-11-14

## 2020-11-14 RX ORDER — SODIUM CHLORIDE 9 MG/ML
1000 INJECTION INTRAMUSCULAR; INTRAVENOUS; SUBCUTANEOUS ONCE
Refills: 0 | Status: COMPLETED | OUTPATIENT
Start: 2020-11-14 | End: 2020-11-14

## 2020-11-14 NOTE — ED PROVIDER NOTE - PHYSICAL EXAMINATION
Gen: Alert, NAD, chronically ill appearing  Head/eyes: NC/AT, PERRL, EOMI  ENT: airway patent  Neck: supple, no tenderness/meningismus/JVD, Trachea midline  Pulm/lung: Bilateral clear BS, normal resp effort, no wheeze/stridor/retractions  CV/heart: RRR, no M/R/G, +2 dist pulses (radial, pedal DP/PT, popliteal)  GI/Abd: soft, NT/ND, +BS, no guarding/rebound tenderness  Musculoskeletal: no edema/erythema/cyanosis, FROM in all extremities, no C/T/L spine ttp, no CVAT b/l  Skin: no rash, no vesicles, no petechaie, no ecchymosis, no swelling  Neuro: AAOx3, CN 2-12 intact, normal sensation, 5/5 motor strength in all extremities, normal gait, no dysmetria

## 2020-11-14 NOTE — ED ADULT NURSE NOTE - NSIMPLEMENTINTERV_GEN_ALL_ED
Implemented All Universal Safety Interventions:  [place] to call system. Call bell, personal items and telephone within reach. Instruct patient to call for assistance. Room bathroom lighting operational. Non-slip footwear when patient is off stretcher. Physically safe environment: no spills, clutter or unnecessary equipment. Stretcher in lowest position, wheels locked, appropriate side rails in place.

## 2020-11-14 NOTE — ED PROVIDER NOTE - CLINICAL SUMMARY MEDICAL DECISION MAKING FREE TEXT BOX
pt states flank similar to her previous visits stemming from her renal artery stenosis, labs, IV anaglesia, IV antiemetics, reassess

## 2020-11-14 NOTE — ED PROVIDER NOTE - OBJECTIVE STATEMENT
66 yo female pmhx dvt on eliquis, HTN, renal artery stenosis, chronic back pain - follows with pain management, migraines presents to the ED c/o left flank pain and nausea/vomiting from her renal artery stenosis, here on 11/3 and 11/8 with negative CT thoracic and abdomen/pelvis.  Finished course of z-pack given on her 11/11 visit.  No fever/chills. PMD Yoly.  Renal Dr. Urban

## 2020-11-14 NOTE — ED PROVIDER NOTE - PATIENT PORTAL LINK FT
You can access the FollowMyHealth Patient Portal offered by [facility] by registering at the following website: [email or web address]. By joining [employer]’s FollowMyHealth portal, you will also be able to view your health information using other applications (apps) compatible with our system.

## 2020-11-14 NOTE — ED ADULT NURSE NOTE - OBJECTIVE STATEMENT
A&OX4 C/C with left flank pain onset since Saturday morning.  Hx Of renal artery stenosis. Pt pain scale 10/10

## 2020-11-14 NOTE — ED PROVIDER NOTE - NSFOLLOWUPINSTRUCTIONS_ED_ALL_ED_FT
1) Follow-up with your Primary Medical Doctor and nephrologist. Call today / next business day for prompt follow-up.  2) Return to Emergency room for any worsening or persistent pain, weakness, fever, or any other concerning symptoms.  3) See attached instruction sheets for additional information, including information regarding signs and symptoms to look out for, reasons to seek immediate care and other important instructions.  4) Follow-up with any specialists as discussed / noted as well.       WHAT YOU NEED TO KNOW:    Flank pain is felt in the area below your ribcage and above your hip bones, often in the lower back. Your pain may be dull or so severe that you cannot get comfortable. The pain may stay in one area or radiate to another area. It may worsen and lighten in waves. Flank pain is often a sign of problems with your urinary tract, such as a kidney stone or infection.     DISCHARGE INSTRUCTIONS:    Return to the emergency department if:   •You have a fever.       •Your heart is fluttering or jumping.       •You see blood in your urine.       •Your pain radiates into your lower abdomen and genital area.       •You have intense pain in your low back next to your spine.       •You are much more tired than usual and have no desire to eat.       •You have a headache and your muscles jerk.       Contact your healthcare provider if:   •You have an upset stomach and are vomiting.      •You have to urinate more often, and with urgency.       •Your pain worsens or does not improve, and you cannot get comfortable.       •You pass a stone when you urinate.      •You have questions or concerns about your condition or care.      Medicines: The following medicines may be ordered for you:  •Pain medicine may help decrease or relieve your pain. Do not wait until the pain is severe before you take your medicine.       •Antibiotics may help treat a urinary tract infection caused by bacteria.       •Take your medicine as directed. Contact your healthcare provider if you think your medicine is not helping or if you have side effects. Tell him of her if you are allergic to any medicine. Keep a list of the medicines, vitamins, and herbs you take. Include the amounts, and when and why you take them. Bring the list or the pill bottles to follow-up visits. Carry your medicine list with you in case of an emergency.      Follow up with your healthcare provider in 1 to 2 days or as directed: Write down your questions so you remember to ask them during your visits.

## 2020-11-15 VITALS — TEMPERATURE: 98 F | SYSTOLIC BLOOD PRESSURE: 170 MMHG | DIASTOLIC BLOOD PRESSURE: 75 MMHG | HEART RATE: 78 BPM

## 2020-11-15 LAB
ALBUMIN SERPL ELPH-MCNC: 3.5 G/DL — SIGNIFICANT CHANGE UP (ref 3.3–5)
ALP SERPL-CCNC: 94 U/L — SIGNIFICANT CHANGE UP (ref 40–120)
ALT FLD-CCNC: 30 U/L — SIGNIFICANT CHANGE UP (ref 12–78)
ANION GAP SERPL CALC-SCNC: 11 MMOL/L — SIGNIFICANT CHANGE UP (ref 5–17)
AST SERPL-CCNC: 16 U/L — SIGNIFICANT CHANGE UP (ref 15–37)
BASOPHILS # BLD AUTO: 0.04 K/UL — SIGNIFICANT CHANGE UP (ref 0–0.2)
BASOPHILS NFR BLD AUTO: 0.6 % — SIGNIFICANT CHANGE UP (ref 0–2)
BILIRUB SERPL-MCNC: 0.4 MG/DL — SIGNIFICANT CHANGE UP (ref 0.2–1.2)
BUN SERPL-MCNC: 12 MG/DL — SIGNIFICANT CHANGE UP (ref 7–23)
CALCIUM SERPL-MCNC: 9.5 MG/DL — SIGNIFICANT CHANGE UP (ref 8.5–10.1)
CHLORIDE SERPL-SCNC: 111 MMOL/L — HIGH (ref 96–108)
CO2 SERPL-SCNC: 19 MMOL/L — LOW (ref 22–31)
CREAT SERPL-MCNC: 1.3 MG/DL — SIGNIFICANT CHANGE UP (ref 0.5–1.3)
EOSINOPHIL # BLD AUTO: 0.22 K/UL — SIGNIFICANT CHANGE UP (ref 0–0.5)
EOSINOPHIL NFR BLD AUTO: 3.3 % — SIGNIFICANT CHANGE UP (ref 0–6)
GLUCOSE SERPL-MCNC: 103 MG/DL — HIGH (ref 70–99)
HCT VFR BLD CALC: 39.9 % — SIGNIFICANT CHANGE UP (ref 34.5–45)
HGB BLD-MCNC: 13.4 G/DL — SIGNIFICANT CHANGE UP (ref 11.5–15.5)
IMM GRANULOCYTES NFR BLD AUTO: 0.6 % — SIGNIFICANT CHANGE UP (ref 0–1.5)
LIDOCAIN IGE QN: 201 U/L — SIGNIFICANT CHANGE UP (ref 73–393)
LYMPHOCYTES # BLD AUTO: 1.2 K/UL — SIGNIFICANT CHANGE UP (ref 1–3.3)
LYMPHOCYTES # BLD AUTO: 17.8 % — SIGNIFICANT CHANGE UP (ref 13–44)
MCHC RBC-ENTMCNC: 27 PG — SIGNIFICANT CHANGE UP (ref 27–34)
MCHC RBC-ENTMCNC: 33.6 GM/DL — SIGNIFICANT CHANGE UP (ref 32–36)
MCV RBC AUTO: 80.4 FL — SIGNIFICANT CHANGE UP (ref 80–100)
MONOCYTES # BLD AUTO: 0.79 K/UL — SIGNIFICANT CHANGE UP (ref 0–0.9)
MONOCYTES NFR BLD AUTO: 11.7 % — SIGNIFICANT CHANGE UP (ref 2–14)
NEUTROPHILS # BLD AUTO: 4.47 K/UL — SIGNIFICANT CHANGE UP (ref 1.8–7.4)
NEUTROPHILS NFR BLD AUTO: 66 % — SIGNIFICANT CHANGE UP (ref 43–77)
NRBC # BLD: 0 /100 WBCS — SIGNIFICANT CHANGE UP (ref 0–0)
PLATELET # BLD AUTO: 263 K/UL — SIGNIFICANT CHANGE UP (ref 150–400)
POTASSIUM SERPL-MCNC: 3.2 MMOL/L — LOW (ref 3.5–5.3)
POTASSIUM SERPL-SCNC: 3.2 MMOL/L — LOW (ref 3.5–5.3)
PROT SERPL-MCNC: 7.1 G/DL — SIGNIFICANT CHANGE UP (ref 6–8.3)
RBC # BLD: 4.96 M/UL — SIGNIFICANT CHANGE UP (ref 3.8–5.2)
RBC # FLD: 16.6 % — HIGH (ref 10.3–14.5)
SODIUM SERPL-SCNC: 141 MMOL/L — SIGNIFICANT CHANGE UP (ref 135–145)
WBC # BLD: 6.76 K/UL — SIGNIFICANT CHANGE UP (ref 3.8–10.5)
WBC # FLD AUTO: 6.76 K/UL — SIGNIFICANT CHANGE UP (ref 3.8–10.5)

## 2020-11-15 PROCEDURE — 80053 COMPREHEN METABOLIC PANEL: CPT

## 2020-11-15 PROCEDURE — 96376 TX/PRO/DX INJ SAME DRUG ADON: CPT

## 2020-11-15 PROCEDURE — 99284 EMERGENCY DEPT VISIT MOD MDM: CPT | Mod: 25

## 2020-11-15 PROCEDURE — 36415 COLL VENOUS BLD VENIPUNCTURE: CPT

## 2020-11-15 PROCEDURE — 96375 TX/PRO/DX INJ NEW DRUG ADDON: CPT

## 2020-11-15 PROCEDURE — 96374 THER/PROPH/DIAG INJ IV PUSH: CPT

## 2020-11-15 PROCEDURE — 83690 ASSAY OF LIPASE: CPT

## 2020-11-15 PROCEDURE — 85025 COMPLETE CBC W/AUTO DIFF WBC: CPT

## 2020-11-15 PROCEDURE — 96361 HYDRATE IV INFUSION ADD-ON: CPT

## 2020-11-15 RX ORDER — POTASSIUM CHLORIDE 20 MEQ
40 PACKET (EA) ORAL ONCE
Refills: 0 | Status: COMPLETED | OUTPATIENT
Start: 2020-11-15 | End: 2020-11-15

## 2020-11-15 RX ORDER — METOCLOPRAMIDE HCL 10 MG
10 TABLET ORAL ONCE
Refills: 0 | Status: COMPLETED | OUTPATIENT
Start: 2020-11-15 | End: 2020-11-15

## 2020-11-15 RX ORDER — MORPHINE SULFATE 50 MG/1
4 CAPSULE, EXTENDED RELEASE ORAL ONCE
Refills: 0 | Status: DISCONTINUED | OUTPATIENT
Start: 2020-11-15 | End: 2020-11-15

## 2020-11-15 RX ADMIN — MORPHINE SULFATE 4 MILLIGRAM(S): 50 CAPSULE, EXTENDED RELEASE ORAL at 02:40

## 2020-11-15 RX ADMIN — MORPHINE SULFATE 4 MILLIGRAM(S): 50 CAPSULE, EXTENDED RELEASE ORAL at 01:21

## 2020-11-15 RX ADMIN — SODIUM CHLORIDE 1000 MILLILITER(S): 9 INJECTION INTRAMUSCULAR; INTRAVENOUS; SUBCUTANEOUS at 01:15

## 2020-11-15 RX ADMIN — MORPHINE SULFATE 4 MILLIGRAM(S): 50 CAPSULE, EXTENDED RELEASE ORAL at 00:02

## 2020-11-15 RX ADMIN — Medication 40 MILLIEQUIVALENT(S): at 01:31

## 2020-11-15 RX ADMIN — Medication 10 MILLIGRAM(S): at 01:32

## 2020-11-15 RX ADMIN — ONDANSETRON 4 MILLIGRAM(S): 8 TABLET, FILM COATED ORAL at 00:04

## 2020-11-15 RX ADMIN — MORPHINE SULFATE 4 MILLIGRAM(S): 50 CAPSULE, EXTENDED RELEASE ORAL at 02:10

## 2020-11-15 RX ADMIN — SODIUM CHLORIDE 1000 MILLILITER(S): 9 INJECTION INTRAMUSCULAR; INTRAVENOUS; SUBCUTANEOUS at 00:08

## 2020-11-15 NOTE — ED ADULT NURSE REASSESSMENT NOTE - NS ED NURSE REASSESS COMMENT FT1
0240 Denies pain, no c/o nausea, no vomiting. Pt ready to be discharged (was discharged earlier but reported having pain @0215)

## 2020-11-15 NOTE — ED ADULT NURSE REASSESSMENT NOTE - NS ED NURSE REASSESS COMMENT FT1
late entry  0006 Medicated with Morphine Sulphate 4 mg ivp @ 0003  pain scale  10/10 & Zofran 4 mg IVp administered. Monitered

## 2020-12-22 ENCOUNTER — APPOINTMENT (OUTPATIENT)
Dept: NEUROLOGY | Facility: CLINIC | Age: 65
End: 2020-12-22
Payer: MEDICARE

## 2020-12-22 DIAGNOSIS — G47.00 INSOMNIA, UNSPECIFIED: ICD-10-CM

## 2020-12-22 PROCEDURE — 99213 OFFICE O/P EST LOW 20 MIN: CPT | Mod: 95

## 2020-12-22 NOTE — REASON FOR VISIT
[Home] : at home, [unfilled] , at the time of the visit. [Medical Office: ([facility])___] : at the medical office located in  [Verbal consent obtained from patient] : the patient, [unfilled]

## 2020-12-22 NOTE — PHYSICAL THERAPY INITIAL EVALUATION ADULT - SIT-TO-STAND BALANCE
Send another blood culture times two sets and give vancomycin IV after. Antibiotic treatment ordered FS done, 79 Pt to receive 1 unit PRBC good balance

## 2020-12-22 NOTE — HISTORY OF PRESENT ILLNESS
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  . 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 A stent was placed by Dr. Garcia at [facility] and started on Eliquis.\par \par She has been on Topamax 100 mg bid for seizure prophylaxis, however she had a seizure last month on the generic version. Her partial seizures start with headache followed by aura of visual disturbance followed by loss of consciousness. Generic Topamax was increased to 125 mg bid and she had a second seizure 1 month ago. \par \par Has been c/o insomnia.

## 2020-12-22 NOTE — PHYSICAL EXAM
[FreeTextEntry1] : Alert and oriented. No cognitive or communication deficits. No focal sensorimotor deficits observed on audiovisual connection.

## 2020-12-22 NOTE — ASSESSMENT
[FreeTextEntry1] : Insomnia may be triggering seizures. Will add clonazepam 0.5 mg hs. Will obtain prior auth for Topamax 100 mg bid

## 2020-12-23 ENCOUNTER — RX RENEWAL (OUTPATIENT)
Age: 65
End: 2020-12-23

## 2021-01-06 RX ORDER — TOPIRAMATE 25 MG/1
25 TABLET, FILM COATED ORAL
Qty: 60 | Refills: 0 | Status: DISCONTINUED | COMMUNITY
Start: 2020-09-21 | End: 2021-01-06

## 2021-01-06 RX ORDER — TOPIRAMATE 100 MG/1
100 TABLET, FILM COATED ORAL TWICE DAILY
Qty: 60 | Refills: 1 | Status: DISCONTINUED | COMMUNITY
End: 2021-01-06

## 2021-01-21 NOTE — ED ADULT TRIAGE NOTE - BP NONINVASIVE SYSTOLIC (MM HG)
Ongoing SW/CM Assessment/Plan of Care Note     See SW/CM flowsheets for goals and other objective data.      Progress note:   Attempted to reach patient but patient did not  phone. Unable to enter room d/t COVID restrictions.          198

## 2021-01-22 ENCOUNTER — APPOINTMENT (OUTPATIENT)
Dept: NEUROLOGY | Facility: CLINIC | Age: 66
End: 2021-01-22
Payer: MEDICARE

## 2021-01-22 PROCEDURE — 99213 OFFICE O/P EST LOW 20 MIN: CPT | Mod: 95

## 2021-01-22 PROCEDURE — ZZZZZ: CPT

## 2021-01-22 RX ORDER — CLONAZEPAM 0.5 MG/1
0.5 TABLET ORAL
Qty: 30 | Refills: 0 | Status: DISCONTINUED | COMMUNITY
Start: 2020-12-22 | End: 2021-01-22

## 2021-01-22 NOTE — REVIEW OF SYSTEMS
[As Noted in HPI] : as noted in HPI [Sleep Disturbances] : sleep disturbances [Negative] : Heme/Lymph [FreeTextEntry9] : back pain. Had kyphoplasty [de-identified] : on zolpidem (Rx by pain management)

## 2021-01-22 NOTE — HISTORY OF PRESENT ILLNESS
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  . 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 A stent was placed by Dr. Garcia at [facility] and started on Eliquis.\par \par She has been on Topamax 100 mg bid for seizure prophylaxis, however she had a seizure last month on the generic version. Her partial seizures start with headache followed by aura of visual disturbance followed by loss of consciousness. Generic Topamax was increased to 125 mg bid and she had a second seizure 1 month ago. Placed on brand Topamax 100 mg bid and has been seizure free. She has hx of migraine and takes occasional butalbital-APAP prn.\par \par

## 2021-01-22 NOTE — REASON FOR VISIT
[Follow-Up: _____] : a [unfilled] follow-up visit [Home] : at home, [unfilled] , at the time of the visit. [Other:____] : [unfilled] [Verbal consent obtained from patient] : the patient, [unfilled]

## 2021-01-22 NOTE — PHYSICAL EXAM
[FreeTextEntry1] : No cognitive or communication deficits. No focal neurologic deficits observed with limitations of audiovisual connection.

## 2021-02-05 NOTE — ED ADULT NURSE NOTE - CAS TRG GENERAL NORM CIRC DET
2/5/21, 10:37 AM EST    DISCHARGE PLANNING EVALUATION      Spoke with son Jeane Saint, informed [facility] has accepted his mother. Strong peripheral pulses

## 2021-02-22 ENCOUNTER — APPOINTMENT (OUTPATIENT)
Dept: VASCULAR SURGERY | Facility: CLINIC | Age: 66
End: 2021-02-22
Payer: MEDICARE

## 2021-02-22 VITALS
WEIGHT: 127 LBS | OXYGEN SATURATION: 99 % | HEIGHT: 62 IN | DIASTOLIC BLOOD PRESSURE: 78 MMHG | BODY MASS INDEX: 23.37 KG/M2 | TEMPERATURE: 97.2 F | HEART RATE: 61 BPM | SYSTOLIC BLOOD PRESSURE: 163 MMHG

## 2021-02-22 DIAGNOSIS — I87.1 COMPRESSION OF VEIN: ICD-10-CM

## 2021-02-22 DIAGNOSIS — I82.522 CHRONIC EMBOLISM AND THROMBOSIS OF LEFT ILIAC VEIN: ICD-10-CM

## 2021-02-22 DIAGNOSIS — I82.599 CHRONIC EMBOLISM AND THROMBOSIS OF OTHER SPECIFIED DEEP VEIN OF UNSPECIFIED LOWER EXTREMITY: ICD-10-CM

## 2021-02-22 PROCEDURE — 99213 OFFICE O/P EST LOW 20 MIN: CPT

## 2021-02-22 NOTE — ASSESSMENT
[FreeTextEntry1] : 66 yo F with hx of LLE DVT, thrombectomy and stenting. On anticoagulation. Recent US showed L BK DVT. Not clear if acute or chronic and US is not available. Asymptomatic.

## 2021-02-22 NOTE — HISTORY OF PRESENT ILLNESS
[FreeTextEntry1] : 64 yo F s/p LLE INARI thrombectomy  with L CIV stent. On Eliquis since then. No LE swelling.  [de-identified] : Patient suffered a seizure recently and was admitted to [facility]. LE venous US was performed and she was told she had a DVT. Not aware if it was acute or chronic.

## 2021-02-22 NOTE — REASON FOR VISIT
[Follow-Up: _____] : a [unfilled] follow-up visit [FreeTextEntry1] : Recent admission to [facility] due to seizure. Told she has a LLE DVT [de-identified] : LLE INARI thrombectomy with L CIV stent [de-identified] : 8/11/2020 [de-identified] : No issues. BLE are same size. On eliquis

## 2021-02-24 NOTE — ED PROVIDER NOTE - CROS ED NEURO POS
CASE MANAGEMENT. Racheal Tariq Patient has been accepted back to [facility]. NO COVID test required. Signed scripts placed in a sealed envelope. [facility] will provide transportation and pick patient up at 1345. Patient and nursing notified. HEADACHE

## 2021-03-08 NOTE — ED ADULT NURSE NOTE - NS ED NURSE DC INFO COMPLEXITY
1-2 drinks Simple: Patient demonstrates quick and easy understanding/Verbalized Understanding/Patient asked questions/Returned Demonstration

## 2021-04-06 NOTE — ED ADULT TRIAGE NOTE - BMI (KG/M2)
I spoke with Stanislav to discuss the results of his genetic testing. 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     We reviewed that achromatopsia is caused by problems with the cones's ability to react appropriately to light. It is characterized by partial or total absence of color vision. Individuals with complete achromatopsia cannot perceive any colors and see only black, white, and shades of gray. Incomplete achromatopsia is a milder form of the condition that allows some color discrimination. Individuals with achromatopsia can develop other eye symptoms in the first few months of life such as photophobia, nystagmus, low visual acuity, and near- or farsightedness, but the visual acuity is usually stable over time. The nystagmus and sensitivity to bright light may improve slightly.     We discussed that this is an autosomal recessive condition, meaning an individual needs two pathogenic variant, one on each copy of their CNGB3 genes, in order to be affected. It is most likely that Stanislav got one his CNGB3 pathogenic variants from his mother and the other from his father meaning his parents are asymptomatic carriers for achromatopsia. For Stanislav, because he has two pathogenic variants, if he has children in the future he will pass one of his two variants to each child meaning they will be obligate carriers for achromatopsia. When we were in clinic, Stanislav's identical twin brother Chicho was a part of the discussion as well. Genetic testing is available for Chicho as well if he is interested. I would expect his results to be exactly the same as Stanislav's.     Finally we discussed that there are clinical trials for CNGB3-associated achromatopsia right now. I directed Stanislav to the  [facility] website for details. [email or web address]     I will write a letter summarizing these results for Stanislav and mail it to the home address on file along with a copy of his test report. I gave Stanislav my contact information again and encouraged him to reach out if he or his brother have any other questions.     Lita Block MS, [facility]  Licensed Genetic Counselor  [facility]  Phone: [phone]  Fax: [phone]             21

## 2021-05-24 ENCOUNTER — RX RENEWAL (OUTPATIENT)
Age: 66
End: 2021-05-24

## 2021-06-05 ENCOUNTER — EMERGENCY (EMERGENCY)
Facility: HOSPITAL | Age: 66
LOS: 1 days | Discharge: HOME | End: 2021-06-05
Attending: STUDENT IN AN ORGANIZED HEALTH CARE EDUCATION/TRAINING PROGRAM | Admitting: STUDENT IN AN ORGANIZED HEALTH CARE EDUCATION/TRAINING PROGRAM
Payer: MEDICARE

## 2021-06-05 VITALS
HEART RATE: 82 BPM | HEIGHT: 62 IN | RESPIRATION RATE: 17 BRPM | SYSTOLIC BLOOD PRESSURE: 155 MMHG | WEIGHT: 125 LBS | DIASTOLIC BLOOD PRESSURE: 83 MMHG | TEMPERATURE: 98 F | OXYGEN SATURATION: 97 %

## 2021-06-05 VITALS
HEART RATE: 79 BPM | OXYGEN SATURATION: 99 % | TEMPERATURE: 98 F | RESPIRATION RATE: 18 BRPM | DIASTOLIC BLOOD PRESSURE: 84 MMHG | SYSTOLIC BLOOD PRESSURE: 141 MMHG

## 2021-06-05 DIAGNOSIS — Z98.890 OTHER SPECIFIED POSTPROCEDURAL STATES: Chronic | ICD-10-CM

## 2021-06-05 DIAGNOSIS — Z95.820 PERIPHERAL VASCULAR ANGIOPLASTY STATUS WITH IMPLANTS AND GRAFTS: Chronic | ICD-10-CM

## 2021-06-05 PROCEDURE — 99283 EMERGENCY DEPT VISIT LOW MDM: CPT

## 2021-06-05 RX ADMIN — Medication 300 MILLIGRAM(S): at 12:07

## 2021-06-05 NOTE — ED PROVIDER NOTE - OBJECTIVE STATEMENT
Pt is a 66 yo female with pmhx of Anxiety Benign essential HTN Constipation Depression DJD (degenerative joint disease) Drug-seeking behavior DVT, lower extremity Grade I diastolic dysfunction HTN (hypertension) Migraines Neuropathy Osteoporosis Pericarditis Renal arteriosclerosis Seizure disorder Shingles c/o of gum pain s/p extraction for 4 teeth 2 days ago at [facility] dental office. Pt not placed on abx but on antiseptic mouth wash. Pt denies any fever chills but states having throbbing pain and noted some discharge. Pt called dental office but no emergency service line and does not have f/u appt. Pt taking hydrocodone for pain.

## 2021-06-05 NOTE — ED PROVIDER NOTE - PATIENT PORTAL LINK FT
You can access the FollowMyHealth Patient Portal offered by [facility] by registering at the following website: [email or web address]. By joining [employer]’s FollowMyHealth portal, you will also be able to view your health information using other applications (apps) compatible with our system.

## 2021-06-05 NOTE — ED ADULT NURSE NOTE - OBJECTIVE STATEMENT
Pt. received alert and oriented x3 with chief complaint of mouth pain and swelling post 4 teeth extracted. No fever noted.

## 2021-06-05 NOTE — ED PROVIDER NOTE - ATTENDING CONTRIBUTION TO CARE
65 F w recent hx of 4 teeth extracted. pt complaining of pain and puss at surgery site. pt on oral rinses only. no fevers, chills, nausea, vomiting. On exam, patient non-toxic, sutures in place over front teeth with grey film. will give antibiotics and have patient follow up with dentist.

## 2021-06-05 NOTE — ED PROVIDER NOTE - NSFOLLOWUPINSTRUCTIONS_ED_ALL_ED_FT
Please follow up with your dentist return to er for any fever worsening symptoms    Dental Extraction    A dental extraction means having a tooth taken out of your mouth (extracted). You may need to have this procedure if:  •Your tooth is broken or has disease in it, and it cannot be fixed or get healthy.      •Your mouth needs room for other teeth to grow in or to line up.      •You need a medical treatment, and taking out some teeth will lower your chance of having problems during treatment.    The procedure may be:  •A simple extraction. This is done if part of the tooth has grown out of the pink tissues (gums) that are around your teeth.    •A surgical extraction. This is done if your tooth:  •Has not grown through the gums.      •Cannot be taken out with a simple extraction.          What happens before the procedure?    Medicines     •You and your doctor will talk about what type of medicine to use for your procedure.    •Ask your doctor about:  •Changing or stopping your normal medicines. This is important if you take diabetes medicines or blood thinners.      •Taking medicines like aspirin and ibuprofen. These medicines can thin your blood. Do not take these medicines unless your doctor tells you to take them.      •Taking over-the-counter medicines, vitamins, herbs, and supplements.        •You may be given antibiotic medicine.      General instructions     • Do not use any products that have nicotine or tobacco in them. These include cigarettes, chewing tobacco, and e-cigarettes. If you need help quitting, ask your doctor.    •You will have a mouth (oral) exam. Your doctor may:  •Take X-rays.      •Make molds of your mouth.      •If you will be given medicine to make you fall asleep for the procedure:  •Plan to have someone take you home from the hospital or clinic.      •Plan to have someone you trust care for you for 24 hours or longer. This is important.          What happens during the procedure?   •To lower your risk of infection, your dental care team will:  •Wash their hands.      •Put on gloves and face masks.      •Clean (sterilize) all surgical tools.        •You will sit or lie back in a dental chair.      •An IV tube may be put into one of your veins.    •You will be given one or more of the following:  •A medicine to help you relax (sedative). This may be given by mouth or through an IV tube.      •A medicine to numb the area around the tooth (local anesthetic). This is given as a shot (injection) near your tooth.      •A medicine to make you fall asleep (general anesthetic).        •A cut (incision) may be made in your gums. This helps your surgeon get to the tooth.      •Your doctor will make the tooth loose.      •If you are having a simple extraction, the tooth will be grabbed with a dental tool and taken out of its socket. The socket is the space in the gum where the tooth normally is.    •If you are having a surgical extraction:  •The tooth may be cut into parts. The parts may be taken out one at a time.      •Small parts of bone may be taken out. Material (a graft) may be put into the socket to replace the bone.        •The socket will be washed (irrigated) and cleared out (debrided).      •Medicine may be put into the socket.      •If a cut was made in your gum, it will be closed with stitches (sutures).      •Gauze may be placed over the socket.      The procedure may vary among doctors and clinics.      What happens after the procedure?    •A clump of blood (blood clot) should start to form over the open socket. This is the start of healing. Follow instructions from your doctor about how to make sure the clot stays in the socket.      •If you have gauze in your mouth, bite down gently on it. This helps with bleeding. It also helps with forming a blood clot.      •Your blood pressure, heart rate, breathing rate, and blood oxygen level will be monitored until the medicines you were given have worn off.      •You will be given pain medicine as needed.      •You may be given an antibiotic to take at home.      • Do not drive for 24 hours if you were given a medicine to help you relax.        Summary    •A dental extraction means having a tooth taken out of your mouth (extracted).      •You and your doctor will talk about what type of medicine to use for your procedure.      •If you have gauze in your mouth after the procedure, bite down gently on it. This helps with bleeding. It also helps with forming a clot to help your gums heal.      • Do not drive for 24 hours if you were given a medicine to help you relax.      This information is not intended to replace advice given to you by your health care provider. Make sure you discuss any questions you have with your health care provider.

## 2021-06-05 NOTE — ED PROVIDER NOTE - ENMT, MLM
Airway patent, Nasal mucosa clear. Mouth with normal mucosa. Throat has no vesicles, no oropharyngeal exudates and uvula is midline. mouth no teeth in mouth s/p extraction lower gum with sutures in place + swelling and ttp no pus expression

## 2021-06-11 ENCOUNTER — APPOINTMENT (OUTPATIENT)
Dept: NEUROLOGY | Facility: CLINIC | Age: 66
End: 2021-06-11
Payer: MEDICARE

## 2021-06-11 VITALS
WEIGHT: 120 LBS | DIASTOLIC BLOOD PRESSURE: 78 MMHG | SYSTOLIC BLOOD PRESSURE: 147 MMHG | HEART RATE: 58 BPM | BODY MASS INDEX: 22.08 KG/M2 | HEIGHT: 62 IN

## 2021-06-11 DIAGNOSIS — F41.9 ANXIETY DISORDER, UNSPECIFIED: ICD-10-CM

## 2021-06-11 PROCEDURE — 99214 OFFICE O/P EST MOD 30 MIN: CPT

## 2021-06-11 NOTE — PHYSICAL EXAM
[FreeTextEntry1] : \par Alert and oriented. No cognitive or communication deficits. Visual fields are full to confrontation. Optic disc margins are sharp. Pupils equal and constrict to light. Extraocular movements intact. No facial asymmetry. Hearing intact to finger rub. Palate rises symmetrically and tongue protrudes in midline. Neck is supple. No bruits heard. No weakness or sensory deficits. Tendon reflexes are all active and symmetric. Plantars are flexor. Gait and coordination intact. Heart sounds are normal. No murmurs heard.\par

## 2021-06-11 NOTE — HISTORY OF PRESENT ILLNESS
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 A stent was placed by Dr. Garcia at [facility] and started on Eliquis.\par \par She has been on Topamax 100 mg bid for seizure prophylaxis, however she had a seizure last month on the generic version. Her partial seizures start with headache followed by aura of visual disturbance followed by loss of consciousness. Generic Topamax was increased to 125 mg bid and she had a second seizure 1 month ago. Placed on brand Topamax 100 mg bid and has been seizure free. She has hx of migraine and takes occasional butalbital-APAP prn.\par \par

## 2021-09-17 ENCOUNTER — APPOINTMENT (OUTPATIENT)
Dept: NEUROLOGY | Facility: CLINIC | Age: 66
End: 2021-09-17
Payer: MEDICARE

## 2021-09-17 PROCEDURE — 99213 OFFICE O/P EST LOW 20 MIN: CPT | Mod: 95

## 2021-09-17 NOTE — HISTORY OF PRESENT ILLNESS
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 A stent was placed by Dr. Garcia at [facility] and started on Eliquis.\par \par He has had no seizures on Topamax 100 mg twice daily.  She continues to have back pain and follows with pain management.  She had thoracic kyphoplasty several years ago.\par \par

## 2021-09-17 NOTE — PHYSICAL EXAM
[FreeTextEntry1] : No cognitive or communication deficits.  No focal sensorimotor deficits observed on video.

## 2021-10-11 ENCOUNTER — RX RENEWAL (OUTPATIENT)
Age: 66
End: 2021-10-11

## 2021-10-19 NOTE — ED ADULT NURSE NOTE - NS ED NURSE RECORD ANOTHER VITAL SIGN
[facility]  [address]  [place], [place]  [phone]    5 days prior to your CT Scan, double the dose of your metoprolol   
Yes

## 2021-10-29 NOTE — H&P ADULT - NSHPLANGLIMITEDENGLISH_GEN_A_CORE
No Glycopyrrolate Counseling:  I discussed with the patient the risks of glycopyrrolate including but not limited to skin rash, drowsiness, dry mouth, difficulty urinating, and blurred vision.

## 2022-01-10 ENCOUNTER — RX RENEWAL (OUTPATIENT)
Age: 67
End: 2022-01-10

## 2022-01-19 NOTE — PHYSICAL THERAPY INITIAL EVALUATION ADULT - NAME OF CLINICIAN
Detail Level: Detailed Quality 226: Preventive Care And Screening: Tobacco Use: Screening And Cessation Intervention: Patient screened for tobacco use and is an ex/non-smoker Quality 431: Preventive Care And Screening: Unhealthy Alcohol Use - Screening: Patient not identified as an unhealthy alcohol user when screened for unhealthy alcohol use using a systematic screening method MARIA LUISA Arango

## 2022-02-01 NOTE — ED PROVIDER NOTE - INTERNATIONAL TRAVEL
(Third Attempt) Called and left Voicemail for patient to call back, Will Send letter,
Called and left voicemail for patient to call the office back,
Left voicemail for patient to call the office back,
Patient called had covid and back to work  3 days and she feels so sick with swollen glands, both ears pain and feel closed, headache ,stuffy nose, Please call patient to see what to do next and if something can be ordered Send to [facility] on [address], Has Allergy to Penicillin/Amoxicillin
Pt called back  States she is feeling much better now  Still coughing, and has post-nasal drip, but does not feel she needs anything at this time  Will call if symptoms worsen 
No

## 2022-02-16 NOTE — ED ADULT NURSE NOTE - NSIMPLEMENTINTERV_GEN_ALL_ED
Implemented All Universal Safety Interventions:  [place] to call system. Call bell, personal items and telephone within reach. Instruct patient to call for assistance. Room bathroom lighting operational. Non-slip footwear when patient is off stretcher. Physically safe environment: no spills, clutter or unnecessary equipment. Stretcher in lowest position, wheels locked, appropriate side rails in place. Number Of Hemigard Strips Per Side: 1

## 2022-04-11 ENCOUNTER — RX RENEWAL (OUTPATIENT)
Age: 67
End: 2022-04-11

## 2022-04-20 NOTE — DISCHARGE NOTE NURSING/CASE MANAGEMENT/SOCIAL WORK - NSTRANSFERBELONGINGSDISPO_GEN_A_NUR
Patient Education     Understanding Achilles Tendonitis    Achilles tendonitis is an overuse injury. It results in inflammation of the Achilles tendon. This tendon is found on the back of the ankle. It links the calf muscle to the heel bone. It helps you do pushing-off movements like running or standing on your toes.     How to say it  [id number]-[id number]   What causes Achilles tendonitis?  Achilles tendonitis can happen if you do an activity like running, walking, or jumping too much. This overuse can strain, or pull, the tendon. It may lead to minor tearing of the tendon. An injury to the lower leg or foot can also cause it.  If you don’t warm up before taking part in sports such as basketball, you are more likely to suffer from this condition. You are also more prone to it if you do too much of such an activity too quickly. Proper training and rest can help prevent it.  Symptoms of Achilles tendonitis  The main symptom of Achilles tendonitis is pain. This pain mostly happens when you move the ankle. The tendon may also feel stiff after a period of no activity, such as sleeping. It may also become swollen. You may hear a crackling sound when you move your ankle.  Treatment for Achilles tendonitis  Symptoms often get better after starting treatment. A full recovery may take several months. Treatments include:  · Rest. You should stop or change the activity that caused the injury. The tendon will then have time to heal.  · Cold or heat pack. These help reduce pain and swelling.  · Prescription or over-the-counter pain medicines. These help reduce pain and swelling.  · Shoe inserts. These devices can reduce strain on the Achilles tendon when you move. You may then feel less pain.  · Stretching and strengthening exercises. Certain exercises can help you regain flexibility and strength in your Achilles tendon.  · Surgery. This option can fix the injured tendon. But you don’t often need it unless other  treatments don’t work.     When to call your healthcare provider   Call your healthcare provider right away if you have any of these:  · Fever of 100.4°F (38°C) or higher, or as directed  · Pain that gets worse  · Symptoms that don’t get better, or get worse  · New symptoms    Date Last Reviewed: 3/10/2016  © 4353-6791 [email or web address]. [address] [place] [address], [place] [address]. All rights reserved. This information is not intended as a substitute for professional medical care. Always follow your healthcare professional's instructions.            not applicable

## 2022-04-20 NOTE — ED ADULT TRIAGE NOTE - CCCP TRG CHIEF CMPLNT
Anesthesia Post Evaluation    Patient: Yoana Thomas    Procedure(s) Performed: Procedure(s) (LRB):  DISSECTION, NECK (Left)    Final Anesthesia Type: general      Patient location during evaluation: PACU  Patient participation: Yes- Able to Participate  Level of consciousness: awake and alert and oriented  Post-procedure vital signs: reviewed and stable  Pain management: adequate  Airway patency: patent    PONV status at discharge: No PONV  Anesthetic complications: no      Cardiovascular status: hemodynamically stable  Respiratory status: unassisted and spontaneous ventilation  Hydration status: euvolemic  Follow-up not needed.          Vitals Value Taken Time   /73 04/20/22 1201   Temp 36.2 °C (97.2 °F) 04/20/22 1201   Pulse 85 04/20/22 1201   Resp 15 04/20/22 1201   SpO2 94 % 04/20/22 1201         Event Time   Out of Recovery 11:15:00         Pain/Jassi Score: Pain Rating Prior to Med Admin: 7 (4/20/2022 11:05 AM)  Pain Rating Post Med Admin: 3 (4/20/2022 12:05 PM)  Jassi Score: 9 (4/20/2022 11:15 AM)        
flank pain

## 2022-06-16 ENCOUNTER — EMERGENCY (EMERGENCY)
Facility: HOSPITAL | Age: 67
LOS: 1 days | Discharge: HOME | End: 2022-06-16
Attending: EMERGENCY MEDICINE | Admitting: EMERGENCY MEDICINE
Payer: MEDICARE

## 2022-06-16 VITALS
TEMPERATURE: 98 F | RESPIRATION RATE: 16 BRPM | HEART RATE: 56 BPM | OXYGEN SATURATION: 97 % | DIASTOLIC BLOOD PRESSURE: 74 MMHG | SYSTOLIC BLOOD PRESSURE: 139 MMHG

## 2022-06-16 VITALS
OXYGEN SATURATION: 98 % | WEIGHT: 119.05 LBS | SYSTOLIC BLOOD PRESSURE: 135 MMHG | HEIGHT: 62 IN | HEART RATE: 60 BPM | RESPIRATION RATE: 15 BRPM | DIASTOLIC BLOOD PRESSURE: 69 MMHG | TEMPERATURE: 97 F

## 2022-06-16 DIAGNOSIS — Z98.890 OTHER SPECIFIED POSTPROCEDURAL STATES: Chronic | ICD-10-CM

## 2022-06-16 DIAGNOSIS — Z95.820 PERIPHERAL VASCULAR ANGIOPLASTY STATUS WITH IMPLANTS AND GRAFTS: Chronic | ICD-10-CM

## 2022-06-16 LAB
ALBUMIN SERPL ELPH-MCNC: 3.9 G/DL — SIGNIFICANT CHANGE UP (ref 3.3–5)
ALP SERPL-CCNC: 88 U/L — SIGNIFICANT CHANGE UP (ref 40–120)
ALT FLD-CCNC: 42 U/L — SIGNIFICANT CHANGE UP (ref 12–78)
ANION GAP SERPL CALC-SCNC: 7 MMOL/L — SIGNIFICANT CHANGE UP (ref 5–17)
APTT BLD: 32.8 SEC — SIGNIFICANT CHANGE UP (ref 27.5–35.5)
AST SERPL-CCNC: 43 U/L — HIGH (ref 15–37)
BASOPHILS # BLD AUTO: 0.03 K/UL — SIGNIFICANT CHANGE UP (ref 0–0.2)
BASOPHILS NFR BLD AUTO: 0.4 % — SIGNIFICANT CHANGE UP (ref 0–2)
BILIRUB SERPL-MCNC: 0.8 MG/DL — SIGNIFICANT CHANGE UP (ref 0.2–1.2)
BUN SERPL-MCNC: 19 MG/DL — SIGNIFICANT CHANGE UP (ref 7–23)
CALCIUM SERPL-MCNC: 9.4 MG/DL — SIGNIFICANT CHANGE UP (ref 8.5–10.1)
CHLORIDE SERPL-SCNC: 112 MMOL/L — HIGH (ref 96–108)
CO2 SERPL-SCNC: 23 MMOL/L — SIGNIFICANT CHANGE UP (ref 22–31)
CREAT SERPL-MCNC: 1.2 MG/DL — SIGNIFICANT CHANGE UP (ref 0.5–1.3)
EGFR: 50 ML/MIN/1.73M2 — LOW
EOSINOPHIL # BLD AUTO: 0.07 K/UL — SIGNIFICANT CHANGE UP (ref 0–0.5)
EOSINOPHIL NFR BLD AUTO: 1 % — SIGNIFICANT CHANGE UP (ref 0–6)
GLUCOSE SERPL-MCNC: 94 MG/DL — SIGNIFICANT CHANGE UP (ref 70–99)
HCT VFR BLD CALC: 42.1 % — SIGNIFICANT CHANGE UP (ref 34.5–45)
HGB BLD-MCNC: 13.3 G/DL — SIGNIFICANT CHANGE UP (ref 11.5–15.5)
IMM GRANULOCYTES NFR BLD AUTO: 0.3 % — SIGNIFICANT CHANGE UP (ref 0–1.5)
INR BLD: 1.01 RATIO — SIGNIFICANT CHANGE UP (ref 0.88–1.16)
LYMPHOCYTES # BLD AUTO: 0.94 K/UL — LOW (ref 1–3.3)
LYMPHOCYTES # BLD AUTO: 14 % — SIGNIFICANT CHANGE UP (ref 13–44)
MCHC RBC-ENTMCNC: 29.5 PG — SIGNIFICANT CHANGE UP (ref 27–34)
MCHC RBC-ENTMCNC: 31.6 GM/DL — LOW (ref 32–36)
MCV RBC AUTO: 93.3 FL — SIGNIFICANT CHANGE UP (ref 80–100)
MONOCYTES # BLD AUTO: 0.62 K/UL — SIGNIFICANT CHANGE UP (ref 0–0.9)
MONOCYTES NFR BLD AUTO: 9.2 % — SIGNIFICANT CHANGE UP (ref 2–14)
NEUTROPHILS # BLD AUTO: 5.04 K/UL — SIGNIFICANT CHANGE UP (ref 1.8–7.4)
NEUTROPHILS NFR BLD AUTO: 75.1 % — SIGNIFICANT CHANGE UP (ref 43–77)
NRBC # BLD: 0 /100 WBCS — SIGNIFICANT CHANGE UP (ref 0–0)
PLATELET # BLD AUTO: 203 K/UL — SIGNIFICANT CHANGE UP (ref 150–400)
POTASSIUM SERPL-MCNC: 4 MMOL/L — SIGNIFICANT CHANGE UP (ref 3.5–5.3)
POTASSIUM SERPL-SCNC: 4 MMOL/L — SIGNIFICANT CHANGE UP (ref 3.5–5.3)
PROT SERPL-MCNC: 7.7 G/DL — SIGNIFICANT CHANGE UP (ref 6–8.3)
PROTHROM AB SERPL-ACNC: 11.8 SEC — SIGNIFICANT CHANGE UP (ref 10.5–13.4)
RBC # BLD: 4.51 M/UL — SIGNIFICANT CHANGE UP (ref 3.8–5.2)
RBC # FLD: 13.1 % — SIGNIFICANT CHANGE UP (ref 10.3–14.5)
SODIUM SERPL-SCNC: 142 MMOL/L — SIGNIFICANT CHANGE UP (ref 135–145)
WBC # BLD: 6.72 K/UL — SIGNIFICANT CHANGE UP (ref 3.8–10.5)
WBC # FLD AUTO: 6.72 K/UL — SIGNIFICANT CHANGE UP (ref 3.8–10.5)

## 2022-06-16 PROCEDURE — 85610 PROTHROMBIN TIME: CPT

## 2022-06-16 PROCEDURE — 80053 COMPREHEN METABOLIC PANEL: CPT

## 2022-06-16 PROCEDURE — 71250 CT THORAX DX C-: CPT | Mod: 26,MA

## 2022-06-16 PROCEDURE — 85730 THROMBOPLASTIN TIME PARTIAL: CPT

## 2022-06-16 PROCEDURE — 85025 COMPLETE CBC W/AUTO DIFF WBC: CPT

## 2022-06-16 PROCEDURE — 96374 THER/PROPH/DIAG INJ IV PUSH: CPT

## 2022-06-16 PROCEDURE — 96375 TX/PRO/DX INJ NEW DRUG ADDON: CPT

## 2022-06-16 PROCEDURE — 99285 EMERGENCY DEPT VISIT HI MDM: CPT

## 2022-06-16 PROCEDURE — 71250 CT THORAX DX C-: CPT | Mod: MA

## 2022-06-16 PROCEDURE — 36415 COLL VENOUS BLD VENIPUNCTURE: CPT

## 2022-06-16 PROCEDURE — 72128 CT CHEST SPINE W/O DYE: CPT | Mod: 26,MA

## 2022-06-16 PROCEDURE — 72128 CT CHEST SPINE W/O DYE: CPT | Mod: MA

## 2022-06-16 PROCEDURE — 99284 EMERGENCY DEPT VISIT MOD MDM: CPT | Mod: 25

## 2022-06-16 RX ORDER — MORPHINE SULFATE 50 MG/1
4 CAPSULE, EXTENDED RELEASE ORAL ONCE
Refills: 0 | Status: DISCONTINUED | OUTPATIENT
Start: 2022-06-16 | End: 2022-06-16

## 2022-06-16 RX ORDER — DIAZEPAM 5 MG
5 TABLET ORAL ONCE
Refills: 0 | Status: DISCONTINUED | OUTPATIENT
Start: 2022-06-16 | End: 2022-06-16

## 2022-06-16 RX ORDER — OXYCODONE HYDROCHLORIDE 5 MG/1
1 TABLET ORAL
Qty: 12 | Refills: 0
Start: 2022-06-16 | End: 2022-06-18

## 2022-06-16 RX ADMIN — MORPHINE SULFATE 4 MILLIGRAM(S): 50 CAPSULE, EXTENDED RELEASE ORAL at 04:08

## 2022-06-16 RX ADMIN — Medication 5 MILLIGRAM(S): at 04:58

## 2022-06-16 RX ADMIN — MORPHINE SULFATE 4 MILLIGRAM(S): 50 CAPSULE, EXTENDED RELEASE ORAL at 03:49

## 2022-06-16 NOTE — ED ADULT NURSE NOTE - BOWEL SOUNDS LLQ
Nutrition Care Plan  Consult to PA from RD    Nutrition Diagnosis:   Limited food acceptance related to multiple food allergy as evidenced by pt reports a rash in the throat and agitation when legumes-peanuts, peas, and soy are consumed.  Unintentional weight loss  related to predicted excessive energy intake as evidenced by wt for age->99th percentile and BMI for age- 99th percentile.    Intervention:  General/healthful diet:   Continue regular diet. Pt to avoid legumes-peanuts, peas, and soy d/t allergy. Pt reports appetite is good. Encourage pt to get adequate food and fluid intake.   Purpose of the nutrition education:   RD met with pt for healthy eating. Based on food recall, pt consumes cere or pancakes with eggs for breakfast, milk and orange juice, a salami or chicken sandwich with sides for lunch, leftovers or order out for dinner. Pt reports he consumes a lot of fruit, but minimal intakes of vegetables d/t flavor. Pt also reports consuming some dairy, mostly in the form of cheese, and some whole grains. Encouraged pt to get a variety of healthy foods from each food group, especially vegetables, while limiting intakes of foods high in fat, sugar, and sodium. Discussed physical activity to which the pt reports he works out daily. Encouraged pt to continue as able.   Specific foods/beverages or groups:   HS snack ordered to meet special diet needs.    Monitoring and Evaluation:  Type of food/meals:   Goal- pt to consume >75% of meals and snacks in a healthy manner without any adverse reaction r/t food allergies. Will monitor intakes.  Growth pattern indices:   Goal- pt's wt and BMI for age to be <95th percentile.  Area(s) and level of knowledge:   Pt verbalizes basic knowledge before and after education, but will need reinforcements in making healthy decisions.    present

## 2022-06-16 NOTE — ED ADULT NURSE NOTE - DOES PATIENT HAVE ADVANCE DIRECTIVE
I have reviewed the provider's instructions with the patient, answering all questions to her satisfaction. No

## 2022-06-16 NOTE — ED PROVIDER NOTE - CLINICAL SUMMARY MEDICAL DECISION MAKING FREE TEXT BOX
s/p nerve ablation in thoracici region with worsening back pain, labs CT chest/thoracic r/o ptx r/o hematoma

## 2022-06-16 NOTE — ED PROVIDER NOTE - PROGRESS NOTE DETAILS
discussed case with Radiologist Dr. Gibbons, recommend starting with  noncontrast CT labs and imaging results explained to patient, will f/u with her pain management doctor today, requesting some pain medication for discharge.  motor/neuro intact

## 2022-06-16 NOTE — ED PROVIDER NOTE - NS ED ROS FT
Constitutional: - Fever, - Chills, - Anorexia, - Fatigue, - Night sweats  Eyes: - Discharge, - Irritation, - Redness, - Visual changes, - Light sensitivity, - Pain  EARS: - Ear Pain, - Tinnitus, - Decreased hearing  NOSE: - Congestion, - Epistaxis  MOUTH/THROAT: - Vocal Changes, - Drooling, - Sore throat  NECK: - Lumps, - Stiffness, - Pain  CV: - Palpitations, - Chest Pain, - Edema, - Syncope  RESP:  - Cough, - Shortness of Breath, - Dyspnea on Exertion, - Trouble speaking, - Pleuritic pain - Wheezing  GI: - Diarrhea, - Constipation, - Bloody stools, - Nausea, - Vomiting, - Abdominal Pain  : - Dysuria, -Frequency, - Hematuria, - Hesitancy, - Incontinence, - Saddle Anesthesia, - Abnormal discharge  MSK: - Myalgias, - Arthralgias, - Weakness, - Deformities, +back pain  SKIN: - Color change, - Rash, - Swelling, - Ecchymosis, - Abrasion, - laceration  NEURO: - Change in behavior, - Dec. Alertness, - Headache, - Dizziness, - Change in speech, - Weakness, - Seizure-like activity, - Difficulty ambulating

## 2022-06-16 NOTE — ED ADULT NURSE NOTE - OBJECTIVE STATEMENT
Pt came to ED c.o non-radiating back spasms in thoracic area s/p nerve burning procedure yesterday at 11am. Denies any numbness or tingling in bilateral lower extremities. Pt able to ambulate without issues. Denies bladder or bowel dysfunction.

## 2022-06-16 NOTE — ED PROVIDER NOTE - PATIENT PORTAL LINK FT
You can access the FollowMyHealth Patient Portal offered by [facility] by registering at the following website: [email or web address]. By joining [employer]’s FollowMyHealth portal, you will also be able to view your health information using other applications (apps) compatible with our system.

## 2022-06-16 NOTE — ED PROVIDER NOTE - PHYSICAL EXAMINATION
Gen: Alert, NAD  Head/eyes: NC/AT, PERRL  ENT: airway patent  Neck: supple, no tenderness  Pulm/lung: Bilateral clear BS, normal resp effort, no wheeze/stridor/retractions  CV/heart: RRR, no M/R/G, +2 dist pulses (radial, pedal DP/PT, popliteal)  GI/Abd: soft, NT/ND, +BS, no guarding/rebound tenderness  Musculoskeletal: no edema/erythema/cyanosis, FROM in all extremities, no C/T/L spine ttp  Skin: noted to have several injection sites on right upper thoracic region with surrounding mild erythema  Neuro: AAOx3, CN 2-12 intact, normal sensation, 5/5 motor strength in all extremities, normal gait, no dysmetria

## 2022-06-16 NOTE — ED PROVIDER NOTE - NSFOLLOWUPINSTRUCTIONS_ED_ALL_ED_FT
1) Follow-up with your Pain management doctor. Call today / next business day for prompt follow-up.  2) Return to Emergency room for any worsening or persistent pain, weakness, fever, or any other concerning symptoms.  3) See attached instruction sheets for additional information, including information regarding signs and symptoms to look out for, reasons to seek immediate care and other important instructions.  4) Follow-up with any specialists as discussed / noted as well.

## 2022-06-16 NOTE — ED PROVIDER NOTE - OBJECTIVE STATEMENT
65 yo female hx of DVT on eliquis, stopped 5 days ago for a nerve ablation procedure that was performed yesterday 11am, ever since the procedure, patients upper back pain has been getting worse and radiating to right arm.  Here with evaluation.

## 2022-06-30 ENCOUNTER — APPOINTMENT (OUTPATIENT)
Dept: NEUROLOGY | Facility: CLINIC | Age: 67
End: 2022-06-30

## 2022-06-30 PROCEDURE — 99212 OFFICE O/P EST SF 10 MIN: CPT | Mod: 95

## 2022-06-30 NOTE — HISTORY OF PRESENT ILLNESS
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  She continues to have back pain and follows with pain management.  She had thoracic kyphoplasty several years ago.  Headaches are relieved with butalbital.\par \par She has a history of left renal artery stenosis and was told that it has progressed and she has lost her left kidney function.  Her high blood pressure is being treated by nephrologist with multiple antihypertensives.\par \par

## 2022-06-30 NOTE — REASON FOR VISIT
[Home] : at home, [unfilled] , at the time of the visit. [Medical Office: ([facility])___] : at the medical office located in  [Patient] : the patient [Follow-Up: _____] : a [unfilled] follow-up visit

## 2022-07-04 ENCOUNTER — EMERGENCY (EMERGENCY)
Facility: HOSPITAL | Age: 67
LOS: 1 days | Discharge: HOME | End: 2022-07-04
Attending: EMERGENCY MEDICINE | Admitting: EMERGENCY MEDICINE
Payer: MEDICARE

## 2022-07-04 VITALS
HEIGHT: 62 IN | DIASTOLIC BLOOD PRESSURE: 77 MMHG | OXYGEN SATURATION: 97 % | HEART RATE: 64 BPM | RESPIRATION RATE: 18 BRPM | SYSTOLIC BLOOD PRESSURE: 138 MMHG | TEMPERATURE: 98 F

## 2022-07-04 DIAGNOSIS — Z98.890 OTHER SPECIFIED POSTPROCEDURAL STATES: Chronic | ICD-10-CM

## 2022-07-04 DIAGNOSIS — Z95.820 PERIPHERAL VASCULAR ANGIOPLASTY STATUS WITH IMPLANTS AND GRAFTS: Chronic | ICD-10-CM

## 2022-07-04 LAB
APPEARANCE UR: CLEAR — SIGNIFICANT CHANGE UP
BASOPHILS # BLD AUTO: 0.04 K/UL — SIGNIFICANT CHANGE UP (ref 0–0.2)
BASOPHILS NFR BLD AUTO: 0.6 % — SIGNIFICANT CHANGE UP (ref 0–2)
BILIRUB UR-MCNC: NEGATIVE — SIGNIFICANT CHANGE UP
COLOR SPEC: YELLOW — SIGNIFICANT CHANGE UP
DIFF PNL FLD: NEGATIVE — SIGNIFICANT CHANGE UP
EOSINOPHIL # BLD AUTO: 0.47 K/UL — SIGNIFICANT CHANGE UP (ref 0–0.5)
EOSINOPHIL NFR BLD AUTO: 7.3 % — HIGH (ref 0–6)
GLUCOSE UR QL: NEGATIVE — SIGNIFICANT CHANGE UP
HCT VFR BLD CALC: 39.4 % — SIGNIFICANT CHANGE UP (ref 34.5–45)
HGB BLD-MCNC: 12.1 G/DL — SIGNIFICANT CHANGE UP (ref 11.5–15.5)
IMM GRANULOCYTES NFR BLD AUTO: 0.3 % — SIGNIFICANT CHANGE UP (ref 0–1.5)
KETONES UR-MCNC: NEGATIVE — SIGNIFICANT CHANGE UP
LEUKOCYTE ESTERASE UR-ACNC: NEGATIVE — SIGNIFICANT CHANGE UP
LYMPHOCYTES # BLD AUTO: 1.26 K/UL — SIGNIFICANT CHANGE UP (ref 1–3.3)
LYMPHOCYTES # BLD AUTO: 19.7 % — SIGNIFICANT CHANGE UP (ref 13–44)
MCHC RBC-ENTMCNC: 29.3 PG — SIGNIFICANT CHANGE UP (ref 27–34)
MCHC RBC-ENTMCNC: 30.7 GM/DL — LOW (ref 32–36)
MCV RBC AUTO: 95.4 FL — SIGNIFICANT CHANGE UP (ref 80–100)
MONOCYTES # BLD AUTO: 0.7 K/UL — SIGNIFICANT CHANGE UP (ref 0–0.9)
MONOCYTES NFR BLD AUTO: 10.9 % — SIGNIFICANT CHANGE UP (ref 2–14)
NEUTROPHILS # BLD AUTO: 3.91 K/UL — SIGNIFICANT CHANGE UP (ref 1.8–7.4)
NEUTROPHILS NFR BLD AUTO: 61.2 % — SIGNIFICANT CHANGE UP (ref 43–77)
NITRITE UR-MCNC: NEGATIVE — SIGNIFICANT CHANGE UP
NRBC # BLD: 0 /100 WBCS — SIGNIFICANT CHANGE UP (ref 0–0)
PH UR: 6 — SIGNIFICANT CHANGE UP (ref 5–8)
PLATELET # BLD AUTO: 185 K/UL — SIGNIFICANT CHANGE UP (ref 150–400)
PROT UR-MCNC: 15
RBC # BLD: 4.13 M/UL — SIGNIFICANT CHANGE UP (ref 3.8–5.2)
RBC # FLD: 13.5 % — SIGNIFICANT CHANGE UP (ref 10.3–14.5)
SP GR SPEC: 1.01 — SIGNIFICANT CHANGE UP (ref 1.01–1.02)
UROBILINOGEN FLD QL: NEGATIVE — SIGNIFICANT CHANGE UP
WBC # BLD: 6.4 K/UL — SIGNIFICANT CHANGE UP (ref 3.8–10.5)
WBC # FLD AUTO: 6.4 K/UL — SIGNIFICANT CHANGE UP (ref 3.8–10.5)

## 2022-07-04 PROCEDURE — 81001 URINALYSIS AUTO W/SCOPE: CPT

## 2022-07-04 PROCEDURE — 83690 ASSAY OF LIPASE: CPT

## 2022-07-04 PROCEDURE — 36415 COLL VENOUS BLD VENIPUNCTURE: CPT

## 2022-07-04 PROCEDURE — 85025 COMPLETE CBC W/AUTO DIFF WBC: CPT

## 2022-07-04 PROCEDURE — 74176 CT ABD & PELVIS W/O CONTRAST: CPT | Mod: MA

## 2022-07-04 PROCEDURE — 99285 EMERGENCY DEPT VISIT HI MDM: CPT | Mod: FS

## 2022-07-04 PROCEDURE — 96374 THER/PROPH/DIAG INJ IV PUSH: CPT

## 2022-07-04 PROCEDURE — 87086 URINE CULTURE/COLONY COUNT: CPT

## 2022-07-04 PROCEDURE — 76705 ECHO EXAM OF ABDOMEN: CPT

## 2022-07-04 PROCEDURE — 99284 EMERGENCY DEPT VISIT MOD MDM: CPT | Mod: 25

## 2022-07-04 PROCEDURE — 80053 COMPREHEN METABOLIC PANEL: CPT

## 2022-07-04 RX ORDER — AMLODIPINE BESYLATE 2.5 MG/1
1 TABLET ORAL
Qty: 0 | Refills: 0 | DISCHARGE

## 2022-07-04 RX ORDER — SODIUM CHLORIDE 9 MG/ML
1000 INJECTION INTRAMUSCULAR; INTRAVENOUS; SUBCUTANEOUS ONCE
Refills: 0 | Status: COMPLETED | OUTPATIENT
Start: 2022-07-04 | End: 2022-07-04

## 2022-07-04 RX ORDER — APIXABAN 2.5 MG/1
1 TABLET, FILM COATED ORAL
Qty: 0 | Refills: 0 | DISCHARGE

## 2022-07-04 RX ORDER — MORPHINE SULFATE 50 MG/1
4 CAPSULE, EXTENDED RELEASE ORAL ONCE
Refills: 0 | Status: DISCONTINUED | OUTPATIENT
Start: 2022-07-04 | End: 2022-07-04

## 2022-07-04 RX ADMIN — SODIUM CHLORIDE 1000 MILLILITER(S): 9 INJECTION INTRAMUSCULAR; INTRAVENOUS; SUBCUTANEOUS at 23:30

## 2022-07-04 RX ADMIN — MORPHINE SULFATE 4 MILLIGRAM(S): 50 CAPSULE, EXTENDED RELEASE ORAL at 23:43

## 2022-07-04 NOTE — ED PROVIDER NOTE - PHYSICAL EXAMINATION
Constitutional: Awake, Alert, non-toxic.  HEAD: Normocephalic, atraumatic.   EYES: PERRL, EOM intact, conjunctiva and sclera are clear bilaterally. No raccoon eyes.   ENT: No rhinorrhea, normal pharynx, patent, no tonsillar exudate or enlargement, mucous membranes pink/moist, no erythema, no drooling or stridor.   NECK: Supple, non-tender  BACK: No midline or paraspinal TTP of cervical/thoracic/lumbar spine, FROM. No ecchymosis or hematomas. no rash   CARDIOVASCULAR: Normal S1, S2; regular rate and rhythm.  RESPIRATORY: Normal respiratory effort; breath sounds CTAB, no wheezes, rhonchi, or rales. Speaking in full sentences. No accessory muscle use.   ABDOMEN: Soft; (+) minimal RUQ TTP, non-distended. no CVAT   EXTREMITIES: Full passive and active ROM in all extremities; non-tender to palpation; distal pulses palpable and symmetric, no edema, no crepitus or step off  SKIN: Warm, dry; good skin turgor, no apparent lesions or rashes, no ecchymosis, brisk capillary refill.  NEURO: A&O x3. Sensory and motor functions are grossly intact. Speech is normal. Appearance and judgement seem appropriate for gender and age. No neurological deficits. Neurovascular sensation intact motor function 5/5 of upper and lower extremities

## 2022-07-04 NOTE — ED PROVIDER NOTE - OBJECTIVE STATEMENT
68 y/o female with PMHx Atrophic Kidney, DJD, and HTN presents today due to right sided back pain. pt reports she has chronic back pain with acute onset of pain last night. reports she had ablation done a few weeks ago in which came to ED and was evaluated. Reports that she wants to make sure her kidney is okay. pt requesting morphine for pain. reports taking Robaxin and Hydrocodone for pain chronically. pt denies urine/bowel incontinence, numbness/weakness, trauma, vomiting, nausea, Cp, sob or any other complaints

## 2022-07-04 NOTE — ED ADULT TRIAGE NOTE - CHIEF COMPLAINT QUOTE
66 y/o female received ambulatory to triage. Alert and oriented x4. C/o 7/10 back pain on right side with difficulty urination since today. Pt states "I only have one kidney on the right side so I'm worried something is wrong." Denies any cp, sob, n/v/d, dizziness, headache, fever/chills at this time. Respirations even and unlabored.

## 2022-07-04 NOTE — ED PROVIDER NOTE - CARE PROVIDER_API CALL
Trenton Barnard)  Urology  [address], [address]  [place] [address]  Phone: [phone]  Fax: [phone]  Follow Up Time: 4-6 Days

## 2022-07-04 NOTE — ED PROVIDER NOTE - PATIENT PORTAL LINK FT
You can access the FollowMyHealth Patient Portal offered by [facility] by registering at the following website: [email or web address]. By joining [employer]’s FollowMyHealth portal, you will also be able to view your health information using other applications (apps) compatible with our system.

## 2022-07-04 NOTE — ED PROVIDER NOTE - CLINICAL SUMMARY MEDICAL DECISION MAKING FREE TEXT BOX
pt with right flank pain, no f/c. pt with recent imaging with non obstructing right renal stone. pt now with ct with mild hydro, no stone and c/w recently passed stone. no f/c. ua neg, d/c to fu pmd, urology, return for worsening symptoms.  exan no cvat, pt non toxic, us gb negative

## 2022-07-04 NOTE — ED ADULT NURSE NOTE - CHIEF COMPLAINT QUOTE
68 y/o female received ambulatory to triage. Alert and oriented x4. C/o 7/10 back pain on right side with difficulty urination since today. Pt states "I only have one kidney on the right side so I'm worried something is wrong." Denies any cp, sob, n/v/d, dizziness, headache, fever/chills at this time. Respirations even and unlabored.

## 2022-07-04 NOTE — ED PROVIDER NOTE - PROGRESS NOTE DETAILS
Recommended observation. pt still with mild right flank pain, ct c/w recent passed stone, d/c on normal pain meds fu urology, pmd

## 2022-07-04 NOTE — ED PROVIDER NOTE - NS ED ATTENDING STATEMENT MOD
This was a shared visit with the RON. I reviewed and verified the documentation and independently performed the documented:

## 2022-07-04 NOTE — ED PROVIDER NOTE - NSFOLLOWUPINSTRUCTIONS_ED_ALL_ED_FT
return for fever, severe uncontrolled pain, chills, bloody urine. call dr monroe of urology for follow up this week.   call dr luna for follow up in 1-2 days. call your pain doctor for adjustment in pain medication dose if pain persists

## 2022-07-05 VITALS
HEART RATE: 68 BPM | DIASTOLIC BLOOD PRESSURE: 75 MMHG | SYSTOLIC BLOOD PRESSURE: 129 MMHG | TEMPERATURE: 98 F | RESPIRATION RATE: 18 BRPM | OXYGEN SATURATION: 98 %

## 2022-07-05 LAB
ALBUMIN SERPL ELPH-MCNC: 3.4 G/DL — SIGNIFICANT CHANGE UP (ref 3.3–5)
ALP SERPL-CCNC: 104 U/L — SIGNIFICANT CHANGE UP (ref 40–120)
ALT FLD-CCNC: 79 U/L — HIGH (ref 12–78)
ANION GAP SERPL CALC-SCNC: 7 MMOL/L — SIGNIFICANT CHANGE UP (ref 5–17)
AST SERPL-CCNC: 61 U/L — HIGH (ref 15–37)
BILIRUB SERPL-MCNC: 0.5 MG/DL — SIGNIFICANT CHANGE UP (ref 0.2–1.2)
BUN SERPL-MCNC: 25 MG/DL — HIGH (ref 7–23)
CALCIUM SERPL-MCNC: 9.2 MG/DL — SIGNIFICANT CHANGE UP (ref 8.5–10.1)
CHLORIDE SERPL-SCNC: 117 MMOL/L — HIGH (ref 96–108)
CO2 SERPL-SCNC: 19 MMOL/L — LOW (ref 22–31)
CREAT SERPL-MCNC: 1.2 MG/DL — SIGNIFICANT CHANGE UP (ref 0.5–1.3)
CULTURE RESULTS: SIGNIFICANT CHANGE UP
EGFR: 50 ML/MIN/1.73M2 — LOW
GLUCOSE SERPL-MCNC: 98 MG/DL — SIGNIFICANT CHANGE UP (ref 70–99)
LIDOCAIN IGE QN: 100 U/L — SIGNIFICANT CHANGE UP (ref 73–393)
POTASSIUM SERPL-MCNC: 3.9 MMOL/L — SIGNIFICANT CHANGE UP (ref 3.5–5.3)
POTASSIUM SERPL-SCNC: 3.9 MMOL/L — SIGNIFICANT CHANGE UP (ref 3.5–5.3)
PROT SERPL-MCNC: 6.8 G/DL — SIGNIFICANT CHANGE UP (ref 6–8.3)
SODIUM SERPL-SCNC: 143 MMOL/L — SIGNIFICANT CHANGE UP (ref 135–145)
SPECIMEN SOURCE: SIGNIFICANT CHANGE UP

## 2022-07-05 PROCEDURE — 74176 CT ABD & PELVIS W/O CONTRAST: CPT | Mod: 26,MA

## 2022-07-05 PROCEDURE — 76705 ECHO EXAM OF ABDOMEN: CPT | Mod: 26

## 2022-07-09 NOTE — ED ADULT NURSE NOTE - OBJECTIVE STATEMENT
Pt presents to the ED C/O back pain. Pt states she has a compression fracture and osteoporosis adding to her pain. pain meds given as ordered by MD. will continue to monitor
COUGH/SHORTNESS OF BREATH

## 2022-08-06 ENCOUNTER — EMERGENCY (EMERGENCY)
Facility: HOSPITAL | Age: 67
LOS: 1 days | Discharge: HOME | End: 2022-08-06
Attending: EMERGENCY MEDICINE | Admitting: EMERGENCY MEDICINE
Payer: MEDICARE

## 2022-08-06 VITALS
RESPIRATION RATE: 16 BRPM | SYSTOLIC BLOOD PRESSURE: 122 MMHG | WEIGHT: 115.08 LBS | DIASTOLIC BLOOD PRESSURE: 61 MMHG | HEART RATE: 66 BPM | OXYGEN SATURATION: 98 % | TEMPERATURE: 97 F | HEIGHT: 62 IN

## 2022-08-06 VITALS
TEMPERATURE: 99 F | RESPIRATION RATE: 16 BRPM | SYSTOLIC BLOOD PRESSURE: 143 MMHG | HEART RATE: 68 BPM | OXYGEN SATURATION: 98 % | DIASTOLIC BLOOD PRESSURE: 60 MMHG

## 2022-08-06 DIAGNOSIS — Z98.890 OTHER SPECIFIED POSTPROCEDURAL STATES: Chronic | ICD-10-CM

## 2022-08-06 DIAGNOSIS — Z95.820 PERIPHERAL VASCULAR ANGIOPLASTY STATUS WITH IMPLANTS AND GRAFTS: Chronic | ICD-10-CM

## 2022-08-06 LAB
ALBUMIN SERPL ELPH-MCNC: 3.5 G/DL — SIGNIFICANT CHANGE UP (ref 3.3–5)
ALP SERPL-CCNC: 70 U/L — SIGNIFICANT CHANGE UP (ref 40–120)
ALT FLD-CCNC: 23 U/L — SIGNIFICANT CHANGE UP (ref 12–78)
ANION GAP SERPL CALC-SCNC: 8 MMOL/L — SIGNIFICANT CHANGE UP (ref 5–17)
AST SERPL-CCNC: 15 U/L — SIGNIFICANT CHANGE UP (ref 15–37)
BASOPHILS # BLD AUTO: 0.03 K/UL — SIGNIFICANT CHANGE UP (ref 0–0.2)
BASOPHILS NFR BLD AUTO: 0.6 % — SIGNIFICANT CHANGE UP (ref 0–2)
BILIRUB SERPL-MCNC: 0.3 MG/DL — SIGNIFICANT CHANGE UP (ref 0.2–1.2)
BUN SERPL-MCNC: 21 MG/DL — SIGNIFICANT CHANGE UP (ref 7–23)
CALCIUM SERPL-MCNC: 9.3 MG/DL — SIGNIFICANT CHANGE UP (ref 8.5–10.1)
CHLORIDE SERPL-SCNC: 110 MMOL/L — HIGH (ref 96–108)
CK MB CFR SERPL CALC: 1.5 NG/ML — SIGNIFICANT CHANGE UP (ref 0–3.6)
CO2 SERPL-SCNC: 26 MMOL/L — SIGNIFICANT CHANGE UP (ref 22–31)
CREAT SERPL-MCNC: 1.3 MG/DL — SIGNIFICANT CHANGE UP (ref 0.5–1.3)
EGFR: 45 ML/MIN/1.73M2 — LOW
EOSINOPHIL # BLD AUTO: 0.19 K/UL — SIGNIFICANT CHANGE UP (ref 0–0.5)
EOSINOPHIL NFR BLD AUTO: 3.6 % — SIGNIFICANT CHANGE UP (ref 0–6)
GLUCOSE SERPL-MCNC: 100 MG/DL — HIGH (ref 70–99)
HCT VFR BLD CALC: 37.2 % — SIGNIFICANT CHANGE UP (ref 34.5–45)
HGB BLD-MCNC: 12 G/DL — SIGNIFICANT CHANGE UP (ref 11.5–15.5)
IMM GRANULOCYTES NFR BLD AUTO: 0.4 % — SIGNIFICANT CHANGE UP (ref 0–1.5)
LYMPHOCYTES # BLD AUTO: 1 K/UL — SIGNIFICANT CHANGE UP (ref 1–3.3)
LYMPHOCYTES # BLD AUTO: 19.2 % — SIGNIFICANT CHANGE UP (ref 13–44)
MCHC RBC-ENTMCNC: 30.4 PG — SIGNIFICANT CHANGE UP (ref 27–34)
MCHC RBC-ENTMCNC: 32.3 GM/DL — SIGNIFICANT CHANGE UP (ref 32–36)
MCV RBC AUTO: 94.2 FL — SIGNIFICANT CHANGE UP (ref 80–100)
MONOCYTES # BLD AUTO: 0.6 K/UL — SIGNIFICANT CHANGE UP (ref 0–0.9)
MONOCYTES NFR BLD AUTO: 11.5 % — SIGNIFICANT CHANGE UP (ref 2–14)
NEUTROPHILS # BLD AUTO: 3.38 K/UL — SIGNIFICANT CHANGE UP (ref 1.8–7.4)
NEUTROPHILS NFR BLD AUTO: 64.7 % — SIGNIFICANT CHANGE UP (ref 43–77)
NRBC # BLD: 0 /100 WBCS — SIGNIFICANT CHANGE UP (ref 0–0)
PLATELET # BLD AUTO: 205 K/UL — SIGNIFICANT CHANGE UP (ref 150–400)
POTASSIUM SERPL-MCNC: 4 MMOL/L — SIGNIFICANT CHANGE UP (ref 3.5–5.3)
POTASSIUM SERPL-SCNC: 4 MMOL/L — SIGNIFICANT CHANGE UP (ref 3.5–5.3)
PROT SERPL-MCNC: 6.3 G/DL — SIGNIFICANT CHANGE UP (ref 6–8.3)
RBC # BLD: 3.95 M/UL — SIGNIFICANT CHANGE UP (ref 3.8–5.2)
RBC # FLD: 13.2 % — SIGNIFICANT CHANGE UP (ref 10.3–14.5)
SODIUM SERPL-SCNC: 144 MMOL/L — SIGNIFICANT CHANGE UP (ref 135–145)
TROPONIN I, HIGH SENSITIVITY RESULT: 7.2 NG/L — SIGNIFICANT CHANGE UP
WBC # BLD: 5.22 K/UL — SIGNIFICANT CHANGE UP (ref 3.8–10.5)
WBC # FLD AUTO: 5.22 K/UL — SIGNIFICANT CHANGE UP (ref 3.8–10.5)

## 2022-08-06 PROCEDURE — 84484 ASSAY OF TROPONIN QUANT: CPT

## 2022-08-06 PROCEDURE — 85025 COMPLETE CBC W/AUTO DIFF WBC: CPT

## 2022-08-06 PROCEDURE — 99284 EMERGENCY DEPT VISIT MOD MDM: CPT | Mod: 25

## 2022-08-06 PROCEDURE — 99285 EMERGENCY DEPT VISIT HI MDM: CPT | Mod: FS

## 2022-08-06 PROCEDURE — 36415 COLL VENOUS BLD VENIPUNCTURE: CPT

## 2022-08-06 PROCEDURE — 82553 CREATINE MB FRACTION: CPT

## 2022-08-06 PROCEDURE — 80053 COMPREHEN METABOLIC PANEL: CPT

## 2022-08-06 PROCEDURE — 96374 THER/PROPH/DIAG INJ IV PUSH: CPT

## 2022-08-06 PROCEDURE — 93005 ELECTROCARDIOGRAM TRACING: CPT

## 2022-08-06 PROCEDURE — 93010 ELECTROCARDIOGRAM REPORT: CPT

## 2022-08-06 RX ORDER — ONDANSETRON 8 MG/1
4 TABLET, FILM COATED ORAL ONCE
Refills: 0 | Status: COMPLETED | OUTPATIENT
Start: 2022-08-06 | End: 2022-08-06

## 2022-08-06 RX ORDER — SODIUM CHLORIDE 9 MG/ML
1000 INJECTION INTRAMUSCULAR; INTRAVENOUS; SUBCUTANEOUS ONCE
Refills: 0 | Status: COMPLETED | OUTPATIENT
Start: 2022-08-06 | End: 2022-08-06

## 2022-08-06 RX ADMIN — SODIUM CHLORIDE 1000 MILLILITER(S): 9 INJECTION INTRAMUSCULAR; INTRAVENOUS; SUBCUTANEOUS at 16:38

## 2022-08-06 RX ADMIN — ONDANSETRON 4 MILLIGRAM(S): 8 TABLET, FILM COATED ORAL at 15:33

## 2022-08-06 RX ADMIN — SODIUM CHLORIDE 1000 MILLILITER(S): 9 INJECTION INTRAMUSCULAR; INTRAVENOUS; SUBCUTANEOUS at 15:33

## 2022-08-06 NOTE — ED PROVIDER NOTE - OBJECTIVE STATEMENT
Pt is a 68 y/o female with PMHx Atrophic Kidney, DJD, HTN Anxiety depression DJD DVT migraine neuropathy Seizures shingles here for about 3 weeks of nausea vomiting diarrhea, pain dizziness described as lightheadedness.  Patient states symptoms started after she quit hydrocodone 10–3 25 cold turkey which she was taking for over a year for her back pain.  Patient states she had some nerve block done which helped the pain so she decided to stop medication.  Patient states lightheadedness worse when she stands up and walks around.  Patient denies any chest pain shortness of breath.

## 2022-08-06 NOTE — ED ADULT NURSE NOTE - NSICDXPASTMEDICALHX_GEN_ALL_CORE_FT
PAST MEDICAL HISTORY:  Anxiety     Benign essential HTN     Constipation     Depression     DJD (degenerative joint disease)     Drug-seeking behavior     DVT, lower extremity     Grade I diastolic dysfunction     HTN (hypertension)     Migraines     Neuropathy     Osteoporosis     Pericarditis     Renal arteriosclerosis     Seizure disorder     Shingles     
Yes

## 2022-08-06 NOTE — ED PROVIDER NOTE - CLINICAL SUMMARY MEDICAL DECISION MAKING FREE TEXT BOX
66 y/o female with PMHx Atrophic Kidney, DJD, HTN Anxiety, depression, DJD, DVT, migraine, neuropathy and Seizures presently in pain management after stopping Oxycodone cold turkey after being on it for 1+ years now presenting to ED with weakness, nausea, diarrhea and postural lightheadedness all beginning after Oxycodone was discontinued. No vertigo. No headache. No chest pain. This case will require complete evaluation, labs, ecg and IVFs and meds.

## 2022-08-06 NOTE — ED PROVIDER NOTE - ATTENDING APP SHARED VISIT CONTRIBUTION OF CARE
Exam revealed pleasant white female in NAD with clear lungs, normal heart sounds, benign and non tender abdomen and non focal neuro exam. I agree with plan and management outlined by PA.

## 2022-08-06 NOTE — ED ADULT TRIAGE NOTE - CHIEF COMPLAINT QUOTE
pt ambulatory to triage a&ox4 with complaints of dizziness, weakness, and lightheadedness since stopping taking hydrocodone 10/325 4x/day for one year 3 weeks ago cold turkey. reports she was prescribed the medication by pain management for back after osteoporosis procedure. patient pale. denies any bleeding. normotensive @ this time

## 2022-08-06 NOTE — ED PROVIDER NOTE - PATIENT PORTAL LINK FT
You can access the FollowMyHealth Patient Portal offered by [facility] by registering at the following website: [email or web address]. By joining [facility] [employer]’s FollowMyHealth portal, you will also be able to view your health information using other applications (apps) compatible with our system.

## 2022-08-06 NOTE — ED ADULT NURSE NOTE - OBJECTIVE STATEMENT
Patient  is a 68 y/o female with PMHx Atrophic Kidney, DJD, HTN Anxiety depression DJD DVT migraine neuropathy Seizures shingles here for about 3 weeks of nausea vomiting diarrhea, pain dizziness described as lightheadedness.  Patient states symptoms started after she quit hydrocodone 10–3 25 cold turkey which she was taking for over a year for her back pain.  Patient states she had some nerve block done which helped the pain so she decided to stop medication.  Patient states lightheadedness worse when she stands up and walks around.  Patient denies any chest pain shortness of breath.

## 2022-08-06 NOTE — ED PROVIDER NOTE - NSFOLLOWUPINSTRUCTIONS_ED_ALL_ED_FT
Follow up with pcp  drink plenty of fluids  return to er for any worsening symptoms     Dizziness      Dizziness is a common problem. It makes you feel unsteady or light-headed. You may feel like you are about to pass out (faint). Dizziness can lead to getting hurt if you stumble or fall. Dizziness can be caused by many things, including:  •Medicines.      •Not having enough water in your body (dehydration).      •Illness.        Follow these instructions at home:      Eating and drinking   A comparison of three sample cups showing dark yellow, yellow, and pale yellow urine.   •Drink enough fluid to keep your pee (urine) pale yellow. This helps to keep you from getting dehydrated. Try to drink more clear fluids, such as water.      • Do not drink alcohol.      •Limit how much caffeine you drink or eat, if your doctor tells you to do that.      •Limit how much salt (sodium) you drink or eat, if your doctor tells you to do that.        Activity   A sign showing that a person should not drive. •Avoid making quick movements.  •Stand up slowly from sitting in a chair, and steady yourself until you feel okay.      •In the morning, first sit up on the side of the bed. When you feel okay, stand up slowly while you hold onto something. Do this until you know that your balance is okay.        •If you need to  one place for a long time, move your legs often. Tighten and relax the muscles in your legs while you are standing.      • Do not drive or use machinery if you feel dizzy.      •Avoid bending down if you feel dizzy. Place items in your home so you can reach them easily without leaning over.      Lifestyle     • Do not smoke or use any products that contain nicotine or tobacco. If you need help quitting, ask your doctor.      •Try to lower your stress level. You can do this by using methods such as yoga or meditation. Talk with your doctor if you need help.      General instructions     •Watch your dizziness for any changes.      •Take over-the-counter and prescription medicines only as told by your doctor. Talk with your doctor if you think that you are dizzy because of a medicine that you are taking.      •Tell a friend or a family member that you are feeling dizzy. If he or she notices any changes in your behavior, have this person call your doctor.      •Keep all follow-up visits.        Contact a doctor if:    •Your dizziness does not go away.      •Your dizziness or light-headedness gets worse.      •You feel like you may vomit (are nauseous).      •You have trouble hearing.      •You have new symptoms.      •You are unsteady on your feet.      •You feel like the room is spinning.      •You have neck pain or a stiff neck.      •You have a fever.        Get help right away if:    •You vomit or have watery poop (diarrhea), and you cannot eat or drink anything.    •You have trouble:  •Talking.      •Walking.      •Swallowing.      •Using your arms, hands, or legs.        •You feel generally weak.      •You are not thinking clearly, or you have trouble forming sentences. A friend or family member may notice this.    •You have:  •Chest pain.      •Pain in your belly (abdomen).      •Shortness of breath.      •Sweating.        •Your vision changes.      •You are bleeding.      •You have a very bad headache.      These symptoms may be an emergency. Get help right away. Call your local emergency services (911 in the [place]   • Do not wait to see if the symptoms will go away.        • Do not drive yourself to the hospital.         Summary    •Dizziness makes you feel unsteady or light-headed. You may feel like you are about to pass out (faint).      •Drink enough fluid to keep your pee (urine) pale yellow. Do not drink alcohol.      •Avoid making quick movements if you feel dizzy.      •Watch your dizziness for any changes.      This information is not intended to replace advice given to you by your health care provider. Make sure you discuss any questions you have with your health care provider.      Document Revised: 11/22/2021 Document Reviewed: 11/22/2021    Elsevier Patient Education © 2022 [employer] Inc[employer]

## 2022-09-02 ENCOUNTER — RX RENEWAL (OUTPATIENT)
Age: 67
End: 2022-09-02

## 2022-09-21 NOTE — ED ADULT TRIAGE NOTE - NSWEIGHTCALCTOOLDRUG_GEN_A_CORE
September 22, 2022      Taina Jones  [address] [facility] [address]        Dear ,    We are writing to inform you of your test results.    Your test result was within normal parameters.     Resulted Orders   Basic metabolic panel  (Ca, Cl, CO2, Creat, Gluc, K, Na, BUN)   Result Value Ref Range    Sodium 140 136 - 145 mmol/L    Potassium 4.1 3.4 - 5.3 mmol/L    Chloride 105 98 - 107 mmol/L    Carbon Dioxide (CO2) 26 22 - 29 mmol/L    Anion Gap 9 7 - 15 mmol/L    Urea Nitrogen 23.8 (H) 8.0 - 23.0 mg/dL    Creatinine 0.84 0.51 - 0.95 mg/dL    Calcium 9.0 8.8 - 10.2 mg/dL    Glucose 94 70 - 99 mg/dL    GFR Estimate 69 >60 mL/min/1.73m2      Comment:      Effective December 21, 2021 eGFRcr in adults is calculated using the 2021 CKD-EPI creatinine equation which includes age and gender (Jennie et al., NEJ, DOI: 10.1056/VDSKrd7374556)   TSH with free T4 reflex   Result Value Ref Range    TSH 0.90 0.30 - 4.20 uIU/mL       If you have any questions or concerns, please call the clinic at the number listed above.       Sincerely,      Glen Santamaria MD          
 used

## 2022-09-28 NOTE — ED ADULT NURSE NOTE - NSSISCREENINGQ3_ED_A_ED
normal, alert, pleasant, well nourished, in no acute distress, well developed, well nourished, ambulating without difficulty No

## 2022-10-06 NOTE — ED ADULT NURSE NOTE - NS PRO PASSIVE SMOKE EXP
Quality 111:Pneumonia Vaccination Status For Older Adults: Pneumococcal Vaccination Previously Received Quality 110: Preventive Care And Screening: Influenza Immunization: Influenza Immunization previously received during influenza season Detail Level: Detailed Quality 130: Documentation Of Current Medications In The Medical Record: Current Medications Documented No

## 2022-10-10 NOTE — ED ADULT NURSE NOTE - CHPI ED NUR QUALITY2
aching Cimetidine Pregnancy And Lactation Text: This medication is Pregnancy Category B and is considered safe during pregnancy. It is also excreted in breast milk and breast feeding isn't recommended.

## 2022-10-19 NOTE — ED ADULT TRIAGE NOTE - BP NONINVASIVE DIASTOLIC (MM HG)
77 Patient was fully dilated and pushing. Fetal head was OA and restituted to LOT. Loose nuchal cord was reduced prior delivery of the shoulders. The anterior and posterior shoulders delivered, followed by the remaining body atraumatically. Delayed cord clamping was performed, and then clamped and cut. Cord blood gases collected x2. The  was handed to the mother and RN. The placenta delivered intact with membranes. Pitocin was administered. Uterus massaged, fundus found to be firm. Cervix, vagina and perineum inspected: no lacerations; Left lateral medial episiotomy repaired using 2-0 Chromic suture in the usual fashion with good hemostasis.     Viable male infant delivered, weighing , with APGARs 7/8     cc  Dr. Galdamez present for the delivery

## 2022-10-24 NOTE — ED ADULT NURSE NOTE - CAS ELECT INFOMATION PROVIDED
Which Photosensitizer Was Used: Ameluz Detail Level: Zone Number Of Kerasticks/Tubes Billed For: 1 Debridement Text (Will Only Render In Visit Note If You Select Debridement Option Under Who Performed The Pdt Field): Prior to application of the photodynamic medication the hyperkeratotic lesions were curetted to make them more amenable to therapy. Treatment Number: 0 Show Inventory Tab: Hide Anesthesia Type: 1% lidocaine with epinephrine Show Medical Necessity In Plan?: Yes Was Levulan/Ameluz Applied On A Previous Day?: No Consent: Written consent obtained.  The risks were reviewed with the patient including but not limited to: pigmentary changes, pain, blistering, scabbing, redness, and the remote possibility of scarring. Medical Necessity: Precancerous Lesions Illumination Time: 00:16:40 Pre-Procedure Text: The treatment areas were cleaned and prepped in the usual fashion. Incubation Time: 01:00 Post-Care Instructions: I reviewed with the patient in detail post-care instructions. Patient is to avoid sunlight for the next 2 days, and wear sun protection. Patients may expect sunburn like redness, discomfort and scabbing. Who Performed The Pdt (Provider): Elisa Light Source: 415nm Who Performed The Pdt?: Performed by MD HERBER, FROY or RICHY with Pre-Procedure Debridement of Hyperkeratotic Lesions (03312) DC instructions

## 2022-12-13 NOTE — PHYSICAL THERAPY INITIAL EVALUATION ADULT - AMBULATION SKILLS, REHAB EVAL
Pt assessment completed and charted. VSS. Pt a/ox4. Pt reports pain 8/10 in back. Pt educated on prn pain meds, meds given per mar. Pt denies any other needs at this time. Pt tolerating diet. Pt calls out appropriately. Pt is a fall risk;  -Bed in lowest position and wheels locked. -Call light within reach.   -Bedside table within reach.   -Non-skid footwear in place.  -bed check in place. independent

## 2022-12-29 NOTE — ED ADULT NURSE NOTE - CHPI ED NUR SEVERITY2
Regarding: [place]-62 yr M bp keep fluctuating right now 197/111,he did just tell me he knows when his bp  ----- Message from Joleen Chaparro sent at 12/29/2022  3:48 PM CST -----  Patient Name: Janay Navas    Specialist or PCP Name: Dr. Lizeth Stone    Symptoms: [place]-62 yr M bp keep fluctuating right now 197/111,he did just tell me he knows when his bp is up he doesnt want to eat,he is on HP medication     Pregnant (females aged 13-60. If Yes, how long?) : no    Call Back # : [phone]    Which State are you currently located in?: [phone]    Name of Clinic Site / Acct# : [facility]    Use following scripting for patients waiting for a callback:   \"Nurse callback times vary based on call volumes; please be aware the return phone call may come from an unidentified phone number. If your symptoms worsen or become life threatening while waiting, you should seek immediate assistance by calling 911 or going to the ER for evaluation.\"     PAIN SCALE 10 OF 10.

## 2023-01-04 RX ORDER — BUTALBITAL, ACETAMINOPHEN AND CAFFEINE 325; 50; 40 MG/1; MG/1; MG/1
50-325-40 TABLET ORAL
Qty: 100 | Refills: 0 | Status: ACTIVE | COMMUNITY
Start: 2020-04-30 | End: 1900-01-01

## 2023-01-14 ENCOUNTER — RX RENEWAL (OUTPATIENT)
Age: 68
End: 2023-01-14

## 2023-01-18 NOTE — H&P ADULT - NSHPLABSRESULTS_GEN_ALL_CORE
ct abdomen /pelvis   HISTORY:  vomiting, not eating, failure to thrive, syncope, status post   fall  Comparison: CT abdomen and pelvis 8/7/2018    Findings:  LIVER: Normal.  SPLEEN: Normal.  PANCREAS: Normal.  GALLBLADDER/BILIARY TREE: Nondilated. Layering gallbladder sludge.  ADRENALS: Stable low-attenuation left adrenal nodule, likely an adenoma.  KIDNEYS: No hydronephrosis or urinary tract calculi. Asymmetric left   renal atrophy.  LYMPHADENOPATHY/RETROPERITONEUM: No adenopathy.  VASCULATURE: Aortoiliac atherosclerosis without aneurysm.  BOWEL: No bowel-related abnormality. Specifically, no evidence of acute   diverticulitis. Normal appendix and ileocecal region. No bowel   obstruction or bowel inflammation.  PELVIC VISCERA: No uterine or adnexal abnormality.  PELVIC LYMPH NODES: No pelvic adenopathy.  PERITONEUM/ABDOMINAL WALL: No free air or ascites.  SKELETAL: No acute bony abnormality.  LUNG BASES: Clear.    IMPRESSION:     No acute pathology.            ct head     PROCEDURE DATE:  08/20/2018          INTERPRETATION:  Clinical History: Syncope. Fall.    Noncontrast CT scan of brain is performed with axial images obtained from   skull base to vertex.    Comparison: CTscan 8/8/2018.    There is cerebral volume loss, without mass effect or midline shift.  Periventricular white matter low attenuation extending into the deep   subcortical white matter tracts is most compatible with chronic   microvascular ischemic disease.    No acute parenchymal hemorrhage or large territorial infarction is noted.   No extra-axial fluid collection is demonstrated.    The visualized paranasal sinuses appear unremarkable.    Impression:    No acute intracranial hemorrhage or mass effect is noted.                INTERPRETATION:  ANKLE LEFT (MINIMUM 3 V)  HISTORY:  syncope, fall with left ankle pain  FINDINGS:    Osseous structures reveal intact cortical margins. Trabecular   architecture is normal. There are no fractures.  Ankle mortise is well maintained.  There is no appreciable soft tissue swelling.  IMPRESSION:  No acute fracture or dislocation.              < end of copied text > Retention Suture Text: Retention sutures were placed to support the closure and prevent dehiscence.

## 2023-01-19 ENCOUNTER — APPOINTMENT (OUTPATIENT)
Dept: NEUROLOGY | Facility: CLINIC | Age: 68
End: 2023-01-19
Payer: MEDICARE

## 2023-01-19 DIAGNOSIS — G40.109 LOCALIZATION-RELATED (FOCAL) (PARTIAL) SYMPTOMATIC EPILEPSY AND EPILEPTIC SYNDROMES WITH SIMPLE PARTIAL SEIZURES, NOT INTRACTABLE, W/OUT STATUS EPILEPTICUS: ICD-10-CM

## 2023-01-19 DIAGNOSIS — R51.9 HEADACHE, UNSPECIFIED: ICD-10-CM

## 2023-01-19 PROCEDURE — 99212 OFFICE O/P EST SF 10 MIN: CPT | Mod: 95

## 2023-01-19 RX ORDER — APIXABAN 5 MG/1
5 TABLET, FILM COATED ORAL
Qty: 180 | Refills: 0 | Status: DISCONTINUED | COMMUNITY
Start: 2020-09-15 | End: 2023-01-19

## 2023-01-19 NOTE — HISTORY OF PRESENT ILLNESS
[FreeTextEntry1] : 65 yr-old woman  seen 6 1/2 months ago with hx of partial seizures that began in her 20's and chronic migraine.\par \par Se has had no seizures on Topamax 100 mg twice daily.  She continues to have back pain and follows with pain management.  She had thoracic kyphoplasty several years ago.  Headaches are relieved with butalbital.\par \par She has a history of left renal artery stenosis and was told that it has progressed and she has lost her left kidney function.  Her high blood pressure is being treated by nephrologist with multiple antihypertensives.  She claims her blood pressure is currently elevated and she has follow-up appointment scheduled with her cardiologist.\par \par

## 2023-01-19 NOTE — ASSESSMENT
[FreeTextEntry1] : Her Topamax and butalbital have been renewed.  Importance of getting her blood pressure controlled was discussed.  She will return for office visit in 3 months.

## 2023-03-27 NOTE — ED ADULT NURSE NOTE - NS ED NURSE LEVEL OF CONSCIOUSNESS AFFECT
patient will need 40-60 g/d protein from supplemental shakes to meet the total daily intake goal of  g/d protein. Patient understands the importance of being compliant with the diet and vitamin/mineral protocols, as well as the complications and risks that can occur if they are non-compliant. Patient has also been educated on the pre-op clear liquid diet protocol for 1 day prior to surgery. Patient understands that failure to comply with following the pre-op clear liquid diet could result in surgery being canceled. Patient's virtual appointment for 2 week post-op nutrition education has been scheduled. At this 2 week post-op visit, RD will assess how patient is tolerating liquids. If patient is tolerating liquid diet without difficulty, RD will recommend advancement of patient's diet to soft/moist (puree) diet to provider. Patient will also see RD again at 1-month post-op to assess tolerance of soft/moist diet and advance to soft protein with soft vegetables, if soft/moist diet is tolerated without difficulty. RD will assess patient's compliance with current protocol, including diet, vitamins/minerals, protein shakes, and physical activity. Post-op diet guidelines will be reinforced. Patient provided with RD contact information, and RD is available for questions and to meet with patient outside of the 2 week and 1 month post-op visit.      Candidate for surgery: Yes     Bibiana Schilling RD
Calm

## 2023-04-11 NOTE — ED ADULT TRIAGE NOTE - NS ED NURSE BANDS TYPE
Patient notified and verbalized understanding.  
Pt called had labs done on 03/07/23 results are scanned under media on 03/23/23 looks like Kaci has reviewed labs. Pt was calling checking the status. Pt is aware Kaci is out this week. Please contact pt  
Received thyroid labs from 3/7/2022-- labs WNL--sent letter to patient-- advised him to continue methimaozle 10mg daily and call the office to schedule a follow up appointment in 3-4 months for monitoring.  RT  
The thyroid levels were not checked with these labs- and his A1c had improved slightly (but his PCP was managing this).  He was due to follow up 6 mos after his last appointment in July which would have been January.  He should schedule a follow up so we can check his thyroid labs-these levels were not included on the labs I received.  Thanks RT   
Name band;

## 2023-04-13 RX ORDER — SERTRALINE HYDROCHLORIDE 50 MG/1
50 TABLET, FILM COATED ORAL
Qty: 90 | Refills: 1 | Status: ACTIVE | COMMUNITY
Start: 2022-09-02 | End: 1900-01-01

## 2023-04-13 NOTE — BRIEF OPERATIVE NOTE - SPECIMENS
Left leg clot -pt agitated, combative  -recent psych admission reviewed. Behavior thought to be more likely related to vascular dementia than decompensated bipolar d/o  -pt not cooperative with taking po meds  -c/w haldol prn for acute agitation. Can also give low dose benzo (max 1mg q6h) if remains agitated  - starr in AM

## 2023-06-12 RX ORDER — TOPIRAMATE 100 MG/1
100 TABLET, COATED ORAL
Qty: 180 | Refills: 0 | Status: ACTIVE | COMMUNITY
Start: 2020-12-22 | End: 1900-01-01

## 2023-06-16 RX ORDER — BUTALBITAL, ACETAMINOPHEN AND CAFFEINE 300; 50; 40 MG/1; MG/1; MG/1
50-300-40 CAPSULE ORAL
Qty: 100 | Refills: 0 | Status: ACTIVE | COMMUNITY
Start: 2021-01-22 | End: 1900-01-01

## 2023-06-30 ENCOUNTER — APPOINTMENT (OUTPATIENT)
Dept: NEUROLOGY | Facility: CLINIC | Age: 68
End: 2023-06-30

## 2023-07-10 NOTE — ED ADULT TRIAGE NOTE - CCCP TRG CHIEF CMPLNT
Quality 431: Preventive Care And Screening: Unhealthy Alcohol Use - Screening: Patient not identified as an unhealthy alcohol user when screened for unhealthy alcohol use using a systematic screening method
Detail Level: Detailed
Quality 226: Preventive Care And Screening: Tobacco Use: Screening And Cessation Intervention: Patient screened for tobacco use and is an ex/non-smoker
Quality 130: Documentation Of Current Medications In The Medical Record: Current Medications Documented
difficulty urinating/back pain general

## 2023-08-07 ENCOUNTER — APPOINTMENT (OUTPATIENT)
Dept: NEUROLOGY | Facility: CLINIC | Age: 68
End: 2023-08-07

## 2023-10-06 NOTE — ED PROVIDER NOTE - NSTIMEPROVIDERCAREINITIATE_GEN_ER
Establised patient here for evaluation of bloating and gas at night. Als complains of globus sensation in throat. Usually fine during the day.  Diet consistent with fruits, veggetables, crackers with cheese, chicken, fish, pasta, and bread.  Tomatoes are a known trigger. Doesn't drink soda. No issues with coffee or alcohol. Has been taking Pantoprazole 40 mg daily for the past 6 months. Reports no improvement. States she took a few days off and did not notice a difference.  Last EGD: 01/2023 that showed reflux esophagitis that was non-bleeding, and gastric polyps. Last colon: 01/2023 that showed 30 mm polyp at the appendiceal orifice, tortuous colon, Non-bleeding, external and internal hemorrhoids. 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  She was referred to surgery for laparoscopic right hemicolectomy and appendectomy. Patient now has a transverse colonic anastomosis. She reports no post surgical complications.
21-Sep-2019 20:08

## 2023-11-03 NOTE — H&P ADULT - ENMT
Mom notified of lab results as annotated by Dr. Denney, she voiced understanding.     negative detailed exam ear L/ear R

## 2024-04-04 NOTE — ED PROVIDER NOTE - CLINICAL SUMMARY MEDICAL DECISION MAKING FREE TEXT BOX
Detail Level: Detailed
back pain, strain,  hx of chronic back pain, sees pain management, will obtain xray, give pain meds

## 2024-04-16 NOTE — ED PROVIDER NOTE - MUSCULOSKELETAL, MLM
Called pt to confirm appointment for 4/18/24 at 10:15 AM. No answer, left voicemail   
Spine appears normal, range of motion is not limited, no muscle or joint tenderness

## 2024-05-15 NOTE — PROVIDER CONTACT NOTE (CHANGE IN STATUS NOTIFICATION) - NAME OF MD/NP/PA/DO NOTIFIED:
Internist [No Acute Distress] : no acute distress [Well-Appearing] : well-appearing [Normal Outer Ear/Nose] : the outer ears and nose were normal in appearance [No Respiratory Distress] : no respiratory distress  [No Accessory Muscle Use] : no accessory muscle use [Coordination Grossly Intact] : coordination grossly intact [No Focal Deficits] : no focal deficits [Normal Gait] : normal gait [Normal Affect] : the affect was normal [Normal Insight/Judgement] : insight and judgment were intact [de-identified] : right ear wax, left distorted cone of light, narrow canals

## 2024-07-28 NOTE — BEHAVIORAL HEALTH ASSESSMENT NOTE - NSBHMEDSOTHERFT_PSY_A_CORE
80
MEDICATIONS  (STANDING):  amLODIPine   Tablet 5 milliGRAM(s) Oral daily  docusate sodium 100 milliGRAM(s) Oral three times a day  heparin  Injectable 5000 Unit(s) SubCutaneous every 12 hours  HYDROmorphone  Injectable 0.25 milliGRAM(s) IV Push once  lidocaine   Patch 1 Patch Transdermal daily  pantoprazole    Tablet 40 milliGRAM(s) Oral before breakfast  sertraline 25 milliGRAM(s) Oral daily  sodium chloride 0.9% Bolus 500 milliLiter(s) IV Bolus once  topiramate 100 milliGRAM(s) Oral two times a day    MEDICATIONS  (PRN):  acetaminophen   Tablet. 650 milliGRAM(s) Oral every 6 hours PRN Mild Pain (1 - 3)  ALPRAZolam 0.5 milliGRAM(s) Oral once PRN Anxiety  diazepam    Tablet 2 milliGRAM(s) Oral at bedtime PRN muscle spasms  HYDROmorphone  Injectable 0.5 milliGRAM(s) IV Push every 4 hours PRN Moderate Pain (4 - 6)

## 2024-08-20 NOTE — ED ADULT NURSE NOTE - DOES PATIENT HAVE ADVANCE DIRECTIVE
No

## 2024-11-04 NOTE — ED ADULT NURSE NOTE - NSFALLRSKHARMRISK_ED_ALL_ED
Duration Of Freeze Thaw-Cycle (Seconds): 5 Show Aperture Variable?: Yes Application Tool (Optional): Liquid Nitrogen Sprayer Render Note In Bullet Format When Appropriate: No Post-Care Instructions: I reviewed with the patient in detail post-care instructions. Patient is to wear sunprotection, and avoid picking at any of the treated lesions. Pt may apply Vaseline to crusted or scabbing areas. Number Of Freeze-Thaw Cycles: 1 freeze-thaw cycle Detail Level: Detailed Consent: The patient's consent was obtained including but not limited to risks of crusting, scabbing, blistering, scarring, darker or lighter pigmentary change, recurrence, incomplete removal and infection. no

## 2024-12-02 NOTE — ED PROVIDER NOTE - NSTIMEPROVIDERCAREINITIATE_GEN_ER
31-Mar-2019 20:26
General: unresponsive elderly female in no acute distress  HEENT: Normocephalic, abrasion w/ swelling over R face. Dry mucous membranes. Oropharynx clear. No lymphadenopathy.  Eyes: No scleral icterus. EOMI. ALEA.  Neck:. Soft and supple. Full ROM without pain. No midline tenderness  Cardiac: Regular rate and regular rhythm. No murmurs, rubs, gallops. Peripheral pulses 2+ and symmetric. No LE edema.  Resp: Lungs CTAB. No wheezes, rales or rhonchi.  Abd: Soft, non-tender, non-distended. No guarding or rebound. No scars, masses, or lesions.  Back: Spine midline and non-tender. No CVA tenderness.    Skin: No rashes, abrasions, or lacerations.  Neuro: AO x 0. Moves all extremities symmetrically. Motor strength and sensation grossly intact. GCS 8, NIH 12

## 2024-12-09 NOTE — BEHAVIORAL HEALTH ASSESSMENT NOTE - NSBHHPIREASON_PSY_A_CORE
Report received from PAT Alonso at [facility] [place]. Anticipating transportation via ambulance.    Consult

## 2025-03-02 NOTE — PROGRESS NOTE ADULT - NSHPATTENDINGPLANDISCUSS_GEN_ALL_CORE
Pt ventilator settings are now 320/18/25%/+5. Auto PEEP level has been slightly elevated throughout shift. Changes made throughout the night per ABG. Tried VT of 350 at 0519, PIP increased to 40, VT titrated down after. Tube moved Q2H. Assessment done Q4H. Tube remains 22 cm  at the lip. PRN neb given due to wheezing. No other respiratory interventions this shift.    Jayla Goodman, RT  
MED STAFF , OUTPATIENT PCP  , GI TEAM ,  ,
med staff  , Dr STEARNS ,GI TEAM
med staff  , Dr STEARNS ,GI TEAM, outpatient PCP 
med staff  , Dr STEARNS ,GI TEAM, outpatient PCP 
med staff , ER physician , Dr STEARNS ,GI TEAM
med staff  , Dr STEARNS ,GI TEAM

## 2025-03-14 NOTE — ED PROVIDER NOTE - OBJECTIVE STATEMENT
64yo female bib ems with nausea and vomiting. pt states her last dose of xanax was 2 days ago and she stopped it on her own and she has been vomiting all day, no seizure, no chest pain or sob, no shaking
Yes

## 2025-03-15 NOTE — ED ADULT NURSE NOTE - NS ED NURSE TRANSPORT WITH
Neurology Consult Note     NAME: Gaby Stroud   :  1962   MRN:  [id number]   DATE:  3/15/2025    Assessment and Plan:     62 yo F h/o HTN, HLD, h/o ACOM aneurysm, CVA with residual R sided weakness, depression, alcohol abuse presenting with acute on chronic worsening of R facial droop and RLE weakness on 3/14 afternoon. In ED, CT neuroimaging no significant abnormalities. MRI brain showed possible extension of L BG/corona radiata infarct in L thalamus. On exam, patient has R facial droop, dysarthria, RUE and RLE weakness. Presentation consistent with extension of L BG infarct from small vessel disease.    - Continue ASA 81 mg daily  - S/p Plavix 300 mg x1; start 75 mg daily x20 days THEN STOP PLAVIX  - Increase home lipitor to 80 mg daily  - Defer TTE; last TTE on  showed no thrombus or PFO  - A1c 5.6, LDL 59  - PT/OT/SLP as tolerated  - [facility]    We will sign off at this time.    Subjective   HPI:  Gaby Stroud is a 63 y.o. female who presents with CVA (cerebrovascular accident due to intracerebral hemorrhage) (HCC). Neurology was consulted for CVA. History obtained per patient and chart review.    Ms. Stroud presents due to acute on chronic worsening of R facial droop and RLE weakness on 3/14 afternoon. In ED, CT neuroimaging no significant abnormalities. MRI brain showed possible extension of L BG/corona radiata infarct in L thalamus. Has baseline R sided weakness from L BG infarct in January. R sided weakness had significantly improved since then but not back to normal, uses a cane or wheelchair to mobilize. Has ILR in place. Patient reports no significant improvement in symptoms since 3/14. Denies smoking. Endorses good compliance with meds.    ROS:  A comprehensive ROS was negative except as noted in the HPI above.    PMH:  Past Medical History:   Diagnosis Date    Psychiatric disorder     depresssion        MEDS:  HOME MEDS:  Prior to Admission 
cardiac monitor

## 2025-06-11 NOTE — PRE-OP CHECKLIST - ALLERGIES REVIEWED
PCN/done
Quality 47: Advance Care Plan: Advance Care Planning discussed and documented; advance care plan or surrogate decision maker documented in the medical record.
Detail Level: Detailed
Quality 226: Preventive Care And Screening: Tobacco Use: Screening And Cessation Intervention: Patient screened for tobacco use and is an ex/non-smoker